# Patient Record
Sex: FEMALE | Race: WHITE | NOT HISPANIC OR LATINO
[De-identification: names, ages, dates, MRNs, and addresses within clinical notes are randomized per-mention and may not be internally consistent; named-entity substitution may affect disease eponyms.]

---

## 2017-01-06 ENCOUNTER — APPOINTMENT (OUTPATIENT)
Dept: PULMONOLOGY | Facility: CLINIC | Age: 47
End: 2017-01-06

## 2017-01-06 VITALS
HEART RATE: 72 BPM | WEIGHT: 189 LBS | BODY MASS INDEX: 37.11 KG/M2 | RESPIRATION RATE: 15 BRPM | SYSTOLIC BLOOD PRESSURE: 120 MMHG | HEIGHT: 60 IN | TEMPERATURE: 99 F | DIASTOLIC BLOOD PRESSURE: 84 MMHG

## 2017-01-12 ENCOUNTER — APPOINTMENT (OUTPATIENT)
Dept: CARDIOLOGY | Facility: CLINIC | Age: 47
End: 2017-01-12

## 2017-01-18 ENCOUNTER — APPOINTMENT (OUTPATIENT)
Dept: CARDIOLOGY | Facility: CLINIC | Age: 47
End: 2017-01-18

## 2017-01-18 ENCOUNTER — NON-APPOINTMENT (OUTPATIENT)
Age: 47
End: 2017-01-18

## 2017-01-18 VITALS
BODY MASS INDEX: 34.36 KG/M2 | SYSTOLIC BLOOD PRESSURE: 136 MMHG | TEMPERATURE: 98.1 F | WEIGHT: 175 LBS | HEIGHT: 60 IN | DIASTOLIC BLOOD PRESSURE: 82 MMHG | OXYGEN SATURATION: 98 % | HEART RATE: 72 BPM | RESPIRATION RATE: 13 BRPM

## 2017-01-24 ENCOUNTER — TRANSCRIPTION ENCOUNTER (OUTPATIENT)
Age: 47
End: 2017-01-24

## 2017-01-24 ENCOUNTER — APPOINTMENT (OUTPATIENT)
Dept: RHEUMATOLOGY | Facility: CLINIC | Age: 47
End: 2017-01-24

## 2017-01-27 ENCOUNTER — APPOINTMENT (OUTPATIENT)
Dept: INTERNAL MEDICINE | Facility: CLINIC | Age: 47
End: 2017-01-27

## 2017-01-27 VITALS
BODY MASS INDEX: 33.98 KG/M2 | HEART RATE: 74 BPM | RESPIRATION RATE: 16 BRPM | DIASTOLIC BLOOD PRESSURE: 74 MMHG | WEIGHT: 174 LBS | SYSTOLIC BLOOD PRESSURE: 128 MMHG | TEMPERATURE: 98.4 F

## 2017-01-27 RX ORDER — CYCLOBENZAPRINE HYDROCHLORIDE 5 MG/1
5 TABLET, FILM COATED ORAL
Qty: 10 | Refills: 0 | Status: COMPLETED | COMMUNITY
Start: 2017-01-23

## 2017-02-05 ENCOUNTER — RESULT REVIEW (OUTPATIENT)
Age: 47
End: 2017-02-05

## 2017-02-21 ENCOUNTER — RX RENEWAL (OUTPATIENT)
Age: 47
End: 2017-02-21

## 2017-05-24 ENCOUNTER — MEDICATION RENEWAL (OUTPATIENT)
Age: 47
End: 2017-05-24

## 2017-06-19 ENCOUNTER — APPOINTMENT (OUTPATIENT)
Dept: ULTRASOUND IMAGING | Facility: CLINIC | Age: 47
End: 2017-06-19

## 2017-06-19 ENCOUNTER — OUTPATIENT (OUTPATIENT)
Dept: OUTPATIENT SERVICES | Facility: HOSPITAL | Age: 47
LOS: 1 days | End: 2017-06-19
Payer: COMMERCIAL

## 2017-06-19 DIAGNOSIS — R10.9 UNSPECIFIED ABDOMINAL PAIN: ICD-10-CM

## 2017-06-19 PROCEDURE — 76700 US EXAM ABDOM COMPLETE: CPT

## 2017-06-29 ENCOUNTER — RESULT REVIEW (OUTPATIENT)
Age: 47
End: 2017-06-29

## 2017-07-27 ENCOUNTER — APPOINTMENT (OUTPATIENT)
Dept: INTERNAL MEDICINE | Facility: CLINIC | Age: 47
End: 2017-07-27
Payer: COMMERCIAL

## 2017-07-27 VITALS
SYSTOLIC BLOOD PRESSURE: 122 MMHG | TEMPERATURE: 97.5 F | RESPIRATION RATE: 14 BRPM | DIASTOLIC BLOOD PRESSURE: 74 MMHG | HEART RATE: 76 BPM | WEIGHT: 187 LBS | BODY MASS INDEX: 36.52 KG/M2

## 2017-07-27 DIAGNOSIS — Z87.09 PERSONAL HISTORY OF OTHER DISEASES OF THE RESPIRATORY SYSTEM: ICD-10-CM

## 2017-07-27 PROCEDURE — 99396 PREV VISIT EST AGE 40-64: CPT | Mod: 25

## 2017-07-27 PROCEDURE — G0447 BEHAVIOR COUNSEL OBESITY 15M: CPT

## 2017-07-27 PROCEDURE — 99215 OFFICE O/P EST HI 40 MIN: CPT | Mod: 25

## 2017-07-27 PROCEDURE — 36415 COLL VENOUS BLD VENIPUNCTURE: CPT

## 2017-07-27 RX ORDER — AMOXICILLIN AND CLAVULANATE POTASSIUM 875; 125 MG/1; MG/1
875-125 TABLET, COATED ORAL
Qty: 20 | Refills: 0 | Status: DISCONTINUED | COMMUNITY
Start: 2017-02-21 | End: 2017-07-27

## 2017-07-27 RX ORDER — FLUTICASONE PROPIONATE 50 UG/1
50 SPRAY, METERED NASAL TWICE DAILY
Qty: 1 | Refills: 1 | Status: DISCONTINUED | COMMUNITY
Start: 2017-01-27 | End: 2017-07-27

## 2017-07-27 RX ORDER — AZITHROMYCIN 250 MG/1
250 TABLET, FILM COATED ORAL
Qty: 6 | Refills: 0 | Status: DISCONTINUED | COMMUNITY
Start: 2017-01-27 | End: 2017-07-27

## 2017-07-27 RX ORDER — FLUCONAZOLE 150 MG/1
150 TABLET ORAL
Qty: 1 | Refills: 0 | Status: DISCONTINUED | COMMUNITY
Start: 2017-05-02

## 2017-07-27 RX ORDER — CIPROFLOXACIN HYDROCHLORIDE 500 MG/1
500 TABLET, FILM COATED ORAL
Qty: 10 | Refills: 0 | Status: DISCONTINUED | COMMUNITY
Start: 2017-05-05

## 2017-07-29 ENCOUNTER — LABORATORY RESULT (OUTPATIENT)
Age: 47
End: 2017-07-29

## 2017-07-31 ENCOUNTER — RX RENEWAL (OUTPATIENT)
Age: 47
End: 2017-07-31

## 2017-07-31 ENCOUNTER — LABORATORY RESULT (OUTPATIENT)
Age: 47
End: 2017-07-31

## 2017-07-31 LAB — BACTERIA THROAT CULT: NORMAL

## 2017-08-03 ENCOUNTER — APPOINTMENT (OUTPATIENT)
Dept: INTERNAL MEDICINE | Facility: CLINIC | Age: 47
End: 2017-08-03
Payer: COMMERCIAL

## 2017-08-03 PROCEDURE — 96372 THER/PROPH/DIAG INJ SC/IM: CPT

## 2017-08-03 RX ORDER — CYANOCOBALAMIN 1000 UG/ML
1000 INJECTION INTRAMUSCULAR; SUBCUTANEOUS
Qty: 0 | Refills: 0 | Status: COMPLETED | OUTPATIENT
Start: 2017-08-03

## 2017-08-03 RX ADMIN — CYANOCOBALAMIN 0 MCG/ML: 1000 INJECTION INTRAMUSCULAR; SUBCUTANEOUS at 00:00

## 2017-08-31 ENCOUNTER — APPOINTMENT (OUTPATIENT)
Dept: RADIOLOGY | Facility: HOSPITAL | Age: 47
End: 2017-08-31

## 2017-08-31 ENCOUNTER — OUTPATIENT (OUTPATIENT)
Dept: OUTPATIENT SERVICES | Facility: HOSPITAL | Age: 47
LOS: 1 days | End: 2017-08-31
Payer: COMMERCIAL

## 2017-08-31 DIAGNOSIS — R05 COUGH: ICD-10-CM

## 2017-08-31 DIAGNOSIS — K21.9 GASTRO-ESOPHAGEAL REFLUX DISEASE WITHOUT ESOPHAGITIS: ICD-10-CM

## 2017-08-31 PROCEDURE — 74220 X-RAY XM ESOPHAGUS 1CNTRST: CPT

## 2017-08-31 PROCEDURE — 74220 X-RAY XM ESOPHAGUS 1CNTRST: CPT | Mod: 26

## 2017-09-05 ENCOUNTER — OUTPATIENT (OUTPATIENT)
Dept: OUTPATIENT SERVICES | Facility: HOSPITAL | Age: 47
LOS: 1 days | End: 2017-09-05
Payer: COMMERCIAL

## 2017-09-05 ENCOUNTER — APPOINTMENT (OUTPATIENT)
Dept: MRI IMAGING | Facility: CLINIC | Age: 47
End: 2017-09-05
Payer: COMMERCIAL

## 2017-09-05 DIAGNOSIS — Z00.8 ENCOUNTER FOR OTHER GENERAL EXAMINATION: ICD-10-CM

## 2017-09-05 PROCEDURE — 72197 MRI PELVIS W/O & W/DYE: CPT

## 2017-09-05 PROCEDURE — A9585: CPT

## 2017-09-05 PROCEDURE — 72197 MRI PELVIS W/O & W/DYE: CPT | Mod: 26

## 2017-09-05 PROCEDURE — C8920: CPT

## 2017-12-28 ENCOUNTER — RESULT REVIEW (OUTPATIENT)
Age: 47
End: 2017-12-28

## 2017-12-30 ENCOUNTER — RX RENEWAL (OUTPATIENT)
Age: 47
End: 2017-12-30

## 2018-01-02 ENCOUNTER — TRANSCRIPTION ENCOUNTER (OUTPATIENT)
Age: 48
End: 2018-01-02

## 2018-01-19 ENCOUNTER — APPOINTMENT (OUTPATIENT)
Dept: OTOLARYNGOLOGY | Facility: CLINIC | Age: 48
End: 2018-01-19
Payer: COMMERCIAL

## 2018-01-19 VITALS
BODY MASS INDEX: 33.04 KG/M2 | HEIGHT: 61 IN | DIASTOLIC BLOOD PRESSURE: 89 MMHG | SYSTOLIC BLOOD PRESSURE: 133 MMHG | WEIGHT: 175 LBS | HEART RATE: 72 BPM

## 2018-01-19 DIAGNOSIS — Z87.19 PERSONAL HISTORY OF OTHER DISEASES OF THE DIGESTIVE SYSTEM: ICD-10-CM

## 2018-01-19 DIAGNOSIS — Z82.61 FAMILY HISTORY OF ARTHRITIS: ICD-10-CM

## 2018-01-19 PROCEDURE — 99214 OFFICE O/P EST MOD 30 MIN: CPT | Mod: 25

## 2018-01-19 PROCEDURE — 31575 DIAGNOSTIC LARYNGOSCOPY: CPT

## 2018-01-22 ENCOUNTER — TRANSCRIPTION ENCOUNTER (OUTPATIENT)
Age: 48
End: 2018-01-22

## 2018-01-31 ENCOUNTER — TRANSCRIPTION ENCOUNTER (OUTPATIENT)
Age: 48
End: 2018-01-31

## 2018-02-17 ENCOUNTER — RX RENEWAL (OUTPATIENT)
Age: 48
End: 2018-02-17

## 2018-02-26 ENCOUNTER — MEDICATION RENEWAL (OUTPATIENT)
Age: 48
End: 2018-02-26

## 2018-03-10 DIAGNOSIS — D64.9 ANEMIA, UNSPECIFIED: ICD-10-CM

## 2018-03-10 DIAGNOSIS — T50.905A TOXIC LIVER DISEASE WITH HEPATITIS, NOT ELSEWHERE CLASSIFIED: ICD-10-CM

## 2018-03-10 DIAGNOSIS — K71.6 TOXIC LIVER DISEASE WITH HEPATITIS, NOT ELSEWHERE CLASSIFIED: ICD-10-CM

## 2018-03-10 RX ORDER — METHYLPREDNISOLONE 4 MG/1
4 TABLET ORAL
Qty: 21 | Refills: 0 | Status: DISCONTINUED | COMMUNITY
Start: 2018-01-19 | End: 2018-03-10

## 2018-03-10 RX ORDER — BENZONATATE 200 MG/1
200 CAPSULE ORAL
Qty: 60 | Refills: 0 | Status: DISCONTINUED | COMMUNITY
Start: 2017-01-27 | End: 2018-03-10

## 2018-03-10 RX ORDER — DOXYCYCLINE HYCLATE 100 MG/1
100 CAPSULE ORAL
Qty: 14 | Refills: 0 | Status: DISCONTINUED | COMMUNITY
Start: 2018-01-02 | End: 2018-03-10

## 2018-03-17 LAB
25(OH)D3 SERPL-MCNC: 8.2 NG/ML
ALBUMIN SERPL ELPH-MCNC: 3.9 G/DL
ALP BLD-CCNC: 39 U/L
ALT SERPL-CCNC: 15 U/L
ANION GAP SERPL CALC-SCNC: 12 MMOL/L
AST SERPL-CCNC: 16 U/L
BASOPHILS # BLD AUTO: 0.03 K/UL
BASOPHILS NFR BLD AUTO: 0.5 %
BILIRUB SERPL-MCNC: 0.4 MG/DL
BUN SERPL-MCNC: 9 MG/DL
CALCIUM SERPL-MCNC: 8.8 MG/DL
CHLORIDE SERPL-SCNC: 101 MMOL/L
CHOLEST SERPL-MCNC: 267 MG/DL
CHOLEST/HDLC SERPL: 3.9 RATIO
CO2 SERPL-SCNC: 25 MMOL/L
CREAT SERPL-MCNC: 0.62 MG/DL
CREAT SPEC-SCNC: 45 MG/DL
EOSINOPHIL # BLD AUTO: 0.11 K/UL
EOSINOPHIL NFR BLD AUTO: 1.8 %
GLUCOSE SERPL-MCNC: 81 MG/DL
HBA1C MFR BLD HPLC: 5.3 %
HCT VFR BLD CALC: 38.5 %
HDLC SERPL-MCNC: 69 MG/DL
HGB BLD-MCNC: 12.2 G/DL
IMM GRANULOCYTES NFR BLD AUTO: 0.3 %
LDLC SERPL CALC-MCNC: 162 MG/DL
LYMPHOCYTES # BLD AUTO: 2.37 K/UL
LYMPHOCYTES NFR BLD AUTO: 38.2 %
MAN DIFF?: NORMAL
MCHC RBC-ENTMCNC: 30.6 PG
MCHC RBC-ENTMCNC: 31.7 GM/DL
MCV RBC AUTO: 96.5 FL
MICROALBUMIN 24H UR DL<=1MG/L-MCNC: 0.4 MG/DL
MICROALBUMIN/CREAT 24H UR-RTO: 9 MG/G
MONOCYTES # BLD AUTO: 0.71 K/UL
MONOCYTES NFR BLD AUTO: 11.4 %
NEUTROPHILS # BLD AUTO: 2.97 K/UL
NEUTROPHILS NFR BLD AUTO: 47.8 %
PLATELET # BLD AUTO: 348 K/UL
POTASSIUM SERPL-SCNC: 4.4 MMOL/L
PROT SERPL-MCNC: 7.1 G/DL
RBC # BLD: 3.99 M/UL
RBC # FLD: 12.7 %
SODIUM SERPL-SCNC: 138 MMOL/L
TRIGL SERPL-MCNC: 178 MG/DL
VIT B12 SERPL-MCNC: 357 PG/ML
WBC # FLD AUTO: 6.21 K/UL

## 2018-03-19 LAB
T4 FREE SERPL-MCNC: 1.2 NG/DL
TSH SERPL-ACNC: 2.14 UIU/ML

## 2018-03-20 ENCOUNTER — APPOINTMENT (OUTPATIENT)
Dept: INTERNAL MEDICINE | Facility: CLINIC | Age: 48
End: 2018-03-20

## 2018-05-11 ENCOUNTER — APPOINTMENT (OUTPATIENT)
Dept: MRI IMAGING | Facility: CLINIC | Age: 48
End: 2018-05-11
Payer: COMMERCIAL

## 2018-05-11 ENCOUNTER — OUTPATIENT (OUTPATIENT)
Dept: OUTPATIENT SERVICES | Facility: HOSPITAL | Age: 48
LOS: 1 days | End: 2018-05-11
Payer: COMMERCIAL

## 2018-05-11 DIAGNOSIS — Z00.8 ENCOUNTER FOR OTHER GENERAL EXAMINATION: ICD-10-CM

## 2018-05-11 PROCEDURE — 72141 MRI NECK SPINE W/O DYE: CPT | Mod: 26

## 2018-05-11 PROCEDURE — 72141 MRI NECK SPINE W/O DYE: CPT

## 2018-06-13 ENCOUNTER — APPOINTMENT (OUTPATIENT)
Dept: ENDOCRINOLOGY | Facility: CLINIC | Age: 48
End: 2018-06-13

## 2018-06-15 ENCOUNTER — TRANSCRIPTION ENCOUNTER (OUTPATIENT)
Age: 48
End: 2018-06-15

## 2018-07-15 ENCOUNTER — RX RENEWAL (OUTPATIENT)
Age: 48
End: 2018-07-15

## 2018-07-20 ENCOUNTER — MEDICATION RENEWAL (OUTPATIENT)
Age: 48
End: 2018-07-20

## 2018-07-20 ENCOUNTER — RX RENEWAL (OUTPATIENT)
Age: 48
End: 2018-07-20

## 2018-08-02 ENCOUNTER — APPOINTMENT (OUTPATIENT)
Dept: CARDIOLOGY | Facility: CLINIC | Age: 48
End: 2018-08-02
Payer: COMMERCIAL

## 2018-08-02 ENCOUNTER — NON-APPOINTMENT (OUTPATIENT)
Age: 48
End: 2018-08-02

## 2018-08-02 VITALS
OXYGEN SATURATION: 100 % | HEART RATE: 74 BPM | DIASTOLIC BLOOD PRESSURE: 82 MMHG | HEIGHT: 61 IN | SYSTOLIC BLOOD PRESSURE: 146 MMHG | TEMPERATURE: 98.5 F | BODY MASS INDEX: 36.44 KG/M2 | WEIGHT: 193 LBS | RESPIRATION RATE: 14 BRPM

## 2018-08-02 PROCEDURE — 99215 OFFICE O/P EST HI 40 MIN: CPT | Mod: 25

## 2018-08-02 PROCEDURE — 93000 ELECTROCARDIOGRAM COMPLETE: CPT

## 2018-08-06 ENCOUNTER — RX RENEWAL (OUTPATIENT)
Age: 48
End: 2018-08-06

## 2018-08-09 ENCOUNTER — LABORATORY RESULT (OUTPATIENT)
Age: 48
End: 2018-08-09

## 2018-08-09 ENCOUNTER — APPOINTMENT (OUTPATIENT)
Dept: ENDOCRINOLOGY | Facility: CLINIC | Age: 48
End: 2018-08-09
Payer: COMMERCIAL

## 2018-08-09 VITALS
DIASTOLIC BLOOD PRESSURE: 86 MMHG | HEIGHT: 61 IN | WEIGHT: 194 LBS | OXYGEN SATURATION: 98 % | HEART RATE: 71 BPM | BODY MASS INDEX: 36.63 KG/M2 | SYSTOLIC BLOOD PRESSURE: 136 MMHG

## 2018-08-09 PROCEDURE — 99214 OFFICE O/P EST MOD 30 MIN: CPT

## 2018-08-09 RX ORDER — PANTOPRAZOLE 40 MG/1
40 TABLET, DELAYED RELEASE ORAL
Qty: 90 | Refills: 1 | Status: DISCONTINUED | COMMUNITY
Start: 2017-01-27 | End: 2018-08-09

## 2018-08-09 RX ORDER — OMEPRAZOLE MAGNESIUM 20 MG/1
20 TABLET, DELAYED RELEASE ORAL DAILY
Qty: 1 | Refills: 3 | Status: DISCONTINUED | COMMUNITY
Start: 2018-01-19 | End: 2018-08-09

## 2018-08-13 LAB
24R-OH-CALCIDIOL SERPL-MCNC: 76.1 PG/ML
25(OH)D3 SERPL-MCNC: 18.6 NG/ML
ALBUMIN SERPL ELPH-MCNC: 4.2 G/DL
ALP BLD-CCNC: 36 U/L
ALT SERPL-CCNC: 11 U/L
ANION GAP SERPL CALC-SCNC: 12 MMOL/L
AST SERPL-CCNC: 17 U/L
BILIRUB SERPL-MCNC: <0.2 MG/DL
BUN SERPL-MCNC: 13 MG/DL
CALCIUM SERPL-MCNC: 8.7 MG/DL
CALCIUM SERPL-MCNC: 8.7 MG/DL
CHLORIDE SERPL-SCNC: 104 MMOL/L
CO2 SERPL-SCNC: 24 MMOL/L
CREAT SERPL-MCNC: 0.62 MG/DL
GLUCOSE SERPL-MCNC: 56 MG/DL
PARATHYROID HORMONE INTACT: 112 PG/ML
POTASSIUM SERPL-SCNC: 4.6 MMOL/L
PROT SERPL-MCNC: 7.3 G/DL
SODIUM SERPL-SCNC: 140 MMOL/L
T3RU NFR SERPL: 1.19 INDEX
T4 SERPL-MCNC: 7.8 UG/DL
TSH SERPL-ACNC: 3.47 UIU/ML
TTG IGA SER IA-ACNC: 6.5 UNITS
TTG IGA SER-ACNC: NEGATIVE
TTG IGG SER IA-ACNC: <5 UNITS
TTG IGG SER IA-ACNC: NEGATIVE

## 2018-10-05 ENCOUNTER — APPOINTMENT (OUTPATIENT)
Dept: OPHTHALMOLOGY | Facility: CLINIC | Age: 48
End: 2018-10-05
Payer: COMMERCIAL

## 2018-10-05 DIAGNOSIS — H18.832 RECURRENT EROSION OF CORNEA, LEFT EYE: ICD-10-CM

## 2018-10-05 PROCEDURE — 92004 COMPRE OPH EXAM NEW PT 1/>: CPT

## 2018-10-15 ENCOUNTER — RESULT CHARGE (OUTPATIENT)
Age: 48
End: 2018-10-15

## 2018-10-17 ENCOUNTER — APPOINTMENT (OUTPATIENT)
Dept: PULMONOLOGY | Facility: CLINIC | Age: 48
End: 2018-10-17
Payer: COMMERCIAL

## 2018-10-17 ENCOUNTER — RESULT CHARGE (OUTPATIENT)
Age: 48
End: 2018-10-17

## 2018-10-17 ENCOUNTER — APPOINTMENT (OUTPATIENT)
Dept: CARDIOLOGY | Facility: CLINIC | Age: 48
End: 2018-10-17
Payer: COMMERCIAL

## 2018-10-17 VITALS
SYSTOLIC BLOOD PRESSURE: 138 MMHG | OXYGEN SATURATION: 100 % | RESPIRATION RATE: 12 BRPM | BODY MASS INDEX: 36.63 KG/M2 | HEART RATE: 69 BPM | WEIGHT: 194 LBS | DIASTOLIC BLOOD PRESSURE: 85 MMHG | HEIGHT: 61 IN

## 2018-10-17 PROCEDURE — 93015 CV STRESS TEST SUPVJ I&R: CPT

## 2018-10-17 PROCEDURE — 99244 OFF/OP CNSLTJ NEW/EST MOD 40: CPT

## 2018-10-23 ENCOUNTER — APPOINTMENT (OUTPATIENT)
Dept: ORTHOPEDIC SURGERY | Facility: CLINIC | Age: 48
End: 2018-10-23
Payer: COMMERCIAL

## 2018-10-23 VITALS — WEIGHT: 194 LBS | HEIGHT: 61 IN | BODY MASS INDEX: 36.63 KG/M2

## 2018-10-23 DIAGNOSIS — M50.20 OTHER CERVICAL DISC DISPLACEMENT, UNSPECIFIED CERVICAL REGION: ICD-10-CM

## 2018-10-23 DIAGNOSIS — M54.12 RADICULOPATHY, CERVICAL REGION: ICD-10-CM

## 2018-10-23 PROCEDURE — 99203 OFFICE O/P NEW LOW 30 MIN: CPT

## 2018-10-25 ENCOUNTER — APPOINTMENT (OUTPATIENT)
Dept: CARDIOLOGY | Facility: CLINIC | Age: 48
End: 2018-10-25
Payer: COMMERCIAL

## 2018-10-25 ENCOUNTER — NON-APPOINTMENT (OUTPATIENT)
Age: 48
End: 2018-10-25

## 2018-10-25 VITALS
HEART RATE: 64 BPM | SYSTOLIC BLOOD PRESSURE: 155 MMHG | DIASTOLIC BLOOD PRESSURE: 86 MMHG | BODY MASS INDEX: 36.63 KG/M2 | OXYGEN SATURATION: 99 % | WEIGHT: 194 LBS | RESPIRATION RATE: 12 BRPM | HEIGHT: 61 IN | TEMPERATURE: 98.1 F

## 2018-10-25 VITALS — SYSTOLIC BLOOD PRESSURE: 150 MMHG | DIASTOLIC BLOOD PRESSURE: 86 MMHG

## 2018-10-25 PROCEDURE — 99214 OFFICE O/P EST MOD 30 MIN: CPT

## 2018-10-25 PROCEDURE — 93000 ELECTROCARDIOGRAM COMPLETE: CPT

## 2018-12-21 ENCOUNTER — APPOINTMENT (OUTPATIENT)
Dept: ORTHOPEDIC SURGERY | Facility: CLINIC | Age: 48
End: 2018-12-21
Payer: COMMERCIAL

## 2018-12-21 VITALS — HEIGHT: 61 IN | WEIGHT: 194 LBS | BODY MASS INDEX: 36.63 KG/M2

## 2018-12-21 DIAGNOSIS — M79.671 PAIN IN RIGHT FOOT: ICD-10-CM

## 2018-12-21 PROCEDURE — 73630 X-RAY EXAM OF FOOT: CPT | Mod: RT

## 2018-12-21 PROCEDURE — 99214 OFFICE O/P EST MOD 30 MIN: CPT

## 2019-01-17 ENCOUNTER — APPOINTMENT (OUTPATIENT)
Dept: CARDIOLOGY | Facility: CLINIC | Age: 49
End: 2019-01-17
Payer: COMMERCIAL

## 2019-01-17 ENCOUNTER — NON-APPOINTMENT (OUTPATIENT)
Age: 49
End: 2019-01-17

## 2019-01-17 VITALS
RESPIRATION RATE: 12 BRPM | TEMPERATURE: 98.2 F | HEART RATE: 70 BPM | HEIGHT: 61 IN | DIASTOLIC BLOOD PRESSURE: 82 MMHG | OXYGEN SATURATION: 98 % | BODY MASS INDEX: 36.25 KG/M2 | WEIGHT: 192 LBS | SYSTOLIC BLOOD PRESSURE: 124 MMHG

## 2019-01-17 VITALS — SYSTOLIC BLOOD PRESSURE: 122 MMHG | DIASTOLIC BLOOD PRESSURE: 78 MMHG

## 2019-01-17 PROCEDURE — 99214 OFFICE O/P EST MOD 30 MIN: CPT | Mod: 25

## 2019-01-17 PROCEDURE — 93000 ELECTROCARDIOGRAM COMPLETE: CPT

## 2019-01-17 RX ORDER — METHYLPREDNISOLONE 4 MG/1
4 TABLET ORAL
Qty: 21 | Refills: 0 | Status: DISCONTINUED | COMMUNITY
Start: 2018-10-23 | End: 2019-01-17

## 2019-01-17 NOTE — PHYSICAL EXAM
[General Appearance - Well Developed] : well developed [Normal Appearance] : normal appearance [Well Groomed] : well groomed [General Appearance - Well Nourished] : well nourished [No Deformities] : no deformities [General Appearance - In No Acute Distress] : no acute distress [Normal Conjunctiva] : the conjunctiva exhibited no abnormalities [Eyelids - No Xanthelasma] : the eyelids demonstrated no xanthelasmas [Normal Oral Mucosa] : normal oral mucosa [No Oral Pallor] : no oral pallor [No Oral Cyanosis] : no oral cyanosis [Normal Jugular Venous A Waves Present] : normal jugular venous A waves present [Normal Jugular Venous V Waves Present] : normal jugular venous V waves present [No Jugular Venous Rich A Waves] : no jugular venous rich A waves [Respiration, Rhythm And Depth] : normal respiratory rhythm and effort [Exaggerated Use Of Accessory Muscles For Inspiration] : no accessory muscle use [Auscultation Breath Sounds / Voice Sounds] : lungs were clear to auscultation bilaterally [Heart Rate And Rhythm] : heart rate and rhythm were normal [Heart Sounds] : normal S1 and S2 [Murmurs] : no murmurs present [Abdomen Soft] : soft [Abdomen Tenderness] : non-tender [Abdomen Mass (___ Cm)] : no abdominal mass palpated [Abnormal Walk] : normal gait [Gait - Sufficient For Exercise Testing] : the gait was sufficient for exercise testing [Nail Clubbing] : no clubbing of the fingernails [Cyanosis, Localized] : no localized cyanosis [Petechial Hemorrhages (___cm)] : no petechial hemorrhages [Skin Color & Pigmentation] : normal skin color and pigmentation [] : no rash [No Venous Stasis] : no venous stasis [Skin Lesions] : no skin lesions [No Skin Ulcers] : no skin ulcer [No Xanthoma] : no  xanthoma was observed [Oriented To Time, Place, And Person] : oriented to person, place, and time [Affect] : the affect was normal [Mood] : the mood was normal [No Anxiety] : not feeling anxious

## 2019-01-17 NOTE — REVIEW OF SYSTEMS
[Shortness Of Breath] : shortness of breath [Dyspnea on exertion] : dyspnea during exertion [Negative] : Heme/Lymph [Recent Weight Gain (___ Lbs)] : no recent weight gain [Chest Pain] : no chest pain [Lower Ext Edema] : no extremity edema

## 2019-01-17 NOTE — HISTORY OF PRESENT ILLNESS
[FreeTextEntry1] : [facility] Bp readings have been much higher than here. Taking amlodipine every other day. Not taking crestor regularly. Very tired and no energy.

## 2019-01-17 NOTE — DISCUSSION/SUMMARY
[___ Month(s)] : [unfilled] month(s) [FreeTextEntry1] : The patient is a 48-year-old female strong family history of premature coronary artery disease, hypothyroidism, hyperlipidemia,hypertension with fatigue.\par #1 Htn- increase amlodipine to daily, stress test next visit\par #2 Lipids- compliance crestor, labs \par #3 Hypothyroid- increased dose, f/u endo\par #4 General- We discussed adherence to a low fat low cholesterol, diet, weight loss and regular daily exercise.\par

## 2019-01-28 ENCOUNTER — APPOINTMENT (OUTPATIENT)
Dept: INTERNAL MEDICINE | Facility: CLINIC | Age: 49
End: 2019-01-28
Payer: COMMERCIAL

## 2019-01-28 VITALS — BODY MASS INDEX: 36.25 KG/M2 | WEIGHT: 192 LBS | HEIGHT: 61 IN | HEART RATE: 79 BPM | OXYGEN SATURATION: 99 %

## 2019-01-28 DIAGNOSIS — Z00.01 ENCOUNTER FOR GENERAL ADULT MEDICAL EXAMINATION WITH ABNORMAL FINDINGS: ICD-10-CM

## 2019-01-28 PROCEDURE — 99396 PREV VISIT EST AGE 40-64: CPT | Mod: 25

## 2019-01-28 PROCEDURE — 99215 OFFICE O/P EST HI 40 MIN: CPT | Mod: 25

## 2019-01-28 PROCEDURE — G0447 BEHAVIOR COUNSEL OBESITY 15M: CPT

## 2019-01-28 PROCEDURE — G0444 DEPRESSION SCREEN ANNUAL: CPT

## 2019-01-28 PROCEDURE — G0442 ANNUAL ALCOHOL SCREEN 15 MIN: CPT

## 2019-01-29 VITALS — RESPIRATION RATE: 14 BRPM | SYSTOLIC BLOOD PRESSURE: 136 MMHG | DIASTOLIC BLOOD PRESSURE: 84 MMHG

## 2019-01-29 PROBLEM — Z00.01 ANNUAL VISIT FOR GENERAL ADULT MEDICAL EXAMINATION WITH ABNORMAL FINDINGS: Status: ACTIVE | Noted: 2019-01-29

## 2019-01-29 NOTE — COUNSELING
[ - Annual Alcohol Misuse Screening] : Annual Alcohol Misuse Screening [ - Behavioral Counseling for Obesity (Face-to-Face for 15 Minutes)] : Behavioral Counseling for Obesity (Face-to-Face for 15 Minutes) [ - Annual Depression Screening] : Annual Depression Screening

## 2019-01-29 NOTE — ASSESSMENT
[FreeTextEntry1] : 1) Annual physical: Patient presents for annual physical, influenza previously administered and PPD done. Patient will be scheduling appointment for mammogram, colonoscopy and Pap smear. Patient follows with cardiology\par 2) Fatigue: May be chronic fatigue syndrome vs possibly fibromyalgia. Patient denies any arthralgias. Check TSH, CPK and Vitamin B12. Explained sleep hygiene and starting exercise program. Discussed possibility of duloxetine\par 3) Obesity: Discussed starting elliptical bike and limiting red meat\par 4) Prediabetes: Discussed starting exercise program and limiting red meat and ice cream\par 5) Low Vitamin B12 levels: Check levels as patient states she has fatigue\par 6) Hypothyroidism: Check TSH\par 7) Hypovitaminosis D: Check levels\par 8) Hyperlipidemia: Explained limiting red meat and ice cream. Advised starting exercise program on stationary bike.

## 2019-01-29 NOTE — PHYSICAL EXAM
[No Acute Distress] : no acute distress [Well Nourished] : well nourished [Well Developed] : well developed [Well-Appearing] : well-appearing [Normal Sclera/Conjunctiva] : normal sclera/conjunctiva [PERRL] : pupils equal round and reactive to light [EOMI] : extraocular movements intact [Normal Outer Ear/Nose] : the outer ears and nose were normal in appearance [Normal Oropharynx] : the oropharynx was normal [Normal TMs] : both tympanic membranes were normal [Normal Nasal Mucosa] : the nasal mucosa was normal [No JVD] : no jugular venous distention [Supple] : supple [No Lymphadenopathy] : no lymphadenopathy [Thyroid Normal, No Nodules] : the thyroid was normal and there were no nodules present [No Respiratory Distress] : no respiratory distress  [Clear to Auscultation] : lungs were clear to auscultation bilaterally [No Accessory Muscle Use] : no accessory muscle use [Normal Rate] : normal rate  [Regular Rhythm] : with a regular rhythm [Normal S1, S2] : normal S1 and S2 [No Murmur] : no murmur heard [No Carotid Bruits] : no carotid bruits [No Abdominal Bruit] : a ~M bruit was not heard ~T in the abdomen [No Varicosities] : no varicosities [Pedal Pulses Present] : the pedal pulses are present [No Edema] : there was no peripheral edema [No Extremity Clubbing/Cyanosis] : no extremity clubbing/cyanosis [No Palpable Aorta] : no palpable aorta [Soft] : abdomen soft [Non Tender] : non-tender [Non-distended] : non-distended [No Masses] : no abdominal mass palpated [No HSM] : no HSM [Normal Bowel Sounds] : normal bowel sounds [Normal Supraclavicular Nodes] : no supraclavicular lymphadenopathy [Normal Axillary Nodes] : no axillary lymphadenopathy [Normal Posterior Cervical Nodes] : no posterior cervical lymphadenopathy [Normal Anterior Cervical Nodes] : no anterior cervical lymphadenopathy [Normal Inguinal Nodes] : no inguinal lymphadenopathy [No CVA Tenderness] : no CVA  tenderness [No Spinal Tenderness] : no spinal tenderness [No Joint Swelling] : no joint swelling [Grossly Normal Strength/Tone] : grossly normal strength/tone [No Rash] : no rash [No Skin Lesions] : no skin lesions [Normal Gait] : normal gait [Coordination Grossly Intact] : coordination grossly intact [No Focal Deficits] : no focal deficits [Normal Affect] : the affect was normal [Normal Insight/Judgement] : insight and judgment were intact [de-identified] : Will be examined by gynecologist in one week

## 2019-01-29 NOTE — HISTORY OF PRESENT ILLNESS
[FreeTextEntry1] : Patient presents for annual physical [de-identified] : Patient states she has fatigue and has decreased energy. Feels soreness in legs. States she comes home from work and needs to rest before preparing dinner. Sleeps approximately 5 hours every night and is currently on CPAP machine. Feels slight difference when sleeping more hours. Denies any muscle weakness, numbness, sleepiness or muscle fatigue.

## 2019-01-31 ENCOUNTER — NON-APPOINTMENT (OUTPATIENT)
Age: 49
End: 2019-01-31

## 2019-01-31 ENCOUNTER — APPOINTMENT (OUTPATIENT)
Dept: PULMONOLOGY | Facility: CLINIC | Age: 49
End: 2019-01-31
Payer: COMMERCIAL

## 2019-01-31 VITALS
TEMPERATURE: 98.3 F | HEIGHT: 61 IN | OXYGEN SATURATION: 100 % | WEIGHT: 195 LBS | RESPIRATION RATE: 14 BRPM | DIASTOLIC BLOOD PRESSURE: 83 MMHG | BODY MASS INDEX: 36.82 KG/M2 | SYSTOLIC BLOOD PRESSURE: 139 MMHG | HEART RATE: 68 BPM

## 2019-01-31 DIAGNOSIS — R06.83 SNORING: ICD-10-CM

## 2019-01-31 PROCEDURE — 94060 EVALUATION OF WHEEZING: CPT

## 2019-01-31 PROCEDURE — 99214 OFFICE O/P EST MOD 30 MIN: CPT | Mod: 25

## 2019-02-01 ENCOUNTER — LABORATORY RESULT (OUTPATIENT)
Age: 49
End: 2019-02-01

## 2019-02-01 ENCOUNTER — RESULT REVIEW (OUTPATIENT)
Age: 49
End: 2019-02-01

## 2019-02-03 ENCOUNTER — MESSAGE (OUTPATIENT)
Age: 49
End: 2019-02-03

## 2019-02-03 LAB
25(OH)D3 SERPL-MCNC: 20.8 NG/ML
ALBUMIN SERPL ELPH-MCNC: 4.1 G/DL
ALP BLD-CCNC: 42 U/L
ALT SERPL-CCNC: 10 U/L
ANION GAP SERPL CALC-SCNC: 10 MMOL/L
AST SERPL-CCNC: 11 U/L
BASOPHILS # BLD AUTO: 0.03 K/UL
BASOPHILS NFR BLD AUTO: 0.5 %
BILIRUB SERPL-MCNC: 0.3 MG/DL
BUN SERPL-MCNC: 12 MG/DL
CALCIUM SERPL-MCNC: 9.3 MG/DL
CHLORIDE SERPL-SCNC: 103 MMOL/L
CHOLEST SERPL-MCNC: 236 MG/DL
CHOLEST/HDLC SERPL: 3.6 RATIO
CK SERPL-CCNC: 129 U/L
CO2 SERPL-SCNC: 24 MMOL/L
CREAT SERPL-MCNC: 0.73 MG/DL
EOSINOPHIL # BLD AUTO: 0.15 K/UL
EOSINOPHIL NFR BLD AUTO: 2.6 %
GLUCOSE SERPL-MCNC: 82 MG/DL
HBA1C MFR BLD HPLC: 5.4 %
HCT VFR BLD CALC: 40.1 %
HCV AB SER QL: NONREACTIVE
HCV S/CO RATIO: 0.22 S/CO
HDLC SERPL-MCNC: 66 MG/DL
HGB BLD-MCNC: 12.9 G/DL
HIV1+2 AB SPEC QL IA.RAPID: NONREACTIVE
IMM GRANULOCYTES NFR BLD AUTO: 0.2 %
LDLC SERPL CALC-MCNC: 129 MG/DL
LYMPHOCYTES # BLD AUTO: 1.95 K/UL
LYMPHOCYTES NFR BLD AUTO: 33.7 %
MAN DIFF?: NORMAL
MCHC RBC-ENTMCNC: 31.2 PG
MCHC RBC-ENTMCNC: 32.2 GM/DL
MCV RBC AUTO: 96.9 FL
MONOCYTES # BLD AUTO: 0.67 K/UL
MONOCYTES NFR BLD AUTO: 11.6 %
NEUTROPHILS # BLD AUTO: 2.98 K/UL
NEUTROPHILS NFR BLD AUTO: 51.4 %
PLATELET # BLD AUTO: 315 K/UL
POTASSIUM SERPL-SCNC: 4.3 MMOL/L
PROT SERPL-MCNC: 6.8 G/DL
RBC # BLD: 4.14 M/UL
RBC # FLD: 13.2 %
SODIUM SERPL-SCNC: 137 MMOL/L
TRIGL SERPL-MCNC: 204 MG/DL
TSH SERPL-ACNC: 2.76 UIU/ML
VIT B12 SERPL-MCNC: 349 PG/ML
WBC # FLD AUTO: 5.79 K/UL

## 2019-02-04 ENCOUNTER — APPOINTMENT (OUTPATIENT)
Dept: ENDOCRINOLOGY | Facility: CLINIC | Age: 49
End: 2019-02-04
Payer: COMMERCIAL

## 2019-02-04 VITALS
WEIGHT: 195 LBS | SYSTOLIC BLOOD PRESSURE: 130 MMHG | HEART RATE: 87 BPM | OXYGEN SATURATION: 98 % | BODY MASS INDEX: 36.82 KG/M2 | TEMPERATURE: 98.5 F | HEIGHT: 61 IN | DIASTOLIC BLOOD PRESSURE: 70 MMHG

## 2019-02-04 PROCEDURE — 99204 OFFICE O/P NEW MOD 45 MIN: CPT

## 2019-02-04 PROCEDURE — 99214 OFFICE O/P EST MOD 30 MIN: CPT

## 2019-02-04 NOTE — HISTORY OF PRESENT ILLNESS
[FreeTextEntry1] : CC: Hypothyroidism\par This is a 48-year-old female with primary hypothyroidism, here for consultation. She reports that she was diagnosed with hypothyroidism approximately 2 years ago. She is currently on levothyroxine 75 mcg daily. She takes in the morning on an empty stomach. She complains of fatigue, feeling tired, bloating.\par She was also found to have an elevated PTH level (112). She also has a history of vitamin D insufficiency and is currently on ergocalciferol 50,000 international unit weekly.

## 2019-02-04 NOTE — ASSESSMENT
[FreeTextEntry1] : This is a 48-year-old female with primary hypothyroidism, vitamin D insufficiency, elevated PTH level.\par 1.Primary hypothyroidism\par Patient provided with a sample of Synthroid 75 mcg daily to determine if she notices a difference between generic and brand name. She will call in 2 weeks if she would like Synthroid brand sent to her pharmacy.\par 2. Vitamin D insufficiency\par Continue with ergocalciferol 50,000 international unit weekly. Compliance was advised.\par 3.High PTH level\par Likely due to vitamin D insufficiency. Will check PTH level once vitamin D is replete.

## 2019-02-04 NOTE — REVIEW OF SYSTEMS
[Fatigue] : fatigue [Recent Weight Gain (___ Lbs)] : recent [unfilled] ~Ulb weight gain [Gas/Bloating] : gas/bloating [All other systems negative] : All other systems negative

## 2019-02-04 NOTE — PHYSICAL EXAM
[Alert] : alert [No Acute Distress] : no acute distress [Well Nourished] : well nourished [Well Developed] : well developed [Healthy Appearance] : healthy appearance [Normal Sclera/Conjunctiva] : normal sclera/conjunctiva [Normal Oropharynx] : the oropharynx was normal [No Neck Mass] : no neck mass was observed [Supple] : the neck was supple [No LAD] : no lymphadenopathy [Thyroid Not Enlarged] : the thyroid was not enlarged [No Thyroid Nodules] : there were no palpable thyroid nodules [No Respiratory Distress] : no respiratory distress [Normal Rate and Effort] : normal respiratory rhythm and effort [No Accessory Muscle Use] : no accessory muscle use [Clear to Auscultation] : lungs were clear to auscultation bilaterally [Normal Rate] : heart rate was normal  [Normal S1, S2] : normal S1 and S2 [Regular Rhythm] : with a regular rhythm [No Edema] : there was no peripheral edema [Not Distended] : not distended [No Stigmata of Cushings Syndrome] : no stigmata of cushings syndrome [Normal Gait] : normal gait [Normal Insight/Judgement] : insight and judgment were intact [Normal Affect] : the affect was normal [Normal Mood] : the mood was normal

## 2019-02-04 NOTE — CONSULT LETTER
[Dear  ___] : Dear  [unfilled], [Consult Letter:] : I had the pleasure of evaluating your patient, [unfilled]. [Please see my note below.] : Please see my note below. [Consult Closing:] : Thank you very much for allowing me to participate in the care of this patient.  If you have any questions, please do not hesitate to contact me. [Sincerely,] : Sincerely, [FreeTextEntry2] : Dr Jarvis Osorio  [FreeTextEntry3] : Kimi Villarreal M.D., F.A.CPeeE.\par

## 2019-02-05 LAB
THYROGLOB AB SERPL-ACNC: <20 IU/ML
THYROPEROXIDASE AB SERPL IA-ACNC: 12 IU/ML

## 2019-02-08 ENCOUNTER — LABORATORY RESULT (OUTPATIENT)
Age: 49
End: 2019-02-08

## 2019-02-14 LAB
CELIAC DISEASE INTERPRETATION: NORMAL
CELIAC GENE PAIRS PRESENT: YES
DQ ALPHA 1: NORMAL
DQ BETA 1: NORMAL
IMMUNOGLOBULIN A (IGA): 193 MG/DL

## 2019-02-27 ENCOUNTER — EMERGENCY (EMERGENCY)
Facility: HOSPITAL | Age: 49
LOS: 1 days | End: 2019-02-27
Attending: EMERGENCY MEDICINE
Payer: COMMERCIAL

## 2019-02-27 VITALS
WEIGHT: 179.9 LBS | HEIGHT: 61 IN | SYSTOLIC BLOOD PRESSURE: 161 MMHG | HEART RATE: 95 BPM | RESPIRATION RATE: 18 BRPM | TEMPERATURE: 98 F | DIASTOLIC BLOOD PRESSURE: 95 MMHG | OXYGEN SATURATION: 100 %

## 2019-02-27 LAB
ALBUMIN SERPL ELPH-MCNC: 4.5 G/DL — SIGNIFICANT CHANGE UP (ref 3.3–5)
ALP SERPL-CCNC: 39 U/L — LOW (ref 40–120)
ALT FLD-CCNC: 13 U/L — SIGNIFICANT CHANGE UP (ref 10–45)
ANION GAP SERPL CALC-SCNC: 11 MMOL/L — SIGNIFICANT CHANGE UP (ref 5–17)
ANION GAP SERPL CALC-SCNC: 12 MMOL/L — SIGNIFICANT CHANGE UP (ref 5–17)
APTT BLD: 27.8 SEC — SIGNIFICANT CHANGE UP (ref 27.5–36.3)
AST SERPL-CCNC: 41 U/L — HIGH (ref 10–40)
BILIRUB SERPL-MCNC: 0.2 MG/DL — SIGNIFICANT CHANGE UP (ref 0.2–1.2)
BLD GP AB SCN SERPL QL: NEGATIVE — SIGNIFICANT CHANGE UP
BUN SERPL-MCNC: 10 MG/DL — SIGNIFICANT CHANGE UP (ref 7–23)
BUN SERPL-MCNC: 9 MG/DL — SIGNIFICANT CHANGE UP (ref 7–23)
CALCIUM SERPL-MCNC: 9 MG/DL — SIGNIFICANT CHANGE UP (ref 8.4–10.5)
CALCIUM SERPL-MCNC: 9.3 MG/DL — SIGNIFICANT CHANGE UP (ref 8.4–10.5)
CHLORIDE SERPL-SCNC: 103 MMOL/L — SIGNIFICANT CHANGE UP (ref 96–108)
CHLORIDE SERPL-SCNC: 106 MMOL/L — SIGNIFICANT CHANGE UP (ref 96–108)
CO2 SERPL-SCNC: 24 MMOL/L — SIGNIFICANT CHANGE UP (ref 22–31)
CO2 SERPL-SCNC: 24 MMOL/L — SIGNIFICANT CHANGE UP (ref 22–31)
CREAT SERPL-MCNC: 0.57 MG/DL — SIGNIFICANT CHANGE UP (ref 0.5–1.3)
CREAT SERPL-MCNC: 0.58 MG/DL — SIGNIFICANT CHANGE UP (ref 0.5–1.3)
GLUCOSE SERPL-MCNC: 113 MG/DL — HIGH (ref 70–99)
GLUCOSE SERPL-MCNC: 79 MG/DL — SIGNIFICANT CHANGE UP (ref 70–99)
HCT VFR BLD CALC: 37.3 % — SIGNIFICANT CHANGE UP (ref 34.5–45)
HGB BLD-MCNC: 12.8 G/DL — SIGNIFICANT CHANGE UP (ref 11.5–15.5)
INR BLD: 0.92 RATIO — SIGNIFICANT CHANGE UP (ref 0.88–1.16)
MCHC RBC-ENTMCNC: 32.2 PG — SIGNIFICANT CHANGE UP (ref 27–34)
MCHC RBC-ENTMCNC: 34.3 GM/DL — SIGNIFICANT CHANGE UP (ref 32–36)
MCV RBC AUTO: 93.9 FL — SIGNIFICANT CHANGE UP (ref 80–100)
PLATELET # BLD AUTO: 298 K/UL — SIGNIFICANT CHANGE UP (ref 150–400)
POTASSIUM SERPL-MCNC: 4.1 MMOL/L — SIGNIFICANT CHANGE UP (ref 3.5–5.3)
POTASSIUM SERPL-MCNC: 5.5 MMOL/L — HIGH (ref 3.5–5.3)
POTASSIUM SERPL-SCNC: 4.1 MMOL/L — SIGNIFICANT CHANGE UP (ref 3.5–5.3)
POTASSIUM SERPL-SCNC: 5.5 MMOL/L — HIGH (ref 3.5–5.3)
PROT SERPL-MCNC: 8.3 G/DL — SIGNIFICANT CHANGE UP (ref 6–8.3)
PROTHROM AB SERPL-ACNC: 10.5 SEC — SIGNIFICANT CHANGE UP (ref 10–12.9)
RBC # BLD: 3.97 M/UL — SIGNIFICANT CHANGE UP (ref 3.8–5.2)
RBC # FLD: 12.2 % — SIGNIFICANT CHANGE UP (ref 10.3–14.5)
RH IG SCN BLD-IMP: POSITIVE — SIGNIFICANT CHANGE UP
SODIUM SERPL-SCNC: 138 MMOL/L — SIGNIFICANT CHANGE UP (ref 135–145)
SODIUM SERPL-SCNC: 142 MMOL/L — SIGNIFICANT CHANGE UP (ref 135–145)
TROPONIN T, HIGH SENSITIVITY RESULT: <6 NG/L — SIGNIFICANT CHANGE UP (ref 0–51)
TROPONIN T, HIGH SENSITIVITY RESULT: <6 NG/L — SIGNIFICANT CHANGE UP (ref 0–51)
WBC # BLD: 8.8 K/UL — SIGNIFICANT CHANGE UP (ref 3.8–10.5)
WBC # FLD AUTO: 8.8 K/UL — SIGNIFICANT CHANGE UP (ref 3.8–10.5)

## 2019-02-27 PROCEDURE — 99284 EMERGENCY DEPT VISIT MOD MDM: CPT | Mod: GC

## 2019-02-27 PROCEDURE — 99220: CPT

## 2019-02-27 PROCEDURE — 93010 ELECTROCARDIOGRAM REPORT: CPT

## 2019-02-27 PROCEDURE — 71046 X-RAY EXAM CHEST 2 VIEWS: CPT | Mod: 26

## 2019-02-27 RX ORDER — ASPIRIN/CALCIUM CARB/MAGNESIUM 324 MG
324 TABLET ORAL ONCE
Qty: 0 | Refills: 0 | Status: COMPLETED | OUTPATIENT
Start: 2019-02-27 | End: 2019-02-27

## 2019-02-27 RX ORDER — SODIUM CHLORIDE 9 MG/ML
3 INJECTION INTRAMUSCULAR; INTRAVENOUS; SUBCUTANEOUS EVERY 8 HOURS
Qty: 0 | Refills: 0 | Status: DISCONTINUED | OUTPATIENT
Start: 2019-02-27 | End: 2019-03-03

## 2019-02-27 RX ORDER — ATORVASTATIN CALCIUM 80 MG/1
40 TABLET, FILM COATED ORAL AT BEDTIME
Qty: 0 | Refills: 0 | Status: DISCONTINUED | OUTPATIENT
Start: 2019-02-27 | End: 2019-03-03

## 2019-02-27 RX ORDER — SODIUM,POTASSIUM PHOSPHATES 278-250MG
1 POWDER IN PACKET (EA) ORAL ONCE
Qty: 0 | Refills: 0 | Status: COMPLETED | OUTPATIENT
Start: 2019-02-27 | End: 2019-02-27

## 2019-02-27 RX ORDER — ACETAMINOPHEN 500 MG
975 TABLET ORAL ONCE
Qty: 0 | Refills: 0 | Status: COMPLETED | OUTPATIENT
Start: 2019-02-27 | End: 2019-02-27

## 2019-02-27 RX ORDER — AMLODIPINE BESYLATE 2.5 MG/1
2.5 TABLET ORAL DAILY
Qty: 0 | Refills: 0 | Status: DISCONTINUED | OUTPATIENT
Start: 2019-02-27 | End: 2019-03-03

## 2019-02-27 RX ORDER — LEVOTHYROXINE SODIUM 125 MCG
75 TABLET ORAL DAILY
Qty: 0 | Refills: 0 | Status: DISCONTINUED | OUTPATIENT
Start: 2019-02-27 | End: 2019-03-03

## 2019-02-27 RX ADMIN — Medication 324 MILLIGRAM(S): at 15:12

## 2019-02-27 RX ADMIN — Medication 975 MILLIGRAM(S): at 21:31

## 2019-02-27 RX ADMIN — Medication 1 PACKET(S): at 22:13

## 2019-02-27 RX ADMIN — ATORVASTATIN CALCIUM 40 MILLIGRAM(S): 80 TABLET, FILM COATED ORAL at 22:12

## 2019-02-27 RX ADMIN — SODIUM CHLORIDE 3 MILLILITER(S): 9 INJECTION INTRAMUSCULAR; INTRAVENOUS; SUBCUTANEOUS at 23:54

## 2019-02-27 RX ADMIN — Medication 975 MILLIGRAM(S): at 22:01

## 2019-02-27 RX ADMIN — AMLODIPINE BESYLATE 2.5 MILLIGRAM(S): 2.5 TABLET ORAL at 22:12

## 2019-02-27 NOTE — ED PROVIDER NOTE - OBJECTIVE STATEMENT
49 y.o. female coming in with intermittent chest pain over the past several months associated with some shortness of breath that has been progressively getting worse.  Pain is described as pressure but also sharp with radiation to the left shoulder.  Pain and shortness of breath are both worse with exertion, stopping her in her tracks earlier today.  No cough, no fevers, no chills.  No diaphoresis, abd pain, nausea or vomiting.

## 2019-02-27 NOTE — ED CDU PROVIDER INITIAL DAY NOTE - PROGRESS NOTE DETAILS
CDU PROGRESS NOTE DESHAWN WEI: Pt resting comfortably, NAD, No events on telemetry. Reports having mild headache, will order Tylenol 975mg po and continue to monitor. CDU PROGRESS NOTE PA ERIBERTO: Pt resting comfortably, without complaint. NAD, VSS. No events on telemetry. Will continue to monitor.

## 2019-02-27 NOTE — CONSULT NOTE ADULT - NSHPATTENDINGPLANDISCUSS_GEN_ALL_CORE
Plan discussed with cardiology fellow, Dr. RADHIKA Eden; patient seen and examined.       I was physically present for the key portions of the evaluation and management (E/M) service provided.    I agree with the above history, physical, and plan which I have reviewed and edited where appropriate.

## 2019-02-27 NOTE — ED PROVIDER NOTE - ATTENDING CONTRIBUTION TO CARE
Dr. Graham (Attending Physician)  I performed a history and physical exam of the patient and discussed their management with the advanced care provider. I reviewed the advanced care provider's note and agree with the documented findings and plan of care. My medical decision making and objective findings are found above.

## 2019-02-27 NOTE — ED CDU PROVIDER INITIAL DAY NOTE - ATTENDING CONTRIBUTION TO CARE
49 y.o. female coming in with intermittent chest pain over the past several months associated with some shortness of breath that has been progressively getting worse.  Pain is described as pressure but also sharp with radiation to the left shoulder.  Pain and shortness of breath are both worse with exertion, stopping her in her tracks earlier today.  No cough, no fevers, no chills.  No diaphoresis, abd pain, nausea or vomiting.  PE lungs CTA. no leg swelling. no abd TTP.   labs nonactionable. trop negative. cxr negative. ekg with TWI. cards consulted, patient follows with Dr. gaffney.   no known PE risk factors, vs stable.   patient transferred to CDU for serial trops, tele monitoring, and provocative stress testing.

## 2019-02-27 NOTE — CONSULT NOTE ADULT - SUBJECTIVE AND OBJECTIVE BOX
Patient seen and evaluated at bedside    Chief Complaint: chest pain    HPI:  Ms Stapleton is a 50 yo F with a PMH significant for HTN, HLD, hypothyroidism, strong family history of heart disease who presents after 2 episodes of chest pain. Patient notes she has been dealing with chest discomfort for months and has been following with Dr Castaneda. She had an exercise stress test in Oct 2018 which was significant for 2mm horizontal depressions in V3-V6. She recently followed with Dr Castaneda in Jan 2019 and plan was to repeat stress test at next visit. She states that over the past week she has continued to experience chest discomfort that "wraps around to the back." Today, she walked down and back up a flight of stairs and when she got to the top she experienced sharp, 9/10, substernal, nonradiating chest pain. She walked to her car without any change in pain but by the time she got to her car she was winded and felt SOB. Her symptoms subsided and she went to her office desk at which point she experienced the discomfort again with radiation up to the shoulder. She says the symptoms are similar to her prior discomfort but were more severe so she came to the ED. She endorses associated palpitations but denies nausea, vomiting.     In the ED,       PMHx:   Hypothyroid  Diverticulitis  LBP (Low Back Pain)  History of Nasal Septoplasty  h/o Heart Palpitations  Hyperlipidemia on no meds now  Irritable Bowel Syndrome  Stress Incontinence      PSHx:   h/o dental implant  History of Colonoscopy  h/o  Endoscopy  C Section - x 1 - 1994      Allergies:  iodine (Hives)  Zosyn (Urticaria)      Home Meds:    Current Medications:       FAMILY HISTORY:  Family history of colon cancer (Grandparent)      Social History:  Smoking History:  Alcohol Use:  Drug Use:    REVIEW OF SYSTEMS:  CONSTITUTIONAL: No weakness, fevers or chills  EYES/ENT: No visual changes;  No dysphagia  NECK: No pain or stiffness  RESPIRATORY: No cough, wheezing, hemoptysis; No shortness of breath  CARDIOVASCULAR: No chest pain or palpitations; No lower extremity edema  GASTROINTESTINAL: No abdominal or epigastric pain. No nausea, vomiting, or hematemesis; No diarrhea or constipation. No melena or hematochezia.  BACK: No back pain  GENITOURINARY: No dysuria, frequency or hematuria  NEUROLOGICAL: No numbness or weakness  SKIN: No itching, burning, rashes, or lesions   All other review of systems is negative unless indicated above.    Physical Exam:  T(F): 98.2 (02-27), Max: 98.2 (02-27)  HR: 98 (02-27) (95 - 99)  BP: 154/90 (02-27) (154/90 - 162/95)  RR: 18 (02-27)  SpO2: 99% (02-27)  GENERAL: No acute distress, well-developed  HEAD:  Atraumatic, Normocephalic  ENT: EOMI, PERRLA, conjunctiva and sclera clear, Neck supple, No JVD, moist mucosa  CHEST/LUNG: Clear to auscultation bilaterally; No wheeze, equal breath sounds bilaterally   BACK: No spinal tenderness  HEART: Regular rate and rhythm; No murmurs, rubs, or gallops  ABDOMEN: Soft, Nontender, Nondistended; Bowel sounds present  EXTREMITIES:  No clubbing, cyanosis, or edema  PSYCH: Nl behavior, nl affect  NEUROLOGY: AAOx3, non-focal, cranial nerves intact  SKIN: Normal color, No rashes or lesions  LINES:    Cardiovascular Diagnostic Testing:    ECG: Personally reviewed:    Echo: Personally reviewed:    Stress Testing:    Cath:    Imaging:    CXR: Personally reviewed    Labs: Personally reviewed                        12.8   8.8   )-----------( 298      ( 27 Feb 2019 14:45 )             37.3     02-27    142  |  106  |  9   ----------------------------<  79  4.1   |  24  |  0.58    Ca    9.0      27 Feb 2019 18:14  Phos  2.3     02-27  Mg     2.2     02-27    TPro  8.3  /  Alb  4.5  /  TBili  0.2  /  DBili  x   /  AST  41<H>  /  ALT  13  /  AlkPhos  39<L>  02-27    PT/INR - ( 27 Feb 2019 14:45 )   PT: 10.5 sec;   INR: 0.92 ratio         PTT - ( 27 Feb 2019 14:45 )  PTT:27.8 sec    CARDIAC MARKERS ( 27 Feb 2019 18:14 )  <6 ng/L / x     / x     / x     / x     / x      CARDIAC MARKERS ( 27 Feb 2019 14:45 )  <6 ng/L / x     / x     / x     / x     / x Patient seen and evaluated at bedside    Chief Complaint: chest pain    HPI:  Ms Stapleton is a 50 yo F with a PMH significant for HTN, HLD, hypothyroidism, strong family history of heart disease who presents after 2 episodes of chest pain. Patient notes she has been dealing with chest discomfort for months and has been following with Dr Castaneda. She had an exercise stress test in Oct 2018 which was significant for 2mm horizontal depressions in V3-V6. She recently followed with Dr Castaneda in 2019 and plan was to repeat stress test at next visit. She states that over the past week she has continued to experience chest discomfort that "wraps around to the back." Today, she walked down and back up a flight of stairs and when she got to the top she experienced sharp, 9/10, substernal, nonradiating chest pain. She walked to her car without any change in pain but by the time she got to her car she was winded and felt SOB. Her symptoms subsided and she went to her office desk at which point she experienced the discomfort again with radiation up to the shoulder. She says the symptoms are similar to her prior discomfort but were more severe so she came to the ED. She endorses associated palpitations but denies nausea, vomiting.    In the ED, T 36.7, P 95, /95, RR 18, O2 sat 100. EKG NSR 99, baseline subtle ST depressions I, II, aVF, V3-V6. HsTrop < 6 x 2. Patient asymptomatic.      PMHx:   Hypothyroid  Diverticulitis  LBP (Low Back Pain)  History of Nasal Septoplasty  h/o Heart Palpitations  Hyperlipidemia on no meds now  Irritable Bowel Syndrome  Stress Incontinence      PSHx:   h/o dental implant  History of Colonoscopy  h/o  Endoscopy  C Section - x 1994      Allergies:  iodine (Hives)  Zosyn (Urticaria)      Home Meds:  amlodipine 2.5mg daily  rosuvastatin 10mg daily  synthroid 75mcg  vit D    FAMILY HISTORY:  Father- MI at age 45,  of "arrhythmia" at 69  First cousin: MI at age 46  3 uncles with MI at age 40, 49, 51      Social History:  Smoking History: denies  Alcohol Use: rarely  Drug Use: denies    REVIEW OF SYSTEMS:  CONSTITUTIONAL: No weakness, fevers or chills  EYES/ENT: No visual changes;  No dysphagia  NECK: No pain or stiffness  RESPIRATORY: No cough, wheezing, hemoptysis; No shortness of breath  CARDIOVASCULAR: No chest pain or palpitations; No lower extremity edema  GASTROINTESTINAL: No abdominal or epigastric pain. No nausea, vomiting, or hematemesis; No diarrhea or constipation. No melena or hematochezia.  BACK: No back pain  GENITOURINARY: No dysuria, frequency or hematuria  NEUROLOGICAL: No numbness or weakness  SKIN: No itching, burning, rashes, or lesions   All other review of systems is negative unless indicated above.    Physical Exam:  T(F): 98.2 (), Max: 98.2 ()  HR: 98 () (95 - 99)  BP: 154/90 () (154/90 - 162/95)  RR: 18 ()  SpO2: 99% ()  GENERAL: No acute distress, obese  HEAD:  Atraumatic, Normocephalic  ENT: EOMI, PERRLA, conjunctiva and sclera clear, Neck supple, No JVD, moist mucosa  CHEST/LUNG: Clear to auscultation bilaterally; No wheeze, equal breath sounds bilaterally   BACK: No spinal tenderness  HEART: Regular rate and rhythm; No murmurs, rubs, or gallops  ABDOMEN: Soft, Nontender, Nondistended; Bowel sounds present  EXTREMITIES:  No clubbing, cyanosis, or edema  PSYCH: Nl behavior, nl affect  NEUROLOGY: AAOx3, non-focal, cranial nerves intact  SKIN: Normal color, No rashes or lesions    Cardiovascular Diagnostic Testing:    ECG: Personally reviewed: NSR 99, subtle ST depressions  I, II, aVF, V3-V6.    Echo: Personally reviewed:  16  mild MR  EF 55%, basal inferior wall hypokinesis  normal RV function    Stress Testin METS. No chest pain with exercise. Hypertensive to 222/100. ST depressions 2mm horizontal in leads V3-V6 5:15 min into exercise, all changes resolved by 10 minutes of recovery.    Imaging:    CXR: Personally reviewed. Normal    Labs: Personally reviewed                        12.8   8.8   )-----------( 298      ( 2019 14:45 )             37.3         142  |  106  |  9   ----------------------------<  79  4.1   |  24  |  0.58    Ca    9.0      2019 18:14  Phos  2.3       Mg     2.2         TPro  8.3  /  Alb  4.5  /  TBili  0.2  /  DBili  x   /  AST  41<H>  /  ALT  13  /  AlkPhos  39<L>      PT/INR - ( 2019 14:45 )   PT: 10.5 sec;   INR: 0.92 ratio    PTT - ( 2019 14:45 )  PTT:27.8 sec    CARDIAC MARKERS ( 2019 18:14 )  <6 ng/L / x     / x     / x     / x     / x      CARDIAC MARKERS ( 2019 14:45 )  <6 ng/L / x     / x     / x     / x     / x James Stapleton is a 48 yo F with a PMH significant for HTN, HLD, and a strong family history of heart disease.  She presents after 2 episodes of chest pain. Patient notes she has been dealing with chest discomfort for several months and has been following with Dr Castaneda. She had an exercise stress test in Oct 2018 which reported 2 mm horizontal depressions in V3-V6. She saw Dr Castaneda in 2019; plan was to repeat stress test at next visit.   Patient states that, over the past week, she has continued to experience chest discomfort that "wraps around to the back." Today, she walked down and back up a flight of stairs and when she got to the top she experienced sharp, 9/10, substernal, non-radiating chest pain. She walked to her car without any change in pain but by the time she got to her car she was winded and felt SOB. Her symptoms subsided and she went to her office desk at which point she experienced the discomfort again with radiation up to the shoulder. She says the symptoms are similar to her prior discomfort but were more severe so she came to the ED. She describes associated palpitations but denies nausea, vomiting.  Pain free at present.      PMHx:   Hypothyroid  Diverticulitis  LBP (Low Back Pain)  History of Nasal Septoplasty  h/o Heart Palpitations  Hyperlipidemia on no meds now  Irritable Bowel Syndrome  Stress Incontinence      PSHx:   h/o dental implant  History of Colonoscopy  h/o  Endoscopy  C Section - x 1994      Allergies:  iodine (Hives)  Zosyn (Urticaria)      Home Meds:  amlodipine 2.5mg daily  rosuvastatin 10mg daily  synthroid 75mcg  vit D    FAMILY HISTORY:  Father- MI at age 45,  of "arrhythmia" at 69  First cousin: MI at age 46  3 uncles with MI at age 40, 49, 51      Social History:  Smoking History: denies  Alcohol Use: rarely  Drug Use: denies    REVIEW OF SYSTEMS:  CONSTITUTIONAL: No weakness, fevers or chills  EYES/ENT: No visual changes;  No dysphagia  NECK: No pain or stiffness  RESPIRATORY: No cough, wheezing, hemoptysis; No shortness of breath  CARDIOVASCULAR: No chest pain or palpitations; No lower extremity edema  GASTROINTESTINAL: No abdominal or epigastric pain. No nausea, vomiting, or hematemesis; No diarrhea or constipation. No melena or hematochezia.  BACK: No back pain  GENITOURINARY: No dysuria, frequency or hematuria  NEUROLOGICAL: No numbness or weakness  SKIN: No itching, burning, rashes, or lesions   All other review of systems is negative unless indicated above.    Physical Exam:  T(F): 98.2 (), Max: 98.2 ()  HR: 98 () (95 - 99)  BP: 154/90 () (154/90 - 162/95)  RR: 18 ()  SpO2: 99% ()    GENERAL: No acute distress, obese  HEAD:  Atraumatic, Normocephalic  ENT: EOMI, PERRLA, conjunctiva and sclera clear, Neck supple, No JVD, moist mucosa  CHEST/LUNG: Clear to auscultation bilaterally; No wheeze, equal breath sounds bilaterally   BACK: No spinal tenderness  HEART: Regular rate and rhythm; No murmurs, rubs, or gallops  ABDOMEN: Soft, Nontender, Nondistended; Bowel sounds present  EXTREMITIES:  No clubbing, cyanosis, or edema  PSYCH: Nl behavior, nl affect  NEUROLOGY: AAOx3, non-focal, cranial nerves intact  SKIN: Normal color, No rashes or lesions      ECG:  NSR at 99 bpm.  Normal intervals and axis.  Voltage suggest LVH; NS ST/T changes vs. LVH      Echo:   16  mild MR  EF 55%, basal inferior wall hypokinesis  normal RV function      Stress Testing: 10/2018  8 METS. No chest pain with exercise. Hypertensive to 222/100. ST depressions 2mm horizontal in leads V3-V6 5:15 min into exercise, all changes resolved by 10 minutes of recovery.      CXR:  Normal      Labs:                      12.8   8.8   )-----------( 298      ( 2019 14:45 )             37.3       142  |  106  |  9   ----------------------------<  79  4.1   |  24  |  0.58    Ca    9.0      2019 18:14  Phos  2.3       Mg     2.2         TPro  8.3  /  Alb  4.5  /  TBili  0.2  /  DBili  x   /  AST  41<H>  /  ALT  13  /  AlkPhos  39<L>      PT/INR - ( 2019 14:45 )   PT: 10.5 sec;   INR: 0.92 ratio    PTT - ( 2019 14:45 )  PTT:27.8 sec    CARDIAC MARKERS ( 2019 18:14 )  <6 ng/L / x     / x     / x     / x     / x      CARDIAC MARKERS ( 2019 14:45 )  <6 ng/L / x     / x     / x     / x     / x

## 2019-02-27 NOTE — CONSULT NOTE ADULT - ASSESSMENT
Ms Stapleton is a     #Stable angina  EKG at baseline. HsTrop < 6. Currently without chest pain.  - monitor on telemetry overnight  - trend troponins to peak  - EKG PRN chest pain Ms Stapleton is a 50 yo F with a PMH significant for HTN, HLD, hypothyroidism, strong family history of heart disease who presents after 2 episodes of chest pain, EKG at baseline, HsTrop neg x 2.    #Stable angina  Chest pain is substernal, exertional and relieved with rest. She has been dealing with this pain for months. EKG at baseline. HsTrop < 6. Currently without chest pain. Patient had normal nuclear stress test in 2018 and recently had 2mm ST depressions V3-V6 on treadmill stress test in Oct 2018. She was planned for repeat stress as an outpatient.  - monitor on telemetry overnight  - trend troponins to peak  - EKG PRN chest pain  - will plan for likely nuclear stress test in am to rule out CAD. James Stapleton is a 48 yo F with hx. of HTN, HLD, and strong family history of heart disease.  Presents after 2 episodes of chest pain, EKG at baseline, HsTrop neg x 2.      REC:    #1. Chest pain - no evidence of MI.    - monitor on telemetry overnight  - trend troponins to peak  - EKG PRN chest pain  - will plan for likely nuclear stress test in am to rule out CAD.    2.  HTN - continue amlodipine    3.  HLD - continue statin      Kenny Eden M.D.  Cardiology fellow  [id number]    Gama Gentile M.D.  Cardiology Consult Service  [phone] or [phone]

## 2019-02-27 NOTE — ED PROVIDER NOTE - CLINICAL SUMMARY MEDICAL DECISION MAKING FREE TEXT BOX
Dr. Graham (Attending Physician)  exertional chest pain and shortness of breath ho abnormal stress test.  will check cardiac enzymes likely cdu for coronary cta. d/w cards.

## 2019-02-27 NOTE — ED CDU PROVIDER INITIAL DAY NOTE - OBJECTIVE STATEMENT
49 y.o. female coming in with intermittent chest pain over the past several months associated with some shortness of breath that has been progressively getting worse.  Pain is described as pressure but also sharp with radiation to the left shoulder.  Pain and shortness of breath are both worse with exertion, stopping her in her tracks earlier today.  No cough, no fevers, no chills.  No diaphoresis, abd pain, nausea or vomiting. Patient is 49 y.o female w/ reported PMH significant for HTN, HLD, hypothyroidism, strong family history of heart disease who presents after 2 episodes of chest pain. Patient notes she has been dealing with chest discomfort for months and has been following with Dr Castaneda. She had an exercise stress test in Oct 2018 which was significant for 2mm horizontal depressions in V3-V6. She recently followed with Dr Castaneda in Jan 2019 and plan was to repeat stress test at next visit. She states that over the past week she has continued to experience chest discomfort that "wraps around to the back." Today, she walked down and back up a flight of stairs and when she got to the top she experienced sharp, 9/10, substernal, nonradiated chest pain.  In ED, Patient had Troponin x 2 negative, ekg no signs of acute ischemia, chest x ray negative for acute pathology. Pt was evaluated by Cardiology and sent to CDU for telemetry monitoring, Nuclear stress testing r/o CAD.

## 2019-02-27 NOTE — ED ADULT NURSE NOTE - CHPI ED NUR SYMPTOMS NEG
no nausea/no chest pain/no fever/no congestion/no chills/no diaphoresis/no syncope/no dizziness/no vomiting/no back pain/no shortness of breath

## 2019-02-27 NOTE — ED CDU PROVIDER INITIAL DAY NOTE - DETAILS
CHEST PAIN  -[facility]  -ANISHAMI  -[facility] EVAL  -STRESS TEST  -CASE D/W ATTENDING CHEST PAIN  -[facility]  -[facility]  -[facility] EVAL  -STRESS TEST  -CASE D/W ATTENDING LIZ

## 2019-02-27 NOTE — ED ADULT NURSE NOTE - OBJECTIVE STATEMENT
49 year old female presented to ED with c/o of sharp intermittent midsternal chest pain x1 week. hx HTN, IBS, diverticulitis. pt denies current CP, SOB, nausea/vomiting, numbness/tingling, fever, cough, chills, dizziness, headache, blurred vision, neuro intact. pt a&ox3, lung sounds clear, heart rate tachycardic (~104), EKG completed handed to MD, on cardiac monitor, abdomen soft nontender nondistended to palp. skin intact. IV in right AC 18G and patent. pt currently resting in bed with MD and RN at bedside. Will continue to monitor and assess while offering support and reassurance.

## 2019-02-27 NOTE — ED PROVIDER NOTE - PMH
Diverticulitis    h/o Heart Palpitations    History of Nasal Septoplasty    Hypothyroid    Irritable Bowel Syndrome

## 2019-02-28 VITALS
DIASTOLIC BLOOD PRESSURE: 80 MMHG | HEART RATE: 67 BPM | SYSTOLIC BLOOD PRESSURE: 124 MMHG | RESPIRATION RATE: 18 BRPM | OXYGEN SATURATION: 97 % | TEMPERATURE: 99 F

## 2019-02-28 PROCEDURE — A9500: CPT

## 2019-02-28 PROCEDURE — G0378: CPT

## 2019-02-28 PROCEDURE — 84100 ASSAY OF PHOSPHORUS: CPT

## 2019-02-28 PROCEDURE — 93005 ELECTROCARDIOGRAM TRACING: CPT | Mod: 76

## 2019-02-28 PROCEDURE — 86850 RBC ANTIBODY SCREEN: CPT

## 2019-02-28 PROCEDURE — 99284 EMERGENCY DEPT VISIT MOD MDM: CPT | Mod: 25

## 2019-02-28 PROCEDURE — 80048 BASIC METABOLIC PNL TOTAL CA: CPT

## 2019-02-28 PROCEDURE — 86900 BLOOD TYPING SEROLOGIC ABO: CPT

## 2019-02-28 PROCEDURE — 93017 CV STRESS TEST TRACING ONLY: CPT

## 2019-02-28 PROCEDURE — 85610 PROTHROMBIN TIME: CPT

## 2019-02-28 PROCEDURE — 93018 CV STRESS TEST I&R ONLY: CPT

## 2019-02-28 PROCEDURE — 99217: CPT

## 2019-02-28 PROCEDURE — 84443 ASSAY THYROID STIM HORMONE: CPT

## 2019-02-28 PROCEDURE — 86901 BLOOD TYPING SEROLOGIC RH(D): CPT

## 2019-02-28 PROCEDURE — 78452 HT MUSCLE IMAGE SPECT MULT: CPT

## 2019-02-28 PROCEDURE — 83735 ASSAY OF MAGNESIUM: CPT

## 2019-02-28 PROCEDURE — 71046 X-RAY EXAM CHEST 2 VIEWS: CPT

## 2019-02-28 PROCEDURE — 78452 HT MUSCLE IMAGE SPECT MULT: CPT | Mod: 26

## 2019-02-28 PROCEDURE — 80053 COMPREHEN METABOLIC PANEL: CPT

## 2019-02-28 PROCEDURE — 85730 THROMBOPLASTIN TIME PARTIAL: CPT

## 2019-02-28 PROCEDURE — 84484 ASSAY OF TROPONIN QUANT: CPT

## 2019-02-28 PROCEDURE — 85027 COMPLETE CBC AUTOMATED: CPT

## 2019-02-28 PROCEDURE — 93016 CV STRESS TEST SUPVJ ONLY: CPT

## 2019-02-28 RX ADMIN — SODIUM CHLORIDE 3 MILLILITER(S): 9 INJECTION INTRAMUSCULAR; INTRAVENOUS; SUBCUTANEOUS at 06:42

## 2019-02-28 RX ADMIN — Medication 75 MICROGRAM(S): at 06:42

## 2019-02-28 NOTE — ED CDU PROVIDER DISPOSITION NOTE - PLAN OF CARE
1. Follow up with your PMD and cardiologist Dr. Castaneda within 48-72 hours.   2. Show copies of your reports given to you.  Take all of your other medications as previously prescribed.   3. Worsening or continued chest pain, shortness of breath, weakness, return to ED. 1.  Stay Hydrated  2. Continue taking all current home medications.  Increase Amlodipine to 5mgs  3.  Please follow up with your Primary care provider in 1-2 (Please bring all of your results with you)      Follow up with your cardiologist within one week of discharge  4.  Return to the ER for worsening Chest Pain, Shortness of breath or any other concerning symptoms.

## 2019-02-28 NOTE — ED CDU PROVIDER DISPOSITION NOTE - NSFOLLOWUPINSTRUCTIONS_ED_ALL_ED_FT
1.  Stay Hydrated  2. Continue taking all current home medications.  Increase Amlodipine to 5mgs  3.  Please follow up with your Primary care provider in 1-2 (Please bring all of your results with you)      Follow up with your cardiologist within one week of discharge  4.  Return to the ER for worsening Chest Pain, Shortness of breath or any other concerning symptoms.

## 2019-02-28 NOTE — ED CDU PROVIDER DISPOSITION NOTE - ATTENDING CONTRIBUTION TO CARE
CDU Attending Note -- Pt seen and examined at bedside.  Case discussed c CDU PA.  Pt comfortable, asymptomatic, and has good follow up.  Normal stress.  Pain free throughout CDU course and at time of d/c.  VSS and no ectopy on tele.  D/C c strict return precautions.  --BMM

## 2019-02-28 NOTE — ED CDU PROVIDER SUBSEQUENT DAY NOTE - PROGRESS NOTE DETAILS
CDU PROGRESS NOTE PA ERIBERTO: Pt resting comfortably, without complaint. NAD, VSS. No events on telemetry. Patient seen at bedside in NAD.  VSS.  Patient resting comfortably without complaints. No cardiac event noted on tele overnight.  Patient feeling well.  Denies CP/SOB.  Awaiting Stress test.  -Figueroa Cormier PA-C Patient at stress.  -Figueroa Cormier PA-C Stress test with a normal perfusion.  Discussed case with cardiology fellow who recommends discharge with close outpatient follow up and increasing amlodipine to 5mgs.  -Figueroa Cormier PA-C

## 2019-02-28 NOTE — PROGRESS NOTE ADULT - NSHPATTENDINGPLANDISCUSS_GEN_ALL_CORE
Plan discussed with cardiology fellow, Dr. ESPERANZA Stockton; patient seen and examined.       I was physically present for the key portions of the evaluation and management (E/M) service provided.    I agree with the above history, physical, and plan which I have reviewed and edited where appropriate.

## 2019-02-28 NOTE — PROGRESS NOTE ADULT - ASSESSMENT
Ms Stapleton is a 50 yo F with a PMH significant for HTN, HLD, hypothyroidism, strong family history of heart disease who presents after 2 episodes of chest pain, EKG at baseline, HsTrop neg x 2.    #Stable angina: Chest pain is substernal, exertional and relieved with rest. She has been dealing with this pain for months. EKG at baseline. HsTrop < 6. Currently without chest pain. Patient had normal nuclear stress test in 2018 and recently had 2mm ST depressions V3-V6 on treadmill stress test in Oct 2018. She was planned for repeat stress as an outpatient.  - monitor on telemetry overnight  - EKG PRN chest pain  - Pt has contrast allergy, so will plan for likely nuclear stress test today to rule out CAD    El Stockton MD  Cardiology Fellow  #[id number] Ms Stapleton is a 50 yo F with a PMH significant for HTN, HLD, hypothyroidism, strong family history of heart disease who presents after 2 episodes of chest pain, EKG at baseline, HsTrop neg x 2.    #1.  Chest pain, r/o mi neg.  - continue telemetry overnight  - EKG PRN chest pain  - Pt has contrast allergy, so will plan for likely nuclear stress test today to rule out CAD    #2.  HTN - continue amlodipine    #3.  HLD  - continue statin    El Stockton MD  Cardiology Fellow  #[id number]    Gama Gentile M.D.  Cardiology Consult Service  [phone] or [phone]

## 2019-02-28 NOTE — ED CDU PROVIDER SUBSEQUENT DAY NOTE - HISTORY
CDU PROGRESS NOTE DESHAWN WEI: No interval change from previous note. Pt resting comfortably, feeling well without complaint. NAD, VSS. No events on telemetry.

## 2019-02-28 NOTE — PROGRESS NOTE ADULT - SUBJECTIVE AND OBJECTIVE BOX
Patient seen and examined at bedside.    Overnight Events: No acute events overnight. Pt to go for NST today    Review Of Systems:  CONSTITUTIONAL: No weakness, fevers or chills  EYES/ENT: No visual changes;  No dysphagia  NECK: No pain or stiffness  RESPIRATORY: No cough, wheezing, hemoptysis; No shortness of breath  CARDIOVASCULAR: No chest pain or palpitations; No lower extremity edema  GASTROINTESTINAL: No abdominal or epigastric pain. No nausea, vomiting, or hematemesis; No diarrhea or constipation. No melena or hematochezia.  BACK: No back pain  GENITOURINARY: No dysuria, frequency or hematuria  NEUROLOGICAL: No numbness or weakness  SKIN: No itching, burning, rashes, or lesions   All other review of systems is negative unless indicated above.          Medications:  amLODIPine   Tablet 2.5 milliGRAM(s) Oral daily  atorvastatin 40 milliGRAM(s) Oral at bedtime  levothyroxine 75 MICROGram(s) Oral daily  sodium chloride 0.9% lock flush 3 milliLiter(s) IV Push every 8 hours      PAST MEDICAL & SURGICAL HISTORY:  Hypothyroid  Diverticulitis  History of Nasal Septoplasty  h/o Heart Palpitations  Irritable Bowel Syndrome  h/o dental implant  History of Colonoscopy  h/o  Endoscopy  C Section - x 1994      Vitals:  T(F): 98.4 (-), Max: 98.4 (-)  HR: 68 (-) (62 - 99)  BP: 108/70 (-) (104/67 - 162/95)  RR: 18 (-)  SpO2: 98% ()  I&O's Summary      Physical Exam:  GENERAL: No acute distress, obese  HEAD:  Atraumatic, Normocephalic  ENT: EOMI, PERRLA, conjunctiva and sclera clear, Neck supple, No JVD, moist mucosa  CHEST/LUNG: Clear to auscultation bilaterally; No wheeze, equal breath sounds bilaterally   BACK: No spinal tenderness  HEART: Regular rate and rhythm; No murmurs, rubs, or gallops  ABDOMEN: Soft, Nontender, Nondistended; Bowel sounds present  EXTREMITIES:  No clubbing, cyanosis, or edema  PSYCH: Nl behavior, nl affect  NEUROLOGY: AAOx3, non-focal, cranial nerves intact  SKIN: Normal color, No rashes or lesions                        12.8   8.8   )-----------( 298      ( 2019 14:45 )             37.3         142  |  106  |  9   ----------------------------<  79  4.1   |  24  |  0.58    Ca    9.0      2019 18:14  Phos  2.3       Mg     2.2         TPro  8.3  /  Alb  4.5  /  TBili  0.2  /  DBili  x   /  AST  41<H>  /  ALT  13  /  AlkPhos  39<L>      PT/INR - ( 2019 14:45 )   PT: 10.5 sec;   INR: 0.92 ratio         PTT - ( 2019 14:45 )  PTT:27.8 sec      ECG: Personally reviewed: NSR 99, subtle ST depressions  I, II, aVF, V3-V6.    Echo: Personally reviewed:  16  mild MR  EF 55%, basal inferior wall hypokinesis  normal RV function    Stress Testin METS. No chest pain with exercise. Hypertensive to 222/100. ST depressions 2mm horizontal in leads V3-V6 5:15 min into exercise, all changes resolved by 10 minutes of recovery.    Imaging:    CXR: Personally reviewed. Normal Patient seen and examined at bedside.    Overnight Events: No acute events overnight. Pt to go for NST today    Review Of Systems:  CONSTITUTIONAL: No weakness, fevers or chills  EYES/ENT: No visual changes;  No dysphagia  NECK: No pain or stiffness  RESPIRATORY: No cough, wheezing, hemoptysis; No shortness of breath  CARDIOVASCULAR: No chest pain or palpitations; No lower extremity edema  GASTROINTESTINAL: No abdominal or epigastric pain. No nausea, vomiting, or hematemesis; No diarrhea or constipation. No melena or hematochezia.  BACK: No back pain  GENITOURINARY: No dysuria, frequency or hematuria  NEUROLOGICAL: No numbness or weakness  SKIN: No itching, burning, rashes, or lesions   All other review of systems is negative unless indicated above.          Medications:  amLODIPine   Tablet 2.5 milliGRAM(s) Oral daily  atorvastatin 40 milliGRAM(s) Oral at bedtime  levothyroxine 75 MICROGram(s) Oral daily  sodium chloride 0.9% lock flush 3 milliLiter(s) IV Push every 8 hours      PAST MEDICAL & SURGICAL HISTORY:  Hypothyroid  Diverticulitis  History of Nasal Septoplasty  h/o Heart Palpitations  Irritable Bowel Syndrome  h/o dental implant  History of Colonoscopy  h/o  Endoscopy  C Section - x 1 - 1994      Vitals:  T(F): 98.4 (02-28), Max: 98.4 (02-28)  HR: 68 (02-28) (62 - 99)  BP: 108/70 (02-28) (104/67 - 162/95)  RR: 18 (02-28)  SpO2: 98% (02-28)  I&O's Summary      Physical Exam:  GENERAL: No acute distress, obese  HEAD:  Atraumatic, Normocephalic  ENT: EOMI, PERRLA, conjunctiva and sclera clear, Neck supple, No JVD, moist mucosa  CHEST/LUNG: Clear to auscultation bilaterally; No wheeze, equal breath sounds bilaterally   BACK: No spinal tenderness  HEART: Regular rate and rhythm; No murmurs, rubs, or gallops  ABDOMEN: Soft, Nontender, Nondistended; Bowel sounds present  EXTREMITIES:  No clubbing, cyanosis, or edema  PSYCH: Nl behavior, nl affect  NEUROLOGY: AAOx3, non-focal, cranial nerves intact  SKIN: Normal color, No rashes or lesions             LABS:             12.8   8.8   )-----------( 298      ( 27 Feb 2019 14:45 )             37.3     02-27  142  |  106  |  9   ----------------------------<  79  4.1   |  24  |  0.58    Ca    9.0      27 Feb 2019 18:14  Phos  2.3     02-27  Mg     2.2     02-27    TPro  8.3  /  Alb  4.5  /  TBili  0.2  /  DBili  x   /  AST  41<H>  /  ALT  13  /  AlkPhos  39<L>  02-27    PT/INR - ( 27 Feb 2019 14:45 )   PT: 10.5 sec;   INR: 0.92 ratio    PTT - ( 27 Feb 2019 14:45 )  PTT:27.8 sec

## 2019-02-28 NOTE — ED CDU PROVIDER DISPOSITION NOTE - CLINICAL COURSE
Patient is 49 y.o female w/ reported PMH significant for HTN, HLD, hypothyroidism, strong family history of heart disease who presents after 2 episodes of chest pain. Patient notes she has been dealing with chest discomfort for months and has been following with Dr Castaneda. She had an exercise stress test in Oct 2018 which was significant for 2mm horizontal depressions in V3-V6. She recently followed with Dr Castaneda in Jan 2019 and plan was to repeat stress test at next visit. She states that over the past week she has continued to experience chest discomfort that "wraps around to the back." Today, she walked down and back up a flight of stairs and when she got to the top she experienced sharp, 9/10, substernal, nonradiated chest pain.  In ED, Patient had Troponin x 2 negative, ekg no signs of acute ischemia, chest x ray negative for acute pathology. Pt was evaluated by Cardiology and sent to CDU for telemetry monitoring, Nuclear stress testing r/o CAD. Patient is 49 y.o female w/ reported PMH significant for HTN, HLD, hypothyroidism, strong family history of heart disease who presents after 2 episodes of chest pain. Patient notes she has been dealing with chest discomfort for months and has been following with Dr Castaneda. She had an exercise stress test in Oct 2018 which was significant for 2mm horizontal depressions in V3-V6. She recently followed with Dr Castaneda in Jan 2019 and plan was to repeat stress test at next visit. She states that over the past week she has continued to experience chest discomfort that "wraps around to the back." Today, she walked down and back up a flight of stairs and when she got to the top she experienced sharp, 9/10, substernal, nonradiated chest pain.  In ED, Patient had Troponin x 2 negative, ekg no signs of acute ischemia, chest x ray negative for acute pathology. Pt was evaluated by Cardiology and sent to CDU for telemetry monitoring, Nuclear stress testing r/o CAD.  Stress test showing normal perfusion.  Cardiology recommending discharge with increase in amlodipine to 5mgs and close outpatient cardiology follow up.

## 2019-02-28 NOTE — ED ADULT NURSE REASSESSMENT NOTE - NSIMPLEMENTINTERV_GEN_ALL_ED
Implemented All Universal Safety Interventions:  Reed to call system. Call bell, personal items and telephone within reach. Instruct patient to call for assistance. Room bathroom lighting operational. Non-slip footwear when patient is off stretcher. Physically safe environment: no spills, clutter or unnecessary equipment. Stretcher in lowest position, wheels locked, appropriate side rails in place.

## 2019-02-28 NOTE — ED ADULT NURSE REASSESSMENT NOTE - NS ED NURSE REASSESS COMMENT FT1
1845  Pt is evaluated by  Dr Sincere Pantoja  Pt feeling better pt is discharged ML out PA Figueroa Cormier explained the follow up care & gave the discharge summary pt has stable vitals has steady gait vitals  stable to go home
Pt resting quietly on stretcher on cardiac monitor showing NSR to the 80's. Awaiting results. Safety and comfort measures provided.
Pt resting quietly with family at bedside. Pt reports no pain or discomfort. Pt denies chest pain, SOB, back pain, shoulder pain. Safety and comfort measures provided.
Pt received from KATHLEEN Tidwell. Pt oriented to CDU & plan of care was discussed. Pt A&O x 4. Pt in CDU for cardiac monitoring and stress in am. Pt denies any chest pain, SOB, dizziness or palpitations as of now. Pt c/o headache 7/10 and back pain 4/10, PA Raheem aware, will medicate as per MAR. Pt on a cardiac monitor in NSR, HR in 70's. Safety & comfort measures maintained. Call bell in reach. Will continue to monitor.
07.00  am   Received the pt from KATHLEEN Gant pt is observed for Chest pain Awaitng for Nucleasr stress test   07.30 Am  Pt reassessed Pt oriented to CDU & plan of care was discussed. Pt A&O x 4.  Pt denies any chest pain, SOB, dizziness or palpitations as of now. Pt on a cardiac monitor in NSR, HR in 70's. Safety & comfort measures maintained. Call bell in reach.  IV site looks clean & dry  No signs of infiltration noted   Will continue to monitor.
23-Sep-2018 00:27

## 2019-03-03 LAB — TSH SERPL-MCNC: 3.5 UIU/ML — SIGNIFICANT CHANGE UP (ref 0.27–4.2)

## 2019-03-06 ENCOUNTER — APPOINTMENT (OUTPATIENT)
Dept: CARDIOLOGY | Facility: CLINIC | Age: 49
End: 2019-03-06
Payer: COMMERCIAL

## 2019-03-06 ENCOUNTER — NON-APPOINTMENT (OUTPATIENT)
Age: 49
End: 2019-03-06

## 2019-03-06 VITALS
BODY MASS INDEX: 36.25 KG/M2 | RESPIRATION RATE: 12 BRPM | HEART RATE: 80 BPM | HEIGHT: 61 IN | WEIGHT: 192 LBS | DIASTOLIC BLOOD PRESSURE: 85 MMHG | OXYGEN SATURATION: 99 % | SYSTOLIC BLOOD PRESSURE: 130 MMHG

## 2019-03-06 PROCEDURE — 99215 OFFICE O/P EST HI 40 MIN: CPT | Mod: 25

## 2019-03-06 NOTE — HISTORY OF PRESENT ILLNESS
[FreeTextEntry1] : James is s/p hospitalization for atypical chest pain. Nuclear normal but htn. She has been taking amlodipine 5mg every other day. She has her daughter checking her blood pressures in alternate arms but not at the same time and concerned about differences. Relative had AWMI at 49 a few weeks ago. She is very worried.

## 2019-03-06 NOTE — DISCUSSION/SUMMARY
[___ Month(s)] : [unfilled] month(s) [FreeTextEntry1] : The patient is a 49-year-old female strong family history of premature coronary artery disease, hypothyroidism, hyperlipidemia,hypertension with atypical chest pain\par #1 Htn- increase amlodipine 5mg to daily\par #2 Lipids- compliance crestor, labs \par #3 Hypothyroid- increased dose, f/u endo\par #4 CV- chest pain atypical, nuclear negative, Cardiac CT ordered, prednisone for contrast allergy\par #4 General- We discussed adherence to a low fat low cholesterol, diet, weight loss and regular daily exercise.\par

## 2019-03-19 PROBLEM — R06.83 SNORING: Status: ACTIVE | Noted: 2018-08-02

## 2019-03-19 NOTE — REVIEW OF SYSTEMS
[Snoring] : snoring [Witnessed Apneas] : demonstrated ~M apnea [Nonrestorative Sleep] : nonrestorative sleep [Awakes With Headache] : awakes with a headache [Awakes With Dry Mouth] : awakes with dry mouth [Negative] : Pulmonary Hypertension

## 2019-03-19 NOTE — ASSESSMENT
[FreeTextEntry1] : In summary the patient is a 58-year-old female with past medical history significant for hypothyroidism, obesity and mild to moderate obstructive sleep apnea who presents for followup. Patient's physical exam is significant for good air entry bilaterally. Spirometry revealed mild restrictive lung disease. The patient was instructed to continue weight loss and followup in 3 months

## 2019-03-19 NOTE — HISTORY OF PRESENT ILLNESS
[FreeTextEntry1] : Patient is a 48-year-old morbidly obese female with past medical history significant for acquired hypothyroidism , moderate MARIAN who presents for follow up. Patient denies any recent fevers, chills, chest pain, abdominal pain or hemoptysis

## 2019-03-19 NOTE — PHYSICAL EXAM
[General Appearance - Well Developed] : well developed [Normal Appearance] : normal appearance [Well Groomed] : well groomed [General Appearance - Well Nourished] : well nourished [No Deformities] : no deformities [General Appearance - In No Acute Distress] : no acute distress [Normal Conjunctiva] : the conjunctiva exhibited no abnormalities [Eyelids - No Xanthelasma] : the eyelids demonstrated no xanthelasmas [Normal Oropharynx] : normal oropharynx [III] : III [Neck Appearance] : the appearance of the neck was normal [Jugular Venous Distention Increased] : there was no jugular-venous distention [] : the neck was supple [Heart Sounds] : normal S1 and S2 [Exaggerated Use Of Accessory Muscles For Inspiration] : no accessory muscle use [Auscultation Breath Sounds / Voice Sounds] : lungs were clear to auscultation bilaterally [Bowel Sounds] : normal bowel sounds [Abdomen Soft] : soft [Abnormal Walk] : normal gait [Nail Clubbing] : no clubbing of the fingernails [Cyanosis, Localized] : no localized cyanosis [No Focal Deficits] : no focal deficits [Oriented To Time, Place, And Person] : oriented to person, place, and time [Impaired Insight] : insight and judgment were intact [Affect] : the affect was normal [Mood] : the mood was normal

## 2019-03-24 ENCOUNTER — FORM ENCOUNTER (OUTPATIENT)
Age: 49
End: 2019-03-24

## 2019-03-25 ENCOUNTER — APPOINTMENT (OUTPATIENT)
Dept: CARDIOLOGY | Facility: CLINIC | Age: 49
End: 2019-03-25

## 2019-03-25 ENCOUNTER — OUTPATIENT (OUTPATIENT)
Dept: OUTPATIENT SERVICES | Facility: HOSPITAL | Age: 49
LOS: 1 days | End: 2019-03-25
Payer: COMMERCIAL

## 2019-03-25 DIAGNOSIS — R07.9 CHEST PAIN, UNSPECIFIED: ICD-10-CM

## 2019-03-25 PROCEDURE — 75574 CT ANGIO HRT W/3D IMAGE: CPT | Mod: 26

## 2019-03-25 PROCEDURE — 75574 CT ANGIO HRT W/3D IMAGE: CPT

## 2019-04-03 ENCOUNTER — APPOINTMENT (OUTPATIENT)
Dept: PULMONOLOGY | Facility: CLINIC | Age: 49
End: 2019-04-03
Payer: COMMERCIAL

## 2019-04-03 VITALS
TEMPERATURE: 98.1 F | OXYGEN SATURATION: 97 % | WEIGHT: 192 LBS | SYSTOLIC BLOOD PRESSURE: 139 MMHG | HEART RATE: 78 BPM | DIASTOLIC BLOOD PRESSURE: 96 MMHG | BODY MASS INDEX: 36.25 KG/M2 | RESPIRATION RATE: 12 BRPM | HEIGHT: 61 IN

## 2019-04-03 DIAGNOSIS — Z82.49 FAMILY HISTORY OF ISCHEMIC HEART DISEASE AND OTHER DISEASES OF THE CIRCULATORY SYSTEM: ICD-10-CM

## 2019-04-03 DIAGNOSIS — Z86.39 PERSONAL HISTORY OF OTHER ENDOCRINE, NUTRITIONAL AND METABOLIC DISEASE: ICD-10-CM

## 2019-04-03 DIAGNOSIS — Z83.438 FAMILY HISTORY OF OTHER DISORDER OF LIPOPROTEIN METABOLISM AND OTHER LIPIDEMIA: ICD-10-CM

## 2019-04-03 PROCEDURE — 94729 DIFFUSING CAPACITY: CPT

## 2019-04-03 PROCEDURE — 94060 EVALUATION OF WHEEZING: CPT

## 2019-04-03 PROCEDURE — 99214 OFFICE O/P EST MOD 30 MIN: CPT | Mod: 25

## 2019-04-03 PROCEDURE — 94726 PLETHYSMOGRAPHY LUNG VOLUMES: CPT

## 2019-04-03 NOTE — REASON FOR VISIT
[Follow-Up] : a follow-up visit [Asthma] : asthma [Cough] : cough [Shortness of Breath] : shortness of Breath

## 2019-04-03 NOTE — PHYSICAL EXAM
[General Appearance - Well Developed] : well developed [Normal Appearance] : normal appearance [Well Groomed] : well groomed [General Appearance - Well Nourished] : well nourished [No Deformities] : no deformities [General Appearance - In No Acute Distress] : no acute distress [Normal Conjunctiva] : the conjunctiva exhibited no abnormalities [Eyelids - No Xanthelasma] : the eyelids demonstrated no xanthelasmas [Normal Oropharynx] : normal oropharynx [III] : III [Neck Appearance] : the appearance of the neck was normal [] : the neck was supple [Jugular Venous Distention Increased] : there was no jugular-venous distention [Heart Sounds] : normal S1 and S2 [Exaggerated Use Of Accessory Muscles For Inspiration] : no accessory muscle use [Auscultation Breath Sounds / Voice Sounds] : lungs were clear to auscultation bilaterally [Bowel Sounds] : normal bowel sounds [Abdomen Soft] : soft [Abnormal Walk] : normal gait [Nail Clubbing] : no clubbing of the fingernails [Cyanosis, Localized] : no localized cyanosis [No Focal Deficits] : no focal deficits [Oriented To Time, Place, And Person] : oriented to person, place, and time [Impaired Insight] : insight and judgment were intact [Affect] : the affect was normal [Mood] : the mood was normal

## 2019-04-03 NOTE — ASSESSMENT
[FreeTextEntry1] : In summary the patient is a 59-year-old female with hypertension, obstructive sleep apnea and mild obstructive airways disease who presents today for followup. Patient's physical exam is significant for good air entry bilaterally.\par \par The patient underwent a pulmonary function test which revealed normal lung function. The patient is instructed to continue her CPAP and followup in 1 month. Patient  is started on Pulmicort

## 2019-04-16 ENCOUNTER — APPOINTMENT (OUTPATIENT)
Dept: ENDOCRINOLOGY | Facility: CLINIC | Age: 49
End: 2019-04-16

## 2019-05-01 ENCOUNTER — APPOINTMENT (OUTPATIENT)
Dept: CARDIOLOGY | Facility: CLINIC | Age: 49
End: 2019-05-01

## 2019-05-02 ENCOUNTER — APPOINTMENT (OUTPATIENT)
Dept: PULMONOLOGY | Facility: CLINIC | Age: 49
End: 2019-05-02
Payer: COMMERCIAL

## 2019-05-02 VITALS
HEIGHT: 61 IN | DIASTOLIC BLOOD PRESSURE: 87 MMHG | SYSTOLIC BLOOD PRESSURE: 136 MMHG | HEART RATE: 81 BPM | RESPIRATION RATE: 12 BRPM | OXYGEN SATURATION: 99 %

## 2019-05-02 DIAGNOSIS — Z86.39 PERSONAL HISTORY OF OTHER ENDOCRINE, NUTRITIONAL AND METABOLIC DISEASE: ICD-10-CM

## 2019-05-02 DIAGNOSIS — Z82.49 FAMILY HISTORY OF ISCHEMIC HEART DISEASE AND OTHER DISEASES OF THE CIRCULATORY SYSTEM: ICD-10-CM

## 2019-05-02 PROCEDURE — 99214 OFFICE O/P EST MOD 30 MIN: CPT

## 2019-05-02 NOTE — PHYSICAL EXAM
[General Appearance - Well Developed] : well developed [Normal Appearance] : normal appearance [Well Groomed] : well groomed [General Appearance - Well Nourished] : well nourished [No Deformities] : no deformities [General Appearance - In No Acute Distress] : no acute distress [Normal Conjunctiva] : the conjunctiva exhibited no abnormalities [Eyelids - No Xanthelasma] : the eyelids demonstrated no xanthelasmas [Normal Oropharynx] : normal oropharynx [III] : III [Neck Appearance] : the appearance of the neck was normal [] : the neck was supple [Jugular Venous Distention Increased] : there was no jugular-venous distention [Heart Sounds] : normal S1 and S2 [Exaggerated Use Of Accessory Muscles For Inspiration] : no accessory muscle use [Bowel Sounds] : normal bowel sounds [Auscultation Breath Sounds / Voice Sounds] : lungs were clear to auscultation bilaterally [Abdomen Soft] : soft [Abnormal Walk] : normal gait [Nail Clubbing] : no clubbing of the fingernails [Cyanosis, Localized] : no localized cyanosis [No Focal Deficits] : no focal deficits [Impaired Insight] : insight and judgment were intact [Oriented To Time, Place, And Person] : oriented to person, place, and time [Affect] : the affect was normal [Mood] : the mood was normal

## 2019-05-02 NOTE — ASSESSMENT
[FreeTextEntry1] : In summary the patient is a 49-year-old female with hypertension, obstructive sleep apnea and mild obstructive airways disease who presents today for followup. Patient's physical exam is significant for good air entry bilaterally.\par The patient is instructed to continue current medications and followup in 3 months

## 2019-05-02 NOTE — HISTORY OF PRESENT ILLNESS
[FreeTextEntry1] : Patient is a 49-year-old morbidly obese female with past medical history significant for acquired hypothyroidism , moderate MARIAN, asthma  who presents for follow up. Patient denies any recent fevers, chills, chest pain, abdominal pain or hemoptysis. Patient states that her cough and shortness of breath have improved with her compliance with her bronchodilator therapy

## 2019-05-02 NOTE — REASON FOR VISIT
[Follow-Up] : a follow-up visit [Shortness of Breath] : shortness of Breath [Asthma] : asthma [Sleep Apnea] : sleep apnea

## 2019-05-09 ENCOUNTER — APPOINTMENT (OUTPATIENT)
Dept: CARDIOLOGY | Facility: CLINIC | Age: 49
End: 2019-05-09

## 2019-05-20 ENCOUNTER — APPOINTMENT (OUTPATIENT)
Dept: ENDOCRINOLOGY | Facility: CLINIC | Age: 49
End: 2019-05-20
Payer: COMMERCIAL

## 2019-05-20 VITALS
HEIGHT: 61 IN | HEART RATE: 89 BPM | DIASTOLIC BLOOD PRESSURE: 80 MMHG | TEMPERATURE: 98.3 F | SYSTOLIC BLOOD PRESSURE: 125 MMHG | WEIGHT: 192 LBS | BODY MASS INDEX: 36.25 KG/M2 | OXYGEN SATURATION: 99 %

## 2019-05-20 DIAGNOSIS — R79.89 OTHER SPECIFIED ABNORMAL FINDINGS OF BLOOD CHEMISTRY: ICD-10-CM

## 2019-05-20 PROCEDURE — 99214 OFFICE O/P EST MOD 30 MIN: CPT | Mod: 25

## 2019-05-20 PROCEDURE — 36415 COLL VENOUS BLD VENIPUNCTURE: CPT

## 2019-05-20 NOTE — REVIEW OF SYSTEMS
[Fatigue] : fatigue [Irregular Menses] : irregular menses [All other systems negative] : All other systems negative

## 2019-05-20 NOTE — HISTORY OF PRESENT ILLNESS
[FreeTextEntry1] : CC: Hypothyroidism\par This is a 49-year-old female with primary hypothyroidism, vitamin D insufficiency, here for follow up. She reports that she was diagnosed with hypothyroidism at age 46. She is currently on Synthroid 75 mcg daily. She takes in the morning on an empty stomach. She complains of fatigue.\par She was also found to have an elevated PTH level (112). She also has a history of vitamin D insufficiency and completed a 3 month course of ergocalciferol 50,000 international unit weekly.

## 2019-05-20 NOTE — PHYSICAL EXAM
[Alert] : alert [Well Nourished] : well nourished [No Acute Distress] : no acute distress [Well Developed] : well developed [Healthy Appearance] : healthy appearance [Normal Voice/Communication] : normal voice communication [Normal Sclera/Conjunctiva] : normal sclera/conjunctiva [No Proptosis] : no proptosis [Normal Oropharynx] : the oropharynx was normal [No Neck Mass] : no neck mass was observed [Supple] : the neck was supple [No LAD] : no lymphadenopathy [Thyroid Not Enlarged] : the thyroid was not enlarged [No Respiratory Distress] : no respiratory distress [Normal Rate and Effort] : normal respiratory rhythm and effort [No Accessory Muscle Use] : no accessory muscle use [Clear to Auscultation] : lungs were clear to auscultation bilaterally [Normal Rate] : heart rate was normal  [Normal S1, S2] : normal S1 and S2 [Regular Rhythm] : with a regular rhythm [No Edema] : there was no peripheral edema [Not Distended] : not distended [No Stigmata of Cushings Syndrome] : no stigmata of cushings syndrome [Normal Gait] : normal gait [No Involuntary Movements] : no involuntary movements were seen [Hirsutism] : no hirsutism [Acanthosis Nigricans] : no acanthosis nigricans [No Motor Deficits] : the motor exam was normal [Normal Affect] : the affect was normal [Normal Insight/Judgement] : insight and judgment were intact [Normal Mood] : the mood was normal

## 2019-05-20 NOTE — ASSESSMENT
[FreeTextEntry1] : This is a 49-year-old female with primary hypothyroidism, vitamin D insufficiency, elevated PTH level, obesity.\par 1.Primary hypothyroidism\par Check TFTs. \par Cont Synthroid 75mcg qd pending BW.\par 2. Vitamin D insufficiency\par Check 25 OHD.\par 3.High PTH level\par Likely due to vitamin D insufficiency. \par Check PTH today.\par 4. Obesity\par LSM. \par Reviewed Saxenda with pt. She will try LSM for 3 months and contact me if she is interested in starting Saxenda.  [Importance of Diet and Exercise] : importance of diet and exercise to improve glycemic control, achieve weight loss and improve cardiovascular health

## 2019-05-21 LAB
ALBUMIN SERPL ELPH-MCNC: 3.9 G/DL
ALP BLD-CCNC: 39 U/L
ALT SERPL-CCNC: 13 U/L
ANION GAP SERPL CALC-SCNC: 11 MMOL/L
AST SERPL-CCNC: 11 U/L
BASOPHILS # BLD AUTO: 0.06 K/UL
BASOPHILS NFR BLD AUTO: 1.2 %
BILIRUB SERPL-MCNC: 0.2 MG/DL
BUN SERPL-MCNC: 12 MG/DL
CALCIUM SERPL-MCNC: 9.2 MG/DL
CALCIUM SERPL-MCNC: 9.2 MG/DL
CHLORIDE SERPL-SCNC: 103 MMOL/L
CO2 SERPL-SCNC: 24 MMOL/L
CREAT SERPL-MCNC: 0.66 MG/DL
EOSINOPHIL # BLD AUTO: 0.13 K/UL
EOSINOPHIL NFR BLD AUTO: 2.5 %
GLUCOSE SERPL-MCNC: 79 MG/DL
HCT VFR BLD CALC: 40.2 %
HGB BLD-MCNC: 12.5 G/DL
IMM GRANULOCYTES NFR BLD AUTO: 0.2 %
LYMPHOCYTES # BLD AUTO: 1.93 K/UL
LYMPHOCYTES NFR BLD AUTO: 37.6 %
MAN DIFF?: NORMAL
MCHC RBC-ENTMCNC: 30.8 PG
MCHC RBC-ENTMCNC: 31.1 GM/DL
MCV RBC AUTO: 99 FL
MONOCYTES # BLD AUTO: 0.69 K/UL
MONOCYTES NFR BLD AUTO: 13.5 %
NEUTROPHILS # BLD AUTO: 2.31 K/UL
NEUTROPHILS NFR BLD AUTO: 45 %
PARATHYROID HORMONE INTACT: 78 PG/ML
PLATELET # BLD AUTO: 326 K/UL
POTASSIUM SERPL-SCNC: 4.4 MMOL/L
PROT SERPL-MCNC: 6.9 G/DL
RBC # BLD: 4.06 M/UL
RBC # FLD: 13.2 %
SODIUM SERPL-SCNC: 138 MMOL/L
T4 FREE SERPL-MCNC: 1.3 NG/DL
TSH SERPL-ACNC: 2.63 UIU/ML
WBC # FLD AUTO: 5.13 K/UL

## 2019-05-22 ENCOUNTER — RESULT REVIEW (OUTPATIENT)
Age: 49
End: 2019-05-22

## 2019-05-22 LAB
25(OH)D3 SERPL-MCNC: 13.3 NG/ML
VIT B12 SERPL-MCNC: 461 PG/ML

## 2019-08-03 ENCOUNTER — TRANSCRIPTION ENCOUNTER (OUTPATIENT)
Age: 49
End: 2019-08-03

## 2019-08-07 ENCOUNTER — APPOINTMENT (OUTPATIENT)
Dept: ORTHOPEDIC SURGERY | Facility: CLINIC | Age: 49
End: 2019-08-07
Payer: COMMERCIAL

## 2019-08-07 VITALS — WEIGHT: 180 LBS | HEIGHT: 61 IN | BODY MASS INDEX: 33.99 KG/M2

## 2019-08-07 DIAGNOSIS — M19.241 SECONDARY OSTEOARTHRITIS, RIGHT HAND: ICD-10-CM

## 2019-08-07 PROCEDURE — 99214 OFFICE O/P EST MOD 30 MIN: CPT

## 2019-08-28 ENCOUNTER — APPOINTMENT (OUTPATIENT)
Dept: ORTHOPEDIC SURGERY | Facility: CLINIC | Age: 49
End: 2019-08-28
Payer: COMMERCIAL

## 2019-08-28 VITALS — HEIGHT: 61 IN | BODY MASS INDEX: 33.99 KG/M2 | WEIGHT: 180 LBS

## 2019-08-28 DIAGNOSIS — M17.12 UNILATERAL PRIMARY OSTEOARTHRITIS, LEFT KNEE: ICD-10-CM

## 2019-08-28 PROCEDURE — 99214 OFFICE O/P EST MOD 30 MIN: CPT

## 2019-09-02 ENCOUNTER — FORM ENCOUNTER (OUTPATIENT)
Age: 49
End: 2019-09-02

## 2019-09-03 ENCOUNTER — APPOINTMENT (OUTPATIENT)
Dept: ULTRASOUND IMAGING | Facility: IMAGING CENTER | Age: 49
End: 2019-09-03
Payer: COMMERCIAL

## 2019-09-03 ENCOUNTER — OUTPATIENT (OUTPATIENT)
Dept: OUTPATIENT SERVICES | Facility: HOSPITAL | Age: 49
LOS: 1 days | End: 2019-09-03
Payer: COMMERCIAL

## 2019-09-03 DIAGNOSIS — E03.9 HYPOTHYROIDISM, UNSPECIFIED: ICD-10-CM

## 2019-09-03 PROCEDURE — 76536 US EXAM OF HEAD AND NECK: CPT

## 2019-09-03 PROCEDURE — 76536 US EXAM OF HEAD AND NECK: CPT | Mod: 26

## 2019-09-10 ENCOUNTER — APPOINTMENT (OUTPATIENT)
Dept: MRI IMAGING | Facility: CLINIC | Age: 49
End: 2019-09-10
Payer: COMMERCIAL

## 2019-09-10 ENCOUNTER — OUTPATIENT (OUTPATIENT)
Dept: OUTPATIENT SERVICES | Facility: HOSPITAL | Age: 49
LOS: 1 days | End: 2019-09-10
Payer: COMMERCIAL

## 2019-09-10 ENCOUNTER — APPOINTMENT (OUTPATIENT)
Dept: MRI IMAGING | Facility: CLINIC | Age: 49
End: 2019-09-10

## 2019-09-10 ENCOUNTER — APPOINTMENT (OUTPATIENT)
Dept: ULTRASOUND IMAGING | Facility: CLINIC | Age: 49
End: 2019-09-10

## 2019-09-10 DIAGNOSIS — Z00.8 ENCOUNTER FOR OTHER GENERAL EXAMINATION: ICD-10-CM

## 2019-09-10 PROCEDURE — 73218 MRI UPPER EXTREMITY W/O DYE: CPT | Mod: 26,LT

## 2019-09-10 PROCEDURE — 73218 MRI UPPER EXTREMITY W/O DYE: CPT

## 2019-09-12 ENCOUNTER — APPOINTMENT (OUTPATIENT)
Dept: PULMONOLOGY | Facility: CLINIC | Age: 49
End: 2019-09-12

## 2019-09-16 ENCOUNTER — EMERGENCY (EMERGENCY)
Facility: HOSPITAL | Age: 49
LOS: 1 days | Discharge: HOME | End: 2019-09-16
Attending: EMERGENCY MEDICINE
Payer: COMMERCIAL

## 2019-09-16 VITALS
WEIGHT: 179.9 LBS | RESPIRATION RATE: 16 BRPM | TEMPERATURE: 98 F | HEIGHT: 61 IN | HEART RATE: 77 BPM | DIASTOLIC BLOOD PRESSURE: 86 MMHG | SYSTOLIC BLOOD PRESSURE: 128 MMHG | OXYGEN SATURATION: 99 %

## 2019-09-16 LAB
ALBUMIN SERPL ELPH-MCNC: 4.4 G/DL — SIGNIFICANT CHANGE UP (ref 3.3–5)
ALP SERPL-CCNC: 43 U/L — SIGNIFICANT CHANGE UP (ref 40–120)
ALT FLD-CCNC: 14 U/L — SIGNIFICANT CHANGE UP (ref 10–45)
ANION GAP SERPL CALC-SCNC: 11 MMOL/L — SIGNIFICANT CHANGE UP (ref 5–17)
APPEARANCE UR: CLEAR — SIGNIFICANT CHANGE UP
AST SERPL-CCNC: 12 U/L — SIGNIFICANT CHANGE UP (ref 10–40)
BACTERIA # UR AUTO: ABNORMAL
BASOPHILS # BLD AUTO: 0.1 K/UL — SIGNIFICANT CHANGE UP (ref 0–0.2)
BASOPHILS NFR BLD AUTO: 0.8 % — SIGNIFICANT CHANGE UP (ref 0–2)
BILIRUB SERPL-MCNC: 0.4 MG/DL — SIGNIFICANT CHANGE UP (ref 0.2–1.2)
BILIRUB UR-MCNC: NEGATIVE — SIGNIFICANT CHANGE UP
BUN SERPL-MCNC: 9 MG/DL — SIGNIFICANT CHANGE UP (ref 7–23)
CALCIUM SERPL-MCNC: 9.7 MG/DL — SIGNIFICANT CHANGE UP (ref 8.4–10.5)
CHLORIDE SERPL-SCNC: 101 MMOL/L — SIGNIFICANT CHANGE UP (ref 96–108)
CO2 SERPL-SCNC: 25 MMOL/L — SIGNIFICANT CHANGE UP (ref 22–31)
COLOR SPEC: YELLOW — SIGNIFICANT CHANGE UP
CREAT SERPL-MCNC: 0.66 MG/DL — SIGNIFICANT CHANGE UP (ref 0.5–1.3)
DIFF PNL FLD: ABNORMAL
EOSINOPHIL # BLD AUTO: 0.2 K/UL — SIGNIFICANT CHANGE UP (ref 0–0.5)
EOSINOPHIL NFR BLD AUTO: 1.9 % — SIGNIFICANT CHANGE UP (ref 0–6)
EPI CELLS # UR: 6 /HPF — HIGH
GAS PNL BLDV: SIGNIFICANT CHANGE UP
GLUCOSE SERPL-MCNC: 87 MG/DL — SIGNIFICANT CHANGE UP (ref 70–99)
GLUCOSE UR QL: NEGATIVE — SIGNIFICANT CHANGE UP
HCT VFR BLD CALC: 40.1 % — SIGNIFICANT CHANGE UP (ref 34.5–45)
HGB BLD-MCNC: 13.2 G/DL — SIGNIFICANT CHANGE UP (ref 11.5–15.5)
HYALINE CASTS # UR AUTO: 4 /LPF — HIGH (ref 0–2)
KETONES UR-MCNC: ABNORMAL
LEUKOCYTE ESTERASE UR-ACNC: NEGATIVE — SIGNIFICANT CHANGE UP
LIDOCAIN IGE QN: 23 U/L — SIGNIFICANT CHANGE UP (ref 7–60)
LYMPHOCYTES # BLD AUTO: 3.6 K/UL — HIGH (ref 1–3.3)
LYMPHOCYTES # BLD AUTO: 36 % — SIGNIFICANT CHANGE UP (ref 13–44)
MCHC RBC-ENTMCNC: 31.5 PG — SIGNIFICANT CHANGE UP (ref 27–34)
MCHC RBC-ENTMCNC: 33 GM/DL — SIGNIFICANT CHANGE UP (ref 32–36)
MCV RBC AUTO: 95.6 FL — SIGNIFICANT CHANGE UP (ref 80–100)
MONOCYTES # BLD AUTO: 0.9 K/UL — SIGNIFICANT CHANGE UP (ref 0–0.9)
MONOCYTES NFR BLD AUTO: 9.3 % — SIGNIFICANT CHANGE UP (ref 2–14)
NEUTROPHILS # BLD AUTO: 5.2 K/UL — SIGNIFICANT CHANGE UP (ref 1.8–7.4)
NEUTROPHILS NFR BLD AUTO: 52 % — SIGNIFICANT CHANGE UP (ref 43–77)
NITRITE UR-MCNC: NEGATIVE — SIGNIFICANT CHANGE UP
PH UR: 6 — SIGNIFICANT CHANGE UP (ref 5–8)
PLATELET # BLD AUTO: 320 K/UL — SIGNIFICANT CHANGE UP (ref 150–400)
POTASSIUM SERPL-MCNC: 4.2 MMOL/L — SIGNIFICANT CHANGE UP (ref 3.5–5.3)
POTASSIUM SERPL-SCNC: 4.2 MMOL/L — SIGNIFICANT CHANGE UP (ref 3.5–5.3)
PROT SERPL-MCNC: 7.7 G/DL — SIGNIFICANT CHANGE UP (ref 6–8.3)
PROT UR-MCNC: ABNORMAL
RBC # BLD: 4.19 M/UL — SIGNIFICANT CHANGE UP (ref 3.8–5.2)
RBC # FLD: 11.8 % — SIGNIFICANT CHANGE UP (ref 10.3–14.5)
RBC CASTS # UR COMP ASSIST: 3 /HPF — SIGNIFICANT CHANGE UP (ref 0–4)
SODIUM SERPL-SCNC: 137 MMOL/L — SIGNIFICANT CHANGE UP (ref 135–145)
SP GR SPEC: 1.03 — HIGH (ref 1.01–1.02)
UROBILINOGEN FLD QL: NEGATIVE — SIGNIFICANT CHANGE UP
WBC # BLD: 10 K/UL — SIGNIFICANT CHANGE UP (ref 3.8–10.5)
WBC # FLD AUTO: 10 K/UL — SIGNIFICANT CHANGE UP (ref 3.8–10.5)
WBC UR QL: 5 /HPF — SIGNIFICANT CHANGE UP (ref 0–5)

## 2019-09-16 PROCEDURE — 74176 CT ABD & PELVIS W/O CONTRAST: CPT | Mod: 26

## 2019-09-16 PROCEDURE — 99284 EMERGENCY DEPT VISIT MOD MDM: CPT

## 2019-09-16 RX ORDER — ACETAMINOPHEN 500 MG
975 TABLET ORAL ONCE
Refills: 0 | Status: COMPLETED | OUTPATIENT
Start: 2019-09-16 | End: 2019-09-16

## 2019-09-16 NOTE — ED STATDOCS - NS_EDPROVIDERDISPOUSERTYPE_ED_A_ED
Attending Attestation (For Attendings USE Only)... Attending Attestation (For Attendings USE Only).../I have personally evaluated and examined the patient. The Attending was available to me as a supervising provider if needed.

## 2019-09-16 NOTE — ED PROVIDER NOTE - OBJECTIVE STATEMENT
48yo F with HTN, HLD, hypothyroidism, IBS, diverticulitis presenting with lower abdominal pain x 1 week. She endorses she has baseline abdominal pain and frequent BMs due to IBS, but this time pain unabated after BMs. She denies fevers, chills, dysuria. Some radiation to the bilateral flanks. She endorses some nausea and poor PO tolerance.     Patient with anaphylaxis to contrast.

## 2019-09-16 NOTE — ED PROVIDER NOTE - PHYSICAL EXAMINATION
PHYSICAL EXAM:    General: No acute distress.  HEENT: NCAT.  PERRL.  EOMI.    Neck: Supple.  Full ROM.  No JVD.    Heart: RRR.  Normal S1 and S2.  No murmurs, rubs, or gallops.   Lungs: CTAB. No wheezes, crackles, or rhonchi.    Abdomen: BS+, soft, ND.  No organomegaly. minimal TTP in lower quadrants  Skin: Warm and dry.  No rashes.  Extremities: No edema.  2+ peripheral pulses b/l.  Musculoskeletal: No deformities.  No spinal or paraspinal tenderness.  Neuro: A&Ox3 but mild confusion, poor recall.  CN II-XII intact.  5/5 strength in UE and LE b/l.

## 2019-09-16 NOTE — ED PROVIDER NOTE - NS ED ROS FT
REVIEW OF SYSTEMS:    CONSTITUTIONAL: No fever  EYES/ENT: No visual changes;  No vertigo or throat pain   NECK: No pain or stiffness  RESPIRATORY: No cough, wheezing, hemoptysis; No shortness of breath  CARDIOVASCULAR: No chest pain or palpitations  GASTROINTESTINAL: + abdominal pain; nausea, vomiting;  diarrhea or constipation. No hematemasis, melena or hematochezia.  GENITOURINARY: No dysuria, frequency or hematuria  NEUROLOGICAL: No numbness or weakness  SKIN: No itching, burning, rashes, or lesions   All other review of systems is negative unless indicated above.

## 2019-09-16 NOTE — ED ADULT NURSE NOTE - OBJECTIVE STATEMENT
50 yo female from home c/o lower abd pn, 3/10 non radiating tender on palpation. c/o nausea no vomiting, no issues urinating. denies diarrhea, abd rounded, soft. hx diverticulitis, st less severe pn than last flare up. denies SOB, chx pn, l/s equal clr bilat apices to bases. IV in left AC via 20G catheter, labs sent from triage. Vs stable, Ct pending, will continue to monitor.

## 2019-09-16 NOTE — ED PROVIDER NOTE - PATIENT PORTAL LINK FT
You can access the FollowMyHealth Patient Portal offered by [facility] by registering at the following website: [email or web address]. By joining [email or web address]’s FollowMyHealth portal, you will also be able to view your health information using other applications (apps) compatible with our system.

## 2019-09-16 NOTE — ED STATDOCS - OBJECTIVE STATEMENT
48yo F with HTN (not taking meds x 1 month), HLD, hypothyroidism, IBS, diverticulitis presenting with 1 week of constant dull RLQ pain radiating across lower abd. Worse with walking/bending. Better with rest.   IBS pain resolves with BM. This time no relief of pain. Pain 6/10 at rest. +associated chills and nausea. Did not eat today. No vomiting. 8-9 BMs on saturday typical for her IBS, however no BM yesterday and today BM x 1, unusual for her. Reports nonbloody stool, no melena. No urinary symptoms. Did not take anything for pain. No abdominal surgeries. Last colonoscopy 2 years ago, WNL. Called GI today and was directed to ED.    GI Dr. TREY Cordon 50yo F with HTN (not taking meds x 1 month), HLD, hypothyroidism, IBS, diverticulitis presenting with 1 week of constant dull abdominal pain radiating across lower abd, now feels like RLQ>LLQ. Pain worse with walking/bending, better with rest. IBS pain resolves with BM. This time no relief of pain. Pain 6/10 at rest. +associated chills and nausea. Did not eat today. No vomiting. Reports 8-9 BMs on saturday typical for her IBS, however no BM yesterday and today BM x 1, unusual for her. Reports nonbloody stool, no melena. No urinary symptoms. Does not want anything for pain. No h/o abdominal surgeries. Last colonoscopy 2 years ago, WNL. Called GI today and was directed to ED.    GI Dr. TREY Cordon

## 2019-09-16 NOTE — ED STATDOCS - PROGRESS NOTE DETAILS
Attending Alina Plascencia DO, cosign the chart. I was available for supervisory role while patient in the waiting room but did not directly see or examine the patient. Patient was later seen in ER by another ER attending.

## 2019-09-16 NOTE — ED PROVIDER NOTE - NSFOLLOWUPINSTRUCTIONS_ED_ALL_ED_FT
You were seen in the Emergency Department for diverticulitis.  Follow up with you gastroenterologists with in the next 2-3 days.     Return to the Emergency Department if you develop new or worsening symptoms.

## 2019-09-16 NOTE — ED PROVIDER NOTE - CLINICAL SUMMARY MEDICAL DECISION MAKING FREE TEXT BOX
Attending MD Escobar: 49F with lower abd pain, ho diverticulitis. Exam with llq and suprapubic abd ttp, ddx diverticulitis or colitis. Plan for CT a/p eval

## 2019-09-17 VITALS
DIASTOLIC BLOOD PRESSURE: 84 MMHG | RESPIRATION RATE: 16 BRPM | HEART RATE: 70 BPM | OXYGEN SATURATION: 99 % | SYSTOLIC BLOOD PRESSURE: 136 MMHG

## 2019-09-17 PROCEDURE — 80053 COMPREHEN METABOLIC PANEL: CPT

## 2019-09-17 PROCEDURE — 85014 HEMATOCRIT: CPT

## 2019-09-17 PROCEDURE — 84132 ASSAY OF SERUM POTASSIUM: CPT

## 2019-09-17 PROCEDURE — 83605 ASSAY OF LACTIC ACID: CPT

## 2019-09-17 PROCEDURE — 83690 ASSAY OF LIPASE: CPT

## 2019-09-17 PROCEDURE — 82803 BLOOD GASES ANY COMBINATION: CPT

## 2019-09-17 PROCEDURE — 87086 URINE CULTURE/COLONY COUNT: CPT

## 2019-09-17 PROCEDURE — 99284 EMERGENCY DEPT VISIT MOD MDM: CPT | Mod: 25

## 2019-09-17 PROCEDURE — 85027 COMPLETE CBC AUTOMATED: CPT

## 2019-09-17 PROCEDURE — 82330 ASSAY OF CALCIUM: CPT

## 2019-09-17 PROCEDURE — 82435 ASSAY OF BLOOD CHLORIDE: CPT

## 2019-09-17 PROCEDURE — 74176 CT ABD & PELVIS W/O CONTRAST: CPT

## 2019-09-17 PROCEDURE — 36415 COLL VENOUS BLD VENIPUNCTURE: CPT

## 2019-09-17 PROCEDURE — 87186 SC STD MICRODIL/AGAR DIL: CPT

## 2019-09-17 PROCEDURE — 81001 URINALYSIS AUTO W/SCOPE: CPT

## 2019-09-17 PROCEDURE — 82947 ASSAY GLUCOSE BLOOD QUANT: CPT

## 2019-09-17 PROCEDURE — 84295 ASSAY OF SERUM SODIUM: CPT

## 2019-09-17 RX ORDER — MOXIFLOXACIN HYDROCHLORIDE TABLETS, 400 MG 400 MG/1
1 TABLET, FILM COATED ORAL
Qty: 20 | Refills: 0
Start: 2019-09-17 | End: 2019-09-26

## 2019-09-17 RX ORDER — METRONIDAZOLE 500 MG
1 TABLET ORAL
Qty: 30 | Refills: 0
Start: 2019-09-17 | End: 2019-09-26

## 2019-09-17 RX ADMIN — Medication 975 MILLIGRAM(S): at 00:20

## 2019-09-19 NOTE — ED POST DISCHARGE NOTE - ADDITIONAL DOCUMENTATION
Per sensitivity list, bacteria sensitive to Cipro, patient adequately treated per ED, no need to contact at this time. -Julianne Cage PA-C

## 2019-09-21 ENCOUNTER — INPATIENT (INPATIENT)
Facility: HOSPITAL | Age: 49
LOS: 1 days | Discharge: HOME | DRG: 392 | End: 2019-09-23
Attending: STUDENT IN AN ORGANIZED HEALTH CARE EDUCATION/TRAINING PROGRAM | Admitting: HOSPITALIST
Payer: COMMERCIAL

## 2019-09-21 VITALS
TEMPERATURE: 98 F | HEART RATE: 83 BPM | WEIGHT: 179.9 LBS | DIASTOLIC BLOOD PRESSURE: 93 MMHG | HEIGHT: 61 IN | OXYGEN SATURATION: 98 % | RESPIRATION RATE: 17 BRPM | SYSTOLIC BLOOD PRESSURE: 140 MMHG

## 2019-09-21 DIAGNOSIS — I10 ESSENTIAL (PRIMARY) HYPERTENSION: ICD-10-CM

## 2019-09-21 DIAGNOSIS — N39.0 URINARY TRACT INFECTION, SITE NOT SPECIFIED: ICD-10-CM

## 2019-09-21 DIAGNOSIS — E78.5 HYPERLIPIDEMIA, UNSPECIFIED: ICD-10-CM

## 2019-09-21 DIAGNOSIS — R10.32 LEFT LOWER QUADRANT PAIN: ICD-10-CM

## 2019-09-21 DIAGNOSIS — E87.2 ACIDOSIS: ICD-10-CM

## 2019-09-21 DIAGNOSIS — Z29.9 ENCOUNTER FOR PROPHYLACTIC MEASURES, UNSPECIFIED: ICD-10-CM

## 2019-09-21 DIAGNOSIS — E86.0 DEHYDRATION: ICD-10-CM

## 2019-09-21 DIAGNOSIS — E03.9 HYPOTHYROIDISM, UNSPECIFIED: ICD-10-CM

## 2019-09-21 DIAGNOSIS — E66.9 OBESITY, UNSPECIFIED: ICD-10-CM

## 2019-09-21 LAB
ALBUMIN SERPL ELPH-MCNC: 4 G/DL — SIGNIFICANT CHANGE UP (ref 3.3–5)
ALP SERPL-CCNC: 38 U/L — LOW (ref 40–120)
ALT FLD-CCNC: 10 U/L — SIGNIFICANT CHANGE UP (ref 10–45)
ANION GAP SERPL CALC-SCNC: 13 MMOL/L — SIGNIFICANT CHANGE UP (ref 5–17)
APPEARANCE UR: ABNORMAL
APTT BLD: 29 SEC — SIGNIFICANT CHANGE UP (ref 27.5–36.3)
AST SERPL-CCNC: 15 U/L — SIGNIFICANT CHANGE UP (ref 10–40)
BACTERIA # UR AUTO: NEGATIVE — SIGNIFICANT CHANGE UP
BASE EXCESS BLDV CALC-SCNC: -0.3 MMOL/L — SIGNIFICANT CHANGE UP (ref -2–2)
BASOPHILS # BLD AUTO: 0 K/UL — SIGNIFICANT CHANGE UP (ref 0–0.2)
BASOPHILS NFR BLD AUTO: 0.7 % — SIGNIFICANT CHANGE UP (ref 0–2)
BILIRUB SERPL-MCNC: 0.4 MG/DL — SIGNIFICANT CHANGE UP (ref 0.2–1.2)
BILIRUB UR-MCNC: NEGATIVE — SIGNIFICANT CHANGE UP
BUN SERPL-MCNC: 9 MG/DL — SIGNIFICANT CHANGE UP (ref 7–23)
CA-I SERPL-SCNC: 1.18 MMOL/L — SIGNIFICANT CHANGE UP (ref 1.12–1.3)
CALCIUM SERPL-MCNC: 9.1 MG/DL — SIGNIFICANT CHANGE UP (ref 8.4–10.5)
CHLORIDE BLDV-SCNC: 110 MMOL/L — HIGH (ref 96–108)
CHLORIDE SERPL-SCNC: 105 MMOL/L — SIGNIFICANT CHANGE UP (ref 96–108)
CO2 BLDV-SCNC: 26 MMOL/L — SIGNIFICANT CHANGE UP (ref 22–30)
CO2 SERPL-SCNC: 21 MMOL/L — LOW (ref 22–31)
COLOR SPEC: YELLOW — SIGNIFICANT CHANGE UP
COMMENT - URINE: SIGNIFICANT CHANGE UP
CREAT SERPL-MCNC: 0.76 MG/DL — SIGNIFICANT CHANGE UP (ref 0.5–1.3)
DIFF PNL FLD: ABNORMAL
EOSINOPHIL # BLD AUTO: 0.1 K/UL — SIGNIFICANT CHANGE UP (ref 0–0.5)
EOSINOPHIL NFR BLD AUTO: 1.5 % — SIGNIFICANT CHANGE UP (ref 0–6)
EPI CELLS # UR: 19 — SIGNIFICANT CHANGE UP
GAS PNL BLDV: 136 MMOL/L — SIGNIFICANT CHANGE UP (ref 135–145)
GAS PNL BLDV: SIGNIFICANT CHANGE UP
GAS PNL BLDV: SIGNIFICANT CHANGE UP
GLUCOSE BLDV-MCNC: 89 MG/DL — SIGNIFICANT CHANGE UP (ref 70–99)
GLUCOSE SERPL-MCNC: 87 MG/DL — SIGNIFICANT CHANGE UP (ref 70–99)
GLUCOSE UR QL: NEGATIVE — SIGNIFICANT CHANGE UP
HCO3 BLDV-SCNC: 25 MMOL/L — SIGNIFICANT CHANGE UP (ref 21–29)
HCT VFR BLD CALC: 39.5 % — SIGNIFICANT CHANGE UP (ref 34.5–45)
HCT VFR BLDA CALC: 41 % — SIGNIFICANT CHANGE UP (ref 39–50)
HGB BLD CALC-MCNC: 13.4 G/DL — SIGNIFICANT CHANGE UP (ref 11.5–15.5)
HGB BLD-MCNC: 12.9 G/DL — SIGNIFICANT CHANGE UP (ref 11.5–15.5)
HOROWITZ INDEX BLDV+IHG-RTO: SIGNIFICANT CHANGE UP
HYALINE CASTS # UR AUTO: 5 /LPF — SIGNIFICANT CHANGE UP (ref 0–7)
INR BLD: 1.14 RATIO — SIGNIFICANT CHANGE UP (ref 0.88–1.16)
KETONES UR-MCNC: SIGNIFICANT CHANGE UP
LACTATE BLDV-MCNC: 1.3 MMOL/L — SIGNIFICANT CHANGE UP (ref 0.7–2)
LEUKOCYTE ESTERASE UR-ACNC: ABNORMAL
LYMPHOCYTES # BLD AUTO: 2.1 K/UL — SIGNIFICANT CHANGE UP (ref 1–3.3)
LYMPHOCYTES # BLD AUTO: 31 % — SIGNIFICANT CHANGE UP (ref 13–44)
MCHC RBC-ENTMCNC: 31.5 PG — SIGNIFICANT CHANGE UP (ref 27–34)
MCHC RBC-ENTMCNC: 32.7 GM/DL — SIGNIFICANT CHANGE UP (ref 32–36)
MCV RBC AUTO: 96.3 FL — SIGNIFICANT CHANGE UP (ref 80–100)
MONOCYTES # BLD AUTO: 0.7 K/UL — SIGNIFICANT CHANGE UP (ref 0–0.9)
MONOCYTES NFR BLD AUTO: 10.3 % — SIGNIFICANT CHANGE UP (ref 2–14)
NEUTROPHILS # BLD AUTO: 3.9 K/UL — SIGNIFICANT CHANGE UP (ref 1.8–7.4)
NEUTROPHILS NFR BLD AUTO: 56.5 % — SIGNIFICANT CHANGE UP (ref 43–77)
NITRITE UR-MCNC: NEGATIVE — SIGNIFICANT CHANGE UP
PCO2 BLDV: 44 MMHG — SIGNIFICANT CHANGE UP (ref 35–50)
PH BLDV: 7.37 — SIGNIFICANT CHANGE UP (ref 7.35–7.45)
PH UR: 5.5 — SIGNIFICANT CHANGE UP (ref 5–8)
PLATELET # BLD AUTO: 314 K/UL — SIGNIFICANT CHANGE UP (ref 150–400)
PO2 BLDV: 29 MMHG — SIGNIFICANT CHANGE UP (ref 25–45)
POTASSIUM BLDV-SCNC: 4.1 MMOL/L — SIGNIFICANT CHANGE UP (ref 3.5–5.3)
POTASSIUM SERPL-MCNC: 3.9 MMOL/L — SIGNIFICANT CHANGE UP (ref 3.5–5.3)
POTASSIUM SERPL-SCNC: 3.9 MMOL/L — SIGNIFICANT CHANGE UP (ref 3.5–5.3)
PROT SERPL-MCNC: 7.1 G/DL — SIGNIFICANT CHANGE UP (ref 6–8.3)
PROT UR-MCNC: ABNORMAL
PROTHROM AB SERPL-ACNC: 13.1 SEC — HIGH (ref 10–12.9)
RBC # BLD: 4.1 M/UL — SIGNIFICANT CHANGE UP (ref 3.8–5.2)
RBC # FLD: 11.5 % — SIGNIFICANT CHANGE UP (ref 10.3–14.5)
RBC CASTS # UR COMP ASSIST: 4 /HPF — SIGNIFICANT CHANGE UP (ref 0–4)
SAO2 % BLDV: 51 % — LOW (ref 67–88)
SODIUM SERPL-SCNC: 139 MMOL/L — SIGNIFICANT CHANGE UP (ref 135–145)
SP GR SPEC: 1.03 — HIGH (ref 1.01–1.02)
UROBILINOGEN FLD QL: NEGATIVE — SIGNIFICANT CHANGE UP
WBC # BLD: 6.9 K/UL — SIGNIFICANT CHANGE UP (ref 3.8–10.5)
WBC # FLD AUTO: 6.9 K/UL — SIGNIFICANT CHANGE UP (ref 3.8–10.5)
WBC UR QL: 68 /HPF — HIGH (ref 0–5)

## 2019-09-21 PROCEDURE — 99285 EMERGENCY DEPT VISIT HI MDM: CPT

## 2019-09-21 PROCEDURE — 99223 1ST HOSP IP/OBS HIGH 75: CPT

## 2019-09-21 RX ORDER — SODIUM CHLORIDE 9 MG/ML
1000 INJECTION, SOLUTION INTRAVENOUS
Refills: 0 | Status: DISCONTINUED | OUTPATIENT
Start: 2019-09-21 | End: 2019-09-23

## 2019-09-21 RX ORDER — METRONIDAZOLE 500 MG
500 TABLET ORAL EVERY 8 HOURS
Refills: 0 | Status: DISCONTINUED | OUTPATIENT
Start: 2019-09-22 | End: 2019-09-23

## 2019-09-21 RX ORDER — MORPHINE SULFATE 50 MG/1
4 CAPSULE, EXTENDED RELEASE ORAL ONCE
Refills: 0 | Status: DISCONTINUED | OUTPATIENT
Start: 2019-09-21 | End: 2019-09-21

## 2019-09-21 RX ORDER — SODIUM CHLORIDE 9 MG/ML
1000 INJECTION INTRAMUSCULAR; INTRAVENOUS; SUBCUTANEOUS ONCE
Refills: 0 | Status: COMPLETED | OUTPATIENT
Start: 2019-09-21 | End: 2019-09-21

## 2019-09-21 RX ORDER — CIPROFLOXACIN LACTATE 400MG/40ML
400 VIAL (ML) INTRAVENOUS EVERY 12 HOURS
Refills: 0 | Status: DISCONTINUED | OUTPATIENT
Start: 2019-09-22 | End: 2019-09-23

## 2019-09-21 RX ORDER — ACETAMINOPHEN 500 MG
650 TABLET ORAL EVERY 6 HOURS
Refills: 0 | Status: DISCONTINUED | OUTPATIENT
Start: 2019-09-21 | End: 2019-09-23

## 2019-09-21 RX ORDER — ONDANSETRON 8 MG/1
4 TABLET, FILM COATED ORAL ONCE
Refills: 0 | Status: COMPLETED | OUTPATIENT
Start: 2019-09-21 | End: 2019-09-21

## 2019-09-21 RX ORDER — CIPROFLOXACIN LACTATE 400MG/40ML
400 VIAL (ML) INTRAVENOUS ONCE
Refills: 0 | Status: COMPLETED | OUTPATIENT
Start: 2019-09-21 | End: 2019-09-21

## 2019-09-21 RX ORDER — ATORVASTATIN CALCIUM 80 MG/1
80 TABLET, FILM COATED ORAL AT BEDTIME
Refills: 0 | Status: DISCONTINUED | OUTPATIENT
Start: 2019-09-21 | End: 2019-09-23

## 2019-09-21 RX ORDER — LEVOTHYROXINE SODIUM 125 MCG
75 TABLET ORAL DAILY
Refills: 0 | Status: DISCONTINUED | OUTPATIENT
Start: 2019-09-21 | End: 2019-09-23

## 2019-09-21 RX ORDER — METRONIDAZOLE 500 MG
500 TABLET ORAL ONCE
Refills: 0 | Status: COMPLETED | OUTPATIENT
Start: 2019-09-21 | End: 2019-09-21

## 2019-09-21 RX ORDER — DIPHENHYDRAMINE HCL 50 MG
50 CAPSULE ORAL ONCE
Refills: 0 | Status: COMPLETED | OUTPATIENT
Start: 2019-09-21 | End: 2019-09-22

## 2019-09-21 RX ORDER — ENOXAPARIN SODIUM 100 MG/ML
40 INJECTION SUBCUTANEOUS DAILY
Refills: 0 | Status: DISCONTINUED | OUTPATIENT
Start: 2019-09-21 | End: 2019-09-23

## 2019-09-21 RX ORDER — MORPHINE SULFATE 50 MG/1
1 CAPSULE, EXTENDED RELEASE ORAL ONCE
Refills: 0 | Status: DISCONTINUED | OUTPATIENT
Start: 2019-09-21 | End: 2019-09-21

## 2019-09-21 RX ADMIN — Medication 200 MILLIGRAM(S): at 15:00

## 2019-09-21 RX ADMIN — Medication 50 MILLIGRAM(S): at 23:08

## 2019-09-21 RX ADMIN — ONDANSETRON 4 MILLIGRAM(S): 8 TABLET, FILM COATED ORAL at 14:37

## 2019-09-21 RX ADMIN — MORPHINE SULFATE 4 MILLIGRAM(S): 50 CAPSULE, EXTENDED RELEASE ORAL at 15:02

## 2019-09-21 RX ADMIN — Medication 100 MILLIGRAM(S): at 16:21

## 2019-09-21 RX ADMIN — SODIUM CHLORIDE 1000 MILLILITER(S): 9 INJECTION INTRAMUSCULAR; INTRAVENOUS; SUBCUTANEOUS at 14:37

## 2019-09-21 RX ADMIN — MORPHINE SULFATE 4 MILLIGRAM(S): 50 CAPSULE, EXTENDED RELEASE ORAL at 14:37

## 2019-09-21 RX ADMIN — MORPHINE SULFATE 1 MILLIGRAM(S): 50 CAPSULE, EXTENDED RELEASE ORAL at 23:08

## 2019-09-21 RX ADMIN — ATORVASTATIN CALCIUM 80 MILLIGRAM(S): 80 TABLET, FILM COATED ORAL at 23:08

## 2019-09-21 RX ADMIN — SODIUM CHLORIDE 100 MILLILITER(S): 9 INJECTION, SOLUTION INTRAVENOUS at 19:55

## 2019-09-21 NOTE — ED PROVIDER NOTE - OBJECTIVE STATEMENT
50y/o female with HTN, HLD, hypothyroidism, IBS, diverticulitis presenting with lower abdominal pain and nausea after being diagnosed with diverticulitis and UTI in [facility] <1 week and dc with Cipro/Flagyx 1 week. <24 hours states that she has had a BM and urinated without trouble. Has not been able to eat since leaving ED, decreased appetite. Has been sipping H2O. Has been taking antibiotics up until today 2/2 nausea with medication. States that pain is similar, in lower quadrants although has worsening pain on the right side, described as stabbing.   Denies vomiting, fever, chest pain, SOB.

## 2019-09-21 NOTE — ED PROVIDER NOTE - NS ED ROS FT
Constitutional: No fever or chills  Eyes: No visual changes  CV: No chest pain or lower extremity edema  Resp: No SOB no cough  GI: + abd pain.+ nausea No vomiting. No diarrhea.   : No new urinary complaints   MSK: No musculoskeletal pain  Skin: No rash  Psych: No complaints   Neuro: No headache. No numbness or tingling.+ weakness.  +unable to tolerate PO Constitutional: No fever or chills  Eyes: No visual changes  CV: No chest pain or lower extremity edema  Resp: No SOB no cough  GI: + abd pain.+ nausea No vomiting. No diarrhea.   : No new urinary complaints   MSK: No musculoskeletal pain  Skin: No rash  Psych: No complaints   Neuro: No headache. No numbness or tingling.+ weakness.  +unable to tolerate PO solids, per patient

## 2019-09-21 NOTE — ED PROVIDER NOTE - PHYSICAL EXAMINATION
GEN: Well Appearing, Nontoxic, NAD  HEENT: NC/AT, Symm Facies. EOMI  CV: +S1S2, RRR w/o m/g/r  RESP: CTAB w/o w/r/r  ABD: Soft, +mild abdomen ttp in RLQ/nd, +BS  EXT/MSK: No lower extremity edema or calf tenderness. FROMx4  SKIN: No erythema, lesions or rash  Neuro: Grossly intact, AOX3 with normal speech; Sensation intact, motor equal

## 2019-09-21 NOTE — H&P ADULT - NSHPPHYSICALEXAM_GEN_ALL_CORE
ICU Vital Signs Last 24 Hrs  T(C): 36.7 (21 Sep 2019 17:08), Max: 36.8 (21 Sep 2019 13:26)  T(F): 98.1 (21 Sep 2019 17:08), Max: 98.2 (21 Sep 2019 13:26)  HR: 65 (21 Sep 2019 17:08) (65 - 83)  BP: 129/85 (21 Sep 2019 17:08) (129/85 - 140/93)  BP(mean): --  ABP: --  ABP(mean): --  RR: 16 (21 Sep 2019 17:08) (16 - 18)  SpO2: 100% (21 Sep 2019 17:08) (98% - 100%)

## 2019-09-21 NOTE — H&P ADULT - PROBLEM SELECTOR PLAN 8
Lovenox ppx  Keep mg > 2  K>4  Clear diet- advance as tolerated  DIspo: F/U CT scan and if resolved will DC abx and send for outpt GYN appt.

## 2019-09-21 NOTE — ED ADULT NURSE NOTE - OBJECTIVE STATEMENT
48 y/o female PMH diverticulitis, IBS and sleep apnea presents to ED reporting nausea and decreased PO intake. Pt reports being at hospital on Monday, was diagnosed with diverticulosis and sent home with PO abx. Pt also reports increasing of pain and inability to take abx because of nausea. On exam, AOx3, speaking in complete sentences. Lung sounds CTA, NAD. Abdomen soft, non-tender, non-distended. Pt denies CP, SOB, v/d, fever/chills at this time. Seen and evaluted by LEYDI Coronado placed, labs sent.

## 2019-09-21 NOTE — H&P ADULT - PROBLEM SELECTOR PLAN 6
Holding Amlodipine 10mg here until dose confirmed. Normotensive now but if becomes hypertensive can start Amlodipine 5mg in meantime.

## 2019-09-21 NOTE — H&P ADULT - NSICDXPASTSURGICALHX_GEN_ALL_CORE_FT
PAST SURGICAL HISTORY:  C Section - x 1 - 1994     h/o  Endoscopy     h/o dental implant     History of Colonoscopy

## 2019-09-21 NOTE — ED PROVIDER NOTE - ATTENDING CONTRIBUTION TO CARE
has been on cipro flagyl for divertic / zofran. shows me waterbottle - "I cannot eavn keep this down and my urine is very dark"  minimal ttp diffusely wo rebound or guarding  no need to re-ct. will admit for ivf / abx

## 2019-09-21 NOTE — H&P ADULT - NSHPLABSRESULTS_GEN_ALL_CORE
139  |  105  |  9   ----------------------------<  87  3.9   |  21<L>  |  0.76    Ca    9.1      21 Sep 2019 14:26    TPro  7.1  /  Alb  4.0  /  TBili  0.4  /  DBili  x   /  AST  15  /  ALT  10  /  AlkPhos  38<L>                              12.9   6.9   )-----------( 314      ( 21 Sep 2019 14:26 )             39.5             LIVER FUNCTIONS - ( 21 Sep 2019 14:26 )  Alb: 4.0 g/dL / Pro: 7.1 g/dL / ALK PHOS: 38 U/L / ALT: 10 U/L / AST: 15 U/L / GGT: x             PT/INR - ( 21 Sep 2019 14:26 )   PT: 13.1 sec;   INR: 1.14 ratio         PTT - ( 21 Sep 2019 14:26 )  PTT:29.0 sec    Urinalysis Basic - ( 21 Sep 2019 16:31 )    Color: Yellow / Appearance: Slightly Turbid / S.029 / pH: x  Gluc: x / Ketone: Trace  / Bili: Negative / Urobili: Negative   Blood: x / Protein: 30 mg/dL / Nitrite: Negative   Leuk Esterase: Large / RBC: 4 /hpf / WBC 68 /HPF   Sq Epi: x / Non Sq Epi: 19 / Bacteria: Negative      CT abd/pelvis: ordered

## 2019-09-21 NOTE — ED ADULT NURSE NOTE - FINAL NURSING ELECTRONIC SIGNATURE
21-Sep-2019 17:02 O-L Flap Text: The defect edges were debeveled with a #15 scalpel blade.  Given the location of the defect, shape of the defect and the proximity to free margins an O-L flap was deemed most appropriate.  Using a sterile surgical marker, an appropriate advancement flap was drawn incorporating the defect and placing the expected incisions within the relaxed skin tension lines where possible.    The area thus outlined was incised deep to adipose tissue with a #15 scalpel blade.  The skin margins were undermined to an appropriate distance in all directions utilizing iris scissors.

## 2019-09-21 NOTE — H&P ADULT - PROBLEM SELECTOR PLAN 1
Left worse than right but b/l L abd pain 2/2 cystitis vs. diverticulitis vs. appendicitis vs. ovarian cyst  Repeat CT abd/pelvis w/ iv contrast to r/o appendicitis or abscess.  Continue Cipro and Flagyl for now.   If negative would send for outpt pelvic US for cysts.  Benign abd exam w/o rebound or guarding or any TTP.   UTI treatment as below

## 2019-09-21 NOTE — H&P ADULT - NSICDXFAMILYHX_GEN_ALL_CORE_FT
FAMILY HISTORY:  Family history of early CAD    Grandparent  Still living? No  Family history of colon cancer, Age at diagnosis: Age Unknown

## 2019-09-21 NOTE — H&P ADULT - PROBLEM SELECTOR PLAN 2
Last culture grew Klebsiella sensitive to Cirpo so will continue but resent urine culture. No dysuria but abd pain could be 2/2 cystitis.

## 2019-09-21 NOTE — H&P ADULT - NSICDXPASTMEDICALHX_GEN_ALL_CORE_FT
PAST MEDICAL HISTORY:  Diverticulitis     h/o Heart Palpitations     History of Nasal Septoplasty     Hypothyroid     Irritable Bowel Syndrome

## 2019-09-21 NOTE — ED ADULT NURSE NOTE - NSIMPLEMENTINTERV_GEN_ALL_ED
Implemented All Universal Safety Interventions:  [place] to call system. Call bell, personal items and telephone within reach. Instruct patient to call for assistance. Room bathroom lighting operational. Non-slip footwear when patient is off stretcher. Physically safe environment: no spills, clutter or unnecessary equipment. Stretcher in lowest position, wheels locked, appropriate side rails in place.

## 2019-09-21 NOTE — PATIENT PROFILE ADULT - LAST ORAL INTAKE
Subjective   Sari Lees  is a 20 y.o. female who presents for a 6 week postpartum checkup after .  She is feeling well.  Bowels and bladder are functioning normally.  Incisional pain has resolved.  Her baby is breast-feeding.  The baby is doing well    Chief Complaint   Patient presents with   • Postpartum Care     6 weeks out from        HPI    Past Medical History:   Diagnosis Date   • History of pancreatitis    • IBS (irritable bowel syndrome)        Past Surgical History:   Procedure Laterality Date   •  SECTION N/A 2/3/2017    Procedure:  SECTION PRIMARY;  Surgeon: David Barajas MD;  Location: [facility] LABOR DELIVERY;  Service:    • CHOLECYSTECTOMY     • COLONOSCOPY  < 2yrs ago    Had done [facility] by Dr Sergei Junior   • ERCP      Had at [facility] approx one yr ago   • UPPER GASTROINTESTINAL ENDOSCOPY      Done  at [facility] by  Dr Seregi Junior.       Social History     Social History   • Marital status:      Spouse name: N/A   • Number of children: N/A   • Years of education: N/A     Occupational History   • Not on file.     Social History Main Topics   • Smoking status: Never Smoker   • Smokeless tobacco: Never Used   • Alcohol use No      Comment: OCC. BEFORE PREGNANCY   • Drug use: No   • Sexual activity: Yes     Partners: Male     Other Topics Concern   • Not on file     Social History Narrative       The following portions of the patient's history were reviewed and updated as appropriate: allergies, current medications, past family history, past medical history, past social history, past surgical history and problem list.    Review of Systems   Constitutional: Negative.    HENT: Negative.    Respiratory: Negative.    Cardiovascular: Negative.    Gastrointestinal: Negative.    Genitourinary: Negative.    Musculoskeletal: Negative.    Skin: Negative.    Allergic/Immunologic: Negative.    Psychiatric/Behavioral: Negative.        Objective      Physical Exam   Constitutional: She is oriented to person, place, and time. She appears well-developed and well-nourished.   HENT:   Head: Normocephalic and atraumatic.   Cardiovascular: Normal rate and regular rhythm.    Pulmonary/Chest: Effort normal and breath sounds normal. She has no wheezes. She has no rales.   Abdominal: Soft. She exhibits no distension. There is no tenderness.   Genitourinary: Vagina normal and uterus normal. There is no lesion on the right labia. There is no lesion on the left labia. Cervix exhibits no motion tenderness. Right adnexum displays no mass and no tenderness. Left adnexum displays no mass and no tenderness. No vaginal discharge found.   Neurological: She is alert and oriented to person, place, and time.   Skin: Skin is warm and dry.   Nursing note and vitals reviewed.      Assessment    Sari was seen today for postpartum care.    Diagnoses and all orders for this visit:    Routine postpartum follow-up        Plan  1.  Appropriate progress 6 weeks postpartum.  Okay to resume all normal activities of daily living  2.  Pap is due in May  3.  Contraception.  The patient chooses to use condoms  4.   21-Sep-2019

## 2019-09-21 NOTE — H&P ADULT - PROBLEM SELECTOR PLAN 5
Nonadherant to Crestor at home but has very strong family history of cardiac death so will continue here and encouraged her to continue to take it and control her weight and BP.  Needs dose confirmation

## 2019-09-22 DIAGNOSIS — K57.92 DIVERTICULITIS OF INTESTINE, PART UNSPECIFIED, WITHOUT PERFORATION OR ABSCESS WITHOUT BLEEDING: ICD-10-CM

## 2019-09-22 LAB
ALBUMIN SERPL ELPH-MCNC: 3.9 G/DL — SIGNIFICANT CHANGE UP (ref 3.3–5)
ALP SERPL-CCNC: 38 U/L — LOW (ref 40–120)
ALT FLD-CCNC: 10 U/L — SIGNIFICANT CHANGE UP (ref 10–45)
ANION GAP SERPL CALC-SCNC: 12 MMOL/L — SIGNIFICANT CHANGE UP (ref 5–17)
APPEARANCE UR: CLEAR — SIGNIFICANT CHANGE UP
AST SERPL-CCNC: 12 U/L — SIGNIFICANT CHANGE UP (ref 10–40)
BACTERIA # UR AUTO: NEGATIVE — SIGNIFICANT CHANGE UP
BILIRUB SERPL-MCNC: 0.2 MG/DL — SIGNIFICANT CHANGE UP (ref 0.2–1.2)
BILIRUB UR-MCNC: NEGATIVE — SIGNIFICANT CHANGE UP
BUN SERPL-MCNC: 12 MG/DL — SIGNIFICANT CHANGE UP (ref 7–23)
CALCIUM SERPL-MCNC: 9.2 MG/DL — SIGNIFICANT CHANGE UP (ref 8.4–10.5)
CHLORIDE SERPL-SCNC: 106 MMOL/L — SIGNIFICANT CHANGE UP (ref 96–108)
CO2 SERPL-SCNC: 20 MMOL/L — LOW (ref 22–31)
COLOR SPEC: YELLOW — SIGNIFICANT CHANGE UP
CREAT SERPL-MCNC: 0.88 MG/DL — SIGNIFICANT CHANGE UP (ref 0.5–1.3)
CULTURE RESULTS: SIGNIFICANT CHANGE UP
DIFF PNL FLD: ABNORMAL
EPI CELLS # UR: 3 /HPF — SIGNIFICANT CHANGE UP
GAS PNL BLDA: SIGNIFICANT CHANGE UP
GLUCOSE SERPL-MCNC: 146 MG/DL — HIGH (ref 70–99)
GLUCOSE UR QL: NEGATIVE — SIGNIFICANT CHANGE UP
HCT VFR BLD CALC: 38.8 % — SIGNIFICANT CHANGE UP (ref 34.5–45)
HGB BLD-MCNC: 13.2 G/DL — SIGNIFICANT CHANGE UP (ref 11.5–15.5)
HYALINE CASTS # UR AUTO: 1 /LPF — SIGNIFICANT CHANGE UP (ref 0–2)
KETONES UR-MCNC: NEGATIVE — SIGNIFICANT CHANGE UP
LEUKOCYTE ESTERASE UR-ACNC: NEGATIVE — SIGNIFICANT CHANGE UP
MAGNESIUM SERPL-MCNC: 1.9 MG/DL — SIGNIFICANT CHANGE UP (ref 1.6–2.6)
MCHC RBC-ENTMCNC: 32.4 PG — SIGNIFICANT CHANGE UP (ref 27–34)
MCHC RBC-ENTMCNC: 33.9 GM/DL — SIGNIFICANT CHANGE UP (ref 32–36)
MCV RBC AUTO: 95.4 FL — SIGNIFICANT CHANGE UP (ref 80–100)
NITRITE UR-MCNC: NEGATIVE — SIGNIFICANT CHANGE UP
PH UR: 6.5 — SIGNIFICANT CHANGE UP (ref 5–8)
PHOSPHATE SERPL-MCNC: 1.7 MG/DL — LOW (ref 2.5–4.5)
PLATELET # BLD AUTO: 330 K/UL — SIGNIFICANT CHANGE UP (ref 150–400)
POTASSIUM SERPL-MCNC: 3.6 MMOL/L — SIGNIFICANT CHANGE UP (ref 3.5–5.3)
POTASSIUM SERPL-SCNC: 3.6 MMOL/L — SIGNIFICANT CHANGE UP (ref 3.5–5.3)
PROT SERPL-MCNC: 7.1 G/DL — SIGNIFICANT CHANGE UP (ref 6–8.3)
PROT UR-MCNC: ABNORMAL
RBC # BLD: 4.06 M/UL — SIGNIFICANT CHANGE UP (ref 3.8–5.2)
RBC # FLD: 11.7 % — SIGNIFICANT CHANGE UP (ref 10.3–14.5)
RBC CASTS # UR COMP ASSIST: 8 /HPF — HIGH (ref 0–4)
SODIUM SERPL-SCNC: 138 MMOL/L — SIGNIFICANT CHANGE UP (ref 135–145)
SP GR SPEC: 1.03 — HIGH (ref 1.01–1.02)
SPECIMEN SOURCE: SIGNIFICANT CHANGE UP
UROBILINOGEN FLD QL: NEGATIVE — SIGNIFICANT CHANGE UP
WBC # BLD: 10.3 K/UL — SIGNIFICANT CHANGE UP (ref 3.8–10.5)
WBC # FLD AUTO: 10.3 K/UL — SIGNIFICANT CHANGE UP (ref 3.8–10.5)
WBC UR QL: 1 /HPF — SIGNIFICANT CHANGE UP (ref 0–5)

## 2019-09-22 PROCEDURE — 74177 CT ABD & PELVIS W/CONTRAST: CPT | Mod: 26

## 2019-09-22 PROCEDURE — 99233 SBSQ HOSP IP/OBS HIGH 50: CPT

## 2019-09-22 PROCEDURE — 93010 ELECTROCARDIOGRAM REPORT: CPT

## 2019-09-22 RX ORDER — FLUCONAZOLE 150 MG/1
200 TABLET ORAL ONCE
Refills: 0 | Status: COMPLETED | OUTPATIENT
Start: 2019-09-22 | End: 2019-09-22

## 2019-09-22 RX ORDER — POTASSIUM PHOSPHATE, MONOBASIC POTASSIUM PHOSPHATE, DIBASIC 236; 224 MG/ML; MG/ML
15 INJECTION, SOLUTION INTRAVENOUS ONCE
Refills: 0 | Status: COMPLETED | OUTPATIENT
Start: 2019-09-22 | End: 2019-09-23

## 2019-09-22 RX ORDER — POTASSIUM PHOSPHATE, MONOBASIC POTASSIUM PHOSPHATE, DIBASIC 236; 224 MG/ML; MG/ML
15 INJECTION, SOLUTION INTRAVENOUS ONCE
Refills: 0 | Status: DISCONTINUED | OUTPATIENT
Start: 2019-09-22 | End: 2019-09-22

## 2019-09-22 RX ORDER — SODIUM CHLORIDE 9 MG/ML
1000 INJECTION, SOLUTION INTRAVENOUS
Refills: 0 | Status: COMPLETED | OUTPATIENT
Start: 2019-09-22 | End: 2019-09-22

## 2019-09-22 RX ORDER — POTASSIUM CHLORIDE 20 MEQ
40 PACKET (EA) ORAL ONCE
Refills: 0 | Status: COMPLETED | OUTPATIENT
Start: 2019-09-22 | End: 2019-09-22

## 2019-09-22 RX ADMIN — FLUCONAZOLE 100 MILLIGRAM(S): 150 TABLET ORAL at 23:05

## 2019-09-22 RX ADMIN — Medication 650 MILLIGRAM(S): at 16:07

## 2019-09-22 RX ADMIN — ATORVASTATIN CALCIUM 80 MILLIGRAM(S): 80 TABLET, FILM COATED ORAL at 22:03

## 2019-09-22 RX ADMIN — Medication 50 MILLIGRAM(S): at 11:03

## 2019-09-22 RX ADMIN — MORPHINE SULFATE 1 MILLIGRAM(S): 50 CAPSULE, EXTENDED RELEASE ORAL at 00:00

## 2019-09-22 RX ADMIN — Medication 75 MICROGRAM(S): at 05:42

## 2019-09-22 RX ADMIN — Medication 200 MILLIGRAM(S): at 02:42

## 2019-09-22 RX ADMIN — SODIUM CHLORIDE 999 MILLILITER(S): 9 INJECTION, SOLUTION INTRAVENOUS at 22:03

## 2019-09-22 RX ADMIN — Medication 100 MILLIGRAM(S): at 00:43

## 2019-09-22 RX ADMIN — Medication 200 MILLIGRAM(S): at 14:30

## 2019-09-22 RX ADMIN — Medication 650 MILLIGRAM(S): at 16:50

## 2019-09-22 RX ADMIN — Medication 50 MILLIGRAM(S): at 05:42

## 2019-09-22 RX ADMIN — Medication 100 MILLIGRAM(S): at 14:31

## 2019-09-22 RX ADMIN — Medication 100 MILLIGRAM(S): at 07:33

## 2019-09-22 RX ADMIN — Medication 40 MILLIEQUIVALENT(S): at 22:40

## 2019-09-22 RX ADMIN — Medication 1 APPLICATORFUL: at 22:40

## 2019-09-22 RX ADMIN — ENOXAPARIN SODIUM 40 MILLIGRAM(S): 100 INJECTION SUBCUTANEOUS at 11:03

## 2019-09-22 NOTE — CHART NOTE - NSCHARTNOTEFT_GEN_A_CORE
Notified by RN patient gets premedicated for CT scans w/ contrast since allergy to iodine. D/w CT tech and radiology resident - premedication protocol started at 11PM and CT scan scheduled for 12PM 9/22. VSS. HD stable. Does not endorse any complaints.     will continue to monitor   will endorse to day team     Elaine Garrison PA-C   [phone]

## 2019-09-22 NOTE — PROGRESS NOTE ADULT - SUBJECTIVE AND OBJECTIVE BOX
Patient is a 49y old  Female who presents with a chief complaint of Abdominal pain (21 Sep 2019 18:39)      INTERVAL HPI/OVERNIGHT EVENTS: Complains of constant pain still not much improved.       Dehydration  Family history of early CAD  Family history of colon cancer (Grandparent)  Handoff  MEWS Score  Hypothyroid  Diverticulitis  LBP (Low Back Pain)  History of Nasal Septoplasty  h/o Heart Palpitations  Hyperlipidemia on no meds now  Irritable Bowel Syndrome  Stress Incontinence  Dehydration  Need for prophylactic measure  Obesity  HTN (hypertension)  HLD (hyperlipidemia)  Hypothyroidism  Metabolic acidosis  UTI (urinary tract infection)  Left lower quadrant abdominal pain  h/o dental implant  History of Colonoscopy  h/o  Endoscopy  C Section - x  -   ABDOMINAL PAIN  4      Review of Systems: 12 point review of systems otherwise negative  ( - )fevers/chills  ( - ) dyspnea  ( - ) cough  ( - ) chest pain  ( - ) palpatations  ( - ) dizziness/lightheadedness  ( - ) nausea/vomiting  ( - ) abd pain  ( - ) diarrhea  ( - ) melena  ( - ) hematochezia  ( - ) dysuria  ( - ) hematuria  ( - ) leg swelling  ( -) calf tenderness  ( - ) motor weakness  ( - ) extremity numbness  ( - ) back pain  ( + ) tolerating POs  ( + ) BM    MEDICATIONS  (STANDING):  atorvastatin 80 milliGRAM(s) Oral at bedtime  ciprofloxacin   IVPB 400 milliGRAM(s) IV Intermittent every 12 hours  enoxaparin Injectable 40 milliGRAM(s) SubCutaneous daily  lactated ringers. 1000 milliLiter(s) (100 mL/Hr) IV Continuous <Continuous>  levothyroxine 75 MICROGram(s) Oral daily  metroNIDAZOLE  IVPB 500 milliGRAM(s) IV Intermittent every 8 hours    MEDICATIONS  (PRN):  acetaminophen   Tablet .. 650 milliGRAM(s) Oral every 6 hours PRN Moderate Pain (4 - 6)      Allergies    iodine (Hives)  Zosyn (Urticaria)    Intolerances          Vital Signs Last 24 Hrs  T(C): 36.9 (22 Sep 2019 13:56), Max: 36.9 (22 Sep 2019 13:56)  T(F): 98.4 (22 Sep 2019 13:56), Max: 98.4 (22 Sep 2019 13:56)  HR: 71 (22 Sep 2019 13:56) (65 - 71)  BP: 114/76 (22 Sep 2019 13:56) (111/74 - 129/85)  BP(mean): --  RR: 17 (22 Sep 2019 13:56) (16 - 17)  SpO2: 96% (22 Sep 2019 13:56) (96% - 100%)  CAPILLARY BLOOD GLUCOSE           @ 07:01  -   @ 07:00  --------------------------------------------------------  IN: 240 mL / OUT: 0 mL / NET: 240 mL     @ 07:01  -   @ 14:37  --------------------------------------------------------  IN: 600 mL / OUT: 0 mL / NET: 600 mL        Physical Exam:    Daily     Daily   General:  NAD, non toxic  HEENT:  Nonicteric, PERRLA  CV:  RRR, no murmur  Lungs:  CTA B/L, no wheezes, rales, rhonchi  Abdomen:  Soft, non-tender, no distended, positive BS no rebound or guarding  Extremities:  no edema  Skin:  Warm and dry, no rashes  :  No bacon  Neuro:  AAOx3, non-focal  No Restraints    LABS:                        12.9   6.9   )-----------( 314      ( 21 Sep 2019 14:26 )             39.5         139  |  105  |  9   ----------------------------<  87  3.9   |  21<L>  |  0.76    Ca    9.1      21 Sep 2019 14:26    TPro  7.1  /  Alb  4.0  /  TBili  0.4  /  DBili  x   /  AST  15  /  ALT  10  /  AlkPhos  38<L>      PT/INR - ( 21 Sep 2019 14:26 )   PT: 13.1 sec;   INR: 1.14 ratio         PTT - ( 21 Sep 2019 14:26 )  PTT:29.0 sec  Urinalysis Basic - ( 21 Sep 2019 16:31 )    Color: Yellow / Appearance: Slightly Turbid / S.029 / pH: x  Gluc: x / Ketone: Trace  / Bili: Negative / Urobili: Negative   Blood: x / Protein: 30 mg/dL / Nitrite: Negative   Leuk Esterase: Large / RBC: 4 /hpf / WBC 68 /HPF   Sq Epi: x / Non Sq Epi: 19 / Bacteria: Negative          RADIOLOGY & ADDITIONAL TESTS:    ---------------------------------------------------------------------------  I personally reviewed: [  ]EKG   [  ]CXR    [  ] CT    [  ]Other  ---------------------------------------------------------------------------  PLEASE CHECK WHEN PRESENT:     [  ]Heart Failure     [  ] Acute     [  ] Acute on Chronic     [  ] Chronic  -------------------------------------------------------------------     [  ]Diastolic [HFpEF]     [  ]Systolic [HFrEF]     [  ]Combined [HFpEF & HFrEF]     [  ]Other:  -------------------------------------------------------------------  [  ]KRISTEN     [  ]ATN     [  ]Reneal Medullary Necrosis     [  ]Renal Cortical Necrosis     [  ]Other Pathological Lesions:    [  ]CKD 1  [  ]CKD 2  [  ]CKD 3  [  ]CKD 4  [  ]CKD 5  [  ]Other  -------------------------------------------------------------------  [  ]Other/Unspecified:    --------------------------------------------------------------------    Abdominal Nutritional Status  [  ]Malnutrition: See Nutrition Note  [  ]Cachexia  [  ]Other:   [  ]Supplement Ordered:  [  ]Morbid Obesity (BMI >=40]

## 2019-09-22 NOTE — CHART NOTE - NSCHARTNOTEFT_GEN_A_CORE
Called by RN to the bedside pt is c/o palpitations.   Examined pt at the bedside. c/o palpitations, nausea , HA, poor PO intake,  " feeling I am getting yeast infection ", painless vaginal blood x1 on wipe post urination . Reports that her menstrual period was 9/2/2019 and has been irregular. Last PAP at the beginning of the year and it was normal.    Denies chest pain, dizziness, visual changes, fever, chills, SOB.   Physical Exam ; pt is AOx3 , PERRLA, +s1 & s2, CTA b/l, skin warm and dry , + 2 Dt/PT b/l.     EKG. NSR 90  Vital signs :   HR: 78 (22 Sep 2019 21:24)   BP: 122/75 (22 Sep 2019 21:24) (  RR: 17 (22 Sep 2019 21:24) (17 - 17)  SpO2: 97% (22 Sep 2019 21:24)  T 36.7  Blood work : CBC, CMP,MG, phos. VBG w lytes, CE, TSH, UA   -urine cx pending results   1L LR bolus x 1   increased LR to 125.   Diflucan 200 mg IVPB x 1   miconazole application q bedtime x 3 days   Will send UA to check on amount of blood lose thru urinary track . However, If further vaginal blood loss pt isntucted to notify provider and will call GYN service to do pelvis/ vaginal US & PAP exam. t also instructed to follow with outpatient GYN . Pt reports understanding.  Emotional support provided. Called by RN to the bedside pt is c/o palpitations.   Examined pt at the bedside. c/o palpitations, nausea , HA, poor PO intake,  " feeling I am getting yeast infection ", painless vaginal blood x1 on wipe post urination . Reports that her menstrual period was 9/2/2019 and has been irregular. Last PAP at the beginning of the year and it was normal.    Denies chest pain, dizziness, visual changes, fever, chills, SOB.   Physical Exam ; pt is AOx3 , PERRLA, +s1 & s2, CTA b/l, skin warm and dry , + 2 Dt/PT b/l.     EKG. NSR 90  Vital signs :   HR: 78 (22 Sep 2019 21:24)   BP: 122/75 (22 Sep 2019 21:24) (  RR: 17 (22 Sep 2019 21:24) (17 - 17)  SpO2: 97% (22 Sep 2019 21:24)  T 36.7  Blood work : CBC, CMP,MG, phos. VBG w lytes, CE, TSH, UA   -urine cx pending results   1L LR bolus x 1   increased LR to 125.   Diflucan 200 mg IVPB x 1   miconazole application q bedtime x 3 days   Will send UA to check on amount of blood lose thru urinary track . However, If further vaginal blood loss pt instructed to notify provider and will call GYN service to do pelvis/ vaginal US & PAP exam. t also instructed to follow with outpatient GYN . Pt reports understanding.  Emotional support provided.    Will re-eval post tx if pt still having palpitations . If present will transfer to telemetry unit for monitoring. Called by RN to the bedside pt is c/o palpitations.   50 y/o F with multiple medical problems including  IBS x 30 years , HTN,  HLD, hypothyroid, low vitamin D , diverticulitis 4 years ago  p/w abdominal pain found to have UTI and diverticulitis now on cipro & flagyl.   Examined pt at the bedside. c/o palpitations, nausea , HA, poor PO intake,  " feeling I am getting yeast infection ", painless vaginal blood x1 on wipe post urination . Reports that her menstrual period was 9/2/2019 and has been irregular. Last PAP at the beginning of the year and it was normal.    Denies chest pain, dizziness, visual changes, fever, chills, SOB, dysuria, diarrhea . last BM prior top admission to the hospital.    Physical Exam ; pt is AOx3 , PERRLA, +s1 & s2, CTA b/l, skin warm and dry , + 2 Dt/PT b/l.     EKG. NSR 90  Vital signs :   HR: 78 (22 Sep 2019 21:24)   BP: 122/75 (22 Sep 2019 21:24) (  RR: 17 (22 Sep 2019 21:24) (17 - 17)  SpO2: 97% (22 Sep 2019 21:24)  T 36.7  Blood work : CBC, CMP,MG, phos. VBG w lytes, CE, TSH, UA   -urine cx pending results   1L LR bolus x 1   increased LR to 125.   Diflucan 200 mg IVPB x 1   miconazole application q bedtime x 3 days   Will send UA to check on amount of blood lose thru urinary track . However, If further vaginal blood loss pt instructed to notify provider and will call GYN service to do pelvis/ vaginal US & PAP exam. t also instructed to follow with outpatient GYN . Pt reports understanding.  Emotional support provided.    Will re-eval post tx if pt still having palpitations . If present will transfer to telemetry unit for monitoring.

## 2019-09-22 NOTE — PROGRESS NOTE ADULT - PROBLEM SELECTOR PLAN 8
Lovenox ppx  Keep mg > 2  K>4  Clear diet- advance to regular diet today and assess response    DC when tolerates diet and pending urine cultures  DIspo: F/U CT scan and if resolved will DC abx and send for outpt GYN appt.

## 2019-09-23 ENCOUNTER — TRANSCRIPTION ENCOUNTER (OUTPATIENT)
Age: 49
End: 2019-09-23

## 2019-09-23 VITALS
SYSTOLIC BLOOD PRESSURE: 114 MMHG | DIASTOLIC BLOOD PRESSURE: 62 MMHG | RESPIRATION RATE: 18 BRPM | HEART RATE: 76 BPM | TEMPERATURE: 99 F | OXYGEN SATURATION: 98 %

## 2019-09-23 LAB
ANION GAP SERPL CALC-SCNC: 9 MMOL/L — SIGNIFICANT CHANGE UP (ref 5–17)
BASE EXCESS BLDV CALC-SCNC: 1 MMOL/L — SIGNIFICANT CHANGE UP (ref -2–2)
BUN SERPL-MCNC: 9 MG/DL — SIGNIFICANT CHANGE UP (ref 7–23)
CA-I SERPL-SCNC: 1.18 MMOL/L — SIGNIFICANT CHANGE UP (ref 1.12–1.3)
CALCIUM SERPL-MCNC: 8.8 MG/DL — SIGNIFICANT CHANGE UP (ref 8.4–10.5)
CHLORIDE BLDV-SCNC: 112 MMOL/L — HIGH (ref 96–108)
CHLORIDE SERPL-SCNC: 108 MMOL/L — SIGNIFICANT CHANGE UP (ref 96–108)
CO2 BLDV-SCNC: 26 MMOL/L — SIGNIFICANT CHANGE UP (ref 22–30)
CO2 SERPL-SCNC: 23 MMOL/L — SIGNIFICANT CHANGE UP (ref 22–31)
CREAT SERPL-MCNC: 0.69 MG/DL — SIGNIFICANT CHANGE UP (ref 0.5–1.3)
GAS PNL BLDV: 136 MMOL/L — SIGNIFICANT CHANGE UP (ref 135–145)
GAS PNL BLDV: SIGNIFICANT CHANGE UP
GLUCOSE BLDV-MCNC: 93 MG/DL — SIGNIFICANT CHANGE UP (ref 70–99)
GLUCOSE SERPL-MCNC: 94 MG/DL — SIGNIFICANT CHANGE UP (ref 70–99)
HCO3 BLDV-SCNC: 24 MMOL/L — SIGNIFICANT CHANGE UP (ref 21–29)
HCT VFR BLD CALC: 27.6 % — LOW (ref 34.5–45)
HCT VFR BLDA CALC: 36 % — LOW (ref 39–50)
HGB BLD CALC-MCNC: 11.7 G/DL — SIGNIFICANT CHANGE UP (ref 11.5–15.5)
HGB BLD-MCNC: 9.3 G/DL — LOW (ref 11.5–15.5)
LACTATE BLDV-MCNC: 1.9 MMOL/L — SIGNIFICANT CHANGE UP (ref 0.7–2)
MAGNESIUM SERPL-MCNC: 2.1 MG/DL — SIGNIFICANT CHANGE UP (ref 1.6–2.6)
MCHC RBC-ENTMCNC: 32 PG — SIGNIFICANT CHANGE UP (ref 27–34)
MCHC RBC-ENTMCNC: 33.7 GM/DL — SIGNIFICANT CHANGE UP (ref 32–36)
MCV RBC AUTO: 94.8 FL — SIGNIFICANT CHANGE UP (ref 80–100)
OTHER CELLS CSF MANUAL: 11 ML/DL — LOW (ref 18–22)
PCO2 BLDV: 37 MMHG — SIGNIFICANT CHANGE UP (ref 35–50)
PH BLDV: 7.44 — SIGNIFICANT CHANGE UP (ref 7.35–7.45)
PHOSPHATE SERPL-MCNC: 2.2 MG/DL — LOW (ref 2.5–4.5)
PLATELET # BLD AUTO: 116 K/UL — LOW (ref 150–400)
PO2 BLDV: 36 MMHG — SIGNIFICANT CHANGE UP (ref 25–45)
POTASSIUM BLDV-SCNC: 3.9 MMOL/L — SIGNIFICANT CHANGE UP (ref 3.5–5.3)
POTASSIUM SERPL-MCNC: 3.8 MMOL/L — SIGNIFICANT CHANGE UP (ref 3.5–5.3)
POTASSIUM SERPL-SCNC: 3.8 MMOL/L — SIGNIFICANT CHANGE UP (ref 3.5–5.3)
RBC # BLD: 2.91 M/UL — LOW (ref 3.8–5.2)
RBC # FLD: 12.3 % — SIGNIFICANT CHANGE UP (ref 10.3–14.5)
SAO2 % BLDV: 70 % — SIGNIFICANT CHANGE UP (ref 67–88)
SODIUM SERPL-SCNC: 140 MMOL/L — SIGNIFICANT CHANGE UP (ref 135–145)
TSH SERPL-MCNC: 1.14 UIU/ML — SIGNIFICANT CHANGE UP (ref 0.27–4.2)
WBC # BLD: 7.5 K/UL — SIGNIFICANT CHANGE UP (ref 3.8–10.5)
WBC # FLD AUTO: 7.5 K/UL — SIGNIFICANT CHANGE UP (ref 3.8–10.5)

## 2019-09-23 PROCEDURE — 99285 EMERGENCY DEPT VISIT HI MDM: CPT | Mod: 25

## 2019-09-23 PROCEDURE — 85014 HEMATOCRIT: CPT

## 2019-09-23 PROCEDURE — 96374 THER/PROPH/DIAG INJ IV PUSH: CPT

## 2019-09-23 PROCEDURE — 96375 TX/PRO/DX INJ NEW DRUG ADDON: CPT

## 2019-09-23 PROCEDURE — 84132 ASSAY OF SERUM POTASSIUM: CPT

## 2019-09-23 PROCEDURE — 81001 URINALYSIS AUTO W/SCOPE: CPT

## 2019-09-23 PROCEDURE — 83605 ASSAY OF LACTIC ACID: CPT

## 2019-09-23 PROCEDURE — 82330 ASSAY OF CALCIUM: CPT

## 2019-09-23 PROCEDURE — 83735 ASSAY OF MAGNESIUM: CPT

## 2019-09-23 PROCEDURE — 85730 THROMBOPLASTIN TIME PARTIAL: CPT

## 2019-09-23 PROCEDURE — 80048 BASIC METABOLIC PNL TOTAL CA: CPT

## 2019-09-23 PROCEDURE — 84295 ASSAY OF SERUM SODIUM: CPT

## 2019-09-23 PROCEDURE — 80053 COMPREHEN METABOLIC PANEL: CPT

## 2019-09-23 PROCEDURE — 82565 ASSAY OF CREATININE: CPT

## 2019-09-23 PROCEDURE — 87086 URINE CULTURE/COLONY COUNT: CPT

## 2019-09-23 PROCEDURE — 99221 1ST HOSP IP/OBS SF/LOW 40: CPT

## 2019-09-23 PROCEDURE — 99239 HOSP IP/OBS DSCHRG MGMT >30: CPT

## 2019-09-23 PROCEDURE — 85027 COMPLETE CBC AUTOMATED: CPT

## 2019-09-23 PROCEDURE — 84100 ASSAY OF PHOSPHORUS: CPT

## 2019-09-23 PROCEDURE — 82435 ASSAY OF BLOOD CHLORIDE: CPT

## 2019-09-23 PROCEDURE — 82947 ASSAY GLUCOSE BLOOD QUANT: CPT

## 2019-09-23 PROCEDURE — 74177 CT ABD & PELVIS W/CONTRAST: CPT

## 2019-09-23 PROCEDURE — 82803 BLOOD GASES ANY COMBINATION: CPT

## 2019-09-23 PROCEDURE — 84443 ASSAY THYROID STIM HORMONE: CPT

## 2019-09-23 PROCEDURE — 85610 PROTHROMBIN TIME: CPT

## 2019-09-23 PROCEDURE — 93005 ELECTROCARDIOGRAM TRACING: CPT

## 2019-09-23 RX ORDER — MOXIFLOXACIN HYDROCHLORIDE TABLETS, 400 MG 400 MG/1
1 TABLET, FILM COATED ORAL
Qty: 2 | Refills: 0
Start: 2019-09-23 | End: 2019-09-23

## 2019-09-23 RX ORDER — METRONIDAZOLE 500 MG
1 TABLET ORAL
Qty: 3 | Refills: 0
Start: 2019-09-23 | End: 2019-09-23

## 2019-09-23 RX ORDER — ONDANSETRON 8 MG/1
1 TABLET, FILM COATED ORAL
Qty: 15 | Refills: 0
Start: 2019-09-23 | End: 2019-09-27

## 2019-09-23 RX ORDER — AMLODIPINE BESYLATE 2.5 MG/1
1 TABLET ORAL
Qty: 0 | Refills: 0 | DISCHARGE

## 2019-09-23 RX ADMIN — POTASSIUM PHOSPHATE, MONOBASIC POTASSIUM PHOSPHATE, DIBASIC 125 MILLIMOLE(S): 236; 224 INJECTION, SOLUTION INTRAVENOUS at 01:19

## 2019-09-23 RX ADMIN — Medication 200 MILLIGRAM(S): at 12:07

## 2019-09-23 RX ADMIN — Medication 100 MILLIGRAM(S): at 07:28

## 2019-09-23 RX ADMIN — Medication 75 MICROGRAM(S): at 05:25

## 2019-09-23 RX ADMIN — SODIUM CHLORIDE 150 MILLILITER(S): 9 INJECTION, SOLUTION INTRAVENOUS at 01:18

## 2019-09-23 RX ADMIN — Medication 100 MILLIGRAM(S): at 12:07

## 2019-09-23 RX ADMIN — Medication 650 MILLIGRAM(S): at 02:15

## 2019-09-23 RX ADMIN — Medication 200 MILLIGRAM(S): at 04:08

## 2019-09-23 RX ADMIN — Medication 100 MILLIGRAM(S): at 00:13

## 2019-09-23 RX ADMIN — Medication 650 MILLIGRAM(S): at 01:18

## 2019-09-23 NOTE — DISCHARGE NOTE NURSING/CASE MANAGEMENT/SOCIAL WORK - PATIENT PORTAL LINK FT
You can access the FollowMyHealth Patient Portal offered by [facility] by registering at the following website: [email or web address]. By joining [facility]’s FollowMyHealth portal, you will also be able to view your health information using other applications (apps) compatible with our system.

## 2019-09-23 NOTE — CONSULT NOTE ADULT - SUBJECTIVE AND OBJECTIVE BOX
Patient is a 49y old  Female who presentsed with a chief complaint of Abdominal pain (22 Sep 2019 14:37)      HPI:  49F pmhx of IBS, hypothyroidism, HLD, and HTN who presents with constant, worsening lower quadrant abd pain x 5 days.  She came to ED on Monday for similar pain and was dx w/ diverticulitis and UTI and sent home with Cipro and Flagyl which she has been taking for the last week until today when she was too nauseated to take it.  She says the pain was initially dull, right and left lower quadrants and associated with nausea but now worsening to sharp, stabbing pain on both lower quadrants now.  She also notes she has hx of iBS and ovarian cysts but has not had pain like this.  She has had diverticulitis in the past but not in several years and felt that the pain was worse at that time.  She is having soft regular BMs, no fever, some chills and no vomiting.    In ED:  She was given IVF, Morphine and Cipro/flagyl. (21 Sep 2019 18:39)    CT 9/22 - stable descending colon diverticulitis  Now tolerating PO diet, feeling much better  lower abdominal pain much better, possible plans for discharge today  Has scheduled outpt follow up with Dr Macias on Thursday this week    PAST MEDICAL & SURGICAL HISTORY:  Hypothyroid  Diverticulitis  History of Nasal Septoplasty  h/o Heart Palpitations  Irritable Bowel Syndrome  h/o dental implant  History of Colonoscopy  h/o  Endoscopy  C Section - x 1 - 1994      Allergies  iodine (Hives)  Zosyn (Urticaria)      MEDICATIONS  (STANDING):  atorvastatin 80 milliGRAM(s) Oral at bedtime  ciprofloxacin   IVPB 400 milliGRAM(s) IV Intermittent every 12 hours  clotrimazole 2% Vaginal Cream 1 Applicatorful Vaginal at bedtime  enoxaparin Injectable 40 milliGRAM(s) SubCutaneous daily  lactated ringers. 1000 milliLiter(s) (150 mL/Hr) IV Continuous <Continuous>  levothyroxine 75 MICROGram(s) Oral daily  metroNIDAZOLE  IVPB 500 milliGRAM(s) IV Intermittent every 8 hours    MEDICATIONS  (PRN):  acetaminophen   Tablet .. 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 no Tobacco    Family History   IBD (  ) Yes   ( X ) No  GI Malignancy (  )  Yes    (X  ) No    Health Management     Last Colonoscopy - 2years ago - diverticulsos      Advanced Directives: (  X   ) None    (      ) DNR    (     ) DNI    (     ) Health Care Proxy:     Review of Systems:    General:  No wt loss, fevers, chills, night sweats, fatigue   CV:  No pain, palpitations, hypo/hypertension  Resp:  No dyspnea, cough, tachypnea, wheezing  GI:  see HPI  :  No pain, bleeding, incontinence, nocturia  Muscle:  No pain, weakness  Neuro:  No weakness, tingling, memory problems  Psych:  No fatigue, insomnia, mood problems, depression  Endocrine:  No polyuria, polydypsia, cold/heat intolerance  Heme:  No petechiae, ecchymosis, easy bruisability  Skin:  No rash, tattoos, scars, edema      Vital Signs Last 24 Hrs  T(C): 36.6 (23 Sep 2019 05:42), Max: 36.9 (22 Sep 2019 13:56)  T(F): 97.8 (23 Sep 2019 05:42), Max: 98.4 (22 Sep 2019 13:56)  HR: 70 (23 Sep 2019 05:42) (70 - 78)  BP: 127/79 (23 Sep 2019 05:42) (114/76 - 127/79)  BP(mean): --  RR: 17 (23 Sep 2019 05:42) (17 - 17)  SpO2: 97% (23 Sep 2019 05:42) (96% - 97%)    PHYSICAL EXAM:    Constitutional: NAD, well-developed pleasant female eating breakfast  Neck: No LAD, supple  Respiratory: Clear b/l  Cardiovascular: S1 and S2, RRR  Gastrointestinal: BS+, soft, NT/ND, neg HSM, no rebound guarding or rigidity  Extremities: No peripheral edema, neg clubbing, cyanosis  Vascular: 2+ peripheral pulses  Neurological: A/O x 3, no focal deficits  Psychiatric: Normal mood, normal affect  Skin: No rashes        LABS:                        13.2   10.3  )-----------( 330      ( 22 Sep 2019 21:48 )             38.8     09-23    140  |  108  |  9   ----------------------------<  94  3.8   |  23  |  0.69    Ca    8.8      23 Sep 2019 07:00  Phos  2.2     09-23  Mg     2.1     09-23    TPro  7.1  /  Alb  3.9  /  TBili  0.2  /  DBili  x   /  AST  12  /  ALT  10  /  AlkPhos  38<L>  09-22    PT/INR - ( 21 Sep 2019 14:26 )   PT: 13.1 sec;   INR: 1.14 ratio         PTT - ( 21 Sep 2019 14:26 )  PTT:29.0 sec  Urinalysis Basic - ( 22 Sep 2019 23:46 )    Color: x / Appearance: x / SG: x / pH: x  Gluc: x / Ketone: x  / Bili: x / Urobili: x   Blood: x / Protein: x / Nitrite: x   Leuk Esterase: x / RBC: 8 /hpf / WBC 1 /HPF   Sq Epi: x / Non Sq Epi: 3 /hpf / Bacteria: Negative        RADIOLOGY & ADDITIONAL TESTS:  < from: CT Abdomen and Pelvis w/ Oral Cont and w/ IV Cont (09.22.19 @ 12:43) >    FINDINGS:    LOWER CHEST: Within normal limits.     LIVER: Within normal limits.   BILE DUCTS: Normal caliber.  GALLBLADDER: Within normal limits.  SPLEEN: Within normal limits.   PANCREAS: Within normal limits.  ADRENALS: Within normal limits.   KIDNEYS/URETERS: Within normal limits.     BLADDER: Within normal limits.   REPRODUCTIVE ORGANS: Within normal limits.    BOWEL: No bowel obstruction. Minimal inflammation surrounding a   descending colon diverticulum is essentially unchanged. There is no free   air or abscess.  PERITONEUM: No ascites.   VESSELS:  Within normal limits.  RETROPERITONEUM/LYMPH NODES: No lymphadenopathy.     ABDOMINAL WALL: Within normal limits.  BONES: Within normal limits.     IMPRESSION: Stable descending colon diverticulitis.

## 2019-09-23 NOTE — CONSULT NOTE ADULT - ASSESSMENT
49F pmhx of IBS, hypothyroidism, HLD, and HTN who presented to ER with worsening abdominal pain on 9/21/19; initially Dx with acute descending colon diverticultiis on 9/16/19 - Rx with Cipro and Flagyl. Was not tolerating PO (meds or diet) well at home and presented with concerns of worsening abdominal pain.  Stable uncomplicated  Diverticultis on repeat  CT 9/22 - clinically much improved and tolerating PO diet    RECS:  No GI objection to discharge plans  Continue Cipro/Flagyl to complete 10 days Rx on 9/26/19  Keep scheduled follow up appt with Dr Macias for 9/26/19  Low Residue diet education reviewed with pt  Discussed with pt, all questions answered at bedside    Thank you for the courtesy of this consult.  Discussed with Medicine NP    Arnulfo Canela PA-C    [facility]  [phone]  After hours and weekend coverage [phone] 49F pmhx of IBS, hypothyroidism, HLD, and HTN who presented to ER with worsening abdominal pain on 9/21/19; initially Dx with acute descending colon diverticultiis on 9/16/19 - Rx with Cipro and Flagyl. Was not tolerating PO (meds or diet) well at home and presented with concerns of worsening abdominal pain.  Stable uncomplicated  Diverticultis on repeat  CT 9/22 - clinically much improved and tolerating PO diet    RECS:  No GI objection to discharge plans  Continue Cipro/Flagyl to complete 10 days Rx on 9/26/19  Keep scheduled follow up appt with Dr Macias for 9/26/19  Low Residue diet education reviewed with pt  Eventual outpt colonoscopy   (in 6-8 weeks)  Discussed with pt, all questions answered at bedside    Thank you for the courtesy of this consult.  Discussed with Medicine NP    Arnulfo Canela PA-C    [facility]  [phone]  After hours and weekend coverage [phone]

## 2019-09-23 NOTE — DISCHARGE NOTE PROVIDER - HOSPITAL COURSE
This is a 49F with pmhx of IBS, hypothyroidism (c/w Synthroid  75 mcg daily and TSH - 1.14 - ), HLD (nonadherent to Crestor at home but +strong family hx. of cardiac death so will continue here and encouraged her to continue to take it, control weight and BP), HTN (holding Amlodipine 10mg here, normotensive in-pt.), obesity; admitted on  with lower quadrant abd pain and s/p CT scan which showed diverticulitis. Pt. seen in ED prior for same pain (dx w/ diverticulitis and UTI and sent home with Cipro and Flagyl) and then returned with worsening lower quadrant pain and nausea.  Repeat CT abd/pelvis was done to ?appendicitis or abscess and pt. now admitted with Cipro and Flagyl course.  CT w/ + unchanged diverticulitis and will observe & continue treatment until  for total of 10 days. Diet  was advanced and tolerated.  Regarding UTI (last culture with Klebsiella sensitive to Cipro so continued with urine culture resent; no dysuria 2/2 cystitis. Will F/U urine culture (on cipro with ? UTI). Initially metabolic acidosis 2/2 infection /  PO intake and improvement with LR @100mls/hr. For obesity seen in-pt by nutrition. On  dc'd home as tolerated diet. Dispo. plan: Complete abx course through  and f/u with GI (pt. has an appt) and out-pt GYN.  Discussed discharge with GI  / Dr. Ball. This is a 49F with pmhx of IBS, hypothyroidism (c/w Synthroid  75 mcg daily and TSH - 1.14 - ), HLD (nonadherent to Crestor at home but +strong family hx. of cardiac death so will continue here and encouraged her to continue to take it, control weight and BP), HTN (holding Amlodipine 10mg here, normotensive in-pt. & will continue to hold), & obesity; admitted on  with lower quadrant abd pain and s/p CT scan which showed diverticulitis. Pt. seen in ED prior for same pain (dx w/ diverticulitis and UTI and sent home with Cipro and Flagyl) and then returned with worsening lower quadrant pain and nausea.  Repeat CT abd/pelvis was done to ?appendicitis or abscess and pt. now admitted with Cipro and Flagyl course.  CT w/ + unchanged diverticulitis and will observe & continue treatment until  for total of 10 days. Diet  was advanced and tolerated.  Regarding UTI (last culture with Klebsiella sensitive to Cipro so continued with urine culture resent; no dysuria 2/2 cystitis. Will F/U urine culture (on cipro with ? UTI). Initially metabolic acidosis 2/2 infection /  PO intake and improvement with LR @100mls/hr. For obesity seen in-pt by nutrition. On  dc'd home as tolerated diet. Dispo. plan: Complete abx course through  and f/u with GI (pt. has an appt) and out-pt GYN.  Discussed discharge with GI  / Dr. Ball.

## 2019-09-23 NOTE — DISCHARGE NOTE PROVIDER - NSDCFUADDINST_GEN_ALL_CORE_FT
Keep scheduled follow up appt with Dr Macias for 9/26/19  Low Residue diet education reviewed with pt  Eventual outpt colonoscopy   (in 6-8 weeks)  Discussed with pt, all questions answered at bedside Continue Cipro/Flagyl to complete 10 days Rx on 9/26/19  Keep scheduled follow up appt with Dr Macias for 9/26/19  Low Residue diet education reviewed with pt  Eventual outpt colonoscopy   (in 6-8 weeks)  Discussed with pt, all questions answered at bedside

## 2019-09-23 NOTE — DISCHARGE NOTE PROVIDER - CARE PROVIDERS DIRECT ADDRESSES
[email or web address]

## 2019-09-23 NOTE — DISCHARGE NOTE PROVIDER - CARE PROVIDER_API CALL
Faheem Macias)  Gastroenterology  [address], [address]  [place], [place] [id number]  Phone: [phone]  Fax: [phone]  Follow Up Time:     Verito Osorio)  Internal Medicine  [address] [place] [address], [address] [place] [address]  Phone: [phone]  Fax: [phone]  Follow Up Time:

## 2019-09-23 NOTE — DISCHARGE NOTE PROVIDER - NSDCCPCAREPLAN_GEN_ALL_CORE_FT
PRINCIPAL DISCHARGE DIAGNOSIS  Diagnosis: Left lower quadrant abdominal pain  Assessment and Plan of Treatment: Left lower quadrant abdominal pain  CT scan w/ +diverticulitis  Repeat CT abd/pelvis w/ iv contrast + diverticullitis    Continuee Cipro and Flagyl   Seen by GI    Follouwup outpt pelvic US for cysts.    UTI treatment with Cipro / f/u cx.   CT w/ unchanged diverticulitis & gi agree c/w abx. thru  - total of 10 days   Diet advance & toelrated          SECONDARY DISCHARGE DIAGNOSES  Diagnosis: Diverticulitis  Assessment and Plan of Treatment: Diverticulitis  As above    Diagnosis: UTI (urinary tract infection)  Assessment and Plan of Treatment: UTI (urinary tract infection)  Last culture grew Klebsiella sensitive to Cipro so will continue   Resent urine culture.   (No dysuria / abd pain could be 2/2 cystitis    Diagnosis: Metabolic acidosis  Assessment and Plan of Treatment: Metabolic acidosis - 2/2 infection vs  PO intake  LR @100mls/hr with improvment   Labs trended    Diagnosis: Hypothyroidism  Assessment and Plan of Treatment: Hypothyroidism  Continue with Synthroid   TSH   - 1.14    Diagnosis: HLD (hyperlipidemia)  Assessment and Plan of Treatment: HLD (hyperlipidemia)  Pt. non-adherant to Crestor at home (+ strong family history of cardiac death so educated to continue here & at home   Educated regarding weight and BP. PRINCIPAL DISCHARGE DIAGNOSIS  Diagnosis: Left lower quadrant abdominal pain  Assessment and Plan of Treatment: Left lower quadrant abdominal pain  CT scan w/ +diverticulitis  Repeat CT abd/pelvis w/ iv contrast + diverticullitis    Continuee Cipro and Flagyl   Seen by GI    Follouwup outpt pelvic US for cysts.    UTI treatment with Cipro / f/u cx.   CT w/ unchanged diverticulitis & gi agree c/w abx. thru  - total of 10 days   Diet advance & toelrated          SECONDARY DISCHARGE DIAGNOSES  Diagnosis: Diverticulitis  Assessment and Plan of Treatment: Diverticulitis  As above    Diagnosis: UTI (urinary tract infection)  Assessment and Plan of Treatment: UTI (urinary tract infection)  Last culture grew Klebsiella sensitive to Cipro so will continue   Resent urine culture.   (No dysuria / abd pain could be 2/2 cystitis    Diagnosis: Metabolic acidosis  Assessment and Plan of Treatment: Metabolic acidosis - 2/2 infection vs  PO intake  LR @100mls/hr with improvment   Labs trended    Diagnosis: Hypothyroidism  Assessment and Plan of Treatment: Hypothyroidism  Continue with Synthroid   TSH   - 1.14    Diagnosis: HLD (hyperlipidemia)  Assessment and Plan of Treatment: HLD (hyperlipidemia)  Pt. non-adherant to Crestor at home (+ strong family history of cardiac death so educated to continue here & at home   Educated regarding weight and BP.    Diagnosis: HTN (hypertension)  Assessment and Plan of Treatment: HTN (hypertension)  Holding Amlodipine 10mg in-pt   Normotensive   Followup as an out-pt

## 2019-09-23 NOTE — DISCHARGE NOTE PROVIDER - NSDCPNSUBOBJ_GEN_ALL_CORE
CT abd/pelvis shows same diverticulitis and no appendicitis.  Plan for outpt GYN follow up for pelvic US if pain persists to eval for ovarian cysts (has hx of this) and will follow up with GI for future C scope and resolving diverticulitis on Thursday 9/26.  She will be sent home to complete total of  10 days of treatment for diverticulitis and also UTI has resolved.  Got 1 dose of Diflucan for yeast infection related to antibiotic use.         Discharge time 43 minutes

## 2019-09-23 NOTE — DISCHARGE NOTE PROVIDER - NSDCFUSCHEDAPPT_GEN_ALL_CORE_FT
WARNER REYES ; 11/18/2019 ; [facility] End[facility] [address] [facility] WARNER REYES ; 11/18/2019 ; [facility] Endocrin [facility] [address] [facility] WARNER REYES ; 11/18/2019 ; [facility] Endocrin 36 [address] [facility] WARNER REYES ; 11/18/2019 ; N[facility] Endocrin 36 29 [facility] WARNER REYES ; 10/02/2019 ; [facility] OrthoSurg [address] [facility]  WARNER REYES ; 11/18/2019 ; N[facility] Endocrin 36 29 [facility] WARNER REYES ; 10/02/2019 ; [facility] OrthoSurg [address] [facility]  WARNER REYES ; 11/18/2019 ; NPP Endocrin 36 29 [facility] WARNER REYES ; 10/02/2019 ; [facility] OrthoSurg [address] [facility]  WARNER REYES ; 11/18/2019 ; NPP Endocrin 36 [address] [facility]

## 2019-09-23 NOTE — DISCHARGE NOTE NURSING/CASE MANAGEMENT/SOCIAL WORK - NSDCPEWEB_GEN_ALL_CORE
[facility] for Tobacco Control website --- [email or web address] website --- [email or web address]

## 2019-10-02 ENCOUNTER — APPOINTMENT (OUTPATIENT)
Dept: ORTHOPEDIC SURGERY | Facility: CLINIC | Age: 49
End: 2019-10-02
Payer: COMMERCIAL

## 2019-10-02 PROCEDURE — 99214 OFFICE O/P EST MOD 30 MIN: CPT

## 2019-10-25 ENCOUNTER — RX RENEWAL (OUTPATIENT)
Age: 49
End: 2019-10-25

## 2019-11-04 ENCOUNTER — FORM ENCOUNTER (OUTPATIENT)
Age: 49
End: 2019-11-04

## 2019-11-05 ENCOUNTER — APPOINTMENT (OUTPATIENT)
Dept: MRI IMAGING | Facility: CLINIC | Age: 49
End: 2019-11-05
Payer: COMMERCIAL

## 2019-11-05 ENCOUNTER — OUTPATIENT (OUTPATIENT)
Dept: OUTPATIENT SERVICES | Facility: HOSPITAL | Age: 49
LOS: 1 days | End: 2019-11-05
Payer: COMMERCIAL

## 2019-11-05 DIAGNOSIS — M19.042 PRIMARY OSTEOARTHRITIS, LEFT HAND: ICD-10-CM

## 2019-11-05 PROCEDURE — 73218 MRI UPPER EXTREMITY W/O DYE: CPT | Mod: 26,LT

## 2019-11-05 PROCEDURE — 73218 MRI UPPER EXTREMITY W/O DYE: CPT

## 2019-11-14 ENCOUNTER — APPOINTMENT (OUTPATIENT)
Dept: ORTHOPEDIC SURGERY | Facility: CLINIC | Age: 49
End: 2019-11-14
Payer: COMMERCIAL

## 2019-11-14 DIAGNOSIS — M19.042 PRIMARY OSTEOARTHRITIS, LEFT HAND: ICD-10-CM

## 2019-11-14 PROCEDURE — 20550 NJX 1 TENDON SHEATH/LIGAMENT: CPT | Mod: FA

## 2019-11-14 PROCEDURE — 99213 OFFICE O/P EST LOW 20 MIN: CPT | Mod: 25

## 2019-11-14 NOTE — DISCUSSION/SUMMARY
[FreeTextEntry1] : Left thumb tendonitis and chronic partial tear of left thumb UCL. \par \par The underlying pathophysiology was reviewed with the patient. Treatment options were discussed including; observation, NSAIDS, analgesics, injection and surgical intervention. \par \par The patient wishes to proceed with a cortisone injection at this time. The skin was prepped with alcohol and sprayed with Ethyl Chloride. An injection of 0.5 cc 1% Lidocaine without epinephrine, 0.25 cc Kenalog 40mg, and 0.25 cc Dexamethasone was administered into the flexor tendon sheath of the left thumb. The patient tolerated the procedure well. Apply ice. \par \par The patient was advised to soak the hand in warm water and Epsom salt. \par NSAIDs as tolerated. \par Follow up PRN.

## 2019-11-14 NOTE — END OF VISIT
[FreeTextEntry3] : I, Maxx Arora MD, ordering physician, have read and attest that all the information, medical decision making and discharge instructions within are true and accurate.

## 2019-11-14 NOTE — ADDENDUM
[FreeTextEntry1] : I, Ramonita Peraza wrote this note acting as a scribe for Dr. Maxx Arora on Nov 14, 2019.

## 2019-11-14 NOTE — CONSULT LETTER
[Dear  ___] : Dear  [unfilled], [Consult Letter:] : I had the pleasure of evaluating your patient, [unfilled]. [Please see my note below.] : Please see my note below. [Consult Closing:] : Thank you very much for allowing me to participate in the care of this patient.  If you have any questions, please do not hesitate to contact me. [Sincerely,] : Sincerely, [FreeTextEntry2] : LANNY BLANCAS  [FreeTextEntry3] : Maxx Arora MD

## 2019-11-14 NOTE — PHYSICAL EXAM
[de-identified] : Patient is WDWN, alert, and in no acute distress. Breathing is unlabored. She is grossly oriented to person, place, and time. \par \par Left hand: There is A1 pulley tenderness in the thumb. Tenderness with stabilization at the thumb and applied radial deviation. No palpable defect indicating avulsion fracture. Full arc of motion in the fingers. Sensation is intact to light touch, and no skin lesions or discoloration.  [de-identified] : MRI of the left hand taken on 9/10/2019 revealed:\par 1.Capsular sprain injury and joint effusion of the metacarpophalangeal joint of the thumb. \par 2. Suggestion of at least high-grade partial tearing of the ulnar collateral ligament and volar plate injury. \par 3. Recommend dedicated imaging of the thumb for further evaluation as hand MRI does not optimize obliquity planes of the thumb. \par She was prescribed a second MRI dedicated to the thumb on 11/7/19 but she could not tolerate the exam and the study was not complete.  It did reveal a chronic grade 2 sprain of the UCL, no stener lesion.

## 2019-11-14 NOTE — HISTORY OF PRESENT ILLNESS
[Right] : right hand dominant [FreeTextEntry1] : Pt is a 50 y/o female with left hand pain.  She fell while in the shower on 8/2/19 injuring her left knee and left hand. Her primary concern at the time was the left knee which resolved on its own. She now has chronic pain in the left thumb. She has been seeing Dr. Rizo for her injuries.  She was initially seen at [facility] where xrays were taken which were negative for fracture.  An MRI was recommended to evaluate for soft tissue injury which revealed chronic high-grade partial tearing of the ulnar collateral ligament and volar plate injury. She continues to have pain in the thumb.  She does not have full flexion.  She has pain when she types on a computer.  She has difficulty pinching or gripping anything.  It is painful to dress herself.  She is not taking anything for pain.  She does not attend hand therapy.

## 2019-11-18 ENCOUNTER — APPOINTMENT (OUTPATIENT)
Dept: ENDOCRINOLOGY | Facility: CLINIC | Age: 49
End: 2019-11-18
Payer: COMMERCIAL

## 2019-11-18 VITALS
HEART RATE: 88 BPM | TEMPERATURE: 98.2 F | BODY MASS INDEX: 33.99 KG/M2 | OXYGEN SATURATION: 98 % | SYSTOLIC BLOOD PRESSURE: 130 MMHG | DIASTOLIC BLOOD PRESSURE: 75 MMHG | WEIGHT: 180 LBS | HEIGHT: 61 IN

## 2019-11-18 PROCEDURE — 99214 OFFICE O/P EST MOD 30 MIN: CPT | Mod: 25

## 2019-11-18 PROCEDURE — 36415 COLL VENOUS BLD VENIPUNCTURE: CPT

## 2019-11-18 RX ORDER — ERGOCALCIFEROL 1.25 MG/1
1.25 MG CAPSULE ORAL
Qty: 12 | Refills: 0 | Status: COMPLETED | COMMUNITY
Start: 2019-05-22 | End: 2019-11-18

## 2019-11-18 NOTE — PHYSICAL EXAM
[Alert] : alert [No Acute Distress] : no acute distress [Well Developed] : well developed [Well Nourished] : well nourished [Healthy Appearance] : healthy appearance [Normal Voice/Communication] : normal voice communication [Normal Sclera/Conjunctiva] : normal sclera/conjunctiva [Normal Oropharynx] : the oropharynx was normal [No Proptosis] : no proptosis [No LAD] : no lymphadenopathy [No Neck Mass] : no neck mass was observed [Supple] : the neck was supple [Thyroid Not Enlarged] : the thyroid was not enlarged [No Thyroid Nodules] : there were no palpable thyroid nodules [No Respiratory Distress] : no respiratory distress [Normal Rate and Effort] : normal respiratory rhythm and effort [Normal Rate] : heart rate was normal  [Clear to Auscultation] : lungs were clear to auscultation bilaterally [No Accessory Muscle Use] : no accessory muscle use [Normal S1, S2] : normal S1 and S2 [Regular Rhythm] : with a regular rhythm [No Edema] : there was no peripheral edema [No Stigmata of Cushings Syndrome] : no stigmata of cushings syndrome [Not Distended] : not distended [Hirsutism] : no hirsutism [No Involuntary Movements] : no involuntary movements were seen [Normal Gait] : normal gait [Acanthosis Nigricans] : no acanthosis nigricans [No Motor Deficits] : the motor exam was normal [Normal Insight/Judgement] : insight and judgment were intact [Normal Mood] : the mood was normal [Normal Affect] : the affect was normal

## 2019-11-18 NOTE — REVIEW OF SYSTEMS
[Fatigue] : fatigue [FreeTextEntry2] : difficulty losing weight  [All other systems negative] : All other systems negative

## 2019-11-18 NOTE — ASSESSMENT
[FreeTextEntry1] : This is a 49-year-old female with primary hypothyroidism, vitamin D insufficiency, obesity.\par 1.Primary hypothyroidism\par Check TFTs. \par Cont Synthroid 75mcg qd pending BW.\par 2. Vitamin D insufficiency\par Check 25 OHD.\par 3. Obesity\par LSM. \par Appt with RD. \par She is not interested in Saxenda.

## 2019-11-18 NOTE — HISTORY OF PRESENT ILLNESS
[FreeTextEntry1] : CC: Hypothyroidism\par This is a 49-year-old female with primary hypothyroidism, vitamin D insufficiency, obesity here for follow up. She reports that she was diagnosed with hypothyroidism at age 46. She is currently on Synthroid 75 mcg daily. She takes in the morning on an empty stomach. She complains of fatigue and difficulty losing weight despite exercise.\par

## 2019-11-20 ENCOUNTER — RESULT REVIEW (OUTPATIENT)
Age: 49
End: 2019-11-20

## 2019-11-20 LAB
25(OH)D3 SERPL-MCNC: 21.7 NG/ML
ALBUMIN SERPL ELPH-MCNC: 4.2 G/DL
ALP BLD-CCNC: 42 U/L
ALT SERPL-CCNC: 12 U/L
ANION GAP SERPL CALC-SCNC: 12 MMOL/L
AST SERPL-CCNC: 11 U/L
BASOPHILS # BLD AUTO: 0.06 K/UL
BASOPHILS NFR BLD AUTO: 0.9 %
BILIRUB SERPL-MCNC: 0.2 MG/DL
BUN SERPL-MCNC: 17 MG/DL
CALCIUM SERPL-MCNC: 9.4 MG/DL
CHLORIDE SERPL-SCNC: 103 MMOL/L
CHOLEST SERPL-MCNC: 190 MG/DL
CHOLEST/HDLC SERPL: 2.6 RATIO
CO2 SERPL-SCNC: 24 MMOL/L
CREAT SERPL-MCNC: 0.75 MG/DL
EOSINOPHIL # BLD AUTO: 0.12 K/UL
EOSINOPHIL NFR BLD AUTO: 1.7 %
GLUCOSE SERPL-MCNC: 87 MG/DL
HCT VFR BLD CALC: 40.8 %
HDLC SERPL-MCNC: 72 MG/DL
HGB BLD-MCNC: 12.8 G/DL
IMM GRANULOCYTES NFR BLD AUTO: 0.1 %
LDLC SERPL CALC-MCNC: 91 MG/DL
LYMPHOCYTES # BLD AUTO: 2.5 K/UL
LYMPHOCYTES NFR BLD AUTO: 35.8 %
MAN DIFF?: NORMAL
MCHC RBC-ENTMCNC: 30.7 PG
MCHC RBC-ENTMCNC: 31.4 GM/DL
MCV RBC AUTO: 97.8 FL
MONOCYTES # BLD AUTO: 0.71 K/UL
MONOCYTES NFR BLD AUTO: 10.2 %
NEUTROPHILS # BLD AUTO: 3.59 K/UL
NEUTROPHILS NFR BLD AUTO: 51.3 %
PLATELET # BLD AUTO: 377 K/UL
POTASSIUM SERPL-SCNC: 4.7 MMOL/L
PROT SERPL-MCNC: 7.1 G/DL
RBC # BLD: 4.17 M/UL
RBC # FLD: 12.7 %
SODIUM SERPL-SCNC: 138 MMOL/L
T4 FREE SERPL-MCNC: 1.4 NG/DL
TRIGL SERPL-MCNC: 137 MG/DL
TSH SERPL-ACNC: 4.43 UIU/ML
VIT B12 SERPL-MCNC: 706 PG/ML
WBC # FLD AUTO: 6.99 K/UL

## 2019-12-02 ENCOUNTER — APPOINTMENT (OUTPATIENT)
Dept: ENDOCRINOLOGY | Facility: CLINIC | Age: 49
End: 2019-12-02

## 2020-01-16 ENCOUNTER — RESULT REVIEW (OUTPATIENT)
Age: 50
End: 2020-01-16

## 2020-01-16 ENCOUNTER — OUTPATIENT (OUTPATIENT)
Dept: OUTPATIENT SERVICES | Facility: HOSPITAL | Age: 50
LOS: 1 days | End: 2020-01-16
Payer: COMMERCIAL

## 2020-01-16 ENCOUNTER — APPOINTMENT (OUTPATIENT)
Dept: CT IMAGING | Facility: CLINIC | Age: 50
End: 2020-01-16
Payer: COMMERCIAL

## 2020-01-16 DIAGNOSIS — Z00.8 ENCOUNTER FOR OTHER GENERAL EXAMINATION: ICD-10-CM

## 2020-01-16 PROCEDURE — 74176 CT ABD & PELVIS W/O CONTRAST: CPT

## 2020-01-16 PROCEDURE — 74176 CT ABD & PELVIS W/O CONTRAST: CPT | Mod: 26

## 2020-01-29 ENCOUNTER — APPOINTMENT (OUTPATIENT)
Dept: INTERNAL MEDICINE | Facility: CLINIC | Age: 50
End: 2020-01-29
Payer: COMMERCIAL

## 2020-01-29 ENCOUNTER — APPOINTMENT (OUTPATIENT)
Dept: ENDOCRINOLOGY | Facility: CLINIC | Age: 50
End: 2020-01-29
Payer: COMMERCIAL

## 2020-01-29 VITALS
WEIGHT: 192 LBS | DIASTOLIC BLOOD PRESSURE: 96 MMHG | OXYGEN SATURATION: 99 % | HEIGHT: 60.24 IN | BODY MASS INDEX: 37.2 KG/M2 | TEMPERATURE: 97.7 F | SYSTOLIC BLOOD PRESSURE: 156 MMHG | HEART RATE: 70 BPM

## 2020-01-29 VITALS — SYSTOLIC BLOOD PRESSURE: 136 MMHG | DIASTOLIC BLOOD PRESSURE: 82 MMHG

## 2020-01-29 PROCEDURE — 36415 COLL VENOUS BLD VENIPUNCTURE: CPT

## 2020-01-29 PROCEDURE — 97802 MEDICAL NUTRITION INDIV IN: CPT

## 2020-01-29 PROCEDURE — 99396 PREV VISIT EST AGE 40-64: CPT | Mod: 25

## 2020-01-29 PROCEDURE — 99214 OFFICE O/P EST MOD 30 MIN: CPT | Mod: 25

## 2020-01-31 LAB
25(OH)D3 SERPL-MCNC: 17.4 NG/ML
ALBUMIN SERPL ELPH-MCNC: 4.4 G/DL
ALP BLD-CCNC: 42 U/L
ALT SERPL-CCNC: 13 U/L
ANION GAP SERPL CALC-SCNC: 11 MMOL/L
AST SERPL-CCNC: 13 U/L
BASOPHILS # BLD AUTO: 0.06 K/UL
BASOPHILS NFR BLD AUTO: 1.2 %
BILIRUB SERPL-MCNC: 0.2 MG/DL
BUN SERPL-MCNC: 12 MG/DL
CALCIUM SERPL-MCNC: 9.4 MG/DL
CHLORIDE SERPL-SCNC: 106 MMOL/L
CHOLEST SERPL-MCNC: 247 MG/DL
CHOLEST/HDLC SERPL: 3.6 RATIO
CK SERPL-CCNC: 127 U/L
CO2 SERPL-SCNC: 24 MMOL/L
CREAT SERPL-MCNC: 0.67 MG/DL
EOSINOPHIL # BLD AUTO: 0.11 K/UL
EOSINOPHIL NFR BLD AUTO: 2.2 %
ESTIMATED AVERAGE GLUCOSE: 108 MG/DL
GLUCOSE SERPL-MCNC: 83 MG/DL
HBA1C MFR BLD HPLC: 5.4 %
HCT VFR BLD CALC: 41.6 %
HDLC SERPL-MCNC: 68 MG/DL
HGB BLD-MCNC: 13.2 G/DL
IMM GRANULOCYTES NFR BLD AUTO: 0.2 %
LDLC SERPL CALC-MCNC: 148 MG/DL
LYMPHOCYTES # BLD AUTO: 1.76 K/UL
LYMPHOCYTES NFR BLD AUTO: 35 %
MAN DIFF?: NORMAL
MCHC RBC-ENTMCNC: 31.2 PG
MCHC RBC-ENTMCNC: 31.7 GM/DL
MCV RBC AUTO: 98.3 FL
MONOCYTES # BLD AUTO: 0.5 K/UL
MONOCYTES NFR BLD AUTO: 9.9 %
NEUTROPHILS # BLD AUTO: 2.59 K/UL
NEUTROPHILS NFR BLD AUTO: 51.5 %
PLATELET # BLD AUTO: 298 K/UL
POTASSIUM SERPL-SCNC: 4.7 MMOL/L
PROT SERPL-MCNC: 7.1 G/DL
RBC # BLD: 4.23 M/UL
RBC # FLD: 12.9 %
SODIUM SERPL-SCNC: 141 MMOL/L
T3 SERPL-MCNC: 108 NG/DL
T4 SERPL-MCNC: 10.4 UG/DL
TRIGL SERPL-MCNC: 154 MG/DL
TSH SERPL-ACNC: 1.82 UIU/ML
VIT B12 SERPL-MCNC: 528 PG/ML
WBC # FLD AUTO: 5.03 K/UL

## 2020-01-31 NOTE — HISTORY OF PRESENT ILLNESS
[FreeTextEntry1] : Patient presents for annual physical [de-identified] : The patient states that she feels fatigued, has been occurring for a few months now. Denies any cough nausea vomiting rashes muscle aches joint pains. Denies any previous viral infections. Denies any unintentional weight loss. Has seen cardiologist and had unremarkable workup. Patient denies any cough or shortness of breath or wheezing. Compliant with CPAP machine, uses CPAP machine at least 4 hours every night. Has been under more stress recently.

## 2020-01-31 NOTE — PHYSICAL EXAM
[Normal] : affect was normal and insight and judgment were intact [de-identified] : Deferred to gynecology

## 2020-01-31 NOTE — HEALTH RISK ASSESSMENT
[Very Good] : ~his/her~  mood as very good [FreeTextEntry1] : Fatigue [] : No [No] : No [0] : 2) Feeling down, depressed, or hopeless: Not at all (0) [de-identified] : none [de-identified] : Dr. Rizo, Dr. Villarreal, Dr. Castaneda, Dr. Toure [Patient reported colonoscopy was normal] : Patient reported colonoscopy was normal [ColonoscopyDate] : 1/2017

## 2020-01-31 NOTE — ASSESSMENT
[FreeTextEntry1] : 1) Annual Physical exam: Following up with nutritionist, influenza received this year. Will f/u for mammogram\par 2) Fatigue: check CBC, electrolytes, LFT's, thyroid, CK. Advised to complete mammogram and have Pap smear UTD. Check CXR to complete workup. Stress, anxiety possibly contributing\par 3) HLD: patient following up with nutritionist, check lipid panel\par 4) Low Vitamin D: check levels, continue with supplementation\par 5) Low B12: check levels\par 6) Hypothyroidism: check TSH\par 7) HTN: stable, continue current management

## 2020-02-07 ENCOUNTER — TRANSCRIPTION ENCOUNTER (OUTPATIENT)
Age: 50
End: 2020-02-07

## 2020-02-07 NOTE — ED ADULT NURSE NOTE - NS ED NURSE RECORD ANOTHER VITAL SIGN
[facility]    PATIENT'S NAME: ZAID CADE   ATTENDING PHYSICIAN: Gildardo Hart M.D. OPERATING PHYSICIAN: Gildardo Hart M.D.    PATIENT ACCOUNT#:   [de-identified]    LOCATION:  PACU [facility] PACU 9 ED  MEDICAL RECORD #:   [id number]       DATE OF BIRTH:  1 femoral attachment. Leg lengths were measured and a moni made. I anticipated lengthening about 1 cm as she was short by at least this amount.   After marking the leg length, the hip was dislocated in the routine fashion in a flexion external rotation posi negative. There was no undue tension.   It was stable in full extension, external rotation 90 degrees, stable in sleep position, 45 degrees abduction and internal rotation, 90 degrees I could flex, internally rotate 40 degrees, and there was no instability Vicryl, 0 Vicryl, and then 2-0 Vicryl, and the skin closed with staples with the assistance of the SAs. Antibiotic ointment placed over the staples. Adaptic, 4 x 4's, ABDs, a Hemovac was hooked to squeeze box suction, and bulky sterile dressing applied. Yes

## 2020-04-25 ENCOUNTER — MESSAGE (OUTPATIENT)
Age: 50
End: 2020-04-25

## 2020-05-21 ENCOUNTER — APPOINTMENT (OUTPATIENT)
Dept: ENDOCRINOLOGY | Facility: CLINIC | Age: 50
End: 2020-05-21
Payer: COMMERCIAL

## 2020-05-21 VITALS
SYSTOLIC BLOOD PRESSURE: 109 MMHG | HEIGHT: 60.63 IN | OXYGEN SATURATION: 99 % | WEIGHT: 195.41 LBS | TEMPERATURE: 98.4 F | HEART RATE: 64 BPM | DIASTOLIC BLOOD PRESSURE: 75 MMHG | BODY MASS INDEX: 37.38 KG/M2

## 2020-05-21 PROCEDURE — 99214 OFFICE O/P EST MOD 30 MIN: CPT | Mod: 25

## 2020-05-21 PROCEDURE — 36415 COLL VENOUS BLD VENIPUNCTURE: CPT

## 2020-05-21 RX ORDER — LEVOTHYROXINE SODIUM 75 UG/1
75 TABLET ORAL
Qty: 90 | Refills: 3 | Status: DISCONTINUED | COMMUNITY
Start: 2019-02-04 | End: 2020-05-21

## 2020-05-21 NOTE — REVIEW OF SYSTEMS
[Fatigue] : fatigue [All other systems negative] : All other systems negative [FreeTextEntry4] : swelling right anterior neck

## 2020-05-21 NOTE — HISTORY OF PRESENT ILLNESS
[FreeTextEntry1] : CC: Hypothyroidism\par This is a 50-year-old female with primary hypothyroidism, vitamin D insufficiency, obesity here for follow up. She reports that she was diagnosed with hypothyroidism at age 46. She is currently on Synthroid 88mcg daily. She takes in the morning on an empty stomach. She complains of fatigue and swelling on right side of neck.\par She has not yet completed 24 hr urine cortisol collection but will do it next week.  \par

## 2020-05-21 NOTE — ASSESSMENT
[FreeTextEntry1] : This is a 50-year-old female with primary hypothyroidism, vitamin D insufficiency, obesity.\par 1.Primary hypothyroidism\par Check TFTs. \par Cont Synthroid 88mcg qd pending BW.\par 2. Vitamin D insufficiency\par Check 25 OHD.\par 3. Obesity\par LSM. \par

## 2020-05-21 NOTE — PHYSICAL EXAM
[Alert] : alert [Well Nourished] : well nourished [Healthy Appearance] : healthy appearance [Obese] : obese [No Acute Distress] : no acute distress [Well Developed] : well developed [Normal Voice/Communication] : normal voice communication [Normal Sclera/Conjunctiva] : normal sclera/conjunctiva [Normal Oropharynx] : the oropharynx was normal [No Neck Mass] : no neck mass was observed [No LAD] : no lymphadenopathy [Supple] : the neck was supple [Thyroid Not Enlarged] : the thyroid was not enlarged [No Thyroid Nodules] : no palpable thyroid nodules [No Respiratory Distress] : no respiratory distress [No Accessory Muscle Use] : no accessory muscle use [Normal Rate and Effort] : normal respiratory rate and effort [Normal Rate] : heart rate was normal [Spine Straight] : spine straight [Normal Gait] : normal gait [No Clubbing, Cyanosis] : no clubbing  or cyanosis of the fingernails [No Involuntary Movements] : no involuntary movements were seen [Normal Affect] : the affect was normal [Acanthosis Nigricans] : no acanthosis nigricans [No Tremors] : no tremors [Normal Insight/Judgement] : insight and judgment were intact [Normal Mood] : the mood was normal

## 2020-05-22 LAB
25(OH)D3 SERPL-MCNC: 27.8 NG/ML
ALBUMIN SERPL ELPH-MCNC: 4.4 G/DL
ALP BLD-CCNC: 40 U/L
ALT SERPL-CCNC: 12 U/L
ANION GAP SERPL CALC-SCNC: 11 MMOL/L
AST SERPL-CCNC: 15 U/L
BASOPHILS # BLD AUTO: 0.04 K/UL
BASOPHILS NFR BLD AUTO: 0.7 %
BILIRUB SERPL-MCNC: 0.4 MG/DL
BUN SERPL-MCNC: 8 MG/DL
CALCIUM SERPL-MCNC: 9.1 MG/DL
CHLORIDE SERPL-SCNC: 104 MMOL/L
CHOLEST SERPL-MCNC: 197 MG/DL
CHOLEST/HDLC SERPL: 2.8 RATIO
CO2 SERPL-SCNC: 25 MMOL/L
CREAT SERPL-MCNC: 0.69 MG/DL
EOSINOPHIL # BLD AUTO: 0.14 K/UL
EOSINOPHIL NFR BLD AUTO: 2.6 %
GLUCOSE SERPL-MCNC: 88 MG/DL
HCT VFR BLD CALC: 39.1 %
HDLC SERPL-MCNC: 72 MG/DL
HGB BLD-MCNC: 12.5 G/DL
IMM GRANULOCYTES NFR BLD AUTO: 0.2 %
LDLC SERPL CALC-MCNC: 77 MG/DL
LYMPHOCYTES # BLD AUTO: 1.87 K/UL
LYMPHOCYTES NFR BLD AUTO: 34.2 %
MAN DIFF?: NORMAL
MCHC RBC-ENTMCNC: 31.3 PG
MCHC RBC-ENTMCNC: 32 GM/DL
MCV RBC AUTO: 98 FL
MONOCYTES # BLD AUTO: 0.61 K/UL
MONOCYTES NFR BLD AUTO: 11.2 %
NEUTROPHILS # BLD AUTO: 2.8 K/UL
NEUTROPHILS NFR BLD AUTO: 51.1 %
PLATELET # BLD AUTO: 288 K/UL
POTASSIUM SERPL-SCNC: 3.8 MMOL/L
PROT SERPL-MCNC: 6.8 G/DL
RBC # BLD: 3.99 M/UL
RBC # FLD: 13.1 %
SODIUM SERPL-SCNC: 140 MMOL/L
T4 FREE SERPL-MCNC: 1.3 NG/DL
TRIGL SERPL-MCNC: 239 MG/DL
TSH SERPL-ACNC: 4.01 UIU/ML
WBC # FLD AUTO: 5.47 K/UL

## 2020-05-26 RX ORDER — LEVOTHYROXINE SODIUM 0.09 MG/1
88 TABLET ORAL
Qty: 90 | Refills: 2 | Status: DISCONTINUED | COMMUNITY
Start: 2019-11-20 | End: 2020-05-26

## 2020-05-28 ENCOUNTER — APPOINTMENT (OUTPATIENT)
Dept: ORTHOPEDIC SURGERY | Facility: CLINIC | Age: 50
End: 2020-05-28

## 2020-05-28 ENCOUNTER — APPOINTMENT (OUTPATIENT)
Dept: ULTRASOUND IMAGING | Facility: CLINIC | Age: 50
End: 2020-05-28
Payer: COMMERCIAL

## 2020-05-28 ENCOUNTER — APPOINTMENT (OUTPATIENT)
Dept: MAMMOGRAPHY | Facility: CLINIC | Age: 50
End: 2020-05-28
Payer: COMMERCIAL

## 2020-05-28 ENCOUNTER — OUTPATIENT (OUTPATIENT)
Dept: OUTPATIENT SERVICES | Facility: HOSPITAL | Age: 50
LOS: 1 days | End: 2020-05-28
Payer: COMMERCIAL

## 2020-05-28 DIAGNOSIS — Z00.8 ENCOUNTER FOR OTHER GENERAL EXAMINATION: ICD-10-CM

## 2020-05-28 PROCEDURE — 76641 ULTRASOUND BREAST COMPLETE: CPT

## 2020-05-28 PROCEDURE — G0279: CPT | Mod: 26

## 2020-05-28 PROCEDURE — 77066 DX MAMMO INCL CAD BI: CPT | Mod: 26

## 2020-05-28 PROCEDURE — 76641 ULTRASOUND BREAST COMPLETE: CPT | Mod: 26,50

## 2020-05-28 PROCEDURE — G0279: CPT

## 2020-05-28 PROCEDURE — 77066 DX MAMMO INCL CAD BI: CPT

## 2020-06-11 ENCOUNTER — APPOINTMENT (OUTPATIENT)
Dept: ORTHOPEDIC SURGERY | Facility: CLINIC | Age: 50
End: 2020-06-11
Payer: COMMERCIAL

## 2020-06-11 DIAGNOSIS — S63.592D OTHER SPECIFIED SPRAIN OF LEFT WRIST, SUBSEQUENT ENCOUNTER: ICD-10-CM

## 2020-06-11 PROCEDURE — 99213 OFFICE O/P EST LOW 20 MIN: CPT

## 2020-06-11 NOTE — DISCUSSION/SUMMARY
[FreeTextEntry1] : \par \par The underlying pathophysiology was reviewed with the patient. Treatment options were discussed including; surgical intervention. \par \par  The etiology of her condition was discussed at length with the associated treatment options. She was advised surgical intervention as she is at higher risk for developing post-traumatic osteoarthritis in the left thumb due to instability in the ligament which has been resulting in continued subluxation. The risks and benefits were reviewed with the patient. All of her questions were answered. She is currently consenting to the procedure and would like to undergo exploration and repair/reconstruction  of the LEFT THUMB UCL.

## 2020-06-11 NOTE — PHYSICAL EXAM
[de-identified] : Patient is WDWN, alert, and in no acute distress. Breathing is unlabored. She is grossly oriented to person, place, and time. \par \par Left hand: There is A1 pulley tenderness in the thumb. Tenderness with stabilization at the thumb and applied radial deviation. No palpable defect indicating avulsion fracture. Full arc of motion in the fingers. Sensation is intact to light touch, and no skin lesions or discoloration. \par Stability: Instability of the thumb UCL with stabilization and applied stress deviation. [de-identified] : MRI of the left hand taken on 9/10/2019 revealed:\par 1.Capsular sprain injury and joint effusion of the metacarpophalangeal joint of the thumb. \par 2. Suggestion of at least high-grade partial tearing of the ulnar collateral ligament and volar plate injury. \par 3. Recommend dedicated imaging of the thumb for further evaluation as hand MRI does not optimize obliquity planes of the thumb. \par She was prescribed a second MRI dedicated to the thumb on 11/7/19 but she could not tolerate the exam and the study was not complete.  It did reveal a chronic grade 2 sprain of the UCL, no stener lesion.

## 2020-06-11 NOTE — ADDENDUM
[FreeTextEntry1] : I, Ramonita Peraza wrote this note acting as a scribe for Dr. Maxx Arora on Jun 11, 2020.

## 2020-06-11 NOTE — HISTORY OF PRESENT ILLNESS
[Right] : right hand dominant [FreeTextEntry1] : Pt is a 51 y/o female who returns with a left hand pain.  She fell while in the shower on 8/2/19 injuring her left knee and left hand. Her primary concern at the time was the left knee which resolved on its own. She then noticed pain in the left thumb which has remained chronic since. She was initially seen at [facility] where xrays of the left hand were taken which were negative for fracture.  An MRI was recommended to evaluate for soft tissue injury which revealed chronic high-grade partial tearing of the ulnar collateral ligament and volar plate injury. The patient was treated with a cortisone injection into the left thumb flexor tendon sheath in this office on 11/14/2019. She returns today stating that the injection helped in terms of relieving some her pain, however she continues to have pain in the thumb.  She does not have full flexion. She has difficulty pinching or gripping anything and is unable to hold or twist open a jar.  It is painful to dress herself.  She is not taking anything for pain.  She does not attend hand therapy.

## 2020-06-23 ENCOUNTER — APPOINTMENT (OUTPATIENT)
Dept: ULTRASOUND IMAGING | Facility: IMAGING CENTER | Age: 50
End: 2020-06-23
Payer: COMMERCIAL

## 2020-06-23 ENCOUNTER — APPOINTMENT (OUTPATIENT)
Dept: RADIOLOGY | Facility: IMAGING CENTER | Age: 50
End: 2020-06-23
Payer: COMMERCIAL

## 2020-06-23 ENCOUNTER — RESULT REVIEW (OUTPATIENT)
Age: 50
End: 2020-06-23

## 2020-06-23 ENCOUNTER — OUTPATIENT (OUTPATIENT)
Dept: OUTPATIENT SERVICES | Facility: HOSPITAL | Age: 50
LOS: 1 days | End: 2020-06-23
Payer: COMMERCIAL

## 2020-06-23 DIAGNOSIS — Z00.8 ENCOUNTER FOR OTHER GENERAL EXAMINATION: ICD-10-CM

## 2020-06-23 PROCEDURE — 76536 US EXAM OF HEAD AND NECK: CPT | Mod: 26

## 2020-06-23 PROCEDURE — 71046 X-RAY EXAM CHEST 2 VIEWS: CPT | Mod: 26

## 2020-06-23 PROCEDURE — 76536 US EXAM OF HEAD AND NECK: CPT

## 2020-06-23 PROCEDURE — 71046 X-RAY EXAM CHEST 2 VIEWS: CPT

## 2020-06-24 ENCOUNTER — RESULT REVIEW (OUTPATIENT)
Age: 50
End: 2020-06-24

## 2020-06-25 ENCOUNTER — NON-APPOINTMENT (OUTPATIENT)
Age: 50
End: 2020-06-25

## 2020-06-25 ENCOUNTER — APPOINTMENT (OUTPATIENT)
Dept: CARDIOLOGY | Facility: CLINIC | Age: 50
End: 2020-06-25
Payer: COMMERCIAL

## 2020-06-25 VITALS
OXYGEN SATURATION: 98 % | TEMPERATURE: 98.7 F | BODY MASS INDEX: 38.06 KG/M2 | DIASTOLIC BLOOD PRESSURE: 85 MMHG | RESPIRATION RATE: 12 BRPM | HEART RATE: 76 BPM | WEIGHT: 199 LBS | SYSTOLIC BLOOD PRESSURE: 139 MMHG

## 2020-06-25 PROCEDURE — 99214 OFFICE O/P EST MOD 30 MIN: CPT

## 2020-06-25 NOTE — PHYSICAL EXAM
[Well Groomed] : well groomed [General Appearance - Well Developed] : well developed [Normal Appearance] : normal appearance [General Appearance - Well Nourished] : well nourished [No Deformities] : no deformities [General Appearance - In No Acute Distress] : no acute distress [Normal Oral Mucosa] : normal oral mucosa [Eyelids - No Xanthelasma] : the eyelids demonstrated no xanthelasmas [Normal Conjunctiva] : the conjunctiva exhibited no abnormalities [Normal Jugular Venous A Waves Present] : normal jugular venous A waves present [No Oral Cyanosis] : no oral cyanosis [No Oral Pallor] : no oral pallor [Normal Jugular Venous V Waves Present] : normal jugular venous V waves present [No Jugular Venous Rich A Waves] : no jugular venous rich A waves [Respiration, Rhythm And Depth] : normal respiratory rhythm and effort [Auscultation Breath Sounds / Voice Sounds] : lungs were clear to auscultation bilaterally [Exaggerated Use Of Accessory Muscles For Inspiration] : no accessory muscle use [Murmurs] : no murmurs present [Heart Sounds] : normal S1 and S2 [Heart Rate And Rhythm] : heart rate and rhythm were normal [Abdomen Soft] : soft [Abdomen Tenderness] : non-tender [Abdomen Mass (___ Cm)] : no abdominal mass palpated [Abnormal Walk] : normal gait [Cyanosis, Localized] : no localized cyanosis [Petechial Hemorrhages (___cm)] : no petechial hemorrhages [Gait - Sufficient For Exercise Testing] : the gait was sufficient for exercise testing [Nail Clubbing] : no clubbing of the fingernails [Skin Color & Pigmentation] : normal skin color and pigmentation [] : no ischemic changes [No Skin Ulcers] : no skin ulcer [Skin Lesions] : no skin lesions [No Venous Stasis] : no venous stasis [No Xanthoma] : no  xanthoma was observed [Oriented To Time, Place, And Person] : oriented to person, place, and time [Affect] : the affect was normal [No Anxiety] : not feeling anxious [Mood] : the mood was normal

## 2020-07-06 VITALS
SYSTOLIC BLOOD PRESSURE: 135 MMHG | RESPIRATION RATE: 23 BRPM | HEART RATE: 81 BPM | WEIGHT: 202.38 LBS | DIASTOLIC BLOOD PRESSURE: 75 MMHG | HEIGHT: 61 IN | TEMPERATURE: 98 F | OXYGEN SATURATION: 100 %

## 2020-07-06 RX ORDER — LEVOTHYROXINE SODIUM 125 MCG
1 TABLET ORAL
Qty: 0 | Refills: 0 | DISCHARGE

## 2020-07-06 RX ORDER — ROSUVASTATIN CALCIUM 5 MG/1
1 TABLET ORAL
Qty: 0 | Refills: 0 | DISCHARGE

## 2020-07-06 NOTE — H&P PST ADULT - NSICDXPROBLEM_GEN_ALL_CORE_FT
PROBLEM DIAGNOSES  Problem: Trigger thumb, left thumb  Assessment and Plan: LEFT THUMB ULNAR COLLATERAL LIGAMENT RECONSTRUCTION WITH ARTHREX INTERNAL BRACE AND LEFT TRIGGER THUMB RELEASE  MEDICAL AND CARDIAC CLEARANCES  PRE OP INSTRUCTIONS  COVID TESTING 7/8

## 2020-07-06 NOTE — H&P PST ADULT - HISTORY OF PRESENT ILLNESS
As per Covid protocol, telephone interview obtained.  51 yo female reports one year history of pain in left thumb after a fall in the shower.  Pain is intermittent and patient states that she received mild relief from a cortisone injection.  Patient states that it is difficult for her to open a jar or perform normal daily activities using her left hand.  No current analgesics.

## 2020-07-06 NOTE — H&P PST ADULT - NSICDXPASTSURGICALHX_GEN_ALL_CORE_FT
PAST SURGICAL HISTORY:  C Section - x 1 - 1994     h/o  Endoscopy     h/o dental implant     H/O vaginal surgery vaginal mesh    History of Colonoscopy

## 2020-07-06 NOTE — H&P PST ADULT - NSICDXPASTMEDICALHX_GEN_ALL_CORE_FT
PAST MEDICAL HISTORY:  Diverticulitis last hospitalized 9.19    h/o Heart Palpitations     Hypertension     Hypothyroid     Irritable Bowel Syndrome     Sleep apnea nasal mask- settings unknown

## 2020-07-07 ENCOUNTER — LABORATORY RESULT (OUTPATIENT)
Age: 50
End: 2020-07-07

## 2020-07-07 ENCOUNTER — RX RENEWAL (OUTPATIENT)
Age: 50
End: 2020-07-07

## 2020-07-07 ENCOUNTER — APPOINTMENT (OUTPATIENT)
Dept: INTERNAL MEDICINE | Facility: CLINIC | Age: 50
End: 2020-07-07
Payer: COMMERCIAL

## 2020-07-07 VITALS
SYSTOLIC BLOOD PRESSURE: 125 MMHG | OXYGEN SATURATION: 100 % | HEIGHT: 61.02 IN | DIASTOLIC BLOOD PRESSURE: 83 MMHG | TEMPERATURE: 98.2 F | HEART RATE: 64 BPM | BODY MASS INDEX: 37.83 KG/M2 | WEIGHT: 200.38 LBS

## 2020-07-07 DIAGNOSIS — Z01.818 ENCOUNTER FOR OTHER PREPROCEDURAL EXAMINATION: ICD-10-CM

## 2020-07-07 PROCEDURE — 99214 OFFICE O/P EST MOD 30 MIN: CPT | Mod: 25

## 2020-07-07 PROCEDURE — 36415 COLL VENOUS BLD VENIPUNCTURE: CPT

## 2020-07-07 RX ORDER — CHLORHEXIDINE GLUCONATE 213 G/1000ML
1 SOLUTION TOPICAL DAILY
Refills: 0 | Status: DISCONTINUED | OUTPATIENT
Start: 2020-07-10 | End: 2020-07-25

## 2020-07-07 RX ORDER — APREPITANT 80 MG/1
40 CAPSULE ORAL ONCE
Refills: 0 | Status: DISCONTINUED | OUTPATIENT
Start: 2020-07-10 | End: 2020-07-25

## 2020-07-08 ENCOUNTER — APPOINTMENT (OUTPATIENT)
Dept: CARDIOLOGY | Facility: CLINIC | Age: 50
End: 2020-07-08
Payer: COMMERCIAL

## 2020-07-08 ENCOUNTER — OUTPATIENT (OUTPATIENT)
Dept: OUTPATIENT SERVICES | Facility: HOSPITAL | Age: 50
LOS: 1 days | End: 2020-07-08
Payer: COMMERCIAL

## 2020-07-08 DIAGNOSIS — Z98.890 OTHER SPECIFIED POSTPROCEDURAL STATES: Chronic | ICD-10-CM

## 2020-07-08 DIAGNOSIS — S63.649D: ICD-10-CM

## 2020-07-08 DIAGNOSIS — Z11.59 ENCOUNTER FOR SCREENING FOR OTHER VIRAL DISEASES: ICD-10-CM

## 2020-07-08 DIAGNOSIS — M65.312 TRIGGER THUMB, LEFT THUMB: ICD-10-CM

## 2020-07-08 DIAGNOSIS — Z01.818 ENCOUNTER FOR OTHER PREPROCEDURAL EXAMINATION: ICD-10-CM

## 2020-07-08 PROCEDURE — 93306 TTE W/DOPPLER COMPLETE: CPT

## 2020-07-08 PROCEDURE — G0463: CPT

## 2020-07-08 PROCEDURE — U0003: CPT

## 2020-07-09 ENCOUNTER — TRANSCRIPTION ENCOUNTER (OUTPATIENT)
Age: 50
End: 2020-07-09

## 2020-07-09 LAB
ALBUMIN SERPL ELPH-MCNC: 4.4 G/DL
ALP BLD-CCNC: 40 U/L
ALT SERPL-CCNC: 11 U/L
ANION GAP SERPL CALC-SCNC: 13 MMOL/L
APPEARANCE: CLEAR
APPEARANCE: CLEAR
APTT BLD: 27.8 SEC
AST SERPL-CCNC: 13 U/L
BACTERIA: NEGATIVE
BASOPHILS # BLD AUTO: 0.06 K/UL
BASOPHILS NFR BLD AUTO: 1.1 %
BILIRUB SERPL-MCNC: 0.2 MG/DL
BILIRUBIN URINE: NEGATIVE
BILIRUBIN URINE: NEGATIVE
BLOOD URINE: ABNORMAL
BLOOD URINE: ABNORMAL
BUN SERPL-MCNC: 15 MG/DL
CALCIUM SERPL-MCNC: 9 MG/DL
CHLORIDE SERPL-SCNC: 104 MMOL/L
CO2 SERPL-SCNC: 23 MMOL/L
COLOR: YELLOW
COLOR: YELLOW
CREAT SERPL-MCNC: 0.8 MG/DL
EOSINOPHIL # BLD AUTO: 0.16 K/UL
EOSINOPHIL NFR BLD AUTO: 3 %
GLUCOSE QUALITATIVE U: NEGATIVE
GLUCOSE QUALITATIVE U: NEGATIVE
GLUCOSE SERPL-MCNC: 90 MG/DL
HCT VFR BLD CALC: 40.4 %
HGB BLD-MCNC: 12.4 G/DL
HYALINE CASTS: 0 /LPF
IMM GRANULOCYTES NFR BLD AUTO: 0 %
INR PPP: 0.93 RATIO
KETONES URINE: NEGATIVE
KETONES URINE: NEGATIVE
LEUKOCYTE ESTERASE URINE: NEGATIVE
LEUKOCYTE ESTERASE URINE: NEGATIVE
LYMPHOCYTES # BLD AUTO: 2.12 K/UL
LYMPHOCYTES NFR BLD AUTO: 40 %
MAN DIFF?: NORMAL
MCHC RBC-ENTMCNC: 30.7 GM/DL
MCHC RBC-ENTMCNC: 30.8 PG
MCV RBC AUTO: 100.2 FL
MICROSCOPIC-UA: NORMAL
MONOCYTES # BLD AUTO: 0.57 K/UL
MONOCYTES NFR BLD AUTO: 10.8 %
NEUTROPHILS # BLD AUTO: 2.39 K/UL
NEUTROPHILS NFR BLD AUTO: 45.1 %
NITRITE URINE: NEGATIVE
NITRITE URINE: NEGATIVE
PH URINE: 6
PH URINE: 6
PLATELET # BLD AUTO: 317 K/UL
POTASSIUM SERPL-SCNC: 4.5 MMOL/L
PROT SERPL-MCNC: 7 G/DL
PROTEIN URINE: NORMAL
PROTEIN URINE: NORMAL
PT BLD: 11 SEC
RBC # BLD: 4.03 M/UL
RBC # FLD: 13.2 %
RED BLOOD CELLS URINE: 48 /HPF
SARS-COV-2 IGG SERPL IA-ACNC: 0.26 INDEX
SARS-COV-2 IGG SERPL QL IA: NEGATIVE
SARS-COV-2 RNA SPEC QL NAA+PROBE: SIGNIFICANT CHANGE UP
SODIUM SERPL-SCNC: 140 MMOL/L
SPECIFIC GRAVITY URINE: 1.03
SPECIFIC GRAVITY URINE: 1.03
SQUAMOUS EPITHELIAL CELLS: 14 /HPF
UROBILINOGEN URINE: NORMAL
UROBILINOGEN URINE: NORMAL
WBC # FLD AUTO: 5.3 K/UL
WHITE BLOOD CELLS URINE: 2 /HPF

## 2020-07-09 NOTE — ASU PATIENT PROFILE, ADULT - PMH
Diverticulitis  last hospitalized 9.19  h/o Heart Palpitations    Hypertension    Hypothyroid    Irritable Bowel Syndrome    Sleep apnea  nasal mask- settings unknown

## 2020-07-09 NOTE — ASU PATIENT PROFILE, ADULT - PSH
C Section - x 1 - 1994    h/o  Endoscopy    h/o dental implant    H/O vaginal surgery  vaginal mesh  History of Colonoscopy

## 2020-07-09 NOTE — DISCUSSION/SUMMARY
[___ Month(s)] : [unfilled] month(s) [FreeTextEntry1] : The patient is a 50-year-old female strong family history of premature coronary artery disease, hypothyroidism, hyperlipidemia,hypertension with dyspnea\par #1 Htn- better on amlodipine 5mg  daily\par #2 Lipids- compliance crestor, labs acceptable\par #3 Hypothyroid- stable f/u endo\par #4 CV- cardiac CT normal\par #4 General- We discussed adherence to a low fat low cholesterol, diet, weight loss and regular daily exercise.\par  7/9/20 Addendum; There are no cardiac contraindications to surgery.

## 2020-07-09 NOTE — ASSESSMENT
[High Risk Surgery - Intraperitoneal, Intrathoracic or Supringuinal Vascular Procedures] : High Risk Surgery - Intraperitoneal, Intrathoracic or Supringuinal Vascular Procedures - No (0) [Congestive Heart Failure] : Congestive Heart Failure - No (0) [Ischemic Heart Disease] : Ischemic Heart Disease - No (0) [Prior Cerebrovascular Accident or TIA] : Prior Cerebrovascular Accident or TIA - No (0) [Creatinine >= 2mg/dL (1 Point)] : Creatinine >= 2mg/dL - No (0) [Insulin-dependent Diabetic (1 Point)] : Insulin-dependent Diabetic - No (0) [0] : 0 , RCRI Class: I, Risk of Post-Op Cardiac Complications: 3.9%, 95% CI for Risk Estimate: 2.8% - 5.4% [Patient Optimized for Surgery] : Patient optimized for surgery [As per surgery] : as per surgery [FreeTextEntry5] : Discontinue NSAID's and multivitamine [FreeTextEntry4] : Medical labs reviewed, patient medically optimized. Of note patient has history of sleep apnea and asthma and is currently on On CPAP machine. Asthma symptoms currently well cotnrolled.

## 2020-07-09 NOTE — ASU PATIENT PROFILE, ADULT - NS PRO MODE OF ARRIVAL
Medicare Wellness  Personal Prevention Plan of Service     Date of Office Visit:  05/15/2019  Encounter Provider:  Agueda Rodriguez MD  Place of Service:  [facility]  Patient Name: Denisse Chavez  :  1947    As part of the Medicare Wellness portion of your visit today, we are providing you with this personalized preventive plan of services (PPPS). This plan is based upon recommendations of the [place] Preventive Services Task Force (USPSTF) and the Advisory Committee on Immunization Practices (ACIP).    This lists the preventive care services that should be considered, and provides dates of when you are due. Items listed as completed are up-to-date and do not require any further intervention.    Health Maintenance   Topic Date Due   • ZOSTER VACCINE (1 of 2) 1995   • DIABETIC FOOT EXAM  2018   • MEDICARE ANNUAL WELLNESS  2019   • INFLUENZA VACCINE  2019   • HEMOGLOBIN A1C  2019   • MAMMOGRAM  2019   • DIABETIC EYE EXAM  2020   • LIPID PANEL  2020   • URINE MICROALBUMIN  2020   • COLONOSCOPY  2020   • TDAP/TD VACCINES (2 - Td) 10/13/2026   • HEPATITIS C SCREENING  Completed   • PNEUMOCOCCAL VACCINES (65+ LOW/MEDIUM RISK)  Completed       No orders of the defined types were placed in this encounter.      No Follow-up on file.         ambulatory

## 2020-07-09 NOTE — HISTORY OF PRESENT ILLNESS
[FreeTextEntry1] : James has been doing well over the last year. Only complaint is shortness of breath. No chest pain, palpitations or dizziness.

## 2020-07-09 NOTE — REVIEW OF SYSTEMS
[Dyspnea on exertion] : dyspnea during exertion [Shortness Of Breath] : shortness of breath [Negative] : Endocrine [Chest Pain] : no chest pain [Recent Weight Gain (___ Lbs)] : no recent weight gain [Lower Ext Edema] : no extremity edema

## 2020-07-09 NOTE — HISTORY OF PRESENT ILLNESS
[No Pertinent Cardiac History] : no history of aortic stenosis, atrial fibrillation, coronary artery disease, recent myocardial infarction, or implantable device/pacemaker [Asthma] : asthma [No Adverse Anesthesia Reaction] : no adverse anesthesia reaction in self or family member [Sleep Apnea] : sleep apnea [Chronic Anticoagulation] : no chronic anticoagulation [Chronic Kidney Disease] : no chronic kidney disease [Diabetes] : no diabetes [(Patient denies any chest pain, claudication, dyspnea on exertion, orthopnea, palpitations or syncope)] : Patient denies any chest pain, claudication, dyspnea on exertion, orthopnea, palpitations or syncope [Moderate (4-6 METs)] : Moderate (4-6 METs) [FreeTextEntry1] : Surgery of the right wrist [FreeTextEntry2] : 7/10/2020 [FreeTextEntry3] : Dr. Arora

## 2020-07-10 ENCOUNTER — APPOINTMENT (OUTPATIENT)
Dept: ORTHOPEDIC SURGERY | Facility: HOSPITAL | Age: 50
End: 2020-07-10

## 2020-07-10 ENCOUNTER — RESULT REVIEW (OUTPATIENT)
Age: 50
End: 2020-07-10

## 2020-07-10 ENCOUNTER — OUTPATIENT (OUTPATIENT)
Dept: OUTPATIENT SERVICES | Facility: HOSPITAL | Age: 50
LOS: 1 days | Discharge: HOME | End: 2020-07-10
Payer: COMMERCIAL

## 2020-07-10 VITALS
DIASTOLIC BLOOD PRESSURE: 75 MMHG | SYSTOLIC BLOOD PRESSURE: 135 MMHG | TEMPERATURE: 98 F | RESPIRATION RATE: 23 BRPM | HEIGHT: 61 IN | OXYGEN SATURATION: 100 % | WEIGHT: 202.38 LBS | HEART RATE: 81 BPM

## 2020-07-10 VITALS
OXYGEN SATURATION: 97 % | HEART RATE: 91 BPM | SYSTOLIC BLOOD PRESSURE: 138 MMHG | DIASTOLIC BLOOD PRESSURE: 76 MMHG | RESPIRATION RATE: 15 BRPM

## 2020-07-10 DIAGNOSIS — Z98.890 OTHER SPECIFIED POSTPROCEDURAL STATES: Chronic | ICD-10-CM

## 2020-07-10 DIAGNOSIS — M65.312 TRIGGER THUMB, LEFT THUMB: ICD-10-CM

## 2020-07-10 DIAGNOSIS — S63.649D: ICD-10-CM

## 2020-07-10 LAB
ANION GAP SERPL CALC-SCNC: 7 MMOL/L — SIGNIFICANT CHANGE UP (ref 5–17)
BUN SERPL-MCNC: 14 MG/DL — SIGNIFICANT CHANGE UP (ref 7–23)
CALCIUM SERPL-MCNC: 8.9 MG/DL — SIGNIFICANT CHANGE UP (ref 8.4–10.5)
CHLORIDE SERPL-SCNC: 105 MMOL/L — SIGNIFICANT CHANGE UP (ref 96–108)
CO2 SERPL-SCNC: 27 MMOL/L — SIGNIFICANT CHANGE UP (ref 22–31)
CREAT SERPL-MCNC: 0.73 MG/DL — SIGNIFICANT CHANGE UP (ref 0.5–1.3)
GLUCOSE SERPL-MCNC: 92 MG/DL — SIGNIFICANT CHANGE UP (ref 70–99)
HCG UR QL: NEGATIVE — SIGNIFICANT CHANGE UP
HCT VFR BLD CALC: 40 % — SIGNIFICANT CHANGE UP (ref 34.5–45)
HGB BLD-MCNC: 13.1 G/DL — SIGNIFICANT CHANGE UP (ref 11.5–15.5)
MCHC RBC-ENTMCNC: 31.3 PG — SIGNIFICANT CHANGE UP (ref 27–34)
MCHC RBC-ENTMCNC: 32.8 GM/DL — SIGNIFICANT CHANGE UP (ref 32–36)
MCV RBC AUTO: 95.7 FL — SIGNIFICANT CHANGE UP (ref 80–100)
NRBC # BLD: 0 /100 WBCS — SIGNIFICANT CHANGE UP (ref 0–0)
PLATELET # BLD AUTO: 320 K/UL — SIGNIFICANT CHANGE UP (ref 150–400)
POTASSIUM SERPL-MCNC: 4.1 MMOL/L — SIGNIFICANT CHANGE UP (ref 3.5–5.3)
POTASSIUM SERPL-SCNC: 4.1 MMOL/L — SIGNIFICANT CHANGE UP (ref 3.5–5.3)
RBC # BLD: 4.18 M/UL — SIGNIFICANT CHANGE UP (ref 3.8–5.2)
RBC # FLD: 12.7 % — SIGNIFICANT CHANGE UP (ref 10.3–14.5)
SODIUM SERPL-SCNC: 139 MMOL/L — SIGNIFICANT CHANGE UP (ref 135–145)
WBC # BLD: 6.27 K/UL — SIGNIFICANT CHANGE UP (ref 3.8–10.5)
WBC # FLD AUTO: 6.27 K/UL — SIGNIFICANT CHANGE UP (ref 3.8–10.5)

## 2020-07-10 PROCEDURE — 36415 COLL VENOUS BLD VENIPUNCTURE: CPT

## 2020-07-10 PROCEDURE — 76000 FLUOROSCOPY <1 HR PHYS/QHP: CPT

## 2020-07-10 PROCEDURE — C1713: CPT

## 2020-07-10 PROCEDURE — 85027 COMPLETE CBC AUTOMATED: CPT

## 2020-07-10 PROCEDURE — 26541 REPAIR HAND JOINT WITH GRAFT: CPT | Mod: LT

## 2020-07-10 PROCEDURE — 26055 INCISE FINGER TENDON SHEATH: CPT | Mod: 59,FA

## 2020-07-10 PROCEDURE — 26055 INCISE FINGER TENDON SHEATH: CPT | Mod: FA,XS

## 2020-07-10 PROCEDURE — 81025 URINE PREGNANCY TEST: CPT

## 2020-07-10 PROCEDURE — 26541 REPAIR HAND JOINT WITH GRAFT: CPT | Mod: FA

## 2020-07-10 PROCEDURE — 80048 BASIC METABOLIC PNL TOTAL CA: CPT

## 2020-07-10 RX ORDER — ONDANSETRON 8 MG/1
4 TABLET, FILM COATED ORAL ONCE
Refills: 0 | Status: COMPLETED | OUTPATIENT
Start: 2020-07-10 | End: 2020-07-10

## 2020-07-10 RX ORDER — OXYCODONE HYDROCHLORIDE 5 MG/1
5 TABLET ORAL ONCE
Refills: 0 | Status: DISCONTINUED | OUTPATIENT
Start: 2020-07-10 | End: 2020-07-10

## 2020-07-10 RX ORDER — HYDROMORPHONE HYDROCHLORIDE 2 MG/ML
0.5 INJECTION INTRAMUSCULAR; INTRAVENOUS; SUBCUTANEOUS
Refills: 0 | Status: DISCONTINUED | OUTPATIENT
Start: 2020-07-10 | End: 2020-07-10

## 2020-07-10 RX ORDER — SODIUM CHLORIDE 9 MG/ML
1000 INJECTION, SOLUTION INTRAVENOUS
Refills: 0 | Status: DISCONTINUED | OUTPATIENT
Start: 2020-07-10 | End: 2020-07-10

## 2020-07-10 RX ORDER — IBUPROFEN 200 MG
1 TABLET ORAL
Qty: 30 | Refills: 0
Start: 2020-07-10

## 2020-07-10 RX ADMIN — HYDROMORPHONE HYDROCHLORIDE 0.5 MILLIGRAM(S): 2 INJECTION INTRAMUSCULAR; INTRAVENOUS; SUBCUTANEOUS at 11:25

## 2020-07-10 RX ADMIN — SODIUM CHLORIDE 75 MILLILITER(S): 9 INJECTION, SOLUTION INTRAVENOUS at 11:29

## 2020-07-10 RX ADMIN — ONDANSETRON 4 MILLIGRAM(S): 8 TABLET, FILM COATED ORAL at 12:10

## 2020-07-10 RX ADMIN — CHLORHEXIDINE GLUCONATE 1 APPLICATION(S): 213 SOLUTION TOPICAL at 10:00

## 2020-07-10 NOTE — ASU DISCHARGE PLAN (ADULT/PEDIATRIC) - CALL YOUR DOCTOR IF YOU HAVE ANY OF THE FOLLOWING:
Bleeding that does not stop/Wound/Surgical Site with redness, or foul smelling discharge or pus/Pain not relieved by Medications/Fever greater than (need to indicate Fahrenheit or Celsius)/Swelling that gets worse/Numbness, tingling, color or temperature change to extremity

## 2020-07-10 NOTE — BRIEF OPERATIVE NOTE - NSICDXBRIEFPREOP_GEN_ALL_CORE_FT
PRE-OP DIAGNOSIS:  Rupture of ulnar collateral ligament of thumb 10-Jul-2020 11:08:35 left w trigger finger Prisca Martinez

## 2020-07-10 NOTE — BRIEF OPERATIVE NOTE - NSICDXBRIEFPOSTOP_GEN_ALL_CORE_FT
POST-OP DIAGNOSIS:  Rupture of ulnar collateral ligament of thumb 10-Jul-2020 11:08:59 left and tigger finger Prisca Martinez

## 2020-07-10 NOTE — ASU DISCHARGE PLAN (ADULT/PEDIATRIC) - CARE PROVIDER_API CALL
Maxx Arora  ORTHOPAEDIC SURGERY  [address] [address]  Phone: [phone]  Fax: [phone]  Follow Up Time:

## 2020-07-10 NOTE — ASU DISCHARGE PLAN (ADULT/PEDIATRIC) - BATHING
I SPOKE TO PCP, DR MCMANUS, HE SAID PATIENT WAS AT LOW RISK FOR SURGERY AND THAT A1C WAS 6.4, IN WHICH IS VERY GOOD FOR PATIENT'S HISTORY. HE WILL SEND OVER SIGNED CLEARANCE.  
cover with plastic to keep dry/Shower only

## 2020-07-10 NOTE — ASU DISCHARGE PLAN (ADULT/PEDIATRIC) - ASU DC SPECIAL INSTRUCTIONSFT
Elevate Left arm in sling daily when up & walking.  Elevate the  hand/arm above heart level on pillow/blankets when lying down.  Pad the neck strap with athletic sock/collared shirt.  Apply ice packs to top of Left  hand for 30 minutes every 3 hours daily.  Keep bandage clean, dry , & intact daily.  Call the Dr.  for fever, severe pain, fall or hand injury.  Call for an appointment for office visit  in 10 days.

## 2020-07-10 NOTE — BRIEF OPERATIVE NOTE - NSICDXBRIEFPROCEDURE_GEN_ALL_CORE_FT
PROCEDURES:  Release, trigger thumb 10-Jul-2020 11:08:15 left Prisca Martinez  Repair, ligament, ulnar collateral, thumb 10-Jul-2020 11:07:53 left Prisca Martinez

## 2020-07-16 ENCOUNTER — APPOINTMENT (OUTPATIENT)
Dept: ENDOCRINOLOGY | Facility: CLINIC | Age: 50
End: 2020-07-16

## 2020-07-17 NOTE — ED PROVIDER NOTE - NS ED MD EM SELECTION
Did report some bloody show with voiding,but thinks cramping less intense since cervidil placed   [id number] Comprehensive

## 2020-07-20 ENCOUNTER — APPOINTMENT (OUTPATIENT)
Dept: ORTHOPEDIC SURGERY | Facility: CLINIC | Age: 50
End: 2020-07-20
Payer: COMMERCIAL

## 2020-07-20 DIAGNOSIS — M65.319 TRIGGER THUMB, UNSPECIFIED THUMB: ICD-10-CM

## 2020-07-20 PROCEDURE — 99024 POSTOP FOLLOW-UP VISIT: CPT

## 2020-07-20 PROCEDURE — 29075 APPL CST ELBW FNGR SHORT ARM: CPT | Mod: 58,LT

## 2020-07-20 NOTE — HISTORY OF PRESENT ILLNESS
[de-identified] : s/p reconstruction of left thumb UCL with use of palmaris longus tendon then left trigger thumb release on 07/10/2020.  [de-identified] : No imaging done today.  [de-identified] : Patient is WDWN, alert, and in no acute distress. Breathing is unlabored. She is grossly oriented to person, place, and time.  \par \par Left thumb: Xeroform dressing reformed. Incision over the thumb volar aspect at the level of the A1 pulley and the thumb UCL aspect are healing well. There are no signs of infection. Sutures were removed. Normal amount of postoperative edema and tenderness present.  [de-identified] : The patient is a 50 year old female who returns for the 1st postoperative visit after undergoing reconstruction of left thumb UCL with use of palmaris longus tendon then left trigger thumb release at [facility]. The surgery was performed on 07/10/2020. The patient is recovering at home. She reports mild postoperative pain, however she does admit to taking two of the prescribed pain medication tablets following the surgery. She is otherwise doing well.  [de-identified] : A [place]-Deshawn lined short arm cast was applied. Cast care instructions were reviewed. Gentle ROM exercises as well as pumping of the hand while in the cast were encouraged. NSAIDs as tolerated. \par Follow up in 5 weeks for cast removal.

## 2020-07-20 NOTE — CONSULT LETTER
[Dear  ___] : Dear  [unfilled], [Consult Letter:] : I had the pleasure of evaluating your patient, [unfilled]. [Please see my note below.] : Please see my note below. [Consult Closing:] : Thank you very much for allowing me to participate in the care of this patient.  If you have any questions, please do not hesitate to contact me. [Sincerely,] : Sincerely, [FreeTextEntry3] : Maxx Arora MD  [FreeTextEntry2] : LANNY BLANCAS

## 2020-07-20 NOTE — ADDENDUM
[FreeTextEntry1] : I, Ramonita Peraza wrote this note acting as a scribe for Dr. Maxx Arora on Jul 20, 2020.

## 2020-08-24 ENCOUNTER — APPOINTMENT (OUTPATIENT)
Dept: ORTHOPEDIC SURGERY | Facility: CLINIC | Age: 50
End: 2020-08-24
Payer: COMMERCIAL

## 2020-08-24 PROBLEM — G47.30 SLEEP APNEA, UNSPECIFIED: Chronic | Status: ACTIVE | Noted: 2020-07-06

## 2020-08-24 PROBLEM — I10 ESSENTIAL (PRIMARY) HYPERTENSION: Chronic | Status: ACTIVE | Noted: 2020-07-06

## 2020-08-24 PROCEDURE — 99024 POSTOP FOLLOW-UP VISIT: CPT

## 2020-08-24 NOTE — ADDENDUM
[FreeTextEntry1] : I, Maci Simmons wrote this note acting as a scribe for Dr. Maxx Arora on Aug 24, 2020.\par

## 2020-08-24 NOTE — END OF VISIT
[FreeTextEntry3] : I, Maxx Arora MD, ordering physician, have read and attest that all the information, medical decision making and discharge instructions within are true and accurate.\par

## 2020-08-24 NOTE — HISTORY OF PRESENT ILLNESS
[de-identified] : s/p reconstruction of left thumb UCL with use of palmaris longus tendon then left trigger thumb release on 07/10/2020.  [de-identified] : The patient is a 50 year old female who returns for the 2nd postoperative visit after undergoing reconstruction of left thumb UCL with use of palmaris longus tendon then left trigger thumb release at [facility]. The surgery was performed on 07/10/2020. The patient is recovering at home. She reports mild postoperative pain, however she does admit to taking two of the prescribed pain medication tablets following the surgery. She is otherwise doing well. \par \par She returns today for a cast removal.  [de-identified] : Patient is WDWN, alert, and in no acute distress. Breathing is unlabored. She is grossly oriented to person, place, and time.  \par \par Left thumb: Xeroform dressing reformed. Incision over the thumb volar aspect at the level of the A1 pulley and the thumb UCL aspect are healing well. There are no signs of infection. Sutures were removed. Normal amount of postoperative edema and tenderness present. \par \par Cast Removal: Skin intact\par No tenderness over fx site\par No swelling, no deformities\par Fingers well perfused and moving freely with capillary refill of less than 2 seconds\par Neurovascularly intact\par Sensation intact\par  [de-identified] : No imaging done today.  [de-identified] : A [place]-Deshawn lined short arm cast was applied. Cast care instructions were reviewed. Gentle ROM exercises as well as pumping of the hand while in the cast were encouraged. NSAIDs as tolerated. \par \par Massage the scar with vitamin E oil.  \par The patient was advised to soak the hand in warm water and Epsom salt.\par Gentle range of motion and strengthening exercises were encouraged, as tolerated by pain.\par Follow Up in 6 weeks.\par \par \par Follow up in 5 weeks for cast removal.

## 2020-08-30 ENCOUNTER — TRANSCRIPTION ENCOUNTER (OUTPATIENT)
Age: 50
End: 2020-08-30

## 2020-08-30 ENCOUNTER — INPATIENT (INPATIENT)
Facility: HOSPITAL | Age: 50
LOS: 0 days | Discharge: HOME | DRG: 392 | End: 2020-08-31
Attending: HOSPITALIST | Admitting: STUDENT IN AN ORGANIZED HEALTH CARE EDUCATION/TRAINING PROGRAM
Payer: COMMERCIAL

## 2020-08-30 VITALS
TEMPERATURE: 98 F | RESPIRATION RATE: 19 BRPM | HEIGHT: 61 IN | WEIGHT: 190.04 LBS | HEART RATE: 93 BPM | SYSTOLIC BLOOD PRESSURE: 143 MMHG | DIASTOLIC BLOOD PRESSURE: 80 MMHG | OXYGEN SATURATION: 98 %

## 2020-08-30 DIAGNOSIS — R07.9 CHEST PAIN, UNSPECIFIED: ICD-10-CM

## 2020-08-30 DIAGNOSIS — Z98.890 OTHER SPECIFIED POSTPROCEDURAL STATES: Chronic | ICD-10-CM

## 2020-08-30 LAB
ALBUMIN SERPL ELPH-MCNC: 4.3 G/DL — SIGNIFICANT CHANGE UP (ref 3.3–5)
ALP SERPL-CCNC: 41 U/L — SIGNIFICANT CHANGE UP (ref 40–120)
ALT FLD-CCNC: 15 U/L — SIGNIFICANT CHANGE UP (ref 10–45)
ANION GAP SERPL CALC-SCNC: 14 MMOL/L — SIGNIFICANT CHANGE UP (ref 5–17)
APPEARANCE UR: CLEAR — SIGNIFICANT CHANGE UP
AST SERPL-CCNC: 17 U/L — SIGNIFICANT CHANGE UP (ref 10–40)
BACTERIA # UR AUTO: NEGATIVE — SIGNIFICANT CHANGE UP
BASOPHILS # BLD AUTO: 0.06 K/UL — SIGNIFICANT CHANGE UP (ref 0–0.2)
BASOPHILS NFR BLD AUTO: 0.6 % — SIGNIFICANT CHANGE UP (ref 0–2)
BILIRUB SERPL-MCNC: 0.6 MG/DL — SIGNIFICANT CHANGE UP (ref 0.2–1.2)
BILIRUB UR-MCNC: NEGATIVE — SIGNIFICANT CHANGE UP
BUN SERPL-MCNC: 14 MG/DL — SIGNIFICANT CHANGE UP (ref 7–23)
CALCIUM SERPL-MCNC: 9.5 MG/DL — SIGNIFICANT CHANGE UP (ref 8.4–10.5)
CHLORIDE SERPL-SCNC: 103 MMOL/L — SIGNIFICANT CHANGE UP (ref 96–108)
CO2 SERPL-SCNC: 20 MMOL/L — LOW (ref 22–31)
COLOR SPEC: YELLOW — SIGNIFICANT CHANGE UP
CREAT SERPL-MCNC: 0.69 MG/DL — SIGNIFICANT CHANGE UP (ref 0.5–1.3)
DIFF PNL FLD: ABNORMAL
EOSINOPHIL # BLD AUTO: 0.08 K/UL — SIGNIFICANT CHANGE UP (ref 0–0.5)
EOSINOPHIL NFR BLD AUTO: 0.8 % — SIGNIFICANT CHANGE UP (ref 0–6)
EPI CELLS # UR: 8 /HPF — HIGH
GLUCOSE SERPL-MCNC: 78 MG/DL — SIGNIFICANT CHANGE UP (ref 70–99)
GLUCOSE UR QL: NEGATIVE — SIGNIFICANT CHANGE UP
HCT VFR BLD CALC: 37.1 % — SIGNIFICANT CHANGE UP (ref 34.5–45)
HGB BLD-MCNC: 12.3 G/DL — SIGNIFICANT CHANGE UP (ref 11.5–15.5)
HYALINE CASTS # UR AUTO: 6 /LPF — HIGH (ref 0–2)
IMM GRANULOCYTES NFR BLD AUTO: 0.3 % — SIGNIFICANT CHANGE UP (ref 0–1.5)
KETONES UR-MCNC: ABNORMAL
LEUKOCYTE ESTERASE UR-ACNC: ABNORMAL
LYMPHOCYTES # BLD AUTO: 2.38 K/UL — SIGNIFICANT CHANGE UP (ref 1–3.3)
LYMPHOCYTES # BLD AUTO: 23.7 % — SIGNIFICANT CHANGE UP (ref 13–44)
MCHC RBC-ENTMCNC: 31.1 PG — SIGNIFICANT CHANGE UP (ref 27–34)
MCHC RBC-ENTMCNC: 33.2 GM/DL — SIGNIFICANT CHANGE UP (ref 32–36)
MCV RBC AUTO: 93.7 FL — SIGNIFICANT CHANGE UP (ref 80–100)
MONOCYTES # BLD AUTO: 0.93 K/UL — HIGH (ref 0–0.9)
MONOCYTES NFR BLD AUTO: 9.3 % — SIGNIFICANT CHANGE UP (ref 2–14)
NEUTROPHILS # BLD AUTO: 6.55 K/UL — SIGNIFICANT CHANGE UP (ref 1.8–7.4)
NEUTROPHILS NFR BLD AUTO: 65.3 % — SIGNIFICANT CHANGE UP (ref 43–77)
NITRITE UR-MCNC: NEGATIVE — SIGNIFICANT CHANGE UP
NRBC # BLD: 0 /100 WBCS — SIGNIFICANT CHANGE UP (ref 0–0)
PH UR: 6 — SIGNIFICANT CHANGE UP (ref 5–8)
PLATELET # BLD AUTO: 302 K/UL — SIGNIFICANT CHANGE UP (ref 150–400)
POTASSIUM SERPL-MCNC: 3.6 MMOL/L — SIGNIFICANT CHANGE UP (ref 3.5–5.3)
POTASSIUM SERPL-SCNC: 3.6 MMOL/L — SIGNIFICANT CHANGE UP (ref 3.5–5.3)
PROT SERPL-MCNC: 7.1 G/DL — SIGNIFICANT CHANGE UP (ref 6–8.3)
PROT UR-MCNC: ABNORMAL
RBC # BLD: 3.96 M/UL — SIGNIFICANT CHANGE UP (ref 3.8–5.2)
RBC # FLD: 12.2 % — SIGNIFICANT CHANGE UP (ref 10.3–14.5)
RBC CASTS # UR COMP ASSIST: 4 /HPF — SIGNIFICANT CHANGE UP (ref 0–4)
SARS-COV-2 RNA SPEC QL NAA+PROBE: SIGNIFICANT CHANGE UP
SODIUM SERPL-SCNC: 137 MMOL/L — SIGNIFICANT CHANGE UP (ref 135–145)
SP GR SPEC: 1.04 — HIGH (ref 1.01–1.02)
TROPONIN T, HIGH SENSITIVITY RESULT: <6 NG/L — SIGNIFICANT CHANGE UP (ref 0–51)
UROBILINOGEN FLD QL: ABNORMAL
WBC # BLD: 10.03 K/UL — SIGNIFICANT CHANGE UP (ref 3.8–10.5)
WBC # FLD AUTO: 10.03 K/UL — SIGNIFICANT CHANGE UP (ref 3.8–10.5)
WBC UR QL: 17 /HPF — HIGH (ref 0–5)

## 2020-08-30 PROCEDURE — 93010 ELECTROCARDIOGRAM REPORT: CPT

## 2020-08-30 PROCEDURE — 99285 EMERGENCY DEPT VISIT HI MDM: CPT

## 2020-08-30 PROCEDURE — 74176 CT ABD & PELVIS W/O CONTRAST: CPT | Mod: 26

## 2020-08-30 PROCEDURE — 99233 SBSQ HOSP IP/OBS HIGH 50: CPT

## 2020-08-30 PROCEDURE — 71046 X-RAY EXAM CHEST 2 VIEWS: CPT | Mod: 26

## 2020-08-30 RX ORDER — FAMOTIDINE 10 MG/ML
20 INJECTION INTRAVENOUS ONCE
Refills: 0 | Status: COMPLETED | OUTPATIENT
Start: 2020-08-30 | End: 2020-08-30

## 2020-08-30 RX ORDER — METRONIDAZOLE 500 MG
500 TABLET ORAL ONCE
Refills: 0 | Status: DISCONTINUED | OUTPATIENT
Start: 2020-08-30 | End: 2020-08-30

## 2020-08-30 RX ORDER — CIPROFLOXACIN LACTATE 400MG/40ML
400 VIAL (ML) INTRAVENOUS ONCE
Refills: 0 | Status: DISCONTINUED | OUTPATIENT
Start: 2020-08-30 | End: 2020-08-30

## 2020-08-30 RX ORDER — SODIUM CHLORIDE 9 MG/ML
1000 INJECTION INTRAMUSCULAR; INTRAVENOUS; SUBCUTANEOUS ONCE
Refills: 0 | Status: COMPLETED | OUTPATIENT
Start: 2020-08-30 | End: 2020-08-30

## 2020-08-30 RX ORDER — ASPIRIN/CALCIUM CARB/MAGNESIUM 324 MG
324 TABLET ORAL ONCE
Refills: 0 | Status: COMPLETED | OUTPATIENT
Start: 2020-08-30 | End: 2020-08-30

## 2020-08-30 RX ADMIN — SODIUM CHLORIDE 1000 MILLILITER(S): 9 INJECTION INTRAMUSCULAR; INTRAVENOUS; SUBCUTANEOUS at 23:25

## 2020-08-30 RX ADMIN — Medication 1 TABLET(S): at 23:25

## 2020-08-30 RX ADMIN — Medication 324 MILLIGRAM(S): at 17:28

## 2020-08-30 RX ADMIN — FAMOTIDINE 20 MILLIGRAM(S): 10 INJECTION INTRAVENOUS at 17:29

## 2020-08-30 RX ADMIN — Medication 30 MILLILITER(S): at 17:29

## 2020-08-30 RX ADMIN — Medication 1 TABLET(S): at 20:02

## 2020-08-30 NOTE — ED PROVIDER NOTE - ATTENDING CONTRIBUTION TO CARE
ED attending Karma Kaminski DO  I performed a history and physical exam of the patient and discussed their management with the resident/ACP. I reviewed the resident/ACP's note and agree with the documented findings and plan of care except for the following.     Please refer to MDM

## 2020-08-30 NOTE — ED PROVIDER NOTE - CLINICAL SUMMARY MEDICAL DECISION MAKING FREE TEXT BOX
Dr. Karma Kaminski: 49 year old IBS, hypothyroidism, HLD, and HTN presented with LLQ and suprapubic pain. Also reported atypical chest pain for the past week. Abdominal pain likely secondary to UTI vs diverticulitis. r/o ACS for atypical chest pain. Plan: Labs, CT a/p, UA, Urine culture, EKG, cxr. Reassess

## 2020-08-30 NOTE — ED ADULT NURSE NOTE - OBJECTIVE STATEMENT
49 y/o F A&Ox3 PMH diverticulitis, HTN, hypothyroid, palpitations & PSH colonoscopy,  presents to the ED from  c/o CP. Pt states she went to  today after having difficulty urinating last night. While at  pt stated she was having CP, EKG was read as abnormal and pt was advised to seek treatment in the ED. Pt reports stabbing epigastric pain radiating to her back x4 weeks. Endorses intermittent periods of dizziness. Denies fevers, chills, headaches, vision changes, cough, N/V/D, jaw pain. Reports extensive familial cardiac hx. Upon assessment abdomen is soft, non-distended, suprapubic region tender to palpation. Pt reports she has not urinated since this morning. EKG completed, placed on CM. IV access obtained. Call bell within reach, comfort and safety provided.

## 2020-08-30 NOTE — ED PROVIDER NOTE - OBJECTIVE STATEMENT
50 year old female with history of IBS, hypothyroidism, HLD, and HTN presented to ED with 2 days of LLQ and suprapubic abdominal pain associated with urinary hesitancy. Pt states the pain feels like the last time she had UTI and diverticulitis. No fever, chills, N/V, back/flank pain. Also reports left sided chest pain for the past 2 weeks. Pt was seen at  and was told to come to ED for further reevaluation.

## 2020-08-30 NOTE — ED ADULT NURSE REASSESSMENT NOTE - NS ED NURSE REASSESS COMMENT FT1
Pt is resting in bed. VSS/NAD tx for uti. States she still has pressure over bladder area. Pending ct scan.

## 2020-08-30 NOTE — ED ADULT NURSE NOTE - CAS EDN DISCHARGE ASSESSMENT
Still reporting pain but his mood appears to be lifting  paranoia has generally subsided Alert and oriented to person, place and time

## 2020-08-30 NOTE — ED PROVIDER NOTE - NS ED ROS FT
ROS:  GEN: (-) fevers/chills, (-) weight loss, (-) fatigue/malaise  HEENT: (-) visual change, (-) photophobia, (-) nasal congestion/rhinorrhea, (-) difficulty swallowing  NECK: (-) stiffness, (-) swelling  RESP: (-) shortness of breath, (-) cough, (-) sputum, (-) hemoptysis, (-) ELIZALDE  CV: (+) chest pain, (-) palpitations, (-) PND/orthopnea  GI: (-) nausea, (-) vomiting, (+) pain, (-) constipation, (-) diarrhea, (-) melena, (-) BRBPR  : (-) frequency/urgency, (-) hematuria, (-) dysuria, (-) incontinence, (-) discharge  EXT: (-) edema, (-) cyanosis  ENDO: (-) heat/cold intolerance, (-) polyuria  NEURO: (-) paresthesias, (-) weakness, (-) headache, (-) dizziness, (-) syncope  Psych: (-) depression, (-) anxiety, (-) SI, (-) HI, (-) AH, (-) VH  MSK: no recent trauma, no muscle pain

## 2020-08-31 ENCOUNTER — TRANSCRIPTION ENCOUNTER (OUTPATIENT)
Age: 50
End: 2020-08-31

## 2020-08-31 ENCOUNTER — APPOINTMENT (OUTPATIENT)
Dept: CARDIOLOGY | Facility: CLINIC | Age: 50
End: 2020-08-31

## 2020-08-31 VITALS
TEMPERATURE: 98 F | RESPIRATION RATE: 18 BRPM | DIASTOLIC BLOOD PRESSURE: 76 MMHG | OXYGEN SATURATION: 98 % | SYSTOLIC BLOOD PRESSURE: 111 MMHG | HEART RATE: 79 BPM

## 2020-08-31 DIAGNOSIS — R07.9 CHEST PAIN, UNSPECIFIED: ICD-10-CM

## 2020-08-31 DIAGNOSIS — K57.92 DIVERTICULITIS OF INTESTINE, PART UNSPECIFIED, WITHOUT PERFORATION OR ABSCESS WITHOUT BLEEDING: ICD-10-CM

## 2020-08-31 DIAGNOSIS — Z29.9 ENCOUNTER FOR PROPHYLACTIC MEASURES, UNSPECIFIED: ICD-10-CM

## 2020-08-31 LAB
A1C WITH ESTIMATED AVERAGE GLUCOSE RESULT: 5.2 % — SIGNIFICANT CHANGE UP (ref 4–5.6)
ANION GAP SERPL CALC-SCNC: 13 MMOL/L — SIGNIFICANT CHANGE UP (ref 5–17)
BASOPHILS # BLD AUTO: 0.04 K/UL — SIGNIFICANT CHANGE UP (ref 0–0.2)
BASOPHILS NFR BLD AUTO: 0.6 % — SIGNIFICANT CHANGE UP (ref 0–2)
BUN SERPL-MCNC: 9 MG/DL — SIGNIFICANT CHANGE UP (ref 7–23)
CALCIUM SERPL-MCNC: 8.6 MG/DL — SIGNIFICANT CHANGE UP (ref 8.4–10.5)
CHLORIDE SERPL-SCNC: 105 MMOL/L — SIGNIFICANT CHANGE UP (ref 96–108)
CHOLEST SERPL-MCNC: 149 MG/DL — SIGNIFICANT CHANGE UP (ref 10–199)
CO2 SERPL-SCNC: 18 MMOL/L — LOW (ref 22–31)
CREAT SERPL-MCNC: 0.65 MG/DL — SIGNIFICANT CHANGE UP (ref 0.5–1.3)
CULTURE RESULTS: SIGNIFICANT CHANGE UP
EOSINOPHIL # BLD AUTO: 0.1 K/UL — SIGNIFICANT CHANGE UP (ref 0–0.5)
EOSINOPHIL NFR BLD AUTO: 1.5 % — SIGNIFICANT CHANGE UP (ref 0–6)
ESTIMATED AVERAGE GLUCOSE: 103 MG/DL — SIGNIFICANT CHANGE UP (ref 68–114)
GLUCOSE SERPL-MCNC: 79 MG/DL — SIGNIFICANT CHANGE UP (ref 70–99)
HCT VFR BLD CALC: 34.2 % — LOW (ref 34.5–45)
HCT VFR BLD CALC: 35.3 % — SIGNIFICANT CHANGE UP (ref 34.5–45)
HDLC SERPL-MCNC: 65 MG/DL — SIGNIFICANT CHANGE UP
HGB BLD-MCNC: 11.1 G/DL — LOW (ref 11.5–15.5)
HGB BLD-MCNC: 11.6 G/DL — SIGNIFICANT CHANGE UP (ref 11.5–15.5)
IMM GRANULOCYTES NFR BLD AUTO: 0.2 % — SIGNIFICANT CHANGE UP (ref 0–1.5)
LIPID PNL WITH DIRECT LDL SERPL: 66 MG/DL — SIGNIFICANT CHANGE UP
LYMPHOCYTES # BLD AUTO: 2.04 K/UL — SIGNIFICANT CHANGE UP (ref 1–3.3)
LYMPHOCYTES # BLD AUTO: 30.8 % — SIGNIFICANT CHANGE UP (ref 13–44)
MAGNESIUM SERPL-MCNC: 2.2 MG/DL — SIGNIFICANT CHANGE UP (ref 1.6–2.6)
MCHC RBC-ENTMCNC: 30.7 PG — SIGNIFICANT CHANGE UP (ref 27–34)
MCHC RBC-ENTMCNC: 30.9 PG — SIGNIFICANT CHANGE UP (ref 27–34)
MCHC RBC-ENTMCNC: 32.5 GM/DL — SIGNIFICANT CHANGE UP (ref 32–36)
MCHC RBC-ENTMCNC: 32.9 GM/DL — SIGNIFICANT CHANGE UP (ref 32–36)
MCV RBC AUTO: 94.1 FL — SIGNIFICANT CHANGE UP (ref 80–100)
MCV RBC AUTO: 94.5 FL — SIGNIFICANT CHANGE UP (ref 80–100)
MONOCYTES # BLD AUTO: 0.78 K/UL — SIGNIFICANT CHANGE UP (ref 0–0.9)
MONOCYTES NFR BLD AUTO: 11.8 % — SIGNIFICANT CHANGE UP (ref 2–14)
NEUTROPHILS # BLD AUTO: 3.66 K/UL — SIGNIFICANT CHANGE UP (ref 1.8–7.4)
NEUTROPHILS NFR BLD AUTO: 55.1 % — SIGNIFICANT CHANGE UP (ref 43–77)
NRBC # BLD: 0 /100 WBCS — SIGNIFICANT CHANGE UP (ref 0–0)
NRBC # BLD: 0 /100 WBCS — SIGNIFICANT CHANGE UP (ref 0–0)
PHOSPHATE SERPL-MCNC: 2.5 MG/DL — SIGNIFICANT CHANGE UP (ref 2.5–4.5)
PLATELET # BLD AUTO: 273 K/UL — SIGNIFICANT CHANGE UP (ref 150–400)
PLATELET # BLD AUTO: 283 K/UL — SIGNIFICANT CHANGE UP (ref 150–400)
POTASSIUM SERPL-MCNC: 3.7 MMOL/L — SIGNIFICANT CHANGE UP (ref 3.5–5.3)
POTASSIUM SERPL-SCNC: 3.7 MMOL/L — SIGNIFICANT CHANGE UP (ref 3.5–5.3)
RBC # BLD: 3.62 M/UL — LOW (ref 3.8–5.2)
RBC # BLD: 3.75 M/UL — LOW (ref 3.8–5.2)
RBC # FLD: 12.2 % — SIGNIFICANT CHANGE UP (ref 10.3–14.5)
RBC # FLD: 12.3 % — SIGNIFICANT CHANGE UP (ref 10.3–14.5)
SARS-COV-2 IGG SERPL QL IA: NEGATIVE — SIGNIFICANT CHANGE UP
SARS-COV-2 IGM SERPL IA-ACNC: 0.46 RATIO — SIGNIFICANT CHANGE UP
SODIUM SERPL-SCNC: 136 MMOL/L — SIGNIFICANT CHANGE UP (ref 135–145)
SPECIMEN SOURCE: SIGNIFICANT CHANGE UP
TOTAL CHOLESTEROL/HDL RATIO MEASUREMENT: 2.3 RATIO — LOW (ref 3.3–7.1)
TRIGL SERPL-MCNC: 87 MG/DL — SIGNIFICANT CHANGE UP (ref 10–149)
TROPONIN T, HIGH SENSITIVITY RESULT: <6 NG/L — SIGNIFICANT CHANGE UP (ref 0–51)
WBC # BLD: 6.45 K/UL — SIGNIFICANT CHANGE UP (ref 3.8–10.5)
WBC # BLD: 6.63 K/UL — SIGNIFICANT CHANGE UP (ref 3.8–10.5)
WBC # FLD AUTO: 6.45 K/UL — SIGNIFICANT CHANGE UP (ref 3.8–10.5)
WBC # FLD AUTO: 6.63 K/UL — SIGNIFICANT CHANGE UP (ref 3.8–10.5)

## 2020-08-31 PROCEDURE — 85027 COMPLETE CBC AUTOMATED: CPT

## 2020-08-31 PROCEDURE — 80061 LIPID PANEL: CPT

## 2020-08-31 PROCEDURE — 93306 TTE W/DOPPLER COMPLETE: CPT

## 2020-08-31 PROCEDURE — 99223 1ST HOSP IP/OBS HIGH 75: CPT

## 2020-08-31 PROCEDURE — 84484 ASSAY OF TROPONIN QUANT: CPT

## 2020-08-31 PROCEDURE — 99233 SBSQ HOSP IP/OBS HIGH 50: CPT

## 2020-08-31 PROCEDURE — 86769 SARS-COV-2 COVID-19 ANTIBODY: CPT

## 2020-08-31 PROCEDURE — 74176 CT ABD & PELVIS W/O CONTRAST: CPT

## 2020-08-31 PROCEDURE — 87086 URINE CULTURE/COLONY COUNT: CPT

## 2020-08-31 PROCEDURE — 84100 ASSAY OF PHOSPHORUS: CPT

## 2020-08-31 PROCEDURE — 96374 THER/PROPH/DIAG INJ IV PUSH: CPT

## 2020-08-31 PROCEDURE — 84702 CHORIONIC GONADOTROPIN TEST: CPT

## 2020-08-31 PROCEDURE — 99285 EMERGENCY DEPT VISIT HI MDM: CPT | Mod: 25

## 2020-08-31 PROCEDURE — 83735 ASSAY OF MAGNESIUM: CPT

## 2020-08-31 PROCEDURE — 81001 URINALYSIS AUTO W/SCOPE: CPT

## 2020-08-31 PROCEDURE — U0003: CPT

## 2020-08-31 PROCEDURE — 83036 HEMOGLOBIN GLYCOSYLATED A1C: CPT

## 2020-08-31 PROCEDURE — 93306 TTE W/DOPPLER COMPLETE: CPT | Mod: 26

## 2020-08-31 PROCEDURE — 93005 ELECTROCARDIOGRAM TRACING: CPT

## 2020-08-31 PROCEDURE — 71046 X-RAY EXAM CHEST 2 VIEWS: CPT

## 2020-08-31 PROCEDURE — 80048 BASIC METABOLIC PNL TOTAL CA: CPT

## 2020-08-31 PROCEDURE — 99239 HOSP IP/OBS DSCHRG MGMT >30: CPT

## 2020-08-31 PROCEDURE — 80053 COMPREHEN METABOLIC PANEL: CPT

## 2020-08-31 RX ORDER — CIPROFLOXACIN LACTATE 400MG/40ML
400 VIAL (ML) INTRAVENOUS EVERY 12 HOURS
Refills: 0 | Status: DISCONTINUED | OUTPATIENT
Start: 2020-08-31 | End: 2020-08-31

## 2020-08-31 RX ORDER — METRONIDAZOLE 500 MG
500 TABLET ORAL EVERY 8 HOURS
Refills: 0 | Status: DISCONTINUED | OUTPATIENT
Start: 2020-08-31 | End: 2020-08-31

## 2020-08-31 RX ORDER — METRONIDAZOLE 500 MG
500 TABLET ORAL ONCE
Refills: 0 | Status: COMPLETED | OUTPATIENT
Start: 2020-08-31 | End: 2020-08-31

## 2020-08-31 RX ORDER — CHOLECALCIFEROL (VITAMIN D3) 125 MCG
50000 CAPSULE ORAL
Qty: 0 | Refills: 0 | DISCHARGE

## 2020-08-31 RX ORDER — AMLODIPINE BESYLATE 2.5 MG/1
1 TABLET ORAL
Qty: 0 | Refills: 0 | DISCHARGE

## 2020-08-31 RX ORDER — ONDANSETRON 8 MG/1
4 TABLET, FILM COATED ORAL ONCE
Refills: 0 | Status: COMPLETED | OUTPATIENT
Start: 2020-08-31 | End: 2020-08-31

## 2020-08-31 RX ORDER — ACETAMINOPHEN 500 MG
650 TABLET ORAL ONCE
Refills: 0 | Status: COMPLETED | OUTPATIENT
Start: 2020-08-31 | End: 2020-08-31

## 2020-08-31 RX ORDER — SODIUM CHLORIDE 9 MG/ML
500 INJECTION, SOLUTION INTRAVENOUS ONCE
Refills: 0 | Status: COMPLETED | OUTPATIENT
Start: 2020-08-31 | End: 2020-08-31

## 2020-08-31 RX ORDER — CIPROFLOXACIN LACTATE 400MG/40ML
400 VIAL (ML) INTRAVENOUS ONCE
Refills: 0 | Status: COMPLETED | OUTPATIENT
Start: 2020-08-31 | End: 2020-08-31

## 2020-08-31 RX ORDER — CIPROFLOXACIN LACTATE 400MG/40ML
VIAL (ML) INTRAVENOUS
Refills: 0 | Status: DISCONTINUED | OUTPATIENT
Start: 2020-08-31 | End: 2020-08-31

## 2020-08-31 RX ORDER — METRONIDAZOLE 500 MG
TABLET ORAL
Refills: 0 | Status: DISCONTINUED | OUTPATIENT
Start: 2020-08-31 | End: 2020-08-31

## 2020-08-31 RX ADMIN — Medication 650 MILLIGRAM(S): at 06:48

## 2020-08-31 RX ADMIN — Medication 200 MILLIGRAM(S): at 02:42

## 2020-08-31 RX ADMIN — Medication 100 MILLIGRAM(S): at 02:43

## 2020-08-31 RX ADMIN — SODIUM CHLORIDE 100 MILLILITER(S): 9 INJECTION, SOLUTION INTRAVENOUS at 06:33

## 2020-08-31 RX ADMIN — Medication 650 MILLIGRAM(S): at 05:59

## 2020-08-31 RX ADMIN — Medication 100 MILLIGRAM(S): at 10:03

## 2020-08-31 RX ADMIN — ONDANSETRON 4 MILLIGRAM(S): 8 TABLET, FILM COATED ORAL at 06:33

## 2020-08-31 NOTE — H&P ADULT - ATTENDING COMMENTS
Patient was previously unknown to me. Patient was assigned to me by hospitalist in charge. My involvement in this case consisted only of the initial history, physical and management plan. Primary medicine day team to assume care in AM and thereafter. Case discussed in detail with overnight medicine NP/PA Adrienne [phone]

## 2020-08-31 NOTE — H&P ADULT - ASSESSMENT
51 yo woman with PMH of HTN, HLD, hypothyroidism, IBS, diverticulitis, presents from urgent care in setting of LLQ pain and suprapubic abdominal pain found with diverticulitis

## 2020-08-31 NOTE — PROGRESS NOTE ADULT - SUBJECTIVE AND OBJECTIVE BOX
Patient is a 50y old  Female who presents with a chief complaint of abdominal pain and chest pain (31 Aug 2020 10:15)      SUBJECTIVE / OVERNIGHT EVENTS: No overnight events. tolerating liquid diet. Abd pain improving. Denies f/c. Last BM yesterday was normal. no blood   No current cp but has been getting intermittent chest tightness at home, not exertional, no real pattern. denies concomitant sob, palpitations, diaphoresis.     Tele reviewed: sinus       ADDITIONAL REVIEW OF SYSTEMS: Negative except for above    MEDICATIONS  (STANDING):  ciprofloxacin   IVPB 400 milliGRAM(s) IV Intermittent every 12 hours  ciprofloxacin   IVPB      metroNIDAZOLE  IVPB 500 milliGRAM(s) IV Intermittent every 8 hours  metroNIDAZOLE  IVPB        MEDICATIONS  (PRN):      CAPILLARY BLOOD GLUCOSE        I&O's Summary      PHYSICAL EXAM:  Vital Signs Last 24 Hrs  T(C): 37 (31 Aug 2020 11:48), Max: 37.2 (30 Aug 2020 20:03)  T(F): 98.6 (31 Aug 2020 11:48), Max: 98.9 (30 Aug 2020 20:03)  HR: 65 (31 Aug 2020 11:48) (65 - 93)  BP: 123/85 (31 Aug 2020 11:48) (108/71 - 143/80)  BP(mean): --  RR: 19 (31 Aug 2020 11:48) (12 - 20)  SpO2: 99% (31 Aug 2020 11:48) (96% - 100%)    PHYSICAL EXAM:  GENERAL: NAD, well-developed  EYES:  conjunctiva and sclera clear  NECK: Supple, No JVD  CHEST/LUNG: Clear to auscultation bilaterally; No wheeze  HEART: Regular rate and rhythm; No murmurs, rubs, or gallops  ABDOMEN: Soft, Nontender, Nondistended; Bowel sounds present  EXTREMITIES:  2+ Peripheral Pulses, No clubbing, cyanosis, or edema  PSYCH: AAOx3, +anxious  NEUROLOGY: non-focal  SKIN: No rashes or lesions      LABS:                        11.6   6.45  )-----------( 283      ( 31 Aug 2020 12:11 )             35.3         136  |  105  |  9   ----------------------------<  79  3.7   |  18<L>  |  0.65    Ca    8.6      31 Aug 2020 05:57  Phos  2.5     -  Mg     2.2     -    TPro  7.1  /  Alb  4.3  /  TBili  0.6  /  DBili  x   /  AST  17  /  ALT  15  /  AlkPhos  41            Urinalysis Basic - ( 30 Aug 2020 18:54 )    Color: Yellow / Appearance: Clear / S.037 / pH: x  Gluc: x / Ketone: Large  / Bili: Negative / Urobili: 2 mg/dL   Blood: x / Protein: 30 mg/dL / Nitrite: Negative   Leuk Esterase: Small / RBC: 4 /hpf / WBC 17 /HPF   Sq Epi: x / Non Sq Epi: 8 /hpf / Bacteria: Negative          RADIOLOGY & ADDITIONAL TESTS:    Imaging Personally Reviewed:    Electrocardiogram Personally Reviewed:    COORDINATION OF CARE:  Care Discussed with Consultants/Other Providers [Y/N]:  Prior or Outpatient Records Reviewed [Y/N]:
HPI: 51 yo F with a PMH significant for IBS, hypothyroid, HTN, HLD, and a strong family history of heart disease presenting with chest discomfort. The patient was seen for similar pain last year, follows with Dr. Castaneda. Initially presented to outside provider for suprapubic discomfort which she believes is due to a UTI, described ongoing chest pain, EKG done - reportedly abnormal, discussed w/ coverage for outpatient cardiologist and referred to ED for further management. The patient describes her pain as moderate in intensity, usually lasting for 5 minutes, not associated with exertion, occurs randomly approx. once per week. Also describes intermittent palpitations, usually notices when she is resting especially at night, occasionally associated with dizziness. Describes exercise intolerance as well, difficulty ambulating up a flight of stairs over the past few months, limited by fatigue and shortness of breath which she attributes to deconditioning. Denies syncope, orthopnea, PND, LE edema, no f/c n/v.     ===========================  Interval Events  - No reported worsening CP/Palps/SOB     -   - No reported worsening CP/Palps/SOB\  ===========================      PMHx:   Hypertension  Sleep apnea  Hypothyroid  Diverticulitis  LBP (Low Back Pain)  History of Nasal Septoplasty  h/o Heart Palpitations  Hyperlipidemia on no meds now  Irritable Bowel Syndrome  Stress Incontinence    PSHx:   H/O vaginal surgery  h/o dental implant  History of Colonoscopy  h/o  Endoscopy  C Section - x 1 - 1994    Allergies:  iodine (Hives)  Zosyn (Urticaria)    Current Medications:   ciprofloxacin   IVPB 400 milliGRAM(s) IV Intermittent once  metroNIDAZOLE  IVPB 500 milliGRAM(s) IV Intermittent once    FAMILY HISTORY:  Family history of early CAD  Family history of colon cancer (Grandparent)    Physical Exam:  T(F): 98.9 (08-30), Max: 98.9 (08-30)  HR: 90 (08-30) (88 - 93)  BP: 123/77 (08-30) (123/77 - 143/80)  RR: 16 (08-30)  SpO2: 96% (08-30)  GENERAL: No acute distress, well appearing   CHEST/LUNG: Clear to auscultation bilaterally; No wheeze, equal breath sounds bilaterally   HEART: Regular rate and rhythm; No murmurs, rubs, or gallops  ABDOMEN: Soft, Nontender, Nondistended; Bowel sounds present  EXTREMITIES:  No clubbing, cyanosis. Trace edema   NEUROLOGY: AAOx3, non-focal    Cardiovascular Diagnostic Testing:    ECG: NSR, new inferolateral TWI     Cardiac CT 2019: angiographically normal coronaries, coronary ca score zero    Stress Testing: IMPRESSIONS:Normal Scan, Abnormal ECG Study  * Exercise capacity: 10 METS, Excellent for age and  gender.  * Chest Pain: No chest pain with exercise.  * Symptom: Fatigue, shortness of breath.  * HR Response: Appropriate.  * BP Response: Hypertensive response (excessive increase  in systolic/diastolic BP) 200/90.  * Heart Rhythm: Sinus Rhythm - 70 BPM.  * Baseline ECG: ST-T wave abnormalities in leads III, aVF,  and V3-V6 at baseline.  * ECG Changes: ST Depression: 1.5 mm downsloping in leads  V3-V6 and 1 mm horizontal in leads II, III, aVF started at  6:00 min of exercise at HR of 141 bpm and persisted 11:00  min into recovery. These changes had mostly returned to  baseline by 11 min in recovery.  * Arrhythmia: None.  * Review of raw data shows: Breast attenuation artifact.  * The left ventricle was normal in size. There are  medium-sized, mild to moderate defects in the distal  anterior, distal anteroseptal, apical, and lateral walls  that are mostly fixed, predominantly correct with prone  imaging, and demonstrate normal wall motion suggestive of  attenuation artifact.  * Post-stress gated wall motion analysis was performed  (LVEF = 67 %;LVEDV = 68 ml.), revealing normal LV  function.    Imaging:    CXR: unremarkable     CT abdomen pending     Labs: Personally reviewed 8-31                        12.3   10.03 )-----------( 302      ( 30 Aug 2020 17:42 )             37.1     08-30    137  |  103  |  14  ----------------------------<  78  3.6   |  20<L>  |  0.69    Ca    9.5      30 Aug 2020 17:42    TPro  7.1  /  Alb  4.3  /  TBili  0.6  /  DBili  x   /  AST  17  /  ALT  15  /  AlkPhos  41  08-30    CARDIAC MARKERS ( 30 Aug 2020 17:42 )  <6 ng/L / x     / x     / x     / x     / x          51 yo F with a PMH significant for IBS, hypothyroid, HTN, HLD, and a strong family history of heart disease presenting with atypical chest discomfort and nonspecific EKG changes, first troponin negative. Given negative stress imaging and coronary calcium score of zero within last year, probability of coronary disease is low.    - presented for uti at urgent care and not for CP; mentioned it as a side point  - gained 20 lbs recently  - recent unremarkable cta and negative stress  - reassured  - f/u outpatient

## 2020-08-31 NOTE — DISCHARGE NOTE PROVIDER - NSDCFUADDAPPT_GEN_ALL_CORE_FT
Follow up with GI in 1-2 weeks. Follow up with GI in 1-2 weeks.  Follow up with Dr. Castaneda in 1-2 weeks for possible stress test.

## 2020-08-31 NOTE — DISCHARGE NOTE NURSING/CASE MANAGEMENT/SOCIAL WORK - PATIENT PORTAL LINK FT
You can access the FollowMyHealth Patient Portal offered by [facility] by registering at the following website: [email or web address]. By joining [facility]’s FollowMyHealth portal, you will also be able to view your health information using other applications (apps) compatible with our system.

## 2020-08-31 NOTE — H&P ADULT - PROBLEM SELECTOR PLAN 1
EKG with nonspecific changes. Strong family history of heart disease  Cardiology consult reviewed:  -trend cardiac enzymes   -monitor on telemetry for arrythmia in setting of palpitations at night, can consider long term rhythm monitoring at discharge   -TTE in am to asses LV function given worsening exercise intolerance

## 2020-08-31 NOTE — CONSULT NOTE ADULT - ASSESSMENT
49 yo woman with PMH of HTN, HLD, hypothyroidism, IBS, diverticulitis, presents from urgent care in setting of LLQ pain and suprapubic abdominal pain.  Found on CT with uncomplicated sigmoid diverticulitis - clinically improving on IV Cipro and Flagyl    Last colonoscopy 1/2020 +hemorrhoids and sigmoids, descending colon diverticulosis    Clinically non toxic appearing with minimal suprapubic tenderness and no leukocytosis    RECS:  -Continue IV Cipro and Flagyl  -Trial clear liquids. If tolerated then can advance to LRD  -Will clarify allergy status (reported urticaria reaction to Zosyn) given pt very reluctant to get discharged on PO Cipro & Flagyl (severe nausea and vomiting on PO regimen during 9/2019 diverticulitis episode).  Pt requesting PO Augmentin (appropriate for coverage/diverticulitis rx), but will need to clarify allergy status.    Cardiac work-up per primary team    Discussed with pt, all questions answered  Discussed with Medicine NP    Thank you for the courtesy of this consult.    Arnulfo Canela PA-C    [facility]  [phone]  After hours and weekend coverage [phone]

## 2020-08-31 NOTE — H&P ADULT - HISTORY OF PRESENT ILLNESS
49 yo woman with PMH of HTN, HLD, hypothyroidism, IBS, diverticulitis, presents from urgent care in setting of LLQ pain and suprapubic abdominal pain. Patient states in the past 2-3 days she was feeling pressure around the suprapubic region with symptoms of urinary hesitancy. Patient noticed also that her BM was less frequent (typically patient has loose BM on a daily basis). This prompted the patient to go to UC to get checked out. Denies associated fevers, chills or sweats.  She also mentioned that in the past 2-3 weeks she's been experiencing intermittent chest pain. Describes that it is located in the middle of the chest described as a dull, achy sensation. Some radiation to the back or left shoulder. No associated shortness of breath. Sometimes she feels palpitation in the middle of the night. No orthopnea, PND, or LE edema. CP is not related to exertion. Occurs at rest and usually lasts for a few minutes. 49 yo woman with PMH of HTN, HLD, hypothyroidism, IBS, diverticulitis, presents from urgent care in setting of LLQ pain and suprapubic abdominal pain. Patient states in the past 2-3 days she was feeling pressure around the suprapubic region with symptoms of urinary hesitancy. Patient noticed also that her BM was less frequent (typically patient has loose BM on a daily basis). This prompted the patient to go to  to get checked out. Denies associated fevers, chills or sweats.  She also mentioned that in the past 2-3 weeks she's been experiencing intermittent chest pain. Describes that it is located in the middle of the chest described as a dull, achy sensation. Some radiation to the back or left shoulder. No associated shortness of breath. Sometimes she feels palpitation in the middle of the night. No orthopnea, PND, or LE edema. CP is not related to exertion. Occurs at rest and usually lasts for a few minutes. In the past year: had a nuclear stress test-reported normal and was found to have a coronary calcium score of zero.

## 2020-08-31 NOTE — H&P ADULT - NSHPPHYSICALEXAM_GEN_ALL_CORE
Vital Signs Last 24 Hrs  T(C): 36.8 (31 Aug 2020 00:53), Max: 37.2 (30 Aug 2020 20:03)  T(F): 98.2 (31 Aug 2020 00:53), Max: 98.9 (30 Aug 2020 20:03)  HR: 77 (31 Aug 2020 00:53) (77 - 93)  BP: 135/77 (31 Aug 2020 00:53) (117/66 - 143/80)  BP(mean): --  RR: 18 (31 Aug 2020 00:53) (12 - 20)  SpO2: 99% (31 Aug 2020 00:53) (96% - 100%) PHYSICAL EXAM:  GENERAL: NAD, well-groomed, well-developed  HEAD:  Atraumatic, Normocephalic  EYES: EOMI, PERRLA, conjunctiva and sclera clear  NECK: Supple, No JVD  NERVOUS SYSTEM:  Alert & Oriented X3, Good concentration; Motor Strength 5/5 B/L upper and lower extremities;  CHEST/LUNG: Clear to percussion bilaterally; No rales, rhonchi, or wheezing  HEART: Regular rate and rhythm +S1 S2; No murmurs, rubs, or gallops  ABDOMEN: Soft, Nontender, Nondistended; Bowel sounds present  EXTREMITIES:  2+ radial and PT pulse B/L, No clubbing, cyanosis, or edema  SKIN: No rashes or lesions Vital Signs Last 24 Hrs  T(C): 36.8 (31 Aug 2020 00:53), Max: 37.2 (30 Aug 2020 20:03)  T(F): 98.2 (31 Aug 2020 00:53), Max: 98.9 (30 Aug 2020 20:03)  HR: 77 (31 Aug 2020 00:53) (77 - 93)  BP: 135/77 (31 Aug 2020 00:53) (117/66 - 143/80)  BP(mean): --  RR: 18 (31 Aug 2020 00:53) (12 - 20)  SpO2: 99% (31 Aug 2020 00:53) (96% - 100%)    PHYSICAL EXAM:  GENERAL: NAD, well-groomed, well-developed  HEAD:  Atraumatic, Normocephalic  EYES: EOMI, PERRLA, conjunctiva and sclera clear  NECK: Supple, No JVD  NERVOUS SYSTEM:  Alert & Oriented X3, Good concentration; Motor Strength 5/5 B/L upper and lower extremities;  CHEST/LUNG: Clear to percussion bilaterally; No rales, rhonchi, or wheezing  HEART: Regular rate and rhythm +S1 S2; No murmurs, rubs, or gallops  ABDOMEN: Soft, Nontender, Nondistended; Bowel sounds present  EXTREMITIES:  2+ radial and PT pulse B/L, No clubbing, cyanosis, or edema  SKIN: No rashes or lesions

## 2020-08-31 NOTE — DISCHARGE NOTE PROVIDER - CARE PROVIDERS DIRECT ADDRESSES
[email or web address]

## 2020-08-31 NOTE — H&P ADULT - NSICDXFAMILYHX_GEN_ALL_CORE_FT
FAMILY HISTORY:  Family history of early CAD    Grandparent  Still living? No  Family history of colon cancer, Age at diagnosis: Age Unknown FAMILY HISTORY:  Family history of early CAD, Father-45, Paternal Aunt Maternal Uncle    Grandparent  Still living? No  Family history of colon cancer, Age at diagnosis: Age Unknown

## 2020-08-31 NOTE — DISCHARGE NOTE PROVIDER - PROVIDER TOKENS
PROVIDER:[TOKEN:[[id number]]] PROVIDER:[TOKEN:[[id number]]],PROVIDER:[TOKEN:[[id number]],FOLLOWUP:[1 week],ESTABLISHEDPATIENT:[T]],PROVIDER:[TOKEN:[[id number]],FOLLOWUP:[1 week],ESTABLISHEDPATIENT:[T]]

## 2020-08-31 NOTE — DISCHARGE NOTE PROVIDER - CARE PROVIDER_API CALL
Brunner, Robert J  MEDICINE - Z AND A GASTRO  [address], [address] [address]  Phone: [phone]  Fax: [phone]  Follow Up Time: Brunner, Jaime J  MEDICINE - Z AND A GASTRO  [address], [address] [address]  Phone: [phone]  Fax: [phone]  Follow Up Time:     Faheem Macias  GASTROENTEROLOGY  [address], [address]  [place], [place] [id number]  Phone: [phone]  Fax: [phone]  Established Patient  Follow Up Time: 1 week    Jenn Castaneda  CARDIOVASCULAR DISEASE  [address] [place] [address]  Phone: [phone]  Fax: [phone]  Established Patient  Follow Up Time: 1 week Brunner, Jaime J  MEDICINE - Z AND A GASTRO  [address], [address] [address]  Phone: [phone]  Fax: [phone]  Follow Up Time:     Faheem Macias  GASTROENTEROLOGY  [address], [address]  [place], [place] [address]  Phone: [phone]  Fax: [phone]  Established Patient  Follow Up Time: 1 week    Jenn Castaneda  CARDIOVASCULAR DISEASE  [address] [place] [address]  Phone: [phone]  Fax: [phone]  Established Patient  Follow Up Time: 1 week

## 2020-08-31 NOTE — DISCHARGE NOTE NURSING/CASE MANAGEMENT/SOCIAL WORK - NSDCFUADDAPPT_GEN_ALL_CORE_FT
Follow up with GI in 1-2 weeks.  Follow up with Dr. Castaneda in 1-2 weeks for possible stress test.

## 2020-08-31 NOTE — H&P ADULT - NSHPLABSRESULTS_GEN_ALL_CORE
Personally reviewed labs and noted in detail below:     Personally reviewed EKG:    Personally reviewed CXR: no appreciable focal consolidations    < from: CT Abdomen and Pelvis No Cont (08.30.20 @ 20:54) >    FINDINGS:  LOWER CHEST: Within normal limits.    LIVER: Within normal limits.  BILE DUCTS: Normal caliber.  GALLBLADDER: Layering sludge. No wall thickening or pericholecystic fluid.  SPLEEN: Within normal limits.  PANCREAS: Within normal limits.  ADRENALS: Within normal limits.  KIDNEYS/URETERS: Within normal limits.    BLADDER: Within normal limits.  REPRODUCTIVE ORGANS: 2.1 cm right adnexal simple cyst. Uterus and left adnexa are within normal limits.    BOWEL: No bowel obstruction. There is focal wall thickening in the proximal sigmoid colon, in region of multiple diverticula, with adjacent surrounding inflammatory change. Few scattered small mesenteric lymph nodes are seen in the pericolonic fat adjacent to the sigmoid colon of indeterminate etiology. No additional bowel wall thickening. Normal appendix is seen.  PERITONEUM: Noascites. No free air or abscess.  VESSELS: Within normal limits.  RETROPERITONEUM/LYMPH NODES: No lymphadenopathy.  ABDOMINAL WALL: Within normal limits.  BONES: Grade 1 retrolisthesis of L5 on S1.    IMPRESSION:  Acute uncomplicated sigmoid diverticulitis. Follow-up colonoscopy if the appropriate clinical treatment is recommended.    < end of copied text > Personally reviewed labs and noted in detail below: HsTrop <6    Personally reviewed EKG:    Personally reviewed CXR: no appreciable focal consolidations    < from: CT Abdomen and Pelvis No Cont (08.30.20 @ 20:54) >    FINDINGS:  LOWER CHEST: Within normal limits.    LIVER: Within normal limits.  BILE DUCTS: Normal caliber.  GALLBLADDER: Layering sludge. No wall thickening or pericholecystic fluid.  SPLEEN: Within normal limits.  PANCREAS: Within normal limits.  ADRENALS: Within normal limits.  KIDNEYS/URETERS: Within normal limits.    BLADDER: Within normal limits.  REPRODUCTIVE ORGANS: 2.1 cm right adnexal simple cyst. Uterus and left adnexa are within normal limits.    BOWEL: No bowel obstruction. There is focal wall thickening in the proximal sigmoid colon, in region of multiple diverticula, with adjacent surrounding inflammatory change. Few scattered small mesenteric lymph nodes are seen in the pericolonic fat adjacent to the sigmoid colon of indeterminate etiology. No additional bowel wall thickening. Normal appendix is seen.  PERITONEUM: Noascites. No free air or abscess.  VESSELS: Within normal limits.  RETROPERITONEUM/LYMPH NODES: No lymphadenopathy.  ABDOMINAL WALL: Within normal limits.  BONES: Grade 1 retrolisthesis of L5 on S1.    IMPRESSION:  Acute uncomplicated sigmoid diverticulitis. Follow-up colonoscopy if the appropriate clinical treatment is recommended.    < end of copied text > Personally reviewed labs and noted in detail below: HsTrop <6    Personally reviewed EKG: Sinus 93 QTc 467 Twave Inv inferolateral leads    Personally reviewed CXR: no appreciable focal consolidations    < from: CT Abdomen and Pelvis No Cont (08.30.20 @ 20:54) >    FINDINGS:  LOWER CHEST: Within normal limits.    LIVER: Within normal limits.  BILE DUCTS: Normal caliber.  GALLBLADDER: Layering sludge. No wall thickening or pericholecystic fluid.  SPLEEN: Within normal limits.  PANCREAS: Within normal limits.  ADRENALS: Within normal limits.  KIDNEYS/URETERS: Within normal limits.    BLADDER: Within normal limits.  REPRODUCTIVE ORGANS: 2.1 cm right adnexal simple cyst. Uterus and left adnexa are within normal limits.    BOWEL: No bowel obstruction. There is focal wall thickening in the proximal sigmoid colon, in region of multiple diverticula, with adjacent surrounding inflammatory change. Few scattered small mesenteric lymph nodes are seen in the pericolonic fat adjacent to the sigmoid colon of indeterminate etiology. No additional bowel wall thickening. Normal appendix is seen.  PERITONEUM: Noascites. No free air or abscess.  VESSELS: Within normal limits.  RETROPERITONEUM/LYMPH NODES: No lymphadenopathy.  ABDOMINAL WALL: Within normal limits.  BONES: Grade 1 retrolisthesis of L5 on S1.    IMPRESSION:  Acute uncomplicated sigmoid diverticulitis. Follow-up colonoscopy if the appropriate clinical treatment is recommended.    < end of copied text >

## 2020-08-31 NOTE — CONSULT NOTE ADULT - SUBJECTIVE AND OBJECTIVE BOX
Patient is a 50y old  Female who presents with a chief complaint of abdominal pain and chest pain (31 Aug 2020 00:58)      HPI:  51 yo woman with PMH of HTN, HLD, hypothyroidism, IBS, diverticulitis, presents from urgent care in setting of LLQ pain and suprapubic abdominal pain. Patient states in the past 2-3 days she was feeling pressure around the suprapubic region with symptoms of urinary hesitancy. Patient noticed also that her BM was less frequent (typically patient has loose BM on a daily basis). This prompted the patient to go to  to get checked out. Denies associated fevers, chills or sweats.  She also mentioned that in the past 2-3 weeks she's been experiencing intermittent chest pain. Describes that it is located in the middle of the chest described as a dull, achy sensation. Some radiation to the back or left shoulder. No associated shortness of breath. Sometimes she feels palpitation in the middle of the night. No orthopnea, PND, or LE edema. CP is not related to exertion. Occurs at rest and usually lasts for a few minutes. In the past year: had a nuclear stress test-reported normal and was found to have a coronary calcium score of zero. (31 Aug 2020 00:58)    Last Colonoscopy - 2020 : Internal hemorrhoids, Sigmoid and Descending colon diverticulosis  Pt reports abdominal pain primarily LLQ/suprapubic region- no rectal bleeding.  +BMs but decreased from baseline.  Today feeling better after receiving IV Abx. Similar presentation 2019 - also with uncompliacted sigmoid diverticulitis.  Did well with IV Cipro and Flagyl but not able to tolerate PO Cipro/Flagyl combination and required readmission in 2019.  Had follow up colonoscopy (results below).     Currently feeling better and would like to attempt PO, hoping for d/c today if possible as per pt    PAST MEDICAL & SURGICAL HISTORY:  Hypertension  Sleep apnea: nasal mask- settings unknown  Hypothyroid  Diverticulitis: last hospitalized 9.19  h/o Heart Palpitations  Irritable Bowel Syndrome  H/O vaginal surgery: vaginal mesh  h/o dental implant  History of Colonoscopy  h/o  Endoscopy  C Section - x  -       Allergies  iodine (Hives)  Zosyn (Urticaria)        MEDICATIONS  (STANDING):  ciprofloxacin   IVPB 400 milliGRAM(s) IV Intermittent every 12 hours  ciprofloxacin   IVPB      metroNIDAZOLE  IVPB 500 milliGRAM(s) IV Intermittent every 8 hours  metroNIDAZOLE  IVPB        MEDICATIONS  (PRN):      Social History:    Marital Status:  ( X  )    (   ) Single    (   )    (  )   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  Lives with: (  ) alone  ( X ) children   ( X ) spouse   (  ) parents  (  ) other    no ETOH or tobacco    Family History   IBD (  ) Yes   ( X ) No  GI Malignancy (  )  Yes    ( X ) No    Health Management  Last Colonoscopy - 2020 : Internal hemorrhoids, Sigmoid and Descending colon diverticulosis      Advanced Directives: ( X    ) None    (      ) DNR    (     ) DNI    (     ) Health Care Proxy:     Review of Systems:    General:  No wt loss, fevers, chills, night sweats, fatigue  CV:  see HPI - currently without CP  Resp:  No dyspnea, cough, tachypnea, wheezing  GI: see HPI  :  No pain, bleeding, incontinence, nocturia  Muscle:  No pain, weakness  Neuro:  No weakness, tingling, memory problems  Psych:  No fatigue, insomnia, mood problems, depression  Endocrine:  No polyuria, polydypsia, cold/heat intolerance  Heme:  No petechiae, ecchymosis, easy bruisability  Skin:  No rash, tattoos, scars, edema      Vital Signs Last 24 Hrs  T(C): 36.9 (31 Aug 2020 06:18), Max: 37.2 (30 Aug 2020 20:03)  T(F): 98.5 (31 Aug 2020 06:18), Max: 98.9 (30 Aug 2020 20:03)  HR: 71 (31 Aug 2020 06:18) (71 - 93)  BP: 108/71 (31 Aug 2020 06:18) (108/71 - 143/80)  BP(mean): --  RR: 18 (31 Aug 2020 06:18) (12 - 20)  SpO2: 97% (31 Aug 2020 06:18) (96% - 100%)    PHYSICAL EXAM:    Constitutional: NAD, well-developed pleasant non toxic appearing female  Neck: No LAD, supple  Respiratory: Clear anteriorly  Cardiovascular: S1 and S2, RRR NT to palpation  Gastrointestinal: BS+, soft, minimal tenderness to deep palpation in suprapubic region, no rebound, guarding or rigidity  Extremities: No peripheral edema, neg clubbing, cyanosis  Vascular: 2+ peripheral pulses  Neurological: A/O x 3, no focal deficits  Psychiatric: Normal mood, normal affect  Skin: No rashes        LABS:                        11.1   6.63  )-----------( 273      ( 31 Aug 2020 05:57 )             34.2         136  |  105  |  9   ----------------------------<  79  3.7   |  18<L>  |  0.65    Ca    8.6      31 Aug 2020 05:57  Phos  2.5       Mg     2.2         TPro  7.1  /  Alb  4.3  /  TBili  0.6  /  DBili  x   /  AST  17  /  ALT  15  /  AlkPhos  41        Urinalysis Basic - ( 30 Aug 2020 18:54 )    Color: Yellow / Appearance: Clear / S.037 / pH: x  Gluc: x / Ketone: Large  / Bili: Negative / Urobili: 2 mg/dL   Blood: x / Protein: 30 mg/dL / Nitrite: Negative   Leuk Esterase: Small / RBC: 4 /hpf / WBC 17 /HPF   Sq Epi: x / Non Sq Epi: 8 /hpf / Bacteria: Negative      COVID-19 PCR . (20 @ 17:42)    COVID-19 PCR: NotDetec      RADIOLOGY & ADDITIONAL TESTS:  < from: CT Abdomen and Pelvis No Cont (20 @ 20:54) >  PROCEDURE:  CT of the Abdomen and Pelvis was performed without intravenous contrast.  Intravenous contrast: None.  Oral contrast: None.  Sagittal and coronal reformats were performed.    FINDINGS:  LOWER CHEST: Within normal limits.    LIVER: Within normal limits.  BILE DUCTS: Normal caliber.  GALLBLADDER: Layering sludge. No wall thickening or pericholecystic fluid.  SPLEEN: Within normal limits.  PANCREAS: Within normal limits.  ADRENALS: Within normal limits.  KIDNEYS/URETERS: Within normal limits.    BLADDER: Within normal limits.  REPRODUCTIVE ORGANS: 2.1 cm right adnexal simple cyst. Uterus and left adnexa are within normal limits.    BOWEL: No bowel obstruction. There is focal wall thickening in the proximal sigmoid colon, in region of multiple diverticula, with adjacent surrounding inflammatory change. Few scattered small mesenteric lymph nodes are seen in the pericolonic fat adjacent to the sigmoid colon of indeterminate etiology. No additional bowel wall thickening. Normal appendix is seen.  PERITONEUM: Noascites. No free air or abscess.  VESSELS: Within normal limits.  RETROPERITONEUM/LYMPH NODES: No lymphadenopathy.  ABDOMINAL WALL: Within normal limits.  BONES: Grade 1 retrolisthesis of L5 on S1.    IMPRESSION:  Acute uncomplicated sigmoid diverticulitis. Follow-up colonoscopy if the appropriate clinical treatment is recommended.
Patient seen and evaluated at bedside    Chief Complaint: Chest Pain     HPI: 49 yo F with a PMH significant for IBS, hypothyroid, HTN, HLD, and a strong family history of heart disease presenting with chest discomfort. The patient was seen for similar pain last year, follows with Dr. Castaneda. Initially presented to outside provider for suprapubic discomfort which she believes is due to a UTI, described ongoing chest pain, EKG done - reportedly abnormal, discussed w/ coverage for outpatient cardiologist and referred to ED for further management. The patient describes her pain as moderate in intensity, usually lasting for 5 minutes, not associated with exertion, occurs randomly approx. once per week. Also describes intermittent palpitations, usually notices when she is resting especially at night, occasionally associated with dizziness. Describes exercise intolerance as well, difficulty ambulating up a flight of stairs over the past few months, limited by fatigue and shortness of breath which she attributes to deconditioning. Denies syncope, orthopnea, PND, LE edema, no f/c n/v.     PMHx:   Hypertension  Sleep apnea  Hypothyroid  Diverticulitis  LBP (Low Back Pain)  History of Nasal Septoplasty  h/o Heart Palpitations  Hyperlipidemia on no meds now  Irritable Bowel Syndrome  Stress Incontinence    PSHx:   H/O vaginal surgery  h/o dental implant  History of Colonoscopy  h/o  Endoscopy  C Section - x 1 - 1994    Allergies:  iodine (Hives)  Zosyn (Urticaria)    Current Medications:   ciprofloxacin   IVPB 400 milliGRAM(s) IV Intermittent once  metroNIDAZOLE  IVPB 500 milliGRAM(s) IV Intermittent once    FAMILY HISTORY:  Family history of early CAD  Family history of colon cancer (Grandparent)    Physical Exam:  T(F): 98.9 (08-30), Max: 98.9 (08-30)  HR: 90 (08-30) (88 - 93)  BP: 123/77 (08-30) (123/77 - 143/80)  RR: 16 (08-30)  SpO2: 96% (08-30)  GENERAL: No acute distress, well appearing   CHEST/LUNG: Clear to auscultation bilaterally; No wheeze, equal breath sounds bilaterally   HEART: Regular rate and rhythm; No murmurs, rubs, or gallops  ABDOMEN: Soft, Nontender, Nondistended; Bowel sounds present  EXTREMITIES:  No clubbing, cyanosis. Trace edema   NEUROLOGY: AAOx3, non-focal    Cardiovascular Diagnostic Testing:    ECG: NSR, new inferolateral TWI     Cardiac CT 2019: angiographically normal coronaries, coronary ca score zero    Stress Testing: IMPRESSIONS:Normal Scan, Abnormal ECG Study  * Exercise capacity: 10 METS, Excellent for age and  gender.  * Chest Pain: No chest pain with exercise.  * Symptom: Fatigue, shortness of breath.  * HR Response: Appropriate.  * BP Response: Hypertensive response (excessive increase  in systolic/diastolic BP) 200/90.  * Heart Rhythm: Sinus Rhythm - 70 BPM.  * Baseline ECG: ST-T wave abnormalities in leads III, aVF,  and V3-V6 at baseline.  * ECG Changes: ST Depression: 1.5 mm downsloping in leads  V3-V6 and 1 mm horizontal in leads II, III, aVF started at  6:00 min of exercise at HR of 141 bpm and persisted 11:00  min into recovery. These changes had mostly returned to  baseline by 11 min in recovery.  * Arrhythmia: None.  * Review of raw data shows: Breast attenuation artifact.  * The left ventricle was normal in size. There are  medium-sized, mild to moderate defects in the distal  anterior, distal anteroseptal, apical, and lateral walls  that are mostly fixed, predominantly correct with prone  imaging, and demonstrate normal wall motion suggestive of  attenuation artifact.  * Post-stress gated wall motion analysis was performed  (LVEF = 67 %;LVEDV = 68 ml.), revealing normal LV  function.    Imaging:    CXR: unremarkable     CT abdomen pending     Labs: Personally reviewed                        12.3   10.03 )-----------( 302      ( 30 Aug 2020 17:42 )             37.1     08-30    137  |  103  |  14  ----------------------------<  78  3.6   |  20<L>  |  0.69    Ca    9.5      30 Aug 2020 17:42    TPro  7.1  /  Alb  4.3  /  TBili  0.6  /  DBili  x   /  AST  17  /  ALT  15  /  AlkPhos  41  08-30    CARDIAC MARKERS ( 30 Aug 2020 17:42 )  <6 ng/L / x     / x     / x     / x     / x          49 yo F with a PMH significant for IBS, hypothyroid, HTN, HLD, and a strong family history of heart disease presenting with atypical chest discomfort and nonspecific EKG changes, first troponin negative. Given negative stress imaging and coronary calcium score of zero within last year, probability of coronary disease is low however will monitor overnight and trend cardiac enzymes. Also reporting intermittent palpitations with dizziness as well as fatigue and SOB with exertion. Currently appears relatively euvolemic.     - trend cardiac enzymes   - monitor on telemetry for e/o arrhytmia, can consider long term rhythm monitoring at discharge   - does not currently appear to be fluid overloaded, can consider TTE in am to asses LV function given worsening exercise intolerance   - can hold off on antiplatelets for now   - lipids and A1c for risk stratification
no shortness of breath

## 2020-08-31 NOTE — DISCHARGE NOTE PROVIDER - HOSPITAL COURSE
51 yo woman with PMH of HTN, HLD, hypothyroidism, IBS, diverticulitis, presents from urgent care in setting of LLQ pain and suprapubic abdominal pain.  Found on CT with uncomplicated sigmoid diverticulitis - clinically improving on IV Cipro and Flagyl        Last colonoscopy 1/2020 +hemorrhoids and sigmoids, descending colon diverticulosis        Clinically non toxic appearing with minimal suprapubic tenderness and no leukocytosis        -Continue IV Cipro and Flagyl    -Trial clear liquids. If tolerated then can advance to LRD    -Will clarify allergy status (reported urticaria reaction to Zosyn) given pt very reluctant to get discharged on PO Cipro & Flagyl (severe nausea and vomiting on PO regimen during 9/2019 diverticulitis episode).  Pt requesting PO Augmentin (appropriate for coverage/diverticulitis rx), but will need to clarify allergy status.        trend cardiac enzymes     - monitor on telemetry for e/o arrhytmia, can consider long term rhythm monitoring at discharge     - does not currently appear to be fluid overloaded, can consider TTE in am to asses LV function given worsening exercise intolerance     - can hold off on antiplatelets for now     - lipids and A1c for risk stratification 51 yo woman with PMH of HTN, HLD, hypothyroidism, IBS, diverticulitis, presents from urgent care in setting of LLQ pain and suprapubic abdominal pain.  Found on CT with uncomplicated sigmoid diverticulitis - clinically improving on IV Cipro and Flagyl.  Last colonoscopy 1/2020 +hemorrhoids and sigmoids, descending colon diverticulosis    Clinically non toxic appearing with minimal suprapubic tenderness and no leukocytosis    -Continue IV Cipro and Flagyl, Trial clear liquids. If tolerated then can advance to LRD    reported urticaria reaction to Zosyn given pt very reluctant to get discharged on PO Cipro & Flagyl (severe nausea and vomiting on PO regimen during 9/2019 diverticulitis episode).  Pt requesting PO Augmentin (appropriate for coverage/diverticulitis rx), pt reports she's taken Augmentin in the past without an allergic reaction.      trend cardiac enzymes, monitor on telemetry for e/o arrythmia, can consider long term rhythm monitoring at discharge     Echo... 51 yo woman with PMH of HTN, HLD, hypothyroidism, IBS, diverticulitis, presents from urgent care in setting of LLQ pain and suprapubic abdominal pain found with diverticulitis also with chest pain.         Problem/Plan - 1:    ·  Problem: Chest pain.  Plan: Strong family history of heart disease. CP not specific. Cardiac enzymes negative ACS ruled out. Seems anxiety related    -nuclear stress 2/2019 with fixed unchanged defects    -CT coronaries 3/2019 also without significant findings    -TTE pending    -f/u further cardio recs.          Problem/Plan - 2:    ·  Problem: Diverticulitis.  Plan: Acute uncomplicated sigmoid diverticulitis on CT    -tolerating CLD, advanced to LRD per GI    -c/w IV Cipro and Flagyl     -per GI can discharge on augmetin 875mg bid x10 days as had severe n/v with po cipro/flagyl. She took augmentin earlier this year and tolerated well, does not report allergy to this.          Problem/Plan - 3:    ·  Problem: R/O UTI (urinary tract infection).  Plan: UA weakly suggestive of UTI however no symptoms, likely contaminated    F/U Urine culture.          Problem/Plan - 4:    ·  Problem: Prophylactic measure.  Plan: DVT ppx: IMPROVE Score= 0    Dispo: home later today if cleared by cards and tolerating LRD. Will need outpatient cards, GI, PCP followup lyly 1 week    spent 49 min on discharge process time. 49 yo woman with PMH of HTN, HLD, hypothyroidism, IBS, diverticulitis, presents from urgent care in setting of LLQ pain and suprapubic abdominal pain found with diverticulitis also with chest pain.         Problem/Plan - 1:    ·  Problem: Chest pain.  Plan: Strong family history of heart disease. CP not specific. Cardiac enzymes negative ACS ruled out. Seems anxiety related    -nuclear stress 2/2019 with fixed unchanged defects    -CT coronaries 3/2019 also without significant findings    -TTE pending    -f/u further cardio recs.          Problem/Plan - 2:    ·  Problem: Diverticulitis.  Plan: Acute uncomplicated sigmoid diverticulitis on CT    -tolerating CLD, advanced to LRD per GI    -c/w IV Cipro and Flagyl     -per GI can discharge on augmetin 875mg bid x10 days as had severe n/v with po cipro/flagyl. She took augmentin earlier this year and tolerated well, does not report allergy to this.          Problem/Plan - 3:    ·  Problem: R/O UTI (urinary tract infection).  Plan: UA weakly suggestive of UTI however no symptoms, likely contaminated    F/U Urine culture.          Problem/Plan - 4:    ·  Problem: Prophylactic measure.  Plan: DVT ppx: IMPROVE Score= 0    Dispo: home later today if cleared by cards and tolerating LRD. Will need outpatient cards, GI, PCP followup lyly 1 week    spent 49 min on discharge process time.     Echo normal.  Cleared by cards for discharge with out pt cards follow up with Dr. Castaneda for possible ST. 51 yo woman with PMH of HTN, HLD, hypothyroidism, IBS, diverticulitis, presents from urgent care in setting of LLQ pain and suprapubic abdominal pain found with diverticulitis also with chest pain.         Problem/Plan - 1:    ·  Problem: Chest pain.  Plan: Strong family history of heart disease. CP not specific. Cardiac enzymes negative ACS ruled out. Seems anxiety related    -nuclear stress 2/2019 with fixed unchanged defects    -CT coronaries 3/2019 also without significant findings    -TTE wnl    -per cards stable for d/c home          Problem/Plan - 2:    ·  Problem: Diverticulitis.  Plan: Acute uncomplicated sigmoid diverticulitis on CT    -tolerating CLD, advanced to LRD per GI    -c/w IV Cipro and Flagyl     -per GI can discharge on augmetin 875mg bid x10 days as had severe n/v with po cipro/flagyl. She took augmentin earlier this year and tolerated well, does not report allergy to this.          Problem/Plan - 3:    ·  Problem: R/O UTI (urinary tract infection).  Plan: UA weakly suggestive of UTI however no symptoms, likely contaminated    F/U Urine culture.          Problem/Plan - 4:    ·  Problem: Prophylactic measure.  Plan: DVT ppx: IMPROVE Score= 0    Dispo: home later today if cleared by cards and tolerating LRD. Will need outpatient cards, GI, PCP followup lyly 1 week    spent 49 min on discharge process time.     Echo normal.  Cleared by cards for discharge with out pt cards follow up with Dr. Castaneda for possible repeat ST.

## 2020-08-31 NOTE — H&P ADULT - NSHPREVIEWOFSYSTEMS_GEN_ALL_CORE
REVIEW OF SYSTEMS:  CONSTITUTIONAL: No fever, chills or sweats.  EYES: No eye pain or discharge  ENMT: No sinus or throat pain  RESPIRATORY: No cough, wheezing, or shortness of breath  CARDIOVASCULAR: No chest pain, palpitations, dizziness, or leg swelling  GASTROINTESTINAL: No abdominal pain. No nausea, vomiting, diarrhea or constipation. No melena or hematochezia.  GENITOURINARY: No dysuria or change of frequency  NEUROLOGICAL: No headaches, loss of strength, numbness, or tremors  SKIN: No rashes or lesions   MUSCULOSKELETAL: No joint pain or swelling; No muscle, back, or extremity pain  PSYCHIATRIC: No depression or anxiety  HEME/LYMPH: No easy bruising, or bleeding gums  ALLERGY AND IMMUNOLOGIC: No reactions to medications.

## 2020-08-31 NOTE — PROGRESS NOTE ADULT - PROBLEM SELECTOR PLAN 4
DVT ppx: IMPROVE Score= 0  Dispo: home later today if cleared by cards and tolerating LRD. Will need outpatient cards, GI, PCP followup lyly 1 week  spent 49 min on discharge process time

## 2020-08-31 NOTE — PROGRESS NOTE ADULT - PROBLEM SELECTOR PLAN 1
Strong family history of heart disease. CP not specific. Cardiac enzymes negative ACS ruled out. Seems anxiety related  -nuclear stress 2/2019 with fixed unchanged defects  -CT coronaries 3/2019 also without significant findings  -TTE pending  -f/u further cardio recs

## 2020-08-31 NOTE — DISCHARGE NOTE PROVIDER - NSDCCPCAREPLAN_GEN_ALL_CORE_FT
PRINCIPAL DISCHARGE DIAGNOSIS  Diagnosis: Chest pain  Assessment and Plan of Treatment:   HOME CARE INSTRUCTIONS  For the next few days, avoid physical activities that bring on chest pain. Continue physical activities as directed.  Do not Informed pt of the various negative side effects of smoking including risk of COPD, Lung Ca etc  Strongly recommended that pt stops smoking and pt given various options of smoking cessasion tools such as NRT's and other pharmacotherapies.  Avoid drinking alcohol.   Only take over-the-counter or prescription medicine for pain, discomfort, or fever as directed by your caregiver.  Follow your caregiver's suggestions for further testing if your chest pain does not go away.  Keep any follow-up appointments you made. If you do not go to an appointment, you could develop lasting (chronic) problems with pain. If there is any problem keeping an appointment, you must call to reschedule.   SEEK MEDICAL CARE IF:  You think you are having problems from the medicine you are taking. Read your medicine instructions carefully.  Your chest pain does not go away, even after treatment.  You develop a rash with blisters on your chest.  SEEK IMMEDIATE MEDICAL CARE IF:  You have increased chest pain or pain that spreads to your arm, neck, jaw, back, or abdomen.   You develop shortness of breath, an increasing cough, or you are coughing up blood.  You have severe back or abdominal pain, feel nauseous, or vomit.  You develop severe weakness, fainting, or chills.  You have a fever.  THIS IS AN EMERGENCY. Do not wait to see if the pain will go away. Get medical help at once. Call your local emergency services (_____________________). Do not drive yourself to the hospital.      SECONDARY DISCHARGE DIAGNOSES  Diagnosis: Acute diverticulitis  Assessment and Plan of Treatment: Continue antibiotics as prescribed  Continue low residue diet. PRINCIPAL DISCHARGE DIAGNOSIS  Diagnosis: Chest pain  Assessment and Plan of Treatment:   HOME CARE INSTRUCTIONS  For the next few days, avoid physical activities that bring on chest pain. Continue physical activities as directed.  Do not Informed pt of the various negative side effects of smoking including risk of COPD, Lung Ca etc  Strongly recommended that pt stops smoking and pt given various options of smoking cessasion tools such as NRT's and other pharmacotherapies.  Avoid drinking alcohol.   Only take over-the-counter or prescription medicine for pain, discomfort, or fever as directed by your caregiver.  Follow your caregiver's suggestions for further testing if your chest pain does not go away.  Keep any follow-up appointments you made. If you do not go to an appointment, you could develop lasting (chronic) problems with pain. If there is any problem keeping an appointment, you must call to reschedule.   SEEK MEDICAL CARE IF:  You think you are having problems from the medicine you are taking. Read your medicine instructions carefully.  Your chest pain does not go away, even after treatment.  You develop a rash with blisters on your chest.  SEEK IMMEDIATE MEDICAL CARE IF:  You have increased chest pain or pain that spreads to your arm, neck, jaw, back, or abdomen.   You develop shortness of breath, an increasing cough, or you are coughing up blood.  You have severe back or abdominal pain, feel nauseous, or vomit.  You develop severe weakness, fainting, or chills.  You have a fever.  THIS IS AN EMERGENCY. Do not wait to see if the pain will go away. Get medical help at once. Call your local emergency services (_____________________). Do not drive yourself to the hospital.      SECONDARY DISCHARGE DIAGNOSES  Diagnosis: Acute diverticulitis  Assessment and Plan of Treatment: Continue antibiotics as prescribed  Continue low residue diet.  followup with GI doctor in 1 week PRINCIPAL DISCHARGE DIAGNOSIS  Diagnosis: Acute diverticulitis  Assessment and Plan of Treatment: Continue antibiotics as prescribed  Continue low residue diet.  followup with GI doctor in 1 week      SECONDARY DISCHARGE DIAGNOSES  Diagnosis: Chest pain  Assessment and Plan of Treatment: HOME CARE INSTRUCTIONS  For the next few days, avoid physical activities that bring on chest pain. Continue physical activities as directed.  Do not Informed pt of the various negative side effects of smoking including risk of COPD, Lung Ca etc  Strongly recommended that pt stops smoking and pt given various options of smoking cessasion tools such as NRT's and other pharmacotherapies.  Avoid drinking alcohol.   Only take over-the-counter or prescription medicine for pain, discomfort, or fever as directed by your caregiver.  Follow your caregiver's suggestions for further testing if your chest pain does not go away.  Keep any follow-up appointments you made. If you do not go to an appointment, you could develop lasting (chronic) problems with pain. If there is any problem keeping an appointment, you must call to reschedule.   SEEK MEDICAL CARE IF:  You think you are having problems from the medicine you are taking. Read your medicine instructions carefully.  Your chest pain does not go away, even after treatment.  You develop a rash with blisters on your chest.  SEEK IMMEDIATE MEDICAL CARE IF:  You have increased chest pain or pain that spreads to your arm, neck, jaw, back, or abdomen.   You develop shortness of breath, an increasing cough, or you are coughing up blood.  You have severe back or abdominal pain, feel nauseous, or vomit.  You develop severe weakness, fainting, or chills.  You have a fever.  THIS IS AN EMERGENCY. Do not wait to see if the pain will go away. Get medical help at once. Call your local emergency services (_____________________). Do not drive yourself to the hospital.    Diagnosis: Acute diverticulitis  Assessment and Plan of Treatment: Continue antibiotics as prescribed  Continue low residue diet.  followup with GI doctor in 1 week

## 2020-08-31 NOTE — DISCHARGE NOTE PROVIDER - NSDCFUSCHEDAPPT_GEN_ALL_CORE_FT
CATEGWARNER HAGEN ; 09/24/2020 ; [facility] [address] [facility]  CATEGWARNER HAGEN ; 10/05/2020 ; [facility] [address] [facility]  CATEGWARNER HAGEN ; 10/29/2020 ; NPP Cardio [address] CATEGWARNER HAGEN ; 09/24/2020 ; [facility] [address] [facility]  CATEGWARNER HAGEN ; 10/05/2020 ; [facility] [address] [facility]  CATEGWARNER HAGEN ; 10/29/2020 ; NPP Cardio [address] CATEGWARNER HAGEN ; 09/24/2020 ; [facility]PP [facility] [address] [facility]  CATEGWARNER HAGEN ; 10/05/2020 ; [facility] [address] [facility]  CATEGWARNER HAGEN ; 10/29/2020 ; NPP Cardio [address] CATEGWARNER HAGEN ; 09/24/2020 ; [facility] Neuro [address] [facility]  CATEGWARNER HAGEN ; 10/05/2020 ; N[facility] [address] [facility]  CATEGWARNER HAGEN ; 10/29/2020 ; NPP Cardio [address] CATEGE, WARNER ; 09/02/2020 ; NPP Cardio [address]  CATEGE, WARNER ; 09/24/2020 ; NPP Neuro [address]  CATEGE, WARNER ; 10/05/2020 ; NPP Orth[facility]urg [address]  CATEGE, WARNER ; 10/29/2020 ; NPP Cardio [address]

## 2020-08-31 NOTE — PROGRESS NOTE ADULT - PROBLEM SELECTOR PLAN 2
Acute uncomplicated sigmoid diverticulitis on CT  -tolerating CLD, advanced to LRD per GI  -c/w IV Cipro and Flagyl   -per GI can discharge on augmetin 875mg bid x10 days as had severe n/v with po cipro/flagyl. She took augmentin earlier this year and tolerated well, does not report allergy to this

## 2020-08-31 NOTE — DISCHARGE NOTE PROVIDER - NSDCMRMEDTOKEN_GEN_ALL_CORE_FT
amLODIPine 5 mg oral tablet: 1 tab(s) orally once a day  Crestor 10 mg oral tablet: 1 tab(s) orally once a day  levothyroxine 100 mcg (0.1 mg) oral tablet: 1 tab(s) orally once a day amLODIPine 5 mg oral tablet: 1 tab(s) orally once a day  Augmentin 875 mg-125 mg oral tablet: 1 tab(s) orally every 12 hours   Crestor 10 mg oral tablet: 1 tab(s) orally once a day  levothyroxine 100 mcg (0.1 mg) oral tablet: 1 tab(s) orally once a day Augmentin 875 mg-125 mg oral tablet: 1 tab(s) orally every 12 hours   Crestor 10 mg oral tablet: 1 tab(s) orally once a day  levothyroxine 100 mcg (0.1 mg) oral tablet: 1 tab(s) orally once a day

## 2020-08-31 NOTE — PROGRESS NOTE ADULT - ASSESSMENT
51 yo woman with PMH of HTN, HLD, hypothyroidism, IBS, diverticulitis, presents from urgent care in setting of LLQ pain and suprapubic abdominal pain found with diverticulitis also with chest pain.

## 2020-09-02 ENCOUNTER — APPOINTMENT (OUTPATIENT)
Dept: CARDIOLOGY | Facility: CLINIC | Age: 50
End: 2020-09-02
Payer: COMMERCIAL

## 2020-09-02 PROCEDURE — 78452 HT MUSCLE IMAGE SPECT MULT: CPT

## 2020-09-02 PROCEDURE — A9500: CPT

## 2020-09-02 PROCEDURE — 93015 CV STRESS TEST SUPVJ I&R: CPT

## 2020-09-04 ENCOUNTER — INPATIENT (INPATIENT)
Facility: HOSPITAL | Age: 50
LOS: 6 days | Discharge: HOME | DRG: 392 | End: 2020-09-11
Attending: HOSPITALIST | Admitting: HOSPITALIST
Payer: COMMERCIAL

## 2020-09-04 VITALS
SYSTOLIC BLOOD PRESSURE: 128 MMHG | OXYGEN SATURATION: 100 % | HEIGHT: 61 IN | TEMPERATURE: 99 F | WEIGHT: 190.04 LBS | DIASTOLIC BLOOD PRESSURE: 76 MMHG | HEART RATE: 93 BPM | RESPIRATION RATE: 20 BRPM

## 2020-09-04 DIAGNOSIS — Z98.890 OTHER SPECIFIED POSTPROCEDURAL STATES: Chronic | ICD-10-CM

## 2020-09-04 LAB
BASE EXCESS BLDV CALC-SCNC: 1.5 MMOL/L — SIGNIFICANT CHANGE UP (ref -2–2)
BASOPHILS # BLD AUTO: 0.03 K/UL — SIGNIFICANT CHANGE UP (ref 0–0.2)
BASOPHILS NFR BLD AUTO: 0.2 % — SIGNIFICANT CHANGE UP (ref 0–2)
CA-I SERPL-SCNC: 1.22 MMOL/L — SIGNIFICANT CHANGE UP (ref 1.12–1.3)
CHLORIDE BLDV-SCNC: 103 MMOL/L — SIGNIFICANT CHANGE UP (ref 96–108)
CO2 BLDV-SCNC: 28 MMOL/L — SIGNIFICANT CHANGE UP (ref 22–30)
EOSINOPHIL # BLD AUTO: 0.07 K/UL — SIGNIFICANT CHANGE UP (ref 0–0.5)
EOSINOPHIL NFR BLD AUTO: 0.6 % — SIGNIFICANT CHANGE UP (ref 0–6)
GAS PNL BLDV: 138 MMOL/L — SIGNIFICANT CHANGE UP (ref 135–145)
GAS PNL BLDV: SIGNIFICANT CHANGE UP
GAS PNL BLDV: SIGNIFICANT CHANGE UP
GLUCOSE BLDV-MCNC: 92 MG/DL — SIGNIFICANT CHANGE UP (ref 70–99)
HCO3 BLDV-SCNC: 27 MMOL/L — SIGNIFICANT CHANGE UP (ref 21–29)
HCT VFR BLD CALC: 35.4 % — SIGNIFICANT CHANGE UP (ref 34.5–45)
HCT VFR BLDA CALC: 37 % — LOW (ref 39–50)
HGB BLD CALC-MCNC: 12.1 G/DL — SIGNIFICANT CHANGE UP (ref 11.5–15.5)
HGB BLD-MCNC: 11.4 G/DL — LOW (ref 11.5–15.5)
IMM GRANULOCYTES NFR BLD AUTO: 1.1 % — SIGNIFICANT CHANGE UP (ref 0–1.5)
LACTATE BLDV-MCNC: 1.2 MMOL/L — SIGNIFICANT CHANGE UP (ref 0.7–2)
LYMPHOCYTES # BLD AUTO: 17.4 % — SIGNIFICANT CHANGE UP (ref 13–44)
LYMPHOCYTES # BLD AUTO: 2.13 K/UL — SIGNIFICANT CHANGE UP (ref 1–3.3)
MCHC RBC-ENTMCNC: 30.8 PG — SIGNIFICANT CHANGE UP (ref 27–34)
MCHC RBC-ENTMCNC: 32.2 GM/DL — SIGNIFICANT CHANGE UP (ref 32–36)
MCV RBC AUTO: 95.7 FL — SIGNIFICANT CHANGE UP (ref 80–100)
MONOCYTES # BLD AUTO: 1.32 K/UL — HIGH (ref 0–0.9)
MONOCYTES NFR BLD AUTO: 10.8 % — SIGNIFICANT CHANGE UP (ref 2–14)
NEUTROPHILS # BLD AUTO: 8.52 K/UL — HIGH (ref 1.8–7.4)
NEUTROPHILS NFR BLD AUTO: 69.9 % — SIGNIFICANT CHANGE UP (ref 43–77)
NRBC # BLD: 0 /100 WBCS — SIGNIFICANT CHANGE UP (ref 0–0)
PCO2 BLDV: 47 MMHG — SIGNIFICANT CHANGE UP (ref 35–50)
PH BLDV: 7.37 — SIGNIFICANT CHANGE UP (ref 7.35–7.45)
PLATELET # BLD AUTO: 297 K/UL — SIGNIFICANT CHANGE UP (ref 150–400)
PO2 BLDV: 27 MMHG — SIGNIFICANT CHANGE UP (ref 25–45)
POTASSIUM BLDV-SCNC: 3.5 MMOL/L — SIGNIFICANT CHANGE UP (ref 3.5–5.3)
RBC # BLD: 3.7 M/UL — LOW (ref 3.8–5.2)
RBC # FLD: 12.5 % — SIGNIFICANT CHANGE UP (ref 10.3–14.5)
SAO2 % BLDV: 44 % — LOW (ref 67–88)
WBC # BLD: 12.21 K/UL — HIGH (ref 3.8–10.5)
WBC # FLD AUTO: 12.21 K/UL — HIGH (ref 3.8–10.5)

## 2020-09-04 PROCEDURE — 99285 EMERGENCY DEPT VISIT HI MDM: CPT

## 2020-09-04 RX ORDER — ONDANSETRON 8 MG/1
4 TABLET, FILM COATED ORAL ONCE
Refills: 0 | Status: COMPLETED | OUTPATIENT
Start: 2020-09-04 | End: 2020-09-04

## 2020-09-04 RX ORDER — SODIUM CHLORIDE 9 MG/ML
1000 INJECTION INTRAMUSCULAR; INTRAVENOUS; SUBCUTANEOUS ONCE
Refills: 0 | Status: COMPLETED | OUTPATIENT
Start: 2020-09-04 | End: 2020-09-04

## 2020-09-04 RX ORDER — MORPHINE SULFATE 50 MG/1
4 CAPSULE, EXTENDED RELEASE ORAL ONCE
Refills: 0 | Status: DISCONTINUED | OUTPATIENT
Start: 2020-09-04 | End: 2020-09-04

## 2020-09-04 RX ADMIN — SODIUM CHLORIDE 1000 MILLILITER(S): 9 INJECTION INTRAMUSCULAR; INTRAVENOUS; SUBCUTANEOUS at 23:41

## 2020-09-04 RX ADMIN — ONDANSETRON 4 MILLIGRAM(S): 8 TABLET, FILM COATED ORAL at 23:42

## 2020-09-04 RX ADMIN — MORPHINE SULFATE 4 MILLIGRAM(S): 50 CAPSULE, EXTENDED RELEASE ORAL at 23:41

## 2020-09-04 NOTE — ED PROVIDER NOTE - NS ED ROS FT
CONSTITUTIONAL - No fever, No diaphoresis, No weight change  SKIN - No rash  HEMATOLOGIC - No abnormal bleeding or bruising  EYES - No eye pain, No blurred vision  ENT - No change in hearing, No sore throat, No neck pain, No rhinorrhea, No ear pain  RESPIRATORY - No shortness of breath, No cough  CARDIAC -No chest pain, No palpitations  GI +abdominal pain, +nausea, No vomiting, No diarrhea, No constipation  - No dysuria, frequency, hematuria.   MUSCULOSKELETAL - No joint paint, No swelling, No back pain  NEUROLOGIC - No numbness, No focal weakness, No headache, No dizziness

## 2020-09-04 NOTE — ED PROVIDER NOTE - PROGRESS NOTE DETAILS
O"Yuliana: ordered CTAP non-con to r/o perforation. CT tech called and said they needed upreg, put unable to provide urine (doesn't need to urinate at moment) sent serum HCG instead since r/o perforation on CT. ED Sign Out, eval for worsening abd pain, pending imaging and likely admission for pain control, advance diet, antibiotics -- El Chowdary MD

## 2020-09-04 NOTE — ED PROVIDER NOTE - ATTENDING CONTRIBUTION TO CARE
I saw and evaluated patient with resident. I discussed H+P and MDM with resident. I agree with the statements made by the resident unless otherwise noted.    The care of this patient was in support of the [facility] countermeasures to Covid-19.

## 2020-09-04 NOTE — ED PROVIDER NOTE - PHYSICAL EXAMINATION
CONSTITUTIONAL: Well-developed; well-nourished; in no acute distress but looks in pain   SKIN: warm, dry  HEAD: Normocephalic; atraumatic.  EYES: no conjunctival injection.   ENT: No nasal discharge; airway clear.  NECK: Supple; non tender.  CARD: S1, S2 normal; no murmurs, gallops, or rubs. Regular rate and rhythm.   RESP: No wheezes, rales or rhonchi.  ABD: tenderness to LLQ LUQ. No guarding, rebound or rigidity.   EXT:   No clubbing, cyanosis or edema.   NEURO: Alert, oriented, grossly unremarkable  PSYCH: Cooperative, appropriate.

## 2020-09-04 NOTE — ED ADULT NURSE REASSESSMENT NOTE - NS ED NURSE REASSESS COMMENT FT1
Report received from Inez WANG in [facility]. Patient seen laying in bed. Awaiting to be seen by MDs.

## 2020-09-04 NOTE — ED ADULT NURSE NOTE - OBJECTIVE STATEMENT
50 year old female presenting to ED c/o abdominal pain. PMH includes HTN, hypothyroid, IBS. Pt A+Ox3, moves all four extremities, admitted and visited [facility] ED this week for diverticulitis and UTI. Pt presents today c/o worsening diffuse abdominal pain, chills, frontal headache, midsternal chest pain, and inability to tolerate PO intake since this morning. Pt reports taking Tylenol this morning for abdominal pain, however with no relief, and denies alleviating or exacerbating factors. Pt also reports urgency with voiding and dysuria, no relief yet from recently prescribed antibiotics for UTI. Upon assessment, diffuse abdominal tenderness to palpation. Pt denies dizziness, SOB, vomiting, hematuria, bowel symptoms, or fevers. Pt attached to cardiac monitor.

## 2020-09-04 NOTE — ED PROVIDER NOTE - OBJECTIVE STATEMENT
49 y/o F with pmhx of IBS, hypothyroidism, HLD, HTN, MARIAN and was recently here Sunday into Monday and was dx with diverticulitis and UTI - pt today presents c/o worsening abd pain. Pt was discharged on Augmentin PO. Pt states that the pain got much worse today, today 9/10 pain localized to LUQ, left lateral to umbilicus and suprapubic areas. Pt is unable to eat/drink due to nausea. Admits to pressure sensation when urinating. Denies fevers, chills, chest pain, sob, diarrhea, hematuria. Pt denies tobacco, alcohol or recreational drug use 51 y/o F with pmhx of IBS, hypothyroidism, HLD, HTN, MARIAN and was recently here Sunday into Monday and was dx with diverticulitis and UTI - pt today presents c/o worsening abd pain. Pt was discharged on Augmentin PO. Pt states that the pain got much worse today, today 9/10 pain localized to LUQ, left lateral to umbilicus and suprapubic areas. Pt is unable to eat/drink due to nausea. Admits to pressure sensation when urinating. Denies fevers, chills, chest pain, sob, diarrhea, hematuria. Pt denies tobacco, alcohol or recreational drug use.

## 2020-09-04 NOTE — ED PROVIDER NOTE - CLINICAL SUMMARY MEDICAL DECISION MAKING FREE TEXT BOX
SILVANO Hackett MD: Pt is a 49 y/o female with pmhx of IBS, hypothyroidism, HLD, HTN, MARIAN and was recently here Sunday into Monday and was dx with diverticulitis and UTI - pt today presents c/o worsening abd pain. Pt was discharged on Augmentin PO. Pt states that the pain got much worse today, today 9/10 pain localized to LUQ, left lateral to umbilicus and suprapubic areas. Pt is unable to eat/drink due to nausea. Admits to pressure sensation when urinating. Denies fevers, chills, chest pain, sob, diarrhea, hematuria. Pt denies tobacco, alcohol or recreational drug use. States she spoke with her GI doctor who told her to go to ED for a CT scan to r/o perforation. Ddx includes, however, is not limited to: diverticulitis, abscess, fistula, perforation, uti, other acute d/o. Plan: basic and sepsis labs, CTAP, IVF, pain and nausea control, likely IV abx and admission for further eval and mgt

## 2020-09-05 DIAGNOSIS — Z29.9 ENCOUNTER FOR PROPHYLACTIC MEASURES, UNSPECIFIED: ICD-10-CM

## 2020-09-05 DIAGNOSIS — E78.5 HYPERLIPIDEMIA, UNSPECIFIED: ICD-10-CM

## 2020-09-05 DIAGNOSIS — K57.32 DIVERTICULITIS OF LARGE INTESTINE WITHOUT PERFORATION OR ABSCESS WITHOUT BLEEDING: ICD-10-CM

## 2020-09-05 DIAGNOSIS — E03.9 HYPOTHYROIDISM, UNSPECIFIED: ICD-10-CM

## 2020-09-05 LAB
ALBUMIN SERPL ELPH-MCNC: 3.8 G/DL — SIGNIFICANT CHANGE UP (ref 3.3–5)
ALP SERPL-CCNC: 41 U/L — SIGNIFICANT CHANGE UP (ref 40–120)
ALT FLD-CCNC: 13 U/L — SIGNIFICANT CHANGE UP (ref 10–45)
ANION GAP SERPL CALC-SCNC: 14 MMOL/L — SIGNIFICANT CHANGE UP (ref 5–17)
APPEARANCE UR: ABNORMAL
AST SERPL-CCNC: 15 U/L — SIGNIFICANT CHANGE UP (ref 10–40)
BACTERIA # UR AUTO: NEGATIVE — SIGNIFICANT CHANGE UP
BILIRUB SERPL-MCNC: 0.5 MG/DL — SIGNIFICANT CHANGE UP (ref 0.2–1.2)
BILIRUB UR-MCNC: NEGATIVE — SIGNIFICANT CHANGE UP
BUN SERPL-MCNC: 7 MG/DL — SIGNIFICANT CHANGE UP (ref 7–23)
CALCIUM SERPL-MCNC: 9.4 MG/DL — SIGNIFICANT CHANGE UP (ref 8.4–10.5)
CHLORIDE SERPL-SCNC: 104 MMOL/L — SIGNIFICANT CHANGE UP (ref 96–108)
CO2 SERPL-SCNC: 23 MMOL/L — SIGNIFICANT CHANGE UP (ref 22–31)
COLOR SPEC: YELLOW — SIGNIFICANT CHANGE UP
CREAT SERPL-MCNC: 0.68 MG/DL — SIGNIFICANT CHANGE UP (ref 0.5–1.3)
DIFF PNL FLD: ABNORMAL
EPI CELLS # UR: ABNORMAL
GLUCOSE SERPL-MCNC: 92 MG/DL — SIGNIFICANT CHANGE UP (ref 70–99)
GLUCOSE UR QL: NEGATIVE — SIGNIFICANT CHANGE UP
HCG SERPL-ACNC: <2 MIU/ML — SIGNIFICANT CHANGE UP
HYALINE CASTS # UR AUTO: ABNORMAL /LPF
KETONES UR-MCNC: ABNORMAL
LEUKOCYTE ESTERASE UR-ACNC: ABNORMAL
NITRITE UR-MCNC: NEGATIVE — SIGNIFICANT CHANGE UP
PH UR: 6 — SIGNIFICANT CHANGE UP (ref 5–8)
POTASSIUM SERPL-MCNC: 4.2 MMOL/L — SIGNIFICANT CHANGE UP (ref 3.5–5.3)
POTASSIUM SERPL-SCNC: 4.2 MMOL/L — SIGNIFICANT CHANGE UP (ref 3.5–5.3)
PROT SERPL-MCNC: 7.1 G/DL — SIGNIFICANT CHANGE UP (ref 6–8.3)
PROT UR-MCNC: ABNORMAL
RBC CASTS # UR COMP ASSIST: SIGNIFICANT CHANGE UP /HPF (ref 0–4)
SARS-COV-2 RNA SPEC QL NAA+PROBE: SIGNIFICANT CHANGE UP
SODIUM SERPL-SCNC: 141 MMOL/L — SIGNIFICANT CHANGE UP (ref 135–145)
SP GR SPEC: 1.02 — SIGNIFICANT CHANGE UP (ref 1.01–1.02)
UROBILINOGEN FLD QL: NEGATIVE — SIGNIFICANT CHANGE UP
WBC UR QL: ABNORMAL

## 2020-09-05 PROCEDURE — 99223 1ST HOSP IP/OBS HIGH 75: CPT

## 2020-09-05 PROCEDURE — 93010 ELECTROCARDIOGRAM REPORT: CPT

## 2020-09-05 PROCEDURE — 74176 CT ABD & PELVIS W/O CONTRAST: CPT | Mod: 26

## 2020-09-05 RX ORDER — SODIUM CHLORIDE 9 MG/ML
1000 INJECTION, SOLUTION INTRAVENOUS
Refills: 0 | Status: DISCONTINUED | OUTPATIENT
Start: 2020-09-05 | End: 2020-09-09

## 2020-09-05 RX ORDER — ENOXAPARIN SODIUM 100 MG/ML
40 INJECTION SUBCUTANEOUS DAILY
Refills: 0 | Status: DISCONTINUED | OUTPATIENT
Start: 2020-09-05 | End: 2020-09-11

## 2020-09-05 RX ORDER — METRONIDAZOLE 500 MG
500 TABLET ORAL ONCE
Refills: 0 | Status: COMPLETED | OUTPATIENT
Start: 2020-09-05 | End: 2020-09-05

## 2020-09-05 RX ORDER — LEVOTHYROXINE SODIUM 125 MCG
100 TABLET ORAL DAILY
Refills: 0 | Status: DISCONTINUED | OUTPATIENT
Start: 2020-09-05 | End: 2020-09-11

## 2020-09-05 RX ORDER — CEFEPIME 1 G/1
2000 INJECTION, POWDER, FOR SOLUTION INTRAMUSCULAR; INTRAVENOUS ONCE
Refills: 0 | Status: COMPLETED | OUTPATIENT
Start: 2020-09-05 | End: 2020-09-05

## 2020-09-05 RX ORDER — ENOXAPARIN SODIUM 100 MG/ML
40 INJECTION SUBCUTANEOUS EVERY 12 HOURS
Refills: 0 | Status: DISCONTINUED | OUTPATIENT
Start: 2020-09-05 | End: 2020-09-05

## 2020-09-05 RX ORDER — ACETAMINOPHEN 500 MG
650 TABLET ORAL ONCE
Refills: 0 | Status: COMPLETED | OUTPATIENT
Start: 2020-09-05 | End: 2020-09-05

## 2020-09-05 RX ORDER — METRONIDAZOLE 500 MG
500 TABLET ORAL EVERY 8 HOURS
Refills: 0 | Status: DISCONTINUED | OUTPATIENT
Start: 2020-09-05 | End: 2020-09-11

## 2020-09-05 RX ORDER — CIPROFLOXACIN LACTATE 400MG/40ML
400 VIAL (ML) INTRAVENOUS EVERY 12 HOURS
Refills: 0 | Status: DISCONTINUED | OUTPATIENT
Start: 2020-09-05 | End: 2020-09-11

## 2020-09-05 RX ORDER — MORPHINE SULFATE 50 MG/1
2 CAPSULE, EXTENDED RELEASE ORAL ONCE
Refills: 0 | Status: DISCONTINUED | OUTPATIENT
Start: 2020-09-05 | End: 2020-09-05

## 2020-09-05 RX ORDER — ONDANSETRON 8 MG/1
4 TABLET, FILM COATED ORAL ONCE
Refills: 0 | Status: COMPLETED | OUTPATIENT
Start: 2020-09-05 | End: 2020-09-05

## 2020-09-05 RX ORDER — ATORVASTATIN CALCIUM 80 MG/1
40 TABLET, FILM COATED ORAL AT BEDTIME
Refills: 0 | Status: DISCONTINUED | OUTPATIENT
Start: 2020-09-05 | End: 2020-09-11

## 2020-09-05 RX ORDER — MORPHINE SULFATE 50 MG/1
4 CAPSULE, EXTENDED RELEASE ORAL EVERY 4 HOURS
Refills: 0 | Status: DISCONTINUED | OUTPATIENT
Start: 2020-09-05 | End: 2020-09-06

## 2020-09-05 RX ORDER — MORPHINE SULFATE 50 MG/1
4 CAPSULE, EXTENDED RELEASE ORAL ONCE
Refills: 0 | Status: DISCONTINUED | OUTPATIENT
Start: 2020-09-05 | End: 2020-09-05

## 2020-09-05 RX ORDER — ONDANSETRON 8 MG/1
4 TABLET, FILM COATED ORAL EVERY 8 HOURS
Refills: 0 | Status: DISCONTINUED | OUTPATIENT
Start: 2020-09-05 | End: 2020-09-11

## 2020-09-05 RX ADMIN — MORPHINE SULFATE 4 MILLIGRAM(S): 50 CAPSULE, EXTENDED RELEASE ORAL at 18:00

## 2020-09-05 RX ADMIN — CEFEPIME 100 MILLIGRAM(S): 1 INJECTION, POWDER, FOR SOLUTION INTRAMUSCULAR; INTRAVENOUS at 06:09

## 2020-09-05 RX ADMIN — MORPHINE SULFATE 2 MILLIGRAM(S): 50 CAPSULE, EXTENDED RELEASE ORAL at 16:20

## 2020-09-05 RX ADMIN — Medication 650 MILLIGRAM(S): at 09:23

## 2020-09-05 RX ADMIN — MORPHINE SULFATE 2 MILLIGRAM(S): 50 CAPSULE, EXTENDED RELEASE ORAL at 15:54

## 2020-09-05 RX ADMIN — MORPHINE SULFATE 4 MILLIGRAM(S): 50 CAPSULE, EXTENDED RELEASE ORAL at 00:15

## 2020-09-05 RX ADMIN — ONDANSETRON 4 MILLIGRAM(S): 8 TABLET, FILM COATED ORAL at 02:54

## 2020-09-05 RX ADMIN — MORPHINE SULFATE 4 MILLIGRAM(S): 50 CAPSULE, EXTENDED RELEASE ORAL at 03:15

## 2020-09-05 RX ADMIN — Medication 100 MILLIGRAM(S): at 06:37

## 2020-09-05 RX ADMIN — MORPHINE SULFATE 2 MILLIGRAM(S): 50 CAPSULE, EXTENDED RELEASE ORAL at 11:50

## 2020-09-05 RX ADMIN — Medication 100 MILLIGRAM(S): at 22:25

## 2020-09-05 RX ADMIN — MORPHINE SULFATE 4 MILLIGRAM(S): 50 CAPSULE, EXTENDED RELEASE ORAL at 02:54

## 2020-09-05 RX ADMIN — ONDANSETRON 4 MILLIGRAM(S): 8 TABLET, FILM COATED ORAL at 11:25

## 2020-09-05 RX ADMIN — MORPHINE SULFATE 4 MILLIGRAM(S): 50 CAPSULE, EXTENDED RELEASE ORAL at 22:23

## 2020-09-05 RX ADMIN — SODIUM CHLORIDE 75 MILLILITER(S): 9 INJECTION, SOLUTION INTRAVENOUS at 16:03

## 2020-09-05 RX ADMIN — MORPHINE SULFATE 4 MILLIGRAM(S): 50 CAPSULE, EXTENDED RELEASE ORAL at 17:37

## 2020-09-05 RX ADMIN — MORPHINE SULFATE 2 MILLIGRAM(S): 50 CAPSULE, EXTENDED RELEASE ORAL at 11:22

## 2020-09-05 RX ADMIN — MORPHINE SULFATE 4 MILLIGRAM(S): 50 CAPSULE, EXTENDED RELEASE ORAL at 22:38

## 2020-09-05 RX ADMIN — Medication 200 MILLIGRAM(S): at 17:47

## 2020-09-05 RX ADMIN — ATORVASTATIN CALCIUM 40 MILLIGRAM(S): 80 TABLET, FILM COATED ORAL at 22:25

## 2020-09-05 RX ADMIN — Medication 650 MILLIGRAM(S): at 10:20

## 2020-09-05 NOTE — ED ADULT NURSE REASSESSMENT NOTE - NS ED NURSE REASSESS COMMENT FT1
Attempted to call report, was placed on hold and told to wait due to change of shift. Will attempt to call again.

## 2020-09-05 NOTE — H&P ADULT - ATTENDING COMMENTS
Steph Becerra MD  Division of Hospital Medicine  [facility]   Pager: [phone]

## 2020-09-05 NOTE — H&P ADULT - NSHPLABSRESULTS_GEN_ALL_CORE
Labs reviewed:                         11.4   12.21 )-----------( 297      ( 04 Sep 2020 23:39 )             35.4     09-04    141  |  104  |  7   ----------------------------<  92  4.2   |  23  |  0.68    Ca    9.4      04 Sep 2020 23:39    TPro  7.1  /  Alb  3.8  /  TBili  0.5  /  DBili  x   /  AST  15  /  ALT  13  /  AlkPhos  41  09-04          Urinalysis Basic - ( 05 Sep 2020 01:40 )    Color: Yellow / Appearance: Slightly Turbid / S.020 / pH: x  Gluc: x / Ketone: Small  / Bili: Negative / Urobili: Negative   Blood: x / Protein: Trace / Nitrite: Negative   Leuk Esterase: Large / RBC: 3-5 /hpf / WBC 6-10   Sq Epi: x / Non Sq Epi: Many / Bacteria: Negative          Imaging personally reviewed:  CT abd/pelv:  FINDINGS:    LOWER CHEST: No consolidation or pleural effusion. Trace bibasilar dependent atelectasis.    LIVER: Within normal limits.  BILE DUCTS: Normal caliber.  GALLBLADDER: Within normal limits.  SPLEEN: Within normal limits.  PANCREAS: Within normal limits.  ADRENALS: Within normal limits.  KIDNEYS/URETERS: Within normal limits.    BLADDER: Collapsed limiting detailed evaluation.  REPRODUCTIVE ORGANS: Unchanged 2.1 cm right adnexal simple cyst. Uterus and left adnexa are within normal limits.    BOWEL: No bowel obstruction. Again seen is wall thickening with minimal surrounding fat stranding of the proximal sigmoid colon with multiple diverticuli, improved to nearly resolved compared to prior study. There is new short segmental wall thickening and surrounding fatty stranding at the level of mid to distal sigmoid colonic diverticuli just distal to the original area of previous diverticulitis, concerning for a new segment of acute diverticulitis. Appendix is normal.  PERITONEUM: No ascites. No pneumoperitoneum.  VESSELS: Within normal limits.  RETROPERITONEUM/LYMPH NODES: No lymphadenopathy.  ABDOMINAL WALL: Within normal limits.  BONES: Advanced degenerative disc disease at L5-S1 with posterior osteophytic ridging and facet hypertrophy, resulting in bilateral neural foramina narrowing at this level. Grade 1 retrolisthesis of L5 on S1.    IMPRESSION:    Previously identified short segment proximal sigmoid diverticulitis appears improved to nearly resolved.    New short segment of acute mid to distal sigmoid diverticulitis as detailed above. No bowel obstruction, perforation or abscess. Recommend nonemergent follow-up colonoscopy once symptoms have resolved to exclude underlying mass.    Additional findings as mentioned above.    ECG reviewed and interpreted:

## 2020-09-05 NOTE — H&P ADULT - PROBLEM SELECTOR PLAN 1
patient recently admitted and discharged for diverticulitis discharge on PO augmentin initially improving but with new acute worsning LLQ and suprapubic pain.   - s/p cefepime and flagyl in the ED  - CT abd/pelv showing previous areas of sigmoid diverticulitis improved/resolved but also with new short segment of acute mid to distal sigmoid diverticulitis  - start cipro 400mg  IVPB q12h and flagyl 500mg  IVPB q8h  - clear liquid diet, advance as tolerated  - gentle hydration with IVF in setting of poor PO intake  - pain control, zofran PRN for nausea  - f/u GI consult

## 2020-09-05 NOTE — H&P ADULT - NSICDXFAMILYHX_GEN_ALL_CORE_FT
FAMILY HISTORY:  Family history of early CAD, Father-45, Paternal Aunt Maternal Uncle    Grandparent  Still living? No  Family history of colon cancer, Age at diagnosis: Age Unknown

## 2020-09-05 NOTE — H&P ADULT - HISTORY OF PRESENT ILLNESS
50 year old female with PMH of HTN, HLD, hypothyroidism, IBS, recurrent episodes of diverticulitis recently discharged on PO augmentin for diverticulitis on 8/31/20 who presents to the ED with acute worsening LLQ and suprapubic pain. Patient states initially she was improving on PO antibiotics but she developed sharp 9-10 out 10 pains starting Thursday evening associated with nausea and decreased PO intake. Denies any fevers, chills, chest pain SOB, vomiting, diarrhea, constipation, nor dysuria. Last BM yesterday.     In the ED, vitals stable. Labs notable for leukocytosis to 12.21 from 6K on discharge last week. CT abd/pelv showing previous areas of sigmoid diverticulitis improved/resolved but also with new short segment of acute mid to distal sigmoid diverticulitis.

## 2020-09-05 NOTE — H&P ADULT - NSHPREVIEWOFSYSTEMS_GEN_ALL_CORE
CONSTITUTIONAL: No fever, weight loss, or fatigue  EYES: No eye pain, visual disturbances, or discharge  ENMT:  No difficulty hearing, tinnitus, vertigo; No sinus or throat pain  NECK: No pain or stiffness  RESPIRATORY: No cough, wheezing, chills or hemoptysis; No shortness of breath  CARDIOVASCULAR: No chest pain, palpitations, dizziness, or leg swelling  GASTROINTESTINAL: +nausea +LLQ and suprapubic non-radiating 9-10/10 pain, No vomiting, or hematemesis; No diarrhea or constipation. No melena or hematochezia.  GENITOURINARY: No dysuria, frequency, hematuria, or incontinence  NEUROLOGICAL: No headaches, memory loss, loss of strength, numbness, or tremors  SKIN: No itching, burning, rashes, or lesions   LYMPH NODES: No enlarged glands  ENDOCRINE: No heat or cold intolerance; No hair loss  MUSCULOSKELETAL: No joint pain or swelling; No muscle, back, or extremity pain  PSYCHIATRIC: No depression, anxiety, mood swings, or difficulty sleeping  HEME/LYMPH: No easy bruising, or bleeding gums

## 2020-09-05 NOTE — H&P ADULT - ASSESSMENT
50 year old female with PMH of HTN, HLD, hypothyroidism, IBS, recurrent episodes of diverticulitis recently discharged on PO augmentin for diverticulitis on 8/31/20 who presents to the ED with acute worsening LLQ and suprapubic pain with CT abd/pelv showing previous areas of sigmoid diverticulitis improved/resolved but also with new short segment of acute mid to distal sigmoid diverticulitis despite being on PO augmentin.

## 2020-09-05 NOTE — H&P ADULT - NSHPPHYSICALEXAM_GEN_ALL_CORE
CONSTITUTIONAL: NAD, well-developed, non-toxic appearing  EYES: PERRLA; conjunctiva and sclera clear  ENMT: +dry oral mucosa, normal dentition  NECK: Supple, no palpable masses; no thyromegaly  RESPIRATORY: Normal respiratory effort; lungs are clear to auscultation bilaterally  CARDIOVASCULAR: Regular rate and rhythm, normal S1 and S2, no murmur/rub/gallop; No lower extremity edema; Peripheral pulses are 2+ bilaterally  ABDOMEN: Soft, Non-distended,  normoactive bowel sounds +tender to palpation in LLQ>RLQ and suprapubic region  MUSCULOSKELETAL:  No clubbing or cyanosis of digits; no joint swelling or tenderness to palpation  PSYCH: A+O to person, place, and time; affect appropriate  NEUROLOGY: CN 2-12 are intact and symmetric; no gross sensory deficits   SKIN: No rashes; no palpable lesions

## 2020-09-06 LAB
ANION GAP SERPL CALC-SCNC: 9 MMOL/L — SIGNIFICANT CHANGE UP (ref 5–17)
BASOPHILS # BLD AUTO: 0.04 K/UL — SIGNIFICANT CHANGE UP (ref 0–0.2)
BASOPHILS NFR BLD AUTO: 0.6 % — SIGNIFICANT CHANGE UP (ref 0–2)
BUN SERPL-MCNC: 5 MG/DL — LOW (ref 7–23)
CALCIUM SERPL-MCNC: 8.8 MG/DL — SIGNIFICANT CHANGE UP (ref 8.4–10.5)
CHLORIDE SERPL-SCNC: 100 MMOL/L — SIGNIFICANT CHANGE UP (ref 96–108)
CO2 SERPL-SCNC: 24 MMOL/L — SIGNIFICANT CHANGE UP (ref 22–31)
CREAT SERPL-MCNC: 0.7 MG/DL — SIGNIFICANT CHANGE UP (ref 0.5–1.3)
CULTURE RESULTS: SIGNIFICANT CHANGE UP
EOSINOPHIL # BLD AUTO: 0.21 K/UL — SIGNIFICANT CHANGE UP (ref 0–0.5)
EOSINOPHIL NFR BLD AUTO: 3 % — SIGNIFICANT CHANGE UP (ref 0–6)
GLUCOSE SERPL-MCNC: 102 MG/DL — HIGH (ref 70–99)
HCT VFR BLD CALC: 33.3 % — LOW (ref 34.5–45)
HGB BLD-MCNC: 10.9 G/DL — LOW (ref 11.5–15.5)
IMM GRANULOCYTES NFR BLD AUTO: 0.3 % — SIGNIFICANT CHANGE UP (ref 0–1.5)
LYMPHOCYTES # BLD AUTO: 2.02 K/UL — SIGNIFICANT CHANGE UP (ref 1–3.3)
LYMPHOCYTES # BLD AUTO: 28.7 % — SIGNIFICANT CHANGE UP (ref 13–44)
MAGNESIUM SERPL-MCNC: 2 MG/DL — SIGNIFICANT CHANGE UP (ref 1.6–2.6)
MCHC RBC-ENTMCNC: 31.1 PG — SIGNIFICANT CHANGE UP (ref 27–34)
MCHC RBC-ENTMCNC: 32.7 GM/DL — SIGNIFICANT CHANGE UP (ref 32–36)
MCV RBC AUTO: 94.9 FL — SIGNIFICANT CHANGE UP (ref 80–100)
MONOCYTES # BLD AUTO: 0.73 K/UL — SIGNIFICANT CHANGE UP (ref 0–0.9)
MONOCYTES NFR BLD AUTO: 10.4 % — SIGNIFICANT CHANGE UP (ref 2–14)
NEUTROPHILS # BLD AUTO: 4.01 K/UL — SIGNIFICANT CHANGE UP (ref 1.8–7.4)
NEUTROPHILS NFR BLD AUTO: 57 % — SIGNIFICANT CHANGE UP (ref 43–77)
NRBC # BLD: 0 /100 WBCS — SIGNIFICANT CHANGE UP (ref 0–0)
PHOSPHATE SERPL-MCNC: 2.3 MG/DL — LOW (ref 2.5–4.5)
PLATELET # BLD AUTO: 304 K/UL — SIGNIFICANT CHANGE UP (ref 150–400)
POTASSIUM SERPL-MCNC: 3.3 MMOL/L — LOW (ref 3.5–5.3)
POTASSIUM SERPL-SCNC: 3.3 MMOL/L — LOW (ref 3.5–5.3)
RBC # BLD: 3.51 M/UL — LOW (ref 3.8–5.2)
RBC # FLD: 12.6 % — SIGNIFICANT CHANGE UP (ref 10.3–14.5)
SARS-COV-2 IGG SERPL QL IA: NEGATIVE — SIGNIFICANT CHANGE UP
SARS-COV-2 IGM SERPL IA-ACNC: 0.09 INDEX — SIGNIFICANT CHANGE UP
SODIUM SERPL-SCNC: 133 MMOL/L — LOW (ref 135–145)
SPECIMEN SOURCE: SIGNIFICANT CHANGE UP
WBC # BLD: 7.03 K/UL — SIGNIFICANT CHANGE UP (ref 3.8–10.5)
WBC # FLD AUTO: 7.03 K/UL — SIGNIFICANT CHANGE UP (ref 3.8–10.5)

## 2020-09-06 PROCEDURE — 99233 SBSQ HOSP IP/OBS HIGH 50: CPT

## 2020-09-06 RX ORDER — POTASSIUM CHLORIDE 20 MEQ
20 PACKET (EA) ORAL ONCE
Refills: 0 | Status: COMPLETED | OUTPATIENT
Start: 2020-09-06 | End: 2020-09-06

## 2020-09-06 RX ORDER — HYDROMORPHONE HYDROCHLORIDE 2 MG/ML
0.5 INJECTION INTRAMUSCULAR; INTRAVENOUS; SUBCUTANEOUS EVERY 4 HOURS
Refills: 0 | Status: DISCONTINUED | OUTPATIENT
Start: 2020-09-06 | End: 2020-09-11

## 2020-09-06 RX ORDER — CHLORHEXIDINE GLUCONATE 213 G/1000ML
1 SOLUTION TOPICAL DAILY
Refills: 0 | Status: DISCONTINUED | OUTPATIENT
Start: 2020-09-06 | End: 2020-09-11

## 2020-09-06 RX ORDER — FLUCONAZOLE 150 MG/1
150 TABLET ORAL ONCE
Refills: 0 | Status: COMPLETED | OUTPATIENT
Start: 2020-09-06 | End: 2020-09-06

## 2020-09-06 RX ORDER — ACETAMINOPHEN 500 MG
650 TABLET ORAL ONCE
Refills: 0 | Status: COMPLETED | OUTPATIENT
Start: 2020-09-06 | End: 2020-09-06

## 2020-09-06 RX ADMIN — ONDANSETRON 4 MILLIGRAM(S): 8 TABLET, FILM COATED ORAL at 10:59

## 2020-09-06 RX ADMIN — HYDROMORPHONE HYDROCHLORIDE 0.5 MILLIGRAM(S): 2 INJECTION INTRAMUSCULAR; INTRAVENOUS; SUBCUTANEOUS at 22:29

## 2020-09-06 RX ADMIN — SODIUM CHLORIDE 75 MILLILITER(S): 9 INJECTION, SOLUTION INTRAVENOUS at 08:02

## 2020-09-06 RX ADMIN — Medication 650 MILLIGRAM(S): at 12:20

## 2020-09-06 RX ADMIN — HYDROMORPHONE HYDROCHLORIDE 0.5 MILLIGRAM(S): 2 INJECTION INTRAMUSCULAR; INTRAVENOUS; SUBCUTANEOUS at 22:14

## 2020-09-06 RX ADMIN — ATORVASTATIN CALCIUM 40 MILLIGRAM(S): 80 TABLET, FILM COATED ORAL at 21:17

## 2020-09-06 RX ADMIN — ENOXAPARIN SODIUM 40 MILLIGRAM(S): 100 INJECTION SUBCUTANEOUS at 11:38

## 2020-09-06 RX ADMIN — MORPHINE SULFATE 4 MILLIGRAM(S): 50 CAPSULE, EXTENDED RELEASE ORAL at 05:10

## 2020-09-06 RX ADMIN — Medication 200 MILLIGRAM(S): at 05:11

## 2020-09-06 RX ADMIN — Medication 100 MILLIGRAM(S): at 21:17

## 2020-09-06 RX ADMIN — Medication 1 APPLICATORFUL: at 21:14

## 2020-09-06 RX ADMIN — Medication 100 MICROGRAM(S): at 05:11

## 2020-09-06 RX ADMIN — Medication 20 MILLIEQUIVALENT(S): at 08:04

## 2020-09-06 RX ADMIN — MORPHINE SULFATE 4 MILLIGRAM(S): 50 CAPSULE, EXTENDED RELEASE ORAL at 05:25

## 2020-09-06 RX ADMIN — Medication 200 MILLIGRAM(S): at 17:45

## 2020-09-06 RX ADMIN — Medication 100 MILLIGRAM(S): at 06:46

## 2020-09-06 RX ADMIN — Medication 650 MILLIGRAM(S): at 11:37

## 2020-09-06 RX ADMIN — ONDANSETRON 4 MILLIGRAM(S): 8 TABLET, FILM COATED ORAL at 18:59

## 2020-09-06 RX ADMIN — Medication 100 MILLIGRAM(S): at 13:52

## 2020-09-06 RX ADMIN — FLUCONAZOLE 150 MILLIGRAM(S): 150 TABLET ORAL at 21:17

## 2020-09-06 NOTE — PROGRESS NOTE ADULT - SUBJECTIVE AND OBJECTIVE BOX
Patient is a 50y old  Female who presents with a chief complaint of     SUBJECTIVE / OVERNIGHT EVENTS:  Reports continued abdominal pain.  Still with intermittent nausea.   Afebrile. Leukocytosis improved.    MEDICATIONS  (STANDING):  atorvastatin 40 milliGRAM(s) Oral at bedtime  ciprofloxacin   IVPB 400 milliGRAM(s) IV Intermittent every 12 hours  dextrose 5% + sodium chloride 0.45%. 1000 milliLiter(s) (75 mL/Hr) IV Continuous <Continuous>  enoxaparin Injectable 40 milliGRAM(s) SubCutaneous daily  levothyroxine 100 MICROGram(s) Oral daily  metroNIDAZOLE  IVPB 500 milliGRAM(s) IV Intermittent every 8 hours    MEDICATIONS  (PRN):  morphine  - Injectable 4 milliGRAM(s) IV Push every 4 hours PRN Severe Pain (7 - 10)  ondansetron Injectable 4 milliGRAM(s) IV Push every 8 hours PRN Nausea and/or Vomiting      CAPILLARY BLOOD GLUCOSE        I&O's Summary      PHYSICAL EXAM:  Vital Signs Last 24 Hrs  T(C): 36.9 (06 Sep 2020 07:40), Max: 36.9 (06 Sep 2020 07:40)  T(F): 98.4 (06 Sep 2020 07:40), Max: 98.4 (06 Sep 2020 07:40)  HR: 76 (06 Sep 2020 07:40) (65 - 76)  BP: 110/74 (06 Sep 2020 07:40) (98/61 - 132/86)  BP(mean): --  RR: 18 (06 Sep 2020 07:40) (18 - 18)  SpO2: 96% (06 Sep 2020 07:40) (96% - 100%)  GENERAL: NAD, well-developed  HEAD:  Atraumatic, Normocephalic  EYES: EOMI, PERRLA, conjunctiva and sclera clear  NECK: Supple, No JVD  CHEST/LUNG: Clear to auscultation bilaterally; No wheeze  HEART: Regular rate and rhythm; No murmurs, rubs, or gallops  ABDOMEN: Soft, RLQ pain. Tender to palpation  EXTREMITIES:  2+ Peripheral Pulses, No clubbing, cyanosis, or edema  PSYCH: AAOx3  NEUROLOGY: non-focal  SKIN: No rashes or lesions    LABS:                        10.9   7.03  )-----------( 304      ( 06 Sep 2020 07:00 )             33.3     09-06    133<L>  |  100  |  5<L>  ----------------------------<  102<H>  3.3<L>   |  24  |  0.70    Ca    8.8      06 Sep 2020 07:00  Phos  2.3       Mg     2.0         TPro  7.1  /  Alb  3.8  /  TBili  0.5  /  DBili  x   /  AST  15  /  ALT  13  /  AlkPhos  41  -04          Urinalysis Basic - ( 05 Sep 2020 01:40 )    Color: Yellow / Appearance: Slightly Turbid / S.020 / pH: x  Gluc: x / Ketone: Small  / Bili: Negative / Urobili: Negative   Blood: x / Protein: Trace / Nitrite: Negative   Leuk Esterase: Large / RBC: 3-5 /hpf / WBC 6-10   Sq Epi: x / Non Sq Epi: Many / Bacteria: Negative        RADIOLOGY & ADDITIONAL TESTS:    Imaging Personally Reviewed:    Consultant(s) Notes Reviewed:      Care Discussed with Consultants/Other Providers:

## 2020-09-07 LAB
ANION GAP SERPL CALC-SCNC: 11 MMOL/L — SIGNIFICANT CHANGE UP (ref 5–17)
BUN SERPL-MCNC: <4 MG/DL — LOW (ref 7–23)
CALCIUM SERPL-MCNC: 8.8 MG/DL — SIGNIFICANT CHANGE UP (ref 8.4–10.5)
CHLORIDE SERPL-SCNC: 102 MMOL/L — SIGNIFICANT CHANGE UP (ref 96–108)
CO2 SERPL-SCNC: 23 MMOL/L — SIGNIFICANT CHANGE UP (ref 22–31)
CREAT SERPL-MCNC: 0.66 MG/DL — SIGNIFICANT CHANGE UP (ref 0.5–1.3)
GLUCOSE SERPL-MCNC: 117 MG/DL — HIGH (ref 70–99)
HCT VFR BLD CALC: 35.9 % — SIGNIFICANT CHANGE UP (ref 34.5–45)
HGB BLD-MCNC: 11.4 G/DL — LOW (ref 11.5–15.5)
MAGNESIUM SERPL-MCNC: 2 MG/DL — SIGNIFICANT CHANGE UP (ref 1.6–2.6)
MCHC RBC-ENTMCNC: 30.7 PG — SIGNIFICANT CHANGE UP (ref 27–34)
MCHC RBC-ENTMCNC: 31.8 GM/DL — LOW (ref 32–36)
MCV RBC AUTO: 96.8 FL — SIGNIFICANT CHANGE UP (ref 80–100)
NRBC # BLD: 0 /100 WBCS — SIGNIFICANT CHANGE UP (ref 0–0)
PHOSPHATE SERPL-MCNC: 2.4 MG/DL — LOW (ref 2.5–4.5)
PLATELET # BLD AUTO: 290 K/UL — SIGNIFICANT CHANGE UP (ref 150–400)
POTASSIUM SERPL-MCNC: 3.9 MMOL/L — SIGNIFICANT CHANGE UP (ref 3.5–5.3)
POTASSIUM SERPL-SCNC: 3.9 MMOL/L — SIGNIFICANT CHANGE UP (ref 3.5–5.3)
RBC # BLD: 3.71 M/UL — LOW (ref 3.8–5.2)
RBC # FLD: 12.2 % — SIGNIFICANT CHANGE UP (ref 10.3–14.5)
SODIUM SERPL-SCNC: 136 MMOL/L — SIGNIFICANT CHANGE UP (ref 135–145)
WBC # BLD: 5.49 K/UL — SIGNIFICANT CHANGE UP (ref 3.8–10.5)
WBC # FLD AUTO: 5.49 K/UL — SIGNIFICANT CHANGE UP (ref 3.8–10.5)

## 2020-09-07 PROCEDURE — 99233 SBSQ HOSP IP/OBS HIGH 50: CPT

## 2020-09-07 RX ORDER — ACETAMINOPHEN 500 MG
650 TABLET ORAL ONCE
Refills: 0 | Status: COMPLETED | OUTPATIENT
Start: 2020-09-07 | End: 2020-09-07

## 2020-09-07 RX ADMIN — HYDROMORPHONE HYDROCHLORIDE 0.5 MILLIGRAM(S): 2 INJECTION INTRAMUSCULAR; INTRAVENOUS; SUBCUTANEOUS at 12:39

## 2020-09-07 RX ADMIN — Medication 100 MILLIGRAM(S): at 22:08

## 2020-09-07 RX ADMIN — HYDROMORPHONE HYDROCHLORIDE 0.5 MILLIGRAM(S): 2 INJECTION INTRAMUSCULAR; INTRAVENOUS; SUBCUTANEOUS at 22:53

## 2020-09-07 RX ADMIN — ONDANSETRON 4 MILLIGRAM(S): 8 TABLET, FILM COATED ORAL at 12:31

## 2020-09-07 RX ADMIN — Medication 100 MICROGRAM(S): at 05:07

## 2020-09-07 RX ADMIN — ONDANSETRON 4 MILLIGRAM(S): 8 TABLET, FILM COATED ORAL at 22:07

## 2020-09-07 RX ADMIN — HYDROMORPHONE HYDROCHLORIDE 0.5 MILLIGRAM(S): 2 INJECTION INTRAMUSCULAR; INTRAVENOUS; SUBCUTANEOUS at 03:38

## 2020-09-07 RX ADMIN — Medication 1 APPLICATORFUL: at 22:17

## 2020-09-07 RX ADMIN — Medication 650 MILLIGRAM(S): at 17:45

## 2020-09-07 RX ADMIN — HYDROMORPHONE HYDROCHLORIDE 0.5 MILLIGRAM(S): 2 INJECTION INTRAMUSCULAR; INTRAVENOUS; SUBCUTANEOUS at 13:09

## 2020-09-07 RX ADMIN — ENOXAPARIN SODIUM 40 MILLIGRAM(S): 100 INJECTION SUBCUTANEOUS at 12:27

## 2020-09-07 RX ADMIN — Medication 100 MILLIGRAM(S): at 13:57

## 2020-09-07 RX ADMIN — CHLORHEXIDINE GLUCONATE 1 APPLICATION(S): 213 SOLUTION TOPICAL at 12:27

## 2020-09-07 RX ADMIN — HYDROMORPHONE HYDROCHLORIDE 0.5 MILLIGRAM(S): 2 INJECTION INTRAMUSCULAR; INTRAVENOUS; SUBCUTANEOUS at 22:37

## 2020-09-07 RX ADMIN — HYDROMORPHONE HYDROCHLORIDE 0.5 MILLIGRAM(S): 2 INJECTION INTRAMUSCULAR; INTRAVENOUS; SUBCUTANEOUS at 03:53

## 2020-09-07 RX ADMIN — Medication 650 MILLIGRAM(S): at 17:15

## 2020-09-07 RX ADMIN — Medication 200 MILLIGRAM(S): at 17:47

## 2020-09-07 RX ADMIN — Medication 200 MILLIGRAM(S): at 05:07

## 2020-09-07 RX ADMIN — ATORVASTATIN CALCIUM 40 MILLIGRAM(S): 80 TABLET, FILM COATED ORAL at 22:08

## 2020-09-07 RX ADMIN — Medication 100 MILLIGRAM(S): at 06:37

## 2020-09-07 NOTE — PROGRESS NOTE ADULT - SUBJECTIVE AND OBJECTIVE BOX
Patient is a 50y old  Female who presents with a chief complaint of Abdominal pain / Diverticulitis (06 Sep 2020 09:39)      SUBJECTIVE / OVERNIGHT EVENTS: Patient seen and examined at bedside. She states that she feels intermittently nauseous, still has intermittent abd pain, she had mild abd with clears, does not think she is ready to be advanced to a regular diet yet. Her pain currently is generalized and 4/10, sharp and intermittent, no acute events overnight.      ROS:  All other review of systems negative    Allergies    iodine (Hives)  Zosyn (Urticaria)    Intolerances        MEDICATIONS  (STANDING):  atorvastatin 40 milliGRAM(s) Oral at bedtime  chlorhexidine 2% Cloths 1 Application(s) Topical daily  ciprofloxacin   IVPB 400 milliGRAM(s) IV Intermittent every 12 hours  clotrimazole 2% Vaginal Cream 1 Applicatorful Vaginal at bedtime  dextrose 5% + sodium chloride 0.45%. 1000 milliLiter(s) (75 mL/Hr) IV Continuous <Continuous>  enoxaparin Injectable 40 milliGRAM(s) SubCutaneous daily  levothyroxine 100 MICROGram(s) Oral daily  metroNIDAZOLE  IVPB 500 milliGRAM(s) IV Intermittent every 8 hours    MEDICATIONS  (PRN):  HYDROmorphone  Injectable 0.5 milliGRAM(s) IV Push every 4 hours PRN moderate and severe pain  ondansetron Injectable 4 milliGRAM(s) IV Push every 8 hours PRN Nausea and/or Vomiting      Vital Signs Last 24 Hrs  T(C): 36.6 (07 Sep 2020 08:14), Max: 36.7 (06 Sep 2020 16:25)  T(F): 97.9 (07 Sep 2020 08:14), Max: 98 (06 Sep 2020 16:25)  HR: 70 (07 Sep 2020 08:14) (58 - 70)  BP: 109/59 (07 Sep 2020 08:14) (109/59 - 125/75)  BP(mean): --  RR: 18 (07 Sep 2020 08:14) (18 - 18)  SpO2: 99% (07 Sep 2020 08:14) (99% - 100%)  CAPILLARY BLOOD GLUCOSE        I&O's Summary    06 Sep 2020 07:01  -  07 Sep 2020 07:00  --------------------------------------------------------  IN: 1200 mL / OUT: 0 mL / NET: 1200 mL        PHYSICAL EXAM:  GENERAL: NAD, well-developed  HEAD:  Atraumatic, Normocephalic  EYES: EOMI, PERRLA, conjunctiva and sclera clear  NECK: Supple, No JVD  CHEST/LUNG: Clear to auscultation bilaterally; No wheeze  HEART: Regular rate and rhythm; No murmurs, rubs, or gallops  ABDOMEN: Soft, diffuse TTP , mildly distended; Bowel sounds present  EXTREMITIES:  2+ Peripheral Pulses, No clubbing, cyanosis, or edema  NEUROLOGY: AAOx3, non-focal  PSYCH: calm  SKIN: No rashes or lesions    LABS:                        11.4   5.49  )-----------( 290      ( 07 Sep 2020 07:02 )             35.9     09-07    136  |  102  |  <4<L>  ----------------------------<  117<H>  3.9   |  23  |  0.66    Ca    8.8      07 Sep 2020 07:02  Phos  2.4     09-07  Mg     2.0     09-07                RADIOLOGY & ADDITIONAL TESTS:    Consultant(s) Notes Reviewed:  GI rec noted     Care Discussed with Consultants/Other Providers: medicine NP

## 2020-09-07 NOTE — PROVIDER CONTACT NOTE (OTHER) - ACTION/TREATMENT ORDERED:
MARIAMA Baird said no intervention from GI at this time. GI is not advancing diet at this time. Pt is to remain on a clear liquid diet. NO GI intervention at this time. There is a note from MOLLY Beverly on chart.

## 2020-09-07 NOTE — CONSULT NOTE ADULT - ATTENDING COMMENTS
Note initiated by GI Fellow yesterday.  I saw the patient today.  She states that her pain is somewhat improved since admission.  She is tender in the LLQ and suprapubic region.  Leukocytosis improved since admission.  Will therefore continue IV Cipro/Flagyl.  Continue clears for now.  Would not advance the diet until there is no tenderness, as she will not be able to tolerate PO Cipro/Flagyl until she is taking solids.  Will also ask CRS to see the patient given the recurrent nature of her diverticulitis attacks.    Thank you for the courtesy of this consultation.     Makenna Laura MD, FACP, FACG, AGAF  [facility]  [phone]     After hours and weekend coverage GI service : [phone]

## 2020-09-07 NOTE — CONSULT NOTE ADULT - SUBJECTIVE AND OBJECTIVE BOX
Chief Complaint:  Patient is a 50y old  Female who presents with a chief complaint of Abdominal pain / Diverticulitis (06 Sep 2020 09:39)      HPI: Pt is a 50 year old female with PMH of HTN, HLD, hypothyroidism, IBS, recent episode of diverticulitis recently discharged on PO augmentin for diverticulitis on 8/31/20 who presents to the ED with worsening LLQ and suprapubic pain. Despite recent antibiotics use symptoms recurrent. She initially improved but then developed worsening sharpy LLQ pain. She had associated issues with po intake and nausea without vomiting. No melena or hematochezia. No fevers.      Allergies:  iodine (Hives)  Zosyn (Urticaria)      Home Medications:    Hospital Medications:  atorvastatin 40 milliGRAM(s) Oral at bedtime  chlorhexidine 2% Cloths 1 Application(s) Topical daily  ciprofloxacin   IVPB 400 milliGRAM(s) IV Intermittent every 12 hours  clotrimazole 2% Vaginal Cream 1 Applicatorful Vaginal at bedtime  dextrose 5% + sodium chloride 0.45%. 1000 milliLiter(s) IV Continuous <Continuous>  enoxaparin Injectable 40 milliGRAM(s) SubCutaneous daily  HYDROmorphone  Injectable 0.5 milliGRAM(s) IV Push every 4 hours PRN  levothyroxine 100 MICROGram(s) Oral daily  metroNIDAZOLE  IVPB 500 milliGRAM(s) IV Intermittent every 8 hours  ondansetron Injectable 4 milliGRAM(s) IV Push every 8 hours PRN      PMHX/PSHX:  Hypertension  Sleep apnea  Hypothyroid  Diverticulitis  LBP (Low Back Pain)  History of Nasal Septoplasty  h/o Heart Palpitations  Hyperlipidemia on no meds now  Irritable Bowel Syndrome  Stress Incontinence  H/O vaginal surgery  h/o dental implant  History of Colonoscopy  h/o  Endoscopy  C Section - x 1 - 1994      Family history:  Family history of early CAD  Family history of colon cancer (Grandparent)      Social History:     ROS: As per HPI, 14-point ROS negative otherwise.    General:  No wt loss, fevers, chills, night sweats, fatigue,   Eyes:  Good vision, no reported pain  ENT:  No sore throat, pain, runny nose, dysphagia  CV:  No pain, palpitations, hypo/hypertension  Resp:  No dyspnea, cough, tachypnea, wheezing  GI:  See HPI  :  No pain, bleeding, incontinence, nocturia  Muscle:  No pain, weakness  Neuro:  No weakness, tingling, memory problems  Psych:  No fatigue, insomnia, mood problems, depression  Endocrine:  No polyuria, polydipsia, cold/heat intolerance  Heme:  No petechiae, ecchymosis, easy bruisability  Skin:  No rash, edema      PHYSICAL EXAM:     Vital Signs:  Vital Signs Last 24 Hrs  T(C): 36.6 (07 Sep 2020 08:14), Max: 36.7 (06 Sep 2020 16:25)  T(F): 97.9 (07 Sep 2020 08:14), Max: 98 (06 Sep 2020 16:25)  HR: 70 (07 Sep 2020 08:14) (58 - 70)  BP: 109/59 (07 Sep 2020 08:14) (109/59 - 125/75)  BP(mean): --  RR: 18 (07 Sep 2020 08:14) (18 - 18)  SpO2: 99% (07 Sep 2020 08:14) (99% - 100%)  Daily     Daily     GENERAL:  Appears stated age, well-groomed, well-nourished, no distress  HEENT:  NC/AT,  conjunctivae clear and pink  CHEST:  Full & symmetric excursion, no increased effort  HEART:  Regular rhythm, no JVD  ABDOMEN:  Soft, tender, non-distended, normoactive bowel sounds,  no masses , no hepatosplenomegaly  EXTREMITIES:  no cyanosis, clubbing or edema  SKIN:  No rash/erythema/ecchymoses/petechiae/wounds/abscess/warm/dry  NEURO:  Alert, oriented, nonfocal    LABS:                        11.4   5.49  )-----------( 290      ( 07 Sep 2020 07:02 )             35.9     09-07    136  |  102  |  <4<L>  ----------------------------<  117<H>  3.9   |  23  |  0.66    Ca    8.8      07 Sep 2020 07:02  Phos  2.4     09-07  Mg     2.0     09-07                Imaging:    < from: CT Abdomen and Pelvis No Cont (09.05.20 @ 01:41) >  IMPRESSION:    Previously identified short segment proximal sigmoid diverticulitis appears improved to nearly resolved.    New short segment of acute mid to distal sigmoid diverticulitis as detailed above. No bowel obstruction, perforation or abscess. Recommend nonemergent follow-up colonoscopy once symptoms have resolved to exclude underlying mass.    Additional findings as mentioned above.    < end of copied text > Chief Complaint:  Patient is a 50y old  Female who presents with a chief complaint of Abdominal pain / Diverticulitis (06 Sep 2020 09:39)    HPI: Pt is a 50 year old female with PMH of HTN, HLD, hypothyroidism, IBS, recent episode of diverticulitis recently discharged on PO augmentin for diverticulitis on 8/31/20 who presents to the ED with worsening LLQ and suprapubic pain. Despite recent antibiotics use symptoms recurrent. She initially improved but then developed worsening sharpy LLQ pain. She had associated issues with po intake and nausea without vomiting. No melena or hematochezia. No fevers.      Allergies:  iodine (Hives)  Zosyn (Urticaria)    Home Medications:    Hospital Medications:  atorvastatin 40 milliGRAM(s) Oral at bedtime  chlorhexidine 2% Cloths 1 Application(s) Topical daily  ciprofloxacin   IVPB 400 milliGRAM(s) IV Intermittent every 12 hours  clotrimazole 2% Vaginal Cream 1 Applicatorful Vaginal at bedtime  dextrose 5% + sodium chloride 0.45%. 1000 milliLiter(s) IV Continuous <Continuous>  enoxaparin Injectable 40 milliGRAM(s) SubCutaneous daily  HYDROmorphone  Injectable 0.5 milliGRAM(s) IV Push every 4 hours PRN  levothyroxine 100 MICROGram(s) Oral daily  metroNIDAZOLE  IVPB 500 milliGRAM(s) IV Intermittent every 8 hours  ondansetron Injectable 4 milliGRAM(s) IV Push every 8 hours PRN    PMHX/PSHX:  Hypertension  Sleep apnea  Hypothyroid  Diverticulitis  LBP (Low Back Pain)  History of Nasal Septoplasty  h/o Heart Palpitations  Hyperlipidemia on no meds now  Irritable Bowel Syndrome  Stress Incontinence  H/O vaginal surgery  h/o dental implant  History of Colonoscopy  h/o  Endoscopy  C Section - x 1 - 1994    Family history:  Family history of early CAD  Family history of colon cancer (Grandparent)    Social History:     ROS: As per HPI, 14-point ROS negative otherwise.    General:  No wt loss, fevers, chills, night sweats, fatigue,   Eyes:  Good vision, no reported pain  ENT:  No sore throat, pain, runny nose, dysphagia  CV:  No pain, palpitations, hypo/hypertension  Resp:  No dyspnea, cough, tachypnea, wheezing  GI:  See HPI  :  No pain, bleeding, incontinence, nocturia  Muscle:  No pain, weakness  Neuro:  No weakness, tingling, memory problems  Psych:  No fatigue, insomnia, mood problems, depression  Endocrine:  No polyuria, polydipsia, cold/heat intolerance  Heme:  No petechiae, ecchymosis, easy bruisability  Skin:  No rash, edema    PHYSICAL EXAM:   Vital Signs:  Vital Signs Last 24 Hrs  T(C): 36.6 (07 Sep 2020 08:14), Max: 36.7 (06 Sep 2020 16:25)  T(F): 97.9 (07 Sep 2020 08:14), Max: 98 (06 Sep 2020 16:25)  HR: 70 (07 Sep 2020 08:14) (58 - 70)  BP: 109/59 (07 Sep 2020 08:14) (109/59 - 125/75)  BP(mean): --  RR: 18 (07 Sep 2020 08:14) (18 - 18)  SpO2: 99% (07 Sep 2020 08:14) (99% - 100%)  Daily     Daily     GENERAL:  Appears stated age, well-groomed, well-nourished, no distress  HEENT:  NC/AT,  conjunctivae clear and pink  CHEST:  Full & symmetric excursion, no increased effort  HEART:  Regular rhythm, no JVD  ABDOMEN:  Soft, tender LLQ and suprapubic, non-distended, normoactive bowel sounds,  no masses , no hepatosplenomegaly  EXTREMITIES:  no cyanosis, clubbing or edema  SKIN:  No rash/erythema/ecchymoses/petechiae/wounds/abscess/warm/dry  NEURO:  Alert, oriented, nonfocal    LABS:                      11.4   5.49  )-----------( 290      ( 07 Sep 2020 07:02 )             35.9     09-07    136  |  102  |  <4<L>  ----------------------------<  117<H>  3.9   |  23  |  0.66    Ca    8.8      07 Sep 2020 07:02  Phos  2.4     09-07  Mg     2.0     09-07    Imaging:    < from: CT Abdomen and Pelvis No Cont (09.05.20 @ 01:41) >  IMPRESSION:    Previously identified short segment proximal sigmoid diverticulitis appears improved to nearly resolved.    New short segment of acute mid to distal sigmoid diverticulitis as detailed above. No bowel obstruction, perforation or abscess. Recommend nonemergent follow-up colonoscopy once symptoms have resolved to exclude underlying mass.    Additional findings as mentioned above.    < end of copied text >

## 2020-09-07 NOTE — CONSULT NOTE ADULT - ASSESSMENT
49 yo woman with PMH of HTN, HLD, hypothyroidism, IBS, diverticulitis, presents for recurrent LLQ pain found on CT with new segment of sigmoid diverticulitis.    Impression:   # Recurrent diverticulitis: Improved leukocytosis and no fevers noted at this time. Last colonoscopy 1/2020 +hemorrhoids and sigmoids, descending colon diverticulosis    RECS:  -Continue IV Cipro and Flagyl for now given this is now recurrent  -advance diet as tolerated  -no further plans for endoscopic eval at this time

## 2020-09-08 LAB
ANION GAP SERPL CALC-SCNC: 11 MMOL/L — SIGNIFICANT CHANGE UP (ref 5–17)
BUN SERPL-MCNC: 4 MG/DL — LOW (ref 7–23)
CALCIUM SERPL-MCNC: 8.9 MG/DL — SIGNIFICANT CHANGE UP (ref 8.4–10.5)
CHLORIDE SERPL-SCNC: 102 MMOL/L — SIGNIFICANT CHANGE UP (ref 96–108)
CO2 SERPL-SCNC: 23 MMOL/L — SIGNIFICANT CHANGE UP (ref 22–31)
CREAT SERPL-MCNC: 0.63 MG/DL — SIGNIFICANT CHANGE UP (ref 0.5–1.3)
GLUCOSE SERPL-MCNC: 85 MG/DL — SIGNIFICANT CHANGE UP (ref 70–99)
HCT VFR BLD CALC: 35.2 % — SIGNIFICANT CHANGE UP (ref 34.5–45)
HGB BLD-MCNC: 11.4 G/DL — LOW (ref 11.5–15.5)
MCHC RBC-ENTMCNC: 30.4 PG — SIGNIFICANT CHANGE UP (ref 27–34)
MCHC RBC-ENTMCNC: 32.4 GM/DL — SIGNIFICANT CHANGE UP (ref 32–36)
MCV RBC AUTO: 93.9 FL — SIGNIFICANT CHANGE UP (ref 80–100)
NRBC # BLD: 0 /100 WBCS — SIGNIFICANT CHANGE UP (ref 0–0)
PLATELET # BLD AUTO: 342 K/UL — SIGNIFICANT CHANGE UP (ref 150–400)
POTASSIUM SERPL-MCNC: 3.6 MMOL/L — SIGNIFICANT CHANGE UP (ref 3.5–5.3)
POTASSIUM SERPL-SCNC: 3.6 MMOL/L — SIGNIFICANT CHANGE UP (ref 3.5–5.3)
RBC # BLD: 3.75 M/UL — LOW (ref 3.8–5.2)
RBC # FLD: 12.2 % — SIGNIFICANT CHANGE UP (ref 10.3–14.5)
SODIUM SERPL-SCNC: 136 MMOL/L — SIGNIFICANT CHANGE UP (ref 135–145)
WBC # BLD: 4.81 K/UL — SIGNIFICANT CHANGE UP (ref 3.8–10.5)
WBC # FLD AUTO: 4.81 K/UL — SIGNIFICANT CHANGE UP (ref 3.8–10.5)

## 2020-09-08 PROCEDURE — 99233 SBSQ HOSP IP/OBS HIGH 50: CPT

## 2020-09-08 RX ADMIN — HYDROMORPHONE HYDROCHLORIDE 0.5 MILLIGRAM(S): 2 INJECTION INTRAMUSCULAR; INTRAVENOUS; SUBCUTANEOUS at 23:50

## 2020-09-08 RX ADMIN — SODIUM CHLORIDE 75 MILLILITER(S): 9 INJECTION, SOLUTION INTRAVENOUS at 19:37

## 2020-09-08 RX ADMIN — ONDANSETRON 4 MILLIGRAM(S): 8 TABLET, FILM COATED ORAL at 18:51

## 2020-09-08 RX ADMIN — CHLORHEXIDINE GLUCONATE 1 APPLICATION(S): 213 SOLUTION TOPICAL at 11:55

## 2020-09-08 RX ADMIN — ATORVASTATIN CALCIUM 40 MILLIGRAM(S): 80 TABLET, FILM COATED ORAL at 22:07

## 2020-09-08 RX ADMIN — Medication 100 MILLIGRAM(S): at 22:52

## 2020-09-08 RX ADMIN — SODIUM CHLORIDE 75 MILLILITER(S): 9 INJECTION, SOLUTION INTRAVENOUS at 00:23

## 2020-09-08 RX ADMIN — ENOXAPARIN SODIUM 40 MILLIGRAM(S): 100 INJECTION SUBCUTANEOUS at 11:54

## 2020-09-08 RX ADMIN — Medication 200 MILLIGRAM(S): at 17:55

## 2020-09-08 RX ADMIN — HYDROMORPHONE HYDROCHLORIDE 0.5 MILLIGRAM(S): 2 INJECTION INTRAMUSCULAR; INTRAVENOUS; SUBCUTANEOUS at 22:35

## 2020-09-08 RX ADMIN — SODIUM CHLORIDE 75 MILLILITER(S): 9 INJECTION, SOLUTION INTRAVENOUS at 11:55

## 2020-09-08 RX ADMIN — HYDROMORPHONE HYDROCHLORIDE 0.5 MILLIGRAM(S): 2 INJECTION INTRAMUSCULAR; INTRAVENOUS; SUBCUTANEOUS at 05:57

## 2020-09-08 RX ADMIN — Medication 100 MICROGRAM(S): at 05:24

## 2020-09-08 RX ADMIN — Medication 100 MILLIGRAM(S): at 14:03

## 2020-09-08 RX ADMIN — Medication 1 APPLICATORFUL: at 22:08

## 2020-09-08 RX ADMIN — Medication 100 MILLIGRAM(S): at 05:21

## 2020-09-08 RX ADMIN — HYDROMORPHONE HYDROCHLORIDE 0.5 MILLIGRAM(S): 2 INJECTION INTRAMUSCULAR; INTRAVENOUS; SUBCUTANEOUS at 06:12

## 2020-09-08 RX ADMIN — Medication 200 MILLIGRAM(S): at 06:48

## 2020-09-08 NOTE — CONSULT NOTE PEDS - SUBJECTIVE AND OBJECTIVE BOX
HPI:  50 year old female with PMH of HTN, HLD, hypothyroidism, IBS, recurrent episodes of diverticulitis recently discharged on PO augmentin for diverticulitis on 8/31/20 who presents to the ED with acute worsening LLQ and suprapubic pain. Patient states initially she was improving on PO antibiotics but she developed sharp 9-10 out 10 pains starting Thursday evening associated with nausea and decreased PO intake. Denies any fevers, chills, chest pain SOB, vomiting, diarrhea, constipation, nor dysuria. Last BM yesterday.     In the ED, vitals stable. Labs notable for leukocytosis to 12.21 from 6K on discharge last week. CT abd/pelv showing previous areas of sigmoid diverticulitis improved/resolved but also with new short segment of acute mid to distal sigmoid diverticulitis. (05 Sep 2020 17:09)    Interval Events/hospital course:   Patient with improving pain, now tolerable and controlled by pain medication. Patient was nauseated through yesterday evening, now feeling she can tolerate clear liquids without nausea or pain. GI fxn +/+. Has been hemodynamically stable through hospital stay. Remains afebrile. WBC count downtrending (12.2-->7-->5.5-->4.8). General surgery consulted for eventual surgical management of recurrent diverticulitis.       PAST MEDICAL & SURGICAL HISTORY:  Hypertension  Sleep apnea: nasal mask- settings unknown  Hypothyroid  Diverticulitis: last hospitalized 9.19  h/o Heart Palpitations  Irritable Bowel Syndrome  H/O vaginal surgery: vaginal mesh  h/o dental implant  History of Colonoscopy  h/o  Endoscopy  C Section - x 1 - 1994      MEDICATIONS  (STANDING):  atorvastatin 40 milliGRAM(s) Oral at bedtime  chlorhexidine 2% Cloths 1 Application(s) Topical daily  ciprofloxacin   IVPB 400 milliGRAM(s) IV Intermittent every 12 hours  clotrimazole 2% Vaginal Cream 1 Applicatorful Vaginal at bedtime  dextrose 5% + sodium chloride 0.45%. 1000 milliLiter(s) (75 mL/Hr) IV Continuous <Continuous>  enoxaparin Injectable 40 milliGRAM(s) SubCutaneous daily  levothyroxine 100 MICROGram(s) Oral daily  metroNIDAZOLE  IVPB 500 milliGRAM(s) IV Intermittent every 8 hours    MEDICATIONS  (PRN):  HYDROmorphone  Injectable 0.5 milliGRAM(s) IV Push every 4 hours PRN moderate and severe pain  ondansetron Injectable 4 milliGRAM(s) IV Push every 8 hours PRN Nausea and/or Vomiting      Allergies  iodine (Hives)  Zosyn (Urticaria)    Intolerances  No known intolerances    SOCIAL HISTORY:  Non smoker  Social drinker    FAMILY HISTORY:  Family history of early CAD: Father-45, Paternal Aunt Maternal Uncle  Family history of colon cancer (Grandparent)      Physical Exam:  General: NAD, resting comfortably  HEENT: NC/AT, EOMI, normal hearing, neck supple, CN II-XII in tact  Pulmonary: normal resp effort on room air  Cardiovascular: NSR, no murmurs  Abdominal: soft, non-distended, tender to palpation in lower quadrants (L>>R), no organomegaly  Extremities: WWP, normal strength, no clubbing/cyanosis/edema    Vital Signs Last 24 Hrs  T(C): 36.8 (08 Sep 2020 07:30), Max: 36.9 (07 Sep 2020 15:45)  T(F): 98.2 (08 Sep 2020 07:30), Max: 98.4 (07 Sep 2020 15:45)  HR: 77 (08 Sep 2020 07:30) (62 - 77)  BP: 106/70 (08 Sep 2020 07:30) (106/70 - 121/83)  BP(mean): --  RR: 18 (08 Sep 2020 07:30) (18 - 18)  SpO2: 98% (08 Sep 2020 07:30) (97% - 99%)    I&O's Summary    07 Sep 2020 07:01  -  08 Sep 2020 07:00  --------------------------------------------------------  IN: 1170 mL / OUT: 0 mL / NET: 1170 mL          LABS:                        11.4   4.81  )-----------( 342      ( 08 Sep 2020 07:13 )             35.2     09-08    136  |  102  |  4<L>  ----------------------------<  85  3.6   |  23  |  0.63    Ca    8.9      08 Sep 2020 07:11  Phos  2.4     09-07  Mg     2.0     09-07        RADIOLOGY & ADDITIONAL STUDIES:  < from: CT Abdomen and Pelvis No Cont (09.05.20 @ 01:41) >  FINDINGS:    LOWER CHEST: No consolidation or pleural effusion. Trace bibasilar dependent atelectasis.    LIVER: Within normal limits.  BILE DUCTS: Normal caliber.  GALLBLADDER: Within normal limits.  SPLEEN: Within normal limits.  PANCREAS: Within normal limits.  ADRENALS: Within normal limits.  KIDNEYS/URETERS: Withinnormal limits.  BLADDER: Collapsed limiting detailed evaluation.  REPRODUCTIVE ORGANS: Unchanged 2.1 cm right adnexal simple cyst. Uterus and left adnexa are within normal limits.  BOWEL: No bowel obstruction. Again seen is wall thickening with minimal surrounding fat stranding of the proximal sigmoid colon with multiple diverticuli, improved to nearly resolved compared to prior study. There is new short segmental wall thickening and surrounding fatty stranding at the level of mid to distal sigmoid colonic diverticuli just distal to the original area of previous diverticulitis, concerning for a new segment of acute diverticulitis. Appendix is normal.  PERITONEUM: No ascites. No pneumoperitoneum.  VESSELS: Within normal limits.  RETROPERITONEUM/LYMPH NODES: No lymphadenopathy.  ABDOMINAL WALL: Within normal limits.  BONES: Advanced degenerative disc disease at L5-S1 with posterior osteophytic ridging and facet hypertrophy, resulting in bilateral neural foramina narrowing at this level.Grade 1 retrolisthesis of L5 on S1.    IMPRESSION:    Previously identified short segment proximal sigmoid diverticulitis appears improved to nearly resolved.    New short segment of acute mid to distal sigmoid diverticulitis as detailed above. No bowel obstruction, perforation or abscess. Recommend nonemergent follow-up colonoscopy once symptoms have resolved to exclude underlying mass.    < end of copied text >

## 2020-09-08 NOTE — CONSULT NOTE ADULT - ASSESSMENT
49 yo woman with PMH of HTN, HLD, hypothyroidism, IBS, multiple episodes of diverticulitis, recently discharged on PO Augmentin presenting w/ new segment of sigmoid diverticulitis distal to the original area.     Plan:   - Agree with continuation of IV abx (Cipro and Flagyl) as patient had recurrent episode while on PO outpatient abx  - Can advance diet tomorrow if patient tolerates CLD w/o nausea and vomiting throughout the day  - No acute surgical intervention required  - Patient can follow up outpatient to discuss LAR for definitive treatment of recurrent sigmoid diverticulitis  - Plan was discussed with patient at bedside    Julianne Zepeda, PGY2  [facility]  [phone]

## 2020-09-08 NOTE — PROGRESS NOTE ADULT - SUBJECTIVE AND OBJECTIVE BOX
Patient is a 50y old  Female who presents with a chief complaint of Abdominal pain / Diverticulitis (06 Sep 2020 09:39)      SUBJECTIVE / OVERNIGHT EVENTS: patient reports nausea and lower abdominal discomfort, awaiting GI eval today    MEDICATIONS  (STANDING):  atorvastatin 40 milliGRAM(s) Oral at bedtime  chlorhexidine 2% Cloths 1 Application(s) Topical daily  ciprofloxacin   IVPB 400 milliGRAM(s) IV Intermittent every 12 hours  clotrimazole 2% Vaginal Cream 1 Applicatorful Vaginal at bedtime  dextrose 5% + sodium chloride 0.45%. 1000 milliLiter(s) (75 mL/Hr) IV Continuous <Continuous>  enoxaparin Injectable 40 milliGRAM(s) SubCutaneous daily  levothyroxine 100 MICROGram(s) Oral daily  metroNIDAZOLE  IVPB 500 milliGRAM(s) IV Intermittent every 8 hours    MEDICATIONS  (PRN):  HYDROmorphone  Injectable 0.5 milliGRAM(s) IV Push every 4 hours PRN moderate and severe pain  ondansetron Injectable 4 milliGRAM(s) IV Push every 8 hours PRN Nausea and/or Vomiting      Vital Signs Last 24 Hrs  T(C): 36.8 (08 Sep 2020 07:30), Max: 36.9 (07 Sep 2020 15:45)  T(F): 98.2 (08 Sep 2020 07:30), Max: 98.4 (07 Sep 2020 15:45)  HR: 77 (08 Sep 2020 07:30) (62 - 77)  BP: 106/70 (08 Sep 2020 07:30) (106/70 - 121/83)  BP(mean): --  RR: 18 (08 Sep 2020 07:30) (18 - 18)  SpO2: 98% (08 Sep 2020 07:30) (97% - 99%)  CAPILLARY BLOOD GLUCOSE        I&O's Summary    07 Sep 2020 07:01  -  08 Sep 2020 07:00  --------------------------------------------------------  IN: 1170 mL / OUT: 0 mL / NET: 1170 mL        PHYSICAL EXAM:  GENERAL: NAD  HEAD:  Atraumatic, Normocephalic  EYES: conjunctiva and sclera clear  CHEST/LUNG: Clear to auscultation bilaterally; No wheeze  HEART: +S1/S2, reg   ABDOMEN: Soft, Nontender, mild discomfort on plapation in the lower abdomen   EXTREMITIES:  no LE edema   PSYCH: AAOx3      LABS:                        11.4   4.81  )-----------( 342      ( 08 Sep 2020 07:13 )             35.2     09-08    136  |  102  |  4<L>  ----------------------------<  85  3.6   |  23  |  0.63    Ca    8.9      08 Sep 2020 07:11  Phos  2.4     09-07  Mg     2.0     09-07

## 2020-09-08 NOTE — CONSULT NOTE PEDS - ASSESSMENT
51 yo woman with PMH of HTN, HLD, hypothyroidism, IBS, multiple episodes of diverticulitis, recently discharged on PO Augmentin presenting w/ new segment of sigmoid diverticulitis distal to the original area.     Plan:   - Agree with continuation of IV abx (Cipro and Flagyl) as patient had recurrent episode while on PO outpatient abx  - Can advance diet tomorrow if patient tolerates CLD w/o nausea and vomiting throughout the day  - No acute surgical intervention required  - Patient can follow up outpatient to discuss LAR for definitive treatment of recurrent sigmoid diverticulitis  - Plan was discussed with patient at bedside    Julianne Zepeda, PGY2  [facility]  [phone]

## 2020-09-09 LAB
ANION GAP SERPL CALC-SCNC: 9 MMOL/L — SIGNIFICANT CHANGE UP (ref 5–17)
BUN SERPL-MCNC: <4 MG/DL — LOW (ref 7–23)
CALCIUM SERPL-MCNC: 9.2 MG/DL — SIGNIFICANT CHANGE UP (ref 8.4–10.5)
CHLORIDE SERPL-SCNC: 103 MMOL/L — SIGNIFICANT CHANGE UP (ref 96–108)
CO2 SERPL-SCNC: 26 MMOL/L — SIGNIFICANT CHANGE UP (ref 22–31)
CREAT SERPL-MCNC: 0.7 MG/DL — SIGNIFICANT CHANGE UP (ref 0.5–1.3)
GLUCOSE SERPL-MCNC: 88 MG/DL — SIGNIFICANT CHANGE UP (ref 70–99)
HCT VFR BLD CALC: 35.4 % — SIGNIFICANT CHANGE UP (ref 34.5–45)
HGB BLD-MCNC: 11.4 G/DL — LOW (ref 11.5–15.5)
MCHC RBC-ENTMCNC: 30.4 PG — SIGNIFICANT CHANGE UP (ref 27–34)
MCHC RBC-ENTMCNC: 32.2 GM/DL — SIGNIFICANT CHANGE UP (ref 32–36)
MCV RBC AUTO: 94.4 FL — SIGNIFICANT CHANGE UP (ref 80–100)
NRBC # BLD: 0 /100 WBCS — SIGNIFICANT CHANGE UP (ref 0–0)
PLATELET # BLD AUTO: 342 K/UL — SIGNIFICANT CHANGE UP (ref 150–400)
POTASSIUM SERPL-MCNC: 3.9 MMOL/L — SIGNIFICANT CHANGE UP (ref 3.5–5.3)
POTASSIUM SERPL-SCNC: 3.9 MMOL/L — SIGNIFICANT CHANGE UP (ref 3.5–5.3)
RBC # BLD: 3.75 M/UL — LOW (ref 3.8–5.2)
RBC # FLD: 12.2 % — SIGNIFICANT CHANGE UP (ref 10.3–14.5)
SODIUM SERPL-SCNC: 138 MMOL/L — SIGNIFICANT CHANGE UP (ref 135–145)
WBC # BLD: 4.75 K/UL — SIGNIFICANT CHANGE UP (ref 3.8–10.5)
WBC # FLD AUTO: 4.75 K/UL — SIGNIFICANT CHANGE UP (ref 3.8–10.5)

## 2020-09-09 PROCEDURE — 99233 SBSQ HOSP IP/OBS HIGH 50: CPT

## 2020-09-09 RX ORDER — PANTOPRAZOLE SODIUM 20 MG/1
40 TABLET, DELAYED RELEASE ORAL DAILY
Refills: 0 | Status: DISCONTINUED | OUTPATIENT
Start: 2020-09-10 | End: 2020-09-11

## 2020-09-09 RX ORDER — PANTOPRAZOLE SODIUM 20 MG/1
TABLET, DELAYED RELEASE ORAL
Refills: 0 | Status: DISCONTINUED | OUTPATIENT
Start: 2020-09-09 | End: 2020-09-11

## 2020-09-09 RX ORDER — PANTOPRAZOLE SODIUM 20 MG/1
40 TABLET, DELAYED RELEASE ORAL ONCE
Refills: 0 | Status: COMPLETED | OUTPATIENT
Start: 2020-09-09 | End: 2020-09-09

## 2020-09-09 RX ADMIN — PANTOPRAZOLE SODIUM 40 MILLIGRAM(S): 20 TABLET, DELAYED RELEASE ORAL at 12:07

## 2020-09-09 RX ADMIN — Medication 100 MICROGRAM(S): at 05:14

## 2020-09-09 RX ADMIN — Medication 200 MILLIGRAM(S): at 18:35

## 2020-09-09 RX ADMIN — Medication 200 MILLIGRAM(S): at 06:44

## 2020-09-09 RX ADMIN — CHLORHEXIDINE GLUCONATE 1 APPLICATION(S): 213 SOLUTION TOPICAL at 12:08

## 2020-09-09 RX ADMIN — ENOXAPARIN SODIUM 40 MILLIGRAM(S): 100 INJECTION SUBCUTANEOUS at 12:07

## 2020-09-09 RX ADMIN — Medication 1 APPLICATORFUL: at 22:03

## 2020-09-09 RX ADMIN — ONDANSETRON 4 MILLIGRAM(S): 8 TABLET, FILM COATED ORAL at 18:33

## 2020-09-09 RX ADMIN — HYDROMORPHONE HYDROCHLORIDE 0.5 MILLIGRAM(S): 2 INJECTION INTRAMUSCULAR; INTRAVENOUS; SUBCUTANEOUS at 23:07

## 2020-09-09 RX ADMIN — Medication 100 MILLIGRAM(S): at 15:51

## 2020-09-09 RX ADMIN — ATORVASTATIN CALCIUM 40 MILLIGRAM(S): 80 TABLET, FILM COATED ORAL at 21:59

## 2020-09-09 RX ADMIN — Medication 100 MILLIGRAM(S): at 05:10

## 2020-09-09 NOTE — PROGRESS NOTE ADULT - SUBJECTIVE AND OBJECTIVE BOX
SURGERY DAILY PROGRESS NOTE:    S:   Patient seen and examined. No acute events overnight. Continues to tolerate clear liquid diet w/o N/V. Pain is minimal. GI fxn +/+.     O:   Exam:  Gen: NAD. A&Ox3.  Well developed, alert and cooperative.   Resp: No additional work of breathing.   Card: RR. No peripheral edema or pallor.   Abd: Soft, ND, tender in left lower quadrant w/o N/V.  Ext: WWP. Able to move all extremities equally.    Vital Signs Last 24 Hrs  T(C): 36.4 (09 Sep 2020 00:09), Max: 36.8 (08 Sep 2020 07:30)  T(F): 97.6 (09 Sep 2020 00:09), Max: 98.2 (08 Sep 2020 07:30)  HR: 58 (09 Sep 2020 00:09) (58 - 77)  BP: 97/61 (09 Sep 2020 00:09) (97/61 - 129/72)  BP(mean): --  RR: 18 (09 Sep 2020 00:09) (18 - 18)  SpO2: 99% (09 Sep 2020 00:09) (98% - 99%)      09-07-20 @ 07:01  -  09-08-20 @ 07:00  --------------------------------------------------------  IN: 1170 mL / OUT: 0 mL / NET: 1170 mL    09-08-20 @ 07:01  -  09-09-20 @ 06:20  --------------------------------------------------------  IN: 100 mL / OUT: 0 mL / NET: 100 mL        LABS:                        11.4   4.81  )-----------( 342      ( 08 Sep 2020 07:13 )             35.2     09-08    136  |  102  |  4<L>  ----------------------------<  85  3.6   |  23  |  0.63    Ca    8.9      08 Sep 2020 07:11  Phos  2.4     09-07  Mg     2.0     09-07 SURGERY DAILY PROGRESS NOTE:    S:   Patient seen and examined. No acute events overnight. Continues to tolerate clear liquid diet w/o emesis. Complaining of acid reflux causing nausea, states this has happened to her before unrelated to diverticulitis and she would normally eat crackers/bland bread. Pain is minimal. GI fxn +/+.     O:   Exam:  Gen: NAD. A&Ox3.  Well developed, alert and cooperative.   Resp: No additional work of breathing.   Card: RR. No peripheral edema or pallor.   Abd: Soft, ND, non tender in all quadrants.  Ext: WWP. Able to move all extremities equally.    Vital Signs Last 24 Hrs  T(C): 36.4 (09 Sep 2020 00:09), Max: 36.8 (08 Sep 2020 07:30)  T(F): 97.6 (09 Sep 2020 00:09), Max: 98.2 (08 Sep 2020 07:30)  HR: 58 (09 Sep 2020 00:09) (58 - 77)  BP: 97/61 (09 Sep 2020 00:09) (97/61 - 129/72)  BP(mean): --  RR: 18 (09 Sep 2020 00:09) (18 - 18)  SpO2: 99% (09 Sep 2020 00:09) (98% - 99%)      09-07-20 @ 07:01  -  09-08-20 @ 07:00  --------------------------------------------------------  IN: 1170 mL / OUT: 0 mL / NET: 1170 mL    09-08-20 @ 07:01  -  09-09-20 @ 06:20  --------------------------------------------------------  IN: 100 mL / OUT: 0 mL / NET: 100 mL        LABS:                        11.4   4.81  )-----------( 342      ( 08 Sep 2020 07:13 )             35.2     09-08    136  |  102  |  4<L>  ----------------------------<  85  3.6   |  23  |  0.63    Ca    8.9      08 Sep 2020 07:11  Phos  2.4     09-07  Mg     2.0     09-07

## 2020-09-09 NOTE — PROGRESS NOTE ADULT - SUBJECTIVE AND OBJECTIVE BOX
Patient is a 50y old  Female who presents with a chief complaint of Abdominal pain / Diverticulitis (06 Sep 2020 09:39)      SUBJECTIVE / OVERNIGHT EVENTS: no acute events overnight, abd pain is improved but still has on and off nausea. Tolerated clears.     MEDICATIONS  (STANDING):  atorvastatin 40 milliGRAM(s) Oral at bedtime  chlorhexidine 2% Cloths 1 Application(s) Topical daily  ciprofloxacin   IVPB 400 milliGRAM(s) IV Intermittent every 12 hours  clotrimazole 2% Vaginal Cream 1 Applicatorful Vaginal at bedtime  enoxaparin Injectable 40 milliGRAM(s) SubCutaneous daily  levothyroxine 100 MICROGram(s) Oral daily  metroNIDAZOLE  IVPB 500 milliGRAM(s) IV Intermittent every 8 hours  pantoprazole  Injectable        MEDICATIONS  (PRN):  HYDROmorphone  Injectable 0.5 milliGRAM(s) IV Push every 4 hours PRN moderate and severe pain  ondansetron Injectable 4 milliGRAM(s) IV Push every 8 hours PRN Nausea and/or Vomiting      Vital Signs Last 24 Hrs  T(C): 36.8 (09 Sep 2020 08:35), Max: 36.8 (09 Sep 2020 08:35)  T(F): 98.3 (09 Sep 2020 08:35), Max: 98.3 (09 Sep 2020 08:35)  HR: 59 (09 Sep 2020 08:35) (58 - 65)  BP: 113/65 (09 Sep 2020 08:35) (97/61 - 129/72)  BP(mean): --  RR: 18 (09 Sep 2020 08:35) (18 - 18)  SpO2: 98% (09 Sep 2020 08:35) (98% - 99%)  CAPILLARY BLOOD GLUCOSE        I&O's Summary    08 Sep 2020 07:01  -  09 Sep 2020 07:00  --------------------------------------------------------  IN: 1300 mL / OUT: 0 mL / NET: 1300 mL    09 Sep 2020 07:01  -  09 Sep 2020 13:53  --------------------------------------------------------  IN: 220 mL / OUT: 0 mL / NET: 220 mL      PHYSICAL EXAM:  GENERAL: NAD  EYES: conjunctiva and sclera clear  CHEST/LUNG: Clear to auscultation bilaterally; No wheeze  HEART: +S1/S2, reg   ABDOMEN: Soft, Nontender  EXTREMITIES: no LE edema   PSYCH: AAOx3      LABS:                        11.4   4.75  )-----------( 342      ( 09 Sep 2020 07:09 )             35.4     09-09    138  |  103  |  <4<L>  ----------------------------<  88  3.9   |  26  |  0.70    Ca    9.2      09 Sep 2020 07:06

## 2020-09-09 NOTE — PROGRESS NOTE ADULT - SUBJECTIVE AND OBJECTIVE BOX
Patient is a 50y old  Female who presented with a chief complaint of Abdominal pain / Diverticulitis (06 Sep 2020 09:39)      INTERVAL HPI/OVERNIGHT EVENTS:  no lower abdominal pain  +dyspepsia with associated nausea  no emesis  no fever or chills    MEDICATIONS  (STANDING):  atorvastatin 40 milliGRAM(s) Oral at bedtime  chlorhexidine 2% Cloths 1 Application(s) Topical daily  ciprofloxacin   IVPB 400 milliGRAM(s) IV Intermittent every 12 hours  clotrimazole 2% Vaginal Cream 1 Applicatorful Vaginal at bedtime  enoxaparin Injectable 40 milliGRAM(s) SubCutaneous daily  levothyroxine 100 MICROGram(s) Oral daily  metroNIDAZOLE  IVPB 500 milliGRAM(s) IV Intermittent every 8 hours      MEDICATIONS  (PRN):  HYDROmorphone  Injectable 0.5 milliGRAM(s) IV Push every 4 hours PRN moderate and severe pain  ondansetron Injectable 4 milliGRAM(s) IV Push every 8 hours PRN Nausea and/or Vomiting    Allergies  iodine (Hives)  Zosyn (Urticaria)      Review of Systems:  General:  No wt loss, fevers, chills, night sweats, fatigue,   Eyes:  Good vision, no reported pain  ENT:  No sore throat, pain, runny nose, dysphagia  CV:  No pain, palpitations, hypo/hypertension  Resp:  No dyspnea, cough, tachypnea, wheezing  GI:  See HPI  :  No pain, bleeding, incontinence, nocturia  Muscle:  No pain, weakness  Neuro:  No weakness, tingling, memory problems  Psych:  No fatigue, insomnia, mood problems, depression  Endocrine:  No polyuria, polydipsia, cold/heat intolerance  Heme:  No petechiae, ecchymosis, easy bruisability  Skin:  No rash, edema    Vital Signs Last 24 Hrs  T(C): 36.8 (09 Sep 2020 08:35), Max: 36.8 (09 Sep 2020 08:35)  T(F): 98.3 (09 Sep 2020 08:35), Max: 98.3 (09 Sep 2020 08:35)  HR: 59 (09 Sep 2020 08:35) (58 - 65)  BP: 113/65 (09 Sep 2020 08:35) (97/61 - 129/72)  BP(mean): --  RR: 18 (09 Sep 2020 08:35) (18 - 18)  SpO2: 98% (09 Sep 2020 08:35) (98% - 99%)    PHYSICAL EXAM:  GENERAL:  Appears stated age, well-groomed, well-nourished, no distress sitting up in bed  HEENT:  NC/AT,  conjunctivae clear and pink  CHEST:  Full & symmetric excursion, no increased effort  HEART:  Regular rhythm, no JVD  ABDOMEN:  Soft, NT non-distended, normoactive bowel sounds,  no rebound, guarding or rigidity  EXTREMITIES:  no cyanosis, clubbing or edema  SKIN:  No rash/erythema/ecchymoses/petechiae/wounds/abscess/warm/dry  NEURO:  Alert, oriented, nonfocal      LABS:                        11.4   4.75  )-----------( 342      ( 09 Sep 2020 07:09 )             35.4     09-09    138  |  103  |  <4<L>  ----------------------------<  88  3.9   |  26  |  0.70    Ca    9.2      09 Sep 2020 07:06      RADIOLOGY & ADDITIONAL TESTS:

## 2020-09-10 LAB
CULTURE RESULTS: SIGNIFICANT CHANGE UP
CULTURE RESULTS: SIGNIFICANT CHANGE UP
SPECIMEN SOURCE: SIGNIFICANT CHANGE UP
SPECIMEN SOURCE: SIGNIFICANT CHANGE UP

## 2020-09-10 PROCEDURE — 99232 SBSQ HOSP IP/OBS MODERATE 35: CPT | Mod: GC

## 2020-09-10 PROCEDURE — 99233 SBSQ HOSP IP/OBS HIGH 50: CPT

## 2020-09-10 PROCEDURE — 99232 SBSQ HOSP IP/OBS MODERATE 35: CPT

## 2020-09-10 RX ORDER — ONDANSETRON 8 MG/1
4 TABLET, FILM COATED ORAL ONCE
Refills: 0 | Status: COMPLETED | OUTPATIENT
Start: 2020-09-10 | End: 2020-09-10

## 2020-09-10 RX ADMIN — Medication 200 MILLIGRAM(S): at 08:45

## 2020-09-10 RX ADMIN — ONDANSETRON 4 MILLIGRAM(S): 8 TABLET, FILM COATED ORAL at 13:32

## 2020-09-10 RX ADMIN — ATORVASTATIN CALCIUM 40 MILLIGRAM(S): 80 TABLET, FILM COATED ORAL at 21:17

## 2020-09-10 RX ADMIN — ENOXAPARIN SODIUM 40 MILLIGRAM(S): 100 INJECTION SUBCUTANEOUS at 08:55

## 2020-09-10 RX ADMIN — Medication 100 MILLIGRAM(S): at 13:09

## 2020-09-10 RX ADMIN — HYDROMORPHONE HYDROCHLORIDE 0.5 MILLIGRAM(S): 2 INJECTION INTRAMUSCULAR; INTRAVENOUS; SUBCUTANEOUS at 00:00

## 2020-09-10 RX ADMIN — HYDROMORPHONE HYDROCHLORIDE 0.5 MILLIGRAM(S): 2 INJECTION INTRAMUSCULAR; INTRAVENOUS; SUBCUTANEOUS at 23:05

## 2020-09-10 RX ADMIN — HYDROMORPHONE HYDROCHLORIDE 0.5 MILLIGRAM(S): 2 INJECTION INTRAMUSCULAR; INTRAVENOUS; SUBCUTANEOUS at 04:15

## 2020-09-10 RX ADMIN — Medication 100 MILLIGRAM(S): at 21:16

## 2020-09-10 RX ADMIN — ONDANSETRON 4 MILLIGRAM(S): 8 TABLET, FILM COATED ORAL at 18:32

## 2020-09-10 RX ADMIN — Medication 100 MICROGRAM(S): at 05:52

## 2020-09-10 RX ADMIN — Medication 200 MILLIGRAM(S): at 21:16

## 2020-09-10 RX ADMIN — PANTOPRAZOLE SODIUM 40 MILLIGRAM(S): 20 TABLET, DELAYED RELEASE ORAL at 09:00

## 2020-09-10 RX ADMIN — Medication 100 MILLIGRAM(S): at 05:52

## 2020-09-10 RX ADMIN — HYDROMORPHONE HYDROCHLORIDE 0.5 MILLIGRAM(S): 2 INJECTION INTRAMUSCULAR; INTRAVENOUS; SUBCUTANEOUS at 22:50

## 2020-09-10 RX ADMIN — HYDROMORPHONE HYDROCHLORIDE 0.5 MILLIGRAM(S): 2 INJECTION INTRAMUSCULAR; INTRAVENOUS; SUBCUTANEOUS at 03:45

## 2020-09-10 NOTE — PROGRESS NOTE ADULT - SUBJECTIVE AND OBJECTIVE BOX
Patient is a 50y old  Female who presents with a chief complaint of Abdominal pain / Diverticulitis (06 Sep 2020 09:39)      SUBJECTIVE / OVERNIGHT EVENTS: patient tolerated dinner last night, but required IV pain meds and also has ongoing nausea. No vomiting. Abd pain is improved.     MEDICATIONS  (STANDING):  atorvastatin 40 milliGRAM(s) Oral at bedtime  chlorhexidine 2% Cloths 1 Application(s) Topical daily  ciprofloxacin   IVPB 400 milliGRAM(s) IV Intermittent every 12 hours  clotrimazole 2% Vaginal Cream 1 Applicatorful Vaginal at bedtime  enoxaparin Injectable 40 milliGRAM(s) SubCutaneous daily  levothyroxine 100 MICROGram(s) Oral daily  metroNIDAZOLE  IVPB 500 milliGRAM(s) IV Intermittent every 8 hours  pantoprazole  Injectable      pantoprazole  Injectable 40 milliGRAM(s) IV Push daily    MEDICATIONS  (PRN):  HYDROmorphone  Injectable 0.5 milliGRAM(s) IV Push every 4 hours PRN moderate and severe pain  ondansetron Injectable 4 milliGRAM(s) IV Push every 8 hours PRN Nausea and/or Vomiting      Vital Signs Last 24 Hrs  T(C): 36.7 (10 Sep 2020 08:15), Max: 36.8 (09 Sep 2020 16:09)  T(F): 98 (10 Sep 2020 08:15), Max: 98.2 (09 Sep 2020 16:09)  HR: 70 (10 Sep 2020 08:15) (64 - 70)  BP: 122/84 (10 Sep 2020 08:15) (121/82 - 127/81)  BP(mean): --  RR: 18 (10 Sep 2020 08:15) (18 - 18)  SpO2: 100% (10 Sep 2020 08:15) (98% - 100%)  CAPILLARY BLOOD GLUCOSE        I&O's Summary    09 Sep 2020 07:01  -  10 Sep 2020 07:00  --------------------------------------------------------  IN: 500 mL / OUT: 0 mL / NET: 500 mL    10 Sep 2020 07:01  -  10 Sep 2020 13:01  --------------------------------------------------------  IN: 220 mL / OUT: 0 mL / NET: 220 mL        PHYSICAL EXAM:  GENERAL: NAD  EYES: conjunctiva and sclera clear  CHEST/LUNG: Clear to auscultation bilaterally; No wheeze  HEART: +S1/S2, reg   ABDOMEN: Soft, Nontender, Nondistended  EXTREMITIES: no LE edema   PSYCH: AAOx3      LABS:                        11.4   4.75  )-----------( 342      ( 09 Sep 2020 07:09 )             35.4     09-09    138  |  103  |  <4<L>  ----------------------------<  88  3.9   |  26  |  0.70    Ca    9.2      09 Sep 2020 07:06

## 2020-09-10 NOTE — PROGRESS NOTE ADULT - SUBJECTIVE AND OBJECTIVE BOX
SURGERY DAILY PROGRESS NOTE:    S:   Patient seen and examined. No acute events overnight. Advanced to a regular; tolerating w/o nausea/ emesis. Pain is minimal. GI fxn +/+.     O:   Exam:  Gen: NAD. A&Ox3.  Well developed, alert and cooperative.   Resp: No additional work of breathing.   Card: RR. No peripheral edema or pallor.   Abd: Soft, ND, non tender in all quadrants.  Ext: WWP. Able to move all extremities equally.      Vitals   Vital Signs Last 24 Hrs  T(C): 36.4 (10 Sep 2020 00:14), Max: 36.8 (09 Sep 2020 08:35)  T(F): 97.6 (10 Sep 2020 00:14), Max: 98.3 (09 Sep 2020 08:35)  HR: 64 (10 Sep 2020 00:14) (59 - 65)  BP: 127/81 (10 Sep 2020 00:14) (113/65 - 127/81)  BP(mean): --  RR: 18 (10 Sep 2020 00:14) (18 - 18)  SpO2: 98% (10 Sep 2020 00:14) (98% - 100%)    LABS:                        11.4   4.75  )-----------( 342      ( 09 Sep 2020 07:09 )             35.4     09-09    138  |  103  |  <4<L>  ----------------------------<  88  3.9   |  26  |  0.70    Ca    9.2      09 Sep 2020 07:06            I&O's Detail    08 Sep 2020 07:01  -  09 Sep 2020 07:00  --------------------------------------------------------  IN:    dextrose 5% + sodium chloride 0.45%.: 900 mL    IV PiggyBack: 400 mL  Total IN: 1300 mL    OUT:  Total OUT: 0 mL    Total NET: 1300 mL      09 Sep 2020 07:01  -  10 Sep 2020 06:02  --------------------------------------------------------  IN:    Oral Fluid: 500 mL  Total IN: 500 mL    OUT:  Total OUT: 0 mL    Total NET: 500 mL SURGERY DAILY PROGRESS NOTE:    S:   Patient seen and examined. No acute events overnight. Advanced to a regular; tolerating w/ nausea but w/o emesis. Patient states that she often has nausea with antibiotics. Pain is minimal. GI fxn +/+.     O:   Exam:  Gen: NAD. A&Ox3.  Well developed, alert and cooperative.   Resp: No additional work of breathing.   Card: RR. No peripheral edema or pallor.   Abd: Soft, ND, non tender in all quadrants.  Ext: WWP. Able to move all extremities equally.      Vitals   Vital Signs Last 24 Hrs  T(C): 36.4 (10 Sep 2020 00:14), Max: 36.8 (09 Sep 2020 08:35)  T(F): 97.6 (10 Sep 2020 00:14), Max: 98.3 (09 Sep 2020 08:35)  HR: 64 (10 Sep 2020 00:14) (59 - 65)  BP: 127/81 (10 Sep 2020 00:14) (113/65 - 127/81)  BP(mean): --  RR: 18 (10 Sep 2020 00:14) (18 - 18)  SpO2: 98% (10 Sep 2020 00:14) (98% - 100%)    LABS:                        11.4   4.75  )-----------( 342      ( 09 Sep 2020 07:09 )             35.4     09-09    138  |  103  |  <4<L>  ----------------------------<  88  3.9   |  26  |  0.70    Ca    9.2      09 Sep 2020 07:06            I&O's Detail    08 Sep 2020 07:01  -  09 Sep 2020 07:00  --------------------------------------------------------  IN:    dextrose 5% + sodium chloride 0.45%.: 900 mL    IV PiggyBack: 400 mL  Total IN: 1300 mL    OUT:  Total OUT: 0 mL    Total NET: 1300 mL      09 Sep 2020 07:01  -  10 Sep 2020 06:02  --------------------------------------------------------  IN:    Oral Fluid: 500 mL  Total IN: 500 mL    OUT:  Total OUT: 0 mL    Total NET: 500 mL

## 2020-09-10 NOTE — PROGRESS NOTE ADULT - SUBJECTIVE AND OBJECTIVE BOX
Patient is a 50y old  Female who presented with a chief complaint of Abdominal pain / Diverticulitis (06 Sep 2020 09:39)      INTERVAL HPI/OVERNIGHT EVENTS:  dyspepsia improved with addition of PPI  tolerating PO but some nausea  required 1 dose of dilaudid overnight  +flatus, no BM    no fever or chills  no abdominal pain at present  overall continues to feel better   very nervous/anxious about possibility of surgery    MEDICATIONS  (STANDING):  atorvastatin 40 milliGRAM(s) Oral at bedtime  chlorhexidine 2% Cloths 1 Application(s) Topical daily  ciprofloxacin   IVPB 400 milliGRAM(s) IV Intermittent every 12 hours  clotrimazole 2% Vaginal Cream 1 Applicatorful Vaginal at bedtime  enoxaparin Injectable 40 milliGRAM(s) SubCutaneous daily  levothyroxine 100 MICROGram(s) Oral daily  metroNIDAZOLE  IVPB 500 milliGRAM(s) IV Intermittent every 8 hours  pantoprazole  Injectable      pantoprazole  Injectable 40 milliGRAM(s) IV Push daily    MEDICATIONS  (PRN):  HYDROmorphone  Injectable 0.5 milliGRAM(s) IV Push every 4 hours PRN moderate and severe pain  ondansetron Injectable 4 milliGRAM(s) IV Push every 8 hours PRN Nausea and/or Vomiting      Allergies  iodine (Hives)  Zosyn (Urticaria)      Review of Systems:  General:  No wt loss, fevers, chills, night sweats, fatigue,   Eyes:  Good vision, no reported pain  ENT:  No sore throat, pain, runny nose, dysphagia  CV:  No pain, palpitations, hypo/hypertension  Resp:  No dyspnea, cough, tachypnea, wheezing  GI:  See HPI  :  No pain, bleeding, incontinence, nocturia  Muscle:  No pain, weakness  Neuro:  No weakness, tingling, memory problems  Psych:  No fatigue, insomnia, +anxiety +IBS  Endocrine:  No polyuria, polydipsia, cold/heat intolerance  Heme:  No petechiae, ecchymosis, easy bruisability  Skin:  No rash, edema    Vital Signs Last 24 Hrs  T(C): 36.7 (10 Sep 2020 08:15), Max: 36.8 (09 Sep 2020 16:09)  T(F): 98 (10 Sep 2020 08:15), Max: 98.2 (09 Sep 2020 16:09)  HR: 70 (10 Sep 2020 08:15) (64 - 70)  BP: 122/84 (10 Sep 2020 08:15) (121/82 - 127/81)  BP(mean): --  RR: 18 (10 Sep 2020 08:15) (18 - 18)  SpO2: 100% (10 Sep 2020 08:15) (98% - 100%)    PHYSICAL EXAM:  GENERAL:  Appears stated age, well-groomed, well-nourished, no distress sitting up in bed. non toxic but anxious appearing  HEENT:  NC/AT,  conjunctivae clear and pink  CHEST:  Full & symmetric excursion, no increased effort  HEART:  Regular rhythm, no JVD  ABDOMEN:  Soft, NT non-distended, normoactive bowel sounds,  no rebound, guarding or rigidity  EXTREMITIES:  no cyanosis, clubbing or edema  SKIN:  No rash/erythema/ecchymoses/petechiae/wounds/abscess/warm/dry  NEURO:  Alert, oriented, nonfocal    LABS:                        11.4   4.75  )-----------( 342      ( 09 Sep 2020 07:09 )             35.4     09-09    138  |  103  |  <4<L>  ----------------------------<  88  3.9   |  26  |  0.70    Ca    9.2      09 Sep 2020 07:06      RADIOLOGY & ADDITIONAL TESTS:

## 2020-09-11 ENCOUNTER — TRANSCRIPTION ENCOUNTER (OUTPATIENT)
Age: 50
End: 2020-09-11

## 2020-09-11 VITALS
OXYGEN SATURATION: 99 % | DIASTOLIC BLOOD PRESSURE: 72 MMHG | RESPIRATION RATE: 18 BRPM | SYSTOLIC BLOOD PRESSURE: 130 MMHG | TEMPERATURE: 98 F | HEART RATE: 70 BPM

## 2020-09-11 PROCEDURE — 85018 HEMOGLOBIN: CPT

## 2020-09-11 PROCEDURE — 81001 URINALYSIS AUTO W/SCOPE: CPT

## 2020-09-11 PROCEDURE — 80053 COMPREHEN METABOLIC PANEL: CPT

## 2020-09-11 PROCEDURE — 99285 EMERGENCY DEPT VISIT HI MDM: CPT | Mod: 25

## 2020-09-11 PROCEDURE — 96376 TX/PRO/DX INJ SAME DRUG ADON: CPT

## 2020-09-11 PROCEDURE — 82803 BLOOD GASES ANY COMBINATION: CPT

## 2020-09-11 PROCEDURE — 83605 ASSAY OF LACTIC ACID: CPT

## 2020-09-11 PROCEDURE — 93005 ELECTROCARDIOGRAM TRACING: CPT

## 2020-09-11 PROCEDURE — 82947 ASSAY GLUCOSE BLOOD QUANT: CPT

## 2020-09-11 PROCEDURE — 85014 HEMATOCRIT: CPT

## 2020-09-11 PROCEDURE — 87040 BLOOD CULTURE FOR BACTERIA: CPT

## 2020-09-11 PROCEDURE — 74176 CT ABD & PELVIS W/O CONTRAST: CPT

## 2020-09-11 PROCEDURE — 96375 TX/PRO/DX INJ NEW DRUG ADDON: CPT

## 2020-09-11 PROCEDURE — 99233 SBSQ HOSP IP/OBS HIGH 50: CPT

## 2020-09-11 PROCEDURE — 82435 ASSAY OF BLOOD CHLORIDE: CPT

## 2020-09-11 PROCEDURE — 99239 HOSP IP/OBS DSCHRG MGMT >30: CPT

## 2020-09-11 PROCEDURE — 84100 ASSAY OF PHOSPHORUS: CPT

## 2020-09-11 PROCEDURE — 96374 THER/PROPH/DIAG INJ IV PUSH: CPT

## 2020-09-11 PROCEDURE — 84295 ASSAY OF SERUM SODIUM: CPT

## 2020-09-11 PROCEDURE — 85027 COMPLETE CBC AUTOMATED: CPT

## 2020-09-11 PROCEDURE — 86769 SARS-COV-2 COVID-19 ANTIBODY: CPT

## 2020-09-11 PROCEDURE — U0003: CPT

## 2020-09-11 PROCEDURE — 82330 ASSAY OF CALCIUM: CPT

## 2020-09-11 PROCEDURE — 83735 ASSAY OF MAGNESIUM: CPT

## 2020-09-11 PROCEDURE — 84132 ASSAY OF SERUM POTASSIUM: CPT

## 2020-09-11 PROCEDURE — 84702 CHORIONIC GONADOTROPIN TEST: CPT

## 2020-09-11 PROCEDURE — 87086 URINE CULTURE/COLONY COUNT: CPT

## 2020-09-11 PROCEDURE — 80048 BASIC METABOLIC PNL TOTAL CA: CPT

## 2020-09-11 RX ORDER — CIPROFLOXACIN LACTATE 400MG/40ML
500 VIAL (ML) INTRAVENOUS EVERY 12 HOURS
Refills: 0 | Status: DISCONTINUED | OUTPATIENT
Start: 2020-09-11 | End: 2020-09-11

## 2020-09-11 RX ORDER — METRONIDAZOLE 500 MG
1 TABLET ORAL
Qty: 24 | Refills: 0
Start: 2020-09-11 | End: 2020-09-18

## 2020-09-11 RX ORDER — METRONIDAZOLE 500 MG
250 TABLET ORAL ONCE
Refills: 0 | Status: COMPLETED | OUTPATIENT
Start: 2020-09-11 | End: 2020-09-11

## 2020-09-11 RX ORDER — ONDANSETRON 8 MG/1
1 TABLET, FILM COATED ORAL
Qty: 21 | Refills: 0
Start: 2020-09-11 | End: 2020-09-17

## 2020-09-11 RX ORDER — PANTOPRAZOLE SODIUM 20 MG/1
1 TABLET, DELAYED RELEASE ORAL
Qty: 14 | Refills: 0
Start: 2020-09-11 | End: 2020-09-24

## 2020-09-11 RX ORDER — OXYCODONE AND ACETAMINOPHEN 5; 325 MG/1; MG/1
1 TABLET ORAL EVERY 4 HOURS
Refills: 0 | Status: DISCONTINUED | OUTPATIENT
Start: 2020-09-11 | End: 2020-09-11

## 2020-09-11 RX ORDER — MOXIFLOXACIN HYDROCHLORIDE TABLETS, 400 MG 400 MG/1
1 TABLET, FILM COATED ORAL
Qty: 16 | Refills: 0
Start: 2020-09-11 | End: 2020-09-18

## 2020-09-11 RX ADMIN — HYDROMORPHONE HYDROCHLORIDE 0.5 MILLIGRAM(S): 2 INJECTION INTRAMUSCULAR; INTRAVENOUS; SUBCUTANEOUS at 04:50

## 2020-09-11 RX ADMIN — Medication 200 MILLIGRAM(S): at 09:00

## 2020-09-11 RX ADMIN — ENOXAPARIN SODIUM 40 MILLIGRAM(S): 100 INJECTION SUBCUTANEOUS at 12:14

## 2020-09-11 RX ADMIN — Medication 250 MILLIGRAM(S): at 14:08

## 2020-09-11 RX ADMIN — HYDROMORPHONE HYDROCHLORIDE 0.5 MILLIGRAM(S): 2 INJECTION INTRAMUSCULAR; INTRAVENOUS; SUBCUTANEOUS at 04:34

## 2020-09-11 RX ADMIN — Medication 100 MICROGRAM(S): at 05:32

## 2020-09-11 RX ADMIN — PANTOPRAZOLE SODIUM 40 MILLIGRAM(S): 20 TABLET, DELAYED RELEASE ORAL at 12:15

## 2020-09-11 RX ADMIN — Medication 100 MILLIGRAM(S): at 05:32

## 2020-09-11 RX ADMIN — Medication 500 MILLIGRAM(S): at 17:48

## 2020-09-11 NOTE — PROGRESS NOTE ADULT - ASSESSMENT
50 year old female with PMH of HTN, HLD, hypothyroidism, IBS, recurrent episodes of diverticulitis recently discharged on PO augmentin for diverticulitis on 8/31/20 who presents to the ED with acute worsening LLQ and suprapubic pain with CT abd/pelv showing previous areas of sigmoid diverticulitis improved/resolved but also with new short segment of acute mid to distal sigmoid diverticulitis despite being on PO augmentin.
49 yo woman with PMH of HTN, HLD, hypothyroidism, IBS, diverticulitis, presents for recurrent LLQ pain found on CT with new segment of sigmoid diverticulitis.    Impression:   # Recurrent diverticulitis: Improved leukocytosis and no fevers noted at this time. Last colonoscopy 1/2020 +hemorrhoids and sigmoid/ descending colon diverticulosis    RECS:  -Continue IV Cipro and Flagyl for now given this is now recurrent  -advance diet as tolerated (LRD)  -start PPI (IV for now, can change to PO once tolerating solids)  -Surgery following    Discussed with pt, all questions answered  Medicine attending/NP updated    Arnulfo Canela PA-C    [facility]  [phone]  After hours and weekend coverage [phone]
50 year old female with PMH of HTN, HLD, hypothyroidism, IBS, recurrent episodes of diverticulitis recently discharged on PO augmentin for diverticulitis on 8/31/20 who presents to the ED with acute worsening LLQ and suprapubic pain with CT abd/pelv showing previous areas of sigmoid diverticulitis improved/resolved but also with new short segment of acute mid to distal sigmoid diverticulitis despite being on PO augmentin.
51 yo woman with PMH of HTN, HLD, hypothyroidism, IBS, diverticulitis, presents for recurrent LLQ pain found on CT with new segment of sigmoid diverticulitis.    Impression:   # Recurrent diverticulitis: Improved leukocytosis and no fevers noted at this time. Last colonoscopy 1/2020 +hemorrhoids and sigmoid/ descending colon diverticulosis    RECS:  -Can see if pt able to tolerate PO Flagyl (250mg) dose with lunch. If tolerated, then can d/c on  PO Cipro 500 mg BID and Flagyl 250 mg PO TID today; if not able to tolerate (has previously had issues with nausea/vomiting on Flagyl 500mg) then can plan for d/c on PO Augmentin 875 BID (for total 2 week course from date of this admission). Plan d/w Dr Medrano  -Continue PO diet (LRD)  -Continue PPI (IV for now, can change to PO at discharge)    Discussed with pt, all questions answered  No GI objection to d/c plans for today; detailed as outline above.  Outpt follow -up with Dr Macias [phone]) telehealth visit next week    Arnulfo Canela PA-C    [facility]  [phone]  After hours and weekend coverage [phone], please specify "Non-Teaching service coverage"
51 yo woman with PMH of HTN, HLD, hypothyroidism, IBS, diverticulitis, presents for recurrent LLQ pain found on CT with new segment of sigmoid diverticulitis.    Impression:   # Recurrent diverticulitis: Improved leukocytosis and no fevers noted at this time. Last colonoscopy 1/2020 +hemorrhoids and sigmoid/ descending colon diverticulosis    RECS:  -Continue IV Cipro and Flagyl for now given this is now recurrent. If doing better tomorrow AM can try to see if pt able to tolerate PO Cipro 500 mg BID and Flagyl 250 mg PO TID tomorrow; if not able to tolerate (has previously had issues with nausea/vomiting on Flagyl 500mg) then can plan for d/c on PO Augmentin 875 BID (for 2 week course). Plan d/w Dr Medrano  -Continue PO diet (LRD)  -Continue PPI (IV for now, can change to PO prior to discharge)  -Surgery following    Discussed with pt, all questions answered    Arnulfo Canela PA-C    [facility]  [phone]  After hours and weekend coverage [phone]
51 yo woman with PMH of HTN, HLD, hypothyroidism, IBS, multiple episodes of diverticulitis, recently discharged on PO Augmentin presenting w/ new segment of sigmoid diverticulitis distal to the original area.     Plan:   - Tolerating regular diet  - Agree with continuation of abx (Cipro and Flagyl) upon d/c for completion of course  - No acute surgical intervention required    Patient will follow-up outpatient to discuss LAR for definitive treatment of recurrent sigmoid diverticulitis  Please call with further questions     Julianne Zepeda, PGY2  [facility]  [phone]
51 yo woman with PMH of HTN, HLD, hypothyroidism, IBS, multiple episodes of diverticulitis, recently discharged on PO Augmentin presenting w/ new segment of sigmoid diverticulitis distal to the original area.     Plan:   - Advance to regular diet  - Agree with continuation of IV abx (Cipro and Flagyl) as patient had recurrent episode while on PO outpatient abx  - No acute surgical intervention required  - Patient can follow up outpatient to discuss LAR for definitive treatment of recurrent sigmoid diverticulitis  - Plan was discussed with patient at bedside    Julianne Zepeda, PGY2  [facility]  [phone]

## 2020-09-11 NOTE — DISCHARGE NOTE PROVIDER - NSDCMRMEDTOKEN_GEN_ALL_CORE_FT
Augmentin 875 mg-125 mg oral tablet: 1 tab(s) orally every 12 hours   Crestor 10 mg oral tablet: 1 tab(s) orally once a day  levothyroxine 100 mcg (0.1 mg) oral tablet: 1 tab(s) orally once a day Cipro 500 mg oral tablet: 1 tab(s) orally every 12 hours  Crestor 10 mg oral tablet: 1 tab(s) orally once a day  levothyroxine 100 mcg (0.1 mg) oral tablet: 1 tab(s) orally once a day  metroNIDAZOLE 250 mg oral tablet: 1 tab(s) orally 3 times a day Cipro 500 mg oral tablet: 1 tab(s) orally every 12 hours  Crestor 10 mg oral tablet: 1 tab(s) orally once a day  levothyroxine 100 mcg (0.1 mg) oral tablet: 1 tab(s) orally once a day  metroNIDAZOLE 250 mg oral tablet: 1 tab(s) orally 3 times a day   ondansetron 4 mg oral tablet, disintegratin tab(s) orally every 8 hours   pantoprazole 40 mg oral delayed release tablet: 1 tab(s) orally once a day before breakfast

## 2020-09-11 NOTE — DISCHARGE NOTE PROVIDER - NSDCFUADDAPPT_GEN_ALL_CORE_FT
Please follow up with your primary care doctor within 1 week.    Please follow up with GI within 1-2 weeks of discharge.     Please follow up outpatient with general surgery.

## 2020-09-11 NOTE — PROGRESS NOTE ADULT - PROBLEM SELECTOR PROBLEM 1
Diverticulitis of colon

## 2020-09-11 NOTE — DISCHARGE NOTE PROVIDER - NSDCCPCAREPLAN_GEN_ALL_CORE_FT
PRINCIPAL DISCHARGE DIAGNOSIS  Diagnosis: Diverticulitis of colon  Assessment and Plan of Treatment: s/p IV cipro and flagyl  to complete total of 14 days  F/u outpatient with GI   F/u outpatient with general surgery for possible LAR as definitive treatment      SECONDARY DISCHARGE DIAGNOSES  Diagnosis: Hyperlipidemia  Assessment and Plan of Treatment: continue with home dose of statin  low fat/high fiber diet  exercise as tolerated    Diagnosis: Hypothyroid  Assessment and Plan of Treatment: continue with your home medications

## 2020-09-11 NOTE — PROGRESS NOTE ADULT - SUBJECTIVE AND OBJECTIVE BOX
Patient is a 50y old  Female who presented with a chief complaint of diverticulitis (10 Sep 2020 13:16)      INTERVAL HPI/OVERNIGHT EVENTS:  still some nausea, occasional dyspepsia  tolerating PO but appetite fair  +flatus but no BM    MEDICATIONS  (STANDING):  atorvastatin 40 milliGRAM(s) Oral at bedtime  chlorhexidine 2% Cloths 1 Application(s) Topical daily  ciprofloxacin     Tablet 500 milliGRAM(s) Oral every 12 hours  clotrimazole 2% Vaginal Cream 1 Applicatorful Vaginal at bedtime  enoxaparin Injectable 40 milliGRAM(s) SubCutaneous daily  levothyroxine 100 MICROGram(s) Oral daily  metroNIDAZOLE    Tablet 250 milliGRAM(s) Oral once  pantoprazole  Injectable      pantoprazole  Injectable 40 milliGRAM(s) IV Push daily    MEDICATIONS  (PRN):  ondansetron Injectable 4 milliGRAM(s) IV Push every 8 hours PRN Nausea and/or Vomiting  oxycodone    5 mG/acetaminophen 325 mG 1 Tablet(s) Oral every 4 hours PRN Moderate Pain (4 - 6)      Allergies  iodine (Hives)  Zosyn (Urticaria)      Review of Systems:  General:  No wt loss, fevers, chills, night sweats, fatigue,   Eyes:  Good vision, no reported pain  ENT:  No sore throat, pain, runny nose, dysphagia  CV:  No pain, palpitations, hypo/hypertension  Resp:  No dyspnea, cough, tachypnea, wheezing  GI:  See HPI  :  No pain, bleeding, incontinence, nocturia  Muscle:  No pain, weakness  Neuro:  No weakness, tingling, memory problems  Psych:  No fatigue, insomnia, +anxiety +IBS  Endocrine:  No polyuria, polydipsia, cold/heat intolerance  Heme:  No petechiae, ecchymosis, easy bruisability  Skin:  No rash, edema    Vital Signs Last 24 Hrs  T(C): 36.7 (11 Sep 2020 07:25), Max: 36.9 (10 Sep 2020 16:01)  T(F): 98.1 (11 Sep 2020 07:25), Max: 98.4 (10 Sep 2020 16:01)  HR: 74 (11 Sep 2020 07:25) (62 - 74)  BP: 138/84 (11 Sep 2020 07:25) (129/84 - 138/84)  BP(mean): --  RR: 18 (11 Sep 2020 07:25) (16 - 18)  SpO2: 100% (11 Sep 2020 07:25) (99% - 100%)    PHYSICAL EXAM:  GENERAL:  Appears stated age, well-groomed, well-nourished, no distress. non toxic but anxious appearing  HEENT:  NC/AT,  conjunctivae clear and pink  CHEST:  Full & symmetric excursion, no increased effort  HEART:  Regular rhythm, no JVD  ABDOMEN:  Soft, NT non-distended, normoactive bowel sounds,  no rebound, guarding or rigidity  EXTREMITIES:  no cyanosis, clubbing or edema  SKIN:  No rash/erythema/ecchymoses/petechiae/wounds/abscess/warm/dry  NEURO:  Alert, oriented, nonfocal      LABS:            RADIOLOGY & ADDITIONAL TESTS: Patient is a 50y old  Female who presented with a chief complaint of diverticulitis (10 Sep 2020 13:16)    INTERVAL HPI/OVERNIGHT EVENTS:  still some nausea, occasional dyspepsia  tolerating PO but appetite fair  +flatus but no BM    MEDICATIONS  (STANDING):  atorvastatin 40 milliGRAM(s) Oral at bedtime  chlorhexidine 2% Cloths 1 Application(s) Topical daily  ciprofloxacin     Tablet 500 milliGRAM(s) Oral every 12 hours  clotrimazole 2% Vaginal Cream 1 Applicatorful Vaginal at bedtime  enoxaparin Injectable 40 milliGRAM(s) SubCutaneous daily  levothyroxine 100 MICROGram(s) Oral daily  metroNIDAZOLE    Tablet 250 milliGRAM(s) Oral once  pantoprazole  Injectable      pantoprazole  Injectable 40 milliGRAM(s) IV Push daily    MEDICATIONS  (PRN):  ondansetron Injectable 4 milliGRAM(s) IV Push every 8 hours PRN Nausea and/or Vomiting  oxycodone    5 mG/acetaminophen 325 mG 1 Tablet(s) Oral every 4 hours PRN Moderate Pain (4 - 6)      Allergies  iodine (Hives)  Zosyn (Urticaria)    Review of Systems:  General:  No wt loss, fevers, chills, night sweats, fatigue,   Eyes:  Good vision, no reported pain  ENT:  No sore throat, pain, runny nose, dysphagia  CV:  No pain, palpitations, hypo/hypertension  Resp:  No dyspnea, cough, tachypnea, wheezing  GI:  See HPI  :  No pain, bleeding, incontinence, nocturia  Muscle:  No pain, weakness  Neuro:  No weakness, tingling, memory problems  Psych:  No fatigue, insomnia, +anxiety +IBS  Endocrine:  No polyuria, polydipsia, cold/heat intolerance  Heme:  No petechiae, ecchymosis, easy bruisability  Skin:  No rash, edema    Vital Signs Last 24 Hrs  T(C): 36.7 (11 Sep 2020 07:25), Max: 36.9 (10 Sep 2020 16:01)  T(F): 98.1 (11 Sep 2020 07:25), Max: 98.4 (10 Sep 2020 16:01)  HR: 74 (11 Sep 2020 07:25) (62 - 74)  BP: 138/84 (11 Sep 2020 07:25) (129/84 - 138/84)  BP(mean): --  RR: 18 (11 Sep 2020 07:25) (16 - 18)  SpO2: 100% (11 Sep 2020 07:25) (99% - 100%)    PHYSICAL EXAM:  GENERAL:  Appears stated age, well-groomed, well-nourished, no distress. non toxic but anxious appearing  HEENT:  NC/AT,  conjunctivae clear and pink  CHEST:  Full & symmetric excursion, no increased effort  HEART:  Regular rhythm, no JVD  ABDOMEN:  Soft, NT non-distended, normoactive bowel sounds,  no rebound, guarding or rigidity  EXTREMITIES:  no cyanosis, clubbing or edema  SKIN:  No rash/erythema/ecchymoses/petechiae/wounds/abscess/warm/dry  NEURO:  Alert, oriented, nonfocal      LABS:            RADIOLOGY & ADDITIONAL TESTS:

## 2020-09-11 NOTE — PROGRESS NOTE ADULT - PROBLEM SELECTOR PLAN 1
- Patient recently admitted and discharged for diverticulitis discharge on PO   Augmentin initially improving but with new acute worsening LLQ and suprapubic   pain.   - CT abd/pelv showing previous areas of sigmoid diverticulitis improved/resolved   but also with new short segment of acute mid to distal sigmoid diverticulitis  - c/w IV Cipro 400mg  IVPB q12h and flagyl 500mg  IVPB q8h  - Gentle hydration with IVF in setting of poor PO intake  - Pain control, zofran PRN for nausea. c/w Dilaudid for severe pain  -GI and CRS evals appreciated  - advance to low residue diet today
- Patient recently admitted and discharged for diverticulitis discharge on PO   Augmentin initially improving but with new acute worsening LLQ and suprapubic   pain.   - CT abd/pelv showing previous areas of sigmoid diverticulitis improved/resolved   but also with new short segment of acute mid to distal sigmoid diverticulitis  - GI and CRS evals appreciated  - tolerating reg diet  - c/w PRN meds for nausea  -d/c on augmentin 2 week course as per GI, oral percocet for 3 days on discharge, prn zofran PO
- Patient recently admitted and discharged for diverticulitis discharge on PO   Augmentin initially improving but with new acute worsening LLQ and suprapubic   pain.   - CT abd/pelv showing previous areas of sigmoid diverticulitis improved/resolved   but also with new short segment of acute mid to distal sigmoid diverticulitis  - c/w IV Cipro 400mg  IVPB q12h and flagyl 500mg  IVPB q8h  - Gentle hydration with IVF in setting of poor PO intake  - Pain control, zofran PRN for nausea. c/w Dilaudid for severe pain  - GI and CRS evals appreciated  - tolerating reg diet  - c/w PRN meds for nausea
- Patient recently admitted and discharged for diverticulitis discharge on PO   Augmentin initially improving but with new acute worsning LLQ and suprapubic   pain.   - CT abd/pelv showing previous areas of sigmoid diverticulitis improved/resolved   but also with new short segment of acute mid to distal sigmoid diverticulitis  - c/w IV Cipro 400mg  IVPB q12h and flagyl 500mg  IVPB q8h  - c/w Clear liquid diet, advance as tolerated  - Gentle hydration with IVF in setting of poor PO intake  - Pain control, zofran PRN for nausea. c/w Dilaudid for severe pain, received 1mg Dilaudid in 24 hrs   - GI consult rec noted no interventions at this time.
- Patient recently admitted and discharged for diverticulitis discharge on PO   Augmentin initially improving but with new acute worsning LLQ and suprapubic   pain.   - CT abd/pelv showing previous areas of sigmoid diverticulitis improved/resolved   but also with new short segment of acute mid to distal sigmoid diverticulitis  - c/w IV Cipro 400mg  IVPB q12h and flagyl 500mg  IVPB q8h  - c/w Clear liquid diet, advance as tolerated  - Gentle hydration with IVF in setting of poor PO intake  - Pain control, zofran PRN for nausea. c/w Dilaudid for severe pain, received 1mg Dilaudid in 24 hrs   -GI and CRS evals appreciated
- Patient recently admitted and discharged for diverticulitis discharge on PO   Augmentin initially improving but with new acute worsning LLQ and suprapubic   pain.   - S/p cefepime and flagyl in the ED  - CT abd/pelv showing previous areas of sigmoid diverticulitis improved/resolved   but also with new short segment of acute mid to distal sigmoid diverticulitis  - Cipro 400mg  IVPB q12h and flagyl 500mg  IVPB q8h  - Clear liquid diet, advance as tolerated  - Gentle hydration with IVF in setting of poor PO intake  - Pain control, zofran PRN for nausea. Morphine to switch to Dilaudid  - GI consult pending

## 2020-09-11 NOTE — DISCHARGE NOTE PROVIDER - NSDCFUSCHEDAPPT_GEN_ALL_CORE_FT
CATEGWARNER HAGEN ; 09/24/2020 ; [facility] [address]  CATEGWARNER HAGEN ; 10/05/2020 ; [facility] [address]  CATEGWARNER HAGEN ; 10/29/2020 ; NPP Cardio [address] CATEGWARNER HAGEN ; 09/24/2020 ; [facility] Neuro [address]  CATEGWARNER HAGEN ; 10/05/2020 ; [facility] [address] [facility] [address]  CATEGWARNER HAGEN ; 10/29/2020 ; NPP Cardio [address] CATEGWARNER HAGEN ; 09/24/2020 ; [facility] Neuro [address]  CATEGWARNER HAGEN ; 10/05/2020 ; [facility] [address] [facility] [address] [facility]  CATEGWARNER HAGEN ; 10/29/2020 ; NPP Cardio [address]

## 2020-09-11 NOTE — PROGRESS NOTE ADULT - SUBJECTIVE AND OBJECTIVE BOX
Patient is a 50y old  Female who presents with a chief complaint of diverticulitis (10 Sep 2020 13:16)      SUBJECTIVE / OVERNIGHT EVENTS: no acute events overnight. Tolerating PO intake. Still has nausea on and off, but otherwise feeling much better     MEDICATIONS  (STANDING):  atorvastatin 40 milliGRAM(s) Oral at bedtime  chlorhexidine 2% Cloths 1 Application(s) Topical daily  ciprofloxacin     Tablet 500 milliGRAM(s) Oral every 12 hours  clotrimazole 2% Vaginal Cream 1 Applicatorful Vaginal at bedtime  enoxaparin Injectable 40 milliGRAM(s) SubCutaneous daily  levothyroxine 100 MICROGram(s) Oral daily  metroNIDAZOLE    Tablet 250 milliGRAM(s) Oral once  pantoprazole  Injectable      pantoprazole  Injectable 40 milliGRAM(s) IV Push daily    MEDICATIONS  (PRN):  ondansetron Injectable 4 milliGRAM(s) IV Push every 8 hours PRN Nausea and/or Vomiting  oxycodone    5 mG/acetaminophen 325 mG 1 Tablet(s) Oral every 4 hours PRN Moderate Pain (4 - 6)      Vital Signs Last 24 Hrs  T(C): 36.7 (11 Sep 2020 07:25), Max: 36.9 (10 Sep 2020 16:01)  T(F): 98.1 (11 Sep 2020 07:25), Max: 98.4 (10 Sep 2020 16:01)  HR: 74 (11 Sep 2020 07:25) (62 - 74)  BP: 138/84 (11 Sep 2020 07:25) (129/84 - 138/84)  BP(mean): --  RR: 18 (11 Sep 2020 07:25) (16 - 18)  SpO2: 100% (11 Sep 2020 07:25) (99% - 100%)  CAPILLARY BLOOD GLUCOSE        I&O's Summary    10 Sep 2020 07:01  -  11 Sep 2020 07:00  --------------------------------------------------------  IN: 1000 mL / OUT: 0 mL / NET: 1000 mL    11 Sep 2020 07:01  -  11 Sep 2020 13:06  --------------------------------------------------------  IN: 220 mL / OUT: 0 mL / NET: 220 mL        PHYSICAL EXAM:  GENERAL: NAD  EYES: EOMI, PERRLA, conjunctiva and sclera clear  CHEST/LUNG: Clear to auscultation bilaterally; No wheeze  HEART: +S1/S2, reg   ABDOMEN: Soft, Nontender, Nondistended; Bowel sounds present  EXTREMITIES: no LE edema   PSYCH: AAOx3      LABS:

## 2020-09-11 NOTE — PROGRESS NOTE ADULT - PROBLEM SELECTOR PLAN 2
- Stable.  - Continue home levothyroxine

## 2020-09-11 NOTE — DISCHARGE NOTE PROVIDER - HOSPITAL COURSE
50 year old female with PMH of HTN, HLD, hypothyroidism, IBS, recurrent episodes of diverticulitis recently discharged on PO augmentin for diverticulitis on 8/31/20 who presents to the ED with acute worsening LLQ and suprapubic pain. Patient states initially she was improving on PO antibiotics but she developed sharp 9-10 out 10 pains starting Thursday evening associated with nausea and decreased PO intake. Denies any fevers, chills, chest pain SOB, vomiting, diarrhea, constipation, nor dysuria. Last BM yesterday.         In the ED, vitals stable. Labs notable for leukocytosis to 12.21 from 6K on discharge last week. CT abd/pelv showing previous areas of sigmoid diverticulitis improved/resolved but also with new short segment of acute mid to distal sigmoid diverticulitis.         Patient was admitted for further management.  GI was consulted.  Patient was placed on IV cipro and flagyl.  Patient NPO and transitioned to regular diet and tolerated.  Surgery was also consulted for recurrent diverticulitis. Recommended outpatient follow up for possible LAR in future.         Discharge/Dispo/Med rec discussed with attending.     Patient cleared for discharge with outpatient follow up with GI/General Surgery/PCP.    Patient to complete 14 day course of antibiotics.

## 2020-09-11 NOTE — PROGRESS NOTE ADULT - PROBLEM SELECTOR PLAN 4
DVT ppx: lovenox  DIspo: pending improvement in abd pain and tolerating regular diet.
DVT ppx: lovenox
DVT ppx: lovenox  DIspo: pending improvement in abd pain and tolerating regular diet.

## 2020-09-11 NOTE — PROGRESS NOTE ADULT - PROVIDER SPECIALTY LIST ADULT
Gastroenterology
Hospitalist
Surgery
Surgery
US ABDOMEN LIMITED



INDICATION: Abdominal pain/tenderness. Midline abdominal tenderness right above the umbilicus for 5 y
ears, off and on.



COMPARISON: None.



FINDINGS: Limited midline abdominal sonography above the umbilicus in the area of pain demonstrates n
o significant abnormality as a focal suspicious mass or fluid collection. Nonaneurysmal imaged abdomi
nal aorta.



IMPRESSION: No significant sonographic abnormality in the region scanned, as described. Please also c
orrelate clinically and may be evaluated further as with cross-sectional imaging, if warranted or sym
ptoms persist.



Thank you for the opportunity to participate in this patient's care.



Signer Name: Davis Thornton 

Signed: 10/24/2019 11:15 AM

 Workstation Name: [id number]
Hospitalist

## 2020-09-11 NOTE — DISCHARGE NOTE PROVIDER - CARE PROVIDER_API CALL
Jaime Medrano  COLON/RECTAL SURGERY  [address], [address]  [place] [address]  Phone: [phone]  Fax: [phone]  Follow Up Time:     Schuyler Malloy  GASTROENTEROLOGY  [address] [place] [address], [address]  [place] [address]  Phone: [phone]  Fax: [phone]  Follow Up Time:

## 2020-09-11 NOTE — PROGRESS NOTE ADULT - PROBLEM SELECTOR PLAN 3
- On rosuvastatin at home.  - Continue atorvastatin while inpatient

## 2020-09-11 NOTE — DISCHARGE NOTE NURSING/CASE MANAGEMENT/SOCIAL WORK - PATIENT PORTAL LINK FT
You can access the FollowMyHealth Patient Portal offered by [facility] by registering at the following website: [email or web address]. By joining [facility]’s FollowMyHealth portal, you will also be able to view your health information using other applications (apps) compatible with our system.

## 2020-09-11 NOTE — PROGRESS NOTE ADULT - ATTENDING COMMENTS
I agree with the above surgery resident's note.
I have seen and examined the patient. I agree with the above surgery resident's note.
recurrent diverticulitis on cipro/flagyl,      pLan; advance diet as tolerated           ppi daily            surgical f/u
D/C home today  F/u with GI  and PCP as an outpt  Total d/c time: 45 mins
Will f/u GI, if ok today, possible d/c tomorrow.

## 2020-09-16 ENCOUNTER — NON-APPOINTMENT (OUTPATIENT)
Age: 50
End: 2020-09-16

## 2020-09-16 ENCOUNTER — APPOINTMENT (OUTPATIENT)
Dept: CARDIOLOGY | Facility: CLINIC | Age: 50
End: 2020-09-16
Payer: COMMERCIAL

## 2020-09-16 VITALS
SYSTOLIC BLOOD PRESSURE: 137 MMHG | OXYGEN SATURATION: 100 % | HEART RATE: 66 BPM | RESPIRATION RATE: 12 BRPM | DIASTOLIC BLOOD PRESSURE: 85 MMHG | BODY MASS INDEX: 35.87 KG/M2 | TEMPERATURE: 97.1 F | WEIGHT: 190 LBS | HEIGHT: 61 IN

## 2020-09-16 DIAGNOSIS — R94.39 ABNORMAL RESULT OF OTHER CARDIOVASCULAR FUNCTION STUDY: ICD-10-CM

## 2020-09-16 PROCEDURE — 93000 ELECTROCARDIOGRAM COMPLETE: CPT

## 2020-09-16 PROCEDURE — 99215 OFFICE O/P EST HI 40 MIN: CPT

## 2020-09-16 NOTE — PHYSICAL EXAM
[General Appearance - Well Developed] : well developed [Normal Appearance] : normal appearance [Well Groomed] : well groomed [No Deformities] : no deformities [General Appearance - Well Nourished] : well nourished [General Appearance - In No Acute Distress] : no acute distress [Normal Conjunctiva] : the conjunctiva exhibited no abnormalities [Normal Oral Mucosa] : normal oral mucosa [Eyelids - No Xanthelasma] : the eyelids demonstrated no xanthelasmas [No Oral Pallor] : no oral pallor [No Oral Cyanosis] : no oral cyanosis [Normal Jugular Venous A Waves Present] : normal jugular venous A waves present [No Jugular Venous Rich A Waves] : no jugular venous rich A waves [Normal Jugular Venous V Waves Present] : normal jugular venous V waves present [Respiration, Rhythm And Depth] : normal respiratory rhythm and effort [Exaggerated Use Of Accessory Muscles For Inspiration] : no accessory muscle use [Auscultation Breath Sounds / Voice Sounds] : lungs were clear to auscultation bilaterally [Heart Rate And Rhythm] : heart rate and rhythm were normal [Heart Sounds] : normal S1 and S2 [Murmurs] : no murmurs present [Abdomen Soft] : soft [Abdomen Tenderness] : non-tender [Abdomen Mass (___ Cm)] : no abdominal mass palpated [Abnormal Walk] : normal gait [Gait - Sufficient For Exercise Testing] : the gait was sufficient for exercise testing [Nail Clubbing] : no clubbing of the fingernails [Cyanosis, Localized] : no localized cyanosis [Petechial Hemorrhages (___cm)] : no petechial hemorrhages [Skin Color & Pigmentation] : normal skin color and pigmentation [] : no rash [No Venous Stasis] : no venous stasis [Skin Lesions] : no skin lesions [No Skin Ulcers] : no skin ulcer [No Xanthoma] : no  xanthoma was observed [Oriented To Time, Place, And Person] : oriented to person, place, and time [Affect] : the affect was normal [Mood] : the mood was normal [No Anxiety] : not feeling anxious

## 2020-09-16 NOTE — DISCUSSION/SUMMARY
[___ Week(s)] : [unfilled] week(s) [FreeTextEntry1] : The patient is a 50-year-old female strong family history of premature coronary artery disease, hypothyroidism, hyperlipidemia,hypertension with recurrent diverticulitis and persistent chest pain.\par #1 Htn-  amlodipine 2.5mg for now\par #2 Lipids- compliance crestor, labs acceptable\par #3 Hypothyroid- stable f/u endo\par #4 CV- cardiac CT normal last year, recurrent chest pain even on exercise stress with normal perfusion, consider repeat CT if symptoms persist\par #4 GI- recurrent diverticulitis, refer to Dr. Moncada/[facility].

## 2020-09-16 NOTE — HISTORY OF PRESENT ILLNESS
[FreeTextEntry1] : James has been hospitalized several times over the summer last one two weeks ago for diverticulitis. She was hypotensive in house and amlodipine stopped. She also had chest pain and nuclear stress done as outpatient with similar symptoms but neg perfusion. Now will need surgery.

## 2020-09-16 NOTE — REVIEW OF SYSTEMS
[Recent Weight Gain (___ Lbs)] : no recent weight gain [Dyspnea on exertion] : dyspnea during exertion [Shortness Of Breath] : shortness of breath [Lower Ext Edema] : no extremity edema [Chest Pain] : chest pain [Negative] : Endocrine

## 2020-09-23 ENCOUNTER — MED ADMIN CHARGE (OUTPATIENT)
Age: 50
End: 2020-09-23

## 2020-09-24 ENCOUNTER — APPOINTMENT (OUTPATIENT)
Dept: INTERNAL MEDICINE | Facility: CLINIC | Age: 50
End: 2020-09-24
Payer: COMMERCIAL

## 2020-09-24 ENCOUNTER — APPOINTMENT (OUTPATIENT)
Dept: NEUROLOGY | Facility: CLINIC | Age: 50
End: 2020-09-24

## 2020-09-24 VITALS
TEMPERATURE: 97.5 F | HEIGHT: 61.02 IN | SYSTOLIC BLOOD PRESSURE: 146 MMHG | DIASTOLIC BLOOD PRESSURE: 83 MMHG | WEIGHT: 194.76 LBS | BODY MASS INDEX: 36.77 KG/M2 | HEART RATE: 70 BPM | OXYGEN SATURATION: 99 %

## 2020-09-24 DIAGNOSIS — Z23 ENCOUNTER FOR IMMUNIZATION: ICD-10-CM

## 2020-09-24 DIAGNOSIS — Z09 ENCOUNTER FOR FOLLOW-UP EXAMINATION AFTER COMPLETED TREATMENT FOR CONDITIONS OTHER THAN MALIGNANT NEOPLASM: ICD-10-CM

## 2020-09-24 DIAGNOSIS — Z11.59 ENCOUNTER FOR SCREENING FOR OTHER VIRAL DISEASES: ICD-10-CM

## 2020-09-24 PROCEDURE — 99495 TRANSJ CARE MGMT MOD F2F 14D: CPT | Mod: 25

## 2020-09-24 PROCEDURE — 90686 IIV4 VACC NO PRSV 0.5 ML IM: CPT

## 2020-09-24 PROCEDURE — G0008: CPT

## 2020-09-25 PROBLEM — Z23 ENCOUNTER FOR IMMUNIZATION: Status: ACTIVE | Noted: 2020-09-25

## 2020-09-25 NOTE — PHYSICAL EXAM
[Normal] : affect was normal and insight and judgment were intact [37340 - Moderate Complexity requires multiple possible diagnoses and/or the management options, moderate complexity of the medical data (tests, etc.) to be reviewed, and moderate risk of significant complications, morbidity, and/or mortality as well as co] : Moderate Complexity

## 2020-09-25 NOTE — ASSESSMENT
[FreeTextEntry1] : Patient presents for hospital discharge for diverticulitis, has had 4 episodes in the past year will be following up with colorectal surgery and also CAT scan of the arteries given episodic chest pain. Check lipid panel and hemoglobin A1c advised high fiber diet. Influenza administered today.

## 2020-09-25 NOTE — HISTORY OF PRESENT ILLNESS
[Post-hospitalization from ___ Hospital] : Post-hospitalization from [unfilled] Hospital [Admitted on: ___] : The patient was admitted on [unfilled] [Discharged on ___] : discharged on [unfilled] [Discharge Summary] : discharge summary [Pertinent Labs] : pertinent labs [Radiology Findings] : radiology findings [Discharge Med List] : discharge medication list [Med Reconciliation] : medication reconciliation has been completed [Patient Contacted By: ____] : and contacted by [unfilled] [FreeTextEntry2] : Patient presents for hospital discharge followup for diverticulitis, has had 4 episodes in the past year will be following with colorectal surgery. Tending to eat a high-fiber diet. Currently denies any abdominal pain blood in the stool. Will Also be having CAT scan of the arteries with cardiology to

## 2020-09-29 LAB
25(OH)D3 SERPL-MCNC: 21.6 NG/ML
ALBUMIN SERPL ELPH-MCNC: 4.3 G/DL
ALP BLD-CCNC: 45 U/L
ALT SERPL-CCNC: 14 U/L
ANION GAP SERPL CALC-SCNC: 13 MMOL/L
APPEARANCE: ABNORMAL
APPEARANCE: ABNORMAL
AST SERPL-CCNC: 14 U/L
BACTERIA: NEGATIVE
BASOPHILS # BLD AUTO: 0.07 K/UL
BASOPHILS NFR BLD AUTO: 1.3 %
BILIRUB SERPL-MCNC: <0.2 MG/DL
BILIRUBIN URINE: NEGATIVE
BILIRUBIN URINE: NEGATIVE
BLOOD URINE: NEGATIVE
BLOOD URINE: NEGATIVE
BUN SERPL-MCNC: 12 MG/DL
CALCIUM SERPL-MCNC: 9.1 MG/DL
CHLORIDE SERPL-SCNC: 104 MMOL/L
CO2 SERPL-SCNC: 22 MMOL/L
COLOR: YELLOW
COLOR: YELLOW
CREAT SERPL-MCNC: 0.64 MG/DL
EOSINOPHIL # BLD AUTO: 0.15 K/UL
EOSINOPHIL NFR BLD AUTO: 2.8 %
GLUCOSE QUALITATIVE U: NEGATIVE
GLUCOSE QUALITATIVE U: NEGATIVE
GLUCOSE SERPL-MCNC: 86 MG/DL
HCT VFR BLD CALC: 39.3 %
HGB BLD-MCNC: 12.2 G/DL
HYALINE CASTS: 1 /LPF
IMM GRANULOCYTES NFR BLD AUTO: 0.2 %
KETONES URINE: NEGATIVE
KETONES URINE: NEGATIVE
LEUKOCYTE ESTERASE URINE: NEGATIVE
LEUKOCYTE ESTERASE URINE: NEGATIVE
LYMPHOCYTES # BLD AUTO: 1.96 K/UL
LYMPHOCYTES NFR BLD AUTO: 36.6 %
MAN DIFF?: NORMAL
MCHC RBC-ENTMCNC: 30.7 PG
MCHC RBC-ENTMCNC: 31 GM/DL
MCV RBC AUTO: 98.7 FL
MICROSCOPIC-UA: NORMAL
MONOCYTES # BLD AUTO: 0.7 K/UL
MONOCYTES NFR BLD AUTO: 13.1 %
NEUTROPHILS # BLD AUTO: 2.46 K/UL
NEUTROPHILS NFR BLD AUTO: 46 %
NITRITE URINE: NEGATIVE
NITRITE URINE: NEGATIVE
PH URINE: 6
PH URINE: 6
PLATELET # BLD AUTO: 335 K/UL
POTASSIUM SERPL-SCNC: 5.1 MMOL/L
PROT SERPL-MCNC: 6.8 G/DL
PROTEIN URINE: NORMAL
PROTEIN URINE: NORMAL
RBC # BLD: 3.98 M/UL
RBC # FLD: 13.3 %
RED BLOOD CELLS URINE: 3 /HPF
SARS-COV-2 N GENE NPH QL NAA+PROBE: NOT DETECTED
SODIUM SERPL-SCNC: 140 MMOL/L
SPECIFIC GRAVITY URINE: 1.03
SPECIFIC GRAVITY URINE: 1.03
SQUAMOUS EPITHELIAL CELLS: 11 /HPF
T3FREE SERPL-MCNC: 2.76 PG/ML
T4 FREE SERPL-MCNC: 1.3 NG/DL
TSH SERPL-ACNC: 2.09 UIU/ML
UROBILINOGEN URINE: NORMAL
UROBILINOGEN URINE: NORMAL
WBC # FLD AUTO: 5.35 K/UL
WHITE BLOOD CELLS URINE: 2 /HPF

## 2020-10-02 ENCOUNTER — OUTPATIENT (OUTPATIENT)
Dept: OUTPATIENT SERVICES | Facility: HOSPITAL | Age: 50
LOS: 1 days | End: 2020-10-02
Payer: COMMERCIAL

## 2020-10-02 ENCOUNTER — RESULT REVIEW (OUTPATIENT)
Age: 50
End: 2020-10-02

## 2020-10-02 ENCOUNTER — APPOINTMENT (OUTPATIENT)
Dept: CARDIOLOGY | Facility: CLINIC | Age: 50
End: 2020-10-02

## 2020-10-02 DIAGNOSIS — R94.39 ABNORMAL RESULT OF OTHER CARDIOVASCULAR FUNCTION STUDY: ICD-10-CM

## 2020-10-02 DIAGNOSIS — Z98.890 OTHER SPECIFIED POSTPROCEDURAL STATES: Chronic | ICD-10-CM

## 2020-10-02 DIAGNOSIS — R07.9 CHEST PAIN, UNSPECIFIED: ICD-10-CM

## 2020-10-02 PROCEDURE — 75574 CT ANGIO HRT W/3D IMAGE: CPT

## 2020-10-02 PROCEDURE — 75574 CT ANGIO HRT W/3D IMAGE: CPT | Mod: 26

## 2020-10-12 ENCOUNTER — APPOINTMENT (OUTPATIENT)
Dept: ORTHOPEDIC SURGERY | Facility: CLINIC | Age: 50
End: 2020-10-12
Payer: COMMERCIAL

## 2020-10-12 DIAGNOSIS — M65.312 TRIGGER THUMB, LEFT THUMB: ICD-10-CM

## 2020-10-12 PROCEDURE — 99213 OFFICE O/P EST LOW 20 MIN: CPT

## 2020-10-12 NOTE — ADDENDUM
[FreeTextEntry1] : I, Maci Simmons wrote this note acting as a scribe for Dr. Maxx Arora on Oct 12, 2020.

## 2020-10-12 NOTE — HISTORY OF PRESENT ILLNESS
[de-identified] : s/p reconstruction of left thumb UCL with use of palmaris longus tendon then left trigger thumb release on 07/10/2020.  [de-identified] : The patient is a 50 year old female who returns for the 3rd postoperative visit after undergoing reconstruction of left thumb UCL with use of palmaris longus tendon then left trigger thumb release at [facility]. The surgery was performed on 07/10/2020. The patient is recovering at home. She reports mild postoperative pain, however she does admit to taking two of the prescribed pain medication tablets following the surgery. She is otherwise doing well. She states that she is doing better than before surgery. She states that she does have days where she feels discomfort. She states that she takes Tylenol when needed.  [de-identified] : Patient is WDWN, alert, and in no acute distress. Breathing is unlabored. She is grossly oriented to person, place, and time.  \par \par Left thumb scar is well healed.No sign of infection.\par No instability on rdaial deviation of thumb MCP joint\par FROM. Sensation is intact\par \par Left thumb: Incision over the thumb volar aspect at the level of the A1 pulley and the thumb UCL aspect are healed. There are no signs of infection. Sutures were removed. Normal amount of postoperative edema and tenderness present.  [de-identified] : No imaging done today.  [de-identified] : Range of motion and strengthening exercises were reviewed. \par NSAIDs as tolerated. \par Massage the scar with vitamin E oil.  \par The patient was advised to soak the hand in warm water and Epsom salt.\par Follow Up in 4 weeks.

## 2020-10-27 ENCOUNTER — APPOINTMENT (OUTPATIENT)
Dept: SURGERY | Facility: CLINIC | Age: 50
End: 2020-10-27
Payer: COMMERCIAL

## 2020-10-27 VITALS
RESPIRATION RATE: 15 BRPM | DIASTOLIC BLOOD PRESSURE: 89 MMHG | HEART RATE: 73 BPM | TEMPERATURE: 97.2 F | OXYGEN SATURATION: 100 % | SYSTOLIC BLOOD PRESSURE: 121 MMHG

## 2020-10-27 DIAGNOSIS — K57.32 DIVERTICULITIS OF LARGE INTESTINE W/OUT PERFORATION OR ABSCESS W/OUT BLEEDING: ICD-10-CM

## 2020-10-27 PROCEDURE — 99072 ADDL SUPL MATRL&STAF TM PHE: CPT

## 2020-10-27 PROCEDURE — 99214 OFFICE O/P EST MOD 30 MIN: CPT

## 2020-10-27 RX ORDER — PREDNISONE 50 MG/1
50 TABLET ORAL
Qty: 3 | Refills: 0 | Status: DISCONTINUED | COMMUNITY
Start: 2019-03-06 | End: 2020-10-27

## 2020-10-27 RX ORDER — DIPHENHYDRAMINE HCL 50 MG/1
50 CAPSULE ORAL
Qty: 1 | Refills: 0 | Status: DISCONTINUED | COMMUNITY
Start: 2019-03-06 | End: 2020-10-27

## 2020-10-27 RX ORDER — ALPRAZOLAM 0.25 MG/1
0.25 TABLET ORAL
Qty: 15 | Refills: 0 | Status: DISCONTINUED | COMMUNITY
Start: 2020-04-07 | End: 2020-10-27

## 2020-10-27 NOTE — HISTORY OF PRESENT ILLNESS
[FreeTextEntry1] : James is a 49 y/o female here for follow up visit. PMH of recurrent diverticulitis. CT from 20 demonstrated acute uncomplicated sigmoid diverticulitis. Follow-up colonoscopy if the appropriate clinical treatment is recommended. She was discharged from the hospital on 20 with PO Augmentin. \par \par She presented to [facility] ED on 20 with worsening acute LLQ and suprapubic pain. CT abd/pelv from 20 demonstrated previously identified short segment proximal sigmoid diverticulitis appears improved to nearly resolved. New short segment of acute. She was placed on IV Cipro and Flagyl. She was discharged home on 20 with PO Cipro and Flagyl. \par \par Today, patient reports history of IBS. Has erratic BMs. Reports abdominal pain related to IBS today.  Her only abdominal surgery was a  section.  She states that she had a colonoscopy in January of this year which was normal except for diverticulosis.  She now feels well.  She denies fevers chills nausea vomiting

## 2020-10-27 NOTE — ASSESSMENT
[FreeTextEntry1] : In summary the patient has had multiple episodes of recurrent sigmoid diverticulitis.  She has had a least 4 attacks in the past 3 years.  Her most recent episode was in September of this year.  In the office today she feels well.  I explained that the 2 options would be continued conservative treatment versus a laparoscopic sigmoid colectomy.  I explained the risks benefits and alternatives of each approach including the risks of bleeding infection anastomotic leakage the possible need for an open procedure and the possible need for temporary stoma with surgery versus the risk of recurrent diverticulitis and potential complications related to it with continued conservative treatment.  She will be giving this some consideration will be contacting my office if she decides to proceed with surgery.

## 2020-10-27 NOTE — CONSULT LETTER
[Dear  ___] : Dear  [unfilled], [Consult Letter:] : I had the pleasure of evaluating your patient, [unfilled]. [Please see my note below.] : Please see my note below. [Consult Closing:] : Thank you very much for allowing me to participate in the care of this patient.  If you have any questions, please do not hesitate to contact me. [Sincerely,] : Sincerely, [FreeTextEntry3] : Jaime Medrano M.D., F.A.C.S, F.A.S.C.R.S [DrPee  ___] : Dr. BRISENO

## 2020-10-27 NOTE — PHYSICAL EXAM
[No HSM] : no hepatosplenomegaly [Exam Deferred] : exam was deferred [Wheezing] : no wheezing was heard [Normal Rate and Rhythm] : normal rate and rhythm [No Rash or Lesion] : No rash or lesion [Alert] : alert [Calm] : calm [de-identified] : Obese, soft, nontender, no palpable masses or hernias. [de-identified] : nl [de-identified] : nl

## 2020-10-28 ENCOUNTER — APPOINTMENT (OUTPATIENT)
Dept: CARDIOLOGY | Facility: CLINIC | Age: 50
End: 2020-10-28
Payer: COMMERCIAL

## 2020-10-28 ENCOUNTER — NON-APPOINTMENT (OUTPATIENT)
Age: 50
End: 2020-10-28

## 2020-10-28 VITALS
TEMPERATURE: 97.5 F | DIASTOLIC BLOOD PRESSURE: 86 MMHG | HEIGHT: 61 IN | SYSTOLIC BLOOD PRESSURE: 130 MMHG | OXYGEN SATURATION: 98 % | BODY MASS INDEX: 36.82 KG/M2 | WEIGHT: 195 LBS | RESPIRATION RATE: 12 BRPM | HEART RATE: 71 BPM

## 2020-10-28 PROCEDURE — 99214 OFFICE O/P EST MOD 30 MIN: CPT

## 2020-10-28 PROCEDURE — 99072 ADDL SUPL MATRL&STAF TM PHE: CPT

## 2020-10-28 PROCEDURE — 93000 ELECTROCARDIOGRAM COMPLETE: CPT

## 2020-10-28 RX ORDER — CLOTRIMAZOLE AND BETAMETHASONE DIPROPIONATE 10; .5 MG/G; MG/G
1-0.05 CREAM TOPICAL
Qty: 15 | Refills: 0 | Status: DISCONTINUED | COMMUNITY
Start: 2020-06-23

## 2020-10-28 RX ORDER — FLUTICASONE PROPIONATE 0.5 MG/G
0.05 CREAM TOPICAL
Qty: 30 | Refills: 0 | Status: DISCONTINUED | COMMUNITY
Start: 2020-06-29

## 2020-10-28 RX ORDER — IBUPROFEN 600 MG/1
600 TABLET, FILM COATED ORAL
Qty: 30 | Refills: 0 | Status: DISCONTINUED | COMMUNITY
Start: 2020-07-10

## 2020-10-28 RX ORDER — METRONIDAZOLE 250 MG/1
250 TABLET ORAL
Qty: 24 | Refills: 0 | Status: DISCONTINUED | COMMUNITY
Start: 2020-09-11

## 2020-10-28 RX ORDER — ONDANSETRON 4 MG/1
4 TABLET, ORALLY DISINTEGRATING ORAL
Qty: 21 | Refills: 0 | Status: DISCONTINUED | COMMUNITY
Start: 2020-09-11

## 2020-10-28 RX ORDER — OXYCODONE AND ACETAMINOPHEN 5; 325 MG/1; MG/1
5-325 TABLET ORAL
Qty: 5 | Refills: 0 | Status: DISCONTINUED | COMMUNITY
Start: 2020-07-10

## 2020-10-28 RX ORDER — CICLOPIROX OLAMINE 7.7 MG/G
0.77 CREAM TOPICAL
Qty: 30 | Refills: 0 | Status: DISCONTINUED | COMMUNITY
Start: 2020-06-29

## 2020-10-28 NOTE — REVIEW OF SYSTEMS
[Shortness Of Breath] : shortness of breath [Dyspnea on exertion] : dyspnea during exertion [Chest Pain] : chest pain [Negative] : Heme/Lymph [Recent Weight Gain (___ Lbs)] : no recent weight gain [Lower Ext Edema] : no extremity edema

## 2020-10-28 NOTE — DISCUSSION/SUMMARY
[___ Week(s)] : [unfilled] week(s) [FreeTextEntry1] : The patient is a 50-year-old female strong family history of premature coronary artery disease, hypothyroidism, hyperlipidemia,hypertension with recurrent diverticulitis, palpitations and persistent chest pain.\par #1 Htn-  amlodipine 2.5mg for now\par #2 Lipids- compliance crestor, labs acceptable\par #3 Hypothyroid- stable f/u endo\par #4 CV- cardiac CT normal, event monitor for one month, discussed nitrates but not sure her bp will tolerate addition\par #4 GI- recurrent diverticulitis, f/u Dr. Medrano

## 2020-10-28 NOTE — HISTORY OF PRESENT ILLNESS
[FreeTextEntry1] : James continues to have palpitations that can happen at any time. She is concerned that she has afib that has not been found. Also continues to have chest pain for no reason and very concerned no etiology has been found.

## 2020-11-17 ENCOUNTER — APPOINTMENT (OUTPATIENT)
Dept: INTERNAL MEDICINE | Facility: CLINIC | Age: 50
End: 2020-11-17
Payer: COMMERCIAL

## 2020-11-17 VITALS
WEIGHT: 192.89 LBS | OXYGEN SATURATION: 98 % | TEMPERATURE: 96.2 F | DIASTOLIC BLOOD PRESSURE: 83 MMHG | SYSTOLIC BLOOD PRESSURE: 140 MMHG | BODY MASS INDEX: 36.89 KG/M2 | HEART RATE: 77 BPM | HEIGHT: 60.63 IN

## 2020-11-17 DIAGNOSIS — Z20.828 CONTACT WITH AND (SUSPECTED) EXPOSURE TO OTHER VIRAL COMMUNICABLE DISEASES: ICD-10-CM

## 2020-11-17 PROCEDURE — 99211 OFF/OP EST MAY X REQ PHY/QHP: CPT

## 2020-11-19 ENCOUNTER — APPOINTMENT (OUTPATIENT)
Dept: ORTHOPEDIC SURGERY | Facility: CLINIC | Age: 50
End: 2020-11-19
Payer: COMMERCIAL

## 2020-11-19 DIAGNOSIS — S63.649D: ICD-10-CM

## 2020-11-19 LAB — SARS-COV-2 N GENE NPH QL NAA+PROBE: NOT DETECTED

## 2020-11-19 PROCEDURE — 99212 OFFICE O/P EST SF 10 MIN: CPT

## 2020-11-20 PROBLEM — S63.649D: Status: ACTIVE | Noted: 2020-06-11

## 2020-11-20 NOTE — PHYSICAL EXAM
[de-identified] : Patient is WDWN, alert, and in no acute distress. Breathing is unlabored. She is grossly oriented to person, place, and time. \par \par Left hand: There is A1 pulley tenderness in the thumb. Tenderness with stabilization at the thumb and applied radial deviation. No palpable defect indicating avulsion fracture. Full arc of motion in the fingers. Sensation is intact to light touch, and no skin lesions or discoloration. \par Stability: Instability of the thumb UCL with stabilization and applied stress deviation.\par Pain with touch  [de-identified] : MRI of left hand obtained on 09/10/2019 were reviewed and interpreted in office by Dr. Arora and revealed capsular sprain injury and joint effusion of the metacarpophalangeal joint of the thumb. Suggestion of at least high-grade partial tearing of the ulnar collateral ligament and volar plate injury. Recommend dedicated imaging of the thumb for further evaluation as hand MRI does not optimize obliquity planes of the thumb. \par \par She was prescribed a second MRI dedicated to the thumb on 11/7/19 but she could not tolerate the exam and the study was not complete. It did reveal a chronic grade 2 sprain of the UCL, no stener lesion.

## 2020-11-20 NOTE — ADDENDUM
[FreeTextEntry1] : I, Maci Simmons wrote this note acting as a scribe for Dr. Maxx Arora on Nov 19, 2020.

## 2020-11-20 NOTE — HISTORY OF PRESENT ILLNESS
[Right] : right hand dominant [de-identified] : s/p reconstruction of left thumb UCL with use of palmaris longus tendon then left trigger thumb release on 07/10/2020.  [de-identified] : The patient is a 50 year old female who returns for the 3rd postoperative visit after undergoing reconstruction of left thumb UCL with use of palmaris longus tendon then left trigger thumb release at [facility]. The surgery was performed on 07/10/2020. The patient is recovering at home. She reports mild postoperative pain, however she does admit to taking two of the prescribed pain medication tablets following the surgery. She is otherwise doing well. She states that she is doing better than before surgery. She states that she does have days where she feels discomfort. She states that she takes Tylenol when needed.  [de-identified] : Patient is WDWN, alert, and in no acute distress. Breathing is unlabored. She is grossly oriented to person, place, and time.  \par \par Left thumb scar is well healed.No sign of infection.\par No instability on rdaial deviation of thumb MCP joint\par FROM. Sensation is intact\par \par Left thumb: Incision over the thumb volar aspect at the level of the A1 pulley and the thumb UCL aspect are healed. There are no signs of infection. Sutures were removed. Normal amount of postoperative edema and tenderness present.  [de-identified] : No imaging done today.  [de-identified] : Range of motion and strengthening exercises were reviewed. \par NSAIDs as tolerated. \par Massage the scar with vitamin E oil.  \par The patient was advised to soak the hand in warm water and Epsom salt.\par Follow Up in 4 weeks. [FreeTextEntry1] : Pt is a 51 y/o female who returns with a left hand pain.  She fell while in the shower on 8/2/19 injuring her left knee and left hand. Her primary concern at the time was the left knee which resolved on its own. She then noticed pain in the left thumb which has remained chronic since. She was initially seen at [facility] where xrays of the left hand were taken which were negative for fracture.  An MRI was recommended to evaluate for soft tissue injury which revealed chronic high-grade partial tearing of the ulnar collateral ligament and volar plate injury. The patient was treated with a cortisone injection into the left thumb flexor tendon sheath in this office on 11/14/2019. She returned stating that the injection helped in terms of relieving some her pain, however she continues to have pain in the thumb.  She does not have full flexion. She had difficulty pinching or gripping anything and is unable to hold or twist open a jar.  It was painful to dress herself.  She is not taking anything for pain.  She did not attend hand therapy.  \par She returns after undergoing reconstruction of left thumb UCL with use of palmaris longus tendon then left trigger thumb release at [facility]. The surgery was performed on 07/10/2020. She states that she is doing better than before surgery. She states that she does have days where she feels discomfort. She reports taking Tylenol when needed. She states that she has noticed that she was having pain in her left hand near her thumb.

## 2020-11-20 NOTE — ADDENDUM
[FreeTextEntry1] : I, Maci Simmons wrote this note acting as a scribe for Dr. Mxax Arora on Nov 19, 2020.

## 2020-11-20 NOTE — HISTORY OF PRESENT ILLNESS
[Right] : right hand dominant [de-identified] : s/p reconstruction of left thumb UCL with use of palmaris longus tendon then left trigger thumb release on 07/10/2020.  [de-identified] : The patient is a 50 year old female who returns for the 3rd postoperative visit after undergoing reconstruction of left thumb UCL with use of palmaris longus tendon then left trigger thumb release at [facility]. The surgery was performed on 07/10/2020. The patient is recovering at home. She reports mild postoperative pain, however she does admit to taking two of the prescribed pain medication tablets following the surgery. She is otherwise doing well. She states that she is doing better than before surgery. She states that she does have days where she feels discomfort. She states that she takes Tylenol when needed.  [de-identified] : Patient is WDWN, alert, and in no acute distress. Breathing is unlabored. She is grossly oriented to person, place, and time.  \par \par Left thumb scar is well healed.No sign of infection.\par No instability on rdaial deviation of thumb MCP joint\par FROM. Sensation is intact\par \par Left thumb: Incision over the thumb volar aspect at the level of the A1 pulley and the thumb UCL aspect are healed. There are no signs of infection. Sutures were removed. Normal amount of postoperative edema and tenderness present.  [de-identified] : No imaging done today.  [de-identified] : Range of motion and strengthening exercises were reviewed. \par NSAIDs as tolerated. \par Massage the scar with vitamin E oil.  \par The patient was advised to soak the hand in warm water and Epsom salt.\par Follow Up in 4 weeks. [FreeTextEntry1] : Pt is a 49 y/o female who returns with a left hand pain.  She fell while in the shower on 8/2/19 injuring her left knee and left hand. Her primary concern at the time was the left knee which resolved on its own. She then noticed pain in the left thumb which has remained chronic since. She was initially seen at [facility] where xrays of the left hand were taken which were negative for fracture.  An MRI was recommended to evaluate for soft tissue injury which revealed chronic high-grade partial tearing of the ulnar collateral ligament and volar plate injury. The patient was treated with a cortisone injection into the left thumb flexor tendon sheath in this office on 11/14/2019. She returned stating that the injection helped in terms of relieving some her pain, however she continues to have pain in the thumb.  She does not have full flexion. She had difficulty pinching or gripping anything and is unable to hold or twist open a jar.  It was painful to dress herself.  She is not taking anything for pain.  She did not attend hand therapy.  \par She returns after undergoing reconstruction of left thumb UCL with use of palmaris longus tendon then left trigger thumb release at [facility]. The surgery was performed on 07/10/2020. She states that she is doing better than before surgery. She states that she does have days where she feels discomfort. She reports taking Tylenol when needed. She states that she has noticed that she was having pain in her left hand near her thumb.

## 2020-11-20 NOTE — PHYSICAL EXAM
[de-identified] : Patient is WDWN, alert, and in no acute distress. Breathing is unlabored. She is grossly oriented to person, place, and time. \par \par Left hand: There is A1 pulley tenderness in the thumb. Tenderness with stabilization at the thumb and applied radial deviation. No palpable defect indicating avulsion fracture. Full arc of motion in the fingers. Sensation is intact to light touch, and no skin lesions or discoloration. \par Stability: Instability of the thumb UCL with stabilization and applied stress deviation.\par Pain with touch  [de-identified] : MRI of left hand obtained on 09/10/2019 were reviewed and interpreted in office by Dr. Arora and revealed capsular sprain injury and joint effusion of the metacarpophalangeal joint of the thumb. Suggestion of at least high-grade partial tearing of the ulnar collateral ligament and volar plate injury. Recommend dedicated imaging of the thumb for further evaluation as hand MRI does not optimize obliquity planes of the thumb. \par \par She was prescribed a second MRI dedicated to the thumb on 11/7/19 but she could not tolerate the exam and the study was not complete. It did reveal a chronic grade 2 sprain of the UCL, no stener lesion.

## 2020-11-20 NOTE — DISCUSSION/SUMMARY
[FreeTextEntry1] : Patient was informed that she could receive a cortisone injection for her pain, however she has decided not to get the injection. \par The patient was advised to soak the hand in warm water and Epsom salt. \par Range of motion and strengthening exercises were reviewed. \par NSAIDs as tolerated. \par Follow Up in 6 weeks if needed.

## 2020-11-30 ENCOUNTER — INPATIENT (INPATIENT)
Facility: HOSPITAL | Age: 50
LOS: 7 days | Discharge: HOME | DRG: 392 | End: 2020-12-08
Attending: COLON & RECTAL SURGERY | Admitting: COLON & RECTAL SURGERY
Payer: COMMERCIAL

## 2020-11-30 VITALS
RESPIRATION RATE: 18 BRPM | DIASTOLIC BLOOD PRESSURE: 83 MMHG | HEIGHT: 61 IN | TEMPERATURE: 98 F | WEIGHT: 190.04 LBS | OXYGEN SATURATION: 99 % | SYSTOLIC BLOOD PRESSURE: 145 MMHG | HEART RATE: 77 BPM

## 2020-11-30 DIAGNOSIS — Z98.890 OTHER SPECIFIED POSTPROCEDURAL STATES: Chronic | ICD-10-CM

## 2020-11-30 DIAGNOSIS — K57.32 DIVERTICULITIS OF LARGE INTESTINE WITHOUT PERFORATION OR ABSCESS WITHOUT BLEEDING: ICD-10-CM

## 2020-11-30 LAB
ALBUMIN SERPL ELPH-MCNC: 4.1 G/DL — SIGNIFICANT CHANGE UP (ref 3.3–5)
ALP SERPL-CCNC: 47 U/L — SIGNIFICANT CHANGE UP (ref 40–120)
ALT FLD-CCNC: 15 U/L — SIGNIFICANT CHANGE UP (ref 10–45)
ANION GAP SERPL CALC-SCNC: 10 MMOL/L — SIGNIFICANT CHANGE UP (ref 5–17)
APPEARANCE UR: CLEAR — SIGNIFICANT CHANGE UP
AST SERPL-CCNC: 15 U/L — SIGNIFICANT CHANGE UP (ref 10–40)
BACTERIA # UR AUTO: NEGATIVE — SIGNIFICANT CHANGE UP
BASE EXCESS BLDV CALC-SCNC: 1.6 MMOL/L — SIGNIFICANT CHANGE UP (ref -2–2)
BASOPHILS # BLD AUTO: 0.04 K/UL — SIGNIFICANT CHANGE UP (ref 0–0.2)
BASOPHILS NFR BLD AUTO: 0.7 % — SIGNIFICANT CHANGE UP (ref 0–2)
BILIRUB SERPL-MCNC: 0.4 MG/DL — SIGNIFICANT CHANGE UP (ref 0.2–1.2)
BILIRUB UR-MCNC: NEGATIVE — SIGNIFICANT CHANGE UP
BUN SERPL-MCNC: 12 MG/DL — SIGNIFICANT CHANGE UP (ref 7–23)
CA-I SERPL-SCNC: 1.17 MMOL/L — SIGNIFICANT CHANGE UP (ref 1.12–1.3)
CALCIUM SERPL-MCNC: 9.6 MG/DL — SIGNIFICANT CHANGE UP (ref 8.4–10.5)
CHLORIDE BLDV-SCNC: 109 MMOL/L — HIGH (ref 96–108)
CHLORIDE SERPL-SCNC: 105 MMOL/L — SIGNIFICANT CHANGE UP (ref 96–108)
CO2 BLDV-SCNC: 28 MMOL/L — SIGNIFICANT CHANGE UP (ref 22–30)
CO2 SERPL-SCNC: 24 MMOL/L — SIGNIFICANT CHANGE UP (ref 22–31)
COLOR SPEC: SIGNIFICANT CHANGE UP
CREAT SERPL-MCNC: 0.66 MG/DL — SIGNIFICANT CHANGE UP (ref 0.5–1.3)
DIFF PNL FLD: NEGATIVE — SIGNIFICANT CHANGE UP
EOSINOPHIL # BLD AUTO: 0.11 K/UL — SIGNIFICANT CHANGE UP (ref 0–0.5)
EOSINOPHIL NFR BLD AUTO: 2 % — SIGNIFICANT CHANGE UP (ref 0–6)
EPI CELLS # UR: 0 /HPF — SIGNIFICANT CHANGE UP
GAS PNL BLDV: 136 MMOL/L — SIGNIFICANT CHANGE UP (ref 135–145)
GAS PNL BLDV: SIGNIFICANT CHANGE UP
GAS PNL BLDV: SIGNIFICANT CHANGE UP
GLUCOSE BLDV-MCNC: 91 MG/DL — SIGNIFICANT CHANGE UP (ref 70–99)
GLUCOSE SERPL-MCNC: 97 MG/DL — SIGNIFICANT CHANGE UP (ref 70–99)
GLUCOSE UR QL: NEGATIVE — SIGNIFICANT CHANGE UP
HCG UR QL: NEGATIVE — SIGNIFICANT CHANGE UP
HCO3 BLDV-SCNC: 27 MMOL/L — SIGNIFICANT CHANGE UP (ref 21–29)
HCT VFR BLD CALC: 38.5 % — SIGNIFICANT CHANGE UP (ref 34.5–45)
HCT VFR BLDA CALC: 38 % — LOW (ref 39–50)
HGB BLD CALC-MCNC: 12.3 G/DL — SIGNIFICANT CHANGE UP (ref 11.5–15.5)
HGB BLD-MCNC: 12.3 G/DL — SIGNIFICANT CHANGE UP (ref 11.5–15.5)
HYALINE CASTS # UR AUTO: 0 /LPF — SIGNIFICANT CHANGE UP (ref 0–2)
IMM GRANULOCYTES NFR BLD AUTO: 0.2 % — SIGNIFICANT CHANGE UP (ref 0–1.5)
KETONES UR-MCNC: NEGATIVE — SIGNIFICANT CHANGE UP
LACTATE BLDV-MCNC: 1.4 MMOL/L — SIGNIFICANT CHANGE UP (ref 0.7–2)
LEUKOCYTE ESTERASE UR-ACNC: NEGATIVE — SIGNIFICANT CHANGE UP
LIDOCAIN IGE QN: 25 U/L — SIGNIFICANT CHANGE UP (ref 7–60)
LYMPHOCYTES # BLD AUTO: 2.11 K/UL — SIGNIFICANT CHANGE UP (ref 1–3.3)
LYMPHOCYTES # BLD AUTO: 39.1 % — SIGNIFICANT CHANGE UP (ref 13–44)
MCHC RBC-ENTMCNC: 30.3 PG — SIGNIFICANT CHANGE UP (ref 27–34)
MCHC RBC-ENTMCNC: 31.9 GM/DL — LOW (ref 32–36)
MCV RBC AUTO: 94.8 FL — SIGNIFICANT CHANGE UP (ref 80–100)
MONOCYTES # BLD AUTO: 0.62 K/UL — SIGNIFICANT CHANGE UP (ref 0–0.9)
MONOCYTES NFR BLD AUTO: 11.5 % — SIGNIFICANT CHANGE UP (ref 2–14)
NEUTROPHILS # BLD AUTO: 2.51 K/UL — SIGNIFICANT CHANGE UP (ref 1.8–7.4)
NEUTROPHILS NFR BLD AUTO: 46.5 % — SIGNIFICANT CHANGE UP (ref 43–77)
NITRITE UR-MCNC: NEGATIVE — SIGNIFICANT CHANGE UP
NRBC # BLD: 0 /100 WBCS — SIGNIFICANT CHANGE UP (ref 0–0)
PCO2 BLDV: 46 MMHG — SIGNIFICANT CHANGE UP (ref 35–50)
PH BLDV: 7.38 — SIGNIFICANT CHANGE UP (ref 7.35–7.45)
PH UR: 7 — SIGNIFICANT CHANGE UP (ref 5–8)
PLATELET # BLD AUTO: 281 K/UL — SIGNIFICANT CHANGE UP (ref 150–400)
PO2 BLDV: 44 MMHG — SIGNIFICANT CHANGE UP (ref 25–45)
POTASSIUM BLDV-SCNC: 3.9 MMOL/L — SIGNIFICANT CHANGE UP (ref 3.5–5.3)
POTASSIUM SERPL-MCNC: 4.1 MMOL/L — SIGNIFICANT CHANGE UP (ref 3.5–5.3)
POTASSIUM SERPL-SCNC: 4.1 MMOL/L — SIGNIFICANT CHANGE UP (ref 3.5–5.3)
PROT SERPL-MCNC: 7.4 G/DL — SIGNIFICANT CHANGE UP (ref 6–8.3)
PROT UR-MCNC: NEGATIVE — SIGNIFICANT CHANGE UP
RBC # BLD: 4.06 M/UL — SIGNIFICANT CHANGE UP (ref 3.8–5.2)
RBC # FLD: 12.8 % — SIGNIFICANT CHANGE UP (ref 10.3–14.5)
RBC CASTS # UR COMP ASSIST: 1 /HPF — SIGNIFICANT CHANGE UP (ref 0–4)
SAO2 % BLDV: 74 % — SIGNIFICANT CHANGE UP (ref 67–88)
SARS-COV-2 RNA SPEC QL NAA+PROBE: SIGNIFICANT CHANGE UP
SODIUM SERPL-SCNC: 139 MMOL/L — SIGNIFICANT CHANGE UP (ref 135–145)
SP GR SPEC: 1.01 — SIGNIFICANT CHANGE UP (ref 1.01–1.02)
UROBILINOGEN FLD QL: NEGATIVE — SIGNIFICANT CHANGE UP
WBC # BLD: 5.4 K/UL — SIGNIFICANT CHANGE UP (ref 3.8–10.5)
WBC # FLD AUTO: 5.4 K/UL — SIGNIFICANT CHANGE UP (ref 3.8–10.5)
WBC UR QL: 0 /HPF — SIGNIFICANT CHANGE UP (ref 0–5)

## 2020-11-30 PROCEDURE — 74176 CT ABD & PELVIS W/O CONTRAST: CPT | Mod: 26

## 2020-11-30 PROCEDURE — 99285 EMERGENCY DEPT VISIT HI MDM: CPT

## 2020-11-30 RX ORDER — ENOXAPARIN SODIUM 100 MG/ML
40 INJECTION SUBCUTANEOUS DAILY
Refills: 0 | Status: DISCONTINUED | OUTPATIENT
Start: 2020-11-30 | End: 2020-12-08

## 2020-11-30 RX ORDER — METRONIDAZOLE 500 MG
500 TABLET ORAL EVERY 8 HOURS
Refills: 0 | Status: DISCONTINUED | OUTPATIENT
Start: 2020-11-30 | End: 2020-12-04

## 2020-11-30 RX ORDER — METRONIDAZOLE 500 MG
500 TABLET ORAL ONCE
Refills: 0 | Status: COMPLETED | OUTPATIENT
Start: 2020-11-30 | End: 2020-11-30

## 2020-11-30 RX ORDER — CIPROFLOXACIN LACTATE 400MG/40ML
400 VIAL (ML) INTRAVENOUS ONCE
Refills: 0 | Status: COMPLETED | OUTPATIENT
Start: 2020-11-30 | End: 2020-11-30

## 2020-11-30 RX ORDER — ONDANSETRON 8 MG/1
4 TABLET, FILM COATED ORAL ONCE
Refills: 0 | Status: COMPLETED | OUTPATIENT
Start: 2020-11-30 | End: 2020-11-30

## 2020-11-30 RX ORDER — ATORVASTATIN CALCIUM 80 MG/1
40 TABLET, FILM COATED ORAL AT BEDTIME
Refills: 0 | Status: DISCONTINUED | OUTPATIENT
Start: 2020-11-30 | End: 2020-12-08

## 2020-11-30 RX ORDER — ACETAMINOPHEN 500 MG
1000 TABLET ORAL EVERY 6 HOURS
Refills: 0 | Status: DISCONTINUED | OUTPATIENT
Start: 2020-11-30 | End: 2020-12-08

## 2020-11-30 RX ORDER — MORPHINE SULFATE 50 MG/1
4 CAPSULE, EXTENDED RELEASE ORAL ONCE
Refills: 0 | Status: DISCONTINUED | OUTPATIENT
Start: 2020-11-30 | End: 2020-11-30

## 2020-11-30 RX ORDER — CIPROFLOXACIN LACTATE 400MG/40ML
400 VIAL (ML) INTRAVENOUS EVERY 12 HOURS
Refills: 0 | Status: DISCONTINUED | OUTPATIENT
Start: 2020-11-30 | End: 2020-12-04

## 2020-11-30 RX ORDER — SODIUM CHLORIDE 9 MG/ML
1000 INJECTION INTRAMUSCULAR; INTRAVENOUS; SUBCUTANEOUS ONCE
Refills: 0 | Status: COMPLETED | OUTPATIENT
Start: 2020-11-30 | End: 2020-11-30

## 2020-11-30 RX ORDER — SODIUM CHLORIDE 9 MG/ML
1000 INJECTION, SOLUTION INTRAVENOUS
Refills: 0 | Status: DISCONTINUED | OUTPATIENT
Start: 2020-11-30 | End: 2020-12-01

## 2020-11-30 RX ORDER — LEVOTHYROXINE SODIUM 125 MCG
100 TABLET ORAL DAILY
Refills: 0 | Status: DISCONTINUED | OUTPATIENT
Start: 2020-11-30 | End: 2020-12-08

## 2020-11-30 RX ORDER — HYDROMORPHONE HYDROCHLORIDE 2 MG/ML
0.5 INJECTION INTRAMUSCULAR; INTRAVENOUS; SUBCUTANEOUS EVERY 6 HOURS
Refills: 0 | Status: DISCONTINUED | OUTPATIENT
Start: 2020-11-30 | End: 2020-12-07

## 2020-11-30 RX ORDER — AMLODIPINE BESYLATE 2.5 MG/1
2.5 TABLET ORAL DAILY
Refills: 0 | Status: DISCONTINUED | OUTPATIENT
Start: 2020-11-30 | End: 2020-12-08

## 2020-11-30 RX ORDER — ONDANSETRON 8 MG/1
4 TABLET, FILM COATED ORAL ONCE
Refills: 0 | Status: DISCONTINUED | OUTPATIENT
Start: 2020-11-30 | End: 2020-11-30

## 2020-11-30 RX ADMIN — ATORVASTATIN CALCIUM 40 MILLIGRAM(S): 80 TABLET, FILM COATED ORAL at 21:34

## 2020-11-30 RX ADMIN — SODIUM CHLORIDE 1000 MILLILITER(S): 9 INJECTION INTRAMUSCULAR; INTRAVENOUS; SUBCUTANEOUS at 15:20

## 2020-11-30 RX ADMIN — MORPHINE SULFATE 4 MILLIGRAM(S): 50 CAPSULE, EXTENDED RELEASE ORAL at 17:32

## 2020-11-30 RX ADMIN — MORPHINE SULFATE 4 MILLIGRAM(S): 50 CAPSULE, EXTENDED RELEASE ORAL at 17:02

## 2020-11-30 RX ADMIN — Medication 100 MILLIGRAM(S): at 22:41

## 2020-11-30 RX ADMIN — Medication 200 MILLIGRAM(S): at 18:49

## 2020-11-30 RX ADMIN — SODIUM CHLORIDE 1000 MILLILITER(S): 9 INJECTION INTRAMUSCULAR; INTRAVENOUS; SUBCUTANEOUS at 14:00

## 2020-11-30 RX ADMIN — ONDANSETRON 4 MILLIGRAM(S): 8 TABLET, FILM COATED ORAL at 17:02

## 2020-11-30 NOTE — H&P ADULT - NSHPLABSRESULTS_GEN_ALL_CORE
12.3   5.40  )-----------( 281      ( 30 Nov 2020 13:48 )             38.5     11-30    139  |  105  |  12  ----------------------------<  97  4.1   |  24  |  0.66    Ca    9.6      30 Nov 2020 13:48    TPro  7.4  /  Alb  4.1  /  TBili  0.4  /  DBili  x   /  AST  15  /  ALT  15  /  AlkPhos  47  11-30      < from: CT Abdomen and Pelvis w/ Oral Cont (11.30.20 @ 16:38) >      EXAM:  CT ABDOMEN AND PELVIS OC                            PROCEDURE DATE:  11/30/2020            INTERPRETATION:  CLINICAL INFORMATION: Recurrent diverticulitis. Abdominal pain.    COMPARISON: 9/5/2020    PROCEDURE:  CT of the Abdomen and Pelvis was performed without intravenous contrast.  Intravenous contrast: None.  Oral contrast: positive contrast was administered.  Sagittal and coronal reformats were performed.    FINDINGS:  LOWER CHEST: Small hiatal hernia.    LIVER: Within normal limits.  BILE DUCTS: Normal caliber.  GALLBLADDER: Within normal limits.  SPLEEN: Within normal limits.  PANCREAS: Within normal limits.  ADRENALS: Within normal limits.  KIDNEYS/URETERS: Within normal limits.    BLADDER: Within normal limits.  REPRODUCTIVE ORGANS: Uterus and bilateral adnexa are within normal limits.    BOWEL: No bowel obstruction. Appendix is normal. Diverticulosis with acute nonperforated diverticulitis at the level of the proximal sigmoid colon.  PERITONEUM: No ascites. No diverticular abscess. No pneumoperitoneum.  VESSELS: Within normal limits.  RETROPERITONEUM/LYMPH NODES: No lymphadenopathy.  ABDOMINAL WALL: Within normal limits.  BONES: Degenerative changes.    IMPRESSION:  Acute nonperforated diverticulitis of the proximal sigmoid colon.      SO GUZMAN MD; Attending Radiologist  This document has been electronically signed. Nov 30 2020  4:45PM    < end of copied text >

## 2020-11-30 NOTE — ED PROVIDER NOTE - OBJECTIVE STATEMENT
50F hx of IBS, diverticulitis, hypothyroidism presenting to the ED for 5 days of sharp suprapubic abdominal pain w/o radiation, migration, worse w/ standing, no changed with food, similar to prior episodes of diverticulitis. Saw a surgeon outpatient who said patient would likely need surgery but she put it off due to the holidays. Endorsed chills yesterday, denies fever. Endorsing some nausea w/o emesis or diarrhea. No urinary complaints. Has a holter monitor on that she follows outpatient for 2/2 palpitation. No current chest pain or shortness of breath.    GI: Dr. Toscano  Surgeon: Dr. Medrano

## 2020-11-30 NOTE — CONSULT NOTE ADULT - ASSESSMENT
50F hx of IBS, diverticulitis, hypothyroidism presenting to the ED for 5 days of sharp suprapubic abdominal pain w/o radiation, migration, worse w/ standing, no changed with food, similar to prior episodes of diverticulitis    Clinically non toxic appearing, without leukocytosis  Suspect recurrent diverticulitis  low clinical suspicion for abscess/perforation    RECS:  -await CT  -Surgical input (Dr Medrano's team notified)    Further plan pending CT findings; pt admits very anxious and reluctant to get discharged on oral antibiotics if CT reveals uncomplicated diverticulitis unless surgical team is in agreement with the plan      Discussed with Er attending and resident    Discussed with pt, all questions answered    Thank you for the courtesy of this consult.    Arnulfo Canela PA-C    [facility]  [phone]  After hours and weekend coverage [phone]

## 2020-11-30 NOTE — CONSULT NOTE ADULT - SUBJECTIVE AND OBJECTIVE BOX
Patient is a 50y old  Female who presents with a chief complaint of worsening lower abdominal pain    HPI:    50F hx of IBS, diverticulitis, hypothyroidism presenting to the ED for 5 days of sharp suprapubic abdominal pain w/o radiation, migration, worse w/ standing, no changed with food, similar to prior episodes of diverticulitis. Saw Dr Medrano as outpatient who said patient would likely need surgery but she put it off due to the holidays. Endorsed chills yesterday, denies fever. Endorsing some nausea w/o emesis or diarrhea. No urinary complaints. Has a holter monitor on that she follows outpatient for 2/2 palpitation. No current chest pain or shortness of breath.    Pt not currently on Abx, reports onset of sharp suprapubic pain several days ago, similar to prior presentation on diverticulitis.  Pain has progressed and overnight pt reports she was unable to sleep 2/2 pain.    last BM yesterday "normal"  no significant change in dietary intake  no sick contacts or recent travel    PAST MEDICAL & SURGICAL HISTORY:  Hypertension    Sleep apnea  nasal mask- settings unknown    Hypothyroid    Diverticulitis  last hospitalized 9.19    h/o Heart Palpitations    Irritable Bowel Syndrome    H/O vaginal surgery  vaginal mesh    h/o dental implant    History of Colonoscopy    h/o  Endoscopy    C Section - x 1994      Allergies  iodine (Hives)  Zosyn (Urticaria)        MEDICATIONS  (STANDING):  morphine  - Injectable 4 milliGRAM(s) IV Push Once  ondansetron Injectable 4 milliGRAM(s) IV Push once    MEDICATIONS  (PRN):      Social History:  , lives with spouse and children  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    Family History   Family history:  Family history of early CAD  Family history of colon cancer (Grandparent)    IBD (  ) Yes   (X  ) No  GI Malignancy ( X )  Yes - see above   (  ) No    Health Management  Last Colonoscopy - 2020 : Internal hemorrhoids, Sigmoid and Descending colon diverticulosis      Advanced Directives: (   X  ) None    (      ) DNR    (     ) DNI    (     ) Health Care Proxy:     Review of Systems:    General:  No wt loss, fevers, chills, night sweats, fatigue  CV:  see HPI - currently without CP  Resp:  No dyspnea, cough, tachypnea, wheezing  GI: see HPI  :  No pain, bleeding, incontinence, nocturia  Muscle:  No pain, weakness  Neuro:  No weakness, tingling, memory problems  Psych:  No fatigue, insomnia, mood problems, depression  Endocrine:  No polyuria, polydypsia, cold/heat intolerance  Heme:  No petechiae, ecchymosis, easy bruisability  Skin:  No rash, tattoos, scars, edema        Vital Signs Last 24 Hrs  T(C): 36.6 (2020 13:05), Max: 36.6 (2020 11:46)  T(F): 97.9 (2020 13:05), Max: 97.9 (2020 11:46)  HR: 74 (2020 16:27) (73 - 77)  BP: 146/84 (2020 16:27) (145/78 - 146/84)  BP(mean): --  RR: 16 (2020 16:27) (16 - 18)  SpO2: 100% (2020 16:27) (99% - 100%)    PHYSICAL EXAM:    Constitutional: NAD, well-developed pleasant non toxic appearing female  Neck: No LAD, supple  Respiratory: Clear anteriorly  Cardiovascular: S1 and S2, RRR NT to palpation  Gastrointestinal: BS+, soft, +tenderness to deep palpation in suprapubic region, no rebound, guarding or rigidity  Extremities: No peripheral edema, neg clubbing, cyanosis  Vascular: 2+ peripheral pulses  Neurological: A/O x 3, no focal deficits  Psychiatric: Normal mood, normal affect  Skin: No rashes        LABS:                        12.3   5.40  )-----------( 281      ( 2020 13:48 )             38.5     11-30    139  |  105  |  12  ----------------------------<  97  4.1   |  24  |  0.66    Ca    9.6      2020 13:48    TPro  7.4  /  Alb  4.1  /  TBili  0.4  /  DBili  x   /  AST  15  /  ALT  15  /  AlkPhos  47  1130    Lipase, Serum (11.30.20 @ 13:48)   Lipase, Serum: 25 U/L     Blood Gas Venous - Lactate (11.30.20 @ 13:48)   Blood Gas Venous - Lactate: 1.4 mmoL/L       Pregnancy Profile, Urine (11.30.20 @ 14:43)   Pregnancy Profile, Urine: Negative  Urinalysis Basic - ( 2020 14:43 )    Color: Light Yellow / Appearance: Clear / S.011 / pH: x  Gluc: x / Ketone: Negative  / Bili: Negative / Urobili: Negative   Blood: x / Protein: Negative / Nitrite: Negative   Leuk Esterase: Negative / RBC: 1 /hpf / WBC 0 /HPF   Sq Epi: x / Non Sq Epi: 0 /hpf / Bacteria: Negative      LIVER FUNCTIONS - ( 2020 13:48 )  Alb: 4.1 g/dL / Pro: 7.4 g/dL / ALK PHOS: 47 U/L / ALT: 15 U/L / AST: 15 U/L / GGT: x             RADIOLOGY & ADDITIONAL TESTS:

## 2020-11-30 NOTE — ED PROVIDER NOTE - ATTENDING CONTRIBUTION TO CARE
Attending MD Castellon:  I personally have seen and examined this patient.  Resident note reviewed and agree on plan of care and except where noted.

## 2020-11-30 NOTE — ED PROVIDER NOTE - NS ED ROS FT
CONSTITUTIONAL: No fevers, chills, fatigue, unexpected weight change, decreased appetite  CV: No chest pain  PULM: No cough  GI: ABDOMINAL PAIN, NAUSEA. No vomiting, diarrhea, constipation, bloody stools  : No dysuria, polyuria, hematuria  SKIN: No rashes, swelling  MSK: No back pain, flank pain

## 2020-11-30 NOTE — ED PROVIDER NOTE - PHYSICAL EXAMINATION
GENERAL: middle aged female, lying in bed, NAD. Vital signs are within normal limits  HEENT: NC/AT, conjunctiva noninjected, sclera anicteric, moist mucous membranes,  NECK: Supple, trachea midline  LUNG: Nonlabored respirations, no wheezes  CV: RRR, Pulses- Radial: 2+ b/l  ABDOMEN: soft, Nondistended, suprapubic tenderness to palpation  MSK: No visible deformities, no CVA tenderness  NEURO: AAOx4 (to person, place, time, event), no tremor, steady gait  PSYCH: Normal mood and affect

## 2020-11-30 NOTE — ED ADULT NURSE NOTE - ED STAT RN HANDOFF DETAILS
hand off given to oncoming RN Jory Armstrong. Oral contrast in progress for CT. Continues to c/o suprapubic pain. Declines pain medication at present

## 2020-11-30 NOTE — H&P ADULT - ASSESSMENT
50F hx IBS, diverticulitis, hypothyroidism, HLD presenting to the ED with 5 days of abdominal pain found to have uncomplicated sigmoid diverticulitis    - Admit to Surgery under Dr. Medrano  - NPO / IV hydration / IV Ciprofloxacin and Flagyl  - Pain control as needed  - Home meds  - VTE ppx: Lovenox, ambulation    d/w Dr. Mitchell Johns, PGY-3  General Surgery ([place])  [id number] with questions 50F hx IBS, diverticulitis, hypothyroidism, HLD presenting to the ED with 5 days of abdominal pain found to have uncomplicated sigmoid diverticulitis    - Admit to Surgery under Dr. Medrano  - NPO / IV hydration / IV Ciprofloxacin and Flagyl  - Cardiology consult in AM for OR optimization  - Pain control as needed  - Home meds  - VTE ppx: Lovenox, ambulation    d/w Dr. Mitchell Johns, PGY-3  General Surgery ([facility])  [id number] with questions

## 2020-11-30 NOTE — H&P ADULT - HISTORY OF PRESENT ILLNESS
50F hx IBS, diverticulitis, hypothyroidism, HLD presenting to the ED with 5 days of abdominal pain. Patient has had multiple prior episodes of uncomplicated diverticulitis in September 2019, September 2020 which required inpatient admission due to failure of po antibiotics. Prior episodes have been responsive to IV Cipro/Flagyl. She underwent colonoscopy in January 2020 which was reportedly normal. She also saw Dr. Medrano in the office and discussed the possibility of a sigmoid resection, but wanted to hold off until after the holidays. The day before Thanksgiving the patient started having lower abdominal pain similar to prior episodes of diverticulitis. However she wanted to wait and see if the pain improved-- it did not, and continued to worsen. On Sunday the pain was unbearable, sharp, and awoke her from sleep. She now presents to the ED for further evaluation. Denies fevers, endorses chills, mild nausea yesterday, no vomiting. Last BM yesterday.    Of note, patient is currently wearing a Holter monitor being worked up for arrhythmia.

## 2020-11-30 NOTE — ED PROVIDER NOTE - CLINICAL SUMMARY MEDICAL DECISION MAKING FREE TEXT BOX
50F hx of reccurent diverticulitis here for sxs patient states is similar to her prior diverticulitis. Vitals wnl. Afebrile. Suprapubic tenderness. DDx includes sigmoid diverticulitis vs colitis vs urine infection. Eval with CT a/p with oral contrast (patient states she has had it before), labs, urinal, hydrate. Denies wanting analgesia or antiemetics are present time despite pain "7/10". 50F hx of reccurent diverticulitis here for sxs patient states is similar to her prior diverticulitis. Vitals wnl. Afebrile. Suprapubic tenderness. DDx includes sigmoid diverticulitis vs colitis vs urine infection. Eval with CT a/p with oral contrast (patient states she has had it before), labs, urinal, hydrate. Denies wanting analgesia or antiemetics are present time despite pain "7/10".    Attending MD Castellon: 49 yo female with diverticulitis, IBS and hypothyroid now with suprapubic pain similar to prior episodes fo diverticulitis, no urinary symptoms, no fevers, + mild nausea.  On exam the is suprpubic tenderness to palpation.  No LLQ.  Plan:  IV, IVF, labs, type and screen, pain control, abd/pelvis CT, surgical consult as needed,  likely admission.

## 2020-11-30 NOTE — ED ADULT NURSE REASSESSMENT NOTE - NS ED NURSE REASSESS COMMENT FT1
Pt received from RN laine, resting comfortably on stretcher, VSS. A&Ox4, MCARTHUR, distal pulses intact, abdomen soft nontender, skin intact. Side rails up for safety, call bell and personal items within reach, instructed to call for assistance, verbalizes understanding. Will continue to monitor.

## 2020-11-30 NOTE — H&P ADULT - NSHPPHYSICALEXAM_GEN_ALL_CORE
NAD, resting in stretcher  Alert, responding appropriately  Non labored respirations  Soft, suprapubic and LLQ tenderness, ND, no rebound/guarding  WWP

## 2020-11-30 NOTE — ED ADULT NURSE NOTE - OBJECTIVE STATEMENT
1245 50 yr old WF c/o suprapubic pain and decreased appetite x 2 days associated with chills this morning. Wearing a holter monitor for the last 2 wks for evaluation of persistent palpitations, chest pain and dizziness. States recent echo showed abnormality of heart valve. No dizzinesschest pain or palp at present. A&Ox4. ambulatory. Color pink. skin W&D. Lungs clear. abd soft. TTP suprapubic region. pmh IBS and diverticulitis. Denies cough, congestion or SOB. Was tested 3 wks ago for COVID19 when she was exposed to colleague with COVID and had cold symptoms at that time. None now. Was COVID neg 3 wks ago

## 2020-12-01 LAB
ANION GAP SERPL CALC-SCNC: 10 MMOL/L — SIGNIFICANT CHANGE UP (ref 5–17)
BUN SERPL-MCNC: 8 MG/DL — SIGNIFICANT CHANGE UP (ref 7–23)
CALCIUM SERPL-MCNC: 8.7 MG/DL — SIGNIFICANT CHANGE UP (ref 8.4–10.5)
CHLORIDE SERPL-SCNC: 102 MMOL/L — SIGNIFICANT CHANGE UP (ref 96–108)
CO2 SERPL-SCNC: 23 MMOL/L — SIGNIFICANT CHANGE UP (ref 22–31)
CREAT SERPL-MCNC: 0.71 MG/DL — SIGNIFICANT CHANGE UP (ref 0.5–1.3)
CULTURE RESULTS: SIGNIFICANT CHANGE UP
GLUCOSE SERPL-MCNC: 74 MG/DL — SIGNIFICANT CHANGE UP (ref 70–99)
HCT VFR BLD CALC: 35.6 % — SIGNIFICANT CHANGE UP (ref 34.5–45)
HGB BLD-MCNC: 11.6 G/DL — SIGNIFICANT CHANGE UP (ref 11.5–15.5)
MAGNESIUM SERPL-MCNC: 2 MG/DL — SIGNIFICANT CHANGE UP (ref 1.6–2.6)
MCHC RBC-ENTMCNC: 30.4 PG — SIGNIFICANT CHANGE UP (ref 27–34)
MCHC RBC-ENTMCNC: 32.6 GM/DL — SIGNIFICANT CHANGE UP (ref 32–36)
MCV RBC AUTO: 93.4 FL — SIGNIFICANT CHANGE UP (ref 80–100)
NRBC # BLD: 0 /100 WBCS — SIGNIFICANT CHANGE UP (ref 0–0)
PHOSPHATE SERPL-MCNC: 3.2 MG/DL — SIGNIFICANT CHANGE UP (ref 2.5–4.5)
PLATELET # BLD AUTO: 255 K/UL — SIGNIFICANT CHANGE UP (ref 150–400)
POTASSIUM SERPL-MCNC: 3.7 MMOL/L — SIGNIFICANT CHANGE UP (ref 3.5–5.3)
POTASSIUM SERPL-SCNC: 3.7 MMOL/L — SIGNIFICANT CHANGE UP (ref 3.5–5.3)
RBC # BLD: 3.81 M/UL — SIGNIFICANT CHANGE UP (ref 3.8–5.2)
RBC # FLD: 12.9 % — SIGNIFICANT CHANGE UP (ref 10.3–14.5)
SARS-COV-2 IGG SERPL QL IA: NEGATIVE — SIGNIFICANT CHANGE UP
SARS-COV-2 IGM SERPL IA-ACNC: 0.08 INDEX — SIGNIFICANT CHANGE UP
SODIUM SERPL-SCNC: 135 MMOL/L — SIGNIFICANT CHANGE UP (ref 135–145)
SPECIMEN SOURCE: SIGNIFICANT CHANGE UP
WBC # BLD: 5.07 K/UL — SIGNIFICANT CHANGE UP (ref 3.8–10.5)
WBC # FLD AUTO: 5.07 K/UL — SIGNIFICANT CHANGE UP (ref 3.8–10.5)

## 2020-12-01 PROCEDURE — 99223 1ST HOSP IP/OBS HIGH 75: CPT

## 2020-12-01 RX ORDER — DEXTROSE MONOHYDRATE, SODIUM CHLORIDE, AND POTASSIUM CHLORIDE 50; .745; 4.5 G/1000ML; G/1000ML; G/1000ML
1000 INJECTION, SOLUTION INTRAVENOUS
Refills: 0 | Status: DISCONTINUED | OUTPATIENT
Start: 2020-12-01 | End: 2020-12-03

## 2020-12-01 RX ORDER — BACITRACIN ZINC 500 UNIT/G
1 OINTMENT IN PACKET (EA) TOPICAL DAILY
Refills: 0 | Status: DISCONTINUED | OUTPATIENT
Start: 2020-12-01 | End: 2020-12-08

## 2020-12-01 RX ORDER — ONDANSETRON 8 MG/1
4 TABLET, FILM COATED ORAL ONCE
Refills: 0 | Status: COMPLETED | OUTPATIENT
Start: 2020-12-01 | End: 2020-12-02

## 2020-12-01 RX ADMIN — ONDANSETRON 4 MILLIGRAM(S): 8 TABLET, FILM COATED ORAL at 00:21

## 2020-12-01 RX ADMIN — Medication 1000 MILLIGRAM(S): at 18:59

## 2020-12-01 RX ADMIN — Medication 1 APPLICATION(S): at 22:16

## 2020-12-01 RX ADMIN — Medication 100 MICROGRAM(S): at 06:00

## 2020-12-01 RX ADMIN — AMLODIPINE BESYLATE 2.5 MILLIGRAM(S): 2.5 TABLET ORAL at 06:01

## 2020-12-01 RX ADMIN — Medication 100 MILLIGRAM(S): at 06:00

## 2020-12-01 RX ADMIN — ENOXAPARIN SODIUM 40 MILLIGRAM(S): 100 INJECTION SUBCUTANEOUS at 11:43

## 2020-12-01 RX ADMIN — Medication 1000 MILLIGRAM(S): at 10:42

## 2020-12-01 RX ADMIN — Medication 100 MILLIGRAM(S): at 14:04

## 2020-12-01 RX ADMIN — HYDROMORPHONE HYDROCHLORIDE 0.5 MILLIGRAM(S): 2 INJECTION INTRAMUSCULAR; INTRAVENOUS; SUBCUTANEOUS at 06:46

## 2020-12-01 RX ADMIN — HYDROMORPHONE HYDROCHLORIDE 0.5 MILLIGRAM(S): 2 INJECTION INTRAMUSCULAR; INTRAVENOUS; SUBCUTANEOUS at 00:55

## 2020-12-01 RX ADMIN — HYDROMORPHONE HYDROCHLORIDE 0.5 MILLIGRAM(S): 2 INJECTION INTRAMUSCULAR; INTRAVENOUS; SUBCUTANEOUS at 07:41

## 2020-12-01 RX ADMIN — DEXTROSE MONOHYDRATE, SODIUM CHLORIDE, AND POTASSIUM CHLORIDE 100 MILLILITER(S): 50; .745; 4.5 INJECTION, SOLUTION INTRAVENOUS at 17:44

## 2020-12-01 RX ADMIN — SODIUM CHLORIDE 125 MILLILITER(S): 9 INJECTION, SOLUTION INTRAVENOUS at 10:13

## 2020-12-01 RX ADMIN — ATORVASTATIN CALCIUM 40 MILLIGRAM(S): 80 TABLET, FILM COATED ORAL at 22:16

## 2020-12-01 RX ADMIN — Medication 200 MILLIGRAM(S): at 06:06

## 2020-12-01 RX ADMIN — Medication 1000 MILLIGRAM(S): at 10:12

## 2020-12-01 RX ADMIN — HYDROMORPHONE HYDROCHLORIDE 0.5 MILLIGRAM(S): 2 INJECTION INTRAMUSCULAR; INTRAVENOUS; SUBCUTANEOUS at 00:21

## 2020-12-01 RX ADMIN — Medication 1000 MILLIGRAM(S): at 19:29

## 2020-12-01 NOTE — PROGRESS NOTE ADULT - SUBJECTIVE AND OBJECTIVE BOX
Patient is a 50y old  Female who presented with a chief complaint of Uncomplicated diverticulitis (01 Dec 2020 03:45)      INTERVAL HPI/OVERNIGHT EVENTS:  still with lower abdominal/ suprapubic discomfort  +migraine HA  +nausea, no emesis    currently NPO with IV Abx  no CP or SOB  no fever or chills    MEDICATIONS  (STANDING):  amLODIPine   Tablet 2.5 milliGRAM(s) Oral daily  atorvastatin 40 milliGRAM(s) Oral at bedtime  ciprofloxacin   IVPB 400 milliGRAM(s) IV Intermittent every 12 hours  dextrose 5% + sodium chloride 0.45% with potassium chloride 20 mEq/L 1000 milliLiter(s) (100 mL/Hr) IV Continuous <Continuous>  enoxaparin Injectable 40 milliGRAM(s) SubCutaneous daily  levothyroxine 100 MICROGram(s) Oral daily  metroNIDAZOLE  IVPB 500 milliGRAM(s) IV Intermittent every 8 hours  ondansetron Injectable 4 milliGRAM(s) IV Push once    MEDICATIONS  (PRN):  acetaminophen   Tablet .. 1000 milliGRAM(s) Oral every 6 hours PRN Mild Pain (1 - 3), Moderate Pain (4 - 6)  BACItracin   Ointment 1 Application(s) Topical daily PRN for skin irritation  HYDROmorphone  Injectable 0.5 milliGRAM(s) IV Push every 6 hours PRN Severe Pain (7 - 10)      Allergies  iodine (Hives)  Zosyn (Urticaria)      Review of Systems:  General:  No wt loss, fevers, chills, night sweats, fatigue  CV:  see HPI - currently without CP  Resp:  No dyspnea, cough, tachypnea, wheezing  GI: see HPI  :  No pain, bleeding, incontinence, nocturia  Muscle:  No pain, weakness  Neuro:  No weakness, tingling, memory problems  Psych:  No fatigue, insomnia, mood problems, depression  Endocrine:  No polyuria, polydypsia, cold/heat intolerance  Heme:  No petechiae, ecchymosis, easy bruisability  Skin:  No rash, tattoos, scars, edema    Vital Signs Last 24 Hrs  T(C): 36.6 (01 Dec 2020 12:03), Max: 36.7 (2020 20:46)  T(F): 97.9 (01 Dec 2020 12:03), Max: 98.1 (2020 20:46)  HR: 77 (01 Dec 2020 12:03) (60 - 77)  BP: 123/76 (01 Dec 2020 12:03) (108/72 - 146/84)  BP(mean): --  RR: 18 (01 Dec 2020 12:03) (16 - 18)  SpO2: 99% (01 Dec 2020 12:03) (96% - 100%)    PHYSICAL EXAM:  Constitutional: NAD, well-developed pleasant non toxic appearing female  Neck: No LAD, supple  Respiratory: Clear anteriorly  Cardiovascular: S1 and S2, RRR NT to palpation  Gastrointestinal: BS+, soft, +tenderness to deep palpation in suprapubic region, no rebound, guarding or rigidity  Extremities: No peripheral edema, neg clubbing, cyanosis  Vascular: 2+ peripheral pulses  Neurological: A/O x 3, no focal deficits  Psychiatric: Normal mood, normal affect  Skin: No rashes    LABS:                        11.6   5.07  )-----------( 255      ( 01 Dec 2020 06:45 )             35.6     12-    135  |  102  |  8   ----------------------------<  74  3.7   |  23  |  0.71    Ca    8.7      01 Dec 2020 06:44  Phos  3.2     12-  Mg     2.0     12    TPro  7.4  /  Alb  4.1  /  TBili  0.4  /  DBili  x   /  AST  15  /  ALT  15  /  AlkPhos  47        Urinalysis Basic - ( 2020 14:43 )    Color: Light Yellow / Appearance: Clear / S.011 / pH: x  Gluc: x / Ketone: Negative  / Bili: Negative / Urobili: Negative   Blood: x / Protein: Negative / Nitrite: Negative   Leuk Esterase: Negative / RBC: 1 /hpf / WBC 0 /HPF   Sq Epi: x / Non Sq Epi: 0 /hpf / Bacteria: Negative        RADIOLOGY & ADDITIONAL TESTS:  EXAM:  CT ABDOMEN AND PELVIS OC                        PROCEDURE DATE:  2020        INTERPRETATION:  CLINICAL INFORMATION: Recurrent diverticulitis. Abdominal pain.    COMPARISON: 2020    PROCEDURE:  CT of the Abdomen and Pelvis was performed without intravenous contrast.  Intravenous contrast: None.  Oral contrast: positive contrast was administered.  Sagittal and coronal reformats were performed.    FINDINGS:  LOWER CHEST: Small hiatal hernia.    LIVER: Within normal limits.  BILE DUCTS: Normal caliber.  GALLBLADDER: Within normal limits.  SPLEEN: Within normal limits.  PANCREAS: Within normal limits.  ADRENALS: Within normal limits.  KIDNEYS/URETERS: Within normal limits.    BLADDER: Within normal limits.  REPRODUCTIVE ORGANS: Uterus and bilateral adnexa are within normal limits.    BOWEL: No bowel obstruction. Appendix is normal. Diverticulosis with acute nonperforated diverticulitis at the level of the proximal sigmoid colon.  PERITONEUM: No ascites. No diverticular abscess. No pneumoperitoneum.  VESSELS: Within normal limits.  RETROPERITONEUM/LYMPH NODES: No lymphadenopathy.  ABDOMINAL WALL: Within normal limits.  BONES: Degenerative changes.    IMPRESSION:  Acute nonperforated diverticulitis of the proximal sigmoid colon.

## 2020-12-01 NOTE — PROGRESS NOTE ADULT - ASSESSMENT
50F hx IBS, diverticulitis, hypothyroidism, HLD presenting to the ED with 5 days of abdominal pain found to have uncomplicated sigmoid diverticulitis.    Plan:   - NPO / IV hydration / IV Ciprofloxacin and Flagyl  - Cardiology consult in AM for OR optimization  - Pain control as needed  - Home meds  - F/u GI consult   - VTE ppx: Lovenox, ambulation    Green surgery   px [phone]

## 2020-12-01 NOTE — PROGRESS NOTE ADULT - SUBJECTIVE AND OBJECTIVE BOX
Interval events: Patient nauseous overnight, improved with IV Zofran, no emesis.    Patient seen and examined at bedside.   Pain seems to be well controlled.  NPO, nauseous, but not emesis.    GI Function -/-      PHYSICAL EXAM:  Constitutional: NAD, non toxic appearing female  Respiratory: Clear anteriorly  Gastrointestinal: BS+, soft, +tenderness to deep palpation in suprapubic region, no rebound, guarding or rigidity.  Extremities: No peripheral edema, neg clubbing, cyanosis    Vital Signs Last 24 Hrs  T(C): 36.6 (2020 23:31), Max: 36.7 (2020 20:46)  T(F): 97.8 (2020 23:31), Max: 98.1 (2020 20:46)  HR: 72 (:31) (68 - 77)  BP: 134/86 (2020 23:31) (113/77 - 146/84)  BP(mean): --  RR: 18 (2020 23:31) (16 - 18)  SpO2: 98% (2020 23:31) (97% - 100%)    I&O's Detail    2020 07:01  -  01 Dec 2020 03:46  --------------------------------------------------------  IN:    IV PiggyBack: 300 mL    Oral Fluid: 120 mL  Total IN: 420 mL    OUT:  Total OUT: 0 mL    Total NET: 420 mL              139  |  105  |  12  ----------------------------<  97  4.1   |  24  |  0.66    Ca    9.6      2020 13:48    TPro  7.4  /  Alb  4.1  /  TBili  0.4  /  DBili  x   /  AST  15  /  ALT  15  /  AlkPhos  47                              12.3   5.40  )-----------( 281      ( 2020 13:48 )             38.5           LABS:                         12.3   5.40  )-----------( 281      ( 2020 13:48 )             38.5     11-30    139  |  105  |  12  ----------------------------<  97  4.1   |  24  |  0.66    Ca    9.6      2020 13:48    TPro  7.4  /  Alb  4.1  /  TBili  0.4  /  DBili  x   /  AST  15  /  ALT  15  /  AlkPhos  47        Urinalysis Basic - ( 2020 14:43 )    Color: Light Yellow / Appearance: Clear / S.011 / pH: x  Gluc: x / Ketone: Negative  / Bili: Negative / Urobili: Negative   Blood: x / Protein: Negative / Nitrite: Negative   Leuk Esterase: Negative / RBC: 1 /hpf / WBC 0 /HPF   Sq Epi: x / Non Sq Epi: 0 /hpf / Bacteria: Negative    < from: CT Abdomen and Pelvis w/ Oral Cont (20 @ 16:38) >    EXAM:  CT ABDOMEN AND PELVIS OC                            PROCEDURE DATE:  2020        INTERPRETATION:  CLINICAL INFORMATION: Recurrent diverticulitis. Abdominal pain.    COMPARISON: 2020    PROCEDURE:  CT of the Abdomen and Pelvis was performed without intravenous contrast.  Intravenous contrast: None.  Oral contrast: positive contrast was administered.  Sagittal and coronal reformats were performed.    FINDINGS:  LOWER CHEST: Small hiatal hernia.    LIVER: Within normal limits.  BILE DUCTS: Normal caliber.  GALLBLADDER: Within normal limits.  SPLEEN: Within normal limits.  PANCREAS: Within normal limits.  ADRENALS: Within normal limits.  KIDNEYS/URETERS: Within normal limits.    BLADDER: Within normal limits.  REPRODUCTIVE ORGANS: Uterus and bilateral adnexa are within normal limits.    BOWEL: No bowel obstruction. Appendix is normal. Diverticulosis with acute nonperforated diverticulitis at the level of the proximal sigmoid colon.  PERITONEUM: No ascites. No diverticular abscess. No pneumoperitoneum.  VESSELS: Within normal limits.  RETROPERITONEUM/LYMPH NODES: No lymphadenopathy.  ABDOMINAL WALL: Within normal limits.  BONES: Degenerative changes.    IMPRESSION:  Acute nonperforated diverticulitis of the proximal sigmoid colon.            < end of copied text >      MEDICATIONS  (STANDING):  amLODIPine   Tablet 2.5 milliGRAM(s) Oral daily  atorvastatin 40 milliGRAM(s) Oral at bedtime  ciprofloxacin   IVPB 400 milliGRAM(s) IV Intermittent every 12 hours  enoxaparin Injectable 40 milliGRAM(s) SubCutaneous daily  lactated ringers. 1000 milliLiter(s) (125 mL/Hr) IV Continuous <Continuous>  levothyroxine 100 MICROGram(s) Oral daily  metroNIDAZOLE  IVPB 500 milliGRAM(s) IV Intermittent every 8 hours    MEDICATIONS  (PRN):  acetaminophen   Tablet .. 1000 milliGRAM(s) Oral every 6 hours PRN Mild Pain (1 - 3), Moderate Pain (4 - 6)  HYDROmorphone  Injectable 0.5 milliGRAM(s) IV Push every 6 hours PRN Severe Pain (7 - 10)        -Plan to be discussed with the attending after rounds.

## 2020-12-01 NOTE — PROGRESS NOTE ADULT - ASSESSMENT
50F hx of IBS, diverticulitis, hypothyroidism presenting to the ED for 5 days of sharp suprapubic abdominal pain similar to prior episodes of diverticulitis    Clinically non toxic appearing, without leukocytosis  CT - acute non perforated diverticulitis    Acute recurrent (proximal) sigmoid diverticulitis      RECS:  -IV ABx  -NPO; possible trial of clears tomorrow  -monitor clinically  -Zofran prn    Discussed with surgical attending and team  Discussed with pt, all questions answered      Arnulfo Canela PA-C    [facility]  [phone]  After hours and weekend coverage [phone]

## 2020-12-01 NOTE — CONSULT NOTE ADULT - SUBJECTIVE AND OBJECTIVE BOX
N-[id number]    CHIEF COMPLAINT:  Patient is a 50y old  Female who presents with a chief complaint of Uncomplicated diverticulitis (01 Dec 2020 13:00)      HISTORY OF PRESENT ILLNESS:  WARNER REYES is a 50y Female patient with past medical history of *** presenting with ***.     Allergies    iodine (Hives)  Zosyn (Urticaria)    Intolerances    	    PAST MEDICAL & SURGICAL HISTORY:  Hypertension    Sleep apnea  nasal mask- settings unknown    Hypothyroid    Diverticulitis  last hospitalized 9.19    h/o Heart Palpitations    Irritable Bowel Syndrome    H/O vaginal surgery  vaginal mesh    h/o dental implant    History of Colonoscopy    h/o  Endoscopy    C Section - x 1 - 1994        FAMILY HISTORY:  Family history of early CAD  Father-45, Paternal Aunt Maternal Uncle    Family history of colon cancer (Grandparent)        SOCIAL HISTORY:    [ ] Non-smoker  [ ] Smoker  [ ] Alcohol    REVIEW OF SYSTEMS:  CONSTITUTIONAL: No fever, weight loss, or fatigue  EYES: No eye pain, visual disturbances, or discharge  ENMT:  No difficulty hearing, tinnitus, vertigo; No sinus or throat pain  NECK: No pain or stiffness  RESPIRATORY: No cough, wheezing, chills or hemoptysis; No Shortness of Breath  CARDIOVASCULAR: No chest pain, palpitations, passing out, dizziness, or leg swelling  GASTROINTESTINAL: No abdominal or epigastric pain. No nausea, vomiting, or hematemesis; No diarrhea or constipation. No melena or hematochezia.  GENITOURINARY: No dysuria, frequency, hematuria, or incontinence  NEUROLOGICAL: No headaches, memory loss, loss of strength, numbness, or tremors  SKIN: No itching, burning, rashes, or lesions   LYMPH Nodes: No enlarged glands  ENDOCRINE: No heat or cold intolerance; No hair loss  MUSCULOSKELETAL: No joint pain or swelling; No muscle, back, or extremity pain  PSYCHIATRIC: No depression, anxiety, mood swings, or difficulty sleeping  HEME/LYMPH: No easy bruising, or bleeding gums  ALLERY AND IMMUNOLOGIC: No hives or eczema	    [ ] All others negative	  [ ] Unable to obtain    I&O's Summary    30 Nov 2020 07:01  -  01 Dec 2020 07:00  --------------------------------------------------------  IN: 1120 mL / OUT: 0 mL / NET: 1120 mL        PHYSICAL EXAM:  Vital Signs Last 24 Hrs  T(C): 36.8 (01 Dec 2020 16:13), Max: 36.8 (01 Dec 2020 16:13)  T(F): 98.3 (01 Dec 2020 16:13), Max: 98.3 (01 Dec 2020 16:13)  HR: 72 (01 Dec 2020 16:13) (60 - 77)  BP: 130/80 (01 Dec 2020 16:13) (108/72 - 134/86)  BP(mean): --  RR: 18 (01 Dec 2020 16:13) (18 - 18)  SpO2: 97% (01 Dec 2020 16:13) (96% - 99%)  Appearance: Normal	  HEENT:   Normal oral mucosa, PERRL, EOMI	  Lymphatic: No lymphadenopathy  Cardiovascular: Normal S1 S2, No JVD, No murmurs, No edema  Respiratory: Lungs clear to auscultation	  Psychiatry: A & O x 3, Mood & affect appropriate  Gastrointestinal:  Soft, Non-tender, + BS	  Skin: No rashes, No ecchymoses, No cyanosis	  Neurologic: Non-focal  Extremities: Normal range of motion, No clubbing, cyanosis or edema  Vascular: Peripheral pulses palpable 2+ bilaterally    MEDICATIONS:  MEDICATIONS  (STANDING):  amLODIPine   Tablet 2.5 milliGRAM(s) Oral daily  atorvastatin 40 milliGRAM(s) Oral at bedtime  ciprofloxacin   IVPB 400 milliGRAM(s) IV Intermittent every 12 hours  dextrose 5% + sodium chloride 0.45% with potassium chloride 20 mEq/L 1000 milliLiter(s) (100 mL/Hr) IV Continuous <Continuous>  enoxaparin Injectable 40 milliGRAM(s) SubCutaneous daily  levothyroxine 100 MICROGram(s) Oral daily  metroNIDAZOLE  IVPB 500 milliGRAM(s) IV Intermittent every 8 hours  ondansetron Injectable 4 milliGRAM(s) IV Push once    MEDICATIONS  (PRN):  acetaminophen   Tablet .. 1000 milliGRAM(s) Oral every 6 hours PRN Mild Pain (1 - 3), Moderate Pain (4 - 6)  BACItracin   Ointment 1 Application(s) Topical daily PRN for skin irritation  HYDROmorphone  Injectable 0.5 milliGRAM(s) IV Push every 6 hours PRN Severe Pain (7 - 10)      Home Medications:  amLODIPine 2.5 mg oral tablet: 1 tab(s) orally once a day (30 Nov 2020 17:56)  Crestor 10 mg oral tablet: 1 tab(s) orally once a day (30 Nov 2020 17:56)  levothyroxine 100 mcg (0.1 mg) oral tablet: 1 tab(s) orally once a day (30 Nov 2020 17:56)      LABS:	 	  CBC Full  -  ( 01 Dec 2020 06:45 )  WBC Count : 5.07 K/uL  Hemoglobin : 11.6 g/dL  Hematocrit : 35.6 %  Platelet Count - Automated : 255 K/uL  Mean Cell Volume : 93.4 fl  Mean Cell Hemoglobin : 30.4 pg  Mean Cell Hemoglobin Concentration : 32.6 gm/dL  Auto Neutrophil # : x  Auto Lymphocyte # : x  Auto Monocyte # : x  Auto Eosinophil # : x  Auto Basophil # : x  Auto Neutrophil % : x  Auto Lymphocyte % : x  Auto Monocyte % : x  Auto Eosinophil % : x  Auto Basophil % : x    12-01    135  |  102  |  8   ----------------------------<  74  3.7   |  23  |  0.71  11-30    139  |  105  |  12  ----------------------------<  97  4.1   |  24  |  0.66    Ca    8.7      01 Dec 2020 06:44  Ca    9.6      30 Nov 2020 13:48  Phos  3.2     12-01  Mg     2.0     12-01    TPro  7.4  /  Alb  4.1  /  TBili  0.4  /  DBili  x   /  AST  15  /  ALT  15  /  AlkPhos  47  11-30            proBNP:   Lipid Profile:   HgA1c:   TSH:     TELEMETRY: 	    ECG:  	  RADIOLOGY:  OTHER: 	    CARDIAC TESTING/STUDIES:    [ ] Echocardiogram:  [ ]  Catheterization:  [ ] Stress Test:  	  	  ASSESSMENT/PLAN: 	      Pierre Jay MD, MPH, CHRISTINA  Cardiovascular Specialist Attending  [facility]  C: [phone] (text preferred and/or call)  E: [email or web address]   MRN-[id number]    CHIEF COMPLAINT:  Abdominal Pain     HISTORY OF PRESENT ILLNESS:  WARNER REYES is a 50y Female patient with past medical history of IBS, diverticulitis, hypothyroidism, HTN, HLD presenting with recurrent uncomplicated diverticulitis often responsive to antibiotics but becoming more painful, prolonged, and frequent exacerbations. Discussed the possibility of a sigmoid resection in the outpatient setting, but trying to defer/delay as long as possible. Primary team consulted cardiology for possible pre-operative optimization.     Cardiac history incudes history of chest discomfort and underwent a nuclear stress test with negative perfusion but positive ECG in the setting of hypertensive response to exercise. She subsequently had a CT coronary with a calcium score of 0 and no evidence of CAD although the study was sub-optimal. ECG with borderline LVH, NSR. She notes that she has the sensation of palpitations but nothing has been found on ECG/telemetry to date.     Allergies  iodine (Hives)  Zosyn (Urticaria)    PAST MEDICAL & SURGICAL HISTORY:  Hypertension  Sleep apnea  nasal mask- settings unknown  Hypothyroid  Diverticulitis  last hospitalized 9.1  h/o Heart Palpitations  Irritable Bowel Syndrome    H/O vaginal surgery  vaginal mesh    h/o dental implant  History of Colonoscopy  h/o  Endoscop  C Section - x 1994    FAMILY HISTORY:  Family history of early CAD  Father-45, Paternal Aunt Maternal Uncle    Family history of colon cancer (Grandparent)    SOCIAL HISTORY:    Non-smoker    REVIEW OF SYSTEMS:  CONSTITUTIONAL: No fever, weight loss, or fatigue  EYES: No eye pain, visual disturbances, or discharge  ENMT:  No difficulty hearing, tinnitus, vertigo; No sinus or throat pain  NECK: No pain or stiffness  RESPIRATORY: No cough, wheezing, chills or hemoptysis; No Shortness of Breath  CARDIOVASCULAR: No passing out, dizziness, or leg swelling. POSITIVE chest pain, palpitations.  GASTROINTESTINAL: POSITIVE abdominal pain, nausea.   GENITOURINARY: No dysuria, frequency, hematuria, or incontinence  NEUROLOGICAL: No headaches, memory loss, loss of strength, numbness, or tremors  SKIN: No itching, burning, rashes, or lesions   LYMPH Nodes: No enlarged glands  ENDOCRINE: No heat or cold intolerance  MUSCULOSKELETAL: No joint pain or swelling; POSISTIVE extremity pain  PSYCHIATRIC: No depression, mood swings, or difficulty sleeping. POSITIVE anxiety.  HEME/LYMPH: No easy bruising, or bleeding gums  ALLERY AND IMMUNOLOGIC: No hives or eczema	    I&O's Summary    2020 07:01  -  01 Dec 2020 07:00  --------------------------------------------------------  IN: 1120 mL / OUT: 0 mL / NET: 1120 mL    PHYSICAL EXAM:  Vital Signs Last 24 Hrs  T(C): 36.8 (01 Dec 2020 16:13), Max: 36.8 (01 Dec 2020 16:13)  T(F): 98.3 (01 Dec 2020 16:13), Max: 98.3 (01 Dec 2020 16:13)  HR: 72 (01 Dec 2020 16:13) (60 - 77)  BP: 130/80 (01 Dec 2020 16:13) (108/72 - 134/86)  RR: 18 (01 Dec 2020 16:13) (18 - 18)  SpO2: 97% (01 Dec 2020 16:13) (96% - 99%)    Appearance: Normal	  HEENT:   Normal oral mucosa  Lymphatic: No lymphadenopathy  Cardiovascular: Normal S1 S2, No JVD, No edema  Respiratory: Lungs clear to auscultation	  Psychiatry: A & O x 3  Gastrointestinal:  Soft, Tender to light palpation  Skin: No rashes, No ecchymoses, No cyanosis	  Neurologic: Non-focal  Extremities: Normal range of motion  Vascular: Peripheral pulses palpable 2+ bilaterally    MEDICATIONS:  MEDICATIONS  (STANDING):  amLODIPine   Tablet 2.5 milliGRAM(s) Oral daily  atorvastatin 40 milliGRAM(s) Oral at bedtime  ciprofloxacin   IVPB 400 milliGRAM(s) IV Intermittent every 12 hours  dextrose 5% + sodium chloride 0.45% with potassium chloride 20 mEq/L 1000 milliLiter(s) (100 mL/Hr) IV Continuous <Continuous>  enoxaparin Injectable 40 milliGRAM(s) SubCutaneous daily  levothyroxine 100 MICROGram(s) Oral daily  metroNIDAZOLE  IVPB 500 milliGRAM(s) IV Intermittent every 8 hours  ondansetron Injectable 4 milliGRAM(s) IV Push once    MEDICATIONS  (PRN):  acetaminophen   Tablet .. 1000 milliGRAM(s) Oral every 6 hours PRN Mild Pain (1 - 3), Moderate Pain (4 - 6)  BACItracin   Ointment 1 Application(s) Topical daily PRN for skin irritation  HYDROmorphone  Injectable 0.5 milliGRAM(s) IV Push every 6 hours PRN Severe Pain (7 - 10)     Home Medications:  amLODIPine 2.5 mg oral tablet: 1 tab(s) orally once a day (2020 17:56)  Crestor 10 mg oral tablet: 1 tab(s) orally once a day (2020 17:56)  levothyroxine 100 mcg (0.1 mg) oral tablet: 1 tab(s) orally once a day (2020 17:56)    LABS:	 	  CBC Full  -  ( 01 Dec 2020 06:45 )  WBC Count : 5.07 K/uL  Hemoglobin : 11.6 g/dL  Hematocrit : 35.6 %  Platelet Count - Automated : 255 K/uL  Mean Cell Volume : 93.4 fl  Mean Cell Hemoglobin : 30.4 pg  Mean Cell Hemoglobin Concentration : 32.6 gm/dL        135  |  102  |  8   ----------------------------<  74  3.7   |  23  |  0.71      139  |  105  |  12  ----------------------------<  97  4.1   |  24  |  0.66    Ca    8.7      01 Dec 2020 06:44  Ca    9.6      2020 13:48  Phos  3.2       Mg     2.0         TPro  7.4  /  Alb  4.1  /  TBili  0.4  /  DBili  x   /  AST  15  /  ALT  15  /  AlkPhos  47      EC2020  NSR, borderline LVH, non-specific ST abnormalities.     CARDIAC TESTING/STUDIES:    ECHO 2020  Conclusions:  1. Normal left ventricular internal dimensions and wall  thicknesses.  2. Normal left ventricular systolic function. No segmental  wall motion abnormalities.  3. Normal right ventricular size and function.  *** No previous Echo exam.    Nuclear stress test: 2019  IMPRESSIONS: Normal Scan, Abnormal ECG Study  * Exercise capacity: 10 METS, Excellent for age and ngender.  * Chest Pain: No chest pain with exercise.  * Symptom: Fatigue, shortness of breath.  * HR Response: Appropriate.  * BP Response: Hypertensive response (excessive increase  in systolic/diastolic BP) 200/90.  * Heart Rhythm: Sinus Rhythm - 70 BPM.  * Baseline ECG: ST-T wave abnormalities in leads III, aVF,  and V3-V6 at baseline.  * ECG Changes: ST Depression: 1.5 mm downsloping in leads  V3-V6 and 1 mm horizontal in leads II, III, aVF started at  6:00 min of exercise at HR of 141 bpm and persisted 11:00  min into recovery. These changes had mostly returned to  baseline by 11 min in recovery.  * Arrhythmia: None.  * Review of raw data shows: Breast attenuation artifact.  * The left ventricle was normal in size. There are  medium-sized, mild to moderate defects in the distal  anterior, distal anteroseptal, apical, and lateral walls  that are mostly fixed, predominantly correct with prone  imaging, and demonstrate normal wall motion suggestive of  attenuation artifact.  * Post-stress gated wall motion analysis was performed  (LVEF = 67 %;LVEDV = 68 ml.), revealing normal LV  function.  *** Compared with the Nuclear/Stress perfusion images of  2016, the results of the current study appear  similar    CT Heart  3/25/2019  IMPRESSION:    Suboptimal image quality due to motion artifact.  1.  Coronary arteries:  LM:  appears angiographically normal  LAD:  appears angiographically normal; diagonal branches not well   visualized  LCX:  not well visualized  RCA:  dominant; appears angiographically normal  2.  No coronary artery calcium (Agatston score 0).    ASSESSMENT/PLAN: 	  50y Female patient with past medical history of IBS, diverticulitis, hypothyroidism, HTN, HLD presenting with recurrent uncomplicated diverticulitis with possibility of a sigmoid resection.     1) Pre-operative/procodural Optimization  RCRI 0; 4% 30-day risk of MI, cardiac arrest, and/or death  Functional status METs >4  Intermediate risk surgery/procedure  No absolute contraindications including active chest pain, decompensated heart failure, and/or life threatening arrythmia.  Chest discomfort is not likely from myocardial ischemia based on testing and imaging - but possibly from acute episodes of elevated blood pressure whoch can cause angina.     ·	No further testing indicated prior to any surgeries. She is optimized to proceed to GI operation if indicated.   ·	No cardiac indication for aspirin 81 mg daily.     2) HTN   Well controlled     ·	Continue medical management with amlodipine 2.5 mg daily.    3) HLD   Borderline indicated; discuss with outpatient PCP whether this is needed.     ·	Continue home dose rosuvastatin 10 mg nightly (or equivalent).   ·	No cardiac indication for aspirin 81 mg daily.     Pierre Jay MD, MPH, CHRISTINA  Cardiovascular Specialist Attending  [facility]  C: [phone] (text preferred and/or call)  E: [email or web address]

## 2020-12-02 LAB
ANION GAP SERPL CALC-SCNC: 13 MMOL/L — SIGNIFICANT CHANGE UP (ref 5–17)
BUN SERPL-MCNC: 8 MG/DL — SIGNIFICANT CHANGE UP (ref 7–23)
CALCIUM SERPL-MCNC: 9.3 MG/DL — SIGNIFICANT CHANGE UP (ref 8.4–10.5)
CHLORIDE SERPL-SCNC: 101 MMOL/L — SIGNIFICANT CHANGE UP (ref 96–108)
CO2 SERPL-SCNC: 22 MMOL/L — SIGNIFICANT CHANGE UP (ref 22–31)
CREAT SERPL-MCNC: 0.74 MG/DL — SIGNIFICANT CHANGE UP (ref 0.5–1.3)
GLUCOSE SERPL-MCNC: 89 MG/DL — SIGNIFICANT CHANGE UP (ref 70–99)
HCT VFR BLD CALC: 36 % — SIGNIFICANT CHANGE UP (ref 34.5–45)
HGB BLD-MCNC: 11.7 G/DL — SIGNIFICANT CHANGE UP (ref 11.5–15.5)
MAGNESIUM SERPL-MCNC: 2.1 MG/DL — SIGNIFICANT CHANGE UP (ref 1.6–2.6)
MCHC RBC-ENTMCNC: 30.2 PG — SIGNIFICANT CHANGE UP (ref 27–34)
MCHC RBC-ENTMCNC: 32.5 GM/DL — SIGNIFICANT CHANGE UP (ref 32–36)
MCV RBC AUTO: 93 FL — SIGNIFICANT CHANGE UP (ref 80–100)
NRBC # BLD: 0 /100 WBCS — SIGNIFICANT CHANGE UP (ref 0–0)
PHOSPHATE SERPL-MCNC: 2.8 MG/DL — SIGNIFICANT CHANGE UP (ref 2.5–4.5)
PLATELET # BLD AUTO: 264 K/UL — SIGNIFICANT CHANGE UP (ref 150–400)
POTASSIUM SERPL-MCNC: 3.7 MMOL/L — SIGNIFICANT CHANGE UP (ref 3.5–5.3)
POTASSIUM SERPL-SCNC: 3.7 MMOL/L — SIGNIFICANT CHANGE UP (ref 3.5–5.3)
RBC # BLD: 3.87 M/UL — SIGNIFICANT CHANGE UP (ref 3.8–5.2)
RBC # FLD: 12.7 % — SIGNIFICANT CHANGE UP (ref 10.3–14.5)
SODIUM SERPL-SCNC: 136 MMOL/L — SIGNIFICANT CHANGE UP (ref 135–145)
WBC # BLD: 4.28 K/UL — SIGNIFICANT CHANGE UP (ref 3.8–10.5)
WBC # FLD AUTO: 4.28 K/UL — SIGNIFICANT CHANGE UP (ref 3.8–10.5)

## 2020-12-02 PROCEDURE — 99233 SBSQ HOSP IP/OBS HIGH 50: CPT

## 2020-12-02 RX ORDER — POTASSIUM PHOSPHATE, MONOBASIC POTASSIUM PHOSPHATE, DIBASIC 236; 224 MG/ML; MG/ML
15 INJECTION, SOLUTION INTRAVENOUS ONCE
Refills: 0 | Status: COMPLETED | OUTPATIENT
Start: 2020-12-02 | End: 2020-12-04

## 2020-12-02 RX ADMIN — AMLODIPINE BESYLATE 2.5 MILLIGRAM(S): 2.5 TABLET ORAL at 06:58

## 2020-12-02 RX ADMIN — Medication 1000 MILLIGRAM(S): at 11:43

## 2020-12-02 RX ADMIN — ENOXAPARIN SODIUM 40 MILLIGRAM(S): 100 INJECTION SUBCUTANEOUS at 11:38

## 2020-12-02 RX ADMIN — Medication 100 MILLIGRAM(S): at 21:54

## 2020-12-02 RX ADMIN — Medication 100 MILLIGRAM(S): at 01:21

## 2020-12-02 RX ADMIN — ONDANSETRON 4 MILLIGRAM(S): 8 TABLET, FILM COATED ORAL at 21:59

## 2020-12-02 RX ADMIN — Medication 100 MILLIGRAM(S): at 09:01

## 2020-12-02 RX ADMIN — Medication 200 MILLIGRAM(S): at 15:43

## 2020-12-02 RX ADMIN — Medication 1000 MILLIGRAM(S): at 12:30

## 2020-12-02 RX ADMIN — ATORVASTATIN CALCIUM 40 MILLIGRAM(S): 80 TABLET, FILM COATED ORAL at 21:53

## 2020-12-02 RX ADMIN — Medication 100 MICROGRAM(S): at 05:30

## 2020-12-02 RX ADMIN — Medication 100 MILLIGRAM(S): at 18:34

## 2020-12-02 RX ADMIN — Medication 200 MILLIGRAM(S): at 03:29

## 2020-12-02 NOTE — PROGRESS NOTE ADULT - ASSESSMENT
50F hx of IBS, diverticulitis, hypothyroidism presenting to the ED for 5 days of sharp suprapubic abdominal pain similar to prior episodes of diverticulitis    Clinically non toxic appearing, without leukocytosis  CT - acute non perforated diverticulitis    Acute recurrent (proximal) sigmoid diverticulitis      RECS:  -IV ABx  -monitor PO tolerance  -monitor clinically  -Zofran prn  -plans for eventual surgical resection; timing per CRS  -Cardiology following for pre-op optimization    Discussed with surgical attending and team  Discussed with pt, all questions answered      Arnulfo Canela PA-C    [facility]  [phone]  After hours and weekend coverage [phone]

## 2020-12-02 NOTE — PROGRESS NOTE ADULT - SUBJECTIVE AND OBJECTIVE BOX
Interval events: no acute events    SUBJECTIVE:  Reports pain controlled NPO. Denies N/V. Bowel Function +/+  Ambulating independently       OBJECTIVE:  Vital Signs Last 24 Hrs  T(C): 36.3 (02 Dec 2020 00:00), Max: 36.8 (01 Dec 2020 16:13)  T(F): 97.4 (02 Dec 2020 00:00), Max: 98.3 (01 Dec 2020 16:13)  HR: 74 (02 Dec 2020 00:00) (60 - 77)  BP: 125/79 (02 Dec 2020 00:00) (108/72 - 138/86)  BP(mean): --  RR: 18 (02 Dec 2020 00:00) (18 - 18)  SpO2: 97% (02 Dec 2020 00:00) (96% - 99%)    Physical Examination:  GEN: NAD, resting quietly  PULM: symmetric chest rise bilaterally, no increased WOB  ABD: soft, less tender LLQ, nondistended  EXTR: no LE erythema, moving all extremities      LABS:                        11.6   5.07  )-----------( 255      ( 01 Dec 2020 06:45 )             35.6       12-01    135  |  102  |  8   ----------------------------<  74  3.7   |  23  |  0.71    Ca    8.7      01 Dec 2020 06:44  Phos  3.2     12-01  Mg     2.0     12-01    TPro  7.4  /  Alb  4.1  /  TBili  0.4  /  DBili  x   /  AST  15  /  ALT  15  /  AlkPhos  47  11-30

## 2020-12-02 NOTE — PROGRESS NOTE ADULT - ASSESSMENT
50F hx IBS, diverticulitis, hypothyroidism, HLD presenting to the ED with 5 days of abdominal pain found to have uncomplicated sigmoid diverticulitis.    Plan:   - CLD, advance as tolerated  - IV Ciprofloxacin and Flagyl  - Cardiology consult in AM for OR optimization  - Pain control as needed: PO meds  - Home meds  - F/u GI consult   - VTE ppx: Lovenox, ambulation    Green surgery   px [phone]

## 2020-12-02 NOTE — PROGRESS NOTE ADULT - SUBJECTIVE AND OBJECTIVE BOX
Patient is a 50y old  Female who presented with a chief complaint of Uncomplicated diverticulitis (02 Dec 2020 04:15)      INTERVAL HPI/OVERNIGHT EVENTS:  still some nausea, no emesis  did not require narcotic analgesics overnight - abdominal /suprapubic pain improving  started PO clears this AM    migraine HA resolved    MEDICATIONS  (STANDING):  amLODIPine   Tablet 2.5 milliGRAM(s) Oral daily  atorvastatin 40 milliGRAM(s) Oral at bedtime  ciprofloxacin   IVPB 400 milliGRAM(s) IV Intermittent every 12 hours  dextrose 5% + sodium chloride 0.45% with potassium chloride 20 mEq/L 1000 milliLiter(s) (50 mL/Hr) IV Continuous <Continuous>  enoxaparin Injectable 40 milliGRAM(s) SubCutaneous daily  levothyroxine 100 MICROGram(s) Oral daily  metroNIDAZOLE  IVPB 500 milliGRAM(s) IV Intermittent every 8 hours  ondansetron Injectable 4 milliGRAM(s) IV Push once  potassium phosphate IVPB 15 milliMole(s) IV Intermittent once    MEDICATIONS  (PRN):  acetaminophen   Tablet .. 1000 milliGRAM(s) Oral every 6 hours PRN Mild Pain (1 - 3), Moderate Pain (4 - 6)  BACItracin   Ointment 1 Application(s) Topical daily PRN for skin irritation  HYDROmorphone  Injectable 0.5 milliGRAM(s) IV Push every 6 hours PRN Severe Pain (7 - 10)      Allergies  iodine (Hives)  Zosyn (Urticaria)      Review of Systems:  General:  No wt loss, fevers, chills, night sweats, fatigue  CV:  see HPI - currently without CP  Resp:  No dyspnea, cough, tachypnea, wheezing  GI: see HPI  :  No pain, bleeding, incontinence, nocturia  Muscle:  No pain, weakness  Neuro:  No weakness, tingling, memory problems  Psych:  No fatigue, insomnia, mood problems, depression  Endocrine:  No polyuria, polydypsia, cold/heat intolerance  Heme:  No petechiae, ecchymosis, easy bruisability  Skin:  No rash, tattoos, scars, edema      Vital Signs Last 24 Hrs  T(C): 36.7 (02 Dec 2020 08:35), Max: 36.8 (01 Dec 2020 16:13)  T(F): 98.1 (02 Dec 2020 08:35), Max: 98.3 (01 Dec 2020 16:13)  HR: 60 (02 Dec 2020 08:35) (60 - 77)  BP: 122/61 (02 Dec 2020 08:35) (122/61 - 138/86)  BP(mean): --  RR: 18 (02 Dec 2020 08:35) (18 - 18)  SpO2: 99% (02 Dec 2020 08:35) (96% - 99%)    PHYSICAL EXAM:  Constitutional: NAD, well-developed pleasant non toxic appearing female, ambulating in room  Neck: No LAD, supple  Respiratory: Clear anteriorly  Cardiovascular: S1 and S2, RRR NT to palpation  Gastrointestinal: BS+, soft, +mild tenderness to deep palpation in suprapubic region, no rebound, guarding or rigidity  Extremities: No peripheral edema, neg clubbing, cyanosis  Vascular: 2+ peripheral pulses  Neurological: A/O x 3, no focal deficits  Psychiatric: Normal mood, normal affect  Skin: No rashes      LABS:                        11.7   4.28  )-----------( 264      ( 02 Dec 2020 07:13 )             36.0     12-02    136  |  101  |  8   ----------------------------<  89  3.7   |  22  |  0.74    Ca    9.3      02 Dec 2020 07:13  Phos  2.8     12-02  Mg     2.1     12-02    TPro  7.4  /  Alb  4.1  /  TBili  0.4  /  DBili  x   /  AST  15  /  ALT  15  /  AlkPhos  47  11-30        RADIOLOGY & ADDITIONAL TESTS:

## 2020-12-03 ENCOUNTER — TRANSCRIPTION ENCOUNTER (OUTPATIENT)
Age: 50
End: 2020-12-03

## 2020-12-03 PROCEDURE — 99232 SBSQ HOSP IP/OBS MODERATE 35: CPT

## 2020-12-03 RX ORDER — SODIUM,POTASSIUM PHOSPHATES 278-250MG
1 POWDER IN PACKET (EA) ORAL ONCE
Refills: 0 | Status: COMPLETED | OUTPATIENT
Start: 2020-12-03 | End: 2020-12-03

## 2020-12-03 RX ORDER — DEXTROSE MONOHYDRATE, SODIUM CHLORIDE, AND POTASSIUM CHLORIDE 50; .745; 4.5 G/1000ML; G/1000ML; G/1000ML
1000 INJECTION, SOLUTION INTRAVENOUS
Refills: 0 | Status: DISCONTINUED | OUTPATIENT
Start: 2020-12-03 | End: 2020-12-07

## 2020-12-03 RX ADMIN — Medication 1 PACKET(S): at 11:58

## 2020-12-03 RX ADMIN — Medication 200 MILLIGRAM(S): at 14:00

## 2020-12-03 RX ADMIN — Medication 100 MILLIGRAM(S): at 14:00

## 2020-12-03 RX ADMIN — Medication 100 MILLIGRAM(S): at 05:47

## 2020-12-03 RX ADMIN — ENOXAPARIN SODIUM 40 MILLIGRAM(S): 100 INJECTION SUBCUTANEOUS at 11:56

## 2020-12-03 RX ADMIN — Medication 100 MICROGRAM(S): at 05:48

## 2020-12-03 RX ADMIN — Medication 100 MILLIGRAM(S): at 21:13

## 2020-12-03 RX ADMIN — Medication 200 MILLIGRAM(S): at 05:48

## 2020-12-03 NOTE — PROGRESS NOTE ADULT - ASSESSMENT
50F hx IBS, diverticulitis, hypothyroidism, HLD presenting to the ED with 5 days of abdominal pain found to have uncomplicated sigmoid diverticulitis.    Plan:   - Advance diet as tolerated   - IV Ciprofloxacin and Flagyl  - Cardiology consult in AM for OR optimization  - Pain control as needed: PO meds  - Home meds  - F/u GI consult   - VTE ppx: Lovenox, ambulation    Green surgery   px [phone]

## 2020-12-03 NOTE — PROGRESS NOTE ADULT - SUBJECTIVE AND OBJECTIVE BOX
Interval events: no acute events    SUBJECTIVE:  Reports pain well controlled. Tolerating clear liquid diet. Denies N/V. Bowel Function +/-   Ambulating independently       OBJECTIVE:  Vital Signs Last 24 Hrs  T(C): 36.5 (02 Dec 2020 21:03), Max: 36.8 (02 Dec 2020 16:09)  T(F): 97.7 (02 Dec 2020 21:03), Max: 98.3 (02 Dec 2020 16:09)  HR: 66 (02 Dec 2020 21:03) (60 - 73)  BP: 122/76 (02 Dec 2020 21:03) (122/61 - 134/86)  BP(mean): --  RR: 18 (02 Dec 2020 21:03) (18 - 18)  SpO2: 98% (02 Dec 2020 21:03) (96% - 100%)    Physical Examination:  GEN: NAD, resting quietly  PULM: symmetric chest rise bilaterally, no increased WOB  ABD: soft, nontender, nondistended  EXTR: no LE erythema, moving all extremities      LABS:                        11.7   4.28  )-----------( 264      ( 02 Dec 2020 07:13 )             36.0       12-02    136  |  101  |  8   ----------------------------<  89  3.7   |  22  |  0.74    Ca    9.3      02 Dec 2020 07:13  Phos  2.8     12-02  Mg     2.1     12-02

## 2020-12-03 NOTE — DISCHARGE NOTE PROVIDER - HOSPITAL COURSE
50F hx IBS, diverticulitis, hypothyroidism, HLD presenting to the ED with 5 days of abdominal pain. Patient has had multiple prior episodes of uncomplicated diverticulitis in September 2019, September 2020 which required inpatient admission due to failure of po antibiotics. Prior episodes have been responsive to IV Cipro/Flagyl. She underwent colonoscopy in January 2020 which was reportedly normal. She also saw Dr. Medrano in the office and discussed the possibility of a sigmoid resection, but wanted to hold off until after the holidays. The day before Thanksgiving the patient started having lower abdominal pain similar to prior episodes of diverticulitis. However she wanted to wait and see if the pain improved-- it did not, and continued to worsen. On Sunday the pain was unbearable, sharp, and awoke her from sleep. She now presents to the ED for further evaluation. Denies fevers, endorses chills, mild nausea yesterday, no vomiting.   Of note, patient is currently wearing a Holter monitor being worked up for arrhythmia.  CT scan 11/30 showed:  Acute nonperforated diverticulitis of the proximal sigmoid colon.  She was admitted to the surgical service and was started on IV antibiotics.  GI and cardiology were consulted.  When tenderness improved, she was started on clear liquids which she tolerated without increased pain.  She was then advanced to low fiber diet.  She is ambulating, voiding, is tolerating a diet, and is stable for discharge home.  She will complete a course of oral antibiotics and will follow-up with Dr. Medrano within 1-2 weeks to discuss scheduling surgery.

## 2020-12-03 NOTE — PROGRESS NOTE ADULT - SUBJECTIVE AND OBJECTIVE BOX
Patient is a 50y old  Female who presented with a chief complaint of Uncomplicated diverticulitis (03 Dec 2020 10:12)      INTERVAL HPI/OVERNIGHT EVENTS:  started PO low fiber diet today  tolerating well  some upper abdominal bloating  +flatus  no nausea or vomiting  lowe abdominal/ suprapubic pain -overall improved    MEDICATIONS  (STANDING):  amLODIPine   Tablet 2.5 milliGRAM(s) Oral daily  atorvastatin 40 milliGRAM(s) Oral at bedtime  ciprofloxacin   IVPB 400 milliGRAM(s) IV Intermittent every 12 hours  enoxaparin Injectable 40 milliGRAM(s) SubCutaneous daily  levothyroxine 100 MICROGram(s) Oral daily  metroNIDAZOLE  IVPB 500 milliGRAM(s) IV Intermittent every 8 hours  potassium phosphate IVPB 15 milliMole(s) IV Intermittent once    MEDICATIONS  (PRN):  acetaminophen   Tablet .. 1000 milliGRAM(s) Oral every 6 hours PRN Mild Pain (1 - 3), Moderate Pain (4 - 6)  BACItracin   Ointment 1 Application(s) Topical daily PRN for skin irritation  HYDROmorphone  Injectable 0.5 milliGRAM(s) IV Push every 6 hours PRN Severe Pain (7 - 10)      Allergies  iodine (Hives)  Zosyn (Urticaria)      Review of Systems:  General:  No wt loss, fevers, chills, night sweats, fatigue  CV:  see HPI - currently without CP  Resp:  No dyspnea, cough, tachypnea, wheezing  GI: see HPI  :  No pain, bleeding, incontinence, nocturia  Muscle:  No pain, weakness  Neuro:  No weakness, tingling, memory problems  Psych:  No fatigue, insomnia, mood problems, depression  Endocrine:  No polyuria, polydypsia, cold/heat intolerance  Heme:  No petechiae, ecchymosis, easy bruisability  Skin:  No rash, tattoos, scars, edema    Vital Signs Last 24 Hrs  T(C): 36.8 (03 Dec 2020 12:19), Max: 36.8 (02 Dec 2020 16:09)  T(F): 98.3 (03 Dec 2020 12:19), Max: 98.3 (02 Dec 2020 16:09)  HR: 74 (03 Dec 2020 12:19) (62 - 74)  BP: 133/83 (03 Dec 2020 12:19) (108/71 - 134/86)  BP(mean): --  RR: 18 (03 Dec 2020 12:19) (18 - 18)  SpO2: 98% (03 Dec 2020 12:19) (96% - 100%)    PHYSICAL EXAM:  Constitutional: NAD, well-developed pleasant non toxic appearing female, ambulating in room  Neck: No LAD, supple  Respiratory: Clear anteriorly  Cardiovascular: S1 and S2, RRR NT to palpation  Gastrointestinal: BS+, soft, +mild tenderness to deep palpation in suprapubic region, no rebound, guarding or rigidity  Extremities: No peripheral edema, neg clubbing, cyanosis  Vascular: 2+ peripheral pulses  Neurological: A/O x 3, no focal deficits  Psychiatric: Normal mood, normal affect  Skin: No rashes    LABS:                        11.7   4.28  )-----------( 264      ( 02 Dec 2020 07:13 )             36.0     12-02    136  |  101  |  8   ----------------------------<  89  3.7   |  22  |  0.74    Ca    9.3      02 Dec 2020 07:13  Phos  2.8     12-02  Mg     2.1     12-02        RADIOLOGY & ADDITIONAL TESTS:

## 2020-12-03 NOTE — DISCHARGE NOTE PROVIDER - CARE PROVIDER_API CALL
Jaime Medrano  COLON/RECTAL SURGERY  [address], [address]  [place] [address]  Phone: [phone]  Fax: [phone]  Follow Up Time: 1 week    Makenna Laura  GASTROENTEROLOGY  [address], [address]  [place], [place] [id number]  Phone: [phone]  Fax: [phone]  Follow Up Time: 2 weeks    Pierre Jay (MD; CHRISTINA; MPH)  Cardiovascular Disease; Internal Medicine  [address] [facility], [address] [place] [address]  Phone: [phone]  Fax: [phone]  Follow Up Time: 1 week

## 2020-12-03 NOTE — PROGRESS NOTE ADULT - ASSESSMENT
50F hx of IBS, diverticulitis, hypothyroidism presenting to the ED for 5 days of sharp suprapubic abdominal pain similar to prior episodes of diverticulitis    Clinically non toxic appearing, without leukocytosis  CT - acute non perforated diverticulitis    Acute recurrent (proximal) sigmoid diverticulitis      RECS:  -IV ABx  -monitor PO tolerance  -pt does not want simethicone at this time  -encouraged ambulation  -monitor clinically  -Zofran prn  -plans for eventual surgical resection; timing per CRS  -Cardiology following for pre-op optimization    Discussed with surgical attending and team  Discussed with pt, all questions answered      Arnulfo Canela PA-C    [facility]  [phone]  After hours and weekend coverage [phone]

## 2020-12-03 NOTE — DISCHARGE NOTE PROVIDER - NSDCMRMEDTOKEN_GEN_ALL_CORE_FT
amLODIPine 2.5 mg oral tablet: 1 tab(s) orally once a day  Crestor 10 mg oral tablet: 1 tab(s) orally once a day  levothyroxine 100 mcg (0.1 mg) oral tablet: 1 tab(s) orally once a day

## 2020-12-03 NOTE — DISCHARGE NOTE PROVIDER - CARE PROVIDERS DIRECT ADDRESSES
[email or web address]

## 2020-12-03 NOTE — DISCHARGE NOTE PROVIDER - NSDCCPCAREPLAN_GEN_ALL_CORE_FT
PRINCIPAL DISCHARGE DIAGNOSIS  Diagnosis: Sigmoid diverticulitis  Assessment and Plan of Treatment: Please continue IV antibiotics ertapenem and follow up with both Dr. Medrano( surgeon) in 1-2 weeks and Dr. Mensah (Infectious diseae) in 1-2 weeks for further instructions on length of antibiotics and further surgical management.

## 2020-12-03 NOTE — DISCHARGE NOTE PROVIDER - PROVIDER TOKENS
PROVIDER:[TOKEN:[[id number]],FOLLOWUP:[1 week]],PROVIDER:[TOKEN:[[id number]],FOLLOWUP:[2 weeks]],PROVIDER:[TOKEN:[[id number]],FOLLOWUP:[1 week]]

## 2020-12-04 LAB
ANION GAP SERPL CALC-SCNC: 10 MMOL/L — SIGNIFICANT CHANGE UP (ref 5–17)
BUN SERPL-MCNC: 10 MG/DL — SIGNIFICANT CHANGE UP (ref 7–23)
CALCIUM SERPL-MCNC: 9.1 MG/DL — SIGNIFICANT CHANGE UP (ref 8.4–10.5)
CHLORIDE SERPL-SCNC: 108 MMOL/L — SIGNIFICANT CHANGE UP (ref 96–108)
CO2 SERPL-SCNC: 22 MMOL/L — SIGNIFICANT CHANGE UP (ref 22–31)
CREAT SERPL-MCNC: 0.81 MG/DL — SIGNIFICANT CHANGE UP (ref 0.5–1.3)
GLUCOSE SERPL-MCNC: 89 MG/DL — SIGNIFICANT CHANGE UP (ref 70–99)
HCT VFR BLD CALC: 36.1 % — SIGNIFICANT CHANGE UP (ref 34.5–45)
HGB BLD-MCNC: 11.7 G/DL — SIGNIFICANT CHANGE UP (ref 11.5–15.5)
MAGNESIUM SERPL-MCNC: 2 MG/DL — SIGNIFICANT CHANGE UP (ref 1.6–2.6)
MCHC RBC-ENTMCNC: 30.4 PG — SIGNIFICANT CHANGE UP (ref 27–34)
MCHC RBC-ENTMCNC: 32.4 GM/DL — SIGNIFICANT CHANGE UP (ref 32–36)
MCV RBC AUTO: 93.8 FL — SIGNIFICANT CHANGE UP (ref 80–100)
NRBC # BLD: 0 /100 WBCS — SIGNIFICANT CHANGE UP (ref 0–0)
PHOSPHATE SERPL-MCNC: 4.5 MG/DL — SIGNIFICANT CHANGE UP (ref 2.5–4.5)
PLATELET # BLD AUTO: 253 K/UL — SIGNIFICANT CHANGE UP (ref 150–400)
POTASSIUM SERPL-MCNC: 4.1 MMOL/L — SIGNIFICANT CHANGE UP (ref 3.5–5.3)
POTASSIUM SERPL-SCNC: 4.1 MMOL/L — SIGNIFICANT CHANGE UP (ref 3.5–5.3)
RBC # BLD: 3.85 M/UL — SIGNIFICANT CHANGE UP (ref 3.8–5.2)
RBC # FLD: 12.9 % — SIGNIFICANT CHANGE UP (ref 10.3–14.5)
SODIUM SERPL-SCNC: 140 MMOL/L — SIGNIFICANT CHANGE UP (ref 135–145)
WBC # BLD: 5.83 K/UL — SIGNIFICANT CHANGE UP (ref 3.8–10.5)
WBC # FLD AUTO: 5.83 K/UL — SIGNIFICANT CHANGE UP (ref 3.8–10.5)

## 2020-12-04 PROCEDURE — 99223 1ST HOSP IP/OBS HIGH 75: CPT

## 2020-12-04 PROCEDURE — 99231 SBSQ HOSP IP/OBS SF/LOW 25: CPT | Mod: GC

## 2020-12-04 PROCEDURE — 99232 SBSQ HOSP IP/OBS MODERATE 35: CPT

## 2020-12-04 PROCEDURE — 74176 CT ABD & PELVIS W/O CONTRAST: CPT | Mod: 26

## 2020-12-04 RX ORDER — ERTAPENEM SODIUM 1 G/1
1000 INJECTION, POWDER, LYOPHILIZED, FOR SOLUTION INTRAMUSCULAR; INTRAVENOUS ONCE
Refills: 0 | Status: COMPLETED | OUTPATIENT
Start: 2020-12-04 | End: 2020-12-04

## 2020-12-04 RX ORDER — ERTAPENEM SODIUM 1 G/1
1000 INJECTION, POWDER, LYOPHILIZED, FOR SOLUTION INTRAMUSCULAR; INTRAVENOUS EVERY 24 HOURS
Refills: 0 | Status: DISCONTINUED | OUTPATIENT
Start: 2020-12-05 | End: 2020-12-08

## 2020-12-04 RX ORDER — ONDANSETRON 8 MG/1
4 TABLET, FILM COATED ORAL EVERY 4 HOURS
Refills: 0 | Status: DISCONTINUED | OUTPATIENT
Start: 2020-12-04 | End: 2020-12-08

## 2020-12-04 RX ORDER — CIPROFLOXACIN LACTATE 400MG/40ML
400 VIAL (ML) INTRAVENOUS EVERY 12 HOURS
Refills: 0 | Status: DISCONTINUED | OUTPATIENT
Start: 2020-12-04 | End: 2020-12-04

## 2020-12-04 RX ORDER — CIPROFLOXACIN LACTATE 400MG/40ML
400 VIAL (ML) INTRAVENOUS ONCE
Refills: 0 | Status: COMPLETED | OUTPATIENT
Start: 2020-12-04 | End: 2020-12-04

## 2020-12-04 RX ORDER — METRONIDAZOLE 500 MG
500 TABLET ORAL EVERY 8 HOURS
Refills: 0 | Status: DISCONTINUED | OUTPATIENT
Start: 2020-12-04 | End: 2020-12-04

## 2020-12-04 RX ORDER — ERTAPENEM SODIUM 1 G/1
INJECTION, POWDER, LYOPHILIZED, FOR SOLUTION INTRAMUSCULAR; INTRAVENOUS
Refills: 0 | Status: DISCONTINUED | OUTPATIENT
Start: 2020-12-04 | End: 2020-12-08

## 2020-12-04 RX ADMIN — ERTAPENEM SODIUM 1000 MILLIGRAM(S): 1 INJECTION, POWDER, LYOPHILIZED, FOR SOLUTION INTRAMUSCULAR; INTRAVENOUS at 16:08

## 2020-12-04 RX ADMIN — HYDROMORPHONE HYDROCHLORIDE 0.5 MILLIGRAM(S): 2 INJECTION INTRAMUSCULAR; INTRAVENOUS; SUBCUTANEOUS at 11:36

## 2020-12-04 RX ADMIN — ONDANSETRON 4 MILLIGRAM(S): 8 TABLET, FILM COATED ORAL at 16:53

## 2020-12-04 RX ADMIN — HYDROMORPHONE HYDROCHLORIDE 0.5 MILLIGRAM(S): 2 INJECTION INTRAMUSCULAR; INTRAVENOUS; SUBCUTANEOUS at 17:08

## 2020-12-04 RX ADMIN — HYDROMORPHONE HYDROCHLORIDE 0.5 MILLIGRAM(S): 2 INJECTION INTRAMUSCULAR; INTRAVENOUS; SUBCUTANEOUS at 06:45

## 2020-12-04 RX ADMIN — Medication 200 MILLIGRAM(S): at 05:29

## 2020-12-04 RX ADMIN — AMLODIPINE BESYLATE 2.5 MILLIGRAM(S): 2.5 TABLET ORAL at 05:29

## 2020-12-04 RX ADMIN — ATORVASTATIN CALCIUM 40 MILLIGRAM(S): 80 TABLET, FILM COATED ORAL at 22:20

## 2020-12-04 RX ADMIN — Medication 100 MILLIGRAM(S): at 13:14

## 2020-12-04 RX ADMIN — DEXTROSE MONOHYDRATE, SODIUM CHLORIDE, AND POTASSIUM CHLORIDE 100 MILLILITER(S): 50; .745; 4.5 INJECTION, SOLUTION INTRAVENOUS at 16:10

## 2020-12-04 RX ADMIN — HYDROMORPHONE HYDROCHLORIDE 0.5 MILLIGRAM(S): 2 INJECTION INTRAMUSCULAR; INTRAVENOUS; SUBCUTANEOUS at 01:47

## 2020-12-04 RX ADMIN — ENOXAPARIN SODIUM 40 MILLIGRAM(S): 100 INJECTION SUBCUTANEOUS at 13:14

## 2020-12-04 RX ADMIN — Medication 100 MILLIGRAM(S): at 05:29

## 2020-12-04 RX ADMIN — HYDROMORPHONE HYDROCHLORIDE 0.5 MILLIGRAM(S): 2 INJECTION INTRAMUSCULAR; INTRAVENOUS; SUBCUTANEOUS at 17:38

## 2020-12-04 RX ADMIN — DEXTROSE MONOHYDRATE, SODIUM CHLORIDE, AND POTASSIUM CHLORIDE 75 MILLILITER(S): 50; .745; 4.5 INJECTION, SOLUTION INTRAVENOUS at 08:05

## 2020-12-04 RX ADMIN — HYDROMORPHONE HYDROCHLORIDE 0.5 MILLIGRAM(S): 2 INJECTION INTRAMUSCULAR; INTRAVENOUS; SUBCUTANEOUS at 11:06

## 2020-12-04 RX ADMIN — POTASSIUM PHOSPHATE, MONOBASIC POTASSIUM PHOSPHATE, DIBASIC 62.5 MILLIMOLE(S): 236; 224 INJECTION, SOLUTION INTRAVENOUS at 00:04

## 2020-12-04 RX ADMIN — Medication 100 MICROGRAM(S): at 05:29

## 2020-12-04 NOTE — PROGRESS NOTE ADULT - SUBJECTIVE AND OBJECTIVE BOX
Patient is a 50y old  Female who presented with a chief complaint of Uncomplicated diverticulitis (04 Dec 2020 03:48)      INTERVAL HPI/OVERNIGHT EVENTS:  increased suprapubic discomfort - diet downgraded and awaiting CT scan today  no BM since prior to admission  no fever or chills  no nausea or vomiting    MEDICATIONS  (STANDING):  amLODIPine   Tablet 2.5 milliGRAM(s) Oral daily  atorvastatin 40 milliGRAM(s) Oral at bedtime  dextrose 5% + sodium chloride 0.45% with potassium chloride 20 mEq/L 1000 milliLiter(s) (75 mL/Hr) IV Continuous <Continuous>  enoxaparin Injectable 40 milliGRAM(s) SubCutaneous daily  levothyroxine 100 MICROGram(s) Oral daily  metroNIDAZOLE  IVPB 500 milliGRAM(s) IV Intermittent every 8 hours    MEDICATIONS  (PRN):  acetaminophen   Tablet .. 1000 milliGRAM(s) Oral every 6 hours PRN Mild Pain (1 - 3), Moderate Pain (4 - 6)  BACItracin   Ointment 1 Application(s) Topical daily PRN for skin irritation  HYDROmorphone  Injectable 0.5 milliGRAM(s) IV Push every 6 hours PRN Severe Pain (7 - 10)      Allergies  iodine (Hives)  Zosyn (Urticaria)      Review of Systems:  General:  No wt loss, fevers, chills, night sweats, fatigue  CV:  see HPI - currently without CP  Resp:  No dyspnea, cough, tachypnea, wheezing  GI: see HPI  :  No pain, bleeding, incontinence, nocturia  Muscle:  No pain, weakness  Neuro:  No weakness, tingling, memory problems  Psych:  No fatigue, insomnia, mood problems, depression  Endocrine:  No polyuria, polydypsia, cold/heat intolerance  Heme:  No petechiae, ecchymosis, easy bruisability  Skin:  No rash, tattoos, scars, edema      Vital Signs Last 24 Hrs  T(C): 36.5 (04 Dec 2020 09:39), Max: 36.9 (03 Dec 2020 20:58)  T(F): 97.7 (04 Dec 2020 09:39), Max: 98.4 (03 Dec 2020 20:58)  HR: 70 (04 Dec 2020 09:39) (68 - 74)  BP: 132/78 (04 Dec 2020 09:39) (111/73 - 133/83)  BP(mean): --  RR: 18 (04 Dec 2020 09:39) (18 - 18)  SpO2: 99% (04 Dec 2020 09:39) (96% - 99%)    PHYSICAL EXAM:  Constitutional: NAD, well-developed pleasant non toxic but anxious appearing female,   Neck: No LAD, supple  Respiratory: Clear anteriorly  Cardiovascular: S1 and S2, RRR NT to palpation  Gastrointestinal: BS+, soft, +mild tenderness to deep palpation in suprapubic region, no rebound, guarding or rigidity  Extremities: No peripheral edema, neg clubbing, cyanosis  Vascular: 2+ peripheral pulses  Neurological: A/O x 3, no focal deficits  Psychiatric: Normal mood, normal affect  Skin: No rashes    LABS:                        11.7   5.83  )-----------( 253      ( 04 Dec 2020 06:27 )             36.1     12-04    140  |  108  |  10  ----------------------------<  89  4.1   |  22  |  0.81    Ca    9.1      04 Dec 2020 06:27  Phos  4.5     12-04  Mg     2.0     12-04        RADIOLOGY & ADDITIONAL TESTS:

## 2020-12-04 NOTE — CONSULT NOTE ADULT - SUBJECTIVE AND OBJECTIVE BOX
Patient is a 50y old  Female who presents with a chief complaint of Uncomplicated diverticulitis (04 Dec 2020 11:23)    HPI:  49 y/o F PMHx of IBS, hypothyroidism, and recurrent diverticulitis presented for acute onset abdominal pain. Pt had her first episode of diverticulitis in 2014 located in the sigmoid colon. Since then she has had recurrent episodes in September 2019, March 2020, and September 2020. All episodes have been uncomplicated never requiring surgical procedures. Patient had been following with her outpatient surgeon regarding need for possible colon resection, however pt wanted to wait until after the holidays. 1-2 days prior to thanksgiving (11/26) pt noted acute onset lower adominal/suprapubic pain. The pain was associated with some nausea and some diaphoresis at night, mild chills without gross rigors. Pt did not experience fever, diarrhea or other change in BMs, dysuria, flank pain, cough, SOB.    In the hospital pt has been afebrile without leukocytosis, UA/Ucx unremarkable, COVID PCR and ab negative. CT A/P with PO contrast showed acute non-perforated diverticulitis of proximal sigmoid colon adjacent to area of healing old diverticulitis.    Pt has listed Zosyn allergy, so was treated with Cipro/flagyl with improvement in pain. Yesterday they began advancing her diet and she felt acute worsening of pain, swelling of abdomen. Went for repeat CT A/P with PO contrast, found to have worsening inflammatory changes and small pelvic ascites.    Of note, previous Zosyn allergy was rash in 2015 without airway compromise. Since then patient has taken Augmentin multiple times as well as a dose of cefepime without issue.             prior hospital charts reviewed [x  ]  primary team notes reviewed [ x ]  other consultant notes reviewed [ x ]  PAST MEDICAL & SURGICAL HISTORY:  Hypertension    Sleep apnea  nasal mask- settings unknown    Hypothyroid    Diverticulitis  last hospitalized 9.19    h/o Heart Palpitations    Irritable Bowel Syndrome    H/O vaginal surgery  vaginal mesh    h/o dental implant    History of Colonoscopy    h/o  Endoscopy    C Section - x 1 - 1994      Allergies  iodine (Hives)  Zosyn (Urticaria) - no airway compromise (2015), has tolerated Augmentin multiple times since    ANTIMICROBIALS (past 90 days)  MEDICATIONS  (STANDING):    ciprofloxacin   IVPB   200 mL/Hr IV Intermittent (11-30-20 @ 18:49)    ciprofloxacin   IVPB   200 mL/Hr IV Intermittent (12-04-20 @ 05:29)   200 mL/Hr IV Intermittent (12-03-20 @ 14:00)   200 mL/Hr IV Intermittent (12-03-20 @ 05:48)   200 mL/Hr IV Intermittent (12-02-20 @ 15:43)   200 mL/Hr IV Intermittent (12-02-20 @ 03:29)   200 mL/Hr IV Intermittent (12-01-20 @ 06:06)    metroNIDAZOLE  IVPB   100 mL/Hr IV Intermittent (12-04-20 @ 05:29)   100 mL/Hr IV Intermittent (12-03-20 @ 21:13)   100 mL/Hr IV Intermittent (12-03-20 @ 14:00)   100 mL/Hr IV Intermittent (12-03-20 @ 05:47)   100 mL/Hr IV Intermittent (12-02-20 @ 21:54)   100 mL/Hr IV Intermittent (12-02-20 @ 18:34)   100 mL/Hr IV Intermittent (12-02-20 @ 09:01)   100 mL/Hr IV Intermittent (12-02-20 @ 01:21)   100 mL/Hr IV Intermittent (12-01-20 @ 14:04)   100 mL/Hr IV Intermittent (12-01-20 @ 06:00)    metroNIDAZOLE  IVPB   100 mL/Hr IV Intermittent (11-30-20 @ 22:41)      ANTIMICROBIALS:    ciprofloxacin   IVPB 400 every 12 hours  metroNIDAZOLE  IVPB 500 every 8 hours    OTHER MEDS: MEDICATIONS  (STANDING):  acetaminophen   Tablet .. 1000 every 6 hours PRN  amLODIPine   Tablet 2.5 daily  atorvastatin 40 at bedtime  enoxaparin Injectable 40 daily  HYDROmorphone  Injectable 0.5 every 6 hours PRN  levothyroxine 100 daily    SOCIAL HISTORY:   denies smoking, alcohol, recreational drug use    FAMILY HISTORY:  Family history of early CAD  Father-45, Paternal Aunt Maternal Uncle    Family history of colon cancer (Grandparent)      REVIEW OF SYSTEMS  [  ] ROS unobtainable because:    [  x] All other systems negative except as noted below:	    Constitutional:  [ ] fever [ ] chills  [ ] weight loss  [ ] weakness  Skin:  [ ] rash [ ] phlebitis	  Eyes: [ ] icterus [ ] pain  [ ] discharge	  ENMT: [ ] sore throat  [ ] thrush [ ] ulcers [ ] exudates  Respiratory: [ ] dyspnea [ ] hemoptysis [ ] cough [ ] sputum	  Cardiovascular:  [ ] chest pain [ ] palpitations [ ] edema	  Gastrointestinal:  [ ] nausea [ ] vomiting [ ] diarrhea [ ] constipation [x ] pain	  Genitourinary:  [ ] dysuria [ ] frequency [ ] hematuria [ ] discharge [ ] flank pain  [ ] incontinence  Musculoskeletal:  [ ] myalgias [ ] arthralgias [ ] arthritis  [ ] back pain  Neurological:  [ ] headache [ ] seizures  [ ] confusion/altered mental status  Psychiatric:  [ ] anxiety [ ] depression	  Hematology/Lymphatics:  [ ] lymphadenopathy  Endocrine:  [ ] adrenal [ ] thyroid  Allergic/Immunologic:	 [ ] transplant [ ] seasonal    Vital Signs Last 24 Hrs  T(F): 97.7 (12-04-20 @ 09:39), Max: 98.4 (12-03-20 @ 20:58)  Vital Signs Last 24 Hrs  HR: 70 (12-04-20 @ 09:39) (68 - 74)  BP: 132/78 (12-04-20 @ 09:39) (111/73 - 133/83)  RR: 18 (12-04-20 @ 09:39)  SpO2: 99% (12-04-20 @ 09:39) (96% - 99%)  Wt(kg): --    PHYSICAL EXAM:  Constitutional: non-toxic, anxious, uncomfortable  HEAD/EYES: anicteric, no conjunctival injection  ENT:  supple, no thrush  Cardiovascular:   normal S1, S2, no murmur, no edema  Respiratory:  clear BS bilaterally, no wheezes, no rales  GI:  soft, significant TTP suprapubic region  :  no bacon, no CVA tenderness  Musculoskeletal:  no synovitis, normal ROM  Neurologic: awake and alert, normal strength, no focal findings  Skin:  no rash, no erythema, no phlebitis  Heme/Onc: no lymphadenopathy   Psychiatric:  anxious                            11.7   5.83  )-----------( 253      ( 04 Dec 2020 06:27 )             36.1     12-04    140  |  108  |  10  ----------------------------<  89  4.1   |  22  |  0.81    Ca    9.1      04 Dec 2020 06:27  Phos  4.5     12-04  Mg     2.0     12-04      MICROBIOLOGY:  Culture - Urine (collected 30 Nov 2020 17:22)  Source: .Urine Clean Catch (Midstream)  Final Report (01 Dec 2020 14:03):    <10,000 CFU/mL Normal Urogenital Mary                  RADIOLOGY:  < from: CT Abdomen and Pelvis w/ Oral Cont (12.04.20 @ 10:52) >  IMPRESSION:  Sigmoid diverticulitis with increased inflammatory changes in the adjacent fat since 11/30/2020 and new small volume pelvic ascites..    < from: CT Abdomen and Pelvis w/ Oral Cont (11.30.20 @ 16:38) >    IMPRESSION:  Acute nonperforated diverticulitis of the proximal sigmoid colon.    < from: CT Abdomen and Pelvis No Cont (09.05.20 @ 01:41) >  IMPRESSION:    Previously identified short segment proximal sigmoid diverticulitis appears improved to nearly resolved.    New short segment of acute mid to distal sigmoid diverticulitis as detailed above. No bowel obstruction, perforation or abscess. Recommend nonemergent follow-up colonoscopy once symptoms have resolved to exclude underlying mass.    Additional findings as mentioned above.    < from: CT Abdomen and Pelvis No Cont (08.30.20 @ 20:54) >  IMPRESSION:  Acute uncomplicated sigmoid diverticulitis. Follow-up colonoscopy if the appropriate clinical treatment is recommended.    < end of copied text >

## 2020-12-04 NOTE — PROGRESS NOTE ADULT - SUBJECTIVE AND OBJECTIVE BOX
Interval events: no acute events    SUBJECTIVE:  Increased pain and nausea with LRD.  Bowel Function +/-  Ambulating independently       OBJECTIVE:  Vital Signs Last 24 Hrs  T(C): 36.6 (04 Dec 2020 00:17), Max: 36.9 (03 Dec 2020 20:58)  T(F): 97.9 (04 Dec 2020 00:17), Max: 98.4 (03 Dec 2020 20:58)  HR: 68 (04 Dec 2020 00:17) (62 - 74)  BP: 119/80 (04 Dec 2020 00:17) (108/71 - 133/83)  BP(mean): --  RR: 18 (04 Dec 2020 00:17) (18 - 18)  SpO2: 96% (04 Dec 2020 00:17) (96% - 98%)    Physical Examination:  GEN: NAD, resting quietly  PULM: symmetric chest rise bilaterally, no increased WOB  ABD: soft, nontender, nondistended  EXTR: no LE erythema, moving all extremities      LABS:                        11.7   4.28  )-----------( 264      ( 02 Dec 2020 07:13 )             36.0       12-02    136  |  101  |  8   ----------------------------<  89  3.7   |  22  |  0.74    Ca    9.3      02 Dec 2020 07:13  Phos  2.8     12-02  Mg     2.1     12-02             Interval events: Increased abdominal pain. Patient reports pain as bad as admission. Diet backed down to CLD.     SUBJECTIVE:  Increased pain and nausea with LRD.  Bowel Function +/-  Ambulating independently       OBJECTIVE:  Vital Signs Last 24 Hrs  T(C): 36.6 (04 Dec 2020 00:17), Max: 36.9 (03 Dec 2020 20:58)  T(F): 97.9 (04 Dec 2020 00:17), Max: 98.4 (03 Dec 2020 20:58)  HR: 68 (04 Dec 2020 00:17) (62 - 74)  BP: 119/80 (04 Dec 2020 00:17) (108/71 - 133/83)  BP(mean): --  RR: 18 (04 Dec 2020 00:17) (18 - 18)  SpO2: 96% (04 Dec 2020 00:17) (96% - 98%)    Physical Examination:  GEN: NAD, resting quietly  PULM: symmetric chest rise bilaterally, no increased WOB  ABD: soft, nontender, nondistended  EXTR: no LE erythema, moving all extremities      LABS:                        11.7   4.28  )-----------( 264      ( 02 Dec 2020 07:13 )             36.0       12-02    136  |  101  |  8   ----------------------------<  89  3.7   |  22  |  0.74    Ca    9.3      02 Dec 2020 07:13  Phos  2.8     12-02  Mg     2.1     12-02

## 2020-12-04 NOTE — CONSULT NOTE ADULT - ASSESSMENT
49 y/o F PMHx of IBS, hypothyroidism, and recurrent diverticulitis presented for acute onset abdominal pain, found to have recurrent diverticulitis, worsening after initial response to antibiotics.    Recurrent Sigmoid Diverticulitis  -not grossly septic, afebrile without leukocytosis  -initial CT A/P with PO contrast showed recurrent of diverticulitis, initially improving on IV Cipro/flagyl but with acute worsening yesterday after restarting diet. Repeat CT A/P showed worsening inflammatory changes  -uncomplicated, no evidence of abscess, perforation, fistula, stricture  -listed history of Zosyn allergy however pt tolerated Augmentin  -eventual surgical plan per surgery team  -further recommendations pending   51 y/o F PMHx of IBS, hypothyroidism, and recurrent diverticulitis presented for acute onset abdominal pain, found to have recurrent diverticulitis, worsening after initial response to antibiotics.    Recurrent Sigmoid Diverticulitis  -not grossly septic, afebrile without leukocytosis  -initial CT A/P with PO contrast showed recurrent of diverticulitis, initially improving on IV Cipro/flagyl but with acute worsening yesterday after restarting diet. Repeat CT A/P showed worsening inflammatory changes  -uncomplicated, no evidence of abscess, perforation, fistula, stricture  -listed history of Zosyn allergy however pt tolerated Augmentin  -eventual surgical plan per surgery team      Pt notes chills and abdominal discomfort  would obtain blood cultures and start IV ertapenem  Repeat CT reviewed  Team is holding diet and will advance as tolerated  If does well with this regimen will likely go home on IV abs with a PICC line prior to considering surgery  Plan will depend on clinical course

## 2020-12-04 NOTE — PROGRESS NOTE ADULT - ASSESSMENT
50F hx of IBS, diverticulitis, hypothyroidism presenting to the ED for 5 days of sharp suprapubic abdominal pain similar to prior episodes of diverticulitis    Last Colonoscopy - 1/2020 : Internal hemorrhoids, Sigmoid and Descending colon diverticulosis    Clinically non toxic appearing, without leukocytosis  CT 11/30- acute non perforated diverticulitis    Acute recurrent (proximal) sigmoid diverticulitis    RECS:  -IV ABx  -await CT A/P as ordered  -monitor clinically  -Zofran prn  -plans for eventual surgical resection; timing per CRS  -Cardiology following for pre-op optimization    Discussed with pt, all questions answered    Please call over weekend prn with GI concerns   GI service : [phone]      Arnulfo Canela PA-C    [facility]  [phone]  After hours and weekend coverage [phone]

## 2020-12-04 NOTE — CONSULT NOTE ADULT - ATTENDING COMMENTS
Cora Rich M.D. ,   Pager [phone]     after 5PM/ weekends [phone]      ID service will be covering over the weekend. Please call for acute issues or questions. [phone]

## 2020-12-04 NOTE — PROGRESS NOTE ADULT - ASSESSMENT
50F hx IBS, diverticulitis, hypothyroidism, HLD presenting to the ED with 5 days of abdominal pain found to have uncomplicated sigmoid diverticulitis.    Plan:   - Diet as tolerated   - May need a f/up CT   - IV Ciprofloxacin and Flagyl  - Cardiology consult for OR optimization  - Pain control as needed: PO meds  - Home meds  - F/u GI consult   - VTE ppx: Lovenox, ambulation    Green surgery   px [phone]

## 2020-12-05 LAB
ANION GAP SERPL CALC-SCNC: 10 MMOL/L — SIGNIFICANT CHANGE UP (ref 5–17)
BUN SERPL-MCNC: 5 MG/DL — LOW (ref 7–23)
CALCIUM SERPL-MCNC: 9.2 MG/DL — SIGNIFICANT CHANGE UP (ref 8.4–10.5)
CHLORIDE SERPL-SCNC: 106 MMOL/L — SIGNIFICANT CHANGE UP (ref 96–108)
CO2 SERPL-SCNC: 24 MMOL/L — SIGNIFICANT CHANGE UP (ref 22–31)
CREAT SERPL-MCNC: 0.68 MG/DL — SIGNIFICANT CHANGE UP (ref 0.5–1.3)
GLUCOSE SERPL-MCNC: 90 MG/DL — SIGNIFICANT CHANGE UP (ref 70–99)
HCT VFR BLD CALC: 36.7 % — SIGNIFICANT CHANGE UP (ref 34.5–45)
HGB BLD-MCNC: 11.8 G/DL — SIGNIFICANT CHANGE UP (ref 11.5–15.5)
MAGNESIUM SERPL-MCNC: 2.1 MG/DL — SIGNIFICANT CHANGE UP (ref 1.6–2.6)
MCHC RBC-ENTMCNC: 30.2 PG — SIGNIFICANT CHANGE UP (ref 27–34)
MCHC RBC-ENTMCNC: 32.2 GM/DL — SIGNIFICANT CHANGE UP (ref 32–36)
MCV RBC AUTO: 93.9 FL — SIGNIFICANT CHANGE UP (ref 80–100)
NRBC # BLD: 0 /100 WBCS — SIGNIFICANT CHANGE UP (ref 0–0)
PHOSPHATE SERPL-MCNC: 2.6 MG/DL — SIGNIFICANT CHANGE UP (ref 2.5–4.5)
PLATELET # BLD AUTO: 254 K/UL — SIGNIFICANT CHANGE UP (ref 150–400)
POTASSIUM SERPL-MCNC: 4 MMOL/L — SIGNIFICANT CHANGE UP (ref 3.5–5.3)
POTASSIUM SERPL-SCNC: 4 MMOL/L — SIGNIFICANT CHANGE UP (ref 3.5–5.3)
RBC # BLD: 3.91 M/UL — SIGNIFICANT CHANGE UP (ref 3.8–5.2)
RBC # FLD: 13 % — SIGNIFICANT CHANGE UP (ref 10.3–14.5)
SODIUM SERPL-SCNC: 140 MMOL/L — SIGNIFICANT CHANGE UP (ref 135–145)
WBC # BLD: 4.14 K/UL — SIGNIFICANT CHANGE UP (ref 3.8–10.5)
WBC # FLD AUTO: 4.14 K/UL — SIGNIFICANT CHANGE UP (ref 3.8–10.5)

## 2020-12-05 RX ADMIN — HYDROMORPHONE HYDROCHLORIDE 0.5 MILLIGRAM(S): 2 INJECTION INTRAMUSCULAR; INTRAVENOUS; SUBCUTANEOUS at 01:20

## 2020-12-05 RX ADMIN — Medication 85 MILLIMOLE(S): at 15:52

## 2020-12-05 RX ADMIN — DEXTROSE MONOHYDRATE, SODIUM CHLORIDE, AND POTASSIUM CHLORIDE 100 MILLILITER(S): 50; .745; 4.5 INJECTION, SOLUTION INTRAVENOUS at 05:03

## 2020-12-05 RX ADMIN — ENOXAPARIN SODIUM 40 MILLIGRAM(S): 100 INJECTION SUBCUTANEOUS at 14:22

## 2020-12-05 RX ADMIN — HYDROMORPHONE HYDROCHLORIDE 0.5 MILLIGRAM(S): 2 INJECTION INTRAMUSCULAR; INTRAVENOUS; SUBCUTANEOUS at 07:54

## 2020-12-05 RX ADMIN — HYDROMORPHONE HYDROCHLORIDE 0.5 MILLIGRAM(S): 2 INJECTION INTRAMUSCULAR; INTRAVENOUS; SUBCUTANEOUS at 23:00

## 2020-12-05 RX ADMIN — Medication 1000 MILLIGRAM(S): at 08:24

## 2020-12-05 RX ADMIN — Medication 1000 MILLIGRAM(S): at 07:54

## 2020-12-05 RX ADMIN — HYDROMORPHONE HYDROCHLORIDE 0.5 MILLIGRAM(S): 2 INJECTION INTRAMUSCULAR; INTRAVENOUS; SUBCUTANEOUS at 08:24

## 2020-12-05 RX ADMIN — ONDANSETRON 4 MILLIGRAM(S): 8 TABLET, FILM COATED ORAL at 21:40

## 2020-12-05 RX ADMIN — HYDROMORPHONE HYDROCHLORIDE 0.5 MILLIGRAM(S): 2 INJECTION INTRAMUSCULAR; INTRAVENOUS; SUBCUTANEOUS at 00:48

## 2020-12-05 RX ADMIN — ATORVASTATIN CALCIUM 40 MILLIGRAM(S): 80 TABLET, FILM COATED ORAL at 21:37

## 2020-12-05 RX ADMIN — AMLODIPINE BESYLATE 2.5 MILLIGRAM(S): 2.5 TABLET ORAL at 05:03

## 2020-12-05 RX ADMIN — HYDROMORPHONE HYDROCHLORIDE 0.5 MILLIGRAM(S): 2 INJECTION INTRAMUSCULAR; INTRAVENOUS; SUBCUTANEOUS at 22:24

## 2020-12-05 RX ADMIN — ERTAPENEM SODIUM 120 MILLIGRAM(S): 1 INJECTION, POWDER, LYOPHILIZED, FOR SOLUTION INTRAMUSCULAR; INTRAVENOUS at 14:19

## 2020-12-05 RX ADMIN — DEXTROSE MONOHYDRATE, SODIUM CHLORIDE, AND POTASSIUM CHLORIDE 100 MILLILITER(S): 50; .745; 4.5 INJECTION, SOLUTION INTRAVENOUS at 07:54

## 2020-12-05 RX ADMIN — ONDANSETRON 4 MILLIGRAM(S): 8 TABLET, FILM COATED ORAL at 14:22

## 2020-12-05 RX ADMIN — DEXTROSE MONOHYDRATE, SODIUM CHLORIDE, AND POTASSIUM CHLORIDE 100 MILLILITER(S): 50; .745; 4.5 INJECTION, SOLUTION INTRAVENOUS at 22:23

## 2020-12-05 RX ADMIN — Medication 100 MICROGRAM(S): at 05:03

## 2020-12-05 NOTE — PROGRESS NOTE ADULT - SUBJECTIVE AND OBJECTIVE BOX
Interval events:  - Repeat CT revealed progressed inflammation, no perf  - Diet stepped back  - Pain control  - ID consulted    Patient seen and examined at bedside. Report pain has been stable.   NPO/ IVFm. Nauseous but no vomiting. GI -/-  Afebrile overnight.      Physical Examination:  GEN: NAD, resting quietly  PULM: Patent airways/   ABD: soft, nondistended, suprapubic tenderness, no rebound.   EXTR: no LE erythema, moving all extremities      Vital Signs Last 24 Hrs  T(C): 36.4 (05 Dec 2020 04:27), Max: 36.8 (04 Dec 2020 21:17)  T(F): 97.6 (05 Dec 2020 04:27), Max: 98.3 (04 Dec 2020 21:17)  HR: 63 (05 Dec 2020 04:27) (63 - 74)  BP: 116/73 (05 Dec 2020 04:27) (111/73 - 132/78)  BP(mean): --  RR: 18 (05 Dec 2020 04:27) (18 - 18)  SpO2: 97% (05 Dec 2020 04:27) (96% - 99%)    I&O's Detail    03 Dec 2020 07:01  -  04 Dec 2020 07:00  --------------------------------------------------------  IN:    Oral Fluid: 480 mL  Total IN: 480 mL    OUT:  Total OUT: 0 mL    Total NET: 480 mL          12-04    140  |  108  |  10  ----------------------------<  89  4.1   |  22  |  0.81    Ca    9.1      04 Dec 2020 06:27  Phos  4.5     12-04  Mg     2.0     12-04                              11.7   5.83  )-----------( 253      ( 04 Dec 2020 06:27 )             36.1           LABS:                         11.7   5.83  )-----------( 253      ( 04 Dec 2020 06:27 )             36.1     12-04    140  |  108  |  10  ----------------------------<  89  4.1   |  22  |  0.81    Ca    9.1      04 Dec 2020 06:27  Phos  4.5     12-04  Mg     2.0     12-04      < from: CT Abdomen and Pelvis w/ Oral Cont (12.04.20 @ 10:52) >  EXAM:  CT ABDOMEN AND PELVIS OC                            PROCEDURE DATE:  12/04/2020        INTERPRETATION:  CLINICAL INFORMATION: Diverticulitis with increased suprapubic abdominal pain.    COMPARISON: CT abdomen pelvis 11/30/2020.    PROCEDURE:  CT of the Abdomen and Pelvis was performed without intravenous contrast.  Intravenous contrast: None.  Oral contrast: positive contrast was administered.  Sagittal and coronal reformats were performed.    Evaluation of the solid visceral organs is limited without intravenous contrast.    FINDINGS:  LOWER CHEST: Bibasilar linear atelectasis.    LIVER: Within normal limits.  BILE DUCTS: Normal caliber.  GALLBLADDER: Mildly distended. Dependent high attenuation may be secondary to sludge and/orsmall stones.  SPLEEN: Within normal limits.  PANCREAS: Within normal limits.  ADRENALS: Within normal limits.  KIDNEYS/URETERS: Within normal limits.    BLADDER: Within normal limits.  REPRODUCTIVE ORGANS: Uterus and adnexa are within normal limits.    BOWEL: Diverticulosis with wall thickening of the sigmoid colon in the region of an inflamed diverticulum. Increased pericolonic inflammatory changes compared with 11/30/2020.. No bowel obstruction. Appendix is normal.  PERITONEUM: Small volume pelvic ascites.  VESSELS: Within normal limits.  RETROPERITONEUM/LYMPH NODES: No lymphadenopathy.  ABDOMINAL WALL: Small fat-containing umbilical hernia.  BONES: Degenerative changes.    IMPRESSION:  Sigmoid diverticulitis with increased inflammatory changes in the adjacent fat since 11/30/2020 and new small volume pelvic ascites..      < end of copied text >    MEDICATIONS  (STANDING):  amLODIPine   Tablet 2.5 milliGRAM(s) Oral daily  atorvastatin 40 milliGRAM(s) Oral at bedtime  dextrose 5% + sodium chloride 0.45% with potassium chloride 20 mEq/L 1000 milliLiter(s) (100 mL/Hr) IV Continuous <Continuous>  enoxaparin Injectable 40 milliGRAM(s) SubCutaneous daily  ertapenem  IVPB      ertapenem  IVPB 1000 milliGRAM(s) IV Intermittent every 24 hours  levothyroxine 100 MICROGram(s) Oral daily    MEDICATIONS  (PRN):  acetaminophen   Tablet .. 1000 milliGRAM(s) Oral every 6 hours PRN Mild Pain (1 - 3), Moderate Pain (4 - 6)  BACItracin   Ointment 1 Application(s) Topical daily PRN for skin irritation  HYDROmorphone  Injectable 0.5 milliGRAM(s) IV Push every 6 hours PRN Severe Pain (7 - 10)  ondansetron Injectable 4 milliGRAM(s) IV Push every 4 hours PRN Nausea and/or Vomiting        -Plan to be discussed with the attending after rounds.

## 2020-12-05 NOTE — PROGRESS NOTE ADULT - ASSESSMENT
50F hx IBS, diverticulitis, hypothyroidism, HLD presenting to the ED with 5 days of abdominal pain found to have uncomplicated sigmoid diverticulitis. Pt with increased pain, unable to tolerate diet, backed down, repeat ct revealed progressed locoregional inflammation. No abscess or sigmoid perforation.    Plan:   - NPO/ IVFm  - Pain control  - IV ABx: previously with IV Ciprofloxacin and Flagyl, Now with Ertapenem.   - ID consulted, on board.   - Appreciate ID and GI recs.  - Cardiology consult for OR optimization  - VTE ppx: Lovenox, ambulation  - transfer to [address] [place] [address]  - Final Plan to be discussed with attending.    [facility]   px [phone] 50F hx IBS, diverticulitis, hypothyroidism, HLD presenting to the ED with 5 days of abdominal pain found to have uncomplicated sigmoid diverticulitis. Pt with increased pain, unable to tolerate diet, backed down, repeat ct revealed progressed locoregional inflammation. No abscess or sigmoid perforation.    Plan:   - NPO/ IVFm  - Pain control  - IV ABx: previously with IV Ciprofloxacin and Flagyl, Now with Ertapenem. Will place medline  - ID consulted, on board.   - Appreciate ID and GI recs.  - Cardiology consult for OR optimization  - VTE ppx: Lovenox, ambulation  - transfer to [address] [place] [address]  - Final Plan to be discussed with attending.    [facility]   px [phone]

## 2020-12-06 RX ADMIN — ONDANSETRON 4 MILLIGRAM(S): 8 TABLET, FILM COATED ORAL at 14:18

## 2020-12-06 RX ADMIN — Medication 100 MICROGRAM(S): at 04:41

## 2020-12-06 RX ADMIN — ATORVASTATIN CALCIUM 40 MILLIGRAM(S): 80 TABLET, FILM COATED ORAL at 22:24

## 2020-12-06 RX ADMIN — HYDROMORPHONE HYDROCHLORIDE 0.5 MILLIGRAM(S): 2 INJECTION INTRAMUSCULAR; INTRAVENOUS; SUBCUTANEOUS at 04:41

## 2020-12-06 RX ADMIN — HYDROMORPHONE HYDROCHLORIDE 0.5 MILLIGRAM(S): 2 INJECTION INTRAMUSCULAR; INTRAVENOUS; SUBCUTANEOUS at 22:29

## 2020-12-06 RX ADMIN — HYDROMORPHONE HYDROCHLORIDE 0.5 MILLIGRAM(S): 2 INJECTION INTRAMUSCULAR; INTRAVENOUS; SUBCUTANEOUS at 22:59

## 2020-12-06 RX ADMIN — ENOXAPARIN SODIUM 40 MILLIGRAM(S): 100 INJECTION SUBCUTANEOUS at 14:18

## 2020-12-06 RX ADMIN — ERTAPENEM SODIUM 120 MILLIGRAM(S): 1 INJECTION, POWDER, LYOPHILIZED, FOR SOLUTION INTRAMUSCULAR; INTRAVENOUS at 14:18

## 2020-12-06 NOTE — PROGRESS NOTE ADULT - ASSESSMENT
50F hx IBS, diverticulitis, hypothyroidism, HLD presenting to the ED with 5 days of abdominal pain found to have uncomplicated sigmoid diverticulitis. Pt with increased pain, unable to tolerate diet, backed down, repeat ct revealed progressed locoregional inflammation. No abscess or sigmoid perforation.    Plan:   - NPO/ IVFm  - Pain control  - IV ABx: previously with IV Ciprofloxacin and Flagyl, Now with Ertapenem. Will place medline  - ID consulted, on board.   - Appreciate ID and GI recs.  - Cardiology consult for OR optimization  - VTE ppx: Lovenox, ambulation  - transfer to [address] [place] [address]  - Final Plan to be discussed with attending.    [facility] surgery   px [phone]

## 2020-12-06 NOTE — PROGRESS NOTE ADULT - SUBJECTIVE AND OBJECTIVE BOX
No acute events reported over the past 24hrs    Patient seen and examined at bedside. Report pain has been stable. NPO/ IVFm. Nauseous but no vomiting. GI -/-  Afebrile overnight.      Physical Examination:  GEN: NAD, resting quietly  PULM: Patent airways   ABD: soft, nondistended, suprapubic tenderness, no rebound.   EXTR: no LE erythema, moving all extremities        Vital Signs Last 24 Hrs  T(C): 36.6 (06 Dec 2020 04:27), Max: 36.8 (05 Dec 2020 17:52)  T(F): 97.8 (06 Dec 2020 04:27), Max: 98.3 (05 Dec 2020 17:52)  HR: 64 (06 Dec 2020 04:27) (64 - 82)  BP: 106/71 (06 Dec 2020 04:27) (106/71 - 133/84)  BP(mean): --  RR: 18 (06 Dec 2020 04:27) (18 - 18)  SpO2: 97% (06 Dec 2020 04:27) (96% - 99%)    I&O's Detail    04 Dec 2020 07:01  -  05 Dec 2020 07:00  --------------------------------------------------------  IN:    dextrose 5% + sodium chloride 0.45% w/ Additives: 1200 mL  Total IN: 1200 mL    OUT:  Total OUT: 0 mL    Total NET: 1200 mL      05 Dec 2020 07:01  -  06 Dec 2020 05:43  --------------------------------------------------------  IN:    dextrose 5% + sodium chloride 0.45% w/ Additives: 900 mL    IV PiggyBack: 550 mL  Total IN: 1450 mL    OUT:    Voided (mL): 400 mL  Total OUT: 400 mL    Total NET: 1050 mL          12-05    140  |  106  |  5<L>  ----------------------------<  90  4.0   |  24  |  0.68    Ca    9.2      05 Dec 2020 07:11  Phos  2.6     12-05  Mg     2.1     12-05                              11.8   4.14  )-----------( 254      ( 05 Dec 2020 07:11 )             36.7           LABS:                         11.8   4.14  )-----------( 254      ( 05 Dec 2020 07:11 )             36.7     12-05    140  |  106  |  5<L>  ----------------------------<  90  4.0   |  24  |  0.68    Ca    9.2      05 Dec 2020 07:11  Phos  2.6     12-05  Mg     2.1     12-05    RADIOLOGY, EKG & ADDITIONAL TESTS: Reviewed.     MEDICATIONS  (STANDING):  amLODIPine   Tablet 2.5 milliGRAM(s) Oral daily  atorvastatin 40 milliGRAM(s) Oral at bedtime  dextrose 5% + sodium chloride 0.45% with potassium chloride 20 mEq/L 1000 milliLiter(s) (100 mL/Hr) IV Continuous <Continuous>  enoxaparin Injectable 40 milliGRAM(s) SubCutaneous daily  ertapenem  IVPB      ertapenem  IVPB 1000 milliGRAM(s) IV Intermittent every 24 hours  levothyroxine 100 MICROGram(s) Oral daily    MEDICATIONS  (PRN):  acetaminophen   Tablet .. 1000 milliGRAM(s) Oral every 6 hours PRN Mild Pain (1 - 3), Moderate Pain (4 - 6)  BACItracin   Ointment 1 Application(s) Topical daily PRN for skin irritation  HYDROmorphone  Injectable 0.5 milliGRAM(s) IV Push every 6 hours PRN Severe Pain (7 - 10)  ondansetron Injectable 4 milliGRAM(s) IV Push every 4 hours PRN Nausea and/or Vomiting        -Plan to be discussed with the attending after rounds.

## 2020-12-07 ENCOUNTER — TRANSCRIPTION ENCOUNTER (OUTPATIENT)
Age: 50
End: 2020-12-07

## 2020-12-07 LAB
ANION GAP SERPL CALC-SCNC: 13 MMOL/L — SIGNIFICANT CHANGE UP (ref 5–17)
BUN SERPL-MCNC: 5 MG/DL — LOW (ref 7–23)
CALCIUM SERPL-MCNC: 9.3 MG/DL — SIGNIFICANT CHANGE UP (ref 8.4–10.5)
CHLORIDE SERPL-SCNC: 106 MMOL/L — SIGNIFICANT CHANGE UP (ref 96–108)
CO2 SERPL-SCNC: 22 MMOL/L — SIGNIFICANT CHANGE UP (ref 22–31)
CREAT SERPL-MCNC: 0.77 MG/DL — SIGNIFICANT CHANGE UP (ref 0.5–1.3)
GLUCOSE SERPL-MCNC: 98 MG/DL — SIGNIFICANT CHANGE UP (ref 70–99)
HCT VFR BLD CALC: 38.2 % — SIGNIFICANT CHANGE UP (ref 34.5–45)
HGB BLD-MCNC: 12.3 G/DL — SIGNIFICANT CHANGE UP (ref 11.5–15.5)
MAGNESIUM SERPL-MCNC: 2.1 MG/DL — SIGNIFICANT CHANGE UP (ref 1.6–2.6)
MCHC RBC-ENTMCNC: 30.2 PG — SIGNIFICANT CHANGE UP (ref 27–34)
MCHC RBC-ENTMCNC: 32.2 GM/DL — SIGNIFICANT CHANGE UP (ref 32–36)
MCV RBC AUTO: 93.9 FL — SIGNIFICANT CHANGE UP (ref 80–100)
NRBC # BLD: 0 /100 WBCS — SIGNIFICANT CHANGE UP (ref 0–0)
PHOSPHATE SERPL-MCNC: 2.7 MG/DL — SIGNIFICANT CHANGE UP (ref 2.5–4.5)
PLATELET # BLD AUTO: 265 K/UL — SIGNIFICANT CHANGE UP (ref 150–400)
POTASSIUM SERPL-MCNC: 3.9 MMOL/L — SIGNIFICANT CHANGE UP (ref 3.5–5.3)
POTASSIUM SERPL-SCNC: 3.9 MMOL/L — SIGNIFICANT CHANGE UP (ref 3.5–5.3)
RBC # BLD: 4.07 M/UL — SIGNIFICANT CHANGE UP (ref 3.8–5.2)
RBC # FLD: 13 % — SIGNIFICANT CHANGE UP (ref 10.3–14.5)
SODIUM SERPL-SCNC: 141 MMOL/L — SIGNIFICANT CHANGE UP (ref 135–145)
WBC # BLD: 4.71 K/UL — SIGNIFICANT CHANGE UP (ref 3.8–10.5)
WBC # FLD AUTO: 4.71 K/UL — SIGNIFICANT CHANGE UP (ref 3.8–10.5)

## 2020-12-07 PROCEDURE — 71045 X-RAY EXAM CHEST 1 VIEW: CPT | Mod: 26

## 2020-12-07 PROCEDURE — 99232 SBSQ HOSP IP/OBS MODERATE 35: CPT

## 2020-12-07 PROCEDURE — 99233 SBSQ HOSP IP/OBS HIGH 50: CPT | Mod: GC

## 2020-12-07 PROCEDURE — 99231 SBSQ HOSP IP/OBS SF/LOW 25: CPT | Mod: GC

## 2020-12-07 RX ORDER — SODIUM CHLORIDE 9 MG/ML
500 INJECTION, SOLUTION INTRAVENOUS ONCE
Refills: 0 | Status: COMPLETED | OUTPATIENT
Start: 2020-12-07 | End: 2020-12-07

## 2020-12-07 RX ORDER — LANOLIN ALCOHOL/MO/W.PET/CERES
3 CREAM (GRAM) TOPICAL AT BEDTIME
Refills: 0 | Status: DISCONTINUED | OUTPATIENT
Start: 2020-12-07 | End: 2020-12-08

## 2020-12-07 RX ADMIN — ERTAPENEM SODIUM 120 MILLIGRAM(S): 1 INJECTION, POWDER, LYOPHILIZED, FOR SOLUTION INTRAMUSCULAR; INTRAVENOUS at 14:17

## 2020-12-07 RX ADMIN — SODIUM CHLORIDE 1000 MILLILITER(S): 9 INJECTION, SOLUTION INTRAVENOUS at 23:09

## 2020-12-07 RX ADMIN — Medication 100 MICROGRAM(S): at 05:54

## 2020-12-07 RX ADMIN — ONDANSETRON 4 MILLIGRAM(S): 8 TABLET, FILM COATED ORAL at 21:34

## 2020-12-07 RX ADMIN — AMLODIPINE BESYLATE 2.5 MILLIGRAM(S): 2.5 TABLET ORAL at 05:54

## 2020-12-07 RX ADMIN — ATORVASTATIN CALCIUM 40 MILLIGRAM(S): 80 TABLET, FILM COATED ORAL at 21:34

## 2020-12-07 RX ADMIN — ENOXAPARIN SODIUM 40 MILLIGRAM(S): 100 INJECTION SUBCUTANEOUS at 14:17

## 2020-12-07 RX ADMIN — Medication 3 MILLIGRAM(S): at 23:09

## 2020-12-07 NOTE — PROGRESS NOTE ADULT - ASSESSMENT
49 y/o F PMHx of IBS, hypothyroidism, and recurrent diverticulitis presented for acute onset abdominal pain, found to have recurrent diverticulitis, worsening after initial response to antibiotics.    Recurrent Sigmoid Diverticulitis  -not grossly septic, afebrile without leukocytosis  -initial CT A/P with PO contrast showed recurrent of diverticulitis, initially improving on IV Cipro/flagyl but with acute worsening yesterday after restarting diet. Repeat CT A/P showed worsening inflammatory changes  -uncomplicated, no evidence of abscess, perforation, fistula, stricture  -listed history of Zosyn allergy however pt tolerated Augmentin  -eventual surgical plan per surgery team      Pt noted chills and abdominal discomfort  pt changed to ertapenem  now improved  BC negative  for a PICC and home IV abs and then eventual surgery

## 2020-12-07 NOTE — PROGRESS NOTE ADULT - ASSESSMENT
50F hx of IBS, diverticulitis, hypothyroidism presenting to the ED for 5 days of sharp suprapubic abdominal pain similar to prior episodes of diverticulitis    Last Colonoscopy - 1/2020 : Internal hemorrhoids, Sigmoid and Descending colon diverticulosis    Clinically non toxic appearing, without leukocytosis  CT 11/30- acute non perforated diverticulitis    Acute recurrent (proximal) sigmoid diverticulitis; increased inflammation on 12/4 follow up CT - Abx upgraded to IV Invanz; ID following  CT 12/4 - Sigmoid diverticulitis with increased inflammatory changes in the adjacent fat since 11/30/2020 and new small volume pelvic ascites.    RECS:  -IV ABx per ID  -monitor clinically  -Zofran prn  -plans for eventual surgical resection; timing per CRS  -diet per CRS  -Cardiology following     Discussed with pt, all questions answered      Arnulfo Canela PA-C    [facility]  [phone]  After hours and weekend coverage [phone]

## 2020-12-07 NOTE — PROGRESS NOTE ADULT - ASSESSMENT
50F hx IBS, diverticulitis, hypothyroidism, HLD presenting to the ED with 5 days of abdominal pain found to have uncomplicated sigmoid diverticulitis. Pt with increased pain, unable to tolerate diet, backed down, repeat ct revealed progressed locoregional inflammation. No abscess or sigmoid perforation.    Plan:     - CLD  - Pain control  - Now with Ertapenem. Will place medline  - ID consulted, on board.   - Appreciate ID and GI recs.  - Cardiology consult for OR optimization  - VTE ppx: Lovenox, ambulation  - Transfer to [address] [place] [address]  - Final Plan to be discussed with attending.    [place] [facility]   px [phone]

## 2020-12-07 NOTE — DIETITIAN INITIAL EVALUATION ADULT. - ADD RECOMMEND
Encouraged PO intake as tolerated. Diet education provided. Patient made aware RD remains available. Monitor PO intake, weight, labs, skin, GI status, diet.

## 2020-12-07 NOTE — PROGRESS NOTE ADULT - SUBJECTIVE AND OBJECTIVE BOX
Interval events:   - Advanced to CLD, tolerating  - Pain better controlled  - OOB to chair    Patient seen and examined at bedside. Feeling Better, Pain is well controlled.  Marcela CLD. Admits to some nausea d/2 IV abx. No emesis.   GI +/-      Physical Exam  General Appearance: Resting comfortably, no acute distress  Chest: non-labored breathing, no respiratory distress  CV: Pulse regular presently  Abdomen: Soft, min tender to deep palpation, non-distended, no peritonitis       Vital Signs Last 24 Hrs  T(C): 36.3 (07 Dec 2020 00:57), Max: 36.9 (06 Dec 2020 20:49)  T(F): 97.3 (07 Dec 2020 00:57), Max: 98.4 (06 Dec 2020 20:49)  HR: 62 (07 Dec 2020 00:57) (62 - 68)  BP: 116/77 (07 Dec 2020 00:57) (106/71 - 136/74)  BP(mean): --  RR: 18 (07 Dec 2020 00:57) (18 - 18)  SpO2: 97% (07 Dec 2020 00:57) (97% - 98%)    I&O's Detail    05 Dec 2020 07:01  -  06 Dec 2020 07:00  --------------------------------------------------------  IN:    dextrose 5% + sodium chloride 0.45% w/ Additives: 900 mL    IV PiggyBack: 550 mL  Total IN: 1450 mL    OUT:    Voided (mL): 400 mL  Total OUT: 400 mL    Total NET: 1050 mL      06 Dec 2020 07:01  -  07 Dec 2020 02:46  --------------------------------------------------------  IN:    Oral Fluid: 120 mL  Total IN: 120 mL    OUT:    Voided (mL): 550 mL  Total OUT: 550 mL    Total NET: -430 mL          12-05    140  |  106  |  5<L>  ----------------------------<  90  4.0   |  24  |  0.68    Ca    9.2      05 Dec 2020 07:11  Phos  2.6     12-05  Mg     2.1     12-05                              11.8   4.14  )-----------( 254      ( 05 Dec 2020 07:11 )             36.7           LABS:                         11.8   4.14  )-----------( 254      ( 05 Dec 2020 07:11 )             36.7     12-05    140  |  106  |  5<L>  ----------------------------<  90  4.0   |  24  |  0.68    Ca    9.2      05 Dec 2020 07:11  Phos  2.6     12-05  Mg     2.1     12-05                RADIOLOGY, EKG & ADDITIONAL TESTS: Reviewed.     MEDICATIONS  (STANDING):  amLODIPine   Tablet 2.5 milliGRAM(s) Oral daily  atorvastatin 40 milliGRAM(s) Oral at bedtime  dextrose 5% + sodium chloride 0.45% with potassium chloride 20 mEq/L 1000 milliLiter(s) (100 mL/Hr) IV Continuous <Continuous>  enoxaparin Injectable 40 milliGRAM(s) SubCutaneous daily  ertapenem  IVPB      ertapenem  IVPB 1000 milliGRAM(s) IV Intermittent every 24 hours  levothyroxine 100 MICROGram(s) Oral daily    MEDICATIONS  (PRN):  acetaminophen   Tablet .. 1000 milliGRAM(s) Oral every 6 hours PRN Mild Pain (1 - 3), Moderate Pain (4 - 6)  BACItracin   Ointment 1 Application(s) Topical daily PRN for skin irritation  HYDROmorphone  Injectable 0.5 milliGRAM(s) IV Push every 6 hours PRN Severe Pain (7 - 10)  ondansetron Injectable 4 milliGRAM(s) IV Push every 4 hours PRN Nausea and/or Vomiting        -Plan to be discussed with the attending after rounds.

## 2020-12-07 NOTE — PROGRESS NOTE ADULT - SUBJECTIVE AND OBJECTIVE BOX
Patient is a 50y old  Female who presented with a chief complaint of Uncomplicated diverticulitis (07 Dec 2020 11:03)      INTERVAL HPI/OVERNIGHT EVENTS:  +BM yesterday  tolerating PO clears, on IV Invanz since 12/4 PM  decreased suprapubic abdominal pain    MEDICATIONS  (STANDING):  amLODIPine   Tablet 2.5 milliGRAM(s) Oral daily  atorvastatin 40 milliGRAM(s) Oral at bedtime  enoxaparin Injectable 40 milliGRAM(s) SubCutaneous daily  ertapenem  IVPB      ertapenem  IVPB 1000 milliGRAM(s) IV Intermittent every 24 hours  levothyroxine 100 MICROGram(s) Oral daily    MEDICATIONS  (PRN):  acetaminophen   Tablet .. 1000 milliGRAM(s) Oral every 6 hours PRN Mild Pain (1 - 3), Moderate Pain (4 - 6)  BACItracin   Ointment 1 Application(s) Topical daily PRN for skin irritation  ondansetron Injectable 4 milliGRAM(s) IV Push every 4 hours PRN Nausea and/or Vomiting      Allergies  iodine (Hives)  Zosyn (Urticaria)      Review of Systems:  General:  No wt loss, fevers, chills, night sweats, fatigue  CV:  no CP or SOB +loop recorder- currently without CP  Resp:  No dyspnea, cough, tachypnea, wheezing  GI: see HPI  :  No pain, bleeding, incontinence, nocturia  Muscle:  No pain, weakness  Neuro:  No weakness, tingling, memory problems  Psych:  No fatigue, insomnia, mood problems, depression  Endocrine:  No polyuria, polydypsia, cold/heat intolerance  Heme:  No petechiae, ecchymosis, easy bruisability  Skin:  No rash, tattoos, scars, edema      Vital Signs Last 24 Hrs  T(C): 36.6 (07 Dec 2020 08:12), Max: 36.9 (06 Dec 2020 20:49)  T(F): 97.9 (07 Dec 2020 08:12), Max: 98.4 (06 Dec 2020 20:49)  HR: 69 (07 Dec 2020 08:12) (60 - 69)  BP: 124/79 (07 Dec 2020 08:12) (115/75 - 136/74)  BP(mean): --  RR: 18 (07 Dec 2020 08:12) (18 - 18)  SpO2: 99% (07 Dec 2020 08:12) (97% - 99%)    PHYSICAL EXAM:  Constitutional: NAD, well-developed pleasant non toxic but anxious appearing female, OOB to chair  Neck: No LAD, supple  Respiratory: Clear anteriorly  Cardiovascular: S1 and S2, RRR NT to palpation  Gastrointestinal: BS+, soft, +minimal tenderness to deep palpation in suprapubic region, no rebound, guarding or rigidity  Extremities: No peripheral edema, neg clubbing, cyanosis  Vascular: 2+ peripheral pulses  Neurological: A/O x 3, no focal deficits  Psychiatric: Normal mood, normal affect  Skin: No rashes      LABS:                        12.3   4.71  )-----------( 265      ( 07 Dec 2020 07:15 )             38.2     12-07    141  |  106  |  5<L>  ----------------------------<  98  3.9   |  22  |  0.77    Ca    9.3      07 Dec 2020 07:12  Phos  2.7     12-07  Mg     2.1     12-07        RADIOLOGY & ADDITIONAL TESTS:  EXAM:  CT ABDOMEN AND PELVIS OC                        PROCEDURE DATE:  12/04/2020      INTERPRETATION:  CLINICAL INFORMATION: Diverticulitis with increased suprapubic abdominal pain.    COMPARISON: CT abdomen pelvis 11/30/2020.    PROCEDURE:  CT of the Abdomen and Pelvis was performed without intravenous contrast.  Intravenous contrast: None.  Oral contrast: positive contrast was administered.  Sagittal and coronal reformats were performed.    Evaluation of the solid visceral organs is limited without intravenous contrast.    FINDINGS:  LOWER CHEST: Bibasilar linear atelectasis.    LIVER: Within normal limits.  BILE DUCTS: Normal caliber.  GALLBLADDER: Mildly distended. Dependent high attenuation may be secondary to sludge and/or small stones.  SPLEEN: Within normal limits.  PANCREAS: Within normal limits.  ADRENALS: Within normal limits.  KIDNEYS/URETERS: Within normal limits.    BLADDER: Within normal limits.  REPRODUCTIVE ORGANS: Uterus and adnexa are within normal limits.    BOWEL: Diverticulosis with wall thickening of the sigmoid colon in the region of an inflamed diverticulum. Increased pericolonic inflammatory changes compared with 11/30/2020.. No bowel obstruction. Appendix is normal.  PERITONEUM: Small volume pelvic ascites.  VESSELS: Within normal limits.  RETROPERITONEUM/LYMPH NODES: No lymphadenopathy.  ABDOMINAL WALL: Small fat-containing umbilical hernia.  BONES: Degenerative changes.    IMPRESSION:  Sigmoid diverticulitis with increased inflammatory changes in the adjacent fat since 11/30/2020 and new small volume pelvic ascites.

## 2020-12-07 NOTE — DIETITIAN INITIAL EVALUATION ADULT. - SIGNS/SYMPTOMS
as evidenced by pt report of poor PO intake, diet just advanced as evidenced by pt with limited prior knowledge of low fiber diet, with multiple questions

## 2020-12-07 NOTE — DIETITIAN INITIAL EVALUATION ADULT. - ORAL INTAKE PTA/DIET HISTORY
Pt reports good appetite and PO intake PTA with no recent changes. Denies vitamin/mineral supplementation at home.

## 2020-12-07 NOTE — DIETITIAN INITIAL EVALUATION ADULT. - ETIOLOGY
related to GI distress, decreased appetite in setting of diverticulitis related to nutrition therapy for diverticulitis/low fiber diet education

## 2020-12-07 NOTE — DIETITIAN INITIAL EVALUATION ADULT. - REASON INDICATOR FOR ASSESSMENT
Pt seen for length of stay assessment and consult received for education. Source: EMR, pt. Pt is a 49 yo female with PMH of diverticulitis, hypothyroidism, HLD, who presented with 5 days of abdominal pain, admitted 11/30.

## 2020-12-07 NOTE — DIETITIAN INITIAL EVALUATION ADULT. - OTHER INFO
Pt's diet just advanced to low fiber this afternoon (previously NPO --> clear liquids). Pt reports tolerating "some" chicken and rice for lunch (50% of meal per flowsheets) with no nausea/vomiting "yet." Denies diarrhea, reports last BM last Sunday (11/29). Denies difficulties chewing or swallowing. Confirmed NKFA.    Pt reports UBW ~190 pounds, denies recent changes. Dosing weight 189.6 pounds suggests wt stability. Pt in chair at time of visit, unable to obtain new weight. Will continue to monitor and trend weights.     Encouraged PO intake as tolerated. Provided diet education on low fiber diet. Discussed consuming sugar, lactose, and caffeine as tolerated. Explained foods recommended (refined grain products, lean protein, well-cooked vegetables, certain fruits) and foods not recommended (whole grains, seeds, fruit and vegetable peels and skins, whole nuts, raw vegetables, and most raw fruits). Educated pt on nutrition label reading and looking for grain foods with less than 2 grams of fiber per serving. Encouraged consumption of tender, lean protein including fish, chicken, and well-cooked eggs. Pt amenable to recommendations. All nutrition-related questions answered. Pt made aware RD remains available.

## 2020-12-07 NOTE — PROGRESS NOTE ADULT - SUBJECTIVE AND OBJECTIVE BOX
Patient is a 50y old  Female who presents with a chief complaint of Uncomplicated diverticulitis (07 Dec 2020 02:46)    Being followed by ID for        Interval history:  No other acute events      ROS:  No cough,SOB,CP  No N/V/D  No abd pain  No urinary complaints  No HA  No joint or limb pain  No other complaints    PAST MEDICAL & SURGICAL HISTORY:  Hypertension    Sleep apnea  nasal mask- settings unknown    Hypothyroid    Diverticulitis  last hospitalized 9.19    h/o Heart Palpitations    Irritable Bowel Syndrome    H/O vaginal surgery  vaginal mesh    h/o dental implant    History of Colonoscopy    h/o  Endoscopy    C Section - x 1 - 1994      Allergies    iodine (Hives)  Zosyn (Urticaria)    Intolerances      Antimicrobials:    ertapenem  IVPB      ertapenem  IVPB 1000 milliGRAM(s) IV Intermittent every 24 hours    MEDICATIONS  (STANDING):  amLODIPine   Tablet 2.5 milliGRAM(s) Oral daily  atorvastatin 40 milliGRAM(s) Oral at bedtime  dextrose 5% + sodium chloride 0.45% with potassium chloride 20 mEq/L 1000 milliLiter(s) (100 mL/Hr) IV Continuous <Continuous>  enoxaparin Injectable 40 milliGRAM(s) SubCutaneous daily  ertapenem  IVPB      ertapenem  IVPB 1000 milliGRAM(s) IV Intermittent every 24 hours  levothyroxine 100 MICROGram(s) Oral daily      Vital Signs Last 24 Hrs  T(C): 36.6 (12-07-20 @ 08:12), Max: 36.9 (12-06-20 @ 20:49)  T(F): 97.9 (12-07-20 @ 08:12), Max: 98.4 (12-06-20 @ 20:49)  HR: 69 (12-07-20 @ 08:12) (60 - 69)  BP: 124/79 (12-07-20 @ 08:12) (115/75 - 136/74)  BP(mean): --  RR: 18 (12-07-20 @ 08:12) (18 - 18)  SpO2: 99% (12-07-20 @ 08:12) (97% - 99%)    Physical Exam:    Constitutional well preserved,comfortable,pleasant    HEENT PERRLA EOMI,No pallor or icterus    No oral exudate or erythema    Neck supple no JVD or LN    Chest Good AE,CTA    CVS RRR S1 S2 WNl No murmur or rub or gallop    Abd soft BS normal No tenderness no masses    Ext No cyanosis clubbing or edema    IV site no erythema tenderness or discharge    Joints no swelling or LOM    CNS AAO X 3 no focal    Lab Data:                          12.3   4.71  )-----------( 265      ( 07 Dec 2020 07:15 )             38.2       12-07    141  |  106  |  5<L>  ----------------------------<  98  3.9   |  22  |  0.77    Ca    9.3      07 Dec 2020 07:12  Phos  2.7     12-07  Mg     2.1     12-07            .Blood Blood  12-04-20   No growth to date.  --  --      .Urine Clean Catch (Midstream)  11-30-20   <10,000 CFU/mL Normal Urogenital Mary  --  --                    WBC Count: 4.71 (12-07-20 @ 07:15)  WBC Count: 4.14 (12-05-20 @ 07:11)  WBC Count: 5.83 (12-04-20 @ 06:27)  WBC Count: 4.28 (12-02-20 @ 07:13)  WBC Count: 5.07 (12-01-20 @ 06:45)  WBC Count: 5.40 (11-30-20 @ 13:48)             Patient is a 50y old  Female who presents with a chief complaint of Uncomplicated diverticulitis (07 Dec 2020 02:46)    Being followed by ID for        Interval history:  pt feeling improved  no further chills  abdomen more comfortable  still with nausea  no diarrhea  awaiting PICC/mid line  No other acute events        PAST MEDICAL & SURGICAL HISTORY:  Hypertension    Sleep apnea  nasal mask- settings unknown    Hypothyroid    Diverticulitis  last hospitalized 9.19    h/o Heart Palpitations    Irritable Bowel Syndrome    H/O vaginal surgery  vaginal mesh    h/o dental implant    History of Colonoscopy    h/o  Endoscopy    C Section - x 1 - 1994      Allergies    iodine (Hives)  Zosyn (Urticaria)    Intolerances      Antimicrobials:    ertapenem  IVPB      ertapenem  IVPB 1000 milliGRAM(s) IV Intermittent every 24 hours    MEDICATIONS  (STANDING):  amLODIPine   Tablet 2.5 milliGRAM(s) Oral daily  atorvastatin 40 milliGRAM(s) Oral at bedtime  dextrose 5% + sodium chloride 0.45% with potassium chloride 20 mEq/L 1000 milliLiter(s) (100 mL/Hr) IV Continuous <Continuous>  enoxaparin Injectable 40 milliGRAM(s) SubCutaneous daily  ertapenem  IVPB      ertapenem  IVPB 1000 milliGRAM(s) IV Intermittent every 24 hours  levothyroxine 100 MICROGram(s) Oral daily      Vital Signs Last 24 Hrs  T(C): 36.6 (12-07-20 @ 08:12), Max: 36.9 (12-06-20 @ 20:49)  T(F): 97.9 (12-07-20 @ 08:12), Max: 98.4 (12-06-20 @ 20:49)  HR: 69 (12-07-20 @ 08:12) (60 - 69)  BP: 124/79 (12-07-20 @ 08:12) (115/75 - 136/74)  BP(mean): --  RR: 18 (12-07-20 @ 08:12) (18 - 18)  SpO2: 99% (12-07-20 @ 08:12) (97% - 99%)    Physical Exam:    Constitutional well preserved,comfortable,pleasant    HEENT PERRLA EOMI,No pallor or icterus    No oral exudate or erythema    Neck supple no JVD or LN    Chest Good AE,CTA    CVS RRR S1 S2     Abd soft BS normal No tenderness    Ext No cyanosis clubbing or edema    nurse to remove IV . for midline today   no erythema or tenderness     Joints no swelling or LOM    CNS AAO X 3 no focal    Lab Data:                          12.3   4.71  )-----------( 265      ( 07 Dec 2020 07:15 )             38.2       12-07    141  |  106  |  5<L>  ----------------------------<  98  3.9   |  22  |  0.77    Ca    9.3      07 Dec 2020 07:12  Phos  2.7     12-07  Mg     2.1     12-07        .Blood Blood  12-04-20   No growth to date.  --  --      .Urine Clean Catch (Midstream)  11-30-20   <10,000 CFU/mL Normal Urogenital Mary  --  --                    WBC Count: 4.71 (12-07-20 @ 07:15)  WBC Count: 4.14 (12-05-20 @ 07:11)  WBC Count: 5.83 (12-04-20 @ 06:27)  WBC Count: 4.28 (12-02-20 @ 07:13)  WBC Count: 5.07 (12-01-20 @ 06:45)  WBC Count: 5.40 (11-30-20 @ 13:48)

## 2020-12-08 VITALS
SYSTOLIC BLOOD PRESSURE: 136 MMHG | OXYGEN SATURATION: 100 % | TEMPERATURE: 98 F | HEART RATE: 76 BPM | DIASTOLIC BLOOD PRESSURE: 86 MMHG | RESPIRATION RATE: 18 BRPM

## 2020-12-08 PROCEDURE — 82803 BLOOD GASES ANY COMBINATION: CPT

## 2020-12-08 PROCEDURE — 96374 THER/PROPH/DIAG INJ IV PUSH: CPT | Mod: XU

## 2020-12-08 PROCEDURE — 82947 ASSAY GLUCOSE BLOOD QUANT: CPT

## 2020-12-08 PROCEDURE — 71045 X-RAY EXAM CHEST 1 VIEW: CPT

## 2020-12-08 PROCEDURE — 36569 INSJ PICC 5 YR+ W/O IMAGING: CPT

## 2020-12-08 PROCEDURE — 81001 URINALYSIS AUTO W/SCOPE: CPT

## 2020-12-08 PROCEDURE — 84100 ASSAY OF PHOSPHORUS: CPT

## 2020-12-08 PROCEDURE — 85027 COMPLETE CBC AUTOMATED: CPT

## 2020-12-08 PROCEDURE — 74176 CT ABD & PELVIS W/O CONTRAST: CPT

## 2020-12-08 PROCEDURE — 82435 ASSAY OF BLOOD CHLORIDE: CPT

## 2020-12-08 PROCEDURE — 87086 URINE CULTURE/COLONY COUNT: CPT

## 2020-12-08 PROCEDURE — C1751: CPT

## 2020-12-08 PROCEDURE — 82330 ASSAY OF CALCIUM: CPT

## 2020-12-08 PROCEDURE — 80048 BASIC METABOLIC PNL TOTAL CA: CPT

## 2020-12-08 PROCEDURE — 81025 URINE PREGNANCY TEST: CPT

## 2020-12-08 PROCEDURE — 84295 ASSAY OF SERUM SODIUM: CPT

## 2020-12-08 PROCEDURE — 85014 HEMATOCRIT: CPT

## 2020-12-08 PROCEDURE — 96361 HYDRATE IV INFUSION ADD-ON: CPT | Mod: XU

## 2020-12-08 PROCEDURE — U0003: CPT

## 2020-12-08 PROCEDURE — 86769 SARS-COV-2 COVID-19 ANTIBODY: CPT

## 2020-12-08 PROCEDURE — 83605 ASSAY OF LACTIC ACID: CPT

## 2020-12-08 PROCEDURE — 87040 BLOOD CULTURE FOR BACTERIA: CPT

## 2020-12-08 PROCEDURE — 84132 ASSAY OF SERUM POTASSIUM: CPT

## 2020-12-08 PROCEDURE — 85018 HEMOGLOBIN: CPT

## 2020-12-08 PROCEDURE — 83690 ASSAY OF LIPASE: CPT

## 2020-12-08 PROCEDURE — 85025 COMPLETE CBC W/AUTO DIFF WBC: CPT

## 2020-12-08 PROCEDURE — 96375 TX/PRO/DX INJ NEW DRUG ADDON: CPT | Mod: XU

## 2020-12-08 PROCEDURE — 83735 ASSAY OF MAGNESIUM: CPT

## 2020-12-08 PROCEDURE — 80053 COMPREHEN METABOLIC PANEL: CPT

## 2020-12-08 PROCEDURE — 99285 EMERGENCY DEPT VISIT HI MDM: CPT | Mod: 25

## 2020-12-08 RX ORDER — ERTAPENEM SODIUM 1 G/1
1 INJECTION, POWDER, LYOPHILIZED, FOR SOLUTION INTRAMUSCULAR; INTRAVENOUS
Qty: 21 | Refills: 0
Start: 2020-12-08 | End: 2020-12-28

## 2020-12-08 RX ADMIN — ENOXAPARIN SODIUM 40 MILLIGRAM(S): 100 INJECTION SUBCUTANEOUS at 13:29

## 2020-12-08 RX ADMIN — Medication 100 MICROGRAM(S): at 05:22

## 2020-12-08 RX ADMIN — ERTAPENEM SODIUM 120 MILLIGRAM(S): 1 INJECTION, POWDER, LYOPHILIZED, FOR SOLUTION INTRAMUSCULAR; INTRAVENOUS at 13:28

## 2020-12-08 RX ADMIN — AMLODIPINE BESYLATE 2.5 MILLIGRAM(S): 2.5 TABLET ORAL at 05:22

## 2020-12-08 NOTE — PROGRESS NOTE ADULT - REASON FOR ADMISSION
Uncomplicated diverticulitis

## 2020-12-08 NOTE — PROGRESS NOTE ADULT - SUBJECTIVE AND OBJECTIVE BOX
[facility] Team Surgery Daily Progress Note  =====================================================    SUBJECTIVE: Patient seen and examined at bedside on AM rounds. Patient reports that they're feeling well. Tolerating diet, denies nausea, vomiting.    +Flatus/   - BM. OOB/Ambulating as tolerated. Denies fever, chills.       MEDICATIONS  (STANDING):  amLODIPine   Tablet 2.5 milliGRAM(s) Oral daily  atorvastatin 40 milliGRAM(s) Oral at bedtime  enoxaparin Injectable 40 milliGRAM(s) SubCutaneous daily  ertapenem  IVPB      ertapenem  IVPB 1000 milliGRAM(s) IV Intermittent every 24 hours  levothyroxine 100 MICROGram(s) Oral daily  melatonin 3 milliGRAM(s) Oral at bedtime    MEDICATIONS  (PRN):  acetaminophen   Tablet .. 1000 milliGRAM(s) Oral every 6 hours PRN Mild Pain (1 - 3), Moderate Pain (4 - 6)  BACItracin   Ointment 1 Application(s) Topical daily PRN for skin irritation  ondansetron Injectable 4 milliGRAM(s) IV Push every 4 hours PRN Nausea and/or Vomiting      OBJECTIVE:    Vital Signs Last 24 Hrs  T(C): 36.8 (08 Dec 2020 00:08), Max: 36.8 (08 Dec 2020 00:08)  T(F): 98.2 (08 Dec 2020 00:08), Max: 98.2 (08 Dec 2020 00:08)  HR: 67 (08 Dec 2020 00:08) (60 - 79)  BP: 115/73 (08 Dec 2020 00:08) (115/73 - 124/79)  BP(mean): --  RR: 18 (08 Dec 2020 00:08) (18 - 18)  SpO2: 98% (08 Dec 2020 00:08) (97% - 99%)    Physical Exam  General Appearance: Resting comfortably, no acute distress  Chest: non-labored breathing, no respiratory distress  CV: Pulse regular presently  Abdomen: Soft, min tender to deep palpation, non-distended, no peritonitis           I&O's Detail    06 Dec 2020 07:01  -  07 Dec 2020 07:00  --------------------------------------------------------  IN:    Oral Fluid: 120 mL  Total IN: 120 mL    OUT:    Voided (mL): 550 mL  Total OUT: 550 mL    Total NET: -430 mL      07 Dec 2020 07:01  -  08 Dec 2020 04:05  --------------------------------------------------------  IN:    Oral Fluid: 240 mL  Total IN: 240 mL    OUT:    Voided (mL): 115 mL  Total OUT: 115 mL    Total NET: 125 mL          Daily     Daily     LABS:                        12.3   4.71  )-----------( 265      ( 07 Dec 2020 07:15 )             38.2     12-07    141  |  106  |  5<L>  ----------------------------<  98  3.9   |  22  |  0.77    Ca    9.3      07 Dec 2020 07:12  Phos  2.7     12-07  Mg     2.1     12-07

## 2020-12-08 NOTE — PROGRESS NOTE ADULT - ASSESSMENT
50F hx of IBS, diverticulitis, hypothyroidism presenting to the ED for 5 days of sharp suprapubic abdominal pain similar to prior episodes of diverticulitis    Last Colonoscopy - 1/2020 : Internal hemorrhoids, Sigmoid and Descending colon diverticulosis    Clinically non toxic appearing, without leukocytosis  CT 11/30- acute non perforated diverticulitis    Acute recurrent (proximal) sigmoid diverticulitis; increased inflammation on 12/4 follow up CT - Abx upgraded to IV Invanz; ID following  CT 12/4 - Sigmoid diverticulitis with increased inflammatory changes in the adjacent fat since 11/30/2020 and new small volume pelvic ascites.    RECS:  -IV ABx per ID  -monitor clinically  -Zofran prn  -plans for eventual surgical resection; timing per CRS  -diet per CRS  -Cardiology following     Discussed with pt, all questions answered  d/c planning per Surgery      Arnulfo Canela PA-C    [facility]  [phone]  After hours and weekend coverage [phone]

## 2020-12-08 NOTE — PROGRESS NOTE ADULT - ASSESSMENT
50F hx IBS, diverticulitis, hypothyroidism, HLD presenting to the ED with 5 days of abdominal pain found to have uncomplicated sigmoid diverticulitis. Pt with increased pain, unable to tolerate diet, backed down, repeat ct revealed progressed locoregional inflammation. No abscess or sigmoid perforation.    Plan:     - LRD  - Pain control  - continue IV ertapenem w/ plan for PICC line per ID  - Appreciate ID and GI recs.  - Cardiology consult for OR optimization  - VTE ppx: Lovenox, ambulation  - Final Plan to be discussed with attending.    [facility] surgery   px [phone]

## 2020-12-08 NOTE — PROGRESS NOTE ADULT - ATTENDING COMMENTS
I have seen and examined the patient. I agree with the above surgery resident's note.
I have seen and examined the patient. I agree with the above surgery resident's note.
Cora Rich M.D. ,   Pager [phone]     after 5PM/ weekends [phone]
I have seen and examined the patient. I agree with the above surgery resident's note.  plan - home on iv abx- elective lap sigmoid next 2-3 weeks (remain on iv abx til OR)
I have seen and examined the patient. I agree with the above surgery resident's note.  recurrrent sigmoid diverticulitis- npo, iv abx  will need eventual elective lap sigmoid colecomy
I have seen and examined the patient. I agree with the above surgery resident's note.  still with pain- will repeat ct to eval for abscess
Makenna Laura MD, FACP, FACG, AGAF  [facility]  [phone]     After hours and weekend coverage GI service : [phone]
Makenna Laura MD, FACP, FACG, AGAF  [facility]  [phone]     After hours and weekend coverage GI service : [phone]
Pt seen and examined  Agree with note which was reviewed and edited where appropriate.  D/W patient, RN, residents and Fellow
recurrent diverticulitis    plan; continue management per surgery         advance diet as tolerated
recurrent sigmoid diverticulitis,  now on IVANZ,     plan continue management per surgery         advance diet as tolerated
Pt seen and examined  Agree with note which was reviewed and edited where appropriate.  D/W patient, RN, residents and Fellow

## 2020-12-08 NOTE — PROVIDER CONTACT NOTE (OTHER) - ASSESSMENT
Pt AOx 4, Abdomen soft, nontender. Pt c/o nausea, feeling of abdominal bloating, and urge to urinate but unable to void.

## 2020-12-08 NOTE — PROGRESS NOTE ADULT - SUBJECTIVE AND OBJECTIVE BOX
Patient is a 50y old  Female who presented with a chief complaint of Uncomplicated diverticulitis (08 Dec 2020 04:04)      INTERVAL HPI/OVERNIGHT EVENTS:  tolerating PO (LRD)  +flatus  no nausea or vomiting  s/p midline placement yesterday  abdominal discomfort continues to improve    MEDICATIONS  (STANDING):  amLODIPine   Tablet 2.5 milliGRAM(s) Oral daily  atorvastatin 40 milliGRAM(s) Oral at bedtime  enoxaparin Injectable 40 milliGRAM(s) SubCutaneous daily  ertapenem  IVPB      ertapenem  IVPB 1000 milliGRAM(s) IV Intermittent every 24 hours  levothyroxine 100 MICROGram(s) Oral daily  melatonin 3 milliGRAM(s) Oral at bedtime    MEDICATIONS  (PRN):  acetaminophen   Tablet .. 1000 milliGRAM(s) Oral every 6 hours PRN Mild Pain (1 - 3), Moderate Pain (4 - 6)  BACItracin   Ointment 1 Application(s) Topical daily PRN for skin irritation  ondansetron Injectable 4 milliGRAM(s) IV Push every 4 hours PRN Nausea and/or Vomiting      Allergies  iodine (Hives)  Zosyn (Urticaria)      Review of Systems:  General:  No wt loss, fevers, chills, night sweats, fatigue  CV:  no CP or SOB +loop recorder- currently without CP  Resp:  No dyspnea, cough, tachypnea, wheezing  GI: see HPI  :  No pain, bleeding, incontinence, nocturia  Muscle:  No pain, weakness  Neuro:  No weakness, tingling, memory problems  Psych:  No fatigue, insomnia, mood problems, depression  Endocrine:  No polyuria, polydypsia, cold/heat intolerance  Heme:  No petechiae, ecchymosis, easy bruisability  Skin:  No rash, tattoos, scars, edema      Vital Signs Last 24 Hrs  T(C): 36.7 (08 Dec 2020 09:00), Max: 36.8 (08 Dec 2020 00:08)  T(F): 98.1 (08 Dec 2020 09:00), Max: 98.2 (08 Dec 2020 00:08)  HR: 70 (08 Dec 2020 09:00) (67 - 79)  BP: 122/83 (08 Dec 2020 09:00) (115/73 - 122/83)  BP(mean): --  RR: 18 (08 Dec 2020 09:00) (18 - 18)  SpO2: 98% (08 Dec 2020 09:00) (98% - 98%)    PHYSICAL EXAM:  Constitutional: NAD, well-developed pleasant non toxic but sl. anxious appearing female, sitting at edge of bed  Neck: No LAD, supple  Respiratory: Clear anteriorly  Cardiovascular: S1 and S2, RRR NT to palpation  Gastrointestinal: BS+, soft, +minimal tenderness to deep palpation in suprapubic region, no rebound, guarding or rigidity  Extremities: No peripheral edema, neg clubbing, cyanosis  Vascular: 2+ peripheral pulses  Neurological: A/O x 3, no focal deficits  Psychiatric: Normal mood, normal affect  Skin: No rashes    LABS:                        12.3   4.71  )-----------( 265      ( 07 Dec 2020 07:15 )             38.2     12-07    141  |  106  |  5<L>  ----------------------------<  98  3.9   |  22  |  0.77    Ca    9.3      07 Dec 2020 07:12  Phos  2.7     12-07  Mg     2.1     12-07      RADIOLOGY & ADDITIONAL TESTS:

## 2020-12-09 ENCOUNTER — NON-APPOINTMENT (OUTPATIENT)
Age: 50
End: 2020-12-09

## 2020-12-15 ENCOUNTER — NON-APPOINTMENT (OUTPATIENT)
Age: 50
End: 2020-12-15

## 2020-12-15 ENCOUNTER — INPATIENT (INPATIENT)
Facility: HOSPITAL | Age: 50
LOS: 2 days | Discharge: HOME | DRG: 392 | End: 2020-12-18
Attending: COLON & RECTAL SURGERY | Admitting: COLON & RECTAL SURGERY
Payer: COMMERCIAL

## 2020-12-15 VITALS
OXYGEN SATURATION: 98 % | RESPIRATION RATE: 17 BRPM | HEIGHT: 61 IN | SYSTOLIC BLOOD PRESSURE: 119 MMHG | HEART RATE: 89 BPM | TEMPERATURE: 98 F | WEIGHT: 190.04 LBS | DIASTOLIC BLOOD PRESSURE: 82 MMHG

## 2020-12-15 DIAGNOSIS — Z98.890 OTHER SPECIFIED POSTPROCEDURAL STATES: Chronic | ICD-10-CM

## 2020-12-15 PROBLEM — Z87.09 HISTORY OF PHARYNGITIS: Status: RESOLVED | Noted: 2017-07-27 | Resolved: 2020-12-15

## 2020-12-15 PROCEDURE — 99285 EMERGENCY DEPT VISIT HI MDM: CPT

## 2020-12-15 NOTE — ED ADULT TRIAGE NOTE - CHIEF COMPLAINT QUOTE
Lower abdominal pain and chills since 7 am this am. Patient has a history of diverticulitis and scheduled to have abdominal surgery December 23rd. Patient has a left arm PICC line for antibiotics, inserted early December.

## 2020-12-16 DIAGNOSIS — R10.84 GENERALIZED ABDOMINAL PAIN: ICD-10-CM

## 2020-12-16 LAB
ALBUMIN SERPL ELPH-MCNC: 4.4 G/DL — SIGNIFICANT CHANGE UP (ref 3.3–5)
ALP SERPL-CCNC: 58 U/L — SIGNIFICANT CHANGE UP (ref 40–120)
ALT FLD-CCNC: 20 U/L — SIGNIFICANT CHANGE UP (ref 10–45)
ANION GAP SERPL CALC-SCNC: 10 MMOL/L — SIGNIFICANT CHANGE UP (ref 5–17)
APPEARANCE UR: CLEAR — SIGNIFICANT CHANGE UP
AST SERPL-CCNC: 15 U/L — SIGNIFICANT CHANGE UP (ref 10–40)
BACTERIA # UR AUTO: NEGATIVE — SIGNIFICANT CHANGE UP
BASOPHILS # BLD AUTO: 0.05 K/UL — SIGNIFICANT CHANGE UP (ref 0–0.2)
BASOPHILS NFR BLD AUTO: 0.5 % — SIGNIFICANT CHANGE UP (ref 0–2)
BILIRUB SERPL-MCNC: 0.3 MG/DL — SIGNIFICANT CHANGE UP (ref 0.2–1.2)
BILIRUB UR-MCNC: NEGATIVE — SIGNIFICANT CHANGE UP
BUN SERPL-MCNC: 12 MG/DL — SIGNIFICANT CHANGE UP (ref 7–23)
CALCIUM SERPL-MCNC: 9.6 MG/DL — SIGNIFICANT CHANGE UP (ref 8.4–10.5)
CHLORIDE SERPL-SCNC: 102 MMOL/L — SIGNIFICANT CHANGE UP (ref 96–108)
CO2 SERPL-SCNC: 25 MMOL/L — SIGNIFICANT CHANGE UP (ref 22–31)
COLOR SPEC: COLORLESS — SIGNIFICANT CHANGE UP
CREAT SERPL-MCNC: 0.84 MG/DL — SIGNIFICANT CHANGE UP (ref 0.5–1.3)
DIFF PNL FLD: NEGATIVE — SIGNIFICANT CHANGE UP
EOSINOPHIL # BLD AUTO: 0.12 K/UL — SIGNIFICANT CHANGE UP (ref 0–0.5)
EOSINOPHIL NFR BLD AUTO: 1.1 % — SIGNIFICANT CHANGE UP (ref 0–6)
EPI CELLS # UR: 1 /HPF — SIGNIFICANT CHANGE UP
GAS PNL BLDV: SIGNIFICANT CHANGE UP
GLUCOSE SERPL-MCNC: 98 MG/DL — SIGNIFICANT CHANGE UP (ref 70–99)
GLUCOSE UR QL: NEGATIVE — SIGNIFICANT CHANGE UP
HCT VFR BLD CALC: 39.8 % — SIGNIFICANT CHANGE UP (ref 34.5–45)
HGB BLD-MCNC: 13 G/DL — SIGNIFICANT CHANGE UP (ref 11.5–15.5)
IMM GRANULOCYTES NFR BLD AUTO: 0.3 % — SIGNIFICANT CHANGE UP (ref 0–1.5)
KETONES UR-MCNC: NEGATIVE — SIGNIFICANT CHANGE UP
LEUKOCYTE ESTERASE UR-ACNC: NEGATIVE — SIGNIFICANT CHANGE UP
LYMPHOCYTES # BLD AUTO: 2.46 K/UL — SIGNIFICANT CHANGE UP (ref 1–3.3)
LYMPHOCYTES # BLD AUTO: 22.4 % — SIGNIFICANT CHANGE UP (ref 13–44)
MAGNESIUM SERPL-MCNC: 2.3 MG/DL — SIGNIFICANT CHANGE UP (ref 1.6–2.6)
MCHC RBC-ENTMCNC: 30.2 PG — SIGNIFICANT CHANGE UP (ref 27–34)
MCHC RBC-ENTMCNC: 32.7 GM/DL — SIGNIFICANT CHANGE UP (ref 32–36)
MCV RBC AUTO: 92.6 FL — SIGNIFICANT CHANGE UP (ref 80–100)
MONOCYTES # BLD AUTO: 1.08 K/UL — HIGH (ref 0–0.9)
MONOCYTES NFR BLD AUTO: 9.8 % — SIGNIFICANT CHANGE UP (ref 2–14)
NEUTROPHILS # BLD AUTO: 7.26 K/UL — SIGNIFICANT CHANGE UP (ref 1.8–7.4)
NEUTROPHILS NFR BLD AUTO: 65.9 % — SIGNIFICANT CHANGE UP (ref 43–77)
NITRITE UR-MCNC: NEGATIVE — SIGNIFICANT CHANGE UP
NRBC # BLD: 0 /100 WBCS — SIGNIFICANT CHANGE UP (ref 0–0)
PH UR: 6.5 — SIGNIFICANT CHANGE UP (ref 5–8)
PHOSPHATE SERPL-MCNC: 2.6 MG/DL — SIGNIFICANT CHANGE UP (ref 2.5–4.5)
PLATELET # BLD AUTO: 330 K/UL — SIGNIFICANT CHANGE UP (ref 150–400)
POTASSIUM SERPL-MCNC: 3.7 MMOL/L — SIGNIFICANT CHANGE UP (ref 3.5–5.3)
POTASSIUM SERPL-SCNC: 3.7 MMOL/L — SIGNIFICANT CHANGE UP (ref 3.5–5.3)
PROT SERPL-MCNC: 7.8 G/DL — SIGNIFICANT CHANGE UP (ref 6–8.3)
PROT UR-MCNC: NEGATIVE — SIGNIFICANT CHANGE UP
RBC # BLD: 4.3 M/UL — SIGNIFICANT CHANGE UP (ref 3.8–5.2)
RBC # FLD: 12.7 % — SIGNIFICANT CHANGE UP (ref 10.3–14.5)
RBC CASTS # UR COMP ASSIST: 0 /HPF — SIGNIFICANT CHANGE UP (ref 0–4)
SARS-COV-2 RNA SPEC QL NAA+PROBE: SIGNIFICANT CHANGE UP
SODIUM SERPL-SCNC: 137 MMOL/L — SIGNIFICANT CHANGE UP (ref 135–145)
SP GR SPEC: 1 — LOW (ref 1.01–1.02)
UROBILINOGEN FLD QL: NEGATIVE — SIGNIFICANT CHANGE UP
WBC # BLD: 11 K/UL — HIGH (ref 3.8–10.5)
WBC # FLD AUTO: 11 K/UL — HIGH (ref 3.8–10.5)
WBC UR QL: 0 /HPF — SIGNIFICANT CHANGE UP (ref 0–5)

## 2020-12-16 PROCEDURE — 99222 1ST HOSP IP/OBS MODERATE 55: CPT

## 2020-12-16 PROCEDURE — 74177 CT ABD & PELVIS W/CONTRAST: CPT | Mod: 26

## 2020-12-16 PROCEDURE — 71045 X-RAY EXAM CHEST 1 VIEW: CPT | Mod: 26

## 2020-12-16 RX ORDER — IBUPROFEN 200 MG
400 TABLET ORAL EVERY 6 HOURS
Refills: 0 | Status: DISCONTINUED | OUTPATIENT
Start: 2020-12-16 | End: 2020-12-18

## 2020-12-16 RX ORDER — OXYCODONE HYDROCHLORIDE 5 MG/1
2.5 TABLET ORAL EVERY 4 HOURS
Refills: 0 | Status: DISCONTINUED | OUTPATIENT
Start: 2020-12-16 | End: 2020-12-18

## 2020-12-16 RX ORDER — SODIUM CHLORIDE 9 MG/ML
1000 INJECTION, SOLUTION INTRAVENOUS ONCE
Refills: 0 | Status: COMPLETED | OUTPATIENT
Start: 2020-12-16 | End: 2020-12-16

## 2020-12-16 RX ORDER — LEVOTHYROXINE SODIUM 125 MCG
100 TABLET ORAL DAILY
Refills: 0 | Status: DISCONTINUED | OUTPATIENT
Start: 2020-12-16 | End: 2020-12-18

## 2020-12-16 RX ORDER — ACETAMINOPHEN 500 MG
650 TABLET ORAL EVERY 6 HOURS
Refills: 0 | Status: DISCONTINUED | OUTPATIENT
Start: 2020-12-16 | End: 2020-12-18

## 2020-12-16 RX ORDER — ENOXAPARIN SODIUM 100 MG/ML
40 INJECTION SUBCUTANEOUS DAILY
Refills: 0 | Status: DISCONTINUED | OUTPATIENT
Start: 2020-12-16 | End: 2020-12-18

## 2020-12-16 RX ORDER — AMLODIPINE BESYLATE 2.5 MG/1
2.5 TABLET ORAL DAILY
Refills: 0 | Status: DISCONTINUED | OUTPATIENT
Start: 2020-12-16 | End: 2020-12-18

## 2020-12-16 RX ORDER — MORPHINE SULFATE 50 MG/1
4 CAPSULE, EXTENDED RELEASE ORAL ONCE
Refills: 0 | Status: DISCONTINUED | OUTPATIENT
Start: 2020-12-16 | End: 2020-12-16

## 2020-12-16 RX ORDER — MEROPENEM 1 G/30ML
1000 INJECTION INTRAVENOUS EVERY 8 HOURS
Refills: 0 | Status: DISCONTINUED | OUTPATIENT
Start: 2020-12-16 | End: 2020-12-18

## 2020-12-16 RX ORDER — HYDROMORPHONE HYDROCHLORIDE 2 MG/ML
0.5 INJECTION INTRAMUSCULAR; INTRAVENOUS; SUBCUTANEOUS EVERY 4 HOURS
Refills: 0 | Status: DISCONTINUED | OUTPATIENT
Start: 2020-12-16 | End: 2020-12-18

## 2020-12-16 RX ORDER — ONDANSETRON 8 MG/1
4 TABLET, FILM COATED ORAL ONCE
Refills: 0 | Status: COMPLETED | OUTPATIENT
Start: 2020-12-16 | End: 2020-12-16

## 2020-12-16 RX ORDER — ATORVASTATIN CALCIUM 80 MG/1
40 TABLET, FILM COATED ORAL AT BEDTIME
Refills: 0 | Status: DISCONTINUED | OUTPATIENT
Start: 2020-12-16 | End: 2020-12-18

## 2020-12-16 RX ORDER — DIPHENHYDRAMINE HCL 50 MG
50 CAPSULE ORAL ONCE
Refills: 0 | Status: COMPLETED | OUTPATIENT
Start: 2020-12-16 | End: 2020-12-16

## 2020-12-16 RX ORDER — OXYCODONE HYDROCHLORIDE 5 MG/1
5 TABLET ORAL EVERY 4 HOURS
Refills: 0 | Status: DISCONTINUED | OUTPATIENT
Start: 2020-12-16 | End: 2020-12-18

## 2020-12-16 RX ORDER — ERTAPENEM SODIUM 1 G/1
1000 INJECTION, POWDER, LYOPHILIZED, FOR SOLUTION INTRAMUSCULAR; INTRAVENOUS EVERY 24 HOURS
Refills: 0 | Status: DISCONTINUED | OUTPATIENT
Start: 2020-12-16 | End: 2020-12-16

## 2020-12-16 RX ORDER — SENNA PLUS 8.6 MG/1
2 TABLET ORAL AT BEDTIME
Refills: 0 | Status: DISCONTINUED | OUTPATIENT
Start: 2020-12-16 | End: 2020-12-18

## 2020-12-16 RX ORDER — MEROPENEM 1 G/30ML
1000 INJECTION INTRAVENOUS ONCE
Refills: 0 | Status: COMPLETED | OUTPATIENT
Start: 2020-12-16 | End: 2020-12-16

## 2020-12-16 RX ORDER — ONDANSETRON 8 MG/1
4 TABLET, FILM COATED ORAL EVERY 6 HOURS
Refills: 0 | Status: DISCONTINUED | OUTPATIENT
Start: 2020-12-16 | End: 2020-12-18

## 2020-12-16 RX ORDER — MEROPENEM 1 G/30ML
INJECTION INTRAVENOUS
Refills: 0 | Status: DISCONTINUED | OUTPATIENT
Start: 2020-12-16 | End: 2020-12-18

## 2020-12-16 RX ORDER — SODIUM CHLORIDE 9 MG/ML
1000 INJECTION, SOLUTION INTRAVENOUS
Refills: 0 | Status: DISCONTINUED | OUTPATIENT
Start: 2020-12-16 | End: 2020-12-17

## 2020-12-16 RX ADMIN — AMLODIPINE BESYLATE 2.5 MILLIGRAM(S): 2.5 TABLET ORAL at 11:39

## 2020-12-16 RX ADMIN — MEROPENEM 100 MILLIGRAM(S): 1 INJECTION INTRAVENOUS at 09:40

## 2020-12-16 RX ADMIN — SODIUM CHLORIDE 1000 MILLILITER(S): 9 INJECTION, SOLUTION INTRAVENOUS at 01:15

## 2020-12-16 RX ADMIN — HYDROMORPHONE HYDROCHLORIDE 0.5 MILLIGRAM(S): 2 INJECTION INTRAMUSCULAR; INTRAVENOUS; SUBCUTANEOUS at 20:44

## 2020-12-16 RX ADMIN — MEROPENEM 100 MILLIGRAM(S): 1 INJECTION INTRAVENOUS at 16:29

## 2020-12-16 RX ADMIN — MORPHINE SULFATE 4 MILLIGRAM(S): 50 CAPSULE, EXTENDED RELEASE ORAL at 01:30

## 2020-12-16 RX ADMIN — OXYCODONE HYDROCHLORIDE 5 MILLIGRAM(S): 5 TABLET ORAL at 18:46

## 2020-12-16 RX ADMIN — ENOXAPARIN SODIUM 40 MILLIGRAM(S): 100 INJECTION SUBCUTANEOUS at 11:42

## 2020-12-16 RX ADMIN — MORPHINE SULFATE 4 MILLIGRAM(S): 50 CAPSULE, EXTENDED RELEASE ORAL at 01:15

## 2020-12-16 RX ADMIN — Medication 650 MILLIGRAM(S): at 11:39

## 2020-12-16 RX ADMIN — SODIUM CHLORIDE 125 MILLILITER(S): 9 INJECTION, SOLUTION INTRAVENOUS at 09:03

## 2020-12-16 RX ADMIN — Medication 50 MILLIGRAM(S): at 04:16

## 2020-12-16 RX ADMIN — Medication 650 MILLIGRAM(S): at 12:32

## 2020-12-16 RX ADMIN — HYDROMORPHONE HYDROCHLORIDE 0.5 MILLIGRAM(S): 2 INJECTION INTRAMUSCULAR; INTRAVENOUS; SUBCUTANEOUS at 21:14

## 2020-12-16 RX ADMIN — Medication 400 MILLIGRAM(S): at 16:33

## 2020-12-16 RX ADMIN — Medication 40 MILLIGRAM(S): at 01:15

## 2020-12-16 RX ADMIN — ATORVASTATIN CALCIUM 40 MILLIGRAM(S): 80 TABLET, FILM COATED ORAL at 20:44

## 2020-12-16 RX ADMIN — MORPHINE SULFATE 4 MILLIGRAM(S): 50 CAPSULE, EXTENDED RELEASE ORAL at 03:38

## 2020-12-16 RX ADMIN — Medication 400 MILLIGRAM(S): at 16:45

## 2020-12-16 RX ADMIN — OXYCODONE HYDROCHLORIDE 5 MILLIGRAM(S): 5 TABLET ORAL at 18:16

## 2020-12-16 RX ADMIN — ONDANSETRON 4 MILLIGRAM(S): 8 TABLET, FILM COATED ORAL at 02:18

## 2020-12-16 RX ADMIN — MORPHINE SULFATE 4 MILLIGRAM(S): 50 CAPSULE, EXTENDED RELEASE ORAL at 07:00

## 2020-12-16 NOTE — ED ADULT NURSE REASSESSMENT NOTE - NS ED NURSE REASSESS COMMENT FT1
Pt AAOx4, NAD, resp nonlabored, resting  in bed  Pt denies headache, dizziness, chest pain, palpitations, SOB,, v/d, urinary symptoms, fevers, chills, weakness at this time. Pt awaiting CT. Safety maintained.   Pt states she is still experiencing abdominal pain. MD made aware

## 2020-12-16 NOTE — ED PROVIDER NOTE - PHYSICAL EXAMINATION
Gen: NAD, AOx3, able to make needs known, non-toxic  Head: NCAT  HEENT: EOMI, oral mucosa moist, normal conjunctiva  Lung: CTAB, no respiratory distress, no wheezes/rhonchi/rales B/L, speaking in full sentences  CV: RRR, no murmurs  Abd: non distended, soft, tender across lower abdomen, no guarding, no CVA tenderness  MSK: no visible deformities  Neuro: Appears non focal  Skin: Warm, well perfused  Psych: normal affect

## 2020-12-16 NOTE — H&P ADULT - HISTORY OF PRESENT ILLNESS
Patient is a 50y old  Female who presents with a chief complaint of abdominal pain    HPI:   James is a very pleasant 50 year old lady with history of IBS, approximately 5 prior episodes of diverticulitis, hypothyroidism, HLD presenting to the ED with abdominal pain. Patient recently admitted earlier this month with diverticulitis and discharged with IV Ertapenem via PICC line, which she is still receiving daily. Pt states she was doing well until late last night when she began having sharp b/l LQ pain, worse on the left. Pain worsened overnight prompting her presentation to the ED. Pt denies any nausea or vomiting. Endorses flatus and had a normal BM yesterday afternoon. Last colonoscopy 1/2020 reportedly normal. Pt was scheduled for elective sigmoid resection 12/23. Denies any other complaints including sick contacts, fevers/chills, chest pain/shortness of breath, diarrhea/constipation.     ROS: 10-system review is otherwise negative except HPI above.      PAST MEDICAL & SURGICAL HISTORY:  Hypertension    Sleep apnea  nasal mask- settings unknown    Hypothyroid    Diverticulitis  last hospitalized 9.19    h/o Heart Palpitations    Irritable Bowel Syndrome    H/O vaginal surgery  vaginal mesh    h/o dental implant    History of Colonoscopy    h/o  Endoscopy    C Section - x 1 - 1994      FAMILY HISTORY:  Family history of early CAD  Father-45, Paternal Aunt Maternal Uncle    Family history of colon cancer (Grandparent)      [] Family history not pertinent as reviewed with the patient and family    SOCIAL HISTORY:    Nonsmoker  Social EtOH  Denies Illicit drug use     ALLERGIES: iodine (Hives)  Zosyn (Urticaria)      HOME MEDICATIONS:   amLODIPine 2.5 mg oral tablet: 1 tab(s) orally once a day (30 Nov 2020 17:56)  Crestor 10 mg oral tablet: 1 tab(s) orally once a day (30 Nov 2020 17:56)  levothyroxine 100 mcg (0.1 mg) oral tablet: 1 tab(s) orally once a day (30 Nov 2020 17:56)      --------------------------------------------------------------------------------------------

## 2020-12-16 NOTE — ED ADULT NURSE REASSESSMENT NOTE - NS ED NURSE REASSESS COMMENT FT1
Handoff report received from KATHLEEN Lizarraga. Pt resting comfortably in Gold 14. Surgery at bedside. VSS. Pt updated on plan of care. Pending admit.

## 2020-12-16 NOTE — ED PROVIDER NOTE - ATTENDING CONTRIBUTION TO CARE
Pt with diverticulitis getting treated via PICC w Ertapenem as outpt here with sudden onset worsening LLQ pain. denies fever, n/v. on exam, pt with diffuse lower abd tenderness, not peritonitic, afebrile. workup shows new leukocytosis but otherwise unremarkable labs. CT shows acute diverticulitis without any other emergent process. awaiting surgery eval - suspect will need re-admisison given worsening symptoms despite being on Iv abx.

## 2020-12-16 NOTE — ED ADULT NURSE NOTE - OBJECTIVE STATEMENT
50yr old female w/ pmhx of HTN, sleep apnea, Hypothyroid, and diverticulitis presents to ED c/o abd pain. Pt states she was recently DC'd from hosp, and Dx with diverticulitis, and had a PICC placed and put on ABx. Pt is scheduled to have a partial colon resection on 12/23. Pt states this AM she started experiencing abdominal pain again in the RLQ and LLQ. Pt states the pain feels more like a stabbing and its worse in the LLQ. Pt denies NVD, CP, SOB, GI,  Symptoms, syncope, any recent falls or trauma. Pt assessed by MD, bed lowered and locked, call bell within reach. will reassess

## 2020-12-16 NOTE — ED ADULT NURSE NOTE - NSIMPLEMENTINTERV_GEN_ALL_ED
Implemented All Fall Risk Interventions:  [place] to call system. Call bell, personal items and telephone within reach. Instruct patient to call for assistance. Room bathroom lighting operational. Non-slip footwear when patient is off stretcher. Physically safe environment: no spills, clutter or unnecessary equipment. Stretcher in lowest position, wheels locked, appropriate side rails in place. Provide visual cue, wrist band, yellow gown, etc. Monitor gait and stability. Monitor for mental status changes and reorient to person, place, and time. Review medications for side effects contributing to fall risk. Reinforce activity limits and safety measures with patient and family.

## 2020-12-16 NOTE — H&P ADULT - ATTENDING COMMENTS
I have seen and examined the patient. I agree with the above surgery resident's note.  iv abx  OR next week

## 2020-12-16 NOTE — ED PROVIDER NOTE - OBJECTIVE STATEMENT
49 y/o F w/ PMH of diverticulitis currently on ertapenem via PICC line, HTN, hypothyroid, IBS presenting w/ abdominal pain. Purple 14. Pt reports this morning she woke up and had LLQ pain. Has been present throughout the day. Described as sharp and will intermittently increase in pain. Non radiating. Did not take any medications. Was recently in this facility w/ similar pain and found to have divtericulitis. Was sent home w/ PICC line in place and is planned for surgery on 12/23. Was supposed to have pre-op appointments and CT scan this week. Surgeon is Dr. Medrano. Denies fevers, chills, headache, dizziness, blurred vision, chest pain, cough, shortness of breath, n/v/d/c, urinary symptoms, MSK pain, rash.

## 2020-12-16 NOTE — ED PROVIDER NOTE - PROGRESS NOTE DETAILS
Dr. Sanchez Smith, PGY-3: consulted surgery, resident agreed w/ CT a/p w/ oral and IV contrast. Will discuss when CT is performed Dr. Sanchez Smith, PGY-3: spoke w/ surg resident. Final recs to follow final CT read Dr. Sanchez Smith, PGY-3: spoke w/ surg resident and informed him of final read of CT. Still awaiting final surg recs. Surg resident has yet to evaluate pt in person.

## 2020-12-16 NOTE — H&P ADULT - NSHPPHYSICALEXAM_GEN_ALL_CORE
General: Well-developed, in no acute distress   Neurologic: Awake, alert, GCS 15, No focal Deficits   Respiratory: Normal respiratory effort  CVS: RRR, perfusing adequately  Abdomen: Abdomen obese, soft, ND, moderate LLQ TTP, no rebound or guarding   Ext: Grossly symmetric, Moving all extremities  Skin: Warm, dry, intact, no erythema

## 2020-12-16 NOTE — H&P ADULT - NSHPLABSRESULTS_GEN_ALL_CORE
< from: CT Abdomen and Pelvis w/ Oral Cont and w/ IV Cont (12.16.20 @ 05:23) >      EXAM:  CT ABDOMEN AND PELVIS OC IC                            PROCEDURE DATE:  12/16/2020            INTERPRETATION:  CLINICAL INFORMATION: Left lower quadrant pain    COMPARISON: CT abdomen pelvis 12/4/2020    PROCEDURE:  CT of the Abdomen and Pelvis was performed with intravenous contrast.  Intravenous contrast: 90 ml Omnipaque 350. 10 ml discarded.  Oral contrast: positive contrast was administered.  Sagittal and coronal reformats were performed.    FINDINGS:  LOWER CHEST: Within normal limits.    LIVER: Within normal limits.  BILE DUCTS: Normal caliber.  GALLBLADDER: Within normal limits.  SPLEEN: Within normal limits.  PANCREAS: Within normal limits.  ADRENALS: Within normal limits.  KIDNEYS/URETERS: Within normal limits.    BLADDER: Within normal limits.  REPRODUCTIVE ORGANS: Uterus and adnexa within normal limits.    BOWEL: Diverticulosis coli. There is thickening and focal inflammation of a proximal sigmoid diverticulum. No bowel obstruction. Appendix normal.  PERITONEUM: No free air, fluid collection, or ascites.  VESSELS: Within normal limits.  RETROPERITONEUM/LYMPH NODES: No lymphadenopathy.  ABDOMINAL WALL: Within normal limits.  BONES: Degenerative change at L5-S1.    IMPRESSION:  Acute, uncomplicated sigmoid diverticulitis.                  SUKHI NANCE MD; Attending Radiologist  This document has been electronically signed. Dec 16 2020  5:45AM    < end of copied text >

## 2020-12-16 NOTE — ED PROVIDER NOTE - FAMILY HISTORY
Subjective   Yue Bravo is a 38 y.o. female is here today as a self referral for annual.    History of Present Illness-here today for annual exam and checkup.  Patient also having difficulty with prolonged menstrual bleeding with clots and cramping.  She has frequently 10 days of bleeding also with heavy clots and bleeding during the month.  She has used birth control pills to try and control this even though her  has had a vasectomy and this was not successful.  Orestes is almost always painful no matter what position is used.  It also seems to trigger bleeding as well.  A recent ultrasound documented enlarged vessels with pelvic congestion syndrome.    The following portions of the patient's history were reviewed and updated as appropriate: allergies, current medications, past family history, past medical history, past social history, past surgical history and problem list.   Allergies   Allergen Reactions   • Bee Venom Shortness Of Breath   • Other Hives and Shortness Of Breath     Mushrooms and coffee   • Oxycodone Other (See Comments)     Sharp head pain.      No current facility-administered medications on file prior to visit.      No current outpatient prescriptions on file prior to visit.     Past Medical History:   Diagnosis Date   • Abnormal menstrual periods    • Ganglion cyst of finger of left hand     3 RD FINGER   • Herpes simplex     COLD SORES @ TIMES   • Pelvic congestion syndrome      Past Surgical History:   Procedure Laterality Date   • ABDOMINOPLASTY     • COLONOSCOPY     • REDUCTION MAMMAPLASTY     • TRIGGER FINGER RELEASE Left 2/1/2018    Procedure: EXCISION GANGLION CYST LT 3RD FINGER;  Surgeon: Jonh Norwood MD;  [place] [facility] [place]  Service:      Family History   Problem Relation Age of Onset   • Malig Hyperthermia Neg Hx          Review of Systems   Constitutional: Negative.    HENT: Negative.    Eyes: Negative.    Respiratory: Negative.    Cardiovascular:  Negative.    Gastrointestinal: Negative.    Endocrine: Negative.    Genitourinary: Positive for menstrual problem, pelvic pain and vaginal bleeding.   Musculoskeletal: Negative.    Skin: Negative.    Allergic/Immunologic: Negative.    Neurological: Negative.    Hematological: Negative.    Psychiatric/Behavioral: Negative.        Objective   Physical Exam   Constitutional: She is oriented to person, place, and time. She appears well-developed and well-nourished.   HENT:   Head: Normocephalic and atraumatic.   Nose: Nose normal.   Eyes: Conjunctivae and EOM are normal. Pupils are equal, round, and reactive to light.   Neck: Normal range of motion. Neck supple. No thyromegaly present.   Cardiovascular: Normal rate, regular rhythm, normal heart sounds and intact distal pulses.  Exam reveals no gallop.    No murmur heard.  Pulmonary/Chest: Effort normal and breath sounds normal. No respiratory distress. She has no wheezes. She exhibits no mass, no tenderness, no swelling and no retraction. Right breast exhibits no inverted nipple, no mass, no nipple discharge, no skin change and no tenderness. Left breast exhibits no inverted nipple, no mass, no nipple discharge, no skin change and no tenderness.   Abdominal: Soft. Bowel sounds are normal. She exhibits no distension and no mass. There is no tenderness.   Genitourinary: Rectum normal, vagina normal and uterus normal. There is no rash, tenderness, lesion or injury on the right labia. There is no rash, tenderness, lesion or injury on the left labia. Uterus is not enlarged and not tender. Cervix exhibits no motion tenderness and no discharge. Right adnexum displays no mass, no tenderness and no fullness. Left adnexum displays no mass, no tenderness and no fullness.   Musculoskeletal: Normal range of motion. She exhibits no edema, tenderness or deformity.   Neurological: She is alert and oriented to person, place, and time.   Skin: Skin is warm and dry.   Psychiatric: She has  a normal mood and affect. Her behavior is normal. Judgment and thought content normal.   Nursing note and vitals reviewed.        Assessment/Plan   Problems Addressed this Visit     None      Visit Diagnoses     Pelvic pain in female    -  Primary    Relevant Orders    Case Request    Abnormal uterine bleeding (AUB)        Relevant Orders    Case Request    Well woman exam with routine gynecological exam        Relevant Orders    IGP,rfx Aptima HPV All Pth - ThinPrep Vial, Cervix        Patient's bleeding and discomfort is incapacitating at times.  She would like to consider a definitive procedure.  We discussed the options of vaginal hysterectomy as well as laparoscopic supracervical hysterectomy she would like to proceed with a laparoscopic supracervical hysterectomy.  If there is the presence of endometriosis or other pelvic pathology we will consider possible removal of tubes and ovaries.  She understands the expectations, risks, technique and recovery.  She would like to get back to work as soon as possible after the procedure.            Family history of early CAD, Father-45, Paternal Aunt Maternal Uncle     Grandparent  Still living? No  Family history of colon cancer, Age at diagnosis: Age Unknown

## 2020-12-16 NOTE — ED PROVIDER NOTE - CLINICAL SUMMARY MEDICAL DECISION MAKING FREE TEXT BOX
49 y/o F w/ PMH of diverticulitis currently on ertapenem via PICC line, HTN, hypothyroid, IBS presenting w/ abdominal pain. Pt overall well appearing on exam. Lower abdominal tenderness on exam. Possible worsening divertic given hx of similar. Unlikely  source of pain. Will evaluate for intraabdominal process w/ CT a/p. Plan for labs, meds, imaging, IVF. Pt will require pretreatment for reported iodine contrast. Will likely require admission to surgical service. Will reassess the need for additional interventions as clinically warranted.

## 2020-12-17 ENCOUNTER — APPOINTMENT (OUTPATIENT)
Dept: CT IMAGING | Facility: CLINIC | Age: 50
End: 2020-12-17

## 2020-12-17 ENCOUNTER — TRANSCRIPTION ENCOUNTER (OUTPATIENT)
Age: 50
End: 2020-12-17

## 2020-12-17 LAB
ANION GAP SERPL CALC-SCNC: 10 MMOL/L — SIGNIFICANT CHANGE UP (ref 5–17)
BUN SERPL-MCNC: 10 MG/DL — SIGNIFICANT CHANGE UP (ref 7–23)
CALCIUM SERPL-MCNC: 9.3 MG/DL — SIGNIFICANT CHANGE UP (ref 8.4–10.5)
CHLORIDE SERPL-SCNC: 105 MMOL/L — SIGNIFICANT CHANGE UP (ref 96–108)
CO2 SERPL-SCNC: 26 MMOL/L — SIGNIFICANT CHANGE UP (ref 22–31)
CREAT SERPL-MCNC: 0.68 MG/DL — SIGNIFICANT CHANGE UP (ref 0.5–1.3)
GLUCOSE SERPL-MCNC: 70 MG/DL — SIGNIFICANT CHANGE UP (ref 70–99)
HCT VFR BLD CALC: 35.6 % — SIGNIFICANT CHANGE UP (ref 34.5–45)
HGB BLD-MCNC: 11.4 G/DL — LOW (ref 11.5–15.5)
MAGNESIUM SERPL-MCNC: 2.2 MG/DL — SIGNIFICANT CHANGE UP (ref 1.6–2.6)
MCHC RBC-ENTMCNC: 29.9 PG — SIGNIFICANT CHANGE UP (ref 27–34)
MCHC RBC-ENTMCNC: 32 GM/DL — SIGNIFICANT CHANGE UP (ref 32–36)
MCV RBC AUTO: 93.4 FL — SIGNIFICANT CHANGE UP (ref 80–100)
NRBC # BLD: 0 /100 WBCS — SIGNIFICANT CHANGE UP (ref 0–0)
PHOSPHATE SERPL-MCNC: 2.5 MG/DL — SIGNIFICANT CHANGE UP (ref 2.5–4.5)
PLATELET # BLD AUTO: 284 K/UL — SIGNIFICANT CHANGE UP (ref 150–400)
POTASSIUM SERPL-MCNC: 3.7 MMOL/L — SIGNIFICANT CHANGE UP (ref 3.5–5.3)
POTASSIUM SERPL-SCNC: 3.7 MMOL/L — SIGNIFICANT CHANGE UP (ref 3.5–5.3)
RBC # BLD: 3.81 M/UL — SIGNIFICANT CHANGE UP (ref 3.8–5.2)
RBC # FLD: 12.7 % — SIGNIFICANT CHANGE UP (ref 10.3–14.5)
SARS-COV-2 IGG SERPL QL IA: NEGATIVE — SIGNIFICANT CHANGE UP
SARS-COV-2 IGM SERPL IA-ACNC: <0.1 INDEX — SIGNIFICANT CHANGE UP
SODIUM SERPL-SCNC: 141 MMOL/L — SIGNIFICANT CHANGE UP (ref 135–145)
WBC # BLD: 6.81 K/UL — SIGNIFICANT CHANGE UP (ref 3.8–10.5)
WBC # FLD AUTO: 6.81 K/UL — SIGNIFICANT CHANGE UP (ref 3.8–10.5)

## 2020-12-17 PROCEDURE — 99223 1ST HOSP IP/OBS HIGH 75: CPT | Mod: GC

## 2020-12-17 PROCEDURE — 99232 SBSQ HOSP IP/OBS MODERATE 35: CPT

## 2020-12-17 RX ORDER — SODIUM,POTASSIUM PHOSPHATES 278-250MG
1 POWDER IN PACKET (EA) ORAL
Refills: 0 | Status: COMPLETED | OUTPATIENT
Start: 2020-12-17 | End: 2020-12-17

## 2020-12-17 RX ORDER — CHLORHEXIDINE GLUCONATE 213 G/1000ML
1 SOLUTION TOPICAL DAILY
Refills: 0 | Status: DISCONTINUED | OUTPATIENT
Start: 2020-12-17 | End: 2020-12-17

## 2020-12-17 RX ORDER — POTASSIUM PHOSPHATE, MONOBASIC POTASSIUM PHOSPHATE, DIBASIC 236; 224 MG/ML; MG/ML
30 INJECTION, SOLUTION INTRAVENOUS ONCE
Refills: 0 | Status: DISCONTINUED | OUTPATIENT
Start: 2020-12-17 | End: 2020-12-17

## 2020-12-17 RX ORDER — CHLORHEXIDINE GLUCONATE 213 G/1000ML
1 SOLUTION TOPICAL DAILY
Refills: 0 | Status: DISCONTINUED | OUTPATIENT
Start: 2020-12-17 | End: 2020-12-18

## 2020-12-17 RX ORDER — MEROPENEM 1 G/30ML
1000 INJECTION INTRAVENOUS
Qty: 18000 | Refills: 0
Start: 2020-12-17 | End: 2020-12-22

## 2020-12-17 RX ORDER — ACETAMINOPHEN 500 MG
1000 TABLET ORAL ONCE
Refills: 0 | Status: COMPLETED | OUTPATIENT
Start: 2020-12-17 | End: 2020-12-17

## 2020-12-17 RX ORDER — KETOROLAC TROMETHAMINE 30 MG/ML
30 SYRINGE (ML) INJECTION ONCE
Refills: 0 | Status: DISCONTINUED | OUTPATIENT
Start: 2020-12-17 | End: 2020-12-18

## 2020-12-17 RX ADMIN — ENOXAPARIN SODIUM 40 MILLIGRAM(S): 100 INJECTION SUBCUTANEOUS at 11:25

## 2020-12-17 RX ADMIN — HYDROMORPHONE HYDROCHLORIDE 0.5 MILLIGRAM(S): 2 INJECTION INTRAMUSCULAR; INTRAVENOUS; SUBCUTANEOUS at 10:14

## 2020-12-17 RX ADMIN — Medication 100 MICROGRAM(S): at 05:35

## 2020-12-17 RX ADMIN — HYDROMORPHONE HYDROCHLORIDE 0.5 MILLIGRAM(S): 2 INJECTION INTRAMUSCULAR; INTRAVENOUS; SUBCUTANEOUS at 22:14

## 2020-12-17 RX ADMIN — Medication 1 PACKET(S): at 12:12

## 2020-12-17 RX ADMIN — Medication 1000 MILLIGRAM(S): at 11:24

## 2020-12-17 RX ADMIN — MEROPENEM 100 MILLIGRAM(S): 1 INJECTION INTRAVENOUS at 07:33

## 2020-12-17 RX ADMIN — MEROPENEM 100 MILLIGRAM(S): 1 INJECTION INTRAVENOUS at 15:20

## 2020-12-17 RX ADMIN — Medication 1 PACKET(S): at 10:15

## 2020-12-17 RX ADMIN — MEROPENEM 100 MILLIGRAM(S): 1 INJECTION INTRAVENOUS at 01:00

## 2020-12-17 RX ADMIN — Medication 400 MILLIGRAM(S): at 08:09

## 2020-12-17 RX ADMIN — ATORVASTATIN CALCIUM 40 MILLIGRAM(S): 80 TABLET, FILM COATED ORAL at 22:15

## 2020-12-17 RX ADMIN — AMLODIPINE BESYLATE 2.5 MILLIGRAM(S): 2.5 TABLET ORAL at 05:35

## 2020-12-17 RX ADMIN — CHLORHEXIDINE GLUCONATE 1 APPLICATION(S): 213 SOLUTION TOPICAL at 14:02

## 2020-12-17 NOTE — CONSULT NOTE ADULT - SUBJECTIVE AND OBJECTIVE BOX
Patient is a 50y old  Female who presents with a chief complaint of   HPI:  Patient is a 50y old  Female who presents with a chief complaint of abdominal pain    HPI:   James is a very pleasant 50 year old lady with history of IBS, approximately 5 prior episodes of diverticulitis, hypothyroidism, HLD presenting to the ED with abdominal pain. Patient recently admitted earlier this month with diverticulitis and discharged with IV Ertapenem via PICC line, which she is still receiving daily. Pt states she was doing well until late last night when she began having sharp b/l LQ pain, worse on the left. Pain worsened overnight prompting her presentation to the ED. Pt denies any nausea or vomiting. Endorses flatus and had a normal BM yesterday afternoon. Last colonoscopy 1/2020 reportedly normal. Pt was scheduled for elective sigmoid resection 12/23. Denies any other complaints including sick contacts, fevers/chills, chest pain/shortness of breath, diarrhea/constipation.     ROS: 10-system review is otherwise negative except HPI above.      PAST MEDICAL & SURGICAL HISTORY:  Hypertension    Sleep apnea  nasal mask- settings unknown    Hypothyroid    Diverticulitis  last hospitalized 9.19    h/o Heart Palpitations    Irritable Bowel Syndrome    H/O vaginal surgery  vaginal mesh    h/o dental implant    History of Colonoscopy    h/o  Endoscopy    C Section - x 1 - 1994      FAMILY HISTORY:  Family history of early CAD  Father-45, Paternal Aunt Maternal Uncle    Family history of colon cancer (Grandparent)      [] Family history not pertinent as reviewed with the patient and family    SOCIAL HISTORY:    Nonsmoker  Social EtOH  Denies Illicit drug use     ALLERGIES: iodine (Hives)  Zosyn (Urticaria)      HOME MEDICATIONS:   amLODIPine 2.5 mg oral tablet: 1 tab(s) orally once a day (30 Nov 2020 17:56)  Crestor 10 mg oral tablet: 1 tab(s) orally once a day (30 Nov 2020 17:56)  levothyroxine 100 mcg (0.1 mg) oral tablet: 1 tab(s) orally once a day (30 Nov 2020 17:56)      --------------------------------------------------------------------------------------------   (16 Dec 2020 08:02)      PAST MEDICAL & SURGICAL HISTORY:  Hypertension    Sleep apnea  nasal mask- settings unknown    Hypothyroid    Diverticulitis  last hospitalized 9.19    h/o Heart Palpitations    Irritable Bowel Syndrome    H/O vaginal surgery  vaginal mesh    h/o dental implant    History of Colonoscopy    h/o  Endoscopy    C Section - x 1 - 1994        Social history:    FAMILY HISTORY:  Family history of early CAD  Father-45, Paternal Aunt Maternal Uncle    Family history of colon cancer (Grandparent)      REVIEW OF SYSTEMS  General:	Denies any malaise fatigue or chills. Fevers absent    Skin:No rash  	  Ophthalmologic:Denies any visual complaints,discharge redness or photophobia  	  ENMT:No nasal discharge,headache,sinus congestion or throat pain.No dental complaints    Respiratory and Thorax:No cough,sputum or chest pain.Denies shortness of breath  	  Cardiovascular:	No chest pain,palpitaions or dizziness    Gastrointestinal:	  nausea,abdominal pain or diarrhea.         Hematology/Lymphatics:	No LN swelling.No gum bleeding     Endocrine:	No recent weight gain or loss.No abnormal heat/cold intolerance    Allergic/Immunologic:	No hives or rash   Allergies    iodine (Hives)  Zosyn (Urticaria)    Intolerances        Antimicrobials:    ertapenem  IVPB 1000 milliGRAM(s) IV Intermittent every 24 hours        Vital Signs Last 24 Hrs  T(C): 36.6 (16 Dec 2020 07:05), Max: 37.1 (16 Dec 2020 06:39)  T(F): 97.9 (16 Dec 2020 07:05), Max: 98.7 (16 Dec 2020 06:39)  HR: 84 (16 Dec 2020 07:05) (76 - 89)  BP: 123/73 (16 Dec 2020 07:05) (101/66 - 135/72)  BP(mean): --  RR: 15 (16 Dec 2020 07:05) (15 - 18)  SpO2: 99% (16 Dec 2020 07:05) (98% - 100%)     .         No cachexia     Eyes:PERRL EOMI.NO discharge or conjunctival injection    ENMT:No sinus tenderness.No thrush.No pharyngeal exudate or erythema.Fair dental hygiene    Neck:Supple,No LN,no JVD      Respiratory:Good air entry bilaterally,CTA    Cardiovascular:S1 S2 wnl, No murmurs,rub or gallops    Gastrointestinal:Soft BS(+) no tenderness no masses ,No rebound or guarding    Genitourinary:No CVA tendereness     Rectal:    Extremities:No cyanosis,clubbing or edema.    Vascular:peripheral pulses felt    Neurological:AAO X 3,No grossly focal deficits    Skin:No rash     Lymph Nodes:No palpable LNs    Musculoskeletal:No joint swelling or LOM    Psychiatric:Affect normal.                                13.0   11.00 )-----------( 330      ( 16 Dec 2020 01:11 )             39.8         12-16    137  |  102  |  12  ----------------------------<  98  3.7   |  25  |  0.84    Ca    9.6      16 Dec 2020 01:11  Phos  2.6     12-16  Mg     2.3     12-16    TPro  7.8  /  Alb  4.4  /  TBili  0.3  /  DBili  x   /  AST  15  /  ALT  20  /  AlkPhos  58  12-16      RECENT CULTURES:      MICROBIOLOGY:          Radiology:      Assessment:        Recommendations and Plan:    Pager [phone]  After 5 pm/weekends or if no response :[phone]      
    Patient is a 50y old  Female who presents with a chief complaint of fever (17 Dec 2020 10:23)      HPI:   James is a very pleasant 50 year old lady with history of IBS, approximately 5 prior episodes of diverticulitis, hypothyroidism, HLD presenting to the ED with abdominal pain. Patient recently admitted earlier this month with diverticulitis and discharged with IV Ertapenem, which she is still receiving daily. Pt states she was doing well until late last night when she began having sharp b/l LQ pain, worse on the left. Pain worsened overnight prompting her presentation to the ED. Pt denies any nausea or vomiting. Endorses flatus and had a normal BM yesterday afternoon. Last colonoscopy 2020 reportedly normal. Pt was scheduled for elective sigmoid resection . Denies any other complaints including sick contacts, fevers/chills, chest pain/shortness of breath, diarrhea/constipation.     ROS: 10-system review is otherwise negative except HPI above.      reported daily BMs prior to onset of recurrent LLQ/suprapubic pain on 12/15  today feeling better, tolerated PO clears  ID evaluated and changed Abx to Meropenem (has PICC access)    PAST MEDICAL & SURGICAL HISTORY:  Hypertension    Sleep apnea  nasal mask- settings unknown    Hypothyroid    Diverticulitis    h/o Heart Palpitations    Irritable Bowel Syndrome    H/O vaginal surgery  vaginal mesh    h/o dental implant    History of Colonoscopy 2020    h/o  Endoscopy    C Section - x 1994      FAMILY HISTORY:  Family history of early CAD  Father-45, Paternal Aunt Maternal Uncle    Family history of colon cancer (Grandparent)      SOCIAL HISTORY:    , lives with spouse and children  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  Nonsmoker  Social EtOH  Denies Illicit drug use       IBD (  ) Yes   (X  ) No  GI Malignancy ( X )  Yes - see above   (  ) No    Health Management  Last Colonoscopy - 2020 : Internal hemorrhoids, Sigmoid and Descending colon diverticulosis      Advanced Directives: (   X  ) None    (      ) DNR    (     ) DNI    (     ) Health Care Proxy:       ALLERGIES: iodine (Hives)  Zosyn (Urticaria)        MEDICATIONS  (STANDING):  amLODIPine   Tablet 2.5 milliGRAM(s) Oral daily  atorvastatin 40 milliGRAM(s) Oral at bedtime  chlorhexidine 2% Cloths 1 Application(s) Topical daily  enoxaparin Injectable 40 milliGRAM(s) SubCutaneous daily  ketorolac   Injectable 30 milliGRAM(s) IV Push once  levothyroxine 100 MICROGram(s) Oral daily  meropenem  IVPB      meropenem  IVPB 1000 milliGRAM(s) IV Intermittent every 8 hours  senna 2 Tablet(s) Oral at bedtime    MEDICATIONS  (PRN):  acetaminophen   Tablet .. 650 milliGRAM(s) Oral every 6 hours PRN Mild Pain (1 - 3)  HYDROmorphone  Injectable 0.5 milliGRAM(s) IV Push every 4 hours PRN breakthrough severe pain  ibuprofen  Tablet. 400 milliGRAM(s) Oral every 6 hours PRN Moderate Pain (4 - 6)  ondansetron Injectable 4 milliGRAM(s) IV Push every 6 hours PRN Nausea and/or Vomiting  oxyCODONE    IR 5 milliGRAM(s) Oral every 4 hours PRN Severe Pain (7 - 10)  oxyCODONE    IR 2.5 milliGRAM(s) Oral every 4 hours PRN Moderate Pain (4 - 6)    Review of Systems:    General:  No wt loss, fevers, chills, night sweats,fatigue,   CV:  No pain, palpitations, hypo/hypertension +loop recorder  Resp:  No dyspnea, cough, tachypnea, wheezing  GI:  see HPI  :  No pain, bleeding, incontinence, nocturia  Muscle:  No pain, weakness  Neuro:  No weakness, tingling, memory problems  Psych:  No fatigue, insomnia, mood problems, depression  Endocrine:  No polyuria, polydypsia, cold/heat intolerance  Heme:  No petechiae, ecchymosis, easy bruisability  Skin:  No rash, tattoos, scars, edema      Vital Signs Last 24 Hrs  T(C): 36.7 (17 Dec 2020 09:47), Max: 37 (16 Dec 2020 16:12)  T(F): 98 (17 Dec 2020 09:47), Max: 98.6 (16 Dec 2020 16:12)  HR: 81 (17 Dec 2020 09:47) (67 - 90)  BP: 129/78 (17 Dec 2020 09:47) (122/83 - 134/83)  BP(mean): --  RR: 18 (17 Dec 2020 09:47) (16 - 18)  SpO2: 98% (17 Dec 2020 09:47) (97% - 98%)    PHYSICAL EXAM:    Constitutional: NAD, well-developed non toxic appearing female OOB to chair  Neck: No LAD, supple no JVD  Respiratory: CTA and P  Cardiovascular: S1 and S2, RRR  Gastrointestinal: BS+, soft, NT/ND, neg HSM no rebound or rigidity  Extremities: No peripheral edema, neg clubbing, cyanosis  +LUE PICC site clean/dry  Vascular: 2+ peripheral pulses  Neurological: A/O x 3, no focal deficits  Psychiatric: Normal mood, normal affect  Skin: No rashes        LABS:                        11.4   6.81  )-----------( 284      ( 17 Dec 2020 07:39 )             35.6     -    141  |  105  |  10  ----------------------------<  70  3.7   |  26  |  0.68    Ca    9.3      17 Dec 2020 07:39  Phos  2.5       Mg     2.2         TPro  7.8  /  Alb  4.4  /  TBili  0.3  /  DBili  x   /  AST  15  /  ALT  20  /  AlkPhos  58        Urinalysis Basic - ( 16 Dec 2020 04:00 )    Color: Colorless / Appearance: Clear / S.004 / pH: x  Gluc: x / Ketone: Negative  / Bili: Negative / Urobili: Negative   Blood: x / Protein: Negative / Nitrite: Negative   Leuk Esterase: Negative / RBC: 0 /hpf / WBC 0 /HPF   Sq Epi: x / Non Sq Epi: 1 /hpf / Bacteria: Negative               RADIOLOGY & ADDITIONAL TESTS:  EXAM:  CT ABDOMEN AND PELVIS OC IC                        PROCEDURE DATE:  2020        INTERPRETATION:  CLINICAL INFORMATION: Left lower quadrant pain    COMPARISON: CT abdomen pelvis 2020    PROCEDURE:  CT of the Abdomen and Pelvis was performed with intravenous contrast.  Intravenous contrast: 90 ml Omnipaque 350. 10 ml discarded.  Oral contrast: positive contrast was administered.  Sagittal and coronal reformats were performed.    FINDINGS:  LOWER CHEST: Within normal limits.    LIVER: Within normal limits.  BILE DUCTS: Normal caliber.  GALLBLADDER: Within normal limits.  SPLEEN: Within normal limits.  PANCREAS: Within normal limits.  ADRENALS: Within normal limits.  KIDNEYS/URETERS: Within normal limits.    BLADDER: Within normal limits.  REPRODUCTIVE ORGANS: Uterus and adnexa within normal limits.    BOWEL: Diverticulosis coli. There is thickening and focal inflammation of a proximal sigmoid diverticulum. No bowel obstruction. Appendix normal.  PERITONEUM: No free air, fluid collection, or ascites.  VESSELS: Within normal limits.  RETROPERITONEUM/LYMPH NODES: No lymphadenopathy.  ABDOMINAL WALL: Within normal limits.  BONES: Degenerative change at L5-S1.    IMPRESSION:  Acute, uncomplicated sigmoid diverticulitis.    EXAM:  XR CHEST PORTABLE URGENT 1V                        PROCEDURE DATE:  2020        INTERPRETATION:  A single chest x-ray was obtained on .    Indication: PICC line placement.    Impression:    The heart is normal in size. Lungs are clear. A PICC line was placed on the left and the tip is in the entrance to the left subclavian. No pleural effusion. No pneumothorax.

## 2020-12-17 NOTE — DISCHARGE NOTE PROVIDER - NSDCMRMEDTOKEN_GEN_ALL_CORE_FT
amLODIPine 2.5 mg oral tablet: 1 tab(s) orally once a day  Crestor 10 mg oral tablet: 1 tab(s) orally once a day  INVanz 1 g injection: 1 gram(s) injectable once a day  levothyroxine 100 mcg (0.1 mg) oral tablet: 1 tab(s) orally once a day  meropenem 1000 mg/ 50 mL-NaCl 0.9% intravenous solution: 1000 milligram(s) intravenously every 8 hours    acetaminophen 325 mg oral tablet: 2 tab(s) orally every 6 hours, As needed, Mild Pain (1 - 3)  amLODIPine 2.5 mg oral tablet: 1 tab(s) orally once a day  Crestor 10 mg oral tablet: 1 tab(s) orally once a day  ibuprofen 400 mg oral tablet: 1 tab(s) orally every 6 hours, As needed, Moderate Pain (4 - 6)  levothyroxine 100 mcg (0.1 mg) oral tablet: 1 tab(s) orally once a day  meropenem 1000 mg/ 50 mL-NaCl 0.9% intravenous solution: 1000 milligram(s) intravenously every 8 hours   oxyCODONE 5 mg oral tablet: 1 tab(s) orally every 4 hours, As needed, Severe Pain (7 - 10) MDD:3  senna oral tablet: 2 tab(s) orally once a day (at bedtime)

## 2020-12-17 NOTE — PROGRESS NOTE ADULT - ASSESSMENT
ASSESSMENT:  James Stapleton is a 50 year old lady with multiple prior episodes of diverticulitis, presenting with acute uncomplicated diverticulitis.     PLAN:  - NPO, IVF  - IV Abx: meropenem   - ID consult: rec meropenem   - Pain control  - OR planning   Final plans to be discussed with attendings on rounds  Surgery [id number] ASSESSMENT:  James Stapleton is a 50 year old lady with multiple prior episodes of diverticulitis, presenting with acute uncomplicated diverticulitis.     PLAN:  - CLD, advance as tolerated, IVF  - IV Abx: meropenem   - ID consult: rec meropenem   - Pain control  - OR planning   Final plans to be discussed with attendings on rounds  Surgery [id number]

## 2020-12-17 NOTE — PROGRESS NOTE ADULT - ASSESSMENT
50 year old with recurrent diverticulitis scheduled for surgery next week  recently sent home on IV Invanz who presents with pain and flair  no fever no chills .   exam demonstrates mild [id number] and ct  without change       Invanz  changed to meropenem for better PA coverage.  muc better   on meropenem  for surgery next week but can go home on meropenem if ok with surgery until surgery

## 2020-12-17 NOTE — PROGRESS NOTE ADULT - SUBJECTIVE AND OBJECTIVE BOX
infectious diseases progress note:    Patient is a 50y old  Female who presents with a chief complaint of pain (16 Dec 2020 09:14)        Generalized abdominal pain               Allergies    iodine (Hives)  Zosyn (Urticaria)    Intolerances        ANTIBIOTICS/RELEVANT:  antimicrobials  meropenem  IVPB      meropenem  IVPB 1000 milliGRAM(s) IV Intermittent every 8 hours    immunologic:    OTHER:  acetaminophen   Tablet .. 650 milliGRAM(s) Oral every 6 hours PRN  amLODIPine   Tablet 2.5 milliGRAM(s) Oral daily  atorvastatin 40 milliGRAM(s) Oral at bedtime  chlorhexidine 2% Cloths 1 Application(s) Topical daily  enoxaparin Injectable 40 milliGRAM(s) SubCutaneous daily  HYDROmorphone  Injectable 0.5 milliGRAM(s) IV Push every 4 hours PRN  ibuprofen  Tablet. 400 milliGRAM(s) Oral every 6 hours PRN  ketorolac   Injectable 30 milliGRAM(s) IV Push once  levothyroxine 100 MICROGram(s) Oral daily  ondansetron Injectable 4 milliGRAM(s) IV Push every 6 hours PRN  oxyCODONE    IR 5 milliGRAM(s) Oral every 4 hours PRN  oxyCODONE    IR 2.5 milliGRAM(s) Oral every 4 hours PRN  potassium phosphate / sodium phosphate Powder (PHOS-NaK) 1 Packet(s) Oral every 2 hours  senna 2 Tablet(s) Oral at bedtime      Objective:  Vital Signs Last 24 Hrs  T(C): 36.5 (17 Dec 2020 05:15), Max: 37 (16 Dec 2020 16:12)  T(F): 97.7 (17 Dec 2020 05:15), Max: 98.6 (16 Dec 2020 16:12)  HR: 72 (17 Dec 2020 05:15) (67 - 90)  BP: 123/81 (17 Dec 2020 05:15) (122/83 - 134/83)  BP(mean): --  RR: 18 (17 Dec 2020 05:15) (16 - 18)  SpO2: 97% (17 Dec 2020 05:15) (97% - 98%)     Eyes:EBENEZER, EOMI  Ear/Nose/Throat: no oral lesion, no sinus tenderness on percussion	  Neck:no JVD, no lymphadenopathy, supple  Respiratory: CTA laurie  Cardiovascular: S1S2 RRR, no murmurs  Gastrointestinal:soft, (+) BS, no HSM  Extremities:no e/e/c        LABS:                        11.4   6.81  )-----------( 284      ( 17 Dec 2020 07:39 )             35.6     12-    141  |  105  |  10  ----------------------------<  70  3.7   |  26  |  0.68    Ca    9.3      17 Dec 2020 07:39  Phos  2.5     12-  Mg     2.2     -    TPro  7.8  /  Alb  4.4  /  TBili  0.3  /  DBili  x   /  AST  15  /  ALT  20  /  AlkPhos  58  12-16      Urinalysis Basic - ( 16 Dec 2020 04:00 )    Color: Colorless / Appearance: Clear / S.004 / pH: x  Gluc: x / Ketone: Negative  / Bili: Negative / Urobili: Negative   Blood: x / Protein: Negative / Nitrite: Negative   Leuk Esterase: Negative / RBC: 0 /hpf / WBC 0 /HPF   Sq Epi: x / Non Sq Epi: 1 /hpf / Bacteria: Negative          MICROBIOLOGY:    RECENT CULTURES:        RESPIRATORY CULTURES:              RADIOLOGY & ADDITIONAL STUDIES:        Pager [phone]  After 5 pm/weekends or if no response :[phone]

## 2020-12-17 NOTE — DISCHARGE NOTE PROVIDER - NSDCFUSCHEDAPPT_GEN_ALL_CORE_FT
WARNER REYES ; 12/23/2020 ; [facility] Surg [facility] [address] WARNER Reis ; 12/23/2020 ; [facility] PreAdmits  WARNER REYES ; 01/06/2021 ; [facility] Ophthal [address] [facility] CATEGWARNER HAGEN ; [facility]18/2020 [facility]  CATEGWARNER HAGEN ; 12/23/2020 ; [facility] Surg Gen [address] WARNER Reis ; 12/23/2020 ; [facility] PreAdmits  CATEGWARNER HAGEN ; 01/06/2021 ; [facility] Ophthal [address] [facility] WARNER REYES ; 12/23/2020 ; [facility] Surg Gen [address] WARNER Reis ; 12/23/2020 ; [facility] PreAdmits  WARNER REYES ; 01/06/2021 ; [facility] Ophthal [facility]

## 2020-12-17 NOTE — DISCHARGE NOTE PROVIDER - CARE PROVIDER_API CALL
Jaime Medrano  COLON/RECTAL SURGERY  [address] [facility], [facility] [address]  [place] [address]  Phone: [phone]  Fax: [phone]  Follow Up Time: 1 week

## 2020-12-17 NOTE — PROGRESS NOTE ADULT - SUBJECTIVE AND OBJECTIVE BOX
SUBJECTIVE:  Reports pain controlled with medication. Tolerating clear liquid diet. Denies N/V. Bowel Function -/-  Ambulating independently       OBJECTIVE:  Vital Signs Last 24 Hrs  T(C): 36.8 (16 Dec 2020 20:33), Max: 37.1 (16 Dec 2020 06:39)  T(F): 98.2 (16 Dec 2020 20:33), Max: 98.7 (16 Dec 2020 06:39)  HR: 74 (16 Dec 2020 20:33) (67 - 90)  BP: 133/83 (16 Dec 2020 20:33) (101/66 - 135/72)  BP(mean): --  RR: 17 (16 Dec 2020 20:33) (15 - 18)  SpO2: 98% (16 Dec 2020 20:33) (97% - 100%)    Physical Examination:  GEN: NAD, resting quietly  PULM: symmetric chest rise bilaterally, no increased WOB  ABD: soft, mildly tender, nondistended  EXTR: no LE erythema, moving all extremities      LABS:                        13.0   11.00 )-----------( 330      ( 16 Dec 2020 01:11 )             39.8       12-16    137  |  102  |  12  ----------------------------<  98  3.7   |  25  |  0.84    Ca    9.6      16 Dec 2020 01:11  Phos  2.6     12-16  Mg     2.3     12-16    TPro  7.8  /  Alb  4.4  /  TBili  0.3  /  DBili  x   /  AST  15  /  ALT  20  /  AlkPhos  58  12-16           SUBJECTIVE:  Reports pain controlled with medication. Tolerating clear liquid diet. Denies N/V. Bowel Function +/-  Ambulating independently       OBJECTIVE:  Vital Signs Last 24 Hrs  T(C): 36.8 (16 Dec 2020 20:33), Max: 37.1 (16 Dec 2020 06:39)  T(F): 98.2 (16 Dec 2020 20:33), Max: 98.7 (16 Dec 2020 06:39)  HR: 74 (16 Dec 2020 20:33) (67 - 90)  BP: 133/83 (16 Dec 2020 20:33) (101/66 - 135/72)  BP(mean): --  RR: 17 (16 Dec 2020 20:33) (15 - 18)  SpO2: 98% (16 Dec 2020 20:33) (97% - 100%)    Physical Examination:  GEN: NAD, resting quietly  PULM: symmetric chest rise bilaterally, no increased WOB  ABD: soft, mildly tender, nondistended  EXTR: no LE erythema, moving all extremities      LABS:                        13.0   11.00 )-----------( 330      ( 16 Dec 2020 01:11 )             39.8       12-16    137  |  102  |  12  ----------------------------<  98  3.7   |  25  |  0.84    Ca    9.6      16 Dec 2020 01:11  Phos  2.6     12-16  Mg     2.3     12-16    TPro  7.8  /  Alb  4.4  /  TBili  0.3  /  DBili  x   /  AST  15  /  ALT  20  /  AlkPhos  58  12-16           SUBJECTIVE:  Pt experiencing headache. Reports pain controlled with medication. Tolerating clear liquid diet. Denies N/V. Bowel Function +/-  Ambulating independently       OBJECTIVE:  Vital Signs Last 24 Hrs  T(C): 36.8 (16 Dec 2020 20:33), Max: 37.1 (16 Dec 2020 06:39)  T(F): 98.2 (16 Dec 2020 20:33), Max: 98.7 (16 Dec 2020 06:39)  HR: 74 (16 Dec 2020 20:33) (67 - 90)  BP: 133/83 (16 Dec 2020 20:33) (101/66 - 135/72)  BP(mean): --  RR: 17 (16 Dec 2020 20:33) (15 - 18)  SpO2: 98% (16 Dec 2020 20:33) (97% - 100%)    Physical Examination:  GEN: NAD, resting quietly  PULM: symmetric chest rise bilaterally, no increased WOB  ABD: soft, mildly tender, nondistended  EXTR: no LE erythema, moving all extremities      LABS:                        13.0   11.00 )-----------( 330      ( 16 Dec 2020 01:11 )             39.8       12-16    137  |  102  |  12  ----------------------------<  98  3.7   |  25  |  0.84    Ca    9.6      16 Dec 2020 01:11  Phos  2.6     12-16  Mg     2.3     12-16    TPro  7.8  /  Alb  4.4  /  TBili  0.3  /  DBili  x   /  AST  15  /  ALT  20  /  AlkPhos  58  12-16

## 2020-12-17 NOTE — DISCHARGE NOTE PROVIDER - NSDCCPCAREPLAN_GEN_ALL_CORE_FT
PRINCIPAL DISCHARGE DIAGNOSIS  Diagnosis: Diverticulitis, colon  Assessment and Plan of Treatment: 1.  Low fiber diet  2.  Activity as tolerated  3.  IV antibiotics as prescribed.  4.  Follow-up for surgery 12/23.  Please call Dr. Medrano if any questions or concerns prior to surgery.

## 2020-12-17 NOTE — CONSULT NOTE ADULT - ASSESSMENT
50 year old lady with history of IBS, approximately 5 prior episodes of diverticulitis, hypothyroidism, HLD presenting to the ED with abdominal pain. Patient recently admitted earlier this month with diverticulitis and discharged with IV Ertapenem.   CT +acute uncomplicated sigmoid diverticulitis  ID following, Abx changed to Meropenem    Colonoscopy 1/2020 : Internal hemorrhoids, Sigmoid and Descending colon diverticulosis    RECS:  OR timing as per Surgery  Abx as per ID  Diet as per Surgery  Monitor clinically      Discussed with pt, all questions answered at bedside  Dr Macias (primary GI) updated    Thank you for the courtesy of this consult.    Arnulfo Canela PA-C    [facility]  [phone]  After hours and weekend coverage [phone]  
50 year old with recurrent diverticulitis scheduled for surgery next week  recently sent home on IV Invanz who presents with pain and flair  no fever no chills .   exam demonstrates mild [id number] and ct  without change     will change Invanz to meropenem for better PA coverage.  Hopefully she can have her surgery soon or moved up ?
nothing

## 2020-12-17 NOTE — DISCHARGE NOTE PROVIDER - HOSPITAL COURSE
James is a very pleasant 50 year old lady with history of IBS, approximately 5 prior episodes of diverticulitis, hypothyroidism, HLD presenting to the ED with abdominal pain. Patient recently admitted earlier this month with diverticulitis and discharged with IV Ertapenem via midline, which she is still receiving daily. Pt states she was doing well until late last night when she began having sharp b/l LQ pain, worse on the left. Pain worsened overnight prompting her presentation to the ED. Pt denies any nausea or vomiting. Endorses flatus and had a normal BM yesterday afternoon. Last colonoscopy 1/2020 reportedly normal. Pt was scheduled for elective sigmoid resection 12/23. Denies any other complaints including sick contacts, fevers/chills, chest pain/shortness of breath, diarrhea/constipation. .  She was admitted to the surgical service.  ID was consulted and recommended meropenem instead of Invanz.  Arrangements were made for home IV antibiotics and she was discharged home with meropenmem via midline through 12/22.  She will return for elective surgery on 12/23. James is a very pleasant 50 year old lady with history of IBS, approximately 5 prior episodes of diverticulitis, hypothyroidism, HLD presenting to the ED with abdominal pain. Patient recently admitted earlier this month with diverticulitis and discharged with IV Ertapenem via midline, which she is still receiving daily. Pt states she was doing well until late last night when she began having sharp b/l LQ pain, worse on the left. Pain worsened overnight prompting her presentation to the ED. Pt denies any nausea or vomiting. Endorses flatus and had a normal BM yesterday afternoon. Last colonoscopy 1/2020 reportedly normal. Pt was scheduled for elective sigmoid resection 12/23. Denies any other complaints including sick contacts, fevers/chills, chest pain/shortness of breath, diarrhea/constipation. .  She was admitted to the surgical service.  ID was consulted and recommended meropenem instead of Invanz.  Arrangements were made for home IV antibiotics and she was discharged home with meropenem via midline through 12/22.  She will return for elective surgery on 12/23.

## 2020-12-18 ENCOUNTER — TRANSCRIPTION ENCOUNTER (OUTPATIENT)
Age: 50
End: 2020-12-18

## 2020-12-18 ENCOUNTER — APPOINTMENT (OUTPATIENT)
Dept: SURGERY | Facility: CLINIC | Age: 50
End: 2020-12-18

## 2020-12-18 ENCOUNTER — OUTPATIENT (OUTPATIENT)
Dept: OUTPATIENT SERVICES | Facility: HOSPITAL | Age: 50
LOS: 1 days | End: 2020-12-18
Payer: COMMERCIAL

## 2020-12-18 VITALS
WEIGHT: 186.95 LBS | HEART RATE: 80 BPM | OXYGEN SATURATION: 97 % | DIASTOLIC BLOOD PRESSURE: 72 MMHG | HEIGHT: 61 IN | RESPIRATION RATE: 16 BRPM | SYSTOLIC BLOOD PRESSURE: 113 MMHG | TEMPERATURE: 98 F

## 2020-12-18 VITALS
DIASTOLIC BLOOD PRESSURE: 78 MMHG | RESPIRATION RATE: 18 BRPM | OXYGEN SATURATION: 98 % | HEART RATE: 84 BPM | SYSTOLIC BLOOD PRESSURE: 125 MMHG | TEMPERATURE: 98 F

## 2020-12-18 DIAGNOSIS — Z87.19 PERSONAL HISTORY OF OTHER DISEASES OF THE DIGESTIVE SYSTEM: ICD-10-CM

## 2020-12-18 DIAGNOSIS — K57.90 DIVERTICULOSIS OF INTESTINE, PART UNSPECIFIED, WITHOUT PERFORATION OR ABSCESS WITHOUT BLEEDING: ICD-10-CM

## 2020-12-18 DIAGNOSIS — Z29.9 ENCOUNTER FOR PROPHYLACTIC MEASURES, UNSPECIFIED: ICD-10-CM

## 2020-12-18 DIAGNOSIS — Z98.890 OTHER SPECIFIED POSTPROCEDURAL STATES: Chronic | ICD-10-CM

## 2020-12-18 DIAGNOSIS — T14.8XXA OTHER INJURY OF UNSPECIFIED BODY REGION, INITIAL ENCOUNTER: Chronic | ICD-10-CM

## 2020-12-18 DIAGNOSIS — G47.33 OBSTRUCTIVE SLEEP APNEA (ADULT) (PEDIATRIC): ICD-10-CM

## 2020-12-18 DIAGNOSIS — I10 ESSENTIAL (PRIMARY) HYPERTENSION: ICD-10-CM

## 2020-12-18 DIAGNOSIS — Z01.818 ENCOUNTER FOR OTHER PREPROCEDURAL EXAMINATION: ICD-10-CM

## 2020-12-18 LAB
A1C WITH ESTIMATED AVERAGE GLUCOSE RESULT: 5.4 % — SIGNIFICANT CHANGE UP (ref 4–5.6)
BLD GP AB SCN SERPL QL: NEGATIVE — SIGNIFICANT CHANGE UP
ESTIMATED AVERAGE GLUCOSE: 108 MG/DL — SIGNIFICANT CHANGE UP (ref 68–114)
RH IG SCN BLD-IMP: POSITIVE — SIGNIFICANT CHANGE UP

## 2020-12-18 PROCEDURE — 80048 BASIC METABOLIC PNL TOTAL CA: CPT

## 2020-12-18 PROCEDURE — 85014 HEMATOCRIT: CPT

## 2020-12-18 PROCEDURE — 83605 ASSAY OF LACTIC ACID: CPT

## 2020-12-18 PROCEDURE — 99238 HOSP IP/OBS DSCHRG MGMT 30/<: CPT

## 2020-12-18 PROCEDURE — 82435 ASSAY OF BLOOD CHLORIDE: CPT

## 2020-12-18 PROCEDURE — 99232 SBSQ HOSP IP/OBS MODERATE 35: CPT

## 2020-12-18 PROCEDURE — 80053 COMPREHEN METABOLIC PANEL: CPT

## 2020-12-18 PROCEDURE — 99231 SBSQ HOSP IP/OBS SF/LOW 25: CPT | Mod: GC

## 2020-12-18 PROCEDURE — 82330 ASSAY OF CALCIUM: CPT

## 2020-12-18 PROCEDURE — 86900 BLOOD TYPING SEROLOGIC ABO: CPT

## 2020-12-18 PROCEDURE — 96374 THER/PROPH/DIAG INJ IV PUSH: CPT | Mod: XU

## 2020-12-18 PROCEDURE — 85018 HEMOGLOBIN: CPT

## 2020-12-18 PROCEDURE — 83036 HEMOGLOBIN GLYCOSYLATED A1C: CPT

## 2020-12-18 PROCEDURE — 85027 COMPLETE CBC AUTOMATED: CPT

## 2020-12-18 PROCEDURE — U0003: CPT

## 2020-12-18 PROCEDURE — 82947 ASSAY GLUCOSE BLOOD QUANT: CPT

## 2020-12-18 PROCEDURE — G0463: CPT

## 2020-12-18 PROCEDURE — 84295 ASSAY OF SERUM SODIUM: CPT

## 2020-12-18 PROCEDURE — 84100 ASSAY OF PHOSPHORUS: CPT

## 2020-12-18 PROCEDURE — 99285 EMERGENCY DEPT VISIT HI MDM: CPT | Mod: 25

## 2020-12-18 PROCEDURE — 85025 COMPLETE CBC W/AUTO DIFF WBC: CPT

## 2020-12-18 PROCEDURE — 74177 CT ABD & PELVIS W/CONTRAST: CPT

## 2020-12-18 PROCEDURE — 96376 TX/PRO/DX INJ SAME DRUG ADON: CPT

## 2020-12-18 PROCEDURE — 86850 RBC ANTIBODY SCREEN: CPT

## 2020-12-18 PROCEDURE — 83735 ASSAY OF MAGNESIUM: CPT

## 2020-12-18 PROCEDURE — 84132 ASSAY OF SERUM POTASSIUM: CPT

## 2020-12-18 PROCEDURE — 81001 URINALYSIS AUTO W/SCOPE: CPT

## 2020-12-18 PROCEDURE — 96375 TX/PRO/DX INJ NEW DRUG ADDON: CPT

## 2020-12-18 PROCEDURE — 71045 X-RAY EXAM CHEST 1 VIEW: CPT

## 2020-12-18 PROCEDURE — 86901 BLOOD TYPING SEROLOGIC RH(D): CPT

## 2020-12-18 PROCEDURE — 82803 BLOOD GASES ANY COMBINATION: CPT

## 2020-12-18 PROCEDURE — 86769 SARS-COV-2 COVID-19 ANTIBODY: CPT

## 2020-12-18 RX ORDER — SENNA PLUS 8.6 MG/1
2 TABLET ORAL
Qty: 0 | Refills: 0 | DISCHARGE
Start: 2020-12-18

## 2020-12-18 RX ORDER — IBUPROFEN 200 MG
1 TABLET ORAL
Qty: 0 | Refills: 0 | DISCHARGE
Start: 2020-12-18

## 2020-12-18 RX ORDER — ERTAPENEM SODIUM 1 G/1
1 INJECTION, POWDER, LYOPHILIZED, FOR SOLUTION INTRAMUSCULAR; INTRAVENOUS
Qty: 0 | Refills: 0 | DISCHARGE

## 2020-12-18 RX ORDER — ACETAMINOPHEN 500 MG
2 TABLET ORAL
Qty: 0 | Refills: 0 | DISCHARGE
Start: 2020-12-18

## 2020-12-18 RX ORDER — OXYCODONE HYDROCHLORIDE 5 MG/1
1 TABLET ORAL
Qty: 6 | Refills: 0
Start: 2020-12-18

## 2020-12-18 RX ADMIN — Medication 650 MILLIGRAM(S): at 05:39

## 2020-12-18 RX ADMIN — Medication 650 MILLIGRAM(S): at 06:09

## 2020-12-18 RX ADMIN — MEROPENEM 100 MILLIGRAM(S): 1 INJECTION INTRAVENOUS at 08:21

## 2020-12-18 RX ADMIN — MEROPENEM 100 MILLIGRAM(S): 1 INJECTION INTRAVENOUS at 01:51

## 2020-12-18 RX ADMIN — AMLODIPINE BESYLATE 2.5 MILLIGRAM(S): 2.5 TABLET ORAL at 05:38

## 2020-12-18 RX ADMIN — Medication 100 MICROGRAM(S): at 05:38

## 2020-12-18 NOTE — DISCHARGE NOTE NURSING/CASE MANAGEMENT/SOCIAL WORK - NSSCNAMETXT_GEN_ALL_CORE
[facility] Hospital Medicine  Progress Note    Patient Name: Arron Atkinson Jr.  MRN: [id number]  Patient Class: IP- Inpatient   Admission Date: 1/11/2018  Length of Stay: 1 days  Attending Physician: Derrick Neal MD  Primary Care Provider: Azikiwe K Lombard, MD        Subjective:     Principal Problem:NSTEMI (non-ST elevated myocardial infarction)    HPI:  Mr. Arron Atkinson Jr. is a 56 y.o. male with essential hypertension who presented initially to [facility] ED with complaints of chest pain for one day.  The pain was midsternal without radiation, was pressure-like in quality, and was 8/10 in severity.  It was associated with some shortness of breath and nausea without vomiting.  He denies any palpitations, diaphoresis, fevers, chills, hemoptysis, nor any lower extremity pain or swelling.  He also complains of dyspnea on exertion for the past two days, which he has never had before.  He works for [employer] and when he couldn't climb up the vessel due to his dyspnea, he decided to seek ED evaluation.  He denies any exacerbating factors but does say that deep breathing helps with the pain.  He denies any orthopnea nor any PND, and doesn't have any personal or family history of heart disease.    Hospital Course:  55 y/o male admitted to ICU with NSTEMI.  S/P C on 1/12 with PCI to LAD.  On ASA, Statin, B blocker, ARB and Plavix.    Interval History: Feeling well.  No chest pain.    Review of Systems   HENT: Negative for ear discharge and ear pain.    Eyes: Negative for pain and itching.   Endocrine: Negative for polyphagia and polyuria.   Neurological: Negative for seizures and syncope.     Objective:     Vital Signs (Most Recent):  Temp: 98.5 °F (36.9 °C) (01/12/18 1030)  Pulse: 75 (01/12/18 1115)  Resp: (!) 40 (01/12/18 1115)  BP: (!) 96/58 (01/12/18 1102)  SpO2: 100 % (01/12/18 1115) Vital Signs (24h Range):  Temp:  [98.2 °F (36.8 °C)-99.1 °F (37.3 °C)] 98.5 °F (36.9 °C)  Pulse:   [58-98] 75  Resp:  [13-40] 40  SpO2:  [97 %-100 %] 100 %  BP: ()/(57-89) 96/58     Weight: 80.1 kg (176 lb 9.4 oz)  Body mass index is 26.08 kg/m².    Intake/Output Summary (Last 24 hours) at 01/12/18 1404  Last data filed at 01/12/18 0900   Gross per 24 hour   Intake            86.77 ml   Output              350 ml   Net          -263.23 ml      Physical Exam   Constitutional: He is oriented to person, place, and time. He appears well-developed and well-nourished. No distress.   HENT:   Head: Normocephalic and atraumatic.   Right Ear: External ear normal.   Left Ear: External ear normal.   Nose: Nose normal.   Eyes: Right eye exhibits no discharge. Left eye exhibits no discharge.   Neck: Normal range of motion.   Cardiovascular: Normal rate, regular rhythm, normal heart sounds and intact distal pulses.  Exam reveals no gallop and no friction rub.    No murmur heard.  Pulmonary/Chest: Effort normal and breath sounds normal. No respiratory distress. He has no wheezes. He has no rales. He exhibits no tenderness.   Abdominal: Soft. Bowel sounds are normal. He exhibits no distension. There is no tenderness. There is no rebound and no guarding.   Musculoskeletal: Normal range of motion. He exhibits no edema.   Neurological: He is alert and oriented to person, place, and time.   Skin: Skin is warm and dry. He is not diaphoretic. No erythema.   Psychiatric: He has a normal mood and affect. His behavior is normal. Judgment and thought content normal.   Nursing note and vitals reviewed.      Significant Labs:   BMP:   Recent Labs  Lab 01/12/18 0213         K 3.2*      CO2 28   BUN 16   CREATININE 1.1   CALCIUM 9.0   MG 2.0     CBC:   Recent Labs  Lab 01/11/18  2312 01/12/18  0213   WBC 8.67 8.58  8.58   HGB 13.9* 13.9*  13.9*   HCT 41.8 41.7  41.7     246 265  265       Significant Imaging: I have reviewed all pertinent imaging results/findings within the past 24  hours.    Assessment/Plan:      * NSTEMI (non-ST elevated myocardial infarction)    Patient has a significantly elevated troponin of 6.75.   Peaked at 23.5  Admitted to ICU with NSTEMI.  Cardiology consulted.  Underwent LHC with PCI to LAD.  Currently chest pain free.  Continue with ASA, Statin, B blocker, ARB and Plavix.        Hypokalemia    Replace as necessary.        Essential hypertension    Patient's blood pressure is well-controlled; will continue home regimen of amlodipine and clorthalidone, and provide as-needed clonidine.          VTE Risk Mitigation         Ordered     Medium Risk of VTE  Once      01/11/18 6024          Critical care time spent on the evaluation and treatment of severe organ dysfunction, review of pertinent labs and imaging studies, discussions with consulting providers and discussions with patient/family: 30 minutes.    Derrick Nael MD  Department of Hospital Medicine   [facility]   [facility]

## 2020-12-18 NOTE — H&P PST ADULT - REASON FOR ADMISSION
Alert and oriented to person, place and time, memory intact, behavior appropriate to situation, PERRL.
" I have diverticulitis"

## 2020-12-18 NOTE — PROGRESS NOTE ADULT - SUBJECTIVE AND OBJECTIVE BOX
SUBJECTIVE:   Pt seen and examined at bedside. No acute events overnight. Pt laying in bed comfortably. Pt tolerating diet. No nausea, vomiting. +flatus/-BM        Vital Signs Last 24 Hrs  T(C): 36.5 (17 Dec 2020 22:42), Max: 36.9 (17 Dec 2020 18:39)  T(F): 97.7 (17 Dec 2020 22:42), Max: 98.5 (17 Dec 2020 18:39)  HR: 68 (17 Dec 2020 22:42) (68 - 81)  BP: 143/81 (17 Dec 2020 22:42) (120/76 - 143/81)  BP(mean): --  RR: 18 (17 Dec 2020 22:42) (16 - 18)  SpO2: 97% (17 Dec 2020 22:42) (97% - 98%)      PHYSICAL EXAM:  Constitutional: NAD, laying in bed  Neuro: AAOx3  Respiratory: breathing comfortably  Gastrointestinal: Abdomen soft, non distended, nontender      I&O's Summary    16 Dec 2020 07:  -  17 Dec 2020 07:00  --------------------------------------------------------  IN: 2530 mL / OUT: 750 mL / NET: 1780 mL    17 Dec 2020 07:  -  18 Dec 2020 00:54  --------------------------------------------------------  IN: 930 mL / OUT: 1150 mL / NET: -220 mL      I&O's Detail    16 Dec 2020 07:  -  17 Dec 2020 07:00  --------------------------------------------------------  IN:    IV PiggyBack: 50 mL    Lactated Ringers: 2000 mL    Oral Fluid: 480 mL  Total IN: 2530 mL    OUT:    Estimated Blood Loss (mL): 0 mL    Voided (mL): 750 mL  Total OUT: 750 mL    Total NET: 1780 mL      17 Dec 2020 07:01  -  18 Dec 2020 00:54  --------------------------------------------------------  IN:    IV PiggyBack: 150 mL    Oral Fluid: 780 mL  Total IN: 930 mL    OUT:    Voided (mL): 1150 mL  Total OUT: 1150 mL    Total NET: -220 mL          MEDICATIONS  (STANDING):  amLODIPine   Tablet 2.5 milliGRAM(s) Oral daily  atorvastatin 40 milliGRAM(s) Oral at bedtime  chlorhexidine 2% Cloths 1 Application(s) Topical daily  enoxaparin Injectable 40 milliGRAM(s) SubCutaneous daily  ketorolac   Injectable 30 milliGRAM(s) IV Push once  levothyroxine 100 MICROGram(s) Oral daily  meropenem  IVPB      meropenem  IVPB 1000 milliGRAM(s) IV Intermittent every 8 hours  senna 2 Tablet(s) Oral at bedtime    MEDICATIONS  (PRN):  acetaminophen   Tablet .. 650 milliGRAM(s) Oral every 6 hours PRN Mild Pain (1 - 3)  HYDROmorphone  Injectable 0.5 milliGRAM(s) IV Push every 4 hours PRN breakthrough severe pain  ibuprofen  Tablet. 400 milliGRAM(s) Oral every 6 hours PRN Moderate Pain (4 - 6)  ondansetron Injectable 4 milliGRAM(s) IV Push every 6 hours PRN Nausea and/or Vomiting  oxyCODONE    IR 5 milliGRAM(s) Oral every 4 hours PRN Severe Pain (7 - 10)  oxyCODONE    IR 2.5 milliGRAM(s) Oral every 4 hours PRN Moderate Pain (4 - 6)      LABS:                        11.4   6.81  )-----------( 284      ( 17 Dec 2020 07:39 )             35.6     12-17    141  |  105  |  10  ----------------------------<  70  3.7   |  26  |  0.68    Ca    9.3      17 Dec 2020 07:39  Phos  2.5     12-17  Mg     2.2     12-17    TPro  7.8  /  Alb  4.4  /  TBili  0.3  /  DBili  x   /  AST  15  /  ALT  20  /  AlkPhos  58  12-16      Urinalysis Basic - ( 16 Dec 2020 04:00 )    Color: Colorless / Appearance: Clear / S.004 / pH: x  Gluc: x / Ketone: Negative  / Bili: Negative / Urobili: Negative   Blood: x / Protein: Negative / Nitrite: Negative   Leuk Esterase: Negative / RBC: 0 /hpf / WBC 0 /HPF   Sq Epi: x / Non Sq Epi: 1 /hpf / Bacteria: Negative        RADIOLOGY & ADDITIONAL STUDIES:

## 2020-12-18 NOTE — PROGRESS NOTE ADULT - ASSESSMENT
Pt is a 49 yo F with multiple prior episodes of diverticulitis, presenting with acute uncomplicated diverticulitis.     PLAN:  - LRD  - IV Abx: meropenem   - ID consult: rec meropenem   - pain control  - OR planning for upcoming wednesday  - dc home today, pt w/ PST scheduled for today  Final plans to be discussed with attendings on rounds

## 2020-12-18 NOTE — PROGRESS NOTE ADULT - ASSESSMENT
50 year old with recurrent diverticulitis scheduled for surgery next week  recently sent home on IV Invanz who presents with pain and flair  no fever no chills .   exam demonstrates mild [id number] and ct  without change       Invanz  changed to meropenem for better PA coverage.  muc better   on meropenem  for surgery next week but can go home on meropenem if ok with surgery until surgery

## 2020-12-18 NOTE — PROGRESS NOTE ADULT - ATTENDING COMMENTS
I have seen and examined the patient. I agree with the above surgery resident's note.  pain improved, home on iv abx, OR next week, r/b/c explained

## 2020-12-18 NOTE — H&P PST ADULT - NSICDXPROBLEM_GEN_ALL_CORE_FT
PROBLEM DIAGNOSES  Problem: History of diverticulosis  Assessment and Plan: Scheduled for ERP, laparoscopic sigmoid clectomy on 12/23 with Dr Medrano  Preop instructions and Chlorohexidine soap provided  Labs on chart  Covid test on 12/20    Problem: HTN (hypertension)  Assessment and Plan: Pt instructed to take BP med with sip of water on DOS       PROBLEM DIAGNOSES  Problem: Need for prophylactic measure  Assessment and Plan: The Caprini score indicates this patient is at risk for a VTE event (score 3-5).  Most surgical patients in this group would benefit from pharmacologic prophylaxis.  The surgical team will determine the balance between VTE risk and bleeding risk      Problem: History of diverticulosis  Assessment and Plan: Scheduled for ERP, laparoscopic sigmoid clectomy on 12/23 with Dr Medrano  Preop instructions and Chlorohexidine soap provided  Labs on chart  Covid test on 12/20    Problem: HTN (hypertension)  Assessment and Plan: Pt instructed to take BP med with sip of water on DOS       PROBLEM DIAGNOSES  Problem: MARIAN (obstructive sleep apnea)  Assessment and Plan: Wears CPAP at bedtime. Will notify OR booking    Problem: Need for prophylactic measure  Assessment and Plan: The Caprini score indicates this patient is at risk for a VTE event (score 3-5).  Most surgical patients in this group would benefit from pharmacologic prophylaxis.  The surgical team will determine the balance between VTE risk and bleeding risk      Problem: History of diverticulosis  Assessment and Plan: Scheduled for ERP, laparoscopic sigmoid clectomy on 12/23 with Dr Medrano  Preop instructions and Chlorohexidine soap provided  Labs on chart  Covid test on 12/20    Problem: HTN (hypertension)  Assessment and Plan: Pt instructed to take BP med with sip of water on DOS

## 2020-12-18 NOTE — H&P PST ADULT - HISTORY OF PRESENT ILLNESS
This is 50 year old female with past medical history of, HTN, HLD, hypothyroidism, MARIAN on CPAP at home, IBS  Discharged home today from [facility]- admitted for admonlal pain secondary to  diverticulitis, sent home with left arm Mid- line placed to receive  IV Ertapenem at home.     Pt is now presenting to [facility] for scheduled  ERP, laparoscopic sigmoid colectomy on 12/23 with Dr. Medrano.    Denies any other complaints including sick contacts, fevers/chills, chest pain/shortness of breath, diarrhea/constipation.  This is 50 year old female with past medical history of, HTN, HLD, hypothyroidism, MARIAN on CPAP at home, IBS  Discharged home today from [facility]- admitted for abdominal pain secondary to  diverticulitis, sent home with left arm Mid- line placed to receive  IV Ertapenem at home.  Pt is now presenting to [facility] for scheduled  ERP, laparoscopic sigmoid colectomy on 12/23 with Dr. Medrano.      Denies any other complaints including sick contacts, fevers/chills, chest pain/shortness of breath, diarrhea/constipation.  Of note, pt reports having occasional chest discomfort and palpitations Recently had cardiac workup performed- (stress test, ECHO and holter montior- negative).   This is 50 year old female with past medical history of, HTN, HLD, hypothyroidism, MARIAN on CPAP at home, IBS  Discharged home today from [facility]- admitted for abdominal pain secondary to  diverticulitis, sent home with left arm Mid- line placed to receive  IV Ertapenem at home.  Pt is now presenting to [facility] for scheduled  ERP, laparoscopic sigmoid colectomy on 12/23 with Dr. Medrano.      Denies any other complaints including sick contacts, fevers/chills, chest pain/shortness of breath, diarrhea/constipation.  Of note, pt reports having occasional chest pain/ discomfort and palpitations for over one year. Recently had cardiac workup performed- (stress test, ECHO and holter montior- negative).    ** Covid test on 12/20 @ [facility]   This is 50 year old female with past medical history of, HTN, HLD, hypothyroidism, MARIAN on CPAP at home, IBS, diverticulitis  Discharged home today from [facility]- admitted for abdominal pain secondary to diverticulitis, sent home with left arm Mid- line to receive IV Ertapenem at home.  Pt is now presenting to [facility] for scheduled  ERP, laparoscopic sigmoid colectomy on 12/23 with Dr. Medrano.    Of note, pt reports having occasional chest pain/ discomfort and palpitations for over one year. Recently had cardiac workup performed- (stress test, ECHO and holter montior- negative). Was seen by Cardiology Dr. Jay  when admitted- cleared for surgery.     ** Covid test on 12/20 @ [facility][1]   This is 50 year old female with past medical history of, HTN, HLD, hypothyroidism, MARIAN on CPAP at home, IBS, diverticulitis. Pt was discharged home today from [facility]- admitted for abdominal pain secondary to diverticulitis, sent home with left arm Mid- line to receive IV Ertapenem at home.  Pt is now presenting to [facility] for scheduled  ERP, laparoscopic sigmoid colectomy on 12/23 with Dr. Medrano.    Of note, pt reports having occasional chest pain/ discomfort and palpitations for over one year. Recently had cardiac workup performed- (stress test, ECHO and holter montior- negative). Was seen by Cardiology Dr. Jay  when admitted- cleared for surgery.     ** Covid test on 12/20 @ [facility]

## 2020-12-18 NOTE — PROGRESS NOTE ADULT - SUBJECTIVE AND OBJECTIVE BOX
infectious diseases progress note:    Patient is a 50y old  Female who presents with a chief complaint of fever (17 Dec 2020 10:23)        Generalized abdominal pain             Allergies    iodine (Hives)  Zosyn (Urticaria)    Intolerances        ANTIBIOTICS/RELEVANT:  antimicrobials  meropenem  IVPB      meropenem  IVPB 1000 milliGRAM(s) IV Intermittent every 8 hours    immunologic:    OTHER:  acetaminophen   Tablet .. 650 milliGRAM(s) Oral every 6 hours PRN  amLODIPine   Tablet 2.5 milliGRAM(s) Oral daily  atorvastatin 40 milliGRAM(s) Oral at bedtime  chlorhexidine 2% Cloths 1 Application(s) Topical daily  enoxaparin Injectable 40 milliGRAM(s) SubCutaneous daily  ibuprofen  Tablet. 400 milliGRAM(s) Oral every 6 hours PRN  levothyroxine 100 MICROGram(s) Oral daily  ondansetron Injectable 4 milliGRAM(s) IV Push every 6 hours PRN  oxyCODONE    IR 5 milliGRAM(s) Oral every 4 hours PRN  oxyCODONE    IR 2.5 milliGRAM(s) Oral every 4 hours PRN  senna 2 Tablet(s) Oral at bedtime      Objective:  Vital Signs Last 24 Hrs  T(C): 36.5 (18 Dec 2020 05:06), Max: 36.9 (17 Dec 2020 18:39)  T(F): 97.7 (18 Dec 2020 05:06), Max: 98.5 (17 Dec 2020 18:39)  HR: 69 (18 Dec 2020 05:06) (61 - 81)  BP: 128/76 (18 Dec 2020 05:06) (104/67 - 143/81)  BP(mean): --  RR: 18 (18 Dec 2020 05:06) (16 - 18)  SpO2: 98% (18 Dec 2020 05:06) (97% - 98%)       Eyes:EBENEZER, EOMI  Ear/Nose/Throat: no oral lesion, no sinus tenderness on percussion	  Neck:no JVD, no lymphadenopathy, supple  Respiratory: CTA laurie  Cardiovascular: S1S2 RRR, no murmurs  Gastrointestinal:soft, (+) BS, no HSM  Extremities:no e/e/c        LABS:                        11.4   6.81  )-----------( 284      ( 17 Dec 2020 07:39 )             35.6     12-17    141  |  105  |  10  ----------------------------<  70  3.7   |  26  |  0.68    Ca    9.3      17 Dec 2020 07:39  Phos  2.5     12-17  Mg     2.2     12-17              MICROBIOLOGY:    RECENT CULTURES:        RESPIRATORY CULTURES:              RADIOLOGY & ADDITIONAL STUDIES:        Pager [phone]  After 5 pm/weekends or if no response :[phone]

## 2020-12-18 NOTE — H&P PST ADULT - NSICDXPASTSURGICALHX_GEN_ALL_CORE_FT
PAST SURGICAL HISTORY:  C Section - x 1 - 1994     h/o  Endoscopy     h/o dental implant     H/O vaginal surgery vaginal mesh    History of Colonoscopy      PAST SURGICAL HISTORY:  C Section - x 1 - 1994     h/o  Endoscopy     h/o dental implant     H/O vaginal surgery vaginal mesh    History of Colonoscopy     Torn ligament left thumb July 2020

## 2020-12-18 NOTE — DISCHARGE NOTE NURSING/CASE MANAGEMENT/SOCIAL WORK - PATIENT PORTAL LINK FT
You can access the FollowMyHealth Patient Portal offered by [facility] by registering at the following website: [email or web address]. By joining [employer]’s FollowMyHealth portal, you will also be able to view your health information using other applications (apps) compatible with our system.

## 2020-12-18 NOTE — H&P PST ADULT - ASSESSMENT

## 2020-12-20 ENCOUNTER — OUTPATIENT (OUTPATIENT)
Dept: OUTPATIENT SERVICES | Facility: HOSPITAL | Age: 50
LOS: 1 days | End: 2020-12-20
Payer: COMMERCIAL

## 2020-12-20 DIAGNOSIS — Z98.890 OTHER SPECIFIED POSTPROCEDURAL STATES: Chronic | ICD-10-CM

## 2020-12-20 DIAGNOSIS — T14.8XXA OTHER INJURY OF UNSPECIFIED BODY REGION, INITIAL ENCOUNTER: Chronic | ICD-10-CM

## 2020-12-20 DIAGNOSIS — Z20.828 CONTACT WITH AND (SUSPECTED) EXPOSURE TO OTHER VIRAL COMMUNICABLE DISEASES: ICD-10-CM

## 2020-12-20 LAB — SARS-COV-2 RNA SPEC QL NAA+PROBE: SIGNIFICANT CHANGE UP

## 2020-12-20 PROCEDURE — C9803: CPT

## 2020-12-20 PROCEDURE — U0003: CPT

## 2020-12-20 PROCEDURE — 99232 SBSQ HOSP IP/OBS MODERATE 35: CPT

## 2020-12-22 ENCOUNTER — TRANSCRIPTION ENCOUNTER (OUTPATIENT)
Age: 50
End: 2020-12-22

## 2020-12-23 ENCOUNTER — RESULT REVIEW (OUTPATIENT)
Age: 50
End: 2020-12-23

## 2020-12-23 ENCOUNTER — INPATIENT (INPATIENT)
Facility: HOSPITAL | Age: 50
LOS: 2 days | Discharge: HOME | DRG: 331 | End: 2020-12-26
Attending: COLON & RECTAL SURGERY | Admitting: COLON & RECTAL SURGERY
Payer: COMMERCIAL

## 2020-12-23 ENCOUNTER — APPOINTMENT (OUTPATIENT)
Dept: SURGERY | Facility: HOSPITAL | Age: 50
End: 2020-12-23
Payer: COMMERCIAL

## 2020-12-23 VITALS
HEART RATE: 86 BPM | WEIGHT: 186.95 LBS | OXYGEN SATURATION: 98 % | SYSTOLIC BLOOD PRESSURE: 135 MMHG | TEMPERATURE: 98 F | DIASTOLIC BLOOD PRESSURE: 81 MMHG | HEIGHT: 61 IN | RESPIRATION RATE: 17 BRPM

## 2020-12-23 DIAGNOSIS — K57.90 DIVERTICULOSIS OF INTESTINE, PART UNSPECIFIED, WITHOUT PERFORATION OR ABSCESS WITHOUT BLEEDING: ICD-10-CM

## 2020-12-23 DIAGNOSIS — T14.8XXA OTHER INJURY OF UNSPECIFIED BODY REGION, INITIAL ENCOUNTER: Chronic | ICD-10-CM

## 2020-12-23 DIAGNOSIS — Z98.890 OTHER SPECIFIED POSTPROCEDURAL STATES: Chronic | ICD-10-CM

## 2020-12-23 LAB
GLUCOSE BLDC GLUCOMTR-MCNC: 100 MG/DL — HIGH (ref 70–99)
HCG SERPL-ACNC: <2 MIU/ML — SIGNIFICANT CHANGE UP
HCG UR QL: NEGATIVE — SIGNIFICANT CHANGE UP

## 2020-12-23 PROCEDURE — 44204 LAPARO PARTIAL COLECTOMY: CPT

## 2020-12-23 PROCEDURE — 88304 TISSUE EXAM BY PATHOLOGIST: CPT | Mod: 26

## 2020-12-23 PROCEDURE — 44213 LAP MOBIL SPLENIC FL ADD-ON: CPT

## 2020-12-23 PROCEDURE — 88307 TISSUE EXAM BY PATHOLOGIST: CPT | Mod: 26

## 2020-12-23 RX ORDER — KETOROLAC TROMETHAMINE 30 MG/ML
30 SYRINGE (ML) INJECTION EVERY 6 HOURS
Refills: 0 | Status: DISCONTINUED | OUTPATIENT
Start: 2020-12-23 | End: 2020-12-24

## 2020-12-23 RX ORDER — APREPITANT 80 MG/1
40 CAPSULE ORAL ONCE
Refills: 0 | Status: COMPLETED | OUTPATIENT
Start: 2020-12-23 | End: 2020-12-23

## 2020-12-23 RX ORDER — CELECOXIB 200 MG/1
400 CAPSULE ORAL ONCE
Refills: 0 | Status: COMPLETED | OUTPATIENT
Start: 2020-12-23 | End: 2020-12-23

## 2020-12-23 RX ORDER — OXYCODONE HYDROCHLORIDE 5 MG/1
5 TABLET ORAL
Refills: 0 | Status: DISCONTINUED | OUTPATIENT
Start: 2020-12-23 | End: 2020-12-23

## 2020-12-23 RX ORDER — SODIUM CHLORIDE 9 MG/ML
3 INJECTION INTRAMUSCULAR; INTRAVENOUS; SUBCUTANEOUS EVERY 8 HOURS
Refills: 0 | Status: DISCONTINUED | OUTPATIENT
Start: 2020-12-23 | End: 2020-12-23

## 2020-12-23 RX ORDER — MORPHINE SULFATE 50 MG/1
0.2 CAPSULE, EXTENDED RELEASE ORAL ONCE
Refills: 0 | Status: DISCONTINUED | OUTPATIENT
Start: 2020-12-23 | End: 2020-12-24

## 2020-12-23 RX ORDER — ACETAMINOPHEN 500 MG
1000 TABLET ORAL EVERY 6 HOURS
Refills: 0 | Status: COMPLETED | OUTPATIENT
Start: 2020-12-23 | End: 2020-12-24

## 2020-12-23 RX ORDER — SODIUM CHLORIDE 9 MG/ML
1000 INJECTION, SOLUTION INTRAVENOUS
Refills: 0 | Status: DISCONTINUED | OUTPATIENT
Start: 2020-12-23 | End: 2020-12-25

## 2020-12-23 RX ORDER — CHLORHEXIDINE GLUCONATE 213 G/1000ML
1 SOLUTION TOPICAL DAILY
Refills: 0 | Status: DISCONTINUED | OUTPATIENT
Start: 2020-12-23 | End: 2020-12-23

## 2020-12-23 RX ORDER — NALOXONE HYDROCHLORIDE 4 MG/.1ML
0.1 SPRAY NASAL
Refills: 0 | Status: DISCONTINUED | OUTPATIENT
Start: 2020-12-23 | End: 2020-12-24

## 2020-12-23 RX ORDER — HYDROMORPHONE HYDROCHLORIDE 2 MG/ML
1 INJECTION INTRAMUSCULAR; INTRAVENOUS; SUBCUTANEOUS
Refills: 0 | Status: DISCONTINUED | OUTPATIENT
Start: 2020-12-23 | End: 2020-12-24

## 2020-12-23 RX ORDER — GABAPENTIN 400 MG/1
600 CAPSULE ORAL ONCE
Refills: 0 | Status: COMPLETED | OUTPATIENT
Start: 2020-12-23 | End: 2020-12-23

## 2020-12-23 RX ORDER — HEPARIN SODIUM 5000 [USP'U]/ML
5000 INJECTION INTRAVENOUS; SUBCUTANEOUS EVERY 8 HOURS
Refills: 0 | Status: DISCONTINUED | OUTPATIENT
Start: 2020-12-23 | End: 2020-12-26

## 2020-12-23 RX ORDER — CIPROFLOXACIN LACTATE 400MG/40ML
400 VIAL (ML) INTRAVENOUS ONCE
Refills: 0 | Status: DISCONTINUED | OUTPATIENT
Start: 2020-12-23 | End: 2020-12-23

## 2020-12-23 RX ORDER — ONDANSETRON 8 MG/1
4 TABLET, FILM COATED ORAL EVERY 6 HOURS
Refills: 0 | Status: DISCONTINUED | OUTPATIENT
Start: 2020-12-23 | End: 2020-12-24

## 2020-12-23 RX ORDER — HYDROMORPHONE HYDROCHLORIDE 2 MG/ML
0.5 INJECTION INTRAMUSCULAR; INTRAVENOUS; SUBCUTANEOUS
Refills: 0 | Status: DISCONTINUED | OUTPATIENT
Start: 2020-12-23 | End: 2020-12-23

## 2020-12-23 RX ORDER — OXYCODONE HYDROCHLORIDE 5 MG/1
5 TABLET ORAL EVERY 4 HOURS
Refills: 0 | Status: DISCONTINUED | OUTPATIENT
Start: 2020-12-23 | End: 2020-12-26

## 2020-12-23 RX ORDER — ONDANSETRON 8 MG/1
4 TABLET, FILM COATED ORAL ONCE
Refills: 0 | Status: DISCONTINUED | OUTPATIENT
Start: 2020-12-23 | End: 2020-12-23

## 2020-12-23 RX ORDER — OXYCODONE HYDROCHLORIDE 5 MG/1
10 TABLET ORAL
Refills: 0 | Status: DISCONTINUED | OUTPATIENT
Start: 2020-12-23 | End: 2020-12-23

## 2020-12-23 RX ORDER — MORPHINE SULFATE 50 MG/1
0.2 CAPSULE, EXTENDED RELEASE ORAL ONCE
Refills: 0 | Status: DISCONTINUED | OUTPATIENT
Start: 2020-12-23 | End: 2020-12-23

## 2020-12-23 RX ORDER — LIDOCAINE HCL 20 MG/ML
0.2 VIAL (ML) INJECTION ONCE
Refills: 0 | Status: DISCONTINUED | OUTPATIENT
Start: 2020-12-23 | End: 2020-12-23

## 2020-12-23 RX ADMIN — APREPITANT 40 MILLIGRAM(S): 80 CAPSULE ORAL at 07:54

## 2020-12-23 RX ADMIN — SODIUM CHLORIDE 40 MILLILITER(S): 9 INJECTION, SOLUTION INTRAVENOUS at 14:00

## 2020-12-23 RX ADMIN — Medication 30 MILLIGRAM(S): at 20:28

## 2020-12-23 RX ADMIN — HYDROMORPHONE HYDROCHLORIDE 0.5 MILLIGRAM(S): 2 INJECTION INTRAMUSCULAR; INTRAVENOUS; SUBCUTANEOUS at 17:25

## 2020-12-23 RX ADMIN — HYDROMORPHONE HYDROCHLORIDE 0.5 MILLIGRAM(S): 2 INJECTION INTRAMUSCULAR; INTRAVENOUS; SUBCUTANEOUS at 15:35

## 2020-12-23 RX ADMIN — HYDROMORPHONE HYDROCHLORIDE 0.5 MILLIGRAM(S): 2 INJECTION INTRAMUSCULAR; INTRAVENOUS; SUBCUTANEOUS at 13:50

## 2020-12-23 RX ADMIN — HEPARIN SODIUM 5000 UNIT(S): 5000 INJECTION INTRAVENOUS; SUBCUTANEOUS at 20:29

## 2020-12-23 RX ADMIN — HYDROMORPHONE HYDROCHLORIDE 0.5 MILLIGRAM(S): 2 INJECTION INTRAMUSCULAR; INTRAVENOUS; SUBCUTANEOUS at 14:00

## 2020-12-23 RX ADMIN — HYDROMORPHONE HYDROCHLORIDE 1 MILLIGRAM(S): 2 INJECTION INTRAMUSCULAR; INTRAVENOUS; SUBCUTANEOUS at 22:40

## 2020-12-23 RX ADMIN — HEPARIN SODIUM 5000 UNIT(S): 5000 INJECTION INTRAVENOUS; SUBCUTANEOUS at 18:06

## 2020-12-23 RX ADMIN — SODIUM CHLORIDE 40 MILLILITER(S): 9 INJECTION, SOLUTION INTRAVENOUS at 18:21

## 2020-12-23 RX ADMIN — HYDROMORPHONE HYDROCHLORIDE 0.5 MILLIGRAM(S): 2 INJECTION INTRAMUSCULAR; INTRAVENOUS; SUBCUTANEOUS at 17:15

## 2020-12-23 RX ADMIN — OXYCODONE HYDROCHLORIDE 5 MILLIGRAM(S): 5 TABLET ORAL at 18:00

## 2020-12-23 RX ADMIN — HYDROMORPHONE HYDROCHLORIDE 0.5 MILLIGRAM(S): 2 INJECTION INTRAMUSCULAR; INTRAVENOUS; SUBCUTANEOUS at 13:40

## 2020-12-23 RX ADMIN — GABAPENTIN 600 MILLIGRAM(S): 400 CAPSULE ORAL at 06:27

## 2020-12-23 RX ADMIN — HYDROMORPHONE HYDROCHLORIDE 0.5 MILLIGRAM(S): 2 INJECTION INTRAMUSCULAR; INTRAVENOUS; SUBCUTANEOUS at 15:42

## 2020-12-23 RX ADMIN — HYDROMORPHONE HYDROCHLORIDE 1 MILLIGRAM(S): 2 INJECTION INTRAMUSCULAR; INTRAVENOUS; SUBCUTANEOUS at 23:10

## 2020-12-23 RX ADMIN — CELECOXIB 400 MILLIGRAM(S): 200 CAPSULE ORAL at 06:27

## 2020-12-23 RX ADMIN — Medication 1000 MILLIGRAM(S): at 18:21

## 2020-12-23 RX ADMIN — OXYCODONE HYDROCHLORIDE 5 MILLIGRAM(S): 5 TABLET ORAL at 17:20

## 2020-12-23 RX ADMIN — Medication 400 MILLIGRAM(S): at 18:02

## 2020-12-23 RX ADMIN — Medication 30 MILLIGRAM(S): at 20:58

## 2020-12-23 NOTE — PRE-ANESTHESIA EVALUATION ADULT - NSRADCARDRESULTSFT_GEN_ALL_CORE
PROCEDURE: Transthoracic echocardiogram with 2-D, M-Mode  and complete spectral and color flow Doppler.  INDICATION: Chest pain, unspecified (R07.9)  ------------------------------------------------------------------------  Dimensions:    Normal Values:  LA:     3.1    2.0 - 4.0 cm  Ao:     2.7    2.0 - 3.8 cm  SEPTUM: 1.0    0.6 - 1.2 cm  PWT:    0.8    0.6- 1.1 cm  LVIDd:  4.1    3.0 - 5.6 cm  LVIDs:  2.8    1.8 - 4.0 cm  Derived variables:  LVMI: 62 g/m2  RWT: 0.39  Fractional short: 32 %  Doppler Peak Velocity (m/sec): AoV=1.3  ------------------------------------------------------------------------  Observations:  Mitral Valve: Normal mitral valve.  Aortic Valve/Aorta: Normal trileaflet aortic valve. Peak  transaortic valve gradient equals 7 mm Hg. Peak left  ventricular outflow tract gradient equals 3 mm Hg, LVOT  velocity time integral [id number] cm.  Aortic Root: 2.7 cm.  Left Atrium: Normal left atrium.  LA volume index = 23  cc/m2.  Left Ventricle: Normal left ventricular systolic function.  No segmental wall motion abnormalities. Normal left  ventricular internal dimensions and wall thicknesses.  Right Heart: Normal right atrium. Normal right ventricular  size and function. Normal tricuspid valve. Minimal  tricuspid regurgitation. Pulmonic valve not well  visualized. Minimal pulmonic regurgitation.  Pericardium/Pleura: Normal pericardium with no pericardial  effusion.  Hemodynamic: Estimated right atrial pressure is 8 mm Hg.  ------------------------------------------------------------------------  Conclusions:  1. Normal left ventricular internal dimensions and wall  thicknesses.  2. Normal left ventricular systolic function. No segmental  wall motion abnormalities.  3. Normal right ventricular size and function.  *** No previous Echo exam.

## 2020-12-23 NOTE — PRE-ANESTHESIA EVALUATION ADULT - NSANTHPMHFT_GEN_ALL_CORE
49 y/o female with past medical history of, HTN, HLD, hypothyroidism, MARIAN on CPAP at home, IBS, diverticulitis. Hx of heart palpitiations; cardiac work up and Holter monitors wnl & no malignant arrhythmias.  COVID (-) 12/20.

## 2020-12-23 NOTE — CHART NOTE - NSCHARTNOTEFT_GEN_A_CORE
POST-OPERATIVE NOTE    Patient is s/p laparoscopic-assisted low anterior resection.    Subjective:  Patient reports pain at the incisional site, controlled with medication  Denies chest pain, shortness of breath, nausea, vomiting  Is not yet passing gas or having bowel movements  Not yet urinating independently or ambulating independently    Vital Signs Last 24 Hrs  T(C): 36.1 (23 Dec 2020 13:12), Max: 36.5 (23 Dec 2020 05:47)  T(F): 97 (23 Dec 2020 13:12), Max: 97.7 (23 Dec 2020 05:47)  HR: 78 (23 Dec 2020 16:30) (74 - 89)  BP: 127/71 (23 Dec 2020 16:30) (114/69 - 142/77)  BP(mean): 93 (23 Dec 2020 16:30) (87 - 104)  RR: 16 (23 Dec 2020 16:30) (15 - 17)  SpO2: 100% (23 Dec 2020 16:30) (98% - 100%)  I&O's Detail    23 Dec 2020 07:01  -  23 Dec 2020 17:26  --------------------------------------------------------  IN:    Lactated Ringers: 80 mL  Total IN: 80 mL    OUT:    Indwelling Catheter - Urethral (mL): 100 mL  Total OUT: 100 mL    Total NET: -20 mL        heparin   Injectable 5000    PAST MEDICAL & SURGICAL HISTORY:  Hypertension    Sleep apnea  nasal mask- settings unknown    Hypothyroid    Diverticulitis  last hospitalized 9.19    h/o Heart Palpitations    Irritable Bowel Syndrome    Torn ligament  left thumb July 2020    H/O vaginal surgery  vaginal mesh    h/o dental implant    History of Colonoscopy    h/o  Endoscopy    C Section - x 1 - 1994          Physical Exam:  General: NAD, resting comfortably in bed  Pulmonary: Nonlabored breathing, no respiratory distress, CTAB  Cardiovascular: NSR, no murmurs or rubs  Abdominal: soft, appropriately tender, nondistended. Incisions CDI.  Extremities: WWP      LABS:            CAPILLARY BLOOD GLUCOSE      POCT Blood Glucose.: 100 mg/dL (23 Dec 2020 06:08)      Radiology and Additional Studies:    Assessment:  The patient is a 50y Female who is now 4 hours post-op from a lap-assisted LAR.    Plan:  - Pain control as needed, continue Tylenol, Toradol, oxy, dilaudid  - Spinal morphine in place  - Tolerating CLD  - DC Pak in AM  - DVT ppx  - OOB and ambulating as tolerated  - F/u AM labs    [facility]  [id number] POST-OPERATIVE NOTE    Patient is s/p laparoscopic-assisted low anterior resection.    Subjective:  Patient reports pain at the incisional site, controlled with medication  Denies chest pain, shortness of breath, nausea, vomiting  Is not yet passing gas or having bowel movements  Not yet urinating independently (Pak in place) or ambulating independently    Vital Signs Last 24 Hrs  T(C): 36.1 (23 Dec 2020 13:12), Max: 36.5 (23 Dec 2020 05:47)  T(F): 97 (23 Dec 2020 13:12), Max: 97.7 (23 Dec 2020 05:47)  HR: 78 (23 Dec 2020 16:30) (74 - 89)  BP: 127/71 (23 Dec 2020 16:30) (114/69 - 142/77)  BP(mean): 93 (23 Dec 2020 16:30) (87 - 104)  RR: 16 (23 Dec 2020 16:30) (15 - 17)  SpO2: 100% (23 Dec 2020 16:30) (98% - 100%)  I&O's Detail    23 Dec 2020 07:01  -  23 Dec 2020 17:26  --------------------------------------------------------  IN:    Lactated Ringers: 80 mL  Total IN: 80 mL    OUT:    Indwelling Catheter - Urethral (mL): 100 mL  Total OUT: 100 mL    Total NET: -20 mL        heparin   Injectable 5000    PAST MEDICAL & SURGICAL HISTORY:  Hypertension    Sleep apnea  nasal mask- settings unknown    Hypothyroid    Diverticulitis  last hospitalized 9.19    h/o Heart Palpitations    Irritable Bowel Syndrome    Torn ligament  left thumb July 2020    H/O vaginal surgery  vaginal mesh    h/o dental implant    History of Colonoscopy    h/o  Endoscopy    C Section - x 1 - 1994          Physical Exam:  General: NAD, resting comfortably in bed  Pulmonary: Nonlabored breathing, no respiratory distress, CTAB  Cardiovascular: NSR, no murmurs or rubs  Abdominal: soft, appropriately tender on the right, mildly distended. Lap incisions CDI and covered in steri strips. Midline incision covered with gauze and occlusive dressing  Extremities: WWP      LABS:            CAPILLARY BLOOD GLUCOSE      POCT Blood Glucose.: 100 mg/dL (23 Dec 2020 06:08)      Radiology and Additional Studies:    Assessment:  The patient is a 50y Female who is now 4 hours post-op from a lap-assisted LAR.    Plan:  - Pain control as needed, continue Tylenol, Toradol, oxy, dilaudid  - Spinal morphine in place  - Tolerating CLD  - DC Pak in AM  - DVT ppx  - OOB and ambulating as tolerated  - F/u AM labs    [facility]  [id number]

## 2020-12-24 ENCOUNTER — TRANSCRIPTION ENCOUNTER (OUTPATIENT)
Age: 50
End: 2020-12-24

## 2020-12-24 LAB
ANION GAP SERPL CALC-SCNC: 10 MMOL/L — SIGNIFICANT CHANGE UP (ref 5–17)
BUN SERPL-MCNC: 4 MG/DL — LOW (ref 7–23)
CALCIUM SERPL-MCNC: 8.5 MG/DL — SIGNIFICANT CHANGE UP (ref 8.4–10.5)
CHLORIDE SERPL-SCNC: 102 MMOL/L — SIGNIFICANT CHANGE UP (ref 96–108)
CO2 SERPL-SCNC: 24 MMOL/L — SIGNIFICANT CHANGE UP (ref 22–31)
CREAT SERPL-MCNC: 0.53 MG/DL — SIGNIFICANT CHANGE UP (ref 0.5–1.3)
GLUCOSE SERPL-MCNC: 98 MG/DL — SIGNIFICANT CHANGE UP (ref 70–99)
HCT VFR BLD CALC: 36.6 % — SIGNIFICANT CHANGE UP (ref 34.5–45)
HGB BLD-MCNC: 11.5 G/DL — SIGNIFICANT CHANGE UP (ref 11.5–15.5)
MAGNESIUM SERPL-MCNC: 2.4 MG/DL — SIGNIFICANT CHANGE UP (ref 1.6–2.6)
MCHC RBC-ENTMCNC: 29.8 PG — SIGNIFICANT CHANGE UP (ref 27–34)
MCHC RBC-ENTMCNC: 31.4 GM/DL — LOW (ref 32–36)
MCV RBC AUTO: 94.8 FL — SIGNIFICANT CHANGE UP (ref 80–100)
NRBC # BLD: 0 /100 WBCS — SIGNIFICANT CHANGE UP (ref 0–0)
PHOSPHATE SERPL-MCNC: 2.3 MG/DL — LOW (ref 2.5–4.5)
PLATELET # BLD AUTO: 296 K/UL — SIGNIFICANT CHANGE UP (ref 150–400)
POTASSIUM SERPL-MCNC: 4.3 MMOL/L — SIGNIFICANT CHANGE UP (ref 3.5–5.3)
POTASSIUM SERPL-SCNC: 4.3 MMOL/L — SIGNIFICANT CHANGE UP (ref 3.5–5.3)
RBC # BLD: 3.86 M/UL — SIGNIFICANT CHANGE UP (ref 3.8–5.2)
RBC # FLD: 12.9 % — SIGNIFICANT CHANGE UP (ref 10.3–14.5)
SODIUM SERPL-SCNC: 136 MMOL/L — SIGNIFICANT CHANGE UP (ref 135–145)
WBC # BLD: 9.22 K/UL — SIGNIFICANT CHANGE UP (ref 3.8–10.5)
WBC # FLD AUTO: 9.22 K/UL — SIGNIFICANT CHANGE UP (ref 3.8–10.5)

## 2020-12-24 RX ORDER — LEVOTHYROXINE SODIUM 125 MCG
100 TABLET ORAL DAILY
Refills: 0 | Status: DISCONTINUED | OUTPATIENT
Start: 2020-12-24 | End: 2020-12-26

## 2020-12-24 RX ORDER — MAGNESIUM OXIDE 400 MG ORAL TABLET 241.3 MG
1000 TABLET ORAL
Refills: 0 | Status: DISCONTINUED | OUTPATIENT
Start: 2020-12-24 | End: 2020-12-26

## 2020-12-24 RX ORDER — AMLODIPINE BESYLATE 2.5 MG/1
2.5 TABLET ORAL DAILY
Refills: 0 | Status: DISCONTINUED | OUTPATIENT
Start: 2020-12-24 | End: 2020-12-26

## 2020-12-24 RX ORDER — ATORVASTATIN CALCIUM 80 MG/1
40 TABLET, FILM COATED ORAL AT BEDTIME
Refills: 0 | Status: DISCONTINUED | OUTPATIENT
Start: 2020-12-24 | End: 2020-12-26

## 2020-12-24 RX ORDER — IBUPROFEN 200 MG
600 TABLET ORAL EVERY 6 HOURS
Refills: 0 | Status: DISCONTINUED | OUTPATIENT
Start: 2020-12-24 | End: 2020-12-26

## 2020-12-24 RX ORDER — ACETAMINOPHEN 500 MG
1000 TABLET ORAL EVERY 6 HOURS
Refills: 0 | Status: DISCONTINUED | OUTPATIENT
Start: 2020-12-24 | End: 2020-12-26

## 2020-12-24 RX ADMIN — Medication 1000 MILLIGRAM(S): at 17:36

## 2020-12-24 RX ADMIN — Medication 30 MILLIGRAM(S): at 14:06

## 2020-12-24 RX ADMIN — Medication 400 MILLIGRAM(S): at 04:53

## 2020-12-24 RX ADMIN — OXYCODONE HYDROCHLORIDE 5 MILLIGRAM(S): 5 TABLET ORAL at 21:37

## 2020-12-24 RX ADMIN — Medication 400 MILLIGRAM(S): at 00:19

## 2020-12-24 RX ADMIN — Medication 600 MILLIGRAM(S): at 21:07

## 2020-12-24 RX ADMIN — Medication 1000 MILLIGRAM(S): at 06:31

## 2020-12-24 RX ADMIN — Medication 400 MILLIGRAM(S): at 11:30

## 2020-12-24 RX ADMIN — OXYCODONE HYDROCHLORIDE 5 MILLIGRAM(S): 5 TABLET ORAL at 16:41

## 2020-12-24 RX ADMIN — HEPARIN SODIUM 5000 UNIT(S): 5000 INJECTION INTRAVENOUS; SUBCUTANEOUS at 14:06

## 2020-12-24 RX ADMIN — Medication 30 MILLIGRAM(S): at 02:51

## 2020-12-24 RX ADMIN — HEPARIN SODIUM 5000 UNIT(S): 5000 INJECTION INTRAVENOUS; SUBCUTANEOUS at 04:54

## 2020-12-24 RX ADMIN — OXYCODONE HYDROCHLORIDE 5 MILLIGRAM(S): 5 TABLET ORAL at 21:07

## 2020-12-24 RX ADMIN — Medication 30 MILLIGRAM(S): at 08:06

## 2020-12-24 RX ADMIN — MAGNESIUM OXIDE 400 MG ORAL TABLET 1000 MILLIGRAM(S): 241.3 TABLET ORAL at 09:24

## 2020-12-24 RX ADMIN — Medication 30 MILLIGRAM(S): at 08:36

## 2020-12-24 RX ADMIN — Medication 62.5 MILLIMOLE(S): at 08:06

## 2020-12-24 RX ADMIN — Medication 1000 MILLIGRAM(S): at 12:00

## 2020-12-24 RX ADMIN — OXYCODONE HYDROCHLORIDE 5 MILLIGRAM(S): 5 TABLET ORAL at 16:11

## 2020-12-24 RX ADMIN — ATORVASTATIN CALCIUM 40 MILLIGRAM(S): 80 TABLET, FILM COATED ORAL at 21:06

## 2020-12-24 RX ADMIN — Medication 1000 MILLIGRAM(S): at 18:06

## 2020-12-24 RX ADMIN — SODIUM CHLORIDE 40 MILLILITER(S): 9 INJECTION, SOLUTION INTRAVENOUS at 17:37

## 2020-12-24 RX ADMIN — Medication 30 MILLIGRAM(S): at 03:21

## 2020-12-24 RX ADMIN — Medication 30 MILLIGRAM(S): at 14:36

## 2020-12-24 RX ADMIN — Medication 600 MILLIGRAM(S): at 21:37

## 2020-12-24 RX ADMIN — Medication 1000 MILLIGRAM(S): at 00:49

## 2020-12-24 RX ADMIN — HEPARIN SODIUM 5000 UNIT(S): 5000 INJECTION INTRAVENOUS; SUBCUTANEOUS at 21:07

## 2020-12-24 NOTE — PROGRESS NOTE ADULT - SUBJECTIVE AND OBJECTIVE BOX
Day 1 of Anesthesia Pain Management Service    SUBJECTIVE: Im having pain  Pain Scale Score:          [X] Refer to charted pain scores    THERAPY:    s/p     200 mcg PF morphine on 12\23\2020      MEDICATIONS  (STANDING):  acetaminophen   Tablet .. 1000 milliGRAM(s) Oral every 6 hours  acetaminophen  IVPB .. 1000 milliGRAM(s) IV Intermittent every 6 hours  heparin   Injectable 5000 Unit(s) SubCutaneous every 8 hours  ibuprofen  Tablet. 600 milliGRAM(s) Oral every 6 hours  ketorolac   Injectable 30 milliGRAM(s) IV Push every 6 hours  lactated ringers. 1000 milliLiter(s) (40 mL/Hr) IV Continuous <Continuous>  magnesium oxide 1000 milliGRAM(s) Oral two times a day with meals  morphine PF Spinal 0.2 milliGRAM(s) IntraThecal. once    MEDICATIONS  (PRN):  HYDROmorphone  Injectable 1 milliGRAM(s) IV Push every 3 hours PRN Severe Pain (7 - 10)  naloxone Injectable 0.1 milliGRAM(s) IV Push every 3 minutes PRN For ANY of the following changes in patient status:  A. RR LESS THAN 10 breaths per minute, B. Oxygen saturation LESS THAN 90%, C. Sedation score of 6  ondansetron Injectable 4 milliGRAM(s) IV Push every 6 hours PRN Nausea  oxyCODONE    IR 5 milliGRAM(s) Oral every 4 hours PRN Moderate Pain (4 - 6)      OBJECTIVE:    Sedation:        	[X] Alert	 [ ] Drowsy	[ ] Arousable      [ ] Asleep       [ ] Unresponsive    Side Effects:	[X] None 	[ ] Nausea	[ ] Vomiting         [ ] Pruritus  		[ ] Weakness            [ ] Numbness	          [ ] Other:    Vital Signs Last 24 Hrs  T(C): 36.9 (24 Dec 2020 09:08), Max: 36.9 (23 Dec 2020 23:00)  T(F): 98.4 (24 Dec 2020 09:08), Max: 98.4 (23 Dec 2020 23:00)  HR: 69 (24 Dec 2020 05:41) (69 - 102)  BP: 131/81 (24 Dec 2020 05:41) (114/69 - 147/83)  BP(mean): 94 (23 Dec 2020 17:30) (87 - 104)  RR: 17 (24 Dec 2020 09:08) (15 - 18)  SpO2: 100% (24 Dec 2020 09:08) (97% - 100%)    ASSESSMENT/ PLAN  [X] Patient transitioned to prn analgesics. Endorsing abdominal pain. Educated to prn analgesics   [X] Pain management per primary service, pain service to sign off   [X]Documentation and Verification of current medications

## 2020-12-24 NOTE — DIETITIAN INITIAL EVALUATION ADULT. - CHIEF COMPLAINT
"Pt is 50 year old female with past medical history of, HTN, HLD, hypothyroidism, MARIAN on CPAP at home, IBS, diverticulitis." Presents for laparoscopic hand assisted low anterior resection. Pt is POD#1.

## 2020-12-24 NOTE — PROGRESS NOTE ADULT - SUBJECTIVE AND OBJECTIVE BOX
Day 1 of Anesthesia Pain Management Service    SUBJECTIVE:  Pain Scale Score:          [X] Refer to charted pain scores    THERAPY: Received PF neuraxial morphine as per pain service nurse's note    OBJECTIVE:    Sedation:        	[X] Alert	[ ] Drowsy	[ ] Arousable      [ ] Asleep       [ ] Unresponsive    Side Effects:	[X] None	[ ] Nausea	[ ] Vomiting         [ ] Pruritus  		[ ] Weakness            [ ] Numbness	          [ ] Other:    ASSESSMENT/ PLAN  [X] Patient transitioned to prn analgesics  [X] Pain management per primary service, pain service to sign off   [X]Documentation and Verification of current medications

## 2020-12-24 NOTE — PROGRESS NOTE ADULT - SUBJECTIVE AND OBJECTIVE BOX
SUBJECTIVE:   Pt seen and examined at bedside. No acute events overnight. Pt laying in bed comfortably. Pt tolerating CLD. No nausea, vomiting. -flatus/-BM        Vital Signs Last 24 Hrs  T(C): 36.9 (23 Dec 2020 23:00), Max: 36.9 (23 Dec 2020 23:00)  T(F): 98.4 (23 Dec 2020 23:00), Max: 98.4 (23 Dec 2020 23:00)  HR: 80 (23 Dec 2020 23:00) (74 - 102)  BP: 115/74 (23 Dec 2020 23:00) (114/69 - 147/83)  BP(mean): 94 (23 Dec 2020 17:30) (87 - 104)  RR: 18 (23 Dec 2020 23:00) (15 - 18)  SpO2: 99% (23 Dec 2020 23:00) (98% - 100%)    PHYSICAL EXAM:  General: NAD, resting comfortably in bed  Pulmonary: Nonlabored breathing, no respiratory distress, CTAB  Cardiovascular: NSR, no murmurs or rubs  Abdominal: soft, appropriately tender on the right, mildly distended. Lap incisions CDI and covered in steri strips. Midline incision covered with gauze and occlusive dressing  Extremities: WWP      I&O's Detail    23 Dec 2020 07:01  -  24 Dec 2020 00:24  --------------------------------------------------------  IN:    Lactated Ringers: 200 mL  Total IN: 200 mL    OUT:    Indwelling Catheter - Urethral (mL): 775 mL  Total OUT: 775 mL    Total NET: -575 mL        MEDICATIONS  (STANDING):  amLODIPine   Tablet 2.5 milliGRAM(s) Oral daily  atorvastatin 40 milliGRAM(s) Oral at bedtime  chlorhexidine 2% Cloths 1 Application(s) Topical daily  enoxaparin Injectable 40 milliGRAM(s) SubCutaneous daily  ketorolac   Injectable 30 milliGRAM(s) IV Push once  levothyroxine 100 MICROGram(s) Oral daily  meropenem  IVPB      meropenem  IVPB 1000 milliGRAM(s) IV Intermittent every 8 hours  senna 2 Tablet(s) Oral at bedtime    MEDICATIONS  (PRN):  acetaminophen   Tablet .. 650 milliGRAM(s) Oral every 6 hours PRN Mild Pain (1 - 3)  HYDROmorphone  Injectable 0.5 milliGRAM(s) IV Push every 4 hours PRN breakthrough severe pain  ibuprofen  Tablet. 400 milliGRAM(s) Oral every 6 hours PRN Moderate Pain (4 - 6)  ondansetron Injectable 4 milliGRAM(s) IV Push every 6 hours PRN Nausea and/or Vomiting  oxyCODONE    IR 5 milliGRAM(s) Oral every 4 hours PRN Severe Pain (7 - 10)  oxyCODONE    IR 2.5 milliGRAM(s) Oral every 4 hours PRN Moderate Pain (4 - 6)

## 2020-12-24 NOTE — DIETITIAN INITIAL EVALUATION ADULT. - REASON
Nutrition Focused Physical exam deferred at this time per pt request as she was eating breakfast; no overt signs of muscle/fat wasting noted at this time

## 2020-12-24 NOTE — DISCHARGE NOTE PROVIDER - NSDCCPCAREPLAN_GEN_ALL_CORE_FT
PRINCIPAL DISCHARGE DIAGNOSIS  Diagnosis: History of diverticulosis  Assessment and Plan of Treatment: s/p colectomy  WOUND CARE: Staples will be removed at follow up office visit.    BATHING: Please do not submerge wound underwater. You may shower and/or sponge bathe.  ACTIVITY: No heavy lifting anything more than 10-15lbs or straining. Otherwise, you may return to your usual level of physical activity. If you are taking narcotic pain medication (such as Percocet), do NOT drive a car, operate machinery or make important decisions.  DIET: Low fiber diet  NOTIFY YOUR SURGEON IF: You have any bleeding that does not stop, any pus draining from your wound, any fever (over 100.4 F) or chills, persistent nausea/vomiting with inability to tolerate food or liquids, persistent diarrhea, or if your pain is not controlled on your discharge pain medications.  FOLLOW-UP:  1. Please call to make a follow-up appointment within one week of discharge   2. Please follow up with your primary care physician in one week regarding your hospitalization.      SECONDARY DISCHARGE DIAGNOSES  Diagnosis: HTN (hypertension)  Assessment and Plan of Treatment:     Diagnosis: Need for prophylactic measure  Assessment and Plan of Treatment:     Diagnosis: MARIAN (obstructive sleep apnea)  Assessment and Plan of Treatment:

## 2020-12-24 NOTE — DIETITIAN INITIAL EVALUATION ADULT. - SIGNS/SYMPTOMS
pt now on low fiber diet with request for additional diet education at this time pt not meeting 75% estimated nutritional needs at this time and 1.57% weight loss x 2 weeks

## 2020-12-24 NOTE — DISCHARGE NOTE PROVIDER - NSDCMRMEDTOKEN_GEN_ALL_CORE_FT
acetaminophen 325 mg oral tablet: 2 tab(s) orally every 6 hours, As needed, Mild Pain (1 - 3)  amLODIPine 2.5 mg oral tablet: 1 tab(s) orally once a day  Crestor 10 mg oral tablet: 1 tab(s) orally once a day  ibuprofen 400 mg oral tablet: 1 tab(s) orally every 6 hours, As needed, Moderate Pain (4 - 6)  levothyroxine 100 mcg (0.1 mg) oral tablet: 1 tab(s) orally once a day  meropenem 1000 mg/ 50 mL-NaCl 0.9% intravenous solution: 1000 milligram(s) intravenously every 8 hours   oxyCODONE 5 mg oral tablet: 1 tab(s) orally every 4 hours, As needed, Severe Pain (7 - 10) MDD:3   acetaminophen 325 mg oral tablet: 2 tab(s) orally every 6 hours, As needed, Mild Pain (1 - 3)  amLODIPine 2.5 mg oral tablet: 1 tab(s) orally once a day  Crestor 10 mg oral tablet: 1 tab(s) orally once a day  ibuprofen 400 mg oral tablet: 1 tab(s) orally every 6 hours, As needed, Moderate Pain (4 - 6)  levothyroxine 100 mcg (0.1 mg) oral tablet: 1 tab(s) orally once a day  oxyCODONE 5 mg oral tablet: 1 tab(s) orally every 4 hours, As needed, Severe Pain (7 - 10) MDD:3

## 2020-12-24 NOTE — DISCHARGE NOTE PROVIDER - HOSPITAL COURSE
PST HPI:    This is 50 year old female with past medical history of, HTN, HLD, hypothyroidism, MARIAN on CPAP at home, IBS, diverticulitis. Pt was discharged home today from [facility]- admitted for abdominal pain secondary to diverticulitis, sent home with left arm Mid- line to receive IV Ertapenem at home.  Pt is now presenting to [facility] for scheduled  ERP, laparoscopic sigmoid colectomy on 12/23 with Dr. Medrano.    Of note, pt reports having occasional chest pain/ discomfort and palpitations for over one year. Recently had cardiac workup performed- (stress test, ECHO and holter montior- negative). Was seen by Cardiology Dr. Jay  when admitted- cleared for surgery.     ----------------------------------------------    Pt underwent a lap-assisted low anterior resection with Dr. Medrano on 12/23. Pt post-operative course was uncomplicated on ERAS protocol. Pt was able to tolerate a clear liquids diet on POD#0 and her Pak catheter was removed on POD#1. Pt was tolerating oral pain medication and was advanced to a low fiber diet on POD#1 and had a bowel movement that evening. She was considered stable for discharge on POD#2. She is being discharged to home with pain medication and instructions to follow up with Dr. Medrano in the office within one week.

## 2020-12-24 NOTE — PROGRESS NOTE ADULT - ASSESSMENT
50y Female s/p lap-assisted LAR on 12/23 2/2 diverticular disease.    Plan:  - Pain control as needed, continue Tylenol, Toradol, oxy, dilaudid  - Spinal morphine in place  - Tolerating CLD  - DC Pak in AM  - DVT ppx  - OOB and ambulating as tolerated  - F/u AM labs  - Final plans to be discussed with attendings on rounds    [facility]  [id number].

## 2020-12-24 NOTE — PHYSICAL THERAPY INITIAL EVALUATION ADULT - ADDITIONAL COMMENTS
lives w/  and children in private home 3 steps to enter w HR,  flight of stais to bedroom,  reading glasses, hearing good, R handed

## 2020-12-24 NOTE — DISCHARGE NOTE PROVIDER - NSDCACTIVITY_GEN_ALL_CORE
Do not drive or operate machinery/Showering allowed/Do not make important decisions/Walking - Indoors allowed/No heavy lifting/straining/Walking - Outdoors allowed

## 2020-12-24 NOTE — DIETITIAN INITIAL EVALUATION ADULT. - PERTINENT MEDS FT
MEDICATIONS  (STANDING):  acetaminophen   Tablet .. 1000 milliGRAM(s) Oral every 6 hours  acetaminophen  IVPB .. 1000 milliGRAM(s) IV Intermittent every 6 hours  heparin   Injectable 5000 Unit(s) SubCutaneous every 8 hours  ibuprofen  Tablet. 600 milliGRAM(s) Oral every 6 hours  ketorolac   Injectable 30 milliGRAM(s) IV Push every 6 hours  lactated ringers. 1000 milliLiter(s) (40 mL/Hr) IV Continuous <Continuous>  magnesium oxide 1000 milliGRAM(s) Oral two times a day with meals    MEDICATIONS  (PRN):  oxyCODONE    IR 5 milliGRAM(s) Oral every 4 hours PRN Moderate Pain (4 - 6)

## 2020-12-24 NOTE — DISCHARGE NOTE PROVIDER - NSDCFUSCHEDAPPT_GEN_ALL_CORE_FT
WARNER REYES ; 01/05/2021 ; NPP Gen Surg [address] [facility]  WARNER REYES ; 01/06/2021 ; NPP Ophthal [address] [facility]

## 2020-12-24 NOTE — DISCHARGE NOTE PROVIDER - CARE PROVIDER_API CALL
Jaime Medrano  COLON/RECTAL SURGERY  [address] [facility], [facility] [address]  [place] [address]  Phone: [phone]  Fax: [phone]  Follow Up Time: 2 weeks

## 2020-12-24 NOTE — DIETITIAN INITIAL EVALUATION ADULT. - ETIOLOGY
decreased PO intake secondary to altered GI function previous limited exposure to Low Fiber Medical Nutrition Therapy

## 2020-12-24 NOTE — DIETITIAN INITIAL EVALUATION ADULT. - ADD RECOMMEND
3. Monitor diet, PO intake, GI status, weight, labs, skin integrity 4. Honor pt food preferences to promote adequate oral intake while in-house 5. Reinforce diet education as needed

## 2020-12-24 NOTE — DISCHARGE NOTE PROVIDER - NSDCCPTREATMENT_GEN_ALL_CORE_FT
PRINCIPAL PROCEDURE  Procedure: Laparoscopic assisted low anterior resection  Findings and Treatment:

## 2020-12-24 NOTE — PHYSICAL THERAPY INITIAL EVALUATION ADULT - PERTINENT HX OF CURRENT PROBLEM, REHAB EVAL
[facility]- admitted for abdominal pain secondary to diverticulitis, sent home with left arm Mid- line to receive IV Ertapenem at home.  Pt is now presenting to [facility] for scheduled  ERP, laparoscopic sigmoid colectomy on 12/23 with Dr. Medrano.

## 2020-12-24 NOTE — PHYSICAL THERAPY INITIAL EVALUATION ADULT - PLANNED THERAPY INTERVENTIONS, PT EVAL
Goal: To negotiate one flight of steps w/ one handrail independent 2 weeks/bed mobility training/gait training/strengthening/transfer training

## 2020-12-24 NOTE — DIETITIAN INITIAL EVALUATION ADULT. - OTHER INFO
Upon RD visit, pt reports poor appetite this morning as she is experiencing some abdominal pain. Pt consumed a few bites of plain cheerio's. Reports does not like eggs at breakfast, RD offered additional entree but patient declines at this time; RD took pt's lunch order for today.  Pt previously tolerating clear liquids diet well and diet was advanced to low fiber this morning. Pt denies nausea, vomiting, constipation or diarrhea at this time. Pt has not had a bowel movement yet and reports not passing gas as of now.    Pt reports UBW is ~190 pounds; Per 12/7 RD note, pt dosing weight of 189.6 pounds upon last admission.  Current dosing weight of 187 pounds. Noted a 1.57% weight change in 2 weeks; not clinically significant at this time; Will continue to monitor trends weight trends at this time.    R provided low-fiber nutrition therapy including importance of avoiding  fiber rich foods, fresh fruits/vegetables, whole grains, and added fiber in processed foods. Discussed chewing foods well and adequate hydration and protein intake. Discussed gradual reintroduction of fiber back into diet once cleared by MD. Pt verbalized understanding and accepted written handout. Patient with no nutrition-related questions at this time. Made aware RD remains available as needed.   As pt appetite has not been great for past 2 weeks or so, pt agreeable to trying ensure enlive supplement 1x/day to augment current PO intake.

## 2020-12-25 LAB
ALBUMIN SERPL ELPH-MCNC: 3.9 G/DL — SIGNIFICANT CHANGE UP (ref 3.3–5)
ALP SERPL-CCNC: 46 U/L — SIGNIFICANT CHANGE UP (ref 40–120)
ALT FLD-CCNC: 19 U/L — SIGNIFICANT CHANGE UP (ref 10–45)
ANION GAP SERPL CALC-SCNC: 10 MMOL/L — SIGNIFICANT CHANGE UP (ref 5–17)
ANION GAP SERPL CALC-SCNC: 11 MMOL/L — SIGNIFICANT CHANGE UP (ref 5–17)
AST SERPL-CCNC: 15 U/L — SIGNIFICANT CHANGE UP (ref 10–40)
BILIRUB SERPL-MCNC: 0.4 MG/DL — SIGNIFICANT CHANGE UP (ref 0.2–1.2)
BUN SERPL-MCNC: 5 MG/DL — LOW (ref 7–23)
BUN SERPL-MCNC: 7 MG/DL — SIGNIFICANT CHANGE UP (ref 7–23)
CALCIUM SERPL-MCNC: 8.4 MG/DL — SIGNIFICANT CHANGE UP (ref 8.4–10.5)
CALCIUM SERPL-MCNC: 8.8 MG/DL — SIGNIFICANT CHANGE UP (ref 8.4–10.5)
CHLORIDE SERPL-SCNC: 104 MMOL/L — SIGNIFICANT CHANGE UP (ref 96–108)
CHLORIDE SERPL-SCNC: 104 MMOL/L — SIGNIFICANT CHANGE UP (ref 96–108)
CO2 SERPL-SCNC: 25 MMOL/L — SIGNIFICANT CHANGE UP (ref 22–31)
CO2 SERPL-SCNC: 25 MMOL/L — SIGNIFICANT CHANGE UP (ref 22–31)
CREAT SERPL-MCNC: 0.56 MG/DL — SIGNIFICANT CHANGE UP (ref 0.5–1.3)
CREAT SERPL-MCNC: 0.57 MG/DL — SIGNIFICANT CHANGE UP (ref 0.5–1.3)
GLUCOSE SERPL-MCNC: 69 MG/DL — LOW (ref 70–99)
GLUCOSE SERPL-MCNC: 85 MG/DL — SIGNIFICANT CHANGE UP (ref 70–99)
HCT VFR BLD CALC: 32.8 % — LOW (ref 34.5–45)
HCT VFR BLD CALC: 34.7 % — SIGNIFICANT CHANGE UP (ref 34.5–45)
HGB BLD-MCNC: 10.5 G/DL — LOW (ref 11.5–15.5)
HGB BLD-MCNC: 11.4 G/DL — LOW (ref 11.5–15.5)
MAGNESIUM SERPL-MCNC: 2.3 MG/DL — SIGNIFICANT CHANGE UP (ref 1.6–2.6)
MAGNESIUM SERPL-MCNC: 2.3 MG/DL — SIGNIFICANT CHANGE UP (ref 1.6–2.6)
MCHC RBC-ENTMCNC: 30 PG — SIGNIFICANT CHANGE UP (ref 27–34)
MCHC RBC-ENTMCNC: 30.4 PG — SIGNIFICANT CHANGE UP (ref 27–34)
MCHC RBC-ENTMCNC: 32 GM/DL — SIGNIFICANT CHANGE UP (ref 32–36)
MCHC RBC-ENTMCNC: 32.9 GM/DL — SIGNIFICANT CHANGE UP (ref 32–36)
MCV RBC AUTO: 92.5 FL — SIGNIFICANT CHANGE UP (ref 80–100)
MCV RBC AUTO: 93.7 FL — SIGNIFICANT CHANGE UP (ref 80–100)
NRBC # BLD: 0 /100 WBCS — SIGNIFICANT CHANGE UP (ref 0–0)
NRBC # BLD: 0 /100 WBCS — SIGNIFICANT CHANGE UP (ref 0–0)
PHOSPHATE SERPL-MCNC: 1.4 MG/DL — LOW (ref 2.5–4.5)
PHOSPHATE SERPL-MCNC: 3.2 MG/DL — SIGNIFICANT CHANGE UP (ref 2.5–4.5)
PLATELET # BLD AUTO: 259 K/UL — SIGNIFICANT CHANGE UP (ref 150–400)
PLATELET # BLD AUTO: 266 K/UL — SIGNIFICANT CHANGE UP (ref 150–400)
POTASSIUM SERPL-MCNC: 3.6 MMOL/L — SIGNIFICANT CHANGE UP (ref 3.5–5.3)
POTASSIUM SERPL-MCNC: 3.7 MMOL/L — SIGNIFICANT CHANGE UP (ref 3.5–5.3)
POTASSIUM SERPL-SCNC: 3.6 MMOL/L — SIGNIFICANT CHANGE UP (ref 3.5–5.3)
POTASSIUM SERPL-SCNC: 3.7 MMOL/L — SIGNIFICANT CHANGE UP (ref 3.5–5.3)
PROT SERPL-MCNC: 6.6 G/DL — SIGNIFICANT CHANGE UP (ref 6–8.3)
RBC # BLD: 3.5 M/UL — LOW (ref 3.8–5.2)
RBC # BLD: 3.75 M/UL — LOW (ref 3.8–5.2)
RBC # FLD: 12.7 % — SIGNIFICANT CHANGE UP (ref 10.3–14.5)
RBC # FLD: 13 % — SIGNIFICANT CHANGE UP (ref 10.3–14.5)
SODIUM SERPL-SCNC: 139 MMOL/L — SIGNIFICANT CHANGE UP (ref 135–145)
SODIUM SERPL-SCNC: 140 MMOL/L — SIGNIFICANT CHANGE UP (ref 135–145)
TROPONIN T, HIGH SENSITIVITY RESULT: <6 NG/L — SIGNIFICANT CHANGE UP (ref 0–51)
WBC # BLD: 6.93 K/UL — SIGNIFICANT CHANGE UP (ref 3.8–10.5)
WBC # BLD: 7.15 K/UL — SIGNIFICANT CHANGE UP (ref 3.8–10.5)
WBC # FLD AUTO: 6.93 K/UL — SIGNIFICANT CHANGE UP (ref 3.8–10.5)
WBC # FLD AUTO: 7.15 K/UL — SIGNIFICANT CHANGE UP (ref 3.8–10.5)

## 2020-12-25 PROCEDURE — 93010 ELECTROCARDIOGRAM REPORT: CPT

## 2020-12-25 PROCEDURE — 71045 X-RAY EXAM CHEST 1 VIEW: CPT | Mod: 26

## 2020-12-25 RX ORDER — POTASSIUM PHOSPHATE, MONOBASIC POTASSIUM PHOSPHATE, DIBASIC 236; 224 MG/ML; MG/ML
15 INJECTION, SOLUTION INTRAVENOUS ONCE
Refills: 0 | Status: COMPLETED | OUTPATIENT
Start: 2020-12-25 | End: 2020-12-25

## 2020-12-25 RX ORDER — POTASSIUM CHLORIDE 20 MEQ
20 PACKET (EA) ORAL
Refills: 0 | Status: COMPLETED | OUTPATIENT
Start: 2020-12-25 | End: 2020-12-25

## 2020-12-25 RX ADMIN — Medication 600 MILLIGRAM(S): at 15:32

## 2020-12-25 RX ADMIN — MAGNESIUM OXIDE 400 MG ORAL TABLET 1000 MILLIGRAM(S): 241.3 TABLET ORAL at 18:33

## 2020-12-25 RX ADMIN — OXYCODONE HYDROCHLORIDE 5 MILLIGRAM(S): 5 TABLET ORAL at 01:25

## 2020-12-25 RX ADMIN — OXYCODONE HYDROCHLORIDE 5 MILLIGRAM(S): 5 TABLET ORAL at 10:42

## 2020-12-25 RX ADMIN — Medication 1000 MILLIGRAM(S): at 01:55

## 2020-12-25 RX ADMIN — Medication 1000 MILLIGRAM(S): at 12:06

## 2020-12-25 RX ADMIN — Medication 600 MILLIGRAM(S): at 15:02

## 2020-12-25 RX ADMIN — HEPARIN SODIUM 5000 UNIT(S): 5000 INJECTION INTRAVENOUS; SUBCUTANEOUS at 21:13

## 2020-12-25 RX ADMIN — OXYCODONE HYDROCHLORIDE 5 MILLIGRAM(S): 5 TABLET ORAL at 20:26

## 2020-12-25 RX ADMIN — Medication 600 MILLIGRAM(S): at 21:14

## 2020-12-25 RX ADMIN — OXYCODONE HYDROCHLORIDE 5 MILLIGRAM(S): 5 TABLET ORAL at 06:36

## 2020-12-25 RX ADMIN — POTASSIUM PHOSPHATE, MONOBASIC POTASSIUM PHOSPHATE, DIBASIC 62.5 MILLIMOLE(S): 236; 224 INJECTION, SOLUTION INTRAVENOUS at 09:33

## 2020-12-25 RX ADMIN — ATORVASTATIN CALCIUM 40 MILLIGRAM(S): 80 TABLET, FILM COATED ORAL at 21:14

## 2020-12-25 RX ADMIN — HEPARIN SODIUM 5000 UNIT(S): 5000 INJECTION INTRAVENOUS; SUBCUTANEOUS at 15:02

## 2020-12-25 RX ADMIN — OXYCODONE HYDROCHLORIDE 5 MILLIGRAM(S): 5 TABLET ORAL at 15:01

## 2020-12-25 RX ADMIN — AMLODIPINE BESYLATE 2.5 MILLIGRAM(S): 2.5 TABLET ORAL at 06:07

## 2020-12-25 RX ADMIN — Medication 1000 MILLIGRAM(S): at 06:36

## 2020-12-25 RX ADMIN — Medication 20 MILLIEQUIVALENT(S): at 09:33

## 2020-12-25 RX ADMIN — Medication 600 MILLIGRAM(S): at 10:03

## 2020-12-25 RX ADMIN — Medication 1000 MILLIGRAM(S): at 01:25

## 2020-12-25 RX ADMIN — Medication 600 MILLIGRAM(S): at 05:06

## 2020-12-25 RX ADMIN — Medication 1000 MILLIGRAM(S): at 18:32

## 2020-12-25 RX ADMIN — Medication 83.33 MILLIMOLE(S): at 19:57

## 2020-12-25 RX ADMIN — OXYCODONE HYDROCHLORIDE 5 MILLIGRAM(S): 5 TABLET ORAL at 19:56

## 2020-12-25 RX ADMIN — Medication 20 MILLIEQUIVALENT(S): at 12:06

## 2020-12-25 RX ADMIN — Medication 1000 MILLIGRAM(S): at 19:02

## 2020-12-25 RX ADMIN — Medication 1000 MILLIGRAM(S): at 06:07

## 2020-12-25 RX ADMIN — OXYCODONE HYDROCHLORIDE 5 MILLIGRAM(S): 5 TABLET ORAL at 06:07

## 2020-12-25 RX ADMIN — Medication 100 MICROGRAM(S): at 06:07

## 2020-12-25 RX ADMIN — Medication 600 MILLIGRAM(S): at 21:44

## 2020-12-25 RX ADMIN — OXYCODONE HYDROCHLORIDE 5 MILLIGRAM(S): 5 TABLET ORAL at 15:32

## 2020-12-25 RX ADMIN — Medication 1000 MILLIGRAM(S): at 12:21

## 2020-12-25 RX ADMIN — Medication 600 MILLIGRAM(S): at 04:36

## 2020-12-25 RX ADMIN — Medication 600 MILLIGRAM(S): at 09:33

## 2020-12-25 RX ADMIN — HEPARIN SODIUM 5000 UNIT(S): 5000 INJECTION INTRAVENOUS; SUBCUTANEOUS at 06:07

## 2020-12-25 RX ADMIN — OXYCODONE HYDROCHLORIDE 5 MILLIGRAM(S): 5 TABLET ORAL at 10:12

## 2020-12-25 NOTE — PROVIDER CONTACT NOTE (OTHER) - ACTION/TREATMENT ORDERED:
MD aware and at bedside. EKG performed. Stat labs collected. Will continue to monitor.
DESHAWN Hoover at bedside, possibly starting pt back on home medications.

## 2020-12-25 NOTE — PROGRESS NOTE ADULT - SUBJECTIVE AND OBJECTIVE BOX
SUBJECTIVE:   Pt seen and examined at bedside. No acute events overnight. Pt laying in bed comfortably. Pt tolerating CLD. No nausea, vomiting. -flatus/-BM      Vital Signs Last 24 Hrs  T(C): 36.8 (25 Dec 2020 05:48), Max: 37 (24 Dec 2020 10:52)  T(F): 98.2 (25 Dec 2020 05:48), Max: 98.6 (24 Dec 2020 10:52)  HR: 70 (25 Dec 2020 05:48) (70 - 79)  BP: 130/76 (25 Dec 2020 05:48) (116/74 - 145/84)  BP(mean): --  RR: 18 (25 Dec 2020 05:48) (17 - 18)  SpO2: 98% (25 Dec 2020 05:48) (96% - 100%)    PHYSICAL EXAM:  General: NAD, resting comfortably in bed  Pulmonary: Nonlabored breathing, no respiratory distress, CTAB  Cardiovascular: NSR, no murmurs or rubs  Abdominal: soft, appropriately tender on the right, mildly distended. Lap incisions CDI and covered in steri strips. Midline incision covered with gauze and occlusive dressing  Extremities: [facility]    LABS:                        11.5   9.22  )-----------( 296      ( 24 Dec 2020 07:02 )             36.6     12-24    136  |  102  |  4<L>  ----------------------------<  98  4.3   |  24  |  0.53    Ca    8.5      24 Dec 2020 07:02  Phos  2.3     12-24  Mg     2.4     12-24            I&O's Detail    23 Dec 2020 07:01  -  24 Dec 2020 00:24  --------------------------------------------------------  IN:    Lactated Ringers: 200 mL  Total IN: 200 mL    OUT:    Indwelling Catheter - Urethral (mL): 775 mL  Total OUT: 775 mL    Total NET: -575 mL        MEDICATIONS  (STANDING):  amLODIPine   Tablet 2.5 milliGRAM(s) Oral daily  atorvastatin 40 milliGRAM(s) Oral at bedtime  chlorhexidine 2% Cloths 1 Application(s) Topical daily  enoxaparin Injectable 40 milliGRAM(s) SubCutaneous daily  ketorolac   Injectable 30 milliGRAM(s) IV Push once  levothyroxine 100 MICROGram(s) Oral daily  meropenem  IVPB      meropenem  IVPB 1000 milliGRAM(s) IV Intermittent every 8 hours  senna 2 Tablet(s) Oral at bedtime    MEDICATIONS  (PRN):  acetaminophen   Tablet .. 650 milliGRAM(s) Oral every 6 hours PRN Mild Pain (1 - 3)  HYDROmorphone  Injectable 0.5 milliGRAM(s) IV Push every 4 hours PRN breakthrough severe pain  ibuprofen  Tablet. 400 milliGRAM(s) Oral every 6 hours PRN Moderate Pain (4 - 6)  ondansetron Injectable 4 milliGRAM(s) IV Push every 6 hours PRN Nausea and/or Vomiting  oxyCODONE    IR 5 milliGRAM(s) Oral every 4 hours PRN Severe Pain (7 - 10)  oxyCODONE    IR 2.5 milliGRAM(s) Oral every 4 hours PRN Moderate Pain (4 - 6)   SUBJECTIVE:   Pt seen and examined at bedside. No acute events overnight. Pt laying in bed comfortably. Pt tolerating CLD. Low appetite and oral intake. 1/2 maintenance IVF. No nausea, vomiting. -flatus/+BM      Vital Signs Last 24 Hrs  T(C): 36.8 (25 Dec 2020 05:48), Max: 37 (24 Dec 2020 10:52)  T(F): 98.2 (25 Dec 2020 05:48), Max: 98.6 (24 Dec 2020 10:52)  HR: 70 (25 Dec 2020 05:48) (70 - 79)  BP: 130/76 (25 Dec 2020 05:48) (116/74 - 145/84)  BP(mean): --  RR: 18 (25 Dec 2020 05:48) (17 - 18)  SpO2: 98% (25 Dec 2020 05:48) (96% - 100%)    PHYSICAL EXAM:  General: NAD, resting comfortably in bed  Pulmonary: Nonlabored breathing, no respiratory distress, CTAB  Cardiovascular: NSR, no murmurs or rubs  Abdominal: soft, appropriately tender on the right, mildly distended. Lap incisions CDI and covered in steri strips. Midline incision covered with gauze and occlusive dressing  Extremities: P    LABS:                        11.5   9.22  )-----------( 296      ( 24 Dec 2020 07:02 )             36.6     12-24    136  |  102  |  4<L>  ----------------------------<  98  4.3   |  24  |  0.53    Ca    8.5      24 Dec 2020 07:02  Phos  2.3     12-24  Mg     2.4     12-24            I&O's Detail    23 Dec 2020 07:01  -  24 Dec 2020 00:24  --------------------------------------------------------  IN:    Lactated Ringers: 200 mL  Total IN: 200 mL    OUT:    Indwelling Catheter - Urethral (mL): 775 mL  Total OUT: 775 mL    Total NET: -575 mL        MEDICATIONS  (STANDING):  amLODIPine   Tablet 2.5 milliGRAM(s) Oral daily  atorvastatin 40 milliGRAM(s) Oral at bedtime  chlorhexidine 2% Cloths 1 Application(s) Topical daily  enoxaparin Injectable 40 milliGRAM(s) SubCutaneous daily  ketorolac   Injectable 30 milliGRAM(s) IV Push once  levothyroxine 100 MICROGram(s) Oral daily  meropenem  IVPB      meropenem  IVPB 1000 milliGRAM(s) IV Intermittent every 8 hours  senna 2 Tablet(s) Oral at bedtime    MEDICATIONS  (PRN):  acetaminophen   Tablet .. 650 milliGRAM(s) Oral every 6 hours PRN Mild Pain (1 - 3)  HYDROmorphone  Injectable 0.5 milliGRAM(s) IV Push every 4 hours PRN breakthrough severe pain  ibuprofen  Tablet. 400 milliGRAM(s) Oral every 6 hours PRN Moderate Pain (4 - 6)  ondansetron Injectable 4 milliGRAM(s) IV Push every 6 hours PRN Nausea and/or Vomiting  oxyCODONE    IR 5 milliGRAM(s) Oral every 4 hours PRN Severe Pain (7 - 10)  oxyCODONE    IR 2.5 milliGRAM(s) Oral every 4 hours PRN Moderate Pain (4 - 6)

## 2020-12-25 NOTE — PROGRESS NOTE ADULT - ASSESSMENT
50y Female s/p lap-assisted LAR on 12/23 2/2 diverticular disease.    Plan:  - Pain control as needed, continue Tylenol, Toradol, oxy, dilaudid  - Spinal morphine  - Tolerating CLD  - DVT ppx  - OOB and ambulating as tolerated  - F/u AM labs  - Final plans to be discussed with attendings on rounds    [facility]  [id number].   50y Female s/p lap-assisted LAR on 12/23 2/2 diverticular disease.    Plan:  - Pain control as needed, continue Tylenol, Toradol, oxy, dilaudid  - Spinal morphine  - Tolerating CLD  - DVT ppx  - OOB and ambulating as tolerated  - F/u AM labs  - Shree out prior to discharge  - Final plans to be discussed with attendings on rounds    [facility]  [id number].

## 2020-12-25 NOTE — CHART NOTE - NSCHARTNOTEFT_GEN_A_CORE
MD paged due to patient complaining of chest pain. Patient seen and examined at bedside. Patient complains of mid sternal chest pain non radiating. On exam, pain is reproducible to palpation along left parasternal line. Patient states that she sees cardiologist outpatient and has recently had echo, stress test, and holter monitor, which all came back normal. EKG was normal sinus rhythm, non tachycardic and electrolytes were within normal limits. Will continue to follow

## 2020-12-25 NOTE — PROVIDER CONTACT NOTE (OTHER) - ASSESSMENT
Pt complaining of blurry vision. States that she normally uses glasses to read but now requires them to look at the clock. VS stable. Pt denies chest pain or SOB.
Patient c/o of new onset chest pain stating, "It feels like someone sitting on my chest". All VSS. Patient denies SOB.

## 2020-12-26 ENCOUNTER — TRANSCRIPTION ENCOUNTER (OUTPATIENT)
Age: 50
End: 2020-12-26

## 2020-12-26 VITALS
HEART RATE: 63 BPM | SYSTOLIC BLOOD PRESSURE: 115 MMHG | TEMPERATURE: 98 F | OXYGEN SATURATION: 99 % | RESPIRATION RATE: 20 BRPM | DIASTOLIC BLOOD PRESSURE: 73 MMHG

## 2020-12-26 PROCEDURE — C1889: CPT

## 2020-12-26 PROCEDURE — 88307 TISSUE EXAM BY PATHOLOGIST: CPT

## 2020-12-26 PROCEDURE — 93005 ELECTROCARDIOGRAM TRACING: CPT

## 2020-12-26 PROCEDURE — 84702 CHORIONIC GONADOTROPIN TEST: CPT

## 2020-12-26 PROCEDURE — 80053 COMPREHEN METABOLIC PANEL: CPT

## 2020-12-26 PROCEDURE — C9399: CPT

## 2020-12-26 PROCEDURE — 84100 ASSAY OF PHOSPHORUS: CPT

## 2020-12-26 PROCEDURE — 71045 X-RAY EXAM CHEST 1 VIEW: CPT

## 2020-12-26 PROCEDURE — 85027 COMPLETE CBC AUTOMATED: CPT

## 2020-12-26 PROCEDURE — 97161 PT EVAL LOW COMPLEX 20 MIN: CPT

## 2020-12-26 PROCEDURE — 80048 BASIC METABOLIC PNL TOTAL CA: CPT

## 2020-12-26 PROCEDURE — 82962 GLUCOSE BLOOD TEST: CPT

## 2020-12-26 PROCEDURE — 88304 TISSUE EXAM BY PATHOLOGIST: CPT

## 2020-12-26 PROCEDURE — 81025 URINE PREGNANCY TEST: CPT

## 2020-12-26 PROCEDURE — 84484 ASSAY OF TROPONIN QUANT: CPT

## 2020-12-26 PROCEDURE — 97116 GAIT TRAINING THERAPY: CPT

## 2020-12-26 PROCEDURE — 83735 ASSAY OF MAGNESIUM: CPT

## 2020-12-26 PROCEDURE — 97110 THERAPEUTIC EXERCISES: CPT

## 2020-12-26 RX ADMIN — Medication 600 MILLIGRAM(S): at 05:37

## 2020-12-26 RX ADMIN — OXYCODONE HYDROCHLORIDE 5 MILLIGRAM(S): 5 TABLET ORAL at 12:55

## 2020-12-26 RX ADMIN — OXYCODONE HYDROCHLORIDE 5 MILLIGRAM(S): 5 TABLET ORAL at 12:23

## 2020-12-26 RX ADMIN — Medication 1000 MILLIGRAM(S): at 05:36

## 2020-12-26 RX ADMIN — HEPARIN SODIUM 5000 UNIT(S): 5000 INJECTION INTRAVENOUS; SUBCUTANEOUS at 05:36

## 2020-12-26 RX ADMIN — Medication 1000 MILLIGRAM(S): at 06:06

## 2020-12-26 RX ADMIN — OXYCODONE HYDROCHLORIDE 5 MILLIGRAM(S): 5 TABLET ORAL at 05:37

## 2020-12-26 RX ADMIN — OXYCODONE HYDROCHLORIDE 5 MILLIGRAM(S): 5 TABLET ORAL at 00:32

## 2020-12-26 RX ADMIN — Medication 100 MICROGRAM(S): at 05:36

## 2020-12-26 RX ADMIN — OXYCODONE HYDROCHLORIDE 5 MILLIGRAM(S): 5 TABLET ORAL at 01:02

## 2020-12-26 RX ADMIN — Medication 1000 MILLIGRAM(S): at 00:32

## 2020-12-26 RX ADMIN — Medication 1000 MILLIGRAM(S): at 12:25

## 2020-12-26 RX ADMIN — Medication 600 MILLIGRAM(S): at 06:07

## 2020-12-26 RX ADMIN — Medication 1000 MILLIGRAM(S): at 12:55

## 2020-12-26 RX ADMIN — Medication 1000 MILLIGRAM(S): at 01:02

## 2020-12-26 RX ADMIN — AMLODIPINE BESYLATE 2.5 MILLIGRAM(S): 2.5 TABLET ORAL at 05:36

## 2020-12-26 NOTE — PROGRESS NOTE ADULT - ATTENDING COMMENTS
I have seen and examined the patient. I agree with the above surgery resident's note.
I saw and examined the patient, and reviewed  the history with the patient and   Agree with note which was also reviewed and edited where appropriate.  D/W patient, RN, residents and Fellow  Ok d/c home
I have seen and evaluated patient and discussed with team.  Tolerating diet with multiple BMs.  Needs to ambulate more.  If continues to do well then DC home this afternoon

## 2020-12-26 NOTE — DISCHARGE NOTE NURSING/CASE MANAGEMENT/SOCIAL WORK - PATIENT PORTAL LINK FT
You can access the FollowMyHealth Patient Portal offered by [facility] by registering at the following website: [email or web address]. By joining [employer]’s FollowMyHealth portal, you will also be able to view your health information using other applications (apps) compatible with our system.

## 2020-12-26 NOTE — PROGRESS NOTE ADULT - SUBJECTIVE AND OBJECTIVE BOX
GENERAL SURGERY DAILY PROGRESS NOTE:    Interval:  Had chest pain, EKG normal and trops negative    Subjective:  Pt seen and examined at bedside. No acute events overnight. Pt laying in bed comfortably. Pt tolerating CLD. Low appetite and oral intake. No nausea, vomiting. -flatus/+BM      Vital Signs Last 24 Hrs  T(C): 36.7 (26 Dec 2020 04:59), Max: 36.8 (25 Dec 2020 17:19)  T(F): 98 (26 Dec 2020 04:59), Max: 98.2 (25 Dec 2020 17:19)  HR: 59 (26 Dec 2020 04:59) (59 - 76)  BP: 114/71 (26 Dec 2020 04:59) (114/71 - 159/86)  BP(mean): --  RR: 18 (26 Dec 2020 04:59) (18 - 18)  SpO2: 98% (26 Dec 2020 04:59) (96% - 99%)    Exam:  Gen: NAD, resting in bed, alert and responding appropriately  Resp: Airway patent, non-labored respirations  Abd: soft, appropriately tender on the right, mildly distended. Lap incisions CDI and covered in steri strips. Midline incision covered with gauze and occlusive dressing  Ext: No edema, WWP  Neuro: AAOx3, no focal deficits    I&O's Detail    24 Dec 2020 07:01  -  25 Dec 2020 07:00  --------------------------------------------------------  IN:    Lactated Ringers: 960 mL    Oral Fluid: 640 mL  Total IN: 1600 mL    OUT:    Voided (mL): 1100 mL  Total OUT: 1100 mL    Total NET: 500 mL      25 Dec 2020 07:01  -  26 Dec 2020 06:08  --------------------------------------------------------  IN:    IV PiggyBack: 250 mL    Oral Fluid: 640 mL  Total IN: 890 mL    OUT:  Total OUT: 0 mL    Total NET: 890 mL          Daily     Daily     MEDICATIONS  (STANDING):  acetaminophen   Tablet .. 1000 milliGRAM(s) Oral every 6 hours  amLODIPine   Tablet 2.5 milliGRAM(s) Oral daily  atorvastatin 40 milliGRAM(s) Oral at bedtime  heparin   Injectable 5000 Unit(s) SubCutaneous every 8 hours  ibuprofen  Tablet. 600 milliGRAM(s) Oral every 6 hours  levothyroxine 100 MICROGram(s) Oral daily  magnesium oxide 1000 milliGRAM(s) Oral two times a day with meals    MEDICATIONS  (PRN):  oxyCODONE    IR 5 milliGRAM(s) Oral every 4 hours PRN Moderate Pain (4 - 6)      LABS:                        11.4   7.15  )-----------( 266      ( 25 Dec 2020 22:45 )             34.7     12-25    140  |  104  |  5<L>  ----------------------------<  85  3.7   |  25  |  0.56    Ca    8.8      25 Dec 2020 22:45  Phos  3.2     12-25  Mg     2.3     12-25    TPro  6.6  /  Alb  3.9  /  TBili  0.4  /  DBili  x   /  AST  15  /  ALT  19  /  AlkPhos  46  12-25

## 2020-12-26 NOTE — PROGRESS NOTE ADULT - ASSESSMENT
50y Female s/p lap-assisted LAR on 12/23 2/2 diverticular disease.    Plan:  - Pain control as needed  - LRD  - DVT ppx  - OOB and ambulating as tolerated  - F/u AM labs  - Shree out prior to discharge today  - Final plans to be discussed with attendings on rounds    [facility]  [id number].

## 2020-12-29 LAB — SURGICAL PATHOLOGY STUDY: SIGNIFICANT CHANGE UP

## 2021-01-04 ENCOUNTER — APPOINTMENT (OUTPATIENT)
Dept: SURGERY | Facility: CLINIC | Age: 51
End: 2021-01-04
Payer: COMMERCIAL

## 2021-01-04 VITALS
BODY MASS INDEX: 37.69 KG/M2 | DIASTOLIC BLOOD PRESSURE: 83 MMHG | SYSTOLIC BLOOD PRESSURE: 123 MMHG | OXYGEN SATURATION: 98 % | HEART RATE: 73 BPM | RESPIRATION RATE: 18 BRPM | WEIGHT: 192 LBS | HEIGHT: 60 IN

## 2021-01-04 PROCEDURE — 99024 POSTOP FOLLOW-UP VISIT: CPT

## 2021-01-04 RX ORDER — METHYLPREDNISOLONE 32 MG/1
32 TABLET ORAL
Qty: 2 | Refills: 0 | Status: DISCONTINUED | COMMUNITY
Start: 2020-12-14 | End: 2021-01-04

## 2021-01-04 NOTE — PHYSICAL EXAM
[de-identified] : Soft, nontender, incisions healed without evidence of infection.  No focal tenderness.

## 2021-01-04 NOTE — ASSESSMENT
[FreeTextEntry1] : In summary the patient is doing well 2-week status post laparoscopic assisted sigmoid colectomy for chronic diverticulitis.  She does not appear ill and has a benign exam.  If she has worsening pain or fever I will send her for CT of the abdomen and pelvis.  I instructed her that she may resume a high-fiber diet.  I will see her in 2 weeks for a checkup.

## 2021-01-04 NOTE — HISTORY OF PRESENT ILLNESS
[FreeTextEntry1] : James is a 49 y/o female here for post-operative visit. 12/23/20: laparoscopic hand-assisted sigmoid colectomy with mobilization of the splenic flexure. Pathology: colon, sigmoid, resection- segment of large bowel with colonic diverticula and acute diverticulitis. One benign lymph node. Proximal and distal donuts, excision- two colonic donuts with no significant histopathologic findings.  \par In the office today she reports lower pelvic sharp pain radiating to the back for the past two days. Denies nausea, vomiting, fever or chills. Passing flatus and having 2 bowel movements she denies rectal bleeding.  Takes OTC analgesics and took oxycodone on Friday night.

## 2021-01-05 ENCOUNTER — APPOINTMENT (OUTPATIENT)
Dept: SURGERY | Facility: CLINIC | Age: 51
End: 2021-01-05
Payer: COMMERCIAL

## 2021-01-22 ENCOUNTER — APPOINTMENT (OUTPATIENT)
Dept: SURGERY | Facility: CLINIC | Age: 51
End: 2021-01-22
Payer: COMMERCIAL

## 2021-01-22 VITALS
RESPIRATION RATE: 16 BRPM | DIASTOLIC BLOOD PRESSURE: 84 MMHG | HEART RATE: 82 BPM | OXYGEN SATURATION: 99 % | TEMPERATURE: 96 F | SYSTOLIC BLOOD PRESSURE: 125 MMHG

## 2021-01-22 DIAGNOSIS — Z09 ENCOUNTER FOR FOLLOW-UP EXAMINATION AFTER COMPLETED TREATMENT FOR CONDITIONS OTHER THAN MALIGNANT NEOPLASM: ICD-10-CM

## 2021-01-22 PROCEDURE — 99024 POSTOP FOLLOW-UP VISIT: CPT

## 2021-01-22 RX ORDER — NEOMYCIN SULFATE 500 MG/1
500 TABLET ORAL
Qty: 3 | Refills: 0 | Status: DISCONTINUED | COMMUNITY
Start: 2020-12-14 | End: 2021-01-22

## 2021-01-22 RX ORDER — CAMPHOR 0.45 %
25 GEL (GRAM) TOPICAL
Qty: 2 | Refills: 0 | Status: DISCONTINUED | COMMUNITY
Start: 2020-12-14 | End: 2021-01-22

## 2021-01-22 RX ORDER — METRONIDAZOLE 250 MG/1
250 TABLET ORAL
Qty: 3 | Refills: 0 | Status: DISCONTINUED | COMMUNITY
Start: 2020-12-14 | End: 2021-01-22

## 2021-01-22 NOTE — ASSESSMENT
[FreeTextEntry1] : In summary the patient is doing well status post laparoscopic-assisted sigmoid colectomy.  I instructed her that she may resume her normal activities and diet as tolerated.  She will follow-up with  for colorectal cancer screening.  From my standpoint I only need to see her as needed.

## 2021-01-22 NOTE — HISTORY OF PRESENT ILLNESS
[FreeTextEntry1] : James is a 51 y/o female here for post-operative visit. 12/23/20: laparoscopic hand-assisted sigmoid colectomy with mobilization of the splenic flexure. Pathology: colon, sigmoid, resection- segment of large bowel with colonic diverticula and acute diverticulitis. One benign lymph node. Proximal and distal donuts, excision- two colonic donuts with no significant histopathologic findings. Last seen on 1/04/21 with lower pelvic sharp pain radiating to her back. If she develops worsening pain or fever, CT scan will be recommended. Instructed to expand her diet. \par \par Today, patient reports moderate incisional pain. Pain is worsened by twisting/moving. Her back & pelvic pain has improved. Has daily formed BM.

## 2021-01-22 NOTE — PHYSICAL EXAM
[Abdomen Masses] : No abdominal masses [Abdomen Tenderness] : ~T No ~M abdominal tenderness [No HSM] : no hepatosplenomegaly [de-identified] : Incisions healed, no evidence of infection or hernia.

## 2021-02-03 ENCOUNTER — LABORATORY RESULT (OUTPATIENT)
Age: 51
End: 2021-02-03

## 2021-02-03 ENCOUNTER — APPOINTMENT (OUTPATIENT)
Dept: INTERNAL MEDICINE | Facility: CLINIC | Age: 51
End: 2021-02-03
Payer: COMMERCIAL

## 2021-02-03 VITALS
DIASTOLIC BLOOD PRESSURE: 83 MMHG | TEMPERATURE: 98 F | WEIGHT: 191.12 LBS | BODY MASS INDEX: 36.08 KG/M2 | HEIGHT: 60.83 IN | SYSTOLIC BLOOD PRESSURE: 128 MMHG | OXYGEN SATURATION: 100 % | HEART RATE: 73 BPM

## 2021-02-03 DIAGNOSIS — K21.9 GASTRO-ESOPHAGEAL REFLUX DISEASE W/OUT ESOPHAGITIS: ICD-10-CM

## 2021-02-03 PROCEDURE — 36415 COLL VENOUS BLD VENIPUNCTURE: CPT

## 2021-02-03 PROCEDURE — 99396 PREV VISIT EST AGE 40-64: CPT | Mod: 25

## 2021-02-03 PROCEDURE — 99072 ADDL SUPL MATRL&STAF TM PHE: CPT

## 2021-02-04 NOTE — HEALTH RISK ASSESSMENT
[Good] : ~his/her~  mood as  good [FreeTextEntry1] : Health maintenance [Intercurrent ED visits] : went to ED [Intercurrent hospitalizations] : was admitted to the hospital  [] : No [No] : No [0] : 2) Feeling down, depressed, or hopeless: Not at all (0) [de-identified] : Diverticulitis [de-identified] : None

## 2021-02-04 NOTE — HISTORY OF PRESENT ILLNESS
More frequent symptoms, instructed to take omeprazole at least every other day  [FreeTextEntry1] : Patient presents to the office for annual physical [de-identified] : Feels well and status post surgery for diverticular disease.  Does feel well gets an occasional chest pain, later to food located in the center of the chest, denies any orthopnea paroxysmal nocturnal dyspnea.  Denies any lightheadedness dizziness depression anxiety.  Attempting to watch diet.

## 2021-02-04 NOTE — ASSESSMENT
[FreeTextEntry1] : Presents for annual physical compliant with CPAP machine given chest pain advised to try PPI for 2 to 4 weeks for assessment of any improvement may consider advanced imaging in the future.  Denies any dyspnea on exertion previous medical records reviewed given calcium score as well.  Advised low-fat diet also advised Mediterranean diet check thyroid function tests and hemoglobin A1c today.

## 2021-02-07 LAB
25(OH)D3 SERPL-MCNC: 16.5 NG/ML
ALBUMIN SERPL ELPH-MCNC: 4.3 G/DL
ALP BLD-CCNC: 54 U/L
ALT SERPL-CCNC: 29 U/L
ANION GAP SERPL CALC-SCNC: 13 MMOL/L
APPEARANCE: CLEAR
APPEARANCE: CLEAR
AST SERPL-CCNC: 20 U/L
BACTERIA: NEGATIVE
BASOPHILS # BLD AUTO: 0.04 K/UL
BASOPHILS NFR BLD AUTO: 0.8 %
BILIRUB SERPL-MCNC: 0.3 MG/DL
BILIRUBIN URINE: NEGATIVE
BILIRUBIN URINE: NEGATIVE
BLOOD URINE: NEGATIVE
BLOOD URINE: NEGATIVE
BUN SERPL-MCNC: 17 MG/DL
CALCIUM SERPL-MCNC: 9.6 MG/DL
CHLORIDE SERPL-SCNC: 105 MMOL/L
CHOLEST SERPL-MCNC: 275 MG/DL
CO2 SERPL-SCNC: 24 MMOL/L
COLOR: NORMAL
COLOR: NORMAL
CREAT SERPL-MCNC: 0.76 MG/DL
EOSINOPHIL # BLD AUTO: 0.16 K/UL
EOSINOPHIL NFR BLD AUTO: 3.1 %
ESTIMATED AVERAGE GLUCOSE: 105 MG/DL
GLUCOSE QUALITATIVE U: NEGATIVE
GLUCOSE QUALITATIVE U: NEGATIVE
GLUCOSE SERPL-MCNC: 77 MG/DL
HBA1C MFR BLD HPLC: 5.3 %
HCT VFR BLD CALC: 39.3 %
HDLC SERPL-MCNC: 80 MG/DL
HGB BLD-MCNC: 11.9 G/DL
HYALINE CASTS: 3 /LPF
IMM GRANULOCYTES NFR BLD AUTO: 0.4 %
KETONES URINE: NEGATIVE
KETONES URINE: NEGATIVE
LDLC SERPL CALC-MCNC: 168 MG/DL
LEUKOCYTE ESTERASE URINE: NEGATIVE
LEUKOCYTE ESTERASE URINE: NEGATIVE
LYMPHOCYTES # BLD AUTO: 1.97 K/UL
LYMPHOCYTES NFR BLD AUTO: 37.8 %
MAN DIFF?: NORMAL
MCHC RBC-ENTMCNC: 29.1 PG
MCHC RBC-ENTMCNC: 30.3 GM/DL
MCV RBC AUTO: 96.1 FL
MICROSCOPIC-UA: NORMAL
MONOCYTES # BLD AUTO: 0.52 K/UL
MONOCYTES NFR BLD AUTO: 10 %
NEUTROPHILS # BLD AUTO: 2.5 K/UL
NEUTROPHILS NFR BLD AUTO: 47.9 %
NITRITE URINE: NEGATIVE
NITRITE URINE: NEGATIVE
NONHDLC SERPL-MCNC: 195 MG/DL
PH URINE: 6
PH URINE: 6
PLATELET # BLD AUTO: 310 K/UL
POTASSIUM SERPL-SCNC: 4.3 MMOL/L
PROT SERPL-MCNC: 7.2 G/DL
PROTEIN URINE: NORMAL
PROTEIN URINE: NORMAL
RBC # BLD: 4.09 M/UL
RBC # FLD: 13.5 %
RED BLOOD CELLS URINE: 2 /HPF
SODIUM SERPL-SCNC: 141 MMOL/L
SPECIFIC GRAVITY URINE: 1.02
SPECIFIC GRAVITY URINE: 1.02
SQUAMOUS EPITHELIAL CELLS: 6 /HPF
T3FREE SERPL-MCNC: 2.88 PG/ML
T4 FREE SERPL-MCNC: 1.4 NG/DL
TRIGL SERPL-MCNC: 134 MG/DL
TSH SERPL-ACNC: 3.68 UIU/ML
UROBILINOGEN URINE: NORMAL
UROBILINOGEN URINE: NORMAL
WBC # FLD AUTO: 5.21 K/UL
WHITE BLOOD CELLS URINE: 1 /HPF

## 2021-03-01 DIAGNOSIS — M79.662 PAIN IN RIGHT LOWER LEG: ICD-10-CM

## 2021-03-01 DIAGNOSIS — M79.661 PAIN IN RIGHT LOWER LEG: ICD-10-CM

## 2021-03-02 ENCOUNTER — OUTPATIENT (OUTPATIENT)
Dept: OUTPATIENT SERVICES | Facility: HOSPITAL | Age: 51
LOS: 1 days | End: 2021-03-02
Payer: COMMERCIAL

## 2021-03-02 ENCOUNTER — APPOINTMENT (OUTPATIENT)
Dept: ULTRASOUND IMAGING | Facility: CLINIC | Age: 51
End: 2021-03-02
Payer: COMMERCIAL

## 2021-03-02 DIAGNOSIS — T14.8XXA OTHER INJURY OF UNSPECIFIED BODY REGION, INITIAL ENCOUNTER: Chronic | ICD-10-CM

## 2021-03-02 DIAGNOSIS — Z98.890 OTHER SPECIFIED POSTPROCEDURAL STATES: Chronic | ICD-10-CM

## 2021-03-02 DIAGNOSIS — M79.661 PAIN IN RIGHT LOWER LEG: ICD-10-CM

## 2021-03-02 PROCEDURE — 93970 EXTREMITY STUDY: CPT

## 2021-03-02 PROCEDURE — 93970 EXTREMITY STUDY: CPT | Mod: 26

## 2021-04-06 ENCOUNTER — RESULT REVIEW (OUTPATIENT)
Age: 51
End: 2021-04-06

## 2021-04-06 ENCOUNTER — OUTPATIENT (OUTPATIENT)
Dept: OUTPATIENT SERVICES | Facility: HOSPITAL | Age: 51
LOS: 1 days | End: 2021-04-06
Payer: COMMERCIAL

## 2021-04-06 ENCOUNTER — APPOINTMENT (OUTPATIENT)
Dept: CT IMAGING | Facility: CLINIC | Age: 51
End: 2021-04-06
Payer: COMMERCIAL

## 2021-04-06 DIAGNOSIS — Z98.890 OTHER SPECIFIED POSTPROCEDURAL STATES: Chronic | ICD-10-CM

## 2021-04-06 DIAGNOSIS — T14.8XXA OTHER INJURY OF UNSPECIFIED BODY REGION, INITIAL ENCOUNTER: Chronic | ICD-10-CM

## 2021-04-06 DIAGNOSIS — R10.32 LEFT LOWER QUADRANT PAIN: ICD-10-CM

## 2021-04-06 PROCEDURE — 74177 CT ABD & PELVIS W/CONTRAST: CPT

## 2021-04-06 PROCEDURE — 74177 CT ABD & PELVIS W/CONTRAST: CPT | Mod: 26

## 2021-04-06 PROCEDURE — 82565 ASSAY OF CREATININE: CPT

## 2021-04-12 NOTE — DISCHARGE NOTE PROVIDER - YES NO FOR MLM POSITIVE OR NEGATIVE COVID RESULT
Impression: Dry eye syndrome of bilateral lacrimal glands: H04.123 OU. Condition: improving. Plan: Discussed diagnosis in detail with patient. Discussed treatment options with patient. No change to current treatment. Will continue to observe condition and or symptoms. Continue using current medication(s). Emphasized and explained compliance. ,

## 2021-04-15 ENCOUNTER — LABORATORY RESULT (OUTPATIENT)
Age: 51
End: 2021-04-15

## 2021-04-15 ENCOUNTER — APPOINTMENT (OUTPATIENT)
Dept: INTERNAL MEDICINE | Facility: CLINIC | Age: 51
End: 2021-04-15
Payer: COMMERCIAL

## 2021-04-15 VITALS
BODY MASS INDEX: 37.35 KG/M2 | OXYGEN SATURATION: 100 % | HEART RATE: 63 BPM | HEIGHT: 60.24 IN | TEMPERATURE: 97.5 F | WEIGHT: 192.78 LBS | DIASTOLIC BLOOD PRESSURE: 79 MMHG | SYSTOLIC BLOOD PRESSURE: 119 MMHG

## 2021-04-15 DIAGNOSIS — M79.18 MYALGIA, OTHER SITE: ICD-10-CM

## 2021-04-15 DIAGNOSIS — M25.552 PAIN IN LEFT HIP: ICD-10-CM

## 2021-04-15 DIAGNOSIS — M25.561 PAIN IN RIGHT KNEE: ICD-10-CM

## 2021-04-15 DIAGNOSIS — M25.562 PAIN IN RIGHT KNEE: ICD-10-CM

## 2021-04-15 PROCEDURE — 99072 ADDL SUPL MATRL&STAF TM PHE: CPT

## 2021-04-15 PROCEDURE — 99214 OFFICE O/P EST MOD 30 MIN: CPT | Mod: 25

## 2021-04-15 PROCEDURE — 36415 COLL VENOUS BLD VENIPUNCTURE: CPT

## 2021-04-16 ENCOUNTER — OUTPATIENT (OUTPATIENT)
Dept: OUTPATIENT SERVICES | Facility: HOSPITAL | Age: 51
LOS: 1 days | End: 2021-04-16
Payer: COMMERCIAL

## 2021-04-16 ENCOUNTER — APPOINTMENT (OUTPATIENT)
Dept: RADIOLOGY | Facility: CLINIC | Age: 51
End: 2021-04-16
Payer: COMMERCIAL

## 2021-04-16 DIAGNOSIS — T14.8XXA OTHER INJURY OF UNSPECIFIED BODY REGION, INITIAL ENCOUNTER: Chronic | ICD-10-CM

## 2021-04-16 DIAGNOSIS — M25.561 PAIN IN RIGHT KNEE: ICD-10-CM

## 2021-04-16 DIAGNOSIS — Z98.890 OTHER SPECIFIED POSTPROCEDURAL STATES: Chronic | ICD-10-CM

## 2021-04-16 PROCEDURE — 73564 X-RAY EXAM KNEE 4 OR MORE: CPT | Mod: 26,50

## 2021-04-16 PROCEDURE — 73564 X-RAY EXAM KNEE 4 OR MORE: CPT

## 2021-04-18 NOTE — ASSESSMENT
[FreeTextEntry1] : Symptoms may be secondary to local trochanteric bursitis, greater trochanteric pain syndrome arthritis to have x-rays, also to assess for inflammatory markers no signs of any synovitis on exam check rheumatoid factor Lyme titers SPEP UPEP and creatine kinase, fibromyalgia also on the differential. Will attempt of of trial of NSAID's, advised for a few days. Take with food. Advised local application of heat or ice.

## 2021-04-18 NOTE — HISTORY OF PRESENT ILLNESS
[FreeTextEntry1] : Patient presents to the office for bilateral knee pain and hip pain. [de-identified] : Does have pain in the in the knees legs left hip also has muscle aches.  Denies any fatigue denies any recent viral illness denies any changes in medications denies any fevers rashes rigors muscle weakness.  Denies any numbness recent infections or recurrent infections.  Pain uses wake patient from sleep at night however does not anymore.  Denies any dry eyes dry mouth change in bowel habits.

## 2021-04-18 NOTE — PHYSICAL EXAM
[Normal] : normal gait, coordination grossly intact, no focal deficits and deep tendon reflexes were 2+ and symmetric [de-identified] : Tenderness over the greater trochanter, tenderness in the medial aspect of the knees no synovial thickening, no tenderness of the hands.

## 2021-04-21 ENCOUNTER — NON-APPOINTMENT (OUTPATIENT)
Age: 51
End: 2021-04-21

## 2021-04-21 LAB
ALBUMIN MFR SERPL ELPH: 56 %
ALBUMIN SERPL ELPH-MCNC: 4.4 G/DL
ALBUMIN SERPL-MCNC: 4 G/DL
ALBUMIN/GLOB SERPL: 1.2 RATIO
ALBUPE: 19.3 %
ALDOLASE SERPL-CCNC: 4.5 U/L
ALP BLD-CCNC: 53 U/L
ALPHA1 GLOB MFR SERPL ELPH: 4.1 %
ALPHA1 GLOB SERPL ELPH-MCNC: 0.3 G/DL
ALPHA1UPE: 31 %
ALPHA2 GLOB MFR SERPL ELPH: 9.6 %
ALPHA2 GLOB SERPL ELPH-MCNC: 0.7 G/DL
ALPHA2UPE: 23.8 %
ALT SERPL-CCNC: 13 U/L
ANA SER IF-ACNC: NEGATIVE
ANION GAP SERPL CALC-SCNC: 11 MMOL/L
AST SERPL-CCNC: 12 U/L
B BURGDOR IGG+IGM SER QL IB: NORMAL
B-GLOBULIN MFR SERPL ELPH: 12.2 %
B-GLOBULIN SERPL ELPH-MCNC: 0.9 G/DL
BASOPHILS # BLD AUTO: 0.05 K/UL
BASOPHILS NFR BLD AUTO: 0.9 %
BETAUPE: 10.4 %
BILIRUB SERPL-MCNC: 0.4 MG/DL
BUN SERPL-MCNC: 10 MG/DL
CALCIUM SERPL-MCNC: 9.7 MG/DL
CCP AB SER IA-ACNC: 10 UNITS
CHLORIDE SERPL-SCNC: 105 MMOL/L
CK SERPL-CCNC: 144 U/L
CO2 SERPL-SCNC: 25 MMOL/L
CREAT 24H UR-MCNC: NORMAL G/24 H
CREAT SERPL-MCNC: 0.73 MG/DL
CREATININE UR (MAYO): 55 MG/DL
CRP SERPL-MCNC: <3 MG/L
EOSINOPHIL # BLD AUTO: 0.12 K/UL
EOSINOPHIL NFR BLD AUTO: 2.2 %
ERYTHROCYTE [SEDIMENTATION RATE] IN BLOOD BY WESTERGREN METHOD: 18 MM/HR
GAMMA GLOB FLD ELPH-MCNC: 1.3 G/DL
GAMMA GLOB MFR SERPL ELPH: 18.1 %
GAMMAUPE: 15.5 %
GLUCOSE SERPL-MCNC: 86 MG/DL
HCT VFR BLD CALC: 41.2 %
HGB BLD-MCNC: 12.4 G/DL
IGA 24H UR QL IFE: NORMAL
IMM GRANULOCYTES NFR BLD AUTO: 0.4 %
INTERPRETATION SERPL IEP-IMP: NORMAL
KAPPA LC 24H UR QL: NORMAL
LYMPHOCYTES # BLD AUTO: 1.83 K/UL
LYMPHOCYTES NFR BLD AUTO: 33.3 %
M PROTEIN SPEC IFE-MCNC: NORMAL
MAN DIFF?: NORMAL
MCHC RBC-ENTMCNC: 29.3 PG
MCHC RBC-ENTMCNC: 30.1 GM/DL
MCV RBC AUTO: 97.4 FL
MONOCYTES # BLD AUTO: 0.64 K/UL
MONOCYTES NFR BLD AUTO: 11.7 %
NEUTROPHILS # BLD AUTO: 2.83 K/UL
NEUTROPHILS NFR BLD AUTO: 51.5 %
PLATELET # BLD AUTO: 337 K/UL
POTASSIUM SERPL-SCNC: 4.9 MMOL/L
PROT PATTERN 24H UR ELPH-IMP: NORMAL
PROT SERPL-MCNC: 7.2 G/DL
PROT UR-MCNC: 4 MG/DL
PROT UR-MCNC: 4 MG/DL
RBC # BLD: 4.23 M/UL
RBC # FLD: 14.1 %
RF+CCP IGG SER-IMP: NEGATIVE
RHEUMATOID FACT SER QL: <10 IU/ML
SODIUM SERPL-SCNC: 140 MMOL/L
SPECIMEN VOL 24H UR: NORMAL ML
WBC # FLD AUTO: 5.49 K/UL

## 2021-04-26 ENCOUNTER — NON-APPOINTMENT (OUTPATIENT)
Age: 51
End: 2021-04-26

## 2021-04-26 ENCOUNTER — APPOINTMENT (OUTPATIENT)
Dept: OPHTHALMOLOGY | Facility: CLINIC | Age: 51
End: 2021-04-26
Payer: COMMERCIAL

## 2021-04-26 PROCEDURE — 92014 COMPRE OPH EXAM EST PT 1/>: CPT

## 2021-04-26 PROCEDURE — 99072 ADDL SUPL MATRL&STAF TM PHE: CPT

## 2021-05-04 ENCOUNTER — APPOINTMENT (OUTPATIENT)
Dept: ORTHOPEDIC SURGERY | Facility: CLINIC | Age: 51
End: 2021-05-04
Payer: COMMERCIAL

## 2021-05-04 VITALS — HEIGHT: 60 IN | BODY MASS INDEX: 36.32 KG/M2 | WEIGHT: 185 LBS

## 2021-05-04 DIAGNOSIS — M62.838 OTHER MUSCLE SPASM: ICD-10-CM

## 2021-05-04 DIAGNOSIS — M17.12 UNILATERAL PRIMARY OSTEOARTHRITIS, LEFT KNEE: ICD-10-CM

## 2021-05-04 DIAGNOSIS — M54.16 RADICULOPATHY, LUMBAR REGION: ICD-10-CM

## 2021-05-04 DIAGNOSIS — M76.31 ILIOTIBIAL BAND SYNDROME, RIGHT LEG: ICD-10-CM

## 2021-05-04 PROCEDURE — 72100 X-RAY EXAM L-S SPINE 2/3 VWS: CPT

## 2021-05-04 PROCEDURE — 99072 ADDL SUPL MATRL&STAF TM PHE: CPT

## 2021-05-04 PROCEDURE — 99214 OFFICE O/P EST MOD 30 MIN: CPT

## 2021-05-04 PROCEDURE — 72170 X-RAY EXAM OF PELVIS: CPT

## 2021-05-04 NOTE — PHYSICAL EXAM
[de-identified] : Cervical Spine/Neck\par Inspection/Palpation :\par ¦ Inspection : alignment midline, normal degree of lordosis present\par ¦ Skin : normal appearance, no masses, no tenderness, trachea midline\par ¦ Palpation : bilateral cervical paraspinal and trapezius musculature is tender to palpation with palpable tightness. \par ¦ Tests and Signs : Spurling’s (-), Lhermitte’s (-) Richardson’s Reflex (-) \par ¦ Range of Motion : arc of motion full in all planes with pain throughout all ranges,, no crepitus with ROM\par ¦ Stability : no subluxations or other evidence of instability demonstrated during range of motion testing\par o Muscle Strength : paraspinal muscle strength within normal limits\par o Muscle Tone : paraspinal muscle tone within normal limits\par o Muscle Bulk : normal, no atrophy\par o Cervical Lymph Nodes : no lymphadenopathy present\par \par Lumbosacral Spine:\par Musculoskeletal Examination\par ¦ Inspection : no visible rash, no deformities\par ¦ Palpation : bilateral paraspinal musculature tender\par ¦ Stability : no subluxations present\par ¦ Range of Motion : full and painless arc of motion in all planes\par ¦ Muscle Strength : paraspinal muscle strength and tone within normal limits\par ¦ Muscle Tone : paraspinal muscle strength and tone within normal limits\par ¦ Tests/Signs : Straight Leg Raise Test (-) bilaterally. Patellar and Achilles Reflexes (2+) bilaterally.\par ¦ Lower Extremity Muscle Strength : hip flexion 5/5, knee flexion 5/5, ankle dorsiflexion 5/5, plantar flexion 5/5, EHL 5/5\par \par Right Lower Extremity\par o Hip :\par ¦ Inspection/Palpation : diffuse IT band and greater trochanter bursa tenderness, no swelling, no deformity\par ¦ Range of Motion : full and painless in all planes, no crepitus\par ¦ Stability : joint stability intact\par ¦ Tests and Signs : all tests for stability normal\par o Muscle Tone : tone normal\par o Muscle Bulk : normal muscle bulk present\par o Skin : no erythema, no ecchymosis\par o Sensation : sensation to light touch intact\par o Vascular Exam : no edema, no cyanosis, dorsalis pedis artery pulse 2+, posterior tibial artery pulse 2+\par o Special Tests: MALACHI Test (-) FADIR Test (-)\par \par o Knee :\par ¦ Inspection/Palpation : no tenderness to palpation, no swelling, no deformity\par ¦ Range of Motion : 0 - 120 degrees, no crepitus\par ¦ Stability : no valgus or varus instability present on provocative testing, Lachman’s Test (-)\par o Muscle Bulk : normal muscle bulk present\par o Skin : no erythema, no ecchymosis\par o Sensation : sensation to pin intact\par o Vascular Exam : no edema, no cyanosis, dorsalis pedis artery pulse 2+, posterior tibial artery pulse 2+\par \par Left Lower Extremity\par o Hip :\par ¦ Inspection/Palpation : diffuse IT band and marked greater trochanter bursa tenderness, no swelling, no deformity\par ¦ Range of Motion : full and painless in all planes, no crepitus\par ¦ Stability : joint stability intact\par ¦ Tests and Signs : all tests for stability normal\par o Muscle Tone : tone normal\par o Muscle Bulk : normal muscle bulk present\par o Skin : no erythema, no ecchymosis\par o Sensation : sensation to light touch intact\par o Vascular Exam : no edema, no cyanosis, dorsalis pedis artery pulse 2+, posterior tibial artery pulse 2+\par o Special Tests: MALACHI Test (-) FADIR Test (-)\par \par o Knee :\par ¦ Inspection/Palpation : medial joint line and distal IT band tenderness to palpation, no swelling, no deformity\par ¦ Range of Motion : 0 -120 degrees, no crepitus\par ¦ Stability : no valgus or varus instability present on provocative testing, Lachman’s Test (-)\par o Muscle Bulk : normal muscle bulk present\par o Skin : no erythema, no ecchymosis\par o Sensation : sensation to pin intact\par o Vascular Exam : no edema, no cyanosis, dorsalis pedis artery pulse 2+, posterior tibial artery pulse 2+ [de-identified] : o Lumbosacral Spine : AP and lateral views were obtained, there are no soft tissue abnormalities, no fractures, alignment is normal, moderate diffuse degenerative changes, advanced degenerative changes L5/S1, foraminal stenosis noted at L4/L5, L5/S1 normal bone density, no bony lesions.\par \par o Pelvis : AP and lateral views were obtained, there are no soft tissue abnormalities, no fractures, alignment is normal, normal bone density, normal appearing joint spaces, no bony lesions.\par  \par \par Patient comes to today's visit with outside imaging already performed. I reviewed the images in detail with the patient and discussed the findings as highlighted below. \par \par o Xray of the bilateral knees performed on 04/16/2021 at [facility]: Impression: \par ¦ No fractures, dislocations, or joint effusions.\par ¦ Left patellofemoral compartment osteoarthritic change. Preserved remaining bilateral knee joint compartments and no joint margin erosions or intra-articular or periarticular calcifications.\par ¦ Unremarkable quadriceps and patellar tendon shadows.\par ¦ No destructive osseous lesions or periosteal reaction.\par \par \par  Patient

## 2021-05-04 NOTE — CONSULT LETTER
[Dear  ___] : Dear  [unfilled], [Consult Letter:] : I had the pleasure of evaluating your patient, [unfilled]. [Please see my note below.] : Please see my note below. [Consult Closing:] : Thank you very much for allowing me to participate in the care of this patient.  If you have any questions, please do not hesitate to contact me. [Sincerely,] : Sincerely, [FreeTextEntry3] : Dr. López Iverson \par \par

## 2021-05-04 NOTE — HISTORY OF PRESENT ILLNESS
[de-identified] : WARNER REYES is a 51 year female presenting to the office complaining of bilateral lower leg pain. She  presents to the office ambulating independently. Patient reports having a colon resection in December 2020 in which she had an epidural for anesthesia prior to the onset of pain. She is unsure if this is related to her current symptoms though.  . Denies any direct injury or trauma to the area.  The patient describes the pain as a dull aching, and occasionally sharp pain  that radiates down her bilateral lower extremities L>R. She notes she has a lot of pain in the lateral aspects of the hips and thighs as well. She notes a PMHx of lumbar radiculopathy but has not has any recent flare ups. Patient notes she experiences pain localized to the knees as well. She had xrays at [facility], she presents today with those images. that is intermittent in nature. Her symptoms are exacerbated with prolonged sitting or laying down. She notes great difficulty sleeping at night due to the pain. Pain is alleviated with standing and walking. Patient denies numbness or tingling in the lower extremities. Patient also notes bilateral neck pain, and tightness. denies any injury or trauma to this area. Denies numbness and tingling. Patient is taking NSAIDs for pain relief with mild to moderate relief in symptoms. Patient has been tested for autoimmune/inflammatory disorders in the past with high normal, and low abdnormal levels including a ESR of 18 on 4/15/2021 and a +GIANA in April 2016.  Patient denies any other complaints at this time.

## 2021-05-04 NOTE — DISCUSSION/SUMMARY
[de-identified] : The underlying pathophysiology was reviewed in great detail with the patient as well as the various treatment options, including ice, analgesics, NSAIDs, Physical therapy, steroid injections.\par \par A prescription was provided for a MRI of the lumbar spine to rule out HNP\par \par A prescription for Physical Therapy was provided.\par \par A home exercise sheet was given and discussed with the patient to follow.A Thera-Band was provided for exercise program. \par \par Activity modifications and restrictions were discussed. I advised avoiding overhead lifting. I advised the patient to work on good posture.\par \par Activity modifications and restrictions were discussed. I advised avoiding deep bending, squatting and high intensity activity. \par \par A prescription was provided for Diclofenac 75 mg. \par \par FU once MRI results are obtained. \par \par All questions were answered, all alternatives discussed and the patient is in complete agreement with that plan. Follow-up appointment as instructed. Any issues and the patient will call or come in sooner.

## 2021-05-11 ENCOUNTER — APPOINTMENT (OUTPATIENT)
Dept: MRI IMAGING | Facility: CLINIC | Age: 51
End: 2021-05-11
Payer: COMMERCIAL

## 2021-05-11 ENCOUNTER — OUTPATIENT (OUTPATIENT)
Dept: OUTPATIENT SERVICES | Facility: HOSPITAL | Age: 51
LOS: 1 days | End: 2021-05-11
Payer: COMMERCIAL

## 2021-05-11 DIAGNOSIS — Z98.890 OTHER SPECIFIED POSTPROCEDURAL STATES: Chronic | ICD-10-CM

## 2021-05-11 DIAGNOSIS — Z00.8 ENCOUNTER FOR OTHER GENERAL EXAMINATION: ICD-10-CM

## 2021-05-11 DIAGNOSIS — T14.8XXA OTHER INJURY OF UNSPECIFIED BODY REGION, INITIAL ENCOUNTER: Chronic | ICD-10-CM

## 2021-05-11 PROCEDURE — 72148 MRI LUMBAR SPINE W/O DYE: CPT | Mod: 26

## 2021-05-11 PROCEDURE — 72148 MRI LUMBAR SPINE W/O DYE: CPT

## 2021-05-13 ENCOUNTER — NON-APPOINTMENT (OUTPATIENT)
Age: 51
End: 2021-05-13

## 2021-06-18 ENCOUNTER — APPOINTMENT (OUTPATIENT)
Dept: RHEUMATOLOGY | Facility: CLINIC | Age: 51
End: 2021-06-18

## 2021-06-29 ENCOUNTER — TRANSCRIPTION ENCOUNTER (OUTPATIENT)
Age: 51
End: 2021-06-29

## 2021-06-29 ENCOUNTER — RESULT REVIEW (OUTPATIENT)
Age: 51
End: 2021-06-29

## 2021-07-16 ENCOUNTER — NON-APPOINTMENT (OUTPATIENT)
Age: 51
End: 2021-07-16

## 2021-07-16 ENCOUNTER — APPOINTMENT (OUTPATIENT)
Dept: CARDIOLOGY | Facility: CLINIC | Age: 51
End: 2021-07-16
Payer: COMMERCIAL

## 2021-07-16 VITALS
HEIGHT: 60 IN | OXYGEN SATURATION: 99 % | SYSTOLIC BLOOD PRESSURE: 127 MMHG | BODY MASS INDEX: 40.05 KG/M2 | TEMPERATURE: 98.1 F | WEIGHT: 204 LBS | DIASTOLIC BLOOD PRESSURE: 75 MMHG | HEART RATE: 80 BPM

## 2021-07-16 DIAGNOSIS — F41.9 ANXIETY DISORDER, UNSPECIFIED: ICD-10-CM

## 2021-07-16 PROCEDURE — 93000 ELECTROCARDIOGRAM COMPLETE: CPT

## 2021-07-16 PROCEDURE — 99214 OFFICE O/P EST MOD 30 MIN: CPT

## 2021-07-16 PROCEDURE — 99072 ADDL SUPL MATRL&STAF TM PHE: CPT

## 2021-07-16 NOTE — REVIEW OF SYSTEMS
[SOB] : shortness of breath [Dyspnea on exertion] : dyspnea during exertion [Chest Discomfort] : chest discomfort [Palpitations] : palpitations [Anxiety] : anxiety [Negative] : Heme/Lymph [Lower Ext Edema] : no extremity edema [Leg Claudication] : no intermittent leg claudication [Orthopnea] : no orthopnea [PND] : no PND [Syncope] : no syncope [Confusion] : no confusion was observed [Memory Lapses Or Loss] : no memory lapses or loss [Depression] : no depression [Under Stress] : not under stress [Suicidal] : not suicidal

## 2021-07-16 NOTE — HISTORY OF PRESENT ILLNESS
[FreeTextEntry1] : This is a 51 year old lady with a PMH f HTN, Depression, MARIAN and hypothyroidism presents today for cardiac evaluation and second opinion. Patient states that she has been experiencing chest pains on the middle of the chest that occasionally extends to her back. Patient states that there is not a specific time that the discomfort onsets. Patient also with palpitations that occur at random, however, she states that they occur mainly at night. Patient also with shortness of breath while walking, she states specifically while wearing a mask. Patient has had complete cardiac workup- including CTCA in October 20201. patient had Holter in October 2020 as well. Patient states that she has been to the pulmonologist, however that was multiple years ago. patient states that none of her symptoms have worsened in severity since that time.  Patient states that she has strong family history of heart disease. Today patient denies any symptoms. Patient denies dyspnea, palpitations, chest pain, nausea, vomiting, dizziness and lightheadedness.\par

## 2021-07-18 LAB
25(OH)D3 SERPL-MCNC: 37.5 NG/ML
ALBUMIN SERPL ELPH-MCNC: 4.1 G/DL
ALP BLD-CCNC: 54 U/L
ALT SERPL-CCNC: 12 U/L
ANION GAP SERPL CALC-SCNC: 11 MMOL/L
AST SERPL-CCNC: 15 U/L
BASOPHILS # BLD AUTO: 0.05 K/UL
BASOPHILS NFR BLD AUTO: 0.8 %
BILIRUB DIRECT SERPL-MCNC: 0.1 MG/DL
BILIRUB INDIRECT SERPL-MCNC: 0.2 MG/DL
BILIRUB SERPL-MCNC: 0.3 MG/DL
BUN SERPL-MCNC: 11 MG/DL
CALCIUM SERPL-MCNC: 9.4 MG/DL
CHLORIDE SERPL-SCNC: 103 MMOL/L
CHOLEST SERPL-MCNC: 222 MG/DL
CK SERPL-CCNC: 130 U/L
CO2 SERPL-SCNC: 24 MMOL/L
CREAT SERPL-MCNC: 0.73 MG/DL
EOSINOPHIL # BLD AUTO: 0.13 K/UL
EOSINOPHIL NFR BLD AUTO: 2 %
FERRITIN SERPL-MCNC: 21 NG/ML
FOLATE SERPL-MCNC: 14 NG/ML
GLUCOSE SERPL-MCNC: 78 MG/DL
HCT VFR BLD CALC: 38.7 %
HDLC SERPL-MCNC: 70 MG/DL
HGB BLD-MCNC: 12.1 G/DL
IMM GRANULOCYTES NFR BLD AUTO: 0.3 %
IRON SATN MFR SERPL: 11 %
IRON SERPL-MCNC: 48 UG/DL
LDLC SERPL CALC-MCNC: 124 MG/DL
LYMPHOCYTES # BLD AUTO: 2.15 K/UL
LYMPHOCYTES NFR BLD AUTO: 32.5 %
MAN DIFF?: NORMAL
MCHC RBC-ENTMCNC: 30 PG
MCHC RBC-ENTMCNC: 31.3 GM/DL
MCV RBC AUTO: 96 FL
MONOCYTES # BLD AUTO: 0.75 K/UL
MONOCYTES NFR BLD AUTO: 11.3 %
NEUTROPHILS # BLD AUTO: 3.51 K/UL
NEUTROPHILS NFR BLD AUTO: 53.1 %
NONHDLC SERPL-MCNC: 152 MG/DL
PLATELET # BLD AUTO: 315 K/UL
POTASSIUM SERPL-SCNC: 4.1 MMOL/L
PROT SERPL-MCNC: 6.9 G/DL
RBC # BLD: 4.03 M/UL
RBC # FLD: 13.7 %
SODIUM SERPL-SCNC: 138 MMOL/L
T4 FREE SERPL-MCNC: 1.3 NG/DL
TIBC SERPL-MCNC: 419 UG/DL
TRANSFERRIN SERPL-MCNC: 310 MG/DL
TRIGL SERPL-MCNC: 141 MG/DL
TSH SERPL-ACNC: 1.7 UIU/ML
UIBC SERPL-MCNC: 371 UG/DL
VIT B12 SERPL-MCNC: 422 PG/ML
WBC # FLD AUTO: 6.61 K/UL

## 2021-07-19 ENCOUNTER — APPOINTMENT (OUTPATIENT)
Dept: ULTRASOUND IMAGING | Facility: CLINIC | Age: 51
End: 2021-07-19

## 2021-07-20 PROCEDURE — 93224 XTRNL ECG REC UP TO 48 HRS: CPT

## 2021-07-21 ENCOUNTER — APPOINTMENT (OUTPATIENT)
Dept: ULTRASOUND IMAGING | Facility: CLINIC | Age: 51
End: 2021-07-21
Payer: COMMERCIAL

## 2021-07-21 ENCOUNTER — OUTPATIENT (OUTPATIENT)
Dept: OUTPATIENT SERVICES | Facility: HOSPITAL | Age: 51
LOS: 1 days | End: 2021-07-21
Payer: COMMERCIAL

## 2021-07-21 ENCOUNTER — APPOINTMENT (OUTPATIENT)
Dept: MAMMOGRAPHY | Facility: CLINIC | Age: 51
End: 2021-07-21
Payer: COMMERCIAL

## 2021-07-21 DIAGNOSIS — Z98.890 OTHER SPECIFIED POSTPROCEDURAL STATES: Chronic | ICD-10-CM

## 2021-07-21 DIAGNOSIS — T14.8XXA OTHER INJURY OF UNSPECIFIED BODY REGION, INITIAL ENCOUNTER: Chronic | ICD-10-CM

## 2021-07-21 DIAGNOSIS — Z00.8 ENCOUNTER FOR OTHER GENERAL EXAMINATION: ICD-10-CM

## 2021-07-21 PROCEDURE — 77066 DX MAMMO INCL CAD BI: CPT | Mod: 26

## 2021-07-21 PROCEDURE — G0279: CPT

## 2021-07-21 PROCEDURE — G0279: CPT | Mod: 26

## 2021-07-21 PROCEDURE — 76641 ULTRASOUND BREAST COMPLETE: CPT | Mod: 26,50

## 2021-07-21 PROCEDURE — 76641 ULTRASOUND BREAST COMPLETE: CPT

## 2021-07-21 PROCEDURE — 77066 DX MAMMO INCL CAD BI: CPT

## 2021-07-27 ENCOUNTER — APPOINTMENT (OUTPATIENT)
Dept: OTOLARYNGOLOGY | Facility: CLINIC | Age: 51
End: 2021-07-27

## 2021-08-24 ENCOUNTER — APPOINTMENT (OUTPATIENT)
Dept: RHEUMATOLOGY | Facility: CLINIC | Age: 51
End: 2021-08-24
Payer: COMMERCIAL

## 2021-08-24 VITALS
DIASTOLIC BLOOD PRESSURE: 79 MMHG | HEART RATE: 74 BPM | WEIGHT: 195 LBS | TEMPERATURE: 98.9 F | SYSTOLIC BLOOD PRESSURE: 121 MMHG | BODY MASS INDEX: 36.82 KG/M2 | HEIGHT: 61 IN | OXYGEN SATURATION: 98 %

## 2021-08-24 DIAGNOSIS — M25.511 PAIN IN RIGHT SHOULDER: ICD-10-CM

## 2021-08-24 DIAGNOSIS — M70.61 TROCHANTERIC BURSITIS, RIGHT HIP: ICD-10-CM

## 2021-08-24 DIAGNOSIS — M19.011 PRIMARY OSTEOARTHRITIS, RIGHT SHOULDER: ICD-10-CM

## 2021-08-24 DIAGNOSIS — M76.32 ILIOTIBIAL BAND SYNDROME, LEFT LEG: ICD-10-CM

## 2021-08-24 DIAGNOSIS — M70.62 TROCHANTERIC BURSITIS, RIGHT HIP: ICD-10-CM

## 2021-08-24 PROCEDURE — 99203 OFFICE O/P NEW LOW 30 MIN: CPT

## 2021-08-27 ENCOUNTER — APPOINTMENT (OUTPATIENT)
Dept: OTOLARYNGOLOGY | Facility: CLINIC | Age: 51
End: 2021-08-27
Payer: COMMERCIAL

## 2021-08-27 VITALS
WEIGHT: 195 LBS | HEIGHT: 61 IN | HEART RATE: 75 BPM | SYSTOLIC BLOOD PRESSURE: 137 MMHG | DIASTOLIC BLOOD PRESSURE: 80 MMHG | TEMPERATURE: 97.9 F | BODY MASS INDEX: 36.82 KG/M2

## 2021-08-27 DIAGNOSIS — J35.1 HYPERTROPHY OF TONSILS: ICD-10-CM

## 2021-08-27 DIAGNOSIS — H90.5 UNSPECIFIED SENSORINEURAL HEARING LOSS: ICD-10-CM

## 2021-08-27 DIAGNOSIS — R22.1 LOCALIZED SWELLING, MASS AND LUMP, NECK: ICD-10-CM

## 2021-08-27 DIAGNOSIS — H90.3 SENSORINEURAL HEARING LOSS, BILATERAL: ICD-10-CM

## 2021-08-27 PROCEDURE — 92557 COMPREHENSIVE HEARING TEST: CPT

## 2021-08-27 PROCEDURE — 99214 OFFICE O/P EST MOD 30 MIN: CPT | Mod: 25

## 2021-08-27 PROCEDURE — 92567 TYMPANOMETRY: CPT

## 2021-08-27 PROCEDURE — 31575 DIAGNOSTIC LARYNGOSCOPY: CPT

## 2021-08-27 NOTE — ASSESSMENT
[FreeTextEntry1] : Patient had a partial sigmoid resection for diverticulosis back in December since then she is really felt that her throat is not quite the same feels like this this persistent sensation of a lump in her throat she is here for evaluation she does have a history of some mild reflux of that been pretty much well under control she does have obstructive sleep apnea is on a CPAP machine nasal endoscopy was normal fiberoptic evaluation of the larynx that showed very prominent lingual tonsils I put her on a Medrol Dosepak and Flonase nasal spray fully expecting her symptoms to improve consideration for underlying reflux needs to be considered if her symptoms were to persist but she was reassured no tumors or masses she also has been feeling that her right ear is somewhat worse her last hearing test 6 years ago showed pretty much normal hearing except for slight asymmetry in the high frequencies repeat hearing test shows a slight deterioration but not anything significant of concern.

## 2021-08-27 NOTE — END OF VISIT
[FreeTextEntry3] : I saw and examined this patient in person. I have discussed with Racquel Aponte, Physician Assistant, in detail the above note and agree with the above assessment and plan of care.\par

## 2021-08-27 NOTE — HISTORY OF PRESENT ILLNESS
[de-identified] : PAtient with a  history of MARIAN on CPAP, had abdominal surgery in December 2020 and was intubated. Since then she has had a sensation of a lump in the throat which comes and goes. SHe does have a history of stomach issues and reflux but does not take medication. She does not notice that she has had any changes in her voice and she does not have any issues eating, drinking or swallowing. \par She has also had ear clogging bilaterally as well. She denies pain in the ears, ringing in the ears or dizziness. She does feel that the right ear is worse than the left in ear regrads to hearing but has not had a haering test in 6 years

## 2021-08-27 NOTE — PHYSICAL EXAM
[Midline] : trachea located in midline position [Normal] : no rashes [de-identified] : fullness in the base of tongue

## 2021-08-29 NOTE — ASSESSMENT
[FreeTextEntry1] : 31-year-old female with chronic fatigue and low back pain with radiation down thighs worsened since the Covid pandemic likely due to physical inactivity.\par = Trochanteric bursitis and physical exam as well as possible ischial bursitis\par = Right shoulder pain likely rotator cuff tendinopathy/ early shoulder OA\par \par Plan:\par -Physical therapy referral for trochanteric bursa and right shoulder pain\par -xrays of hips and shoulders\par -Ultrasound of trochanteric bursa and ischial bursa\par -Ultrasound-guided injection of left trochanteric bursa and ischial bursa

## 2021-08-29 NOTE — PHYSICAL EXAM
[General Appearance - Alert] : alert [General Appearance - In No Acute Distress] : in no acute distress [General Appearance - Well Nourished] : well nourished [Auscultation Breath Sounds / Voice Sounds] : lungs were clear to auscultation bilaterally [Sclera] : the sclera and conjunctiva were normal [Heart Sounds] : normal S1 and S2 [Heart Sounds Gallop] : no gallops [Cervical Lymph Nodes Enlarged Posterior Bilaterally] : posterior cervical [Cervical Lymph Nodes Enlarged Anterior Bilaterally] : anterior cervical [No Spinal Tenderness] : no spinal tenderness [Nail Clubbing] : no clubbing  or cyanosis of the fingernails [FreeTextEntry1] : TTP to bilateral trochanteric bursa more pronounced on left side. TTP Over ischium possibly ischal bursitis.  DP right subacromial bursa with slightly elevated range of motion on abduction [] : no rash [Sensation] : the sensory exam was normal to light touch and pinprick [Motor Exam] : the motor exam was normal [Oriented To Time, Place, And Person] : oriented to person, place, and time

## 2021-08-29 NOTE — HISTORY OF PRESENT ILLNESS
[FreeTextEntry1] : Ms. Stapleton is a 51F  coming for evaluation of fatigue and joint pain.\par \par = several years of fatigue\par = low back pain since 20s, lately with radiation to b/l waists, sides of thighs, better with stretching and movement\par = has been to PT in 6/2021, has been doing stretching exercises, no other exercise, does not have energy to do cardio\par = takes tylenol as needed does not like to take medications, avoids nsaids due to stomach issues\par = since covid pandemic feels more fatigue, more pain, more stress\par = sleeps with CPAP machine for MARIAN, believes it is well controlled\par = poor non-restorative sleep most days, gets about 8 hours of sleep\par = workup done by Dr. Valenzuela - DESTINEE, RF, CCP, Lyme, ESR CRP, CK normal.\par \par Occupation: \par FH: No personal or family history of autoimmune disease\par

## 2021-09-09 ENCOUNTER — APPOINTMENT (OUTPATIENT)
Dept: ENDOCRINOLOGY | Facility: CLINIC | Age: 51
End: 2021-09-09
Payer: COMMERCIAL

## 2021-09-09 VITALS
DIASTOLIC BLOOD PRESSURE: 70 MMHG | SYSTOLIC BLOOD PRESSURE: 111 MMHG | OXYGEN SATURATION: 98 % | TEMPERATURE: 98 F | HEIGHT: 60.63 IN | HEART RATE: 67 BPM | BODY MASS INDEX: 39.13 KG/M2 | WEIGHT: 204.57 LBS

## 2021-09-09 PROCEDURE — 99214 OFFICE O/P EST MOD 30 MIN: CPT

## 2021-09-09 NOTE — ASSESSMENT
[FreeTextEntry1] : This is a 51-year-old female with primary hypothyroidism, vitamin D insufficiency, obesity.\par 1.Primary hypothyroidism\par Check TFTs. \par Cont LT4 100mcg qd pending BW.\par She is clinically euthyroid.\par 2. Vitamin D insufficiency\par Check 25 OHD.\par 3. Obesity\par LSM. \par She will consider Saxenda versus Wegovy.

## 2021-09-09 NOTE — REVIEW OF SYSTEMS
[Fatigue] : fatigue [Recent Weight Gain (___ Lbs)] : recent weight gain: [unfilled] lbs [All other systems negative] : All other systems negative [FreeTextEntry4] : lump in throat

## 2021-09-09 NOTE — PHYSICAL EXAM
[Alert] : alert [Well Nourished] : well nourished [Healthy Appearance] : healthy appearance [Obese] : obese [No Acute Distress] : no acute distress [Well Developed] : well developed [Normal Voice/Communication] : normal voice communication [Normal Sclera/Conjunctiva] : normal sclera/conjunctiva [Normal Oropharynx] : the oropharynx was normal [No Neck Mass] : no neck mass was observed [No LAD] : no lymphadenopathy [Supple] : the neck was supple [Thyroid Not Enlarged] : the thyroid was not enlarged [No Thyroid Nodules] : no palpable thyroid nodules [No Respiratory Distress] : no respiratory distress [No Accessory Muscle Use] : no accessory muscle use [Normal Rate and Effort] : normal respiratory rate and effort [Normal Rate] : heart rate was normal [Spine Straight] : spine straight [Normal Gait] : normal gait [No Clubbing, Cyanosis] : no clubbing  or cyanosis of the fingernails [No Involuntary Movements] : no involuntary movements were seen [Acanthosis Nigricans] : no acanthosis nigricans [No Tremors] : no tremors [Normal Affect] : the affect was normal [Normal Insight/Judgement] : insight and judgment were intact [Normal Mood] : the mood was normal

## 2021-09-09 NOTE — HISTORY OF PRESENT ILLNESS
[FreeTextEntry1] : CC: Hypothyroidism\par This is a 51-year-old female with primary hypothyroidism, vitamin D insufficiency, obesity, diverticulitis status post sigmoid colectomy, here for follow up. She reports that she was diagnosed with hypothyroidism at age 46. She is currently on levothyroxine 100 mcg daily. She takes in the morning on an empty stomach.  She reports feeling tired, sensation of something stuck in her throat, and weight gain.\par She is not currently exercising.\par

## 2021-10-05 ENCOUNTER — APPOINTMENT (OUTPATIENT)
Dept: MAMMOGRAPHY | Facility: CLINIC | Age: 51
End: 2021-10-05
Payer: COMMERCIAL

## 2021-10-05 ENCOUNTER — OUTPATIENT (OUTPATIENT)
Dept: OUTPATIENT SERVICES | Facility: HOSPITAL | Age: 51
LOS: 1 days | End: 2021-10-05
Payer: COMMERCIAL

## 2021-10-05 ENCOUNTER — APPOINTMENT (OUTPATIENT)
Dept: ULTRASOUND IMAGING | Facility: CLINIC | Age: 51
End: 2021-10-05
Payer: COMMERCIAL

## 2021-10-05 DIAGNOSIS — Z00.8 ENCOUNTER FOR OTHER GENERAL EXAMINATION: ICD-10-CM

## 2021-10-05 DIAGNOSIS — Z98.890 OTHER SPECIFIED POSTPROCEDURAL STATES: Chronic | ICD-10-CM

## 2021-10-05 DIAGNOSIS — T14.8XXA OTHER INJURY OF UNSPECIFIED BODY REGION, INITIAL ENCOUNTER: Chronic | ICD-10-CM

## 2021-10-05 PROCEDURE — G0279: CPT | Mod: 26

## 2021-10-05 PROCEDURE — 76642 ULTRASOUND BREAST LIMITED: CPT | Mod: 26,LT

## 2021-10-05 PROCEDURE — 76642 ULTRASOUND BREAST LIMITED: CPT

## 2021-10-05 PROCEDURE — 77065 DX MAMMO INCL CAD UNI: CPT | Mod: 26,LT

## 2021-10-05 PROCEDURE — 77065 DX MAMMO INCL CAD UNI: CPT

## 2021-10-05 PROCEDURE — G0279: CPT

## 2021-10-13 ENCOUNTER — APPOINTMENT (OUTPATIENT)
Dept: OPHTHALMOLOGY | Facility: CLINIC | Age: 51
End: 2021-10-13
Payer: COMMERCIAL

## 2021-10-13 ENCOUNTER — NON-APPOINTMENT (OUTPATIENT)
Age: 51
End: 2021-10-13

## 2021-10-13 PROCEDURE — 92285 EXTERNAL OCULAR PHOTOGRAPHY: CPT

## 2021-10-13 PROCEDURE — 92004 COMPRE OPH EXAM NEW PT 1/>: CPT | Mod: 25

## 2021-10-13 PROCEDURE — 65222 REMOVE FOREIGN BODY FROM EYE: CPT | Mod: LT

## 2021-10-15 ENCOUNTER — APPOINTMENT (OUTPATIENT)
Dept: OPHTHALMOLOGY | Facility: CLINIC | Age: 51
End: 2021-10-15
Payer: COMMERCIAL

## 2021-10-15 ENCOUNTER — NON-APPOINTMENT (OUTPATIENT)
Age: 51
End: 2021-10-15

## 2021-10-15 DIAGNOSIS — Z01.812 ENCOUNTER FOR PREPROCEDURAL LABORATORY EXAMINATION: ICD-10-CM

## 2021-10-15 PROCEDURE — 92012 INTRM OPH EXAM EST PATIENT: CPT

## 2021-11-15 ENCOUNTER — NON-APPOINTMENT (OUTPATIENT)
Age: 51
End: 2021-11-15

## 2021-11-17 ENCOUNTER — APPOINTMENT (OUTPATIENT)
Dept: ENDOCRINOLOGY | Facility: CLINIC | Age: 51
End: 2021-11-17
Payer: COMMERCIAL

## 2021-11-17 VITALS
OXYGEN SATURATION: 97 % | HEART RATE: 72 BPM | WEIGHT: 204.13 LBS | DIASTOLIC BLOOD PRESSURE: 75 MMHG | SYSTOLIC BLOOD PRESSURE: 120 MMHG | BODY MASS INDEX: 39.04 KG/M2 | TEMPERATURE: 97.4 F | HEIGHT: 60.63 IN

## 2021-11-17 PROCEDURE — 99213 OFFICE O/P EST LOW 20 MIN: CPT | Mod: 25

## 2021-11-17 PROCEDURE — 36415 COLL VENOUS BLD VENIPUNCTURE: CPT

## 2021-11-19 ENCOUNTER — NON-APPOINTMENT (OUTPATIENT)
Age: 51
End: 2021-11-19

## 2021-11-19 ENCOUNTER — RX RENEWAL (OUTPATIENT)
Age: 51
End: 2021-11-19

## 2021-11-19 LAB
25(OH)D3 SERPL-MCNC: 21.8 NG/ML
ALBUMIN SERPL ELPH-MCNC: 4.5 G/DL
ALP BLD-CCNC: 47 U/L
ALT SERPL-CCNC: 20 U/L
ANION GAP SERPL CALC-SCNC: 14 MMOL/L
AST SERPL-CCNC: 18 U/L
BASOPHILS # BLD AUTO: 0.04 K/UL
BASOPHILS NFR BLD AUTO: 0.8 %
BILIRUB SERPL-MCNC: 0.3 MG/DL
BUN SERPL-MCNC: 14 MG/DL
CALCIUM SERPL-MCNC: 9.2 MG/DL
CHLORIDE SERPL-SCNC: 106 MMOL/L
CHOLEST SERPL-MCNC: 196 MG/DL
CO2 SERPL-SCNC: 21 MMOL/L
COVID-19 NUCLEOCAPSID  GAM ANTIBODY INTERPRETATION: NEGATIVE
COVID-19 SPIKE DOMAIN ANTIBODY INTERPRETATION: POSITIVE
CREAT SERPL-MCNC: 0.71 MG/DL
EOSINOPHIL # BLD AUTO: 0.16 K/UL
EOSINOPHIL NFR BLD AUTO: 3.1 %
ESTIMATED AVERAGE GLUCOSE: 111 MG/DL
GLUCOSE SERPL-MCNC: 95 MG/DL
HBA1C MFR BLD HPLC: 5.5 %
HCT VFR BLD CALC: 41.1 %
HDLC SERPL-MCNC: 78 MG/DL
HGB BLD-MCNC: 12.9 G/DL
IMM GRANULOCYTES NFR BLD AUTO: 0.2 %
LDLC SERPL CALC-MCNC: 99 MG/DL
LYMPHOCYTES # BLD AUTO: 1.8 K/UL
LYMPHOCYTES NFR BLD AUTO: 34.9 %
MAN DIFF?: NORMAL
MCHC RBC-ENTMCNC: 30.8 PG
MCHC RBC-ENTMCNC: 31.4 GM/DL
MCV RBC AUTO: 98.1 FL
MONOCYTES # BLD AUTO: 0.53 K/UL
MONOCYTES NFR BLD AUTO: 10.3 %
NEUTROPHILS # BLD AUTO: 2.62 K/UL
NEUTROPHILS NFR BLD AUTO: 50.7 %
NONHDLC SERPL-MCNC: 118 MG/DL
PLATELET # BLD AUTO: 306 K/UL
POTASSIUM SERPL-SCNC: 4.3 MMOL/L
PROT SERPL-MCNC: 7.2 G/DL
RBC # BLD: 4.19 M/UL
RBC # FLD: 13.2 %
SARS-COV-2 AB SERPL IA-ACNC: >250 U/ML
SARS-COV-2 AB SERPL QL IA: 0.07 INDEX
SODIUM SERPL-SCNC: 141 MMOL/L
T4 FREE SERPL-MCNC: 1.7 NG/DL
TRIGL SERPL-MCNC: 95 MG/DL
TSH SERPL-ACNC: 0.64 UIU/ML
WBC # FLD AUTO: 5.16 K/UL

## 2021-11-19 NOTE — REVIEW OF SYSTEMS
[Recent Weight Gain (___ Lbs)] : recent weight gain: [unfilled] lbs [Myalgia] : myalgia  [All other systems negative] : All other systems negative

## 2021-11-19 NOTE — HISTORY OF PRESENT ILLNESS
[FreeTextEntry1] : CC: Hypothyroidism\par This is a 51-year-old female with primary hypothyroidism, vitamin D insufficiency, obesity, diverticulitis status post sigmoid colectomy, here for follow up. She reports that she was diagnosed with hypothyroidism at age 46. She is currently on levothyroxine 100 mcg daily. She takes in the morning on an empty stomach. \par She reports weight gain, difficulty losing weight, aches in her legs.

## 2021-11-19 NOTE — ASSESSMENT
[FreeTextEntry1] : This is a 51-year-old female with primary hypothyroidism, vitamin D insufficiency, obesity.\par 1.Primary hypothyroidism\par Check TFTs. \par Cont LT4 100mcg qd pending BW.\par She is clinically euthyroid.\par Thyroid ultrasound from 2020 showed no thyroid nodules\par 2. Vitamin D insufficiency\par Check 25 OHD.\par 3. Obesity\par LSM. \par She is not interested in GLP-1 agonist.\par Appointment with KRISTINA.

## 2021-11-20 ENCOUNTER — RX RENEWAL (OUTPATIENT)
Age: 51
End: 2021-11-20

## 2021-12-07 ENCOUNTER — TRANSCRIPTION ENCOUNTER (OUTPATIENT)
Age: 51
End: 2021-12-07

## 2021-12-10 ENCOUNTER — APPOINTMENT (OUTPATIENT)
Dept: PULMONOLOGY | Facility: CLINIC | Age: 51
End: 2021-12-10
Payer: COMMERCIAL

## 2021-12-10 ENCOUNTER — NON-APPOINTMENT (OUTPATIENT)
Age: 51
End: 2021-12-10

## 2021-12-10 VITALS
HEART RATE: 72 BPM | DIASTOLIC BLOOD PRESSURE: 77 MMHG | HEIGHT: 61 IN | TEMPERATURE: 97.9 F | RESPIRATION RATE: 14 BRPM | SYSTOLIC BLOOD PRESSURE: 126 MMHG | WEIGHT: 190 LBS | OXYGEN SATURATION: 98 % | BODY MASS INDEX: 35.87 KG/M2

## 2021-12-10 DIAGNOSIS — Z87.09 PERSONAL HISTORY OF OTHER DISEASES OF THE RESPIRATORY SYSTEM: ICD-10-CM

## 2021-12-10 PROCEDURE — 99205 OFFICE O/P NEW HI 60 MIN: CPT | Mod: 25

## 2021-12-10 PROCEDURE — 94729 DIFFUSING CAPACITY: CPT

## 2021-12-10 PROCEDURE — 71046 X-RAY EXAM CHEST 2 VIEWS: CPT

## 2021-12-10 PROCEDURE — 94727 GAS DIL/WSHOT DETER LNG VOL: CPT

## 2021-12-10 PROCEDURE — ZZZZZ: CPT

## 2021-12-10 PROCEDURE — 94010 BREATHING CAPACITY TEST: CPT

## 2021-12-10 NOTE — DISCUSSION/SUMMARY
[FreeTextEntry1] : MARIAN on CPAP but still with fatigue\par SOB.  No clinical radiographic or functional evidence of underlying pulmonary disease.  Status post cardiac evaluation.  Likely dyspnea on exertion is related to weight and conditioning.

## 2021-12-10 NOTE — PROCEDURE
[FreeTextEntry1] : 12/10/2021\par Pulmonary function testing\par FEV1, FVC, and FEV1/FVC are within normal limits. TLC and subdivisions are normal. RV/TLC ratio is normal. Single breath diffusion capacity is normal.

## 2021-12-10 NOTE — HISTORY OF PRESENT ILLNESS
[Never] : never [TextBox_4] : WARNER REYES is a 51 year old  F referred for pulmonary evaluation for MARIAN\par \par On CPAP for few years but still tired. Thought would be better. Sleepy by 3 pm\par Goes to sleep around 1030-11 and wakes at 6. Not tired on awakening. Gets tired later in day. \par Severe snoring if does not wear CPAP. No snoring with CPAP. Uses most nights.Often does not wear on weekend. \par Machine is ? 5 years old. \par Had HSS.Has gained weight approx, 40 pounds.\par Not aware of settings.\par Positive ELIZALDE. Up stairs or walking from parking lot.\par No significant cough unless SOB in cold weather.\par No wheezing.\par \par Past pulmonary history. MARIAN. 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 N\par Recent travel N\par Pets Dog is allergic.\par \par Believes had cxr. \par  [TextBox_29] : Second hand tobacco.

## 2021-12-10 NOTE — ASSESSMENT
[FreeTextEntry1] : Continue CPAP therapy.\par Patient will come in and bring machine to assess function and settings.\par We will obtain split-night study.\par Program of exercise and weight loss would be beneficial.

## 2021-12-21 ENCOUNTER — TRANSCRIPTION ENCOUNTER (OUTPATIENT)
Age: 51
End: 2021-12-21

## 2021-12-29 ENCOUNTER — APPOINTMENT (OUTPATIENT)
Dept: ENDOCRINOLOGY | Facility: CLINIC | Age: 51
End: 2021-12-29

## 2022-01-18 ENCOUNTER — OUTPATIENT (OUTPATIENT)
Dept: OUTPATIENT SERVICES | Facility: HOSPITAL | Age: 52
LOS: 1 days | End: 2022-01-18
Payer: COMMERCIAL

## 2022-01-18 ENCOUNTER — APPOINTMENT (OUTPATIENT)
Dept: CT IMAGING | Facility: IMAGING CENTER | Age: 52
End: 2022-01-18
Payer: COMMERCIAL

## 2022-01-18 ENCOUNTER — APPOINTMENT (OUTPATIENT)
Dept: OTOLARYNGOLOGY | Facility: CLINIC | Age: 52
End: 2022-01-18
Payer: COMMERCIAL

## 2022-01-18 VITALS
HEART RATE: 80 BPM | SYSTOLIC BLOOD PRESSURE: 126 MMHG | TEMPERATURE: 97.5 F | DIASTOLIC BLOOD PRESSURE: 78 MMHG | BODY MASS INDEX: 35.87 KG/M2 | WEIGHT: 190 LBS | HEIGHT: 61 IN

## 2022-01-18 DIAGNOSIS — Z98.890 OTHER SPECIFIED POSTPROCEDURAL STATES: Chronic | ICD-10-CM

## 2022-01-18 DIAGNOSIS — R44.2 OTHER HALLUCINATIONS: ICD-10-CM

## 2022-01-18 DIAGNOSIS — T14.8XXA OTHER INJURY OF UNSPECIFIED BODY REGION, INITIAL ENCOUNTER: Chronic | ICD-10-CM

## 2022-01-18 PROCEDURE — 31231 NASAL ENDOSCOPY DX: CPT

## 2022-01-18 PROCEDURE — 70486 CT MAXILLOFACIAL W/O DYE: CPT | Mod: 26

## 2022-01-18 PROCEDURE — 99214 OFFICE O/P EST MOD 30 MIN: CPT | Mod: 25

## 2022-01-18 PROCEDURE — 70486 CT MAXILLOFACIAL W/O DYE: CPT

## 2022-01-18 NOTE — HISTORY OF PRESENT ILLNESS
[de-identified] : Patient has been having a smell of something smelling like cigarettes for about two weeks. She is able to smell and taste things normally otherwise. At night she does occasionally get a sharp stabbing pain in the left eye as well. She has not had any discharge from the nose recently.

## 2022-01-18 NOTE — ASSESSMENT
[FreeTextEntry1] : Patient with a recent sensation of smell of smoke.'s been going on for about a month she is here she googled certain things somewhat concerned on examination endoscopically she has a deviated septum to the right despite having had nasal surgery in the past no tumors masses or polyps ear examination is normal we sent her for a CAT scan of her sinuses and frontal lobe to rule out a frontal lobe lesion put her on a Medrol Dosepak told to use Flonase nasal spray religiously for a month and she will follow-up and see us in 3 months.  She has a stands that this could very well be self-limiting but may take some time to resolve.

## 2022-01-18 NOTE — REVIEW OF SYSTEMS
[Sinus Pressure] : sinus pressure [Sense Of Smell Problem] : sense of smell problem [Negative] : Heme/Lymph [de-identified] : p

## 2022-01-18 NOTE — PHYSICAL EXAM
[Nasal Endoscopy Performed] : nasal endoscopy was performed, see procedure section for findings [] : septum deviated to the left [Midline] : trachea located in midline position [Normal] : no rashes [de-identified] : fullness in the base of tongue

## 2022-02-02 ENCOUNTER — APPOINTMENT (OUTPATIENT)
Dept: NEUROLOGY | Facility: CLINIC | Age: 52
End: 2022-02-02
Payer: COMMERCIAL

## 2022-02-02 VITALS
HEART RATE: 76 BPM | BODY MASS INDEX: 35.87 KG/M2 | HEIGHT: 61 IN | SYSTOLIC BLOOD PRESSURE: 126 MMHG | WEIGHT: 190 LBS | DIASTOLIC BLOOD PRESSURE: 80 MMHG

## 2022-02-02 PROCEDURE — 99204 OFFICE O/P NEW MOD 45 MIN: CPT

## 2022-02-02 NOTE — PHYSICAL EXAM
[General Appearance - Alert] : alert [Oriented To Time, Place, And Person] : oriented to person, place, and time [Person] : oriented to person [Place] : oriented to place [Time] : oriented to time [Short Term Intact] : short term memory intact [Fluency] : fluency intact [Current Events] : adequate knowledge of current events [Cranial Nerves Optic (II)] : visual acuity intact bilaterally,  visual fields full to confrontation, pupils equal round and reactive to light [Cranial Nerves Oculomotor (III)] : extraocular motion intact [Cranial Nerves Vestibulocochlear (VIII)] : hearing was intact bilaterally [Cranial Nerves Accessory (XI - Cranial And Spinal)] : head turning and shoulder shrug symmetric [Motor Tone] : muscle tone was normal in all four extremities [Motor Strength] : muscle strength was normal in all four extremities [Sensation Tactile Decrease] : light touch was intact [Abnormal Walk] : normal gait [Coordination - Dysmetria Impaired Finger-to-Nose Bilateral] : not present [1+] : Patella left 1+ [FreeTextEntry5] : Able to detect sediment however not able to detect coffee.

## 2022-02-02 NOTE — HISTORY OF PRESENT ILLNESS
[FreeTextEntry1] : 51-year-old woman who is here for initial consultation of smelling cigarette smell before [place].  Patient states that it has been intermittent and she has seen ENT.  Work-up including CT of the sinuses along with course of steroids and nasal sprays have failed to alleviate her symptoms.  Patient has no fevers and no sick contacts.  Patient has no exposure to Covid.

## 2022-02-02 NOTE — DISCUSSION/SUMMARY
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  Will check for Covid with a nasal swab.  Will check MRI of the brain with and without contrast to evaluate for any lesions in the olfactory bulb.  Patient will also be sent for EEG for low suspicion of seizure activity.  Patient will follow up with me after the studies are completed.\par \par I spent the time noted on the day of this patient encounter preparing for, providing and documenting the above E/M service and counseling and educate patient on differential, workup, disease course, and treatment/management. Education was provided to the patient during this encounter. All questions and concerns were answered and addressed in detail. The patient verbalized understanding and agreed to plan. Patient was advised to continue to monitor for neurologic symptoms and to notify my office or go to the nearest emergency room if there are any changes. Any orders/referrals and communications were provided as well. \par Side effects of the above medications were discussed in detail including but not limited to applicable black box warning and teratogenicity as appropriate. \par Patient was advised to bring previous records to my office. \par \par \par

## 2022-02-03 ENCOUNTER — APPOINTMENT (OUTPATIENT)
Dept: ENDOCRINOLOGY | Facility: CLINIC | Age: 52
End: 2022-02-03
Payer: COMMERCIAL

## 2022-02-03 PROCEDURE — 97802 MEDICAL NUTRITION INDIV IN: CPT | Mod: 95

## 2022-02-10 ENCOUNTER — OUTPATIENT (OUTPATIENT)
Dept: OUTPATIENT SERVICES | Facility: HOSPITAL | Age: 52
LOS: 1 days | End: 2022-02-10
Payer: COMMERCIAL

## 2022-02-10 ENCOUNTER — APPOINTMENT (OUTPATIENT)
Dept: MRI IMAGING | Facility: CLINIC | Age: 52
End: 2022-02-10
Payer: COMMERCIAL

## 2022-02-10 DIAGNOSIS — T14.8XXA OTHER INJURY OF UNSPECIFIED BODY REGION, INITIAL ENCOUNTER: Chronic | ICD-10-CM

## 2022-02-10 DIAGNOSIS — Z98.890 OTHER SPECIFIED POSTPROCEDURAL STATES: Chronic | ICD-10-CM

## 2022-02-10 DIAGNOSIS — R44.2 OTHER HALLUCINATIONS: ICD-10-CM

## 2022-02-10 PROCEDURE — A9585: CPT

## 2022-02-10 PROCEDURE — 70553 MRI BRAIN STEM W/O & W/DYE: CPT | Mod: 26

## 2022-02-10 PROCEDURE — 70553 MRI BRAIN STEM W/O & W/DYE: CPT

## 2022-02-11 ENCOUNTER — APPOINTMENT (OUTPATIENT)
Dept: INTERNAL MEDICINE | Facility: CLINIC | Age: 52
End: 2022-02-11
Payer: COMMERCIAL

## 2022-02-11 VITALS
OXYGEN SATURATION: 97 % | TEMPERATURE: 97 F | BODY MASS INDEX: 35.87 KG/M2 | WEIGHT: 190 LBS | SYSTOLIC BLOOD PRESSURE: 140 MMHG | DIASTOLIC BLOOD PRESSURE: 84 MMHG | HEART RATE: 72 BPM | HEIGHT: 61 IN

## 2022-02-11 VITALS — DIASTOLIC BLOOD PRESSURE: 86 MMHG | SYSTOLIC BLOOD PRESSURE: 138 MMHG

## 2022-02-11 DIAGNOSIS — R22.0 LOCALIZED SWELLING, MASS AND LUMP, HEAD: ICD-10-CM

## 2022-02-11 DIAGNOSIS — R22.1 LOCALIZED SWELLING, MASS AND LUMP, HEAD: ICD-10-CM

## 2022-02-11 PROCEDURE — 36415 COLL VENOUS BLD VENIPUNCTURE: CPT

## 2022-02-11 PROCEDURE — 99213 OFFICE O/P EST LOW 20 MIN: CPT | Mod: 25

## 2022-02-11 NOTE — ASSESSMENT
[FreeTextEntry1] : Submandibular Swelling\par -blood work done today\par -US ordered for further characterization-lymph node vs sialoadenitis?\par -will check CBC\par -Assess for any lymphadenopathy \par

## 2022-02-11 NOTE — HISTORY OF PRESENT ILLNESS
[FreeTextEntry8] : Patient c/o of bruise on her neck noticed 5 days ago. She did a facial 4 days ago and the next day noticed the bruise.\par Denies pain, ear pain, sore throat, fever, night sweats, trauma to arm, bleeding gums, dry eyes, dry mouth and change in medications.\par She also reports to have headaches but just drinks water for it. She also states to be fatigue during the day. Denies changes in her diet.\par Also reports to interested in sleep study but haven't scheduled one yet.\par She reports recently did a MRI of the brain awaiting results

## 2022-02-11 NOTE — PHYSICAL EXAM
[No Acute Distress] : no acute distress [Well Nourished] : well nourished [Well Developed] : well developed [Well-Appearing] : well-appearing [Normal Sclera/Conjunctiva] : normal sclera/conjunctiva [PERRL] : pupils equal round and reactive to light [EOMI] : extraocular movements intact [Normal Outer Ear/Nose] : the outer ears and nose were normal in appearance [Normal Oropharynx] : the oropharynx was normal [No JVD] : no jugular venous distention [No Lymphadenopathy] : no lymphadenopathy [Supple] : supple [Thyroid Normal, No Nodules] : the thyroid was normal and there were no nodules present [No Respiratory Distress] : no respiratory distress  [No Accessory Muscle Use] : no accessory muscle use [Clear to Auscultation] : lungs were clear to auscultation bilaterally [Normal Rate] : normal rate  [Regular Rhythm] : with a regular rhythm [Normal S1, S2] : normal S1 and S2 [No Murmur] : no murmur heard [No Carotid Bruits] : no carotid bruits [No Abdominal Bruit] : a ~M bruit was not heard ~T in the abdomen [No Varicosities] : no varicosities [Pedal Pulses Present] : the pedal pulses are present [No Edema] : there was no peripheral edema [No Palpable Aorta] : no palpable aorta [No Extremity Clubbing/Cyanosis] : no extremity clubbing/cyanosis [Soft] : abdomen soft [Non Tender] : non-tender [Non-distended] : non-distended [No Masses] : no abdominal mass palpated [No HSM] : no HSM [Normal Bowel Sounds] : normal bowel sounds [Normal Posterior Cervical Nodes] : no posterior cervical lymphadenopathy [Normal Anterior Cervical Nodes] : no anterior cervical lymphadenopathy [No CVA Tenderness] : no CVA  tenderness [No Spinal Tenderness] : no spinal tenderness [No Joint Swelling] : no joint swelling [Grossly Normal Strength/Tone] : grossly normal strength/tone [No Rash] : no rash [Coordination Grossly Intact] : coordination grossly intact [No Focal Deficits] : no focal deficits [Normal Gait] : normal gait [Deep Tendon Reflexes (DTR)] : deep tendon reflexes were 2+ and symmetric [Normal Affect] : the affect was normal [Normal Insight/Judgement] : insight and judgment were intact [de-identified] : Right Submandibular Tenderness, slight ecchymosis

## 2022-02-14 ENCOUNTER — APPOINTMENT (OUTPATIENT)
Dept: NEUROLOGY | Facility: CLINIC | Age: 52
End: 2022-02-14
Payer: COMMERCIAL

## 2022-02-14 ENCOUNTER — TRANSCRIPTION ENCOUNTER (OUTPATIENT)
Age: 52
End: 2022-02-14

## 2022-02-14 PROCEDURE — 99214 OFFICE O/P EST MOD 30 MIN: CPT | Mod: 95

## 2022-02-14 NOTE — PHYSICAL EXAM
[Person] : oriented to person [Place] : oriented to place [Short Term Intact] : short term memory intact [Time] : oriented to time [Fluency] : fluency intact [Current Events] : adequate knowledge of current events [Cranial Nerves Optic (II)] : visual acuity intact bilaterally,  visual fields full to confrontation, pupils equal round and reactive to light [Cranial Nerves Oculomotor (III)] : extraocular motion intact [Cranial Nerves Facial (VII)] : face symmetrical [Cranial Nerves Vestibulocochlear (VIII)] : hearing was intact bilaterally [Cranial Nerves Accessory (XI - Cranial And Spinal)] : head turning and shoulder shrug symmetric [Paresis Pronator Drift Right-Sided] : no pronator drift on the right [Paresis Pronator Drift Left-Sided] : no pronator drift on the left [Abnormal Walk] : normal gait

## 2022-02-14 NOTE — DISCUSSION/SUMMARY
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  Will pursue MRI of the pituitary with and without contrast along with thin sections of the olfactory bulb.  Message was sent to neuroradiology to ensure that both will be done.  Patient has EEG that is pending and she will follow-up with me after the studies are completed.\par \par physician location: office\par patient location: home\par \par I spent 30 minutes of total time, during which more than 50% of the time was spent on counseling. I explained the diagnosis, other possible diagnoses, workup, and management, as well as answered any questions.\par \par This is a telemedicine visit that was performed using real time 2-way audiovisual technology. Verbal consent to participate in video visit was obtained and patient was aware of their right to refuse to participate in services delivered via telemedicine and the alternative and potential limitations of participating in a telemedicine visit vs a face-to-face visit; I have also informed the patient of my current location in the [facility] and the names of all persons participating in the telemedicine service and their role in the encounter. The patient agrees to have this service via Telehealth.\par \par  This visit occurred during the Coronavirus (COVID-19) Public Health Emergency. I discussed with the patient the nature of our telemedicine visits, that: \par • I would evaluate the patient and recommend diagnostics and treatments based on my assessment \par • Our sessions are not being recorded and that personal health information is protected \par • Our team would provide follow up care in person if/when the patient needs it.\par \par

## 2022-02-14 NOTE — HISTORY OF PRESENT ILLNESS
[FreeTextEntry1] : verbal consent given on 02/14/2022 and 15:02  by WARNER REYES at [address] [address] [place] [address]\[place] [address]\par \par \par 51-year-old woman who is here for initial consultation of smelling cigarette smell before Christmas.  Patient states that it has been intermittent and she has seen ENT.  Work-up including CT of the sinuses along with course of steroids and nasal sprays have failed to alleviate her symptoms.  Patient has no fevers and no sick contacts.  Patient has no exposure to Covid.\par \par Interval history: Discussed results of MRI of the brain with the patient.  Patient has no history of pituitary issues.  Patient sees a endocrinologist in the past for hypothyroidism.  The olfactory bulb was not evaluated even though the question on the MRI order sheet specified.  I have emailed neuroradiology to make sure that this is performed when she returns for MRI of the pituitary gland.  Patient states that she tested negative for Covid swab.  Patient has a EEG that is still pending.\par \par CONSENT FOR VIDEO MEDICAL VISIT1. I understand that my physician wishes me to engage in a telemedicine consultation.2. My physician has explained to me how the video conferencing technology will be used to affect such a consultation will not be the same as a direct patient/health care provider visit due to the fact that I will not be in the same room as my physician 3. I understand there are potential risks to this technology, including interruptions, unauthorized access and technical difficulties. I understand that my health care provider or I can discontinue the telemedicine consult/visit if it is felt that the videoconferencing connections are not adequate for the situation.4. I understand that my healthcare information may be shared with other individuals for scheduling and billing purposes. Others may also be present during the consultation other than my physician and consulting health care provider in order to operate the video equipment. The above mentioned people will all maintain confidentiality of the information obtained. I further understand that I will be informed of their presence in the consultation and thus will have the right to request the following: (1) omit specific details of my medical history/physical examination that are personally sensitive to me; (2) ask non-medical personnel to leave the telemedicine examination room: and or (3) terminate the consultation at any time.5. I have had the alternatives to a telemedicine consultation explained to me, and in choosing to participate in a telemedicine consultation. I understand that some parts of the exam involving physical tests may be conducted by individuals at my location at the direction of the consulting health care provider.6. In an emergent consultation, I understand that the responsibility of the telemedicine consulting specialist is to advise my local practitioner and that the specialist’s responsibility will conclude upon the termination of the video conference connection.7. I understand that billing will occur from both my physician and as a facility fee from the site from which I am presented.8. I have had a direct conversation with my physician, during which I had the opportunity to ask questions in regard to this procedure. My questions have been answered and the risks, benefits and any practical alternatives have been discussed with me in a language in which I understand\par

## 2022-02-15 ENCOUNTER — APPOINTMENT (OUTPATIENT)
Dept: ULTRASOUND IMAGING | Facility: CLINIC | Age: 52
End: 2022-02-15
Payer: COMMERCIAL

## 2022-02-15 ENCOUNTER — OUTPATIENT (OUTPATIENT)
Dept: OUTPATIENT SERVICES | Facility: HOSPITAL | Age: 52
LOS: 1 days | End: 2022-02-15
Payer: COMMERCIAL

## 2022-02-15 ENCOUNTER — NON-APPOINTMENT (OUTPATIENT)
Age: 52
End: 2022-02-15

## 2022-02-15 ENCOUNTER — APPOINTMENT (OUTPATIENT)
Dept: MRI IMAGING | Facility: CLINIC | Age: 52
End: 2022-02-15
Payer: COMMERCIAL

## 2022-02-15 DIAGNOSIS — R22.0 LOCALIZED SWELLING, MASS AND LUMP, HEAD: ICD-10-CM

## 2022-02-15 DIAGNOSIS — Z98.890 OTHER SPECIFIED POSTPROCEDURAL STATES: Chronic | ICD-10-CM

## 2022-02-15 DIAGNOSIS — R44.2 OTHER HALLUCINATIONS: ICD-10-CM

## 2022-02-15 DIAGNOSIS — T14.8XXA OTHER INJURY OF UNSPECIFIED BODY REGION, INITIAL ENCOUNTER: Chronic | ICD-10-CM

## 2022-02-15 PROCEDURE — 76536 US EXAM OF HEAD AND NECK: CPT | Mod: 26

## 2022-02-15 PROCEDURE — 70553 MRI BRAIN STEM W/O & W/DYE: CPT

## 2022-02-15 PROCEDURE — 70553 MRI BRAIN STEM W/O & W/DYE: CPT | Mod: 26

## 2022-02-15 PROCEDURE — 76536 US EXAM OF HEAD AND NECK: CPT

## 2022-02-15 PROCEDURE — A9585: CPT

## 2022-02-16 ENCOUNTER — TRANSCRIPTION ENCOUNTER (OUTPATIENT)
Age: 52
End: 2022-02-16

## 2022-02-16 ENCOUNTER — APPOINTMENT (OUTPATIENT)
Dept: INTERNAL MEDICINE | Facility: CLINIC | Age: 52
End: 2022-02-16
Payer: COMMERCIAL

## 2022-02-16 PROCEDURE — 36415 COLL VENOUS BLD VENIPUNCTURE: CPT

## 2022-02-17 LAB
ALBUMIN SERPL ELPH-MCNC: 4.3 G/DL
ALP BLD-CCNC: 48 U/L
ALT SERPL-CCNC: 14 U/L
ANION GAP SERPL CALC-SCNC: 12 MMOL/L
AST SERPL-CCNC: 13 U/L
BASOPHILS # BLD AUTO: 0.05 K/UL
BASOPHILS NFR BLD AUTO: 0.9 %
BILIRUB SERPL-MCNC: 0.4 MG/DL
BUN SERPL-MCNC: 12 MG/DL
CALCIUM SERPL-MCNC: 9.3 MG/DL
CHLORIDE SERPL-SCNC: 106 MMOL/L
CO2 SERPL-SCNC: 22 MMOL/L
CREAT SERPL-MCNC: 0.62 MG/DL
EOSINOPHIL # BLD AUTO: 0.18 K/UL
EOSINOPHIL NFR BLD AUTO: 3.4 %
GLUCOSE SERPL-MCNC: 85 MG/DL
HCT VFR BLD CALC: 39.8 %
HGB BLD-MCNC: 13 G/DL
IMM GRANULOCYTES NFR BLD AUTO: 0.2 %
LDH SERPL-CCNC: 177 U/L
LYMPHOCYTES # BLD AUTO: 1.86 K/UL
LYMPHOCYTES NFR BLD AUTO: 35.2 %
MAN DIFF?: NORMAL
MCHC RBC-ENTMCNC: 31.3 PG
MCHC RBC-ENTMCNC: 32.7 GM/DL
MCV RBC AUTO: 95.9 FL
MONOCYTES # BLD AUTO: 0.57 K/UL
MONOCYTES NFR BLD AUTO: 10.8 %
NEUTROPHILS # BLD AUTO: 2.62 K/UL
NEUTROPHILS NFR BLD AUTO: 49.5 %
PLATELET # BLD AUTO: 309 K/UL
POTASSIUM SERPL-SCNC: 4.7 MMOL/L
PROT SERPL-MCNC: 7.1 G/DL
RBC # BLD: 4.15 M/UL
RBC # FLD: 12.8 %
SODIUM SERPL-SCNC: 140 MMOL/L
WBC # FLD AUTO: 5.29 K/UL

## 2022-02-18 ENCOUNTER — NON-APPOINTMENT (OUTPATIENT)
Age: 52
End: 2022-02-18

## 2022-02-18 DIAGNOSIS — E27.40 UNSPECIFIED ADRENOCORTICAL INSUFFICIENCY: ICD-10-CM

## 2022-02-21 LAB
CORTIS SERPL-MCNC: 5.5 UG/DL
FSH SERPL-MCNC: 16.4 IU/L
IGF-1 INTERP: NORMAL
IGF-I BLD-MCNC: 111 NG/ML
LH SERPL-ACNC: 11.9 IU/L
PROLACTIN SERPL-MCNC: 19.3 NG/ML
T4 FREE SERPL-MCNC: 1.6 NG/DL
TSH SERPL-ACNC: 0.54 UIU/ML

## 2022-02-22 ENCOUNTER — APPOINTMENT (OUTPATIENT)
Dept: MRI IMAGING | Facility: CLINIC | Age: 52
End: 2022-02-22

## 2022-02-23 ENCOUNTER — EMERGENCY (EMERGENCY)
Facility: HOSPITAL | Age: 52
LOS: 1 days | Discharge: HOME | End: 2022-02-23
Attending: EMERGENCY MEDICINE
Payer: COMMERCIAL

## 2022-02-23 ENCOUNTER — NON-APPOINTMENT (OUTPATIENT)
Age: 52
End: 2022-02-23

## 2022-02-23 ENCOUNTER — APPOINTMENT (OUTPATIENT)
Dept: INTERNAL MEDICINE | Facility: CLINIC | Age: 52
End: 2022-02-23
Payer: COMMERCIAL

## 2022-02-23 ENCOUNTER — TRANSCRIPTION ENCOUNTER (OUTPATIENT)
Age: 52
End: 2022-02-23

## 2022-02-23 VITALS
OXYGEN SATURATION: 100 % | TEMPERATURE: 98 F | HEART RATE: 78 BPM | SYSTOLIC BLOOD PRESSURE: 126 MMHG | DIASTOLIC BLOOD PRESSURE: 78 MMHG | HEIGHT: 61 IN | RESPIRATION RATE: 18 BRPM | WEIGHT: 190.04 LBS

## 2022-02-23 VITALS
SYSTOLIC BLOOD PRESSURE: 125 MMHG | WEIGHT: 190 LBS | HEIGHT: 61 IN | OXYGEN SATURATION: 98 % | BODY MASS INDEX: 35.87 KG/M2 | TEMPERATURE: 97.5 F | DIASTOLIC BLOOD PRESSURE: 79 MMHG | HEART RATE: 71 BPM

## 2022-02-23 DIAGNOSIS — F40.243 FEAR OF FLYING: ICD-10-CM

## 2022-02-23 DIAGNOSIS — Z98.890 OTHER SPECIFIED POSTPROCEDURAL STATES: Chronic | ICD-10-CM

## 2022-02-23 DIAGNOSIS — T14.8XXA OTHER INJURY OF UNSPECIFIED BODY REGION, INITIAL ENCOUNTER: Chronic | ICD-10-CM

## 2022-02-23 DIAGNOSIS — R51.9 HEADACHE, UNSPECIFIED: ICD-10-CM

## 2022-02-23 LAB
ACTH-ESO: 7.8 PG/ML
ALBUMIN SERPL ELPH-MCNC: 4.2 G/DL — SIGNIFICANT CHANGE UP (ref 3.3–5)
ALP SERPL-CCNC: 46 U/L — SIGNIFICANT CHANGE UP (ref 40–120)
ALT FLD-CCNC: 14 U/L — SIGNIFICANT CHANGE UP (ref 10–45)
ANION GAP SERPL CALC-SCNC: 12 MMOL/L — SIGNIFICANT CHANGE UP (ref 5–17)
AST SERPL-CCNC: 13 U/L — SIGNIFICANT CHANGE UP (ref 10–40)
BILIRUB SERPL-MCNC: 0.2 MG/DL — SIGNIFICANT CHANGE UP (ref 0.2–1.2)
BUN SERPL-MCNC: 13 MG/DL — SIGNIFICANT CHANGE UP (ref 7–23)
CALCIUM SERPL-MCNC: 9.3 MG/DL — SIGNIFICANT CHANGE UP (ref 8.4–10.5)
CHLORIDE SERPL-SCNC: 103 MMOL/L — SIGNIFICANT CHANGE UP (ref 96–108)
CO2 SERPL-SCNC: 23 MMOL/L — SIGNIFICANT CHANGE UP (ref 22–31)
CREAT SERPL-MCNC: 0.64 MG/DL — SIGNIFICANT CHANGE UP (ref 0.5–1.3)
GLUCOSE SERPL-MCNC: 88 MG/DL — SIGNIFICANT CHANGE UP (ref 70–99)
POTASSIUM SERPL-MCNC: 4.3 MMOL/L — SIGNIFICANT CHANGE UP (ref 3.5–5.3)
POTASSIUM SERPL-SCNC: 4.3 MMOL/L — SIGNIFICANT CHANGE UP (ref 3.5–5.3)
PROT SERPL-MCNC: 7.2 G/DL — SIGNIFICANT CHANGE UP (ref 6–8.3)
SODIUM SERPL-SCNC: 138 MMOL/L — SIGNIFICANT CHANGE UP (ref 135–145)

## 2022-02-23 PROCEDURE — 99396 PREV VISIT EST AGE 40-64: CPT | Mod: 25

## 2022-02-23 PROCEDURE — 99213 OFFICE O/P EST LOW 20 MIN: CPT | Mod: 25

## 2022-02-23 PROCEDURE — 36415 COLL VENOUS BLD VENIPUNCTURE: CPT

## 2022-02-23 PROCEDURE — 99285 EMERGENCY DEPT VISIT HI MDM: CPT

## 2022-02-23 PROCEDURE — 71045 X-RAY EXAM CHEST 1 VIEW: CPT | Mod: 26

## 2022-02-23 RX ORDER — ALPRAZOLAM 0.25 MG/1
0.25 TABLET ORAL
Qty: 5 | Refills: 0 | Status: ACTIVE | COMMUNITY
Start: 2022-02-23 | End: 1900-01-01

## 2022-02-23 RX ORDER — FAMOTIDINE 10 MG/ML
20 INJECTION INTRAVENOUS ONCE
Refills: 0 | Status: COMPLETED | OUTPATIENT
Start: 2022-02-23 | End: 2022-02-23

## 2022-02-23 NOTE — ASSESSMENT
[FreeTextEntry1] : Blood work done today headache may be secondary to TMJ advised heating pads also check ESR for temporal arteritis given fatigue\par Check lipid panel\par Blood pressure stable\par Following up with cardiology\par Check TFTs

## 2022-02-23 NOTE — HISTORY OF PRESENT ILLNESS
[FreeTextEntry1] : Patient presents to the office for annual physical [de-identified] : Undergoing work-up with pituitary adenoma with endocrinology neurology\par Has a left-sided headache worse in the morning dull in nature denies any blurry vision does have tinnitus has seen ENT in\par Denies any nausea vomiting\par Does have some neck pain\par Denies any grinding of the teeth\par Denies any jaw claudication\par Will be undergoing sleep study

## 2022-02-23 NOTE — PHYSICAL EXAM
[de-identified] : Left-sided muscle spasm tenderness when opening the jaw, left paraspinal muscle spasm

## 2022-02-23 NOTE — ED CLERICAL - NS ED CLERK NOTE PRE-ARRIVAL INFORMATION; ADDITIONAL PRE-ARRIVAL INFORMATION
CC/Reason For referral: CHEST PAIN RADIATING TO BACK  Preferred Consultant(if applicable):  Who admits for you (if needed):  Do you have documents you would like to fax over?  Would you still like to speak to an ED attending? N

## 2022-02-24 ENCOUNTER — NON-APPOINTMENT (OUTPATIENT)
Age: 52
End: 2022-02-24

## 2022-02-24 VITALS
RESPIRATION RATE: 18 BRPM | TEMPERATURE: 98 F | DIASTOLIC BLOOD PRESSURE: 65 MMHG | SYSTOLIC BLOOD PRESSURE: 122 MMHG | HEART RATE: 64 BPM | OXYGEN SATURATION: 98 %

## 2022-02-24 LAB
BASOPHILS # BLD AUTO: 0.06 K/UL — SIGNIFICANT CHANGE UP (ref 0–0.2)
BASOPHILS NFR BLD AUTO: 0.7 % — SIGNIFICANT CHANGE UP (ref 0–2)
EOSINOPHIL # BLD AUTO: 0.16 K/UL — SIGNIFICANT CHANGE UP (ref 0–0.5)
EOSINOPHIL NFR BLD AUTO: 1.8 % — SIGNIFICANT CHANGE UP (ref 0–6)
HCT VFR BLD CALC: 39.7 % — SIGNIFICANT CHANGE UP (ref 34.5–45)
HGB BLD-MCNC: 13 G/DL — SIGNIFICANT CHANGE UP (ref 11.5–15.5)
IMM GRANULOCYTES NFR BLD AUTO: 0.2 % — SIGNIFICANT CHANGE UP (ref 0–1.5)
LYMPHOCYTES # BLD AUTO: 3.19 K/UL — SIGNIFICANT CHANGE UP (ref 1–3.3)
LYMPHOCYTES # BLD AUTO: 36.5 % — SIGNIFICANT CHANGE UP (ref 13–44)
MCHC RBC-ENTMCNC: 31.3 PG — SIGNIFICANT CHANGE UP (ref 27–34)
MCHC RBC-ENTMCNC: 32.7 GM/DL — SIGNIFICANT CHANGE UP (ref 32–36)
MCV RBC AUTO: 95.7 FL — SIGNIFICANT CHANGE UP (ref 80–100)
MONOCYTES # BLD AUTO: 0.91 K/UL — HIGH (ref 0–0.9)
MONOCYTES NFR BLD AUTO: 10.4 % — SIGNIFICANT CHANGE UP (ref 2–14)
NEUTROPHILS # BLD AUTO: 4.41 K/UL — SIGNIFICANT CHANGE UP (ref 1.8–7.4)
NEUTROPHILS NFR BLD AUTO: 50.4 % — SIGNIFICANT CHANGE UP (ref 43–77)
NRBC # BLD: 0 /100 WBCS — SIGNIFICANT CHANGE UP (ref 0–0)
NT-PROBNP SERPL-SCNC: 12 PG/ML — SIGNIFICANT CHANGE UP (ref 0–300)
PLATELET # BLD AUTO: 316 K/UL — SIGNIFICANT CHANGE UP (ref 150–400)
RBC # BLD: 4.15 M/UL — SIGNIFICANT CHANGE UP (ref 3.8–5.2)
RBC # FLD: 12.7 % — SIGNIFICANT CHANGE UP (ref 10.3–14.5)
SARS-COV-2 RNA SPEC QL NAA+PROBE: SIGNIFICANT CHANGE UP
TROPONIN T, HIGH SENSITIVITY RESULT: <6 NG/L — SIGNIFICANT CHANGE UP (ref 0–51)
WBC # BLD: 8.75 K/UL — SIGNIFICANT CHANGE UP (ref 3.8–10.5)
WBC # FLD AUTO: 8.75 K/UL — SIGNIFICANT CHANGE UP (ref 3.8–10.5)

## 2022-02-24 PROCEDURE — U0005: CPT

## 2022-02-24 PROCEDURE — U0003: CPT

## 2022-02-24 PROCEDURE — 36415 COLL VENOUS BLD VENIPUNCTURE: CPT

## 2022-02-24 PROCEDURE — 93005 ELECTROCARDIOGRAM TRACING: CPT

## 2022-02-24 PROCEDURE — 84484 ASSAY OF TROPONIN QUANT: CPT

## 2022-02-24 PROCEDURE — 71045 X-RAY EXAM CHEST 1 VIEW: CPT

## 2022-02-24 PROCEDURE — 80053 COMPREHEN METABOLIC PANEL: CPT

## 2022-02-24 PROCEDURE — 85025 COMPLETE CBC W/AUTO DIFF WBC: CPT

## 2022-02-24 PROCEDURE — 96374 THER/PROPH/DIAG INJ IV PUSH: CPT

## 2022-02-24 PROCEDURE — 83880 ASSAY OF NATRIURETIC PEPTIDE: CPT

## 2022-02-24 PROCEDURE — 99285 EMERGENCY DEPT VISIT HI MDM: CPT | Mod: 25

## 2022-02-24 RX ADMIN — Medication 30 MILLILITER(S): at 00:03

## 2022-02-24 RX ADMIN — FAMOTIDINE 20 MILLIGRAM(S): 10 INJECTION INTRAVENOUS at 00:05

## 2022-02-24 NOTE — ED ADULT NURSE REASSESSMENT NOTE - NS ED NURSE REASSESS COMMENT FT1
Pt resting comfortably in stretcher at this time. States that her pain has decreased and that "it's barely there." Pt denies any other complaints at this time and is well appearing. Stretcher locked and lowered.

## 2022-02-24 NOTE — ED PROVIDER NOTE - NSICDXPASTSURGICALHX_GEN_ALL_CORE_FT
PAST SURGICAL HISTORY:  C Section - x 1 - 1994     h/o  Endoscopy     h/o dental implant     H/O vaginal surgery vaginal mesh    History of Colonoscopy     Torn ligament left thumb July 2020

## 2022-02-24 NOTE — ED PROVIDER NOTE - PHYSICAL EXAMINATION
PHYSICAL EXAM:  GENERAL: Sitting comfortable in bed, in no acute distress  HENMT: Atraumatic, moist mucous membranes, no oropharyngeal exudates or vesicles, uvula is midline EYES: Clear bilaterally, PERRL, EOMs intact b/l  HEART: RRR, S1/S2, no murmur/gallops/rubs  RESPIRATORY: Clear to auscultation bilaterally, no wheezes/rhonchi/rales  ABDOMEN: +BS, soft, epigastric ttp, nondistended  EXTREMITIES: No lower extremity edema, +2 radial pulses b/l  NEURO:  A&Ox4, no focal motor deficits or sensory deficits   Heme/LYMPH: No ecchymosis or bruising, no anterior/posterior cervical or supraclavicular LAD  SKIN:  Skin normal color for race, warm, dry and intact. No evidence of rash.

## 2022-02-24 NOTE — ED PROVIDER NOTE - NSFOLLOWUPINSTRUCTIONS_ED_ALL_ED_FT
You were evaluated in the Emergency Department today for chest pain. Your evaluation has shown no signs of medical conditions requiring emergent intervention at this time, however we recommend that you follow up with your primary care physician or your cardiologist as soon as possible for further testing as an outpatient.    You should take aspirin 81mg daily until you follow-up with your primary care physician or cardiologist who may instruct you that this is no longer necessary.    Please schedule an appointment for follow up with your primary care physician and/or cardiologist  as soon as possible.    Return to the Emergency Department if you experience worsening or uncontrolled chest pain, shortness of breath, light headedness, feeling faint or passing out, nausea, vomiting, or any other concerning symptoms.    Thank you for choosing us for your care.

## 2022-02-24 NOTE — ED ADULT NURSE NOTE - CAS TRG GENERAL NORM CIRC DET
Send letter to pt. Hemoglobin A1c level is at goal at 7.1%, much improved from last level in 9/2019, which was 8.3%. Goal of A1c is less than 8%. Repeat A1c level in 6 months. Continue w/ all medications as directed. Hepatitis C antibody is negative. Shira Amato PA-C     Strong peripheral pulses/Capillary refill less/equal to 2 seconds

## 2022-02-24 NOTE — ED PROVIDER NOTE - OBJECTIVE STATEMENT
50 year old female with past medical history of, HTN, HLD, hypothyroidism, MARIAN on CPAP at home, IBS, diverticulitis, h/o intermittent substernal CP with outpatient workup including holter, echo, stress tests all wnl. Presents s/p episode of sharp substernal CP yesterday afternoon, at rest. Called her cardiologist, Dr. Valenzuela and was told to go to  for an EKG. She had an EKG at [facility] that was WNL without NANCY or depression, no TWIs, sent to ED for further workup. Here she states that her pain is almost completely resolved. She denies any SOB above baseline, cough, fevers, chills, leg swelling, abdominal pain above baseline, or other complaints at this time. 52 year old female with past medical history of, HTN, HLD, hypothyroidism, MARIAN on CPAP at home, IBS, diverticulitis, h/o intermittent substernal CP with outpatient workup including holter, echo, stress tests all wnl. Presents s/p episode of sharp substernal CP yesterday afternoon, at rest. Called her cardiologist, Dr. Valenzuela and was told to go to  for an EKG. She had an EKG at [facility] that was WNL without NANCY or depression, no TWIs, sent to ED for further workup. Here she states that her pain is almost completely resolved. She denies any SOB above baseline, cough, fevers, chills, leg swelling, abdominal pain above baseline, or other complaints at this time.

## 2022-02-24 NOTE — ED PROVIDER NOTE - PATIENT PORTAL LINK FT
You can access the FollowMyHealth Patient Portal offered by [facility] by registering at the following website: [email or web address]. By joining [employer] (acquired by [employer] in 2014)’s FollowMyHealth portal, you will also be able to view your health information using other applications (apps) compatible with our system.

## 2022-02-24 NOTE — ED PROVIDER NOTE - CLINICAL SUMMARY MEDICAL DECISION MAKING FREE TEXT BOX
50 year old female with past medical history of, HTN, HLD, hypothyroidism, MARIAN on CPAP at home, IBS, diverticulitis, h/o intermittent substernal CP with outpatient workup including holter, echo, stress tests all wnl. Presents s/p episode of sharp substernal CP yesterday afternoon, at rest. Called her cardiologist, Dr. Valenzuela and was told to go to  for an EKG. Pain almost completely resolved by time of interview. HD stable, afebrile, exam WNL other than epigastric TTP. Will obtain CBC/CMP/troponin/CXR to r/o ACS and reasses. Suspect GI related pain.

## 2022-02-24 NOTE — ED PROVIDER NOTE - ATTENDING CONTRIBUTION TO CARE
RGUJRAL 53yo f hx listed presents with chest pain x 2 d. Pain is sharp intermittent. Pt states she had outpatient cardiac work up in past, spoke to Dr. Valenzuela and sent her to  for EKG and then sent into the ED for further eval. Denies any cough, uri, sob. +Mild epigastric pain as well. No n/v/d. Nml BMs.   On exam, Patient is awake,alert,oriented x 3. Patient is well appearing and in no acute distress. Patient's chest is clear to ausculation, +s1s2. Abdomen is soft nd/nt +BS. Extremity with no swelling or calf tenderness.   Pt well appearing, EKG reviewed. Significant family hx for CAD. Discussed with patient to check labs and CDU for stress test. States she would likely prefer to follow up outpatient and re eval after results.

## 2022-02-24 NOTE — ED ADULT NURSE NOTE - OBJECTIVE STATEMENT
52 y.o. F A&Ox3, PMHx of HTN, High cholesterol, sleep apnea, IBS, and hyperthyroidism presenting to ED via walk-in for 4/10 intermittent sharp chest pain. Pt states that she has been having this chest pain on and off for over a month, and has been seeing her cardiologist with no obvious cause found in any tests. States that her cardiologist recommended she go to UC today because starting this morning her pain has been longer in duration and worse than usual. States that it was 8/10 and radiating to back this morning with associated nausea and dizziness. Upon assessment, pt is well appearing with no signs of obvious distress, no signs of dyspnea, grossly neurologically intact. Currently states that now she has nausea, but is denying any SOB, diarrhea, constipation, dizziness, fever, chills. Resting in stretcher at this time in position of comfort, with stretcher locked and lowered.

## 2022-02-27 LAB
25(OH)D3 SERPL-MCNC: 11.1 NG/ML
ALBUMIN SERPL ELPH-MCNC: 4.5 G/DL
ALP BLD-CCNC: 47 U/L
ALT SERPL-CCNC: 12 U/L
ANION GAP SERPL CALC-SCNC: 16 MMOL/L
AST SERPL-CCNC: 16 U/L
BASOPHILS # BLD AUTO: 0.07 K/UL
BASOPHILS NFR BLD AUTO: 0.9 %
BILIRUB SERPL-MCNC: 0.2 MG/DL
BUN SERPL-MCNC: 17 MG/DL
CALCIUM SERPL-MCNC: 9.2 MG/DL
CHLORIDE SERPL-SCNC: 105 MMOL/L
CHOLEST SERPL-MCNC: 263 MG/DL
CO2 SERPL-SCNC: 20 MMOL/L
COVID-19 NUCLEOCAPSID  GAM ANTIBODY INTERPRETATION: NEGATIVE
COVID-19 SPIKE DOMAIN ANTIBODY INTERPRETATION: POSITIVE
CREAT SERPL-MCNC: 0.72 MG/DL
EOSINOPHIL # BLD AUTO: 0.14 K/UL
EOSINOPHIL NFR BLD AUTO: 1.9 %
ERYTHROCYTE [SEDIMENTATION RATE] IN BLOOD BY WESTERGREN METHOD: 28 MM/HR
ESTIMATED AVERAGE GLUCOSE: 114 MG/DL
GLUCOSE SERPL-MCNC: 86 MG/DL
HBA1C MFR BLD HPLC: 5.6 %
HCT VFR BLD CALC: 42.5 %
HDLC SERPL-MCNC: 77 MG/DL
HGB BLD-MCNC: 13.5 G/DL
IMM GRANULOCYTES NFR BLD AUTO: 0.1 %
LDLC SERPL CALC-MCNC: 158 MG/DL
LYMPHOCYTES # BLD AUTO: 2.13 K/UL
LYMPHOCYTES NFR BLD AUTO: 28.8 %
MAN DIFF?: NORMAL
MCHC RBC-ENTMCNC: 30.7 PG
MCHC RBC-ENTMCNC: 31.8 GM/DL
MCV RBC AUTO: 96.6 FL
MONOCYTES # BLD AUTO: 0.7 K/UL
MONOCYTES NFR BLD AUTO: 9.5 %
NEUTROPHILS # BLD AUTO: 4.35 K/UL
NEUTROPHILS NFR BLD AUTO: 58.8 %
NONHDLC SERPL-MCNC: 186 MG/DL
PLATELET # BLD AUTO: 326 K/UL
POTASSIUM SERPL-SCNC: 5.2 MMOL/L
PROT SERPL-MCNC: 7.1 G/DL
RBC # BLD: 4.4 M/UL
RBC # FLD: 12.9 %
SARS-COV-2 AB SERPL IA-ACNC: >250 U/ML
SARS-COV-2 AB SERPL QL IA: 0.08 INDEX
SODIUM SERPL-SCNC: 141 MMOL/L
TRIGL SERPL-MCNC: 136 MG/DL
TSH SERPL-ACNC: 1.3 UIU/ML
VIT B12 SERPL-MCNC: 359 PG/ML
WBC # FLD AUTO: 7.4 K/UL

## 2022-02-27 RX ORDER — ERGOCALCIFEROL 1.25 MG/1
1.25 MG CAPSULE, LIQUID FILLED ORAL
Qty: 6 | Refills: 1 | Status: ACTIVE | COMMUNITY
Start: 2019-11-20 | End: 1900-01-01

## 2022-02-28 LAB
CORTIS SERPL-MCNC: 8.1 UG/DL
IGF-1 INTERP: NORMAL
IGF-I BLD-MCNC: 135 NG/ML

## 2022-03-01 ENCOUNTER — NON-APPOINTMENT (OUTPATIENT)
Age: 52
End: 2022-03-01

## 2022-03-03 ENCOUNTER — APPOINTMENT (OUTPATIENT)
Dept: CARDIOLOGY | Facility: CLINIC | Age: 52
End: 2022-03-03
Payer: COMMERCIAL

## 2022-03-03 ENCOUNTER — NON-APPOINTMENT (OUTPATIENT)
Age: 52
End: 2022-03-03

## 2022-03-03 ENCOUNTER — APPOINTMENT (OUTPATIENT)
Dept: ENDOCRINOLOGY | Facility: CLINIC | Age: 52
End: 2022-03-03

## 2022-03-03 ENCOUNTER — APPOINTMENT (OUTPATIENT)
Dept: OPHTHALMOLOGY | Facility: CLINIC | Age: 52
End: 2022-03-03
Payer: COMMERCIAL

## 2022-03-03 VITALS
SYSTOLIC BLOOD PRESSURE: 136 MMHG | BODY MASS INDEX: 39.46 KG/M2 | HEART RATE: 79 BPM | WEIGHT: 209 LBS | OXYGEN SATURATION: 98 % | DIASTOLIC BLOOD PRESSURE: 80 MMHG | TEMPERATURE: 98.2 F | HEIGHT: 61 IN

## 2022-03-03 DIAGNOSIS — R93.89 ABNORMAL FINDINGS ON DIAGNOSTIC IMAGING OF OTHER SPECIFIED BODY STRUCTURES: ICD-10-CM

## 2022-03-03 PROCEDURE — 92014 COMPRE OPH EXAM EST PT 1/>: CPT | Mod: 25

## 2022-03-03 PROCEDURE — 92083 EXTENDED VISUAL FIELD XM: CPT

## 2022-03-03 PROCEDURE — 99214 OFFICE O/P EST MOD 30 MIN: CPT

## 2022-03-03 PROCEDURE — 93000 ELECTROCARDIOGRAM COMPLETE: CPT

## 2022-03-03 PROCEDURE — 93306 TTE W/DOPPLER COMPLETE: CPT

## 2022-03-03 PROCEDURE — 67840 REMOVE EYELID LESION: CPT | Mod: LT

## 2022-03-03 RX ORDER — NAPROXEN 500 MG/1
500 TABLET ORAL
Qty: 1 | Refills: 0 | Status: DISCONTINUED | COMMUNITY
Start: 2021-04-15 | End: 2022-03-03

## 2022-03-03 NOTE — PHYSICAL EXAM

## 2022-03-03 NOTE — HISTORY OF PRESENT ILLNESS
[FreeTextEntry1] : pt presents for acute evaluation pt with htn /dyslipidemia .pt presents for cv eval .pt with intermittent chest discomfort mid chest .pts symptoms occur randomly .pt went to er r/o for mi pt here secondary yo above pt s/p recent cxr right elevated hemidiaphragn .pt witrh rare dyspnea pt denies any chest  pain dizziness ,lightheadedness ,nausea vomiting diaphoresis\par

## 2022-03-08 ENCOUNTER — APPOINTMENT (OUTPATIENT)
Dept: CARDIOLOGY | Facility: CLINIC | Age: 52
End: 2022-03-08
Payer: COMMERCIAL

## 2022-03-08 PROCEDURE — 93351 STRESS TTE COMPLETE: CPT

## 2022-03-15 ENCOUNTER — TRANSCRIPTION ENCOUNTER (OUTPATIENT)
Age: 52
End: 2022-03-15

## 2022-03-23 ENCOUNTER — APPOINTMENT (OUTPATIENT)
Dept: ENDOCRINOLOGY | Facility: CLINIC | Age: 52
End: 2022-03-23

## 2022-03-28 ENCOUNTER — OUTPATIENT (OUTPATIENT)
Dept: OUTPATIENT SERVICES | Facility: HOSPITAL | Age: 52
LOS: 1 days | End: 2022-03-28
Payer: COMMERCIAL

## 2022-03-28 ENCOUNTER — APPOINTMENT (OUTPATIENT)
Dept: ULTRASOUND IMAGING | Facility: CLINIC | Age: 52
End: 2022-03-28
Payer: COMMERCIAL

## 2022-03-28 DIAGNOSIS — T14.8XXA OTHER INJURY OF UNSPECIFIED BODY REGION, INITIAL ENCOUNTER: Chronic | ICD-10-CM

## 2022-03-28 DIAGNOSIS — Z00.8 ENCOUNTER FOR OTHER GENERAL EXAMINATION: ICD-10-CM

## 2022-03-28 DIAGNOSIS — Z98.890 OTHER SPECIFIED POSTPROCEDURAL STATES: Chronic | ICD-10-CM

## 2022-03-28 PROCEDURE — 76856 US EXAM PELVIC COMPLETE: CPT | Mod: 26

## 2022-03-28 PROCEDURE — 76830 TRANSVAGINAL US NON-OB: CPT

## 2022-03-28 PROCEDURE — 76830 TRANSVAGINAL US NON-OB: CPT | Mod: 26

## 2022-03-28 PROCEDURE — 76856 US EXAM PELVIC COMPLETE: CPT

## 2022-03-30 ENCOUNTER — RESULT REVIEW (OUTPATIENT)
Age: 52
End: 2022-03-30

## 2022-04-01 ENCOUNTER — NON-APPOINTMENT (OUTPATIENT)
Age: 52
End: 2022-04-01

## 2022-04-01 ENCOUNTER — APPOINTMENT (OUTPATIENT)
Dept: PULMONOLOGY | Facility: CLINIC | Age: 52
End: 2022-04-01
Payer: COMMERCIAL

## 2022-04-01 VITALS
HEART RATE: 79 BPM | SYSTOLIC BLOOD PRESSURE: 139 MMHG | TEMPERATURE: 98.4 F | OXYGEN SATURATION: 97 % | DIASTOLIC BLOOD PRESSURE: 88 MMHG

## 2022-04-01 PROCEDURE — 99214 OFFICE O/P EST MOD 30 MIN: CPT

## 2022-04-02 NOTE — ASSESSMENT
[FreeTextEntry1] : Patient to restart CPAP therapy.\par Patient will come in and bring machine to assess function and settings.\par will order BiPAP titration study\par Program of exercise and weight loss would be beneficial.\par No indication for further evaluation regarding mild right hemidiaphragmatic elevation.\par \par 35 minutes spent in evaluation management and review of studies.

## 2022-04-02 NOTE — PROCEDURE
[FreeTextEntry1] : 12/10/2021\par Pulmonary function testing\par FEV1, FVC, and FEV1/FVC are within normal limits. TLC and subdivisions are normal. RV/TLC ratio is normal. Single breath diffusion capacity is normal. \par \par poly with moderate MARIAN with moderated desaturations\par \par \par 1 / 2 Orion Torres   \par  \par \par \par \par Report date: 2/24/2022 \par  \par  View Order\par \par \par (Report matches study selected on Patient History pane)\par \par \par   \par \par \par \par \par ACC: [id number] EXAM: XR CHEST PORTABLE URGENT 1V\par \par PROCEDURE DATE: 02/23/2022\par \par \par \par INTERPRETATION: CLINICAL INFORMATION: Chest pain.\par \par TECHNIQUE: Frontal radiograph of the chest.\par \par COMPARISON: CT abdomen pelvis 4/6/2021 and frontal radiograph the chest 12/25/2020.\par \par FINDINGS:\par \par Stable elevation of the right hemidiaphragm.\par The lungs are clear.\par No pleural effusion or pneumothorax.\par Heart size is within normal limits.\par No acute abnormality within visible osseous structures.\par \par \par IMPRESSION:\par \par Stable elevation of the right hemidiaphragm.\par Clear lungs.\par \par --- End of Report ---\par \par \par \par \par NICOLAS PIMENTEL MD; Resident Radiology\par This document has been electronically signed.\par ORION TORRES MD; Attending Radiologist\par This document has been electronically signed. Feb 24 2022 7:21AM

## 2022-04-02 NOTE — DISCUSSION/SUMMARY
[FreeTextEntry1] : MARIAN not compliant to CPAP therapy.\par SOB.  No clinical radiographic or functional evidence of underlying pulmonary disease.  Status post cardiac evaluation.  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  Dates back to at least 2016.  Lung function in December 2021 within normal limits making diaphragmatic dysfunction extremely unlikely.

## 2022-04-02 NOTE — HISTORY OF PRESENT ILLNESS
[Never] : never [TextBox_4] : had a poly\par has not been using the cpap machine\par having vaginal bleeding and being evaluated\par was recently sent to ER by Dr Valenzuela because of c/p chest discomfort\par was in ER and had Cxr and f/u with Dr Valenzuela ,told heart OK but told to come to Dr Clinton\par for a elevated diaphragm\par Has continued to have sob with weight gain\par Told heart is fine. Had ST and echo. \par \par  [TextBox_29] : Second hand tobacco.

## 2022-04-04 ENCOUNTER — APPOINTMENT (OUTPATIENT)
Dept: CARDIOLOGY | Facility: CLINIC | Age: 52
End: 2022-04-04

## 2022-04-06 NOTE — ED ADULT NURSE NOTE - NSFALLRSKASSESSDT_ED_ALL_ED
21-Sep-2019 14:00 Clindamycin Pregnancy And Lactation Text: This medication can be used in pregnancy if certain situations. Clindamycin is also present in breast milk.

## 2022-04-11 PROBLEM — Z11.59 SCREENING FOR VIRAL DISEASE: Status: ACTIVE | Noted: 2020-07-07

## 2022-04-12 LAB
IGF-1 INTERP: NORMAL
IGF-I BLD-MCNC: 115 NG/ML
TSH SERPL-ACNC: 0.84 UIU/ML

## 2022-04-14 ENCOUNTER — APPOINTMENT (OUTPATIENT)
Dept: OTOLARYNGOLOGY | Facility: CLINIC | Age: 52
End: 2022-04-14

## 2022-04-14 LAB
ALBUMIN MFR SERPL ELPH: 56.6 %
ALBUMIN SERPL ELPH-MCNC: 4.5 G/DL
ALBUMIN SERPL-MCNC: 4.2 G/DL
ALBUMIN/GLOB SERPL: 1.3 RATIO
ALP BLD-CCNC: 47 U/L
ALPHA1 GLOB MFR SERPL ELPH: 4.6 %
ALPHA1 GLOB SERPL ELPH-MCNC: 0.3 G/DL
ALPHA2 GLOB MFR SERPL ELPH: 9.5 %
ALPHA2 GLOB SERPL ELPH-MCNC: 0.7 G/DL
ALT SERPL-CCNC: 14 U/L
ANION GAP SERPL CALC-SCNC: 13 MMOL/L
AST SERPL-CCNC: 18 U/L
B-GLOBULIN MFR SERPL ELPH: 12.3 %
B-GLOBULIN SERPL ELPH-MCNC: 0.9 G/DL
BASOPHILS # BLD AUTO: 0.05 K/UL
BASOPHILS NFR BLD AUTO: 0.8 %
BILIRUB SERPL-MCNC: 0.3 MG/DL
BUN SERPL-MCNC: 11 MG/DL
CALCIUM SERPL-MCNC: 9.3 MG/DL
CHLORIDE SERPL-SCNC: 104 MMOL/L
CO2 SERPL-SCNC: 24 MMOL/L
CORTIS SERPL-MCNC: 10 UG/DL
CREAT SERPL-MCNC: 0.75 MG/DL
DEPRECATED KAPPA LC FREE/LAMBDA SER: 1.14 RATIO
EGFR: 95 ML/MIN/1.73M2
EOSINOPHIL # BLD AUTO: 0.09 K/UL
EOSINOPHIL NFR BLD AUTO: 1.5 %
FERRITIN SERPL-MCNC: 9 NG/ML
FSH SERPL-MCNC: 52.2 IU/L
GAMMA GLOB FLD ELPH-MCNC: 1.3 G/DL
GAMMA GLOB MFR SERPL ELPH: 17 %
GLUCOSE SERPL-MCNC: 81 MG/DL
HCT VFR BLD CALC: 36.2 %
HGB BLD-MCNC: 11.5 G/DL
IGA SER QL IEP: 195 MG/DL
IGG SER QL IEP: 1208 MG/DL
IGM SER QL IEP: 119 MG/DL
IMM GRANULOCYTES NFR BLD AUTO: 0.3 %
INTERPRETATION SERPL IEP-IMP: NORMAL
IRON SATN MFR SERPL: 9 %
IRON SERPL-MCNC: 37 UG/DL
KAPPA LC CSF-MCNC: 1.5 MG/DL
KAPPA LC SERPL-MCNC: 1.71 MG/DL
LYMPHOCYTES # BLD AUTO: 2.27 K/UL
LYMPHOCYTES NFR BLD AUTO: 37 %
M PROTEIN SPEC IFE-MCNC: NORMAL
MAN DIFF?: NORMAL
MCHC RBC-ENTMCNC: 30.7 PG
MCHC RBC-ENTMCNC: 31.8 GM/DL
MCV RBC AUTO: 96.5 FL
MONOCYTES # BLD AUTO: 0.6 K/UL
MONOCYTES NFR BLD AUTO: 9.8 %
NEUTROPHILS # BLD AUTO: 3.1 K/UL
NEUTROPHILS NFR BLD AUTO: 50.6 %
PLATELET # BLD AUTO: 376 K/UL
POTASSIUM SERPL-SCNC: 4.2 MMOL/L
PROT SERPL-MCNC: 7.3 G/DL
PROT SERPL-MCNC: 7.4 G/DL
PROT SERPL-MCNC: 7.4 G/DL
RBC # BLD: 3.75 M/UL
RBC # FLD: 12.5 %
SODIUM SERPL-SCNC: 141 MMOL/L
T3FREE SERPL-MCNC: 2.53 PG/ML
T4 FREE SERPL-MCNC: 1.7 NG/DL
TIBC SERPL-MCNC: 390 UG/DL
TSH SERPL-ACNC: 0.94 UIU/ML
UIBC SERPL-MCNC: 353 UG/DL
WBC # FLD AUTO: 6.13 K/UL

## 2022-04-27 ENCOUNTER — TRANSCRIPTION ENCOUNTER (OUTPATIENT)
Age: 52
End: 2022-04-27

## 2022-04-29 ENCOUNTER — APPOINTMENT (OUTPATIENT)
Dept: NEUROLOGY | Facility: CLINIC | Age: 52
End: 2022-04-29
Payer: COMMERCIAL

## 2022-04-29 PROCEDURE — 95816 EEG AWAKE AND DROWSY: CPT

## 2022-04-30 PROCEDURE — 95708 EEG WO VID EA 12-26HR UNMNTR: CPT

## 2022-04-30 PROCEDURE — 95719 EEG PHYS/QHP EA INCR W/O VID: CPT

## 2022-04-30 PROCEDURE — 95700 EEG CONT REC W/VID EEG TECH: CPT

## 2022-05-04 ENCOUNTER — NON-APPOINTMENT (OUTPATIENT)
Age: 52
End: 2022-05-04

## 2022-05-06 ENCOUNTER — APPOINTMENT (OUTPATIENT)
Dept: OTOLARYNGOLOGY | Facility: CLINIC | Age: 52
End: 2022-05-06
Payer: COMMERCIAL

## 2022-05-06 VITALS
TEMPERATURE: 97.8 F | DIASTOLIC BLOOD PRESSURE: 80 MMHG | HEART RATE: 81 BPM | SYSTOLIC BLOOD PRESSURE: 131 MMHG | BODY MASS INDEX: 39.46 KG/M2 | HEIGHT: 61 IN | WEIGHT: 209 LBS

## 2022-05-06 DIAGNOSIS — J30.2 OTHER SEASONAL ALLERGIC RHINITIS: ICD-10-CM

## 2022-05-06 PROCEDURE — 31231 NASAL ENDOSCOPY DX: CPT

## 2022-05-06 PROCEDURE — 99214 OFFICE O/P EST MOD 30 MIN: CPT | Mod: 25

## 2022-05-06 NOTE — HISTORY OF PRESENT ILLNESS
[de-identified] : Since the last visit patient went to see a neurologist and had an MRI that showed a pituitary abnormality. She also had an abnormal EEG and is following up with that.\par She does admit that she has not noted the smell of cigarettes as much. \par She does not have any sinus pressure or pain and is not having any nasal congestion or runny nose . She continues to use FLonase daily. This is allergy season for her as well

## 2022-05-06 NOTE — ASSESSMENT
[FreeTextEntry1] : Follows up but sense of smell is dramatically improved almost totally resolved she is continues to use the Flonase nasal spray CAT scan ended up her getting a MRI being followed for pituitary gland and EEG abnormal results which she is being followed by neurology.  She will follow-up and see us on an annual basis no further acute ENT interventions indicated at this time she will continue using the nasal spray now that allergy season knowing to stop if she starts to get nosebleeds.

## 2022-05-11 ENCOUNTER — APPOINTMENT (OUTPATIENT)
Dept: NEUROLOGY | Facility: CLINIC | Age: 52
End: 2022-05-11
Payer: COMMERCIAL

## 2022-05-11 VITALS — DIASTOLIC BLOOD PRESSURE: 70 MMHG | SYSTOLIC BLOOD PRESSURE: 120 MMHG

## 2022-05-11 PROCEDURE — 99215 OFFICE O/P EST HI 40 MIN: CPT

## 2022-05-11 NOTE — HISTORY OF PRESENT ILLNESS
[FreeTextEntry1] : \par 52-year-old woman who is here for initial consultation of smelling cigarette smell before Kimber.  Patient states that it has been intermittent and she has seen ENT.  Work-up including CT of the sinuses along with course of steroids and nasal sprays have failed to alleviate her symptoms.  Patient has no fevers and no sick contacts.  Patient has no exposure to Covid.\par \par Interval history: Discussed results of MRI of the brain with the patient.  Patient has no history of pituitary issues.  Patient sees a endocrinologist in the past for hypothyroidism.  The olfactory bulb was not evaluated even though the question on the MRI order sheet specified.  I have emailed neuroradiology to make sure that this is performed when she returns for MRI of the pituitary gland.  Patient states that she tested negative for Covid swab.  Patient has a EEG that is still pending.\par \par Interval history: Since her last visit patient was noted to have slowing in the temporal region.  Patient's MRI of the brain shows no lesions in the temporal lobes.  Patient has upcoming appointment with epilepsy to help evaluate the situation whether patient needs longer EEG monitoring versus confirmation that this is incidental finding and should be left alone.  Patient states that her cigarette smells are now intermittent and faint.  Patient's MRI of the brain shows hypoenhancement that is indicative of microadenoma versus cyst.  Endocrinologist has seen the patient and had sent off blood work.  Patient will return to endocrinology for repeat MRI in September.

## 2022-05-11 NOTE — DISCUSSION/SUMMARY
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  Patient should have follow-up with her endocrinologist for any hormonal abnormalities.  Patient will have repeat MRI of the pituitary with and without contrast in September.  Patient has follow-up with epilepsy for further evaluation of the EEG finding of left temporal slowing.  Question would be for 72-hour EEG versus artifact given MRI brain findings.  Patient will follow up with me after the consultations.\par \par I spent the time noted on the day of this patient encounter preparing for, providing and documenting the above E/M service and counseling and educate patient on differential, workup, disease course, and treatment/management. Education was provided to the patient during this encounter. All questions and concerns were answered and addressed in detail. The patient verbalized understanding and agreed to plan. Patient was advised to continue to monitor for neurologic symptoms and to notify my office or go to the nearest emergency room if there are any changes. Any orders/referrals and communications were provided as well. \par Side effects of the above medications were discussed in detail including but not limited to applicable black box warning and teratogenicity as appropriate. \par Patient was advised to bring previous records to my office. \par \par \par

## 2022-05-11 NOTE — PHYSICAL EXAM
[General Appearance - Alert] : alert [Oriented To Time, Place, And Person] : oriented to person, place, and time [Person] : oriented to person [Place] : oriented to place [Time] : oriented to time [Short Term Intact] : short term memory intact [Fluency] : fluency intact [Current Events] : adequate knowledge of current events [Cranial Nerves Optic (II)] : visual acuity intact bilaterally,  visual fields full to confrontation, pupils equal round and reactive to light [Cranial Nerves Oculomotor (III)] : extraocular motion intact [Cranial Nerves Facial (VII)] : face symmetrical [Cranial Nerves Vestibulocochlear (VIII)] : hearing was intact bilaterally [Cranial Nerves Accessory (XI - Cranial And Spinal)] : head turning and shoulder shrug symmetric [Paresis Pronator Drift Right-Sided] : no pronator drift on the right [Paresis Pronator Drift Left-Sided] : no pronator drift on the left [Abnormal Walk] : normal gait

## 2022-05-16 ENCOUNTER — APPOINTMENT (OUTPATIENT)
Dept: NEUROLOGY | Facility: CLINIC | Age: 52
End: 2022-05-16
Payer: COMMERCIAL

## 2022-05-16 VITALS
DIASTOLIC BLOOD PRESSURE: 80 MMHG | HEIGHT: 61 IN | SYSTOLIC BLOOD PRESSURE: 128 MMHG | HEART RATE: 79 BPM | WEIGHT: 200 LBS | BODY MASS INDEX: 37.76 KG/M2

## 2022-05-16 DIAGNOSIS — Z78.9 OTHER SPECIFIED HEALTH STATUS: ICD-10-CM

## 2022-05-16 DIAGNOSIS — R44.2 OTHER HALLUCINATIONS: ICD-10-CM

## 2022-05-16 PROCEDURE — 99215 OFFICE O/P EST HI 40 MIN: CPT

## 2022-05-16 NOTE — PHYSICAL EXAM
[FreeTextEntry1] : MS: alert & oriented to person, place time, good fund of knowledge and recall for recent and remote events. \par CN: VFF to confrontation, EOM full without nystagmus, PERRL, V1-V3 intact to light touch, face symmetrical, hears finger rub bilaterally, palate elevates symmetrically, shoulders elevate symmetrically, tongue midline\par MOTOR: normal tone x 4 extremities, Power 5/5 proximally and distally bilaterally, no drift, normal rapid alternating movements. \par SENSORY: intact LT x 4 extremities\par REFLEXES: biceps 2+ bilaterally, triceps 2+ bilaterally, brachioradialis 2+ bilaterally, patella 2+ bilaterally, ankle 2+ bilaterally, plantar flexor bilaterally\par COORD: normal FNF, no tremor or dysmetria\par GAIT: normal base, Romberg negative, normal gait, able to walk on toes, heels and tandem.\par \par Constitutional: alert and in no acute distress. \par Psychiatric: oriented to person, place, and time, insight and judgment were intact and the affect was normal. \par Eyes: extraocular movements were intact. \par ENT: hearing was normal. \par Neck: the appearance of the neck was normal. \par Pulmonary: no respiratory distress. \par Vascular:. there was no peripheral edema. \par Abdomen: soft. \par Musculoskeletal: normal gait. \par Skin: normal skin color and pigmentation.

## 2022-05-16 NOTE — ASSESSMENT
[FreeTextEntry1] :  Ms. REYES is a 52-year-old woman who was initially evaluated by Dr. Spence of the epilepsy service for complaint of "brain fog" and 2015.  At that time, she had an EEG that showed intermittent slowing in the left temporal lobe, a nonspecific finding.  No further testing was done.  She now presents complaining of recurrent episodes of "burnt smell like cigarettes".  This smell is somewhat stereotyped, raising the possibility of "uncinate fits" however, the episodes may last several hours, not typical of a seizure.  Repeat ambulatory EEG, has detected extremely frequent left temporal slowing, and rare right temporal slowing.  At time, the left temporal slowing appears somewhat rhythmical though does not clearly evolve. Ms. REYES did not have her characteristic sensory symptoms during the time the ambulatory EEG was recorded.\par \par Plan:\par 1.  I recommended epilepsy monitoring unit admission to determine whether these olfactory episodes–in the setting of a significant EEG abnormality–are epileptic in nature.\par 2.  If video EEG monitoring captures seizure, I would recommend starting lacosamide 50 mg twice a day.  If EEG monitoring does not capture a seizure or typical olfactory event, I will discuss with Ms. REYES–after EMU discharge–whether she will consider trial of lacosamide.\par 3.  Return for follow-up in 6-8 weeks.\par \par I have spent 60 minutes or longer reviewing patient data or discussing with the patient  the cause of seizures or seizure-like events and comorbid conditions, assessing the risk of recurrence, educating the patient or family to recognize seizures, and discussing possible treatment options for seizures and comorbid conditions and possible side effects of medications. I also discussed seizure safety, and ways of reducing seizure risk. Greater than 50% of the encounter time was spent on counseling and coordination of care for reviewing records in [employer], discussion with patient regarding plan.

## 2022-05-16 NOTE — HISTORY OF PRESENT ILLNESS
[FreeTextEntry1] : Ms. REYES - pronounced "aguilar"\par \par *** 05/16/2022  ***\par Ms. REYES reports that several months ago she began to experience smell of burning cigarettes intermittently.  The first occasion, she noted smell like burning leaves outside, and smell persisted for most of the day, and smell would follow her when in car or at work.  Now it is not occurring as often - 1-2 x per week.  Ms. REYES thinks that she may be triggered by someone smoking near her - then had persistent smoke smell following day.  sometimes smell is triggered by foul smell, but at other times she may get the smell without provoking.  Smell is mostly the same - described as cigarette smell.  \par No clear episodes of lost time - may be forgetful of tasks to do - but has not been told of conversations that she does not remember. No tongue biting or urinary incontinence. \par \par Ms. REYES was previously seen by Dr. Acharya - MRI essentially negative - she has incidental pituitary cyst.  Ms. REYES had prior EEG testing in 2015 that showed intermittent left temporal slowing.  She underwent repeat ambulatory EEG testing in April 2022 that noted marked left temporal slowing, sometimes rhythmical left temporal delta, and infrequent right temporal intermittent slowing.  During this ambulatory recording, she did not have her characteristic olfactory event.  She initially saw an ENT physician and had a work-up including CT of the sinuses that was unrevealing.  She tried course of steroids and nasal sprays are also not helpful.\par \par *** 5/11/2022 from Dr. Acharya's Notes *** \par 52-year-old woman who is here for initial consultation of smelling cigarette smell before Kimber. Patient states that it has been intermittent and she has seen ENT. Work-up including CT of the sinuses along with course of steroids and nasal sprays have failed to alleviate her symptoms. Patient has no fevers and no sick contacts. Patient has no exposure to COVID.\par \par Interval history: Discussed results of MRI of the brain with the patient. Patient has no history of pituitary issues. Patient sees a endocrinologist in the past for hypothyroidism. The olfactory bulb was not evaluated even though the question on the MRI order sheet specified. I have emailed neuroradiology to make sure that this is performed when she returns for MRI of the pituitary gland. Patient states that she tested negative for COVID swab. Patient has a EEG that is still pending.\par \par Interval history: Since her last visit patient was noted to have slowing in the temporal region. Patient's MRI of the brain shows no lesions in the temporal lobes. Patient has upcoming appointment with epilepsy to help evaluate the situation whether patient needs longer EEG monitoring versus confirmation that this is incidental finding and should be left alone. Patient states that her cigarette smells are now intermittent and faint. Patient's MRI of the brain shows hypo enhancement that is indicative of microadenoma versus cyst. Endocrinologist has seen the patient and had sent off blood work. Patient will return to endocrinology for repeat MRI in September. \par

## 2022-06-05 ENCOUNTER — NON-APPOINTMENT (OUTPATIENT)
Age: 52
End: 2022-06-05

## 2022-06-08 ENCOUNTER — INPATIENT (INPATIENT)
Facility: HOSPITAL | Age: 52
LOS: 1 days | Discharge: HOME | DRG: 101 | End: 2022-06-10
Attending: PSYCHIATRY & NEUROLOGY | Admitting: PSYCHIATRY & NEUROLOGY
Payer: COMMERCIAL

## 2022-06-08 VITALS
OXYGEN SATURATION: 100 % | TEMPERATURE: 98 F | DIASTOLIC BLOOD PRESSURE: 78 MMHG | SYSTOLIC BLOOD PRESSURE: 128 MMHG | HEART RATE: 90 BPM | RESPIRATION RATE: 18 BRPM

## 2022-06-08 DIAGNOSIS — Z98.890 OTHER SPECIFIED POSTPROCEDURAL STATES: Chronic | ICD-10-CM

## 2022-06-08 DIAGNOSIS — G40.909 EPILEPSY, UNSPECIFIED, NOT INTRACTABLE, WITHOUT STATUS EPILEPTICUS: ICD-10-CM

## 2022-06-08 DIAGNOSIS — T14.8XXA OTHER INJURY OF UNSPECIFIED BODY REGION, INITIAL ENCOUNTER: Chronic | ICD-10-CM

## 2022-06-08 LAB
ANION GAP SERPL CALC-SCNC: 12 MMOL/L — SIGNIFICANT CHANGE UP (ref 5–17)
BUN SERPL-MCNC: 12 MG/DL — SIGNIFICANT CHANGE UP (ref 7–23)
CALCIUM SERPL-MCNC: 9.6 MG/DL — SIGNIFICANT CHANGE UP (ref 8.4–10.5)
CHLORIDE SERPL-SCNC: 103 MMOL/L — SIGNIFICANT CHANGE UP (ref 96–108)
CO2 SERPL-SCNC: 24 MMOL/L — SIGNIFICANT CHANGE UP (ref 22–31)
CREAT SERPL-MCNC: 0.71 MG/DL — SIGNIFICANT CHANGE UP (ref 0.5–1.3)
EGFR: 101 ML/MIN/1.73M2 — SIGNIFICANT CHANGE UP
GLUCOSE SERPL-MCNC: 105 MG/DL — HIGH (ref 70–99)
HCT VFR BLD CALC: 36.6 % — SIGNIFICANT CHANGE UP (ref 34.5–45)
HGB BLD-MCNC: 11.4 G/DL — LOW (ref 11.5–15.5)
MAGNESIUM SERPL-MCNC: 2.2 MG/DL — SIGNIFICANT CHANGE UP (ref 1.6–2.6)
MCHC RBC-ENTMCNC: 28.6 PG — SIGNIFICANT CHANGE UP (ref 27–34)
MCHC RBC-ENTMCNC: 31.1 GM/DL — LOW (ref 32–36)
MCV RBC AUTO: 92 FL — SIGNIFICANT CHANGE UP (ref 80–100)
NRBC # BLD: 0 /100 WBCS — SIGNIFICANT CHANGE UP (ref 0–0)
PHOSPHATE SERPL-MCNC: 2.9 MG/DL — SIGNIFICANT CHANGE UP (ref 2.5–4.5)
PLATELET # BLD AUTO: 321 K/UL — SIGNIFICANT CHANGE UP (ref 150–400)
POTASSIUM SERPL-MCNC: 4.2 MMOL/L — SIGNIFICANT CHANGE UP (ref 3.5–5.3)
POTASSIUM SERPL-SCNC: 4.2 MMOL/L — SIGNIFICANT CHANGE UP (ref 3.5–5.3)
RBC # BLD: 3.98 M/UL — SIGNIFICANT CHANGE UP (ref 3.8–5.2)
RBC # FLD: 13 % — SIGNIFICANT CHANGE UP (ref 10.3–14.5)
SODIUM SERPL-SCNC: 139 MMOL/L — SIGNIFICANT CHANGE UP (ref 135–145)
WBC # BLD: 7.07 K/UL — SIGNIFICANT CHANGE UP (ref 3.8–10.5)
WBC # FLD AUTO: 7.07 K/UL — SIGNIFICANT CHANGE UP (ref 3.8–10.5)

## 2022-06-08 PROCEDURE — 95720 EEG PHY/QHP EA INCR W/VEEG: CPT

## 2022-06-08 RX ORDER — LEVOTHYROXINE SODIUM 125 MCG
100 TABLET ORAL DAILY
Refills: 0 | Status: DISCONTINUED | OUTPATIENT
Start: 2022-06-09 | End: 2022-06-10

## 2022-06-08 RX ORDER — ENOXAPARIN SODIUM 100 MG/ML
40 INJECTION SUBCUTANEOUS EVERY 24 HOURS
Refills: 0 | Status: DISCONTINUED | OUTPATIENT
Start: 2022-06-08 | End: 2022-06-10

## 2022-06-08 RX ORDER — LACOSAMIDE 50 MG/1
100 TABLET ORAL ONCE
Refills: 0 | Status: DISCONTINUED | OUTPATIENT
Start: 2022-06-08 | End: 2022-06-10

## 2022-06-08 RX ORDER — AMLODIPINE BESYLATE 2.5 MG/1
2.5 TABLET ORAL DAILY
Refills: 0 | Status: DISCONTINUED | OUTPATIENT
Start: 2022-06-08 | End: 2022-06-10

## 2022-06-08 RX ORDER — ROSUVASTATIN CALCIUM 5 MG/1
1 TABLET ORAL
Qty: 0 | Refills: 0 | DISCHARGE

## 2022-06-08 RX ORDER — ATORVASTATIN CALCIUM 80 MG/1
80 TABLET, FILM COATED ORAL AT BEDTIME
Refills: 0 | Status: DISCONTINUED | OUTPATIENT
Start: 2022-06-08 | End: 2022-06-10

## 2022-06-08 NOTE — H&P ADULT - NSHPPHYSICALEXAM_GEN_ALL_CORE
Vital Signs Last 24 Hrs  T(C): 36.4 (08 Jun 2022 16:23), Max: 36.4 (08 Jun 2022 16:23)  T(F): 97.5 (08 Jun 2022 16:23), Max: 97.5 (08 Jun 2022 16:23)  HR: 90 (08 Jun 2022 16:23) (90 - 90)  BP: 128/78 (08 Jun 2022 16:23) (128/78 - 128/78)  BP(mean): --  RR: 18 (08 Jun 2022 16:23) (18 - 18)  SpO2: 100% (08 Jun 2022 16:23) (100% - 100%) Vital Signs Last 24 Hrs  T(C): 36.4 (08 Jun 2022 16:23), Max: 36.4 (08 Jun 2022 16:23)  T(F): 97.5 (08 Jun 2022 16:23), Max: 97.5 (08 Jun 2022 16:23)  HR: 90 (08 Jun 2022 16:23) (90 - 90)  BP: 128/78 (08 Jun 2022 16:23) (128/78 - 128/78)  BP(mean): --  RR: 18 (08 Jun 2022 16:23) (18 - 18)  SpO2: 100% (08 Jun 2022 16:23) (100% - 100%)    General:  Constitutional: Female, appears stated age, in no apparent distress, pleasant  Head: Normocephalic; Eyes: clear sclera;   Extremities: No cyanosis; Skin: No agnieszka edema of LE  Resp: breathing comfortably     Neurological (>12):  MS: Awake, alert.  Follows commands.   Language: Speech is clear, fluent, normal volume, good repetition,  comprehension, registration of words.  CNs: PERRL (R 3mm, L 3mm). VFF. EOMI. V1-3 intact LT, No facial asymmetry b/l, full. Hearing grossly normal (rubbing fingers) b/l.     Motor: Normal muscle bulk & tone. No noted tremor.               Deltoid	Biceps	Triceps	   R	5	5	5	5		 	  L	5	5	5	5			  	H-Flex	K-Ext	D-Flex	P-Flex  R	5	5	5	5			 	   L	5	5	5	5		     Sensation: Intact to LT b/l. Cortical: Extinction on DSS (neglect): none  Reflexes R/L:  Biceps(C5) 2/2  BR(C6) 2/2   Triceps(C7)  2/2 Patellar(L4)   2/2    Coordination: No dysmetria to FTN b/l

## 2022-06-08 NOTE — H&P ADULT - ATTENDING COMMENTS
probable epileptic events( uncinate ?)  agree with plan as above  may consider pet as outpatient and further AED management  for now monitor off AEDs.

## 2022-06-08 NOTE — H&P ADULT - HISTORY OF PRESENT ILLNESS
Patient AC is a 51 yo R-H F  PMH of HTN, HLD, hypothyroidism, MARIAN (on BPAP), anxiety, daya D deficiency, who presented on 6/8/22 for elective EMU admission for event capture and possible initiation of medication. Patient presented to Neurology (Dr. Acharya) in 02/2022 with complaint of recurrent episodes of smelling a "burnt smell like cigarettes", since 12/2021. Patient noted the smell was sometimes provoked (eg, someone smoking near her), but at other times would occur spontaneously and persist, following her throughout the day. Episodes reported to occur 1-2x per week and are not associated with lost time, tongue biting or urinary incontinence. She was initially evaluated by ENT 01/2022 and had work-up including CT of the sinuses along with a course of steroids and nasal sprays, which did not alleviate her symptoms. Following evaluation by Neurology, she underwent workup with MRI brain w/wo(02/10/22) which showed a small incidental pituitary cyst vs microadenoma, which was redemonstrated on MR pituitary (2/15), but no other pathology. Ambulatory EEG was also done 04/2022, which per chart review, demonstrated "marked left temporal slowing, sometimes rhythmical left temporal delta, and infrequent right temporal intermittent slowing", in the absence of her reported olfactory episodes. Of note, the patient was initially seen by Neurology (Dr. Spence) 12/2014 for complaint of lightheadedness and dizziness, thought to be vertigo and which ultimately resolved without intervention. At that time workup was done including MRI brain which was negative for acute pathology, and EEG, which demonstrated intermittent slowing in the left temporal lobe. Patient AC is a 53 yo R-H F  PMH of HTN, HLD, hypothyroidism, MARIAN (on CPAP), diverticulosis, IBS, anxiety, daya D deficiency, who presented on 6/8/22 for elective EMU admission for event capture and possible initiation of medication. Patient presented to Neurology (Dr. Acharya) in 02/2022 with complaint of recurrent episodes of smelling a "burnt smell like cigarettes", since 12/2021. Patient noted the smell was sometimes provoked (eg, someone smoking near her), but at other times would occur spontaneously and persist, following her throughout the day. Episodes reported to occur 1-2x per week and are not associated with lost time, tongue biting or urinary incontinence. She was initially evaluated by ENT 01/2022 and had work-up including CT of the sinuses along with a course of steroids and nasal sprays, which did not alleviate her symptoms. Following evaluation by Neurology, she underwent workup with MRI brain w/wo(02/10/22) which showed a small incidental pituitary cyst vs microadenoma, which was redemonstrated on MR pituitary (2/15), but no other pathology. Ambulatory EEG was also done 04/2022, which per chart review, demonstrated "marked left temporal slowing, sometimes rhythmical left temporal delta, and infrequent right temporal intermittent slowing", in the absence of her reported olfactory episodes. Of note, the patient was initially seen by Neurology (Dr. Spence) 12/2014 for complaint of lightheadedness and dizziness, thought to be vertigo and which ultimately resolved without intervention. At that time workup was done including MRI brain which was negative for acute pathology, and EEG, which demonstrated intermittent slowing in the left temporal lobe.

## 2022-06-08 NOTE — H&P ADULT - ASSESSMENT
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 Abundant polymorphic focal slowing in the left temporal lobe. Rare polymorphic focal slowing in the right temporal lobe.   - 3/27-3/29/15: Intermittent left temporal slowing c/w left temporal cerebral dysfunction. No epileptiform discharges/seizures present.      Impression: Recurrent olfactory events in the setting of abundant L>R temporal slowing on EEG concerning for focal onset seizures without impaired awareness.     Plan:   [] Video EEG  [] UA, Utox, TSH, B9, B12, vitamin D    Per Dr. Hollis:  [] If seizures captured recommend starting vimpat 50mg BID  [] If no seizures or no olfactory events, will f/u outpt with Dr. Hollis for further management (possible trial of vimpat)    Other:   - Telemetry, neurochecks, VS  - Diet:   - DVT ppx:   - Seizure rescue: Ativan 1mg / Vimpat 100mg Patient AC is a 53 yo R-H F  PMH of HTN, HLD, hypothyroidism, MARIAN (on CPAP), diverticulosis, IBS, anxiety, daya D deficiency, who presented on 6/8/22 for elective EMU admission for event capture and possible initiation of medication in recurrent episodes of smelling a "burnt smell like cigarettes", since 12/2021 occurring 1-2x per week. MRI brain w/wo(02/10/22) which showed a small incidental pituitary cyst vs microadenoma, which was redemonstrated on MR pituitary (2/15), but no other pathology. Ambulatory EEG was also done 04/2022, which per chart review, demonstrated "marked left temporal slowing, sometimes rhythmical left temporal delta, and infrequent right temporal intermittent slowing", in the absence of her reported olfactory episodes. 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 Abundant polymorphic focal slowing in the left temporal lobe. Rare polymorphic focal slowing in the right temporal lobe.   - 3/27-3/29/15: Intermittent left temporal slowing c/w left temporal cerebral dysfunction. No epileptiform discharges/seizures present.    Impression: Recurrent olfactory events in the setting of abundant L>R temporal slowing on EEG concerning for focal onset seizures without impaired awareness.     Plan:   [] Video EEG  [] UA, Utox, TSH, B9, B12, vitamin D    Per Dr. Hollis:  [] If seizures captured recommend starting vimpat 50mg BID  [] If no seizures or no olfactory events, will f/u outpt with Dr. Hollis for further management (possible trial of vimpat)    Other:   - Telemetry, neurochecks, VS  - Diet:   - DVT ppx:   - Seizure rescue: Ativan 1mg / Vimpat 100mg     Initial plan discussed with epilepsy service. To be formally staffed by attending during AM rounds. Plan finalized once signed by attending.

## 2022-06-08 NOTE — PATIENT PROFILE ADULT - FALL HARM RISK - UNIVERSAL INTERVENTIONS
Bed in lowest position, wheels locked, appropriate side rails in place/Call bell, personal items and telephone in reach/Instruct patient to call for assistance before getting out of bed or chair/Non-slip footwear when patient is out of bed/[place] to call system/Physically safe environment - no spills, clutter or unnecessary equipment/Purposeful Proactive Rounding/Room/bathroom lighting operational, light cord in reach

## 2022-06-09 LAB
ANION GAP SERPL CALC-SCNC: 11 MMOL/L — SIGNIFICANT CHANGE UP (ref 5–17)
APPEARANCE UR: CLEAR — SIGNIFICANT CHANGE UP
BACTERIA # UR AUTO: NEGATIVE — SIGNIFICANT CHANGE UP
BILIRUB UR-MCNC: NEGATIVE — SIGNIFICANT CHANGE UP
BUN SERPL-MCNC: 11 MG/DL — SIGNIFICANT CHANGE UP (ref 7–23)
CALCIUM SERPL-MCNC: 9.1 MG/DL — SIGNIFICANT CHANGE UP (ref 8.4–10.5)
CHLORIDE SERPL-SCNC: 107 MMOL/L — SIGNIFICANT CHANGE UP (ref 96–108)
CO2 SERPL-SCNC: 23 MMOL/L — SIGNIFICANT CHANGE UP (ref 22–31)
COLOR SPEC: COLORLESS — SIGNIFICANT CHANGE UP
CREAT SERPL-MCNC: 0.74 MG/DL — SIGNIFICANT CHANGE UP (ref 0.5–1.3)
DIFF PNL FLD: NEGATIVE — SIGNIFICANT CHANGE UP
EGFR: 96 ML/MIN/1.73M2 — SIGNIFICANT CHANGE UP
EPI CELLS # UR: 3 /HPF — SIGNIFICANT CHANGE UP
FOLATE SERPL-MCNC: 10.5 NG/ML — SIGNIFICANT CHANGE UP
GLUCOSE SERPL-MCNC: 99 MG/DL — SIGNIFICANT CHANGE UP (ref 70–99)
GLUCOSE UR QL: NEGATIVE — SIGNIFICANT CHANGE UP
HCT VFR BLD CALC: 34.9 % — SIGNIFICANT CHANGE UP (ref 34.5–45)
HGB BLD-MCNC: 10.8 G/DL — LOW (ref 11.5–15.5)
HYALINE CASTS # UR AUTO: 3 /LPF — HIGH (ref 0–2)
KETONES UR-MCNC: NEGATIVE — SIGNIFICANT CHANGE UP
LEUKOCYTE ESTERASE UR-ACNC: ABNORMAL
MAGNESIUM SERPL-MCNC: 2.2 MG/DL — SIGNIFICANT CHANGE UP (ref 1.6–2.6)
MCHC RBC-ENTMCNC: 28.1 PG — SIGNIFICANT CHANGE UP (ref 27–34)
MCHC RBC-ENTMCNC: 30.9 GM/DL — LOW (ref 32–36)
MCV RBC AUTO: 90.6 FL — SIGNIFICANT CHANGE UP (ref 80–100)
NITRITE UR-MCNC: NEGATIVE — SIGNIFICANT CHANGE UP
NRBC # BLD: 0 /100 WBCS — SIGNIFICANT CHANGE UP (ref 0–0)
PH UR: 6.5 — SIGNIFICANT CHANGE UP (ref 5–8)
PHOSPHATE SERPL-MCNC: 2.9 MG/DL — SIGNIFICANT CHANGE UP (ref 2.5–4.5)
PLATELET # BLD AUTO: 281 K/UL — SIGNIFICANT CHANGE UP (ref 150–400)
POTASSIUM SERPL-MCNC: 3.8 MMOL/L — SIGNIFICANT CHANGE UP (ref 3.5–5.3)
POTASSIUM SERPL-SCNC: 3.8 MMOL/L — SIGNIFICANT CHANGE UP (ref 3.5–5.3)
PROT UR-MCNC: NEGATIVE — SIGNIFICANT CHANGE UP
RBC # BLD: 3.85 M/UL — SIGNIFICANT CHANGE UP (ref 3.8–5.2)
RBC # FLD: 13 % — SIGNIFICANT CHANGE UP (ref 10.3–14.5)
RBC CASTS # UR COMP ASSIST: 1 /HPF — SIGNIFICANT CHANGE UP (ref 0–4)
SODIUM SERPL-SCNC: 141 MMOL/L — SIGNIFICANT CHANGE UP (ref 135–145)
SP GR SPEC: 1 — LOW (ref 1.01–1.02)
TSH SERPL-MCNC: 2.29 UIU/ML — SIGNIFICANT CHANGE UP (ref 0.27–4.2)
UROBILINOGEN FLD QL: NEGATIVE — SIGNIFICANT CHANGE UP
VIT B12 SERPL-MCNC: 310 PG/ML — SIGNIFICANT CHANGE UP (ref 232–1245)
WBC # BLD: 5.03 K/UL — SIGNIFICANT CHANGE UP (ref 3.8–10.5)
WBC # FLD AUTO: 5.03 K/UL — SIGNIFICANT CHANGE UP (ref 3.8–10.5)
WBC UR QL: 3 /HPF — SIGNIFICANT CHANGE UP (ref 0–5)

## 2022-06-09 PROCEDURE — 95720 EEG PHY/QHP EA INCR W/VEEG: CPT

## 2022-06-09 RX ADMIN — ATORVASTATIN CALCIUM 80 MILLIGRAM(S): 80 TABLET, FILM COATED ORAL at 22:11

## 2022-06-09 RX ADMIN — ENOXAPARIN SODIUM 40 MILLIGRAM(S): 100 INJECTION SUBCUTANEOUS at 06:25

## 2022-06-09 RX ADMIN — Medication 100 MICROGRAM(S): at 05:39

## 2022-06-10 ENCOUNTER — TRANSCRIPTION ENCOUNTER (OUTPATIENT)
Age: 52
End: 2022-06-10

## 2022-06-10 VITALS
HEART RATE: 61 BPM | DIASTOLIC BLOOD PRESSURE: 81 MMHG | RESPIRATION RATE: 18 BRPM | OXYGEN SATURATION: 95 % | TEMPERATURE: 97 F | SYSTOLIC BLOOD PRESSURE: 141 MMHG

## 2022-06-10 LAB — 24R-OH-CALCIDIOL SERPL-MCNC: 16.1 NG/ML — LOW (ref 30–80)

## 2022-06-10 PROCEDURE — 95715 VEEG EA 12-26HR INTMT MNTR: CPT

## 2022-06-10 PROCEDURE — 36415 COLL VENOUS BLD VENIPUNCTURE: CPT

## 2022-06-10 PROCEDURE — 84100 ASSAY OF PHOSPHORUS: CPT

## 2022-06-10 PROCEDURE — 82746 ASSAY OF FOLIC ACID SERUM: CPT

## 2022-06-10 PROCEDURE — 95712 VEEG 2-12 HR INTMT MNTR: CPT

## 2022-06-10 PROCEDURE — 80048 BASIC METABOLIC PNL TOTAL CA: CPT

## 2022-06-10 PROCEDURE — 82306 VITAMIN D 25 HYDROXY: CPT

## 2022-06-10 PROCEDURE — 95700 EEG CONT REC W/VID EEG TECH: CPT

## 2022-06-10 PROCEDURE — 85027 COMPLETE CBC AUTOMATED: CPT

## 2022-06-10 PROCEDURE — 84443 ASSAY THYROID STIM HORMONE: CPT

## 2022-06-10 PROCEDURE — 82607 VITAMIN B-12: CPT

## 2022-06-10 PROCEDURE — 83735 ASSAY OF MAGNESIUM: CPT

## 2022-06-10 PROCEDURE — 95718 EEG PHYS/QHP 2-12 HR W/VEEG: CPT

## 2022-06-10 PROCEDURE — 81001 URINALYSIS AUTO W/SCOPE: CPT

## 2022-06-10 RX ORDER — PREGABALIN 225 MG/1
2 CAPSULE ORAL
Qty: 60 | Refills: 2
Start: 2022-06-10 | End: 2022-09-07

## 2022-06-10 RX ORDER — PREGABALIN 225 MG/1
1000 CAPSULE ORAL DAILY
Refills: 0 | Status: DISCONTINUED | OUTPATIENT
Start: 2022-06-10 | End: 2022-06-10

## 2022-06-10 RX ORDER — LACOSAMIDE 50 MG/1
1 TABLET ORAL
Qty: 60 | Refills: 0
Start: 2022-06-10 | End: 2022-07-09

## 2022-06-10 RX ADMIN — ENOXAPARIN SODIUM 40 MILLIGRAM(S): 100 INJECTION SUBCUTANEOUS at 06:25

## 2022-06-10 RX ADMIN — Medication 100 MICROGRAM(S): at 06:25

## 2022-06-10 NOTE — DISCHARGE NOTE NURSING/CASE MANAGEMENT/SOCIAL WORK - PATIENT PORTAL LINK FT
You can access the FollowMyHealth Patient Portal offered by [facility] by registering at the following website: [email or web address]. By joining [employer]’s FollowMyHealth portal, you will also be able to view your health information using other applications (apps) compatible with our system.

## 2022-06-10 NOTE — PROGRESS NOTE ADULT - ASSESSMENT
52 y.o. right handed woman with a PMH of HTN, HLD, hypothyroidism, MARIAN (on CPAP), diverticulosis, IBS, anxiety, vitamin D deficiency, who presented on 6/8/22 for elective EMU admission for event capture and possible initiation of medication in the setting of recurrent episodes of smelling a "burnt smell like cigarettes", since 12/2021 occurring 1-2x per week and abnormal ambulatory EEG demonstrating abundant polymorphic focal slowing in the left temporal lobe, with rare polymorphic focal slowing in the right temporal lobe.    Impression: Recurrent olfactory events in the setting of abundant L>R temporal slowing on EEG concerning for focal onset seizures without impaired awareness.       Plan:   [] Video EEG  [] UA (-), Utox, TSH (2.29), B9 (10.5), B12 (310), vitamin D  [] No AEDs for now    Other:   - Telemetry, neurochecks, VS Q4H  - Diet: Regular  - DVT ppx: Lovenox 40mg SC  - Seizure rescue: Ativan 1mg / Vimpat 100mg  - May consider PET as outpt  - Outpatient follow up with Dr. Hollis upon discharge     52 y.o. right handed woman with a PMH of HTN, HLD, hypothyroidism, MARIAN (on CPAP), diverticulosis, IBS, anxiety, vitamin D deficiency, who presented on 6/8/22 for elective EMU admission for event capture and possible initiation of medication in the setting of recurrent episodes of smelling a "burnt smell like cigarettes", since 12/2021 occurring 1-2x per week and abnormal ambulatory EEG demonstrating abundant polymorphic focal slowing in the left temporal lobe, with rare polymorphic focal slowing in the right temporal lobe.    Impression: Recurrent olfactory events in the setting of abundant L>R temporal slowing on EEG concerning for focal onset seizures without impaired awareness.       Plan:   [] Video EEG  [] UA (-), Utox, TSH (2.29), B9 (10.5), B12 (310), vitamin D  [] No AEDs for now    Other:   - Telemetry, neurochecks, VS Q4H  - Diet: Regular  - DVT ppx: Lovenox 40mg SC  - Seizure rescue: Ativan 1mg / Vimpat 100mg  - May consider PET as outpt  - Outpatient follow up with Dr. Hollis upon discharge    Discussed with Neurology attending, Dr. Jasso.

## 2022-06-10 NOTE — DISCHARGE NOTE PROVIDER - NSDCFUSCHEDAPPT_GEN_ALL_CORE_FT
Moris Clinton  [facility] Physician [facility]  PULMMED [address]  Scheduled Appointment: 06/13/2022    Mahin Hollis  [facility]  NEUROLOGY [address]  Scheduled Appointment: 07/15/2022    Erwin Valenzuela  [facility]  Cardio [address]  Scheduled Appointment: 07/22/2022    Kimi Villarreal  [facility] Physician [facility]  Endocrin [address]  Scheduled Appointment: 09/08/2022

## 2022-06-10 NOTE — DISCHARGE NOTE PROVIDER - HOSPITAL COURSE
HPI:  Patient AC is a 51 yo R-H F  PMH of HTN, HLD, hypothyroidism, MARIAN (on CPAP), diverticulosis, IBS, anxiety, daya D deficiency, who presented on 6/8/22 for elective EMU admission for event capture and possible initiation of medication. Patient presented to Neurology (Dr. Acharya) in 02/2022 with complaint of recurrent episodes of smelling a "burnt smell like cigarettes", since 12/2021. Patient noted the smell was sometimes provoked (eg, someone smoking near her), but at other times would occur spontaneously and persist, following her throughout the day. Episodes reported to occur 1-2x per week and are not associated with lost time, tongue biting or urinary incontinence. She was initially evaluated by ENT 01/2022 and had work-up including CT of the sinuses along with a course of steroids and nasal sprays, which did not alleviate her symptoms. Following evaluation by Neurology, she underwent workup with MRI brain w/wo(02/10/22) which showed a small incidental pituitary cyst vs microadenoma, which was redemonstrated on MR pituitary (2/15), but no other pathology. Ambulatory EEG was also done 04/2022, which per chart review, demonstrated "marked left temporal slowing, sometimes rhythmical left temporal delta, and infrequent right temporal intermittent slowing", in the absence of her reported olfactory episodes. Of note, the patient was initially seen by Neurology (Dr. Spence) 12/2014 for complaint of lightheadedness and dizziness, thought to be vertigo and which ultimately resolved without intervention. At that time workup was done including MRI brain which was negative for acute pathology, and EEG, which demonstrated intermittent slowing in the left temporal lobe. (08 Jun 2022 16:38)    IMAGING/STUDIES:          HOSPITAL COURSE:    HPI:  Patient AC is a 53 yo R-H F  PMH of HTN, HLD, hypothyroidism, MARIAN (on CPAP), diverticulosis, IBS, anxiety, daya D deficiency, who presented on 6/8/22 for elective EMU admission for event capture and possible initiation of medication. Patient presented to Neurology (Dr. Acharya) in 02/2022 with complaint of recurrent episodes of smelling a "burnt smell like cigarettes", since 12/2021. Patient noted the smell was sometimes provoked (eg, someone smoking near her), but at other times would occur spontaneously and persist, following her throughout the day. Episodes reported to occur 1-2x per week and are not associated with lost time, tongue biting or urinary incontinence. She was initially evaluated by ENT 01/2022 and had work-up including CT of the sinuses along with a course of steroids and nasal sprays, which did not alleviate her symptoms. Following evaluation by Neurology, she underwent workup with MRI brain w/wo(02/10/22) which showed a small incidental pituitary cyst vs microadenoma, which was redemonstrated on MR pituitary (2/15), but no other pathology. Ambulatory EEG was also done 04/2022, which per chart review, demonstrated "marked left temporal slowing, sometimes rhythmical left temporal delta, and infrequent right temporal intermittent slowing", in the absence of her reported olfactory episodes. Of note, the patient was initially seen by Neurology (Dr. Spence) 12/2014 for complaint of lightheadedness and dizziness, thought to be vertigo and which ultimately resolved without intervention. At that time workup was done including MRI brain which was negative for acute pathology, and EEG, which demonstrated intermittent slowing in the left temporal lobe. (08 Jun 2022 16:38)    IMAGING/STUDIES:          HOSPITAL COURSE:   The patient was admitted to the EMU for further evaluation and management. EEG *** ***INCOMPLETE***  She was found to be deficient in vitamin B12 (310) and initiated on supplementation. She is to continue with daily supplementation as prescribed (2000mg/d) for 3 months, after which her level should be checked and need for further treatment determined. HPI:  Patient AC is a 53 yo R-H F  PMH of HTN, HLD, hypothyroidism, MARIAN (on CPAP), diverticulosis, IBS, anxiety, daya D deficiency, who presented on 6/8/22 for elective EMU admission for event capture and possible initiation of medication. Patient presented to Neurology (Dr. Acharya) in 02/2022 with complaint of recurrent episodes of smelling a "burnt smell like cigarettes", since 12/2021. Patient noted the smell was sometimes provoked (eg, someone smoking near her), but at other times would occur spontaneously and persist, following her throughout the day. Episodes reported to occur 1-2x per week and are not associated with lost time, tongue biting or urinary incontinence. She was initially evaluated by ENT 01/2022 and had work-up including CT of the sinuses along with a course of steroids and nasal sprays, which did not alleviate her symptoms. Following evaluation by Neurology, she underwent workup with MRI brain w/wo(02/10/22) which showed a small incidental pituitary cyst vs microadenoma, which was redemonstrated on MR pituitary (2/15), but no other pathology. Ambulatory EEG was also done 04/2022, which per chart review, demonstrated "marked left temporal slowing, sometimes rhythmical left temporal delta, and infrequent right temporal intermittent slowing", in the absence of her reported olfactory episodes. Of note, the patient was initially seen by Neurology (Dr. Spence) 12/2014 for complaint of lightheadedness and dizziness, thought to be vertigo and which ultimately resolved without intervention. At that time workup was done including MRI brain which was negative for acute pathology, and EEG, which demonstrated intermittent slowing in the left temporal lobe. (08 Jun 2022 16:38)      HOSPITAL COURSE:   The patient was admitted to the EMU for further evaluation and management. EEG did not demonstrate any seizure activity, however the patient did not have any symptoms characteristic of her typical events during the hospitalization. She is instructed to start taking Vimpat 50mg BID daily and to follow up with her Epileptologist Dr. Hollis, for further management. The patient was also found to be deficient in vitamin B12 (310) and initiated on supplementation. She is to continue with daily supplementation as prescribed (2000mg/d) for 3 months, after which her level should be checked by her primary care provider and need for further treatment determined.      HPI:  Patient AC is a 53 yo R-H F  PMH of HTN, HLD, hypothyroidism, MARIAN (on CPAP), diverticulosis, IBS, anxiety, daya D deficiency, who presented on 6/8/22 for elective EMU admission for event capture and possible initiation of medication. Patient presented to Neurology (Dr. Acharya) in 02/2022 with complaint of recurrent episodes of smelling a "burnt smell like cigarettes", since 12/2021. Patient noted the smell was sometimes provoked (eg, someone smoking near her), but at other times would occur spontaneously and persist, following her throughout the day. Episodes reported to occur 1-2x per week and are not associated with lost time, tongue biting or urinary incontinence. She was initially evaluated by ENT 01/2022 and had work-up including CT of the sinuses along with a course of steroids and nasal sprays, which did not alleviate her symptoms. Following evaluation by Neurology, she underwent workup with MRI brain w/wo(02/10/22) which showed a small incidental pituitary cyst vs microadenoma, which was redemonstrated on MR pituitary (2/15), but no other pathology. Ambulatory EEG was also done 04/2022, which per chart review, demonstrated "marked left temporal slowing, sometimes rhythmical left temporal delta, and infrequent right temporal intermittent slowing", in the absence of her reported olfactory episodes. Of note, the patient was initially seen by Neurology (Dr. Spence) 12/2014 for complaint of lightheadedness and dizziness, thought to be vertigo and which ultimately resolved without intervention. At that time workup was done including MRI brain which was negative for acute pathology, and EEG, which demonstrated intermittent slowing in the left temporal lobe. (08 Jun 2022 16:38)      HOSPITAL COURSE:   The patient was admitted to the EMU for further evaluation and management. EEG did not demonstrate any seizure activity, however the patient did not have any symptoms characteristic of her typical events during the hospitalization. She is instructed to start taking Vimpat 50mg BID daily and to follow up with her Epileptologist Dr. Hollis, for further management. The patient was also found to be deficient in vitamin B12 (310) and initiated on supplementation. She is to continue with daily supplementation as prescribed (2000mg/d) for 3 months, after which her level should be checked by her primary care provider and need for further treatment determined.

## 2022-06-10 NOTE — DISCHARGE NOTE PROVIDER - CARE PROVIDER_API CALL
Mahin Hollis (MD)  Clinical Neurophysiology; EEGEpilepsy; Neurology  [address]  [place] [address]  Phone: [phone]  Fax: [phone]  Established Patient  Scheduled Appointment: 07/15/2022

## 2022-06-10 NOTE — PROGRESS NOTE ADULT - SUBJECTIVE AND OBJECTIVE BOX
SUBJECTIVE/INTERVAL HISTORY:  ***    MEDICATIONS:  MEDICATIONS  (STANDING):  amLODIPine   Tablet 2.5 milliGRAM(s) Oral daily  atorvastatin 80 milliGRAM(s) Oral at bedtime  enoxaparin Injectable 40 milliGRAM(s) SubCutaneous every 24 hours  lacosamide Injectable 100 milliGRAM(s) IV Push once  levothyroxine 100 MICROGram(s) Oral daily  LORazepam   Injectable 1 milliGRAM(s) IV Push once    MEDICATIONS  (PRN):      VITALS & EXAMINATION:  Vital Signs Last 24 Hrs  T(C): 36.4 (10 Basilio 2022 05:44), Max: 36.8 (2022 17:52)  T(F): 97.6 (10 Basilio 2022 05:44), Max: 98.3 (2022 17:52)  HR: 63 (10 Basilio 2022 05:44) (63 - 77)  BP: 136/77 (10 Basilio 2022 05:44) (106/72 - 136/77)  RR: 18 (10 Basilio 2022 05:44) (18 - 18)  SpO2: 98% (10 Basilio 2022 05:44) (96% - 100%)    ***    LABORATORY:  CBC                       10.8   5.03  )-----------( 281      ( 2022 06:31 )             34.9     Chem 06-09    141  |  107  |  11  ----------------------------<  99  3.8   |  23  |  0.74    Ca    9.1      2022 06:31  Phos  2.9     06-09  Mg     2.2     06-09      LFTs   Coagulopathy   Lipid Panel   A1c   Cardiac enzymes     U/A Urinalysis Basic - ( 2022 01:31 )  Color: Colorless / Appearance: Clear / S.005 / pH: x  Gluc: x / Ketone: Negative  / Bili: Negative / Urobili: Negative   Blood: x / Protein: Negative / Nitrite: Negative   Leuk Esterase: Small / RBC: 1 /hpf / WBC 3 /HPF   Sq Epi: x / Non Sq Epi: 3 /hpf / Bacteria: Negative      CSF  Immunological  Other    STUDIES & IMAGING:  Studies (EKG, EEG, EMG, etc):     Prior EEGs  - -22: Abnormal prolonged ambulatory EEG in the awake, drowsy and asleep states. Abundant polymorphic focal slowing in the left temporal lobe. Rare polymorphic focal slowing in the right temporal lobe.   - 3/27-3/29/15: Intermittent left temporal slowing c/w left temporal cerebral dysfunction. No epileptiform discharges/seizures present.    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 No acute events overnight. Patient seated at bedside chair working on computer, eating breakfast. Reports she has no complaints at this time.       MEDICATIONS:  MEDICATIONS  (STANDING):  amLODIPine   Tablet 2.5 milliGRAM(s) Oral daily  atorvastatin 80 milliGRAM(s) Oral at bedtime  enoxaparin Injectable 40 milliGRAM(s) SubCutaneous every 24 hours  lacosamide Injectable 100 milliGRAM(s) IV Push once  levothyroxine 100 MICROGram(s) Oral daily  LORazepam   Injectable 1 milliGRAM(s) IV Push once    MEDICATIONS  (PRN):      VITALS & EXAMINATION:  Vital Signs Last 24 Hrs  T(C): 36.4 (10 Basilio 2022 05:44), Max: 36.8 (2022 17:52)  T(F): 97.6 (10 Basilio 2022 05:44), Max: 98.3 (2022 17:52)  HR: 63 (10 Basilio 2022 05:44) (63 - 77)  BP: 136/77 (10 Basilio 2022 05:44) (106/72 - 136/77)  RR: 18 (10 Basilio 2022 05:44) (18 - 18)  SpO2: 98% (10 Basilio 2022 05:44) (96% - 100%)    General:  Constitutional: Appears stated age, NAD.  Head: NC/AT.  Respiratory: Breathing comfortably on room air.    Neurological (>12):  MS: Awake, alert. Follows all commands.   Speech/Language: Clear, fluent, normal volume. Repetition and naming intact.  CNs: PERRL. VFF. EOMI. V1-3 intact LT, No facial asymmetry b/l, full. Hearing grossly normal (rubbing fingers) b/l.   Motor: Normal muscle bulk & tone. No noted tremor at rest. All extremities antigravity.    Sensation: Intact to LT throughout.  Cortical: Extinction on DSS (neglect): none  Reflexes: 2+ biceps, brachioradialis, triceps, patellar b/l.   Coordination: No dysmetria to FTN b/l.  Gait: Not assessed.      LABORATORY:  CBC                       10.8   5.03  )-----------( 281      ( 2022 06:31 )             34.9     Chem 06-09    141  |  107  |  11  ----------------------------<  99  3.8   |  23  |  0.74    Ca    9.1      2022 06:31  Phos  2.9     -  Mg     2.2     06-      U/A Urinalysis Basic - ( 2022 01:31 )  Color: Colorless / Appearance: Clear / S.005 / pH: x  Gluc: x / Ketone: Negative  / Bili: Negative / Urobili: Negative   Blood: x / Protein: Negative / Nitrite: Negative   Leuk Esterase: Small / RBC: 1 /hpf / WBC 3 /HPF   Sq Epi: x / Non Sq Epi: 3 /hpf / Bacteria: Negative      STUDIES & IMAGING:  Studies (EKG, EEG, EMG, etc):     Prior EEGs  - -22: Abnormal prolonged ambulatory EEG in the awake, drowsy and asleep states. Abundant polymorphic focal slowing in the left temporal lobe. Rare polymorphic focal slowing in the right temporal lobe.   - 3/27-3/29/15: Intermittent left temporal slowing c/w left temporal cerebral dysfunction. No epileptiform discharges/seizures present.    Radiology (XR, CT, MR, U/S, TTE/ISADORA):  Prior MRIs  - MRI brain w/wo 2/10/22 and MR pituitary 2/15/22: 1 x 4 x 2 mm microadenoma vs pars intermedia cyst, otherwise (-)

## 2022-06-10 NOTE — DISCHARGE NOTE PROVIDER - NSDCCPCAREPLAN_GEN_ALL_CORE_FT
PRINCIPAL DISCHARGE DIAGNOSIS  Diagnosis: At risk for seizures  Assessment and Plan of Treatment: You were admited to the EMU for evaluation of possible seizure activity. Upon admission continuous EEG was performed and did not demonstrate seizure activity. You were started on Vimpat 50mg BID daily due to risk of seizures and cyanocobalamin 2000mcg daily for vitamin B12 deficiency.   Follow up with your Primary Care Physician within the next 1-2 weeks and with your Neurologist as scheduled..  Bring a copy of your test results/discharge documents with you to your appointments.  Continue your medications as prescribed.  Contact your Neurologist, Primary Care Provider, or report to the Emergency Room if you experience new or worsening symptoms including sudden severe headache, new numbness/tingling, new weakness, difficulty speaking, convulsions or loss of consciousness.

## 2022-06-10 NOTE — DISCHARGE NOTE PROVIDER - NSDCMRMEDTOKEN_GEN_ALL_CORE_FT
amLODIPine 2.5 mg oral tablet: 1 tab(s) orally once a day  Crestor 20 mg oral tablet: 1 tab(s) orally once a day  levothyroxine 100 mcg (0.1 mg) oral tablet: 1 tab(s) orally once a day   amLODIPine 2.5 mg oral tablet: 1 tab(s) orally once a day  Crestor 20 mg oral tablet: 1 tab(s) orally once a day  levothyroxine 100 mcg (0.1 mg) oral tablet: 1 tab(s) orally once a day  Vimpat 50 mg oral tablet: 1 tab(s) orally 2 times a day MDD:100 mg   amLODIPine 2.5 mg oral tablet: 1 tab(s) orally once a day  Crestor 20 mg oral tablet: 1 tab(s) orally once a day  levothyroxine 100 mcg (0.1 mg) oral tablet: 1 tab(s) orally once a day  Vimpat 50 mg oral tablet: 1 tab(s) orally 2 times a day MDD:100 mg  Vitamin B12 1000 mcg oral tablet: 2 tab(s) orally once a day MDD:2000 mcg

## 2022-06-10 NOTE — DISCHARGE NOTE NURSING/CASE MANAGEMENT/SOCIAL WORK - NSDCPEFALRISK_GEN_ALL_CORE
For information on Fall & Injury Prevention, visit: [email or web address] OR  [email or web address] OR  https://www.cdc.gov/steadi/patient.html

## 2022-06-13 ENCOUNTER — APPOINTMENT (OUTPATIENT)
Dept: PULMONOLOGY | Facility: CLINIC | Age: 52
End: 2022-06-13

## 2022-06-13 ENCOUNTER — NON-APPOINTMENT (OUTPATIENT)
Age: 52
End: 2022-06-13

## 2022-06-16 ENCOUNTER — NON-APPOINTMENT (OUTPATIENT)
Age: 52
End: 2022-06-16

## 2022-06-21 ENCOUNTER — NON-APPOINTMENT (OUTPATIENT)
Age: 52
End: 2022-06-21

## 2022-06-21 NOTE — ED ADULT NURSE NOTE - TEMPLATE
General No Residual Tumor Seen Histology Text: There were no malignant cells seen in the sections examined.

## 2022-06-22 ENCOUNTER — NON-APPOINTMENT (OUTPATIENT)
Age: 52
End: 2022-06-22

## 2022-06-23 ENCOUNTER — TRANSCRIPTION ENCOUNTER (OUTPATIENT)
Age: 52
End: 2022-06-23

## 2022-06-23 ENCOUNTER — NON-APPOINTMENT (OUTPATIENT)
Age: 52
End: 2022-06-23

## 2022-06-23 DIAGNOSIS — U07.1 COVID-19: ICD-10-CM

## 2022-06-25 ENCOUNTER — TRANSCRIPTION ENCOUNTER (OUTPATIENT)
Age: 52
End: 2022-06-25

## 2022-06-25 ENCOUNTER — NON-APPOINTMENT (OUTPATIENT)
Age: 52
End: 2022-06-25

## 2022-06-25 DIAGNOSIS — U07.1 COVID-19: ICD-10-CM

## 2022-06-26 ENCOUNTER — TRANSCRIPTION ENCOUNTER (OUTPATIENT)
Age: 52
End: 2022-06-26

## 2022-07-12 ENCOUNTER — APPOINTMENT (OUTPATIENT)
Dept: RHEUMATOLOGY | Facility: CLINIC | Age: 52
End: 2022-07-12

## 2022-07-13 ENCOUNTER — APPOINTMENT (OUTPATIENT)
Dept: NUCLEAR MEDICINE | Facility: IMAGING CENTER | Age: 52
End: 2022-07-13

## 2022-07-13 ENCOUNTER — OUTPATIENT (OUTPATIENT)
Dept: OUTPATIENT SERVICES | Facility: HOSPITAL | Age: 52
LOS: 1 days | End: 2022-07-13
Payer: COMMERCIAL

## 2022-07-13 ENCOUNTER — RESULT REVIEW (OUTPATIENT)
Age: 52
End: 2022-07-13

## 2022-07-13 DIAGNOSIS — T14.8XXA OTHER INJURY OF UNSPECIFIED BODY REGION, INITIAL ENCOUNTER: Chronic | ICD-10-CM

## 2022-07-13 DIAGNOSIS — G40.109 LOCALIZATION-RELATED (FOCAL) (PARTIAL) SYMPTOMATIC EPILEPSY AND EPILEPTIC SYNDROMES WITH SIMPLE PARTIAL SEIZURES, NOT INTRACTABLE, WITHOUT STATUS EPILEPTICUS: ICD-10-CM

## 2022-07-13 DIAGNOSIS — Z00.8 ENCOUNTER FOR OTHER GENERAL EXAMINATION: ICD-10-CM

## 2022-07-13 DIAGNOSIS — Z98.890 OTHER SPECIFIED POSTPROCEDURAL STATES: Chronic | ICD-10-CM

## 2022-07-13 PROCEDURE — 78999 UNLISTED MISC PX DX NUC MED: CPT | Mod: 26

## 2022-07-13 PROCEDURE — A9552: CPT

## 2022-07-13 PROCEDURE — 78608 BRAIN IMAGING (PET): CPT | Mod: 26

## 2022-07-13 PROCEDURE — 78608 BRAIN IMAGING (PET): CPT

## 2022-07-13 PROCEDURE — 78999 UNLISTED MISC PX DX NUC MED: CPT

## 2022-07-14 ENCOUNTER — APPOINTMENT (OUTPATIENT)
Dept: ORTHOPEDIC SURGERY | Facility: CLINIC | Age: 52
End: 2022-07-14

## 2022-07-14 DIAGNOSIS — M65.331 TRIGGER FINGER, RIGHT MIDDLE FINGER: ICD-10-CM

## 2022-07-14 DIAGNOSIS — M65.341 TRIGGER FINGER, RIGHT RING FINGER: ICD-10-CM

## 2022-07-14 PROCEDURE — 99213 OFFICE O/P EST LOW 20 MIN: CPT

## 2022-07-14 PROCEDURE — 73130 X-RAY EXAM OF HAND: CPT | Mod: RT

## 2022-07-15 ENCOUNTER — APPOINTMENT (OUTPATIENT)
Dept: PULMONOLOGY | Facility: CLINIC | Age: 52
End: 2022-07-15

## 2022-07-15 ENCOUNTER — APPOINTMENT (OUTPATIENT)
Dept: NEUROLOGY | Facility: CLINIC | Age: 52
End: 2022-07-15

## 2022-07-15 VITALS
BODY MASS INDEX: 35.87 KG/M2 | TEMPERATURE: 98.4 F | OXYGEN SATURATION: 96 % | HEIGHT: 61 IN | SYSTOLIC BLOOD PRESSURE: 132 MMHG | HEART RATE: 81 BPM | DIASTOLIC BLOOD PRESSURE: 87 MMHG | WEIGHT: 190 LBS

## 2022-07-15 VITALS
BODY MASS INDEX: 37.76 KG/M2 | WEIGHT: 200 LBS | DIASTOLIC BLOOD PRESSURE: 91 MMHG | HEART RATE: 75 BPM | HEIGHT: 61 IN | SYSTOLIC BLOOD PRESSURE: 144 MMHG

## 2022-07-15 DIAGNOSIS — Z91.89 OTHER SPECIFIED PERSONAL RISK FACTORS, NOT ELSEWHERE CLASSIFIED: ICD-10-CM

## 2022-07-15 DIAGNOSIS — G40.109 LOCALIZATION-RELATED (FOCAL) (PARTIAL) SYMPTOMATIC EPILEPSY AND EPILEPTIC SYNDROMES WITH SIMPLE PARTIAL SEIZURES, NOT INTRACTABLE, W/OUT STATUS EPILEPTICUS: ICD-10-CM

## 2022-07-15 PROBLEM — M65.341 ACQUIRED TRIGGER FINGER OF RIGHT RING FINGER: Status: ACTIVE | Noted: 2022-07-15

## 2022-07-15 PROBLEM — M65.331 ACQUIRED TRIGGER FINGER OF RIGHT MIDDLE FINGER: Status: ACTIVE | Noted: 2022-07-15

## 2022-07-15 PROCEDURE — 99213 OFFICE O/P EST LOW 20 MIN: CPT

## 2022-07-15 PROCEDURE — 99214 OFFICE O/P EST MOD 30 MIN: CPT

## 2022-07-15 RX ORDER — METHYLPREDNISOLONE 4 MG/1
4 TABLET ORAL
Qty: 21 | Refills: 0 | Status: COMPLETED | COMMUNITY
Start: 2022-01-18 | End: 2022-07-15

## 2022-07-15 RX ORDER — PROMETHAZINE HYDROCHLORIDE AND DEXTROMETHORPHAN HYDROBROMIDE ORAL SOLUTION 15; 6.25 MG/5ML; MG/5ML
6.25-15 SOLUTION ORAL EVERY 6 HOURS
Qty: 1 | Refills: 0 | Status: COMPLETED | COMMUNITY
Start: 2022-06-25 | End: 2022-07-15

## 2022-07-15 RX ORDER — FLUTICASONE PROPIONATE 50 UG/1
50 SPRAY, METERED NASAL DAILY
Qty: 1 | Refills: 2 | Status: COMPLETED | COMMUNITY
Start: 2022-01-18 | End: 2022-07-15

## 2022-07-15 RX ORDER — METHYLPREDNISOLONE 4 MG/1
4 TABLET ORAL
Qty: 21 | Refills: 0 | Status: COMPLETED | COMMUNITY
Start: 2021-08-27 | End: 2022-07-15

## 2022-07-15 RX ORDER — NIRMATRELVIR AND RITONAVIR 300-100 MG
20 X 150 MG & KIT ORAL
Qty: 30 | Refills: 0 | Status: COMPLETED | COMMUNITY
Start: 2022-06-23 | End: 2022-07-15

## 2022-07-15 RX ORDER — FLUTICASONE PROPIONATE 50 UG/1
50 SPRAY, METERED NASAL DAILY
Qty: 1 | Refills: 1 | Status: COMPLETED | COMMUNITY
Start: 2021-08-27 | End: 2022-07-15

## 2022-07-15 RX ORDER — PREDNISONE 50 MG/1
50 TABLET ORAL
Qty: 3 | Refills: 0 | Status: COMPLETED | COMMUNITY
Start: 2021-04-05 | End: 2022-07-15

## 2022-07-15 NOTE — ASSESSMENT
[FreeTextEntry1] : Ms. REYES is a 52-year-old woman who was initially evaluated by Dr. Spence of the epilepsy service for complaint of "brain fog" and 2015. At that time, she had an EEG that showed intermittent slowing in the left temporal lobe, a nonspecific finding. No further testing was done. She now presents complaining of recurrent episodes of "burnt smell like cigarettes". This smell is somewhat stereotyped, raising the possibility of "uncinate fits" however, the episodes may last several hours, not typical of a seizure. Repeat ambulatory EEG, has detected extremely frequent left temporal slowing, and rare right temporal slowing. At time, the left temporal slowing appears somewhat rhythmical though does not clearly evolve.  EMU evaluation confirmed left temporal slowing along with less extensive right temporal involvement.  PET scan was done recently, results not yet available.  Neuropsychological testing is scheduled for January 2023.\par \par Plan:\par 1.  Await results of PET scan\par 2.  Continue lacosamide 50 mg twice a day for total of 3 months.  If episodes of burning smell have stopped, we will discontinue lacosamide at that point to see if episodes recur–which would suggest seizure etiology\par 3.  Return for follow-up in 2 months.\par \par I have spent 30 minutes or longer reviewing patient data or discussing with the patient  the cause of seizures or seizure-like events and comorbid conditions, assessing the risk of recurrence, educating the patient or family to recognize seizures, and discussing possible treatment options for seizures and comorbid conditions and possible side effects of medications. I also discussed seizure safety, and ways of reducing seizure risk. Greater than 50% of the encounter time was spent on counseling and coordination of care for reviewing records in [employer], discussion with patient regarding plan.\par

## 2022-07-15 NOTE — PHYSICAL EXAM
[de-identified] : Patient is WDWN, alert, and in no acute distress. Breathing is unlabored. She is grossly oriented to person, place, and time.\par \par Right Hand:\par There is mild tenderness to the A1 pulleys of the right middle and ring fingers, with intermittent locking most notably at the ring finger.\par Full arc of motion to the digits.\par Extrinsic and intrinsic strength to the hand is 5/5\par Intermittent numbness and tingling to the digits.  [de-identified] : AP, lateral and oblique views of the RIGHT hand were obtained today and revealed no abnormalities. No acute fracture. No dislocation. Cartilage spaces are maintained.

## 2022-07-15 NOTE — PROCEDURE
[FreeTextEntry1] : discussed titration study\par 12/10/2021\par Pulmonary function testing\par FEV1, FVC, and FEV1/FVC are within normal limits. TLC and subdivisions are normal. RV/TLC ratio is normal. Single breath diffusion capacity is normal. \par \par poly with moderate MARIAN with moderated desaturations\par \par \par 1 / 2 Orion Torres   \par  \par \par \par \par Report date: 2/24/2022 \par  \par  View Order\par \par \par (Report matches study selected on Patient History pane)\par \par \par   \par \par \par \par \par ACC: [id number] EXAM: XR CHEST PORTABLE URGENT 1V\par \par PROCEDURE DATE: 02/23/2022\par \par \par \par INTERPRETATION: CLINICAL INFORMATION: Chest pain.\par \par TECHNIQUE: Frontal radiograph of the chest.\par \par COMPARISON: CT abdomen pelvis 4/6/2021 and frontal radiograph the chest 12/25/2020.\par \par FINDINGS:\par \par Stable elevation of the right hemidiaphragm.\par The lungs are clear.\par No pleural effusion or pneumothorax.\par Heart size is within normal limits.\par No acute abnormality within visible osseous structures.\par \par \par IMPRESSION:\par \par Stable elevation of the right hemidiaphragm.\par Clear lungs.\par \par --- End of Report ---\par \par \par \par \par NICOLAS PIMENTEL MD; Resident Radiology\par This document has been electronically signed.\par ORION TORRES MD; Attending Radiologist\par This document has been electronically signed. Feb 24 2022 7:21AM

## 2022-07-15 NOTE — HISTORY OF PRESENT ILLNESS
[Right] : right hand dominant [FreeTextEntry1] : Pt is a 51 y/o female with right hand pain and swelling.  The pain is primarily in the third and fourth digits.  She has pain with gripping.  She feels pain in the PIP joints.  She states that the fingers lock occasionally. She complains of very mild numbness and tingling to the right hand as well.

## 2022-07-15 NOTE — HISTORY OF PRESENT ILLNESS
[Never] : never [TextBox_4] : Had poly and titration test\par has not been using the CPAP machine\par despite using it still had daytime fatigue\par Had COVID in June took paxlovid, had sore throat and fever for one day,did well\par \par mild sob with stairs no changes\par no cough\par \par  [TextBox_29] : Second hand tobacco.

## 2022-07-15 NOTE — HISTORY OF PRESENT ILLNESS
[FreeTextEntry1] : Ms. REYES - pronounced "kateej"\par \par *** 07/15/2022  ***\par Ms. REYES returns for follow-up.  She was evaluated in epilepsy monitoring unit at the beginning of June.  That monitoring revealed no epileptiform abnormalities over the course of 48 hours.  She was found to have focal slowing independently in both temporal regions, which represented a progression from prior report showing unilateral focal slowing.  She was started on lacosamide 50 mg twice a day on the possibility that the olfactory sensations she has been experiencing were due to seizures.  Even before starting lacosamide, these olfactory experiences had been rare.  She estimates they have happened twice in the 6 weeks since discharge, so it is not clear whether lacosamide has made a difference. Ms. REYES is very anxious about the change in her EEG, the additional slowing that was noted.  She is anxious that her symptoms represent a dementia or seizures.  At discharge from EMU, she was recommended to PET imaging to rule out dementia and referred to neuropsychological testing for the same reason.\par \par *** 05/16/2022  ***\par Ms. REYES reports that several months ago she began to experience smell of burning cigarettes intermittently.  The first occasion, she noted smell like burning leaves outside, and smell persisted for most of the day, and smell would follow her when in car or at work.  Now it is not occurring as often - 1-2 x per week.  Ms. REYES thinks that she may be triggered by someone smoking near her - then had persistent smoke smell following day.  sometimes smell is triggered by foul smell, but at other times she may get the smell without provoking.  Smell is mostly the same - described as cigarette smell.  \par No clear episodes of lost time - may be forgetful of tasks to do - but has not been told of conversations that she does not remember. No tongue biting or urinary incontinence. \par \par Ms. REYES was previously seen by Dr. Acharya - MRI essentially negative - she has incidental pituitary cyst.  Ms. REYES had prior EEG testing in 2015 that showed intermittent left temporal slowing.  She underwent repeat ambulatory EEG testing in April 2022 that noted marked left temporal slowing, sometimes rhythmical left temporal delta, and infrequent right temporal intermittent slowing.  During this ambulatory recording, she did not have her characteristic olfactory event.  She initially saw an ENT physician and had a work-up including CT of the sinuses that was unrevealing.  She tried course of steroids and nasal sprays are also not helpful.\par \par *** 5/11/2022 from Dr. Acharya's Notes *** \par 52-year-old woman who is here for initial consultation of smelling cigarette smell before Christmas. Patient states that it has been intermittent and she has seen ENT. Work-up including CT of the sinuses along with course of steroids and nasal sprays have failed to alleviate her symptoms. Patient has no fevers and no sick contacts. Patient has no exposure to COVID.\par \par Interval history: Discussed results of MRI of the brain with the patient. Patient has no history of pituitary issues. Patient sees a endocrinologist in the past for hypothyroidism. The olfactory bulb was not evaluated even though the question on the MRI order sheet specified. I have emailed neuroradiology to make sure that this is performed when she returns for MRI of the pituitary gland. Patient states that she tested negative for COVID swab. Patient has a EEG that is still pending.\par \par Interval history: Since her last visit patient was noted to have slowing in the temporal region. Patient's MRI of the brain shows no lesions in the temporal lobes. Patient has upcoming appointment with epilepsy to help evaluate the situation whether patient needs longer EEG monitoring versus confirmation that this is incidental finding and should be left alone. Patient states that her cigarette smells are now intermittent and faint. Patient's MRI of the brain shows hypo enhancement that is indicative of microadenoma versus cyst. Endocrinologist has seen the patient and had sent off blood work. Patient will return to endocrinology for repeat MRI in September. \par

## 2022-07-15 NOTE — ADDENDUM
[FreeTextEntry1] : I, Elva Mcclure wrote this note acting as a scribe for Dr. Maxx Arora on Jul 14, 2022.

## 2022-07-15 NOTE — END OF VISIT
[FreeTextEntry3] : All medical record entries made by the Scribe were at my,  Dr. Maxx Arora MD., direction and personally dictated by me on 07/14/2022. I have personally reviewed the chart and agree that the record accurately reflects my personal performance of the history, physical exam, assessment and plan.

## 2022-07-15 NOTE — ASSESSMENT
[FreeTextEntry1] : to start BiPAP auto bilevel Epap min 6 Ipap max 12 PS 4 8/4 with Ps 2  with Remsed air fit medium nasal mask, landuer\par \par r/t 3 months for compliance and PFT\par \par Program of exercise and weight loss would be beneficial.\par No indication for further evaluation regarding mild right hemidiaphragmatic elevation.\par \par

## 2022-07-15 NOTE — DISCUSSION/SUMMARY
[FreeTextEntry1] : The underlying pathophysiology was reviewed with the patient. XR films were reviewed with the patient. Discussed at length the nature of the patient’s condition. The right hand symptoms appear secondary to middle and ring trigger fingers.\par \par At this time, as her symptoms are quite mild I have recommended observation. I did tell her if her symptoms persist or worsen, she should return to the office for a cortisone injection.\par Topical analgesics as needed. \par Tylenol and/or NSAIDs prn.\par \par All questions answered, understanding verbalized. Patient in agreement with plan of care. Follow up as needed.

## 2022-07-20 NOTE — H&P PST ADULT - HEIGHT IN INCHES
From: Karrie Gaston  To: Rebekah Menon  Sent: 7/20/2022 2:33 PM CDT  Subject: Medication for Covid?    Tested positive for Covid on Monday as per my email on 7/18. Am experiencing increasing headache/ sinus pressure. Please provide recommendations. Thank you - Karrie [phone]   1

## 2022-07-21 ENCOUNTER — APPOINTMENT (OUTPATIENT)
Dept: INTERNAL MEDICINE | Facility: CLINIC | Age: 52
End: 2022-07-21

## 2022-07-21 VITALS
BODY MASS INDEX: 37.57 KG/M2 | OXYGEN SATURATION: 99 % | HEART RATE: 78 BPM | TEMPERATURE: 96.4 F | HEIGHT: 61 IN | WEIGHT: 199 LBS | SYSTOLIC BLOOD PRESSURE: 140 MMHG | DIASTOLIC BLOOD PRESSURE: 71 MMHG

## 2022-07-21 VITALS — SYSTOLIC BLOOD PRESSURE: 136 MMHG | DIASTOLIC BLOOD PRESSURE: 82 MMHG

## 2022-07-21 DIAGNOSIS — E03.9 HYPOTHYROIDISM, UNSPECIFIED: ICD-10-CM

## 2022-07-21 DIAGNOSIS — E55.9 VITAMIN D DEFICIENCY, UNSPECIFIED: ICD-10-CM

## 2022-07-21 PROCEDURE — 96372 THER/PROPH/DIAG INJ SC/IM: CPT

## 2022-07-21 PROCEDURE — 99214 OFFICE O/P EST MOD 30 MIN: CPT | Mod: 25

## 2022-07-21 NOTE — HISTORY OF PRESENT ILLNESS
[FreeTextEntry1] : Patient presents for follow-up.  [de-identified] : Patient is doing well overall. She is Following with neurology for seizure. \par States she will be getting CPAP machine\par Denies CP, chest tightness, SOB or LE edema.\par Denies any abdominal pain.\par \par No longer have the menstrual bleeding. Denies any lightheadedness or dizziness.

## 2022-07-21 NOTE — ADDENDUM
[FreeTextEntry1] : I, Silas Romero, acted as a scribe on behalf of Dr. Jarvis Osorio MD, on 07/21/2022. \par \par All medical entries made by the scribe were at my, Dr. Jarvis Osorio MD, direction and personally dictated by me on 07/21/2022. I have reviewed the chart and agree that the record accurately reflects my personal performance of the history, physical exam, assessment and plan. I have also personally directed, reviewed, and agreed with the chart.

## 2022-07-21 NOTE — ASSESSMENT
[FreeTextEntry1] : -Blood work done today\par -Blood Pressure is stable.\par -Check TFTs and CBC for anemia\par -B12 injection administered today.\par

## 2022-07-22 ENCOUNTER — APPOINTMENT (OUTPATIENT)
Dept: CARDIOLOGY | Facility: CLINIC | Age: 52
End: 2022-07-22

## 2022-07-25 ENCOUNTER — RX RENEWAL (OUTPATIENT)
Age: 52
End: 2022-07-25

## 2022-07-27 ENCOUNTER — NON-APPOINTMENT (OUTPATIENT)
Age: 52
End: 2022-07-27

## 2022-07-27 LAB
25(OH)D3 SERPL-MCNC: 17 NG/ML
ALBUMIN SERPL ELPH-MCNC: 4.4 G/DL
ALP BLD-CCNC: 53 U/L
ALT SERPL-CCNC: 16 U/L
ANION GAP SERPL CALC-SCNC: 10 MMOL/L
APPEARANCE: CLEAR
AST SERPL-CCNC: 15 U/L
BACTERIA: NEGATIVE
BASOPHILS # BLD AUTO: 0.04 K/UL
BASOPHILS NFR BLD AUTO: 0.8 %
BILIRUB SERPL-MCNC: 0.2 MG/DL
BILIRUBIN URINE: NEGATIVE
BLOOD URINE: NEGATIVE
BUN SERPL-MCNC: 12 MG/DL
CALCIUM SERPL-MCNC: 9.8 MG/DL
CHLORIDE SERPL-SCNC: 105 MMOL/L
CHOLEST SERPL-MCNC: 265 MG/DL
CO2 SERPL-SCNC: 26 MMOL/L
COLOR: YELLOW
CREAT SERPL-MCNC: 0.78 MG/DL
EGFR: 90 ML/MIN/1.73M2
EOSINOPHIL # BLD AUTO: 0.19 K/UL
EOSINOPHIL NFR BLD AUTO: 4 %
ESTIMATED AVERAGE GLUCOSE: 123 MG/DL
FERRITIN SERPL-MCNC: 10 NG/ML
GLUCOSE QUALITATIVE U: NEGATIVE
GLUCOSE SERPL-MCNC: 99 MG/DL
HBA1C MFR BLD HPLC: 5.9 %
HCT VFR BLD CALC: 39.4 %
HDLC SERPL-MCNC: 72 MG/DL
HGB BLD-MCNC: 12.4 G/DL
HYALINE CASTS: 1 /LPF
IMM GRANULOCYTES NFR BLD AUTO: 0 %
KETONES URINE: NEGATIVE
LDLC SERPL CALC-MCNC: 168 MG/DL
LEUKOCYTE ESTERASE URINE: NEGATIVE
LYMPHOCYTES # BLD AUTO: 1.75 K/UL
LYMPHOCYTES NFR BLD AUTO: 36.8 %
MAN DIFF?: NORMAL
MCHC RBC-ENTMCNC: 28.1 PG
MCHC RBC-ENTMCNC: 31.5 GM/DL
MCV RBC AUTO: 89.1 FL
MICROSCOPIC-UA: NORMAL
MONOCYTES # BLD AUTO: 0.67 K/UL
MONOCYTES NFR BLD AUTO: 14.1 %
NEUTROPHILS # BLD AUTO: 2.11 K/UL
NEUTROPHILS NFR BLD AUTO: 44.3 %
NITRITE URINE: NEGATIVE
NONHDLC SERPL-MCNC: 193 MG/DL
PH URINE: 6
PLATELET # BLD AUTO: 276 K/UL
POTASSIUM SERPL-SCNC: 4.8 MMOL/L
PROT SERPL-MCNC: 7.5 G/DL
PROTEIN URINE: NORMAL
RBC # BLD: 4.42 M/UL
RBC # FLD: 15.2 %
RED BLOOD CELLS URINE: 3 /HPF
SODIUM SERPL-SCNC: 140 MMOL/L
SPECIFIC GRAVITY URINE: 1.03
SQUAMOUS EPITHELIAL CELLS: 9 /HPF
TRIGL SERPL-MCNC: 128 MG/DL
TSH SERPL-ACNC: 1.15 UIU/ML
UROBILINOGEN URINE: NORMAL
VIT B12 SERPL-MCNC: >2000 PG/ML
WBC # FLD AUTO: 4.76 K/UL
WHITE BLOOD CELLS URINE: 1 /HPF

## 2022-08-17 ENCOUNTER — APPOINTMENT (OUTPATIENT)
Dept: ENDOCRINOLOGY | Facility: CLINIC | Age: 52
End: 2022-08-17

## 2022-08-17 PROCEDURE — 99443: CPT

## 2022-09-08 ENCOUNTER — APPOINTMENT (OUTPATIENT)
Dept: ENDOCRINOLOGY | Facility: CLINIC | Age: 52
End: 2022-09-08

## 2022-09-08 VITALS
SYSTOLIC BLOOD PRESSURE: 126 MMHG | WEIGHT: 203.68 LBS | BODY MASS INDEX: 39.47 KG/M2 | HEART RATE: 67 BPM | HEIGHT: 60.24 IN | TEMPERATURE: 97.2 F | OXYGEN SATURATION: 97 % | DIASTOLIC BLOOD PRESSURE: 78 MMHG

## 2022-09-08 PROCEDURE — 36415 COLL VENOUS BLD VENIPUNCTURE: CPT

## 2022-09-08 PROCEDURE — 99213 OFFICE O/P EST LOW 20 MIN: CPT | Mod: 25

## 2022-09-08 NOTE — HISTORY OF PRESENT ILLNESS
[FreeTextEntry1] : CC: Hypothyroidism\par This is a 52-year-old female with primary hypothyroidism, pituitary lesion, seizures, vitamin D insufficiency, obesity, diverticulitis status post sigmoid colectomy, here for follow up. She was diagnosed with hypothyroidism at age 46. She is currently on levothyroxine 100 mcg daily. She takes in the morning on an empty stomach. 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  Reports brain fog has improved since starting Vimpat.\par Reports difficulty losing weight.  She joined a gym yesterday.

## 2022-09-08 NOTE — PHYSICAL EXAM
[Alert] : alert [Well Nourished] : well nourished [Healthy Appearance] : healthy appearance [Obese] : obese [No Acute Distress] : no acute distress [Well Developed] : well developed [Normal Voice/Communication] : normal voice communication [Normal Sclera/Conjunctiva] : normal sclera/conjunctiva [Normal Oropharynx] : the oropharynx was normal [No Neck Mass] : no neck mass was observed [No LAD] : no lymphadenopathy [Supple] : the neck was supple [Thyroid Not Enlarged] : the thyroid was not enlarged [No Thyroid Nodules] : no palpable thyroid nodules [No Respiratory Distress] : no respiratory distress [No Accessory Muscle Use] : no accessory muscle use [Normal Rate and Effort] : normal respiratory rate and effort [Spine Straight] : spine straight [Normal Gait] : normal gait [No Clubbing, Cyanosis] : no clubbing  or cyanosis of the fingernails [No Involuntary Movements] : no involuntary movements were seen [Acanthosis Nigricans] : no acanthosis nigricans [Normal Affect] : the affect was normal [Normal Insight/Judgement] : insight and judgment were intact [Normal Mood] : the mood was normal

## 2022-09-08 NOTE — ASSESSMENT
[FreeTextEntry1] : This is a 52-year-old female with primary hypothyroidism, vitamin D insufficiency, seizures, obesity.\par 1.Primary hypothyroidism\par TFTs from July 2022 were normal.\par Cont LT4 100mcg qd.\par She is clinically euthyroid.\par Thyroid ultrasound from 2020 showed no thyroid nodules\par 2.  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 Obesity\par LSM. \par

## 2022-09-09 ENCOUNTER — APPOINTMENT (OUTPATIENT)
Dept: ENDOCRINOLOGY | Facility: CLINIC | Age: 52
End: 2022-09-09

## 2022-09-09 PROCEDURE — 98967 PH1 ASSMT&MGMT NQHP 11-20: CPT

## 2022-09-12 ENCOUNTER — NON-APPOINTMENT (OUTPATIENT)
Age: 52
End: 2022-09-12

## 2022-09-12 LAB
CORTIS SERPL-MCNC: 11 UG/DL
ESTRADIOL SERPL-MCNC: 31 PG/ML
FSH SERPL-MCNC: 44.4 IU/L
IGF-1 INTERP: NORMAL
IGF-I BLD-MCNC: 125 NG/ML
LH SERPL-ACNC: 39.1 IU/L
PROLACTIN SERPL-MCNC: 14.3 NG/ML
T4 FREE SERPL-MCNC: 1.4 NG/DL
TSH SERPL-ACNC: 3.16 UIU/ML

## 2022-09-16 LAB — ACTH-ESO: 6.8 PG/ML

## 2022-09-18 NOTE — H&P ADULT - PROBLEM/PLAN-5
Bryanna Tanner was seen and treated in our emergency department on 9/18/2022.  She may return to work on 09/19/2022.       If you have any questions or concerns, please don't hesitate to call.      Juan Velez D.O. DISPLAY PLAN FREE TEXT

## 2022-09-20 ENCOUNTER — OUTPATIENT (OUTPATIENT)
Dept: OUTPATIENT SERVICES | Facility: HOSPITAL | Age: 52
LOS: 1 days | End: 2022-09-20
Payer: COMMERCIAL

## 2022-09-20 ENCOUNTER — APPOINTMENT (OUTPATIENT)
Dept: MRI IMAGING | Facility: CLINIC | Age: 52
End: 2022-09-20

## 2022-09-20 DIAGNOSIS — Z00.8 ENCOUNTER FOR OTHER GENERAL EXAMINATION: ICD-10-CM

## 2022-09-20 DIAGNOSIS — E23.7 DISORDER OF PITUITARY GLAND, UNSPECIFIED: ICD-10-CM

## 2022-09-20 DIAGNOSIS — Z98.890 OTHER SPECIFIED POSTPROCEDURAL STATES: Chronic | ICD-10-CM

## 2022-09-20 DIAGNOSIS — T14.8XXA OTHER INJURY OF UNSPECIFIED BODY REGION, INITIAL ENCOUNTER: Chronic | ICD-10-CM

## 2022-09-20 PROCEDURE — 70553 MRI BRAIN STEM W/O & W/DYE: CPT

## 2022-09-20 PROCEDURE — 70553 MRI BRAIN STEM W/O & W/DYE: CPT | Mod: 26

## 2022-09-20 PROCEDURE — A9585: CPT

## 2022-09-29 ENCOUNTER — NON-APPOINTMENT (OUTPATIENT)
Age: 52
End: 2022-09-29

## 2022-10-25 ENCOUNTER — OUTPATIENT (OUTPATIENT)
Dept: OUTPATIENT SERVICES | Facility: HOSPITAL | Age: 52
LOS: 1 days | End: 2022-10-25
Payer: COMMERCIAL

## 2022-10-25 ENCOUNTER — APPOINTMENT (OUTPATIENT)
Dept: ULTRASOUND IMAGING | Facility: CLINIC | Age: 52
End: 2022-10-25

## 2022-10-25 ENCOUNTER — APPOINTMENT (OUTPATIENT)
Dept: MAMMOGRAPHY | Facility: CLINIC | Age: 52
End: 2022-10-25

## 2022-10-25 DIAGNOSIS — T14.8XXA OTHER INJURY OF UNSPECIFIED BODY REGION, INITIAL ENCOUNTER: Chronic | ICD-10-CM

## 2022-10-25 DIAGNOSIS — R92.2 INCONCLUSIVE MAMMOGRAM: ICD-10-CM

## 2022-10-25 DIAGNOSIS — Z98.890 OTHER SPECIFIED POSTPROCEDURAL STATES: Chronic | ICD-10-CM

## 2022-10-25 DIAGNOSIS — Z12.31 ENCOUNTER FOR SCREENING MAMMOGRAM FOR MALIGNANT NEOPLASM OF BREAST: ICD-10-CM

## 2022-10-25 PROCEDURE — 76641 ULTRASOUND BREAST COMPLETE: CPT | Mod: 26,50

## 2022-10-25 PROCEDURE — 77067 SCR MAMMO BI INCL CAD: CPT

## 2022-10-25 PROCEDURE — 77067 SCR MAMMO BI INCL CAD: CPT | Mod: 26

## 2022-10-25 PROCEDURE — 77063 BREAST TOMOSYNTHESIS BI: CPT | Mod: 26

## 2022-10-25 PROCEDURE — 76641 ULTRASOUND BREAST COMPLETE: CPT

## 2022-10-25 PROCEDURE — 77063 BREAST TOMOSYNTHESIS BI: CPT

## 2022-10-27 NOTE — DISCHARGE NOTE PROVIDER - CARE PROVIDERS DIRECT ADDRESSES
Esteban Orellana is a 33 year old old female who is here for her annual physical.    she has no additional concerns.  Mood has been stable on Zoloft 75 mg daily.      Pap 2020, normal    REVIEW OF SYSTEMS  Review of Systems   Constitutional: Negative.    HENT: Negative.    Eyes: Negative.    Respiratory: Negative.    Cardiovascular: Positive for palpitations. Negative for chest pain and leg swelling.   Gastrointestinal: Negative.    Endocrine: Negative.    Genitourinary: Negative.    Musculoskeletal: Negative.    Skin: Negative.    Allergic/Immunologic: Negative.    Neurological: Negative.    Hematological: Negative.    Psychiatric/Behavioral: Negative.        Depression Screening: normal    Does Patient Exercise? No  Counseling provided? Yes - recommend 30 minutes per day, 5 days per week     HISTORY  Past Medical History reviewed and updated  Social and family history reviewed  Allergies: is allergic to cefaclor and diphenhydramine.  Current Medications: has a current medication list which includes the following prescription(s): sertraline, levonorgestrel, and neomycin-polymyxin-hydrocortisone.    Objective   Physical Exam  Blood pressure 110/84, pulse 84, resp. rate 16, SpO2 98 %.    Physical Exam  Constitutional:       General: She is not in acute distress.     Appearance: She is normal weight. She is not ill-appearing or toxic-appearing.   HENT:      Head: Normocephalic and atraumatic.      Right Ear: Tympanic membrane, ear canal and external ear normal.      Left Ear: Tympanic membrane, ear canal and external ear normal.      Nose: Nose normal.      Mouth/Throat:      Mouth: Mucous membranes are moist.      Pharynx: Oropharynx is clear. No oropharyngeal exudate or posterior oropharyngeal erythema.      Neck: Normal range of motion and neck supple. No muscular tenderness.   Eyes:      Extraocular Movements: Extraocular movements intact.      Conjunctiva/sclera: Conjunctivae normal.      Pupils: Pupils are  equal, round, and reactive to light.   Neck:      Thyroid: No thyroid mass or thyromegaly.   Cardiovascular:      Rate and Rhythm: Normal rate and regular rhythm.      Pulses: Normal pulses.      Heart sounds: Normal heart sounds. No murmur heard.    No friction rub. No gallop.   Pulmonary:      Effort: Pulmonary effort is normal.      Breath sounds: Normal breath sounds.   Abdominal:      General: Bowel sounds are normal.      Palpations: Abdomen is soft.      Tenderness: There is no abdominal tenderness.   Musculoskeletal:         General: Normal range of motion.   Lymphadenopathy:      Cervical: No cervical adenopathy.   Skin:     General: Skin is warm and dry.      Capillary Refill: Capillary refill takes less than 2 seconds.   Neurological:      General: No focal deficit present.      Mental Status: She is alert and oriented to person, place, and time.   Psychiatric:         Mood and Affect: Mood normal.         Behavior: Behavior normal.         Thought Content: Thought content normal.         Judgment: Judgment normal.         Assessment / Plan  1. Encounter for general adult medical examination w/o abnormal findings  - discussed preventative guidelines   - COMPREHENSIVE MET PNL (FAST); Future  - LIPID PANEL WITH REFLEX; Future  - CBC WITH DIFFERENTIAL; Future  - THYROID STIMULATING HORMONE REFLEX; Future    2. Need for immunization against influenza  Risks, benefits, and side effects of immunizations discussed. and Current VIS given to patient / parent at visit and verbal consent obtained    - INFLUENZA QUADRIVALENT SPLIT PRES FREE 0.5 ML VACC, IM (FLULAVAL,FLUARIX,FLUZONE)    3.  Anxiety  - continue Zoloft 75 mg daily      Schedule follow up: in a year, at next annual exam    Signed: SEYMOUR [facility]   [email or web address]

## 2022-10-28 ENCOUNTER — APPOINTMENT (OUTPATIENT)
Dept: ENDOCRINOLOGY | Facility: CLINIC | Age: 52
End: 2022-10-28

## 2022-10-28 PROCEDURE — 98967 PH1 ASSMT&MGMT NQHP 11-20: CPT

## 2022-11-10 ENCOUNTER — APPOINTMENT (OUTPATIENT)
Dept: CT IMAGING | Facility: IMAGING CENTER | Age: 52
End: 2022-11-10

## 2022-11-10 ENCOUNTER — OUTPATIENT (OUTPATIENT)
Dept: OUTPATIENT SERVICES | Facility: HOSPITAL | Age: 52
LOS: 1 days | End: 2022-11-10
Payer: COMMERCIAL

## 2022-11-10 DIAGNOSIS — Z00.8 ENCOUNTER FOR OTHER GENERAL EXAMINATION: ICD-10-CM

## 2022-11-10 DIAGNOSIS — R10.9 UNSPECIFIED ABDOMINAL PAIN: ICD-10-CM

## 2022-11-10 DIAGNOSIS — Z98.890 OTHER SPECIFIED POSTPROCEDURAL STATES: Chronic | ICD-10-CM

## 2022-11-10 DIAGNOSIS — T14.8XXA OTHER INJURY OF UNSPECIFIED BODY REGION, INITIAL ENCOUNTER: Chronic | ICD-10-CM

## 2022-11-10 PROCEDURE — 74176 CT ABD & PELVIS W/O CONTRAST: CPT

## 2022-11-11 PROCEDURE — 74176 CT ABD & PELVIS W/O CONTRAST: CPT | Mod: 26

## 2022-11-17 NOTE — PATIENT PROFILE ADULT - DEAF OR HARD OF HEARING?
Plastic Surgeon Procedure Text (C): After obtaining clear surgical margins the patient was sent to plastics for surgical repair.  The patient understands they will receive post-surgical care and follow-up from the referring physician's office. no

## 2022-11-28 ENCOUNTER — NON-APPOINTMENT (OUTPATIENT)
Age: 52
End: 2022-11-28

## 2022-12-23 ENCOUNTER — APPOINTMENT (OUTPATIENT)
Dept: SURGERY | Facility: CLINIC | Age: 52
End: 2022-12-23

## 2022-12-23 VITALS
SYSTOLIC BLOOD PRESSURE: 154 MMHG | TEMPERATURE: 97.1 F | HEART RATE: 68 BPM | OXYGEN SATURATION: 98 % | DIASTOLIC BLOOD PRESSURE: 84 MMHG | RESPIRATION RATE: 17 BRPM

## 2022-12-23 DIAGNOSIS — M62.08 SEPARATION OF MUSCLE (NONTRAUMATIC), OTHER SITE: ICD-10-CM

## 2022-12-23 PROCEDURE — 99213 OFFICE O/P EST LOW 20 MIN: CPT

## 2022-12-23 NOTE — HISTORY OF PRESENT ILLNESS
[de-identified] : James is a 52 y.o female here for a follow up visit, hernia. \par \par S/p laparoscopic hand-assisted sigmoid colectomy with mobilization of the splenic flexure on 12/23/20.\par \par Today pt reports no pain. Does have occasional discomfort of incision area especially when bending down. Denies drainage, bleeding and swelling of surgical incision. Denies nausea and vomiting. Denies fever and chills. Daily BM, sometimes constipated, denies any bleeding. Good appetite. Not taking anticoagulants.

## 2022-12-23 NOTE — ASSESSMENT
[FreeTextEntry1] : In summary the patient is 2 years status post laparoscopic assisted sigmoid colectomy for diverticulitis.  She has mild intermittent abdominal discomfort when bending lifting or stretching.  There is no evidence of incisional hernia on exam or on recent CT abdomen pelvis.  I suspect her symptoms are musculoskeletal in origin.  I recommended conservative treatment.  She will be following up with Dr. Macias in February for a surveillance colonoscopy.

## 2022-12-23 NOTE — PHYSICAL EXAM
[Abdominal Masses] : No abdominal masses [Abdomen Tenderness] : ~T ~M No abdominal tenderness [No HSM] : no hepatosplenomegaly [No Rash or Lesion] : No rash or lesion [Alert] : alert [Calm] : calm [de-identified] : nad [de-identified] : Obese, soft, nontender.  Incisions healed without evidence of incisional hernia [de-identified] : nl

## 2022-12-27 ENCOUNTER — RX RENEWAL (OUTPATIENT)
Age: 52
End: 2022-12-27

## 2023-01-23 ENCOUNTER — APPOINTMENT (OUTPATIENT)
Dept: NEUROLOGY | Facility: CLINIC | Age: 53
End: 2023-01-23
Payer: COMMERCIAL

## 2023-01-23 PROCEDURE — 96116 NUBHVL XM PHYS/QHP 1ST HR: CPT

## 2023-01-23 PROCEDURE — 96132 NRPSYC TST EVAL PHYS/QHP 1ST: CPT

## 2023-01-23 PROCEDURE — 96138 PSYCL/NRPSYC TECH 1ST: CPT

## 2023-01-23 PROCEDURE — 96139 PSYCL/NRPSYC TST TECH EA: CPT

## 2023-01-23 PROCEDURE — 96133 NRPSYC TST EVAL PHYS/QHP EA: CPT

## 2023-01-24 ENCOUNTER — APPOINTMENT (OUTPATIENT)
Dept: PULMONOLOGY | Facility: CLINIC | Age: 53
End: 2023-01-24
Payer: COMMERCIAL

## 2023-01-24 VITALS — HEART RATE: 72 BPM | SYSTOLIC BLOOD PRESSURE: 116 MMHG | OXYGEN SATURATION: 97 % | DIASTOLIC BLOOD PRESSURE: 71 MMHG

## 2023-01-24 DIAGNOSIS — Z99.89 OBSTRUCTIVE SLEEP APNEA (ADULT) (PEDIATRIC): ICD-10-CM

## 2023-01-24 DIAGNOSIS — G47.33 OBSTRUCTIVE SLEEP APNEA (ADULT) (PEDIATRIC): ICD-10-CM

## 2023-01-24 LAB — POCT - HEMOGLOBIN (HGB), QUANTITATIVE, TRANSCUTANEOUS: 13.1

## 2023-01-24 PROCEDURE — 94727 GAS DIL/WSHOT DETER LNG VOL: CPT

## 2023-01-24 PROCEDURE — 94729 DIFFUSING CAPACITY: CPT

## 2023-01-24 PROCEDURE — 99214 OFFICE O/P EST MOD 30 MIN: CPT | Mod: 25

## 2023-01-24 PROCEDURE — ZZZZZ: CPT

## 2023-01-24 PROCEDURE — 94010 BREATHING CAPACITY TEST: CPT

## 2023-01-24 PROCEDURE — 88738 HGB QUANT TRANSCUTANEOUS: CPT

## 2023-01-24 NOTE — DISCUSSION/SUMMARY
[FreeTextEntry1] : \par Atypical chest pain of unclear etiology.  May be musculoskeletal.  No definitive clinical evidence of underlying pulmonary disease.\par SOB.  No clinical radiographic or functional evidence of underlying pulmonary disease.  Status post cardiac evaluation.  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  Dates back to at least 2016.  Lung function in December 2021 within normal limits making diaphragmatic dysfunction  unlikely.

## 2023-01-24 NOTE — ASSESSMENT
[FreeTextEntry1] : cont BiPAP auto bilevel Epap min 6 Ipap max 12 PS 4 8/4 with Ps 2  with Remsed air fit medium nasal mask, landuer\par D-dimer sent.\par CT a CTA pending above.\par r/t 6 months for compliance\par \par \par 35 minutes spent in evaluation management and review of studies.\par \par \par \par

## 2023-01-24 NOTE — PROCEDURE
[FreeTextEntry1] : cpap data downloaded and discussed with patient\par \par 01/24/2023\par Pulmonary function testing\par FEV1, FVC, and FEV1/FVC are within normal limits. TLC and subdivisions are normal. RV/TLC ratio is normal. Single breath diffusion capacity is normal. \par No significant change in function compared to December 2021.\par \par \par poly with moderate MARIAN with moderated desaturations\par \par \par 1 / 2 Orion Torres   \par  \par \par \par \par Report date: 2/24/2022 \par  \par  View Order\par \par \par (Report matches study selected on Patient History pane)\par \par \par   \par \par \par \par \par ACC: [id number] EXAM: XR CHEST PORTABLE URGENT 1V\par \par PROCEDURE DATE: 02/23/2022\par \par \par \par INTERPRETATION: CLINICAL INFORMATION: Chest pain.\par \par TECHNIQUE: Frontal radiograph of the chest.\par \par COMPARISON: CT abdomen pelvis 4/6/2021 and frontal radiograph the chest 12/25/2020.\par \par FINDINGS:\par \par Stable elevation of the right hemidiaphragm.\par The lungs are clear.\par No pleural effusion or pneumothorax.\par Heart size is within normal limits.\par No acute abnormality within visible osseous structures.\par \par \par IMPRESSION:\par \par Stable elevation of the right hemidiaphragm.\par Clear lungs.\par \par --- End of Report ---\par \par \par \par \par NICOLAS PIMENTEL MD; Resident Radiology\par This document has been electronically signed.\par ORION TORRES MD; Attending Radiologist\par This document has been electronically signed. Feb 24 2022 7:21AM

## 2023-01-24 NOTE — HISTORY OF PRESENT ILLNESS
[Never] : never [TextBox_4] : Still with pain center of chest through to back.\par Can be at rest or on exertion.\par Variable.\par Duration few minutes to 1/2 hour\par Sharp\par No remitting factor. \par Not pleruitic\par Present for greater than 1 year. \par Had cardiac workup.\par Mild ELIZALDE. ELIZALDE 1-2 flights. \par \par brought in new bipap machine for compliance doing well , sleeping better\par \par \par no cough\par \par

## 2023-01-25 LAB — DEPRECATED D DIMER PPP IA-ACNC: 176 NG/ML DDU

## 2023-01-29 ENCOUNTER — NON-APPOINTMENT (OUTPATIENT)
Age: 53
End: 2023-01-29

## 2023-01-30 ENCOUNTER — APPOINTMENT (OUTPATIENT)
Dept: NEUROLOGY | Facility: CLINIC | Age: 53
End: 2023-01-30
Payer: COMMERCIAL

## 2023-01-30 PROCEDURE — 96139 PSYCL/NRPSYC TST TECH EA: CPT

## 2023-01-30 PROCEDURE — 96133 NRPSYC TST EVAL PHYS/QHP EA: CPT

## 2023-02-06 ENCOUNTER — RX RENEWAL (OUTPATIENT)
Age: 53
End: 2023-02-06

## 2023-02-08 ENCOUNTER — APPOINTMENT (OUTPATIENT)
Dept: CT IMAGING | Facility: CLINIC | Age: 53
End: 2023-02-08
Payer: COMMERCIAL

## 2023-02-08 ENCOUNTER — OUTPATIENT (OUTPATIENT)
Dept: OUTPATIENT SERVICES | Facility: HOSPITAL | Age: 53
LOS: 1 days | End: 2023-02-08
Payer: COMMERCIAL

## 2023-02-08 DIAGNOSIS — Z00.8 ENCOUNTER FOR OTHER GENERAL EXAMINATION: ICD-10-CM

## 2023-02-08 DIAGNOSIS — R93.89 ABNORMAL FINDINGS ON DIAGNOSTIC IMAGING OF OTHER SPECIFIED BODY STRUCTURES: ICD-10-CM

## 2023-02-08 DIAGNOSIS — T14.8XXA OTHER INJURY OF UNSPECIFIED BODY REGION, INITIAL ENCOUNTER: Chronic | ICD-10-CM

## 2023-02-08 DIAGNOSIS — Z98.890 OTHER SPECIFIED POSTPROCEDURAL STATES: Chronic | ICD-10-CM

## 2023-02-08 PROCEDURE — 71250 CT THORAX DX C-: CPT

## 2023-02-08 PROCEDURE — 71250 CT THORAX DX C-: CPT | Mod: 26

## 2023-02-09 ENCOUNTER — NON-APPOINTMENT (OUTPATIENT)
Age: 53
End: 2023-02-09

## 2023-02-13 ENCOUNTER — NON-APPOINTMENT (OUTPATIENT)
Age: 53
End: 2023-02-13

## 2023-03-03 ENCOUNTER — APPOINTMENT (OUTPATIENT)
Dept: NEUROLOGY | Facility: CLINIC | Age: 53
End: 2023-03-03

## 2023-03-03 ENCOUNTER — LABORATORY RESULT (OUTPATIENT)
Age: 53
End: 2023-03-03

## 2023-03-03 ENCOUNTER — APPOINTMENT (OUTPATIENT)
Dept: INTERNAL MEDICINE | Facility: CLINIC | Age: 53
End: 2023-03-03
Payer: COMMERCIAL

## 2023-03-03 VITALS
SYSTOLIC BLOOD PRESSURE: 129 MMHG | DIASTOLIC BLOOD PRESSURE: 82 MMHG | HEIGHT: 61 IN | TEMPERATURE: 98 F | OXYGEN SATURATION: 98 % | BODY MASS INDEX: 36.82 KG/M2 | WEIGHT: 195 LBS | HEART RATE: 79 BPM

## 2023-03-03 PROCEDURE — 99214 OFFICE O/P EST MOD 30 MIN: CPT | Mod: 25

## 2023-03-03 PROCEDURE — 36415 COLL VENOUS BLD VENIPUNCTURE: CPT

## 2023-03-06 NOTE — ASSESSMENT
[FreeTextEntry1] : -Blood work done today.\par -Check lipid panel, A1c, TFTs.\par -Possible statin induced myalgia Check SPEP, UPEP, Rheumatoid autoimmune causes Assess ESR, CRP for polymyalgia rheumatica.

## 2023-03-06 NOTE — PHYSICAL EXAM
[Normal] : no acute distress, well nourished, well developed and well-appearing [de-identified] : Tenderpoint on arms. No joint swelling, synovial thickening.

## 2023-03-06 NOTE — HISTORY OF PRESENT ILLNESS
[FreeTextEntry8] : Patient c/o arm muscle aches and shoulder stiffness, \par She has been having fatigue \par Denies any weakness or numbness\par Denies any joint swelling tick bites, fevers or rashes.

## 2023-03-06 NOTE — ADDENDUM
[FreeTextEntry1] : I, Silas Romero, acted as a scribe on behalf of Dr. Jarvis Osorio MD, on 03/06/2023. \par \par All medical entries made by the scribe were at my, Dr. Jarvis Osorio MD, direction and personally dictated by me on 03/06/2023. I have reviewed the chart and agree that the record accurately reflects my personal performance of the history, physical exam, assessment and plan. I have also personally directed, reviewed, and agreed with the chart.

## 2023-03-08 DIAGNOSIS — R79.89 OTHER SPECIFIED ABNORMAL FINDINGS OF BLOOD CHEMISTRY: ICD-10-CM

## 2023-03-08 LAB
25(OH)D3 SERPL-MCNC: 20.9 NG/ML
ALBUMIN MFR SERPL ELPH: 57.4 %
ALBUMIN SERPL ELPH-MCNC: 4.5 G/DL
ALBUMIN SERPL-MCNC: 4.2 G/DL
ALBUMIN/GLOB SERPL: 1.4 RATIO
ALP BLD-CCNC: 54 U/L
ALPHA1 GLOB MFR SERPL ELPH: 4.2 %
ALPHA1 GLOB SERPL ELPH-MCNC: 0.3 G/DL
ALPHA2 GLOB MFR SERPL ELPH: 8.8 %
ALPHA2 GLOB SERPL ELPH-MCNC: 0.6 G/DL
ALT SERPL-CCNC: 19 U/L
ANION GAP SERPL CALC-SCNC: 12 MMOL/L
APPEARANCE: CLEAR
AST SERPL-CCNC: 17 U/L
B-GLOBULIN MFR SERPL ELPH: 11.7 %
B-GLOBULIN SERPL ELPH-MCNC: 0.9 G/DL
BACTERIA: NEGATIVE
BASOPHILS # BLD AUTO: 0.05 K/UL
BASOPHILS NFR BLD AUTO: 0.8 %
BILIRUB SERPL-MCNC: 0.2 MG/DL
BILIRUBIN URINE: NEGATIVE
BLOOD URINE: NEGATIVE
BUN SERPL-MCNC: 12 MG/DL
CALCIUM SERPL-MCNC: 9.9 MG/DL
CCP AB SER IA-ACNC: <8 UNITS
CHLORIDE SERPL-SCNC: 104 MMOL/L
CK SERPL-CCNC: 239 U/L
CO2 SERPL-SCNC: 26 MMOL/L
COLOR: NORMAL
CREAT SERPL-MCNC: 0.7 MG/DL
CRP SERPL-MCNC: <3 MG/L
EGFR: 102 ML/MIN/1.73M2
ENA SS-A AB SER IA-ACNC: <0.2 AL
ENA SS-B AB SER IA-ACNC: <0.2 AL
EOSINOPHIL # BLD AUTO: 0.13 K/UL
EOSINOPHIL NFR BLD AUTO: 2 %
ERYTHROCYTE [SEDIMENTATION RATE] IN BLOOD BY WESTERGREN METHOD: 34 MM/HR
ESTIMATED AVERAGE GLUCOSE: 111 MG/DL
GAMMA GLOB FLD ELPH-MCNC: 1.3 G/DL
GAMMA GLOB MFR SERPL ELPH: 17.9 %
GLUCOSE QUALITATIVE U: NEGATIVE
GLUCOSE SERPL-MCNC: 133 MG/DL
HBA1C MFR BLD HPLC: 5.5 %
HBV CORE IGM SER QL: NONREACTIVE
HBV SURFACE AG SER QL: NONREACTIVE
HCT VFR BLD CALC: 42.1 %
HCV AB SER QL: NONREACTIVE
HCV S/CO RATIO: 0.09 S/CO
HGB BLD-MCNC: 13.5 G/DL
HYALINE CASTS: 2 /LPF
IMM GRANULOCYTES NFR BLD AUTO: 0.2 %
INTERPRETATION SERPL IEP-IMP: NORMAL
KETONES URINE: NEGATIVE
LEUKOCYTE ESTERASE URINE: NEGATIVE
LYMPHOCYTES # BLD AUTO: 2.5 K/UL
LYMPHOCYTES NFR BLD AUTO: 38.2 %
MAN DIFF?: NORMAL
MCHC RBC-ENTMCNC: 30.8 PG
MCHC RBC-ENTMCNC: 32.1 GM/DL
MCV RBC AUTO: 96.1 FL
MICROSCOPIC-UA: NORMAL
MONOCYTES # BLD AUTO: 0.5 K/UL
MONOCYTES NFR BLD AUTO: 7.6 %
NEUTROPHILS # BLD AUTO: 3.35 K/UL
NEUTROPHILS NFR BLD AUTO: 51.2 %
NITRITE URINE: NEGATIVE
PH URINE: 6
PLATELET # BLD AUTO: 297 K/UL
POTASSIUM SERPL-SCNC: 4.1 MMOL/L
PROT SERPL-MCNC: 7.3 G/DL
PROTEIN URINE: NORMAL
RBC # BLD: 4.38 M/UL
RBC # FLD: 13 %
RED BLOOD CELLS URINE: 1 /HPF
RF+CCP IGG SER-IMP: NEGATIVE
RHEUMATOID FACT SER QL: <10 IU/ML
SODIUM SERPL-SCNC: 142 MMOL/L
SPECIFIC GRAVITY URINE: 1.03
SQUAMOUS EPITHELIAL CELLS: 5 /HPF
TSH SERPL-ACNC: 1.46 UIU/ML
UROBILINOGEN URINE: NORMAL
VIT B12 SERPL-MCNC: 455 PG/ML
WBC # FLD AUTO: 6.54 K/UL
WHITE BLOOD CELLS URINE: 2 /HPF

## 2023-03-08 RX ORDER — CYANOCOBALAMIN 1000 UG/ML
1000 INJECTION INTRAMUSCULAR; SUBCUTANEOUS
Qty: 3 | Refills: 0 | Status: ACTIVE | COMMUNITY
Start: 2023-03-08 | End: 1900-01-01

## 2023-03-08 NOTE — PHYSICAL THERAPY INITIAL EVALUATION ADULT - TRANSFER TRAINING, PT EVAL
[facility] home health called stating patient was referred for home health after d/c from nursing home. Nurse is asking if a video visit could be done as f/u face to face.  stated he is not able to get patient into the office. Unable to lift patient. HH is needed written orders and nursing home was unable to provide these. Could only give verbal orders.      [facility] [phone] Goal: Patient will perform transfers sit to stand independently 2 weeks

## 2023-03-09 ENCOUNTER — APPOINTMENT (OUTPATIENT)
Dept: ENDOCRINOLOGY | Facility: CLINIC | Age: 53
End: 2023-03-09
Payer: COMMERCIAL

## 2023-03-09 VITALS
TEMPERATURE: 97.4 F | SYSTOLIC BLOOD PRESSURE: 125 MMHG | HEIGHT: 61 IN | OXYGEN SATURATION: 99 % | BODY MASS INDEX: 36.82 KG/M2 | WEIGHT: 195 LBS | HEART RATE: 75 BPM | DIASTOLIC BLOOD PRESSURE: 78 MMHG

## 2023-03-09 PROCEDURE — 99214 OFFICE O/P EST MOD 30 MIN: CPT

## 2023-03-09 NOTE — REVIEW OF SYSTEMS
[All other systems negative] : All other systems negative [Myalgia] : myalgia  [FreeTextEntry9] : muscle pain

## 2023-03-09 NOTE — ADDENDUM
[FreeTextEntry1] : By signing my name below, I, Jaspreet Russo, attest that this document has been prepared under the direction and in the presence of Dr. Villarreal.\par \par I, Kimi Villarreal MD, personally performed the services described in this documentation. All medical record entries made by the scribe were at my discretion and in my presence. I have reviewed the chart and discharge instructions (if applicable) and agree that the record reflects my personal permanence and is accurate and complete.\par

## 2023-03-09 NOTE — ASSESSMENT
[FreeTextEntry1] : This is a 53-year-old female with primary hypothyroidism, vitamin D insufficiency, seizures, obesity.\par 1.Primary hypothyroidism\par TSH from March 2023 is normal. \par Cont LT4 100mcg qd.\par She is clinically euthyroid.\par Thyroid ultrasound from 2020 showed no thyroid nodules\par 2.  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 \par Check prolactin.\par Check follow-up MRI pituitary in September 2023\par \par \par

## 2023-03-09 NOTE — PHYSICAL EXAM
[Alert] : alert [Well Nourished] : well nourished [Healthy Appearance] : healthy appearance [Obese] : obese [No Acute Distress] : no acute distress [Well Developed] : well developed [Normal Voice/Communication] : normal voice communication [Normal Sclera/Conjunctiva] : normal sclera/conjunctiva [Normal Oropharynx] : the oropharynx was normal [No Neck Mass] : no neck mass was observed [No LAD] : no lymphadenopathy [Supple] : the neck was supple [Thyroid Not Enlarged] : the thyroid was not enlarged [No Thyroid Nodules] : no palpable thyroid nodules [No Respiratory Distress] : no respiratory distress [No Accessory Muscle Use] : no accessory muscle use [Normal Rate and Effort] : normal respiratory rate and effort [Spine Straight] : spine straight [Normal Gait] : normal gait [No Clubbing, Cyanosis] : no clubbing  or cyanosis of the fingernails [No Involuntary Movements] : no involuntary movements were seen [Normal Affect] : the affect was normal [Normal Insight/Judgement] : insight and judgment were intact [Normal Mood] : the mood was normal [Acanthosis Nigricans] : no acanthosis nigricans

## 2023-03-09 NOTE — HISTORY OF PRESENT ILLNESS
[FreeTextEntry1] : CC: Hypothyroidism\par This is a 53-year-old female with primary hypothyroidism, pituitary lesion, seizures, vitamin D insufficiency, obesity, diverticulitis status post sigmoid colectomy, here for follow up. She was diagnosed with hypothyroidism at age 46. She is currently on levothyroxine 100 mcg daily. She takes in the morning on an empty stomach. 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 Was told to hold statin for now.

## 2023-03-10 NOTE — REASON FOR VISIT
Lv: 1/3/23  Appt: 4/6/23   [Follow - Up] : a follow-up visit [Hypothyroidism] : hypothyroidism Special Stains Stage 1 - Results: Base On Clearance Noted Above

## 2023-03-11 LAB
ALBUPE: 21.2 %
ALPHA1UPE: 37.1 %
ALPHA2UPE: 18.8 %
BETAUPE: 14.6 %
GAMMAUPE: 8.3 %
IGA 24H UR QL IFE: NORMAL
KAPPA LC 24H UR QL: NORMAL
PROT PATTERN 24H UR ELPH-IMP: NORMAL
PROT UR-MCNC: 9 MG/DL
PROT UR-MCNC: 9 MG/DL

## 2023-03-17 ENCOUNTER — APPOINTMENT (OUTPATIENT)
Dept: OPHTHALMOLOGY | Facility: CLINIC | Age: 53
End: 2023-03-17
Payer: COMMERCIAL

## 2023-03-17 ENCOUNTER — NON-APPOINTMENT (OUTPATIENT)
Age: 53
End: 2023-03-17

## 2023-03-17 PROCEDURE — 92014 COMPRE OPH EXAM EST PT 1/>: CPT

## 2023-03-29 ENCOUNTER — APPOINTMENT (OUTPATIENT)
Dept: NEUROLOGY | Facility: CLINIC | Age: 53
End: 2023-03-29

## 2023-04-03 ENCOUNTER — APPOINTMENT (OUTPATIENT)
Dept: OPHTHALMOLOGY | Facility: CLINIC | Age: 53
End: 2023-04-03

## 2023-04-04 ENCOUNTER — APPOINTMENT (OUTPATIENT)
Dept: INTERNAL MEDICINE | Facility: CLINIC | Age: 53
End: 2023-04-04
Payer: COMMERCIAL

## 2023-04-04 ENCOUNTER — MED ADMIN CHARGE (OUTPATIENT)
Age: 53
End: 2023-04-04

## 2023-04-04 VITALS
OXYGEN SATURATION: 98 % | DIASTOLIC BLOOD PRESSURE: 81 MMHG | SYSTOLIC BLOOD PRESSURE: 140 MMHG | HEIGHT: 61 IN | BODY MASS INDEX: 36.65 KG/M2 | WEIGHT: 194.13 LBS | HEART RATE: 68 BPM | TEMPERATURE: 97.9 F

## 2023-04-04 VITALS — SYSTOLIC BLOOD PRESSURE: 132 MMHG | DIASTOLIC BLOOD PRESSURE: 86 MMHG

## 2023-04-04 DIAGNOSIS — E53.8 DEFICIENCY OF OTHER SPECIFIED B GROUP VITAMINS: ICD-10-CM

## 2023-04-04 PROCEDURE — 99396 PREV VISIT EST AGE 40-64: CPT | Mod: 25

## 2023-04-04 PROCEDURE — 36415 COLL VENOUS BLD VENIPUNCTURE: CPT

## 2023-04-04 RX ORDER — CYANOCOBALAMIN 1000 UG/ML
1000 INJECTION INTRAMUSCULAR; SUBCUTANEOUS
Qty: 0 | Refills: 0 | Status: COMPLETED | OUTPATIENT
Start: 2023-04-04

## 2023-04-04 NOTE — PHYSICAL EXAM
[de-identified] : Multiple tender points neck arms back thighs and knees [Normal] : normal gait, coordination grossly intact, no focal deficits and deep tendon reflexes were 2+ and symmetric

## 2023-04-04 NOTE — ASSESSMENT
[FreeTextEntry1] : Pressures acceptable continue current management we will check vitamin B12 levels, vitamin B12 injection given today.  Myalgia may be combination of fibromyalgia as patient does have unrefreshed sleep may be secondary to radicular symptoms as well continue follow-up with rheumatology, less likely statin induced myopathy as patient has not had any changes in symptoms with discontinuation of statin.

## 2023-04-04 NOTE — HEALTH RISK ASSESSMENT
[Good] : ~his/her~  mood as  good [FreeTextEntry1] : myalgia [No] : No [0] : 2) Feeling down, depressed, or hopeless: Not at all (0) [PHQ-2 Negative - No further assessment needed] : PHQ-2 Negative - No further assessment needed [[id number]] : 0

## 2023-04-04 NOTE — HISTORY OF PRESENT ILLNESS
[FreeTextEntry1] : Patient presents for annual physical [de-identified] : Continues to have muscle aches arms legs, has attempted to stop statin however continues to have symptoms.  Denies any fevers chills does feel fatigue unrefreshed sleep.  Recently got a new CPAP machine.  Denies any chest pain chest tightness shortness of breath.  Will have mammogram end of year up-to-date with colonoscopy.

## 2023-04-08 LAB
ALDOLASE SERPL-CCNC: 3.1 U/L
ANA SER IF-ACNC: NEGATIVE
CK SERPL-CCNC: 259 U/L
VIT B12 SERPL-MCNC: >2000 PG/ML

## 2023-04-21 ENCOUNTER — RX RENEWAL (OUTPATIENT)
Age: 53
End: 2023-04-21

## 2023-04-24 ENCOUNTER — RX RENEWAL (OUTPATIENT)
Age: 53
End: 2023-04-24

## 2023-04-26 ENCOUNTER — INPATIENT (INPATIENT)
Facility: HOSPITAL | Age: 53
LOS: 2 days | Discharge: HOME | DRG: 303 | End: 2023-04-29
Attending: STUDENT IN AN ORGANIZED HEALTH CARE EDUCATION/TRAINING PROGRAM | Admitting: STUDENT IN AN ORGANIZED HEALTH CARE EDUCATION/TRAINING PROGRAM
Payer: COMMERCIAL

## 2023-04-26 VITALS
HEART RATE: 71 BPM | SYSTOLIC BLOOD PRESSURE: 160 MMHG | HEIGHT: 61 IN | WEIGHT: 190.04 LBS | RESPIRATION RATE: 18 BRPM | TEMPERATURE: 98 F | DIASTOLIC BLOOD PRESSURE: 97 MMHG

## 2023-04-26 DIAGNOSIS — R07.89 OTHER CHEST PAIN: ICD-10-CM

## 2023-04-26 DIAGNOSIS — Z29.9 ENCOUNTER FOR PROPHYLACTIC MEASURES, UNSPECIFIED: ICD-10-CM

## 2023-04-26 DIAGNOSIS — T14.8XXA OTHER INJURY OF UNSPECIFIED BODY REGION, INITIAL ENCOUNTER: Chronic | ICD-10-CM

## 2023-04-26 DIAGNOSIS — E78.5 HYPERLIPIDEMIA, UNSPECIFIED: ICD-10-CM

## 2023-04-26 DIAGNOSIS — I10 ESSENTIAL (PRIMARY) HYPERTENSION: ICD-10-CM

## 2023-04-26 DIAGNOSIS — E03.9 HYPOTHYROIDISM, UNSPECIFIED: ICD-10-CM

## 2023-04-26 DIAGNOSIS — Z98.890 OTHER SPECIFIED POSTPROCEDURAL STATES: Chronic | ICD-10-CM

## 2023-04-26 DIAGNOSIS — F09 UNSPECIFIED MENTAL DISORDER DUE TO KNOWN PHYSIOLOGICAL CONDITION: ICD-10-CM

## 2023-04-26 LAB
ALBUMIN SERPL ELPH-MCNC: 4.6 G/DL — SIGNIFICANT CHANGE UP (ref 3.3–5)
ALP SERPL-CCNC: 58 U/L — SIGNIFICANT CHANGE UP (ref 40–120)
ALT FLD-CCNC: 22 U/L — SIGNIFICANT CHANGE UP (ref 10–45)
ANION GAP SERPL CALC-SCNC: 13 MMOL/L — SIGNIFICANT CHANGE UP (ref 5–17)
APTT BLD: 27.2 SEC — LOW (ref 27.5–35.5)
AST SERPL-CCNC: 17 U/L — SIGNIFICANT CHANGE UP (ref 10–40)
BASOPHILS # BLD AUTO: 0.05 K/UL — SIGNIFICANT CHANGE UP (ref 0–0.2)
BASOPHILS NFR BLD AUTO: 0.6 % — SIGNIFICANT CHANGE UP (ref 0–2)
BILIRUB SERPL-MCNC: 0.2 MG/DL — SIGNIFICANT CHANGE UP (ref 0.2–1.2)
BUN SERPL-MCNC: 13 MG/DL — SIGNIFICANT CHANGE UP (ref 7–23)
CALCIUM SERPL-MCNC: 9.9 MG/DL — SIGNIFICANT CHANGE UP (ref 8.4–10.5)
CHLORIDE SERPL-SCNC: 104 MMOL/L — SIGNIFICANT CHANGE UP (ref 96–108)
CO2 SERPL-SCNC: 24 MMOL/L — SIGNIFICANT CHANGE UP (ref 22–31)
CREAT SERPL-MCNC: 0.69 MG/DL — SIGNIFICANT CHANGE UP (ref 0.5–1.3)
EGFR: 102 ML/MIN/1.73M2 — SIGNIFICANT CHANGE UP
EOSINOPHIL # BLD AUTO: 0.13 K/UL — SIGNIFICANT CHANGE UP (ref 0–0.5)
EOSINOPHIL NFR BLD AUTO: 1.6 % — SIGNIFICANT CHANGE UP (ref 0–6)
FLUAV AG NPH QL: SIGNIFICANT CHANGE UP
FLUBV AG NPH QL: SIGNIFICANT CHANGE UP
GLUCOSE SERPL-MCNC: 90 MG/DL — SIGNIFICANT CHANGE UP (ref 70–99)
HCG SERPL-ACNC: 2.4 MIU/ML — SIGNIFICANT CHANGE UP
HCT VFR BLD CALC: 42.3 % — SIGNIFICANT CHANGE UP (ref 34.5–45)
HGB BLD-MCNC: 13.6 G/DL — SIGNIFICANT CHANGE UP (ref 11.5–15.5)
IMM GRANULOCYTES NFR BLD AUTO: 0.2 % — SIGNIFICANT CHANGE UP (ref 0–0.9)
INR BLD: 0.98 RATIO — SIGNIFICANT CHANGE UP (ref 0.88–1.16)
LIDOCAIN IGE QN: 29 U/L — SIGNIFICANT CHANGE UP (ref 7–60)
LYMPHOCYTES # BLD AUTO: 2.87 K/UL — SIGNIFICANT CHANGE UP (ref 1–3.3)
LYMPHOCYTES # BLD AUTO: 34.3 % — SIGNIFICANT CHANGE UP (ref 13–44)
MCHC RBC-ENTMCNC: 30.6 PG — SIGNIFICANT CHANGE UP (ref 27–34)
MCHC RBC-ENTMCNC: 32.2 GM/DL — SIGNIFICANT CHANGE UP (ref 32–36)
MCV RBC AUTO: 95.1 FL — SIGNIFICANT CHANGE UP (ref 80–100)
MONOCYTES # BLD AUTO: 0.84 K/UL — SIGNIFICANT CHANGE UP (ref 0–0.9)
MONOCYTES NFR BLD AUTO: 10 % — SIGNIFICANT CHANGE UP (ref 2–14)
NEUTROPHILS # BLD AUTO: 4.45 K/UL — SIGNIFICANT CHANGE UP (ref 1.8–7.4)
NEUTROPHILS NFR BLD AUTO: 53.3 % — SIGNIFICANT CHANGE UP (ref 43–77)
NRBC # BLD: 0 /100 WBCS — SIGNIFICANT CHANGE UP (ref 0–0)
PLATELET # BLD AUTO: 299 K/UL — SIGNIFICANT CHANGE UP (ref 150–400)
POTASSIUM SERPL-MCNC: 3.9 MMOL/L — SIGNIFICANT CHANGE UP (ref 3.5–5.3)
POTASSIUM SERPL-SCNC: 3.9 MMOL/L — SIGNIFICANT CHANGE UP (ref 3.5–5.3)
PROT SERPL-MCNC: 7.7 G/DL — SIGNIFICANT CHANGE UP (ref 6–8.3)
PROTHROM AB SERPL-ACNC: 11.3 SEC — SIGNIFICANT CHANGE UP (ref 10.5–13.4)
RBC # BLD: 4.45 M/UL — SIGNIFICANT CHANGE UP (ref 3.8–5.2)
RBC # FLD: 12.4 % — SIGNIFICANT CHANGE UP (ref 10.3–14.5)
RSV RNA NPH QL NAA+NON-PROBE: SIGNIFICANT CHANGE UP
SARS-COV-2 RNA SPEC QL NAA+PROBE: SIGNIFICANT CHANGE UP
SODIUM SERPL-SCNC: 141 MMOL/L — SIGNIFICANT CHANGE UP (ref 135–145)
TROPONIN T, HIGH SENSITIVITY RESULT: <6 NG/L — SIGNIFICANT CHANGE UP (ref 0–51)
WBC # BLD: 8.36 K/UL — SIGNIFICANT CHANGE UP (ref 3.8–10.5)
WBC # FLD AUTO: 8.36 K/UL — SIGNIFICANT CHANGE UP (ref 3.8–10.5)

## 2023-04-26 PROCEDURE — 99223 1ST HOSP IP/OBS HIGH 75: CPT

## 2023-04-26 PROCEDURE — 99285 EMERGENCY DEPT VISIT HI MDM: CPT

## 2023-04-26 PROCEDURE — 71045 X-RAY EXAM CHEST 1 VIEW: CPT | Mod: 26

## 2023-04-26 RX ORDER — AMLODIPINE BESYLATE 2.5 MG/1
2.5 TABLET ORAL DAILY
Refills: 0 | Status: DISCONTINUED | OUTPATIENT
Start: 2023-04-26 | End: 2023-04-28

## 2023-04-26 RX ORDER — NITROGLYCERIN 6.5 MG
0.4 CAPSULE, EXTENDED RELEASE ORAL
Refills: 0 | Status: DISCONTINUED | OUTPATIENT
Start: 2023-04-26 | End: 2023-04-29

## 2023-04-26 RX ORDER — LEVOTHYROXINE SODIUM 125 MCG
100 TABLET ORAL DAILY
Refills: 0 | Status: DISCONTINUED | OUTPATIENT
Start: 2023-04-26 | End: 2023-04-29

## 2023-04-26 RX ORDER — ACETAMINOPHEN 500 MG
650 TABLET ORAL EVERY 6 HOURS
Refills: 0 | Status: DISCONTINUED | OUTPATIENT
Start: 2023-04-26 | End: 2023-04-29

## 2023-04-26 RX ORDER — ASPIRIN/CALCIUM CARB/MAGNESIUM 324 MG
324 TABLET ORAL ONCE
Refills: 0 | Status: COMPLETED | OUTPATIENT
Start: 2023-04-26 | End: 2023-04-26

## 2023-04-26 RX ORDER — LACOSAMIDE 50 MG/1
50 TABLET ORAL
Refills: 0 | Status: DISCONTINUED | OUTPATIENT
Start: 2023-04-26 | End: 2023-04-29

## 2023-04-26 RX ORDER — PANTOPRAZOLE SODIUM 20 MG/1
40 TABLET, DELAYED RELEASE ORAL
Refills: 0 | Status: DISCONTINUED | OUTPATIENT
Start: 2023-04-26 | End: 2023-04-29

## 2023-04-26 RX ORDER — ENOXAPARIN SODIUM 100 MG/ML
40 INJECTION SUBCUTANEOUS EVERY 24 HOURS
Refills: 0 | Status: DISCONTINUED | OUTPATIENT
Start: 2023-04-26 | End: 2023-04-29

## 2023-04-26 RX ADMIN — Medication 324 MILLIGRAM(S): at 18:53

## 2023-04-26 RX ADMIN — Medication 0.4 MILLIGRAM(S): at 18:53

## 2023-04-26 RX ADMIN — Medication 0.4 MILLIGRAM(S): at 19:14

## 2023-04-26 NOTE — H&P ADULT - NSHPPHYSICALEXAM_GEN_ALL_CORE
Vital Signs Last 24 Hrs  T(C): 36.6 (26 Apr 2023 22:26), Max: 37 (26 Apr 2023 21:42)  T(F): 97.9 (26 Apr 2023 22:26), Max: 98.6 (26 Apr 2023 21:42)  HR: 67 (26 Apr 2023 22:26) (67 - 85)  BP: 122/79 (26 Apr 2023 22:26) (122/79 - 160/97)  RR: 18 (26 Apr 2023 22:26) (16 - 18)  SpO2: 98% (26 Apr 2023 22:26) (98% - 100%)    CONSTITUTIONAL: Well-groomed, in no apparent distress  EYES: No conjunctival or scleral injection, non-icteric;   ENMT: No external nasal lesions; MMM  NECK: Trachea midline without palpable neck mass; thyroid not enlarged and non-tender  RESPIRATORY: Breathing comfortably; no dullness to percussion; lungs CTA without wheeze/rhonchi/rales  CARDIOVASCULAR: +S1S2, RRR, no M/G/R; pedal pulses full and symmetric; no lower extremity edema  GASTROINTESTINAL: No palpable masses or tenderness, +BS throughout, no rebound/guarding; no hepatosplenomegaly; no hernia palpated  LYMPHATIC: No cervical LAD or tenderness  SKIN: No rashes or ulcers noted  NEUROLOGIC: CN II-XII intact; sensation intact in LEs b/l to light touch  PSYCHIATRIC: A&Ox3; mood and affect appropriate; appropriate insight and judgment

## 2023-04-26 NOTE — H&P ADULT - HISTORY OF PRESENT ILLNESS
Pt is a 54yo F w/ PMH of HTN, HLD, hypothyroid, anxiety, and ?Cognitive dysfxn on Lacosamide pw CP.    States has been having intermittent mid-sternal CP for the past few days but today woke up w/ the pain which radiated to her back and to her b/l UE. States the pain is stabbing in nature, rated a 6-7/10 lasting 5-10 minutes w/ no inciting factors and resolves on its own. Associated symptoms include lightheadedness and nausea, though attributes the nausea to drinking coffee and not consuming any food.    Of note pt also endorses chronic LBP for which she takes Tylenol and Advil for w/ last use of Advil about 3-4 days ago. Reports a hx of EGD notable for esophagitis, not on PPI at the moment.     In the ED hypertensive to 160s (reports running out of medication 5 days ago), otherwise stable. labs notable for CK of 213 and Trop <6 w/ ECG nonspecific TWI. She was administered SL nitro and ASA.

## 2023-04-26 NOTE — H&P ADULT - PROBLEM SELECTOR PLAN 1
- ddx as above  - Monitor on tele for arrythmia   - TTE in AM to assess LV wall fxn   - Start Protonix for hx of esophagitis and f/u lipase r/o pancreatitis  - If CP ON consider repeat ECG and cardiac enzymes   - Consider cards eval in AM to assess need for repeat stress (Pt reports last ~1yr ago, which was unremarkable and last on system from 2019, unremarkable) given hx of early CAD in family  - f/u A1c, lipip

## 2023-04-26 NOTE — ED ADULT NURSE NOTE - OBJECTIVE STATEMENT
54 y/o female presenting to the ER c/o chest pain. Pt reports midsternal/epigastric chest pain intermittently x couple of days, today pt woke up w/ constant midsternal/epigastric chest pain radiating towards back, pt came to ER for further eval. Pt presents to [facility] A&Ox3, pt placed on continuous cardiac monitoring and pulse oximetry, 20 G IV placed in L. AC by ER RN, pt endorsing intermittent midsternal/epigastric chest pain x couple of days, this morning pt woke up w/ 5/10 constant midsternal/epigastric pain that's sharp in nature, radiating towards back and L. shoulder associated w/ HA, pt felt intermittently lightheaded in the shower which resolved on it's own, pt has significant cardiac Hx in the family, endorsing heaviness in the arms. Pt denies SOB/difficulty breathing, fever/chills, abd pain, N/V/D, dizziness, numbness/tingling. Pt A&Ox3, breathing spontaneous and unlabored, IV locked and patent, pt instructed on use of call bell, bed locked and in lowest position.

## 2023-04-26 NOTE — H&P ADULT - PROBLEM SELECTOR PLAN 5
- hx of c/f seizure like activity w/ neuro f/u outpt  - Last documentation from 07/22 w/ report to f/u in 2 months, but no further record noted  - Is still on Lacosamide 50mg BID, will continue for now and encourage outpt neuro f/u on dc

## 2023-04-26 NOTE — ED PROVIDER NOTE - CLINICAL SUMMARY MEDICAL DECISION MAKING FREE TEXT BOX
Moderate risk chest pain unlikely pulmonary embolism much less likely aortic dissection.  Basic blood work is unremarkable EKG is concerning with ST depressions inferiorly however this is consistent with the priors.  Patient will require stress test/further work-up given her heart score and the nature of her pain.–Kaleb Marlow

## 2023-04-26 NOTE — H&P ADULT - PROBLEM SELECTOR PLAN 2
- Resume Amlodipine 2.5mg QD w/ hold parameters   - Here BP 160s, states usually no higher than 140s when on medication  - DASH diet

## 2023-04-26 NOTE — H&P ADULT - NSHPLABSRESULTS_GEN_ALL_CORE
LABS:                      13.6   8.36  )-----------( 299      ( 26 Apr 2023 18:56 )             42.3     04-26    141  |  104  |  13  ----------------------------<  90  3.9   |  24  |  0.69    Ca    9.9      26 Apr 2023 18:56    TPro  7.7  /  Alb  4.6  /  TBili  0.2  /  DBili  x   /  AST  17  /  ALT  22  /  AlkPhos  58  04-26    CARDIAC MARKERS ( 26 Apr 2023 18:56 )  x     / x     / 213 U/L / x     / x        LIVER FUNCTIONS - ( 26 Apr 2023 18:56 )  Alb: 4.6 g/dL / Pro: 7.7 g/dL / ALK PHOS: 58 U/L / ALT: 22 U/L / AST: 17 U/L / GGT: x           PT/INR - ( 26 Apr 2023 18:56 )   PT: 11.3 sec;   INR: 0.98 ratio    PTT - ( 26 Apr 2023 18:56 )  PTT:27.2 sec    IMAGING:  Xray Chest 1 View- PORTABLE-Urgent (04.26.23 @ 19:19)  IMPRESSION:  - Clear lungs.  ******PRELIMINARY REPORT******      [X] Imaging personally reviewed by me-   [X] ECG personally reviewed by me- NSR HR 78 w/out acute ischemic changes

## 2023-04-26 NOTE — H&P ADULT - ASSESSMENT
52yo F w/ PMH of HTN, HLD, hypothyroid, anxiety, and ?Cognitive dysfxn on Lacosamide pw CP. ECG w/out acute ischemic changes and troponin <6, less likely ACS. DDx includes esophagitis, given hx, vs hypertensive urgency given missed home doses (though on small dose), vs pancreatitis given location lower chest/epigastric w/ radiation to back, but less likely as no ttp on exam and no n/v w/ lipase pending

## 2023-04-26 NOTE — H&P ADULT - NSHPREVIEWOFSYSTEMS_GEN_ALL_CORE
CONSTITUTIONAL: No fever, weight loss  EYES: No eye pain, visual disturbances, or discharge  ENMT:  No difficulty hearing, tinnitus, vertigo; No sinus or throat pain  RESPIRATORY: No SOB. No cough, wheezing, chills or hemoptysis  CARDIOVASCULAR: Chest pain, light headed. No palpitations, or leg swelling  GASTROINTESTINAL: Nausea. No abdominal or epigastric pain. No vomiting, or hematemesis; No diarrhea or constipation. No melena or hematochezia.  GENITOURINARY: No dysuria, frequency, hematuria, or incontinence  NEUROLOGICAL: No headaches, memory loss, loss of strength, numbness, or tremors  SKIN: No itching, burning, rashes, or lesions   LYMPH NODES: No enlarged glands  ENDOCRINE: No heat or cold intolerance; No hair loss  MUSCULOSKELETAL: No joint pain or swelling; No muscle, back pain  PSYCHIATRIC: No depression, anxiety, mood swings, or difficulty sleeping  HEME/LYMPH: No easy bruising, or bleeding gums

## 2023-04-26 NOTE — ED PROVIDER NOTE - PROGRESS NOTE DETAILS
no beds available in cdu. discussed the case with the admitting MD who accepts the patient for admission .

## 2023-04-26 NOTE — ED PROVIDER NOTE - OBJECTIVE STATEMENT
53 female history HTN, HLD, hypothyroidism presents with chest pain.  Reports 2 days of midsternal chest pressure radiating to left arm, feels at rest and with exertion.  Patient also notes aching pain to bilateral arms, reports that she has had this pain for several weeks as was recently told her CK level was mildly elevated.  Was briefly off Crestor, and now has resumed Crestor.  Reports stress test 1 year ago was WNL.  Denies associated diaphoresis or nausea with this chest pain.  Denies palpitations, dizziness, SOB, LOC, abdominal pain, urinary symptoms.

## 2023-04-27 LAB
A1C WITH ESTIMATED AVERAGE GLUCOSE RESULT: 5.9 % — HIGH (ref 4–5.6)
ANION GAP SERPL CALC-SCNC: 11 MMOL/L — SIGNIFICANT CHANGE UP (ref 5–17)
BUN SERPL-MCNC: 12 MG/DL — SIGNIFICANT CHANGE UP (ref 7–23)
CALCIUM SERPL-MCNC: 9.5 MG/DL — SIGNIFICANT CHANGE UP (ref 8.4–10.5)
CHLORIDE SERPL-SCNC: 105 MMOL/L — SIGNIFICANT CHANGE UP (ref 96–108)
CHOLEST SERPL-MCNC: 212 MG/DL — HIGH
CO2 SERPL-SCNC: 25 MMOL/L — SIGNIFICANT CHANGE UP (ref 22–31)
CREAT SERPL-MCNC: 0.7 MG/DL — SIGNIFICANT CHANGE UP (ref 0.5–1.3)
EGFR: 102 ML/MIN/1.73M2 — SIGNIFICANT CHANGE UP
ESTIMATED AVERAGE GLUCOSE: 123 MG/DL — HIGH (ref 68–114)
GLUCOSE SERPL-MCNC: 82 MG/DL — SIGNIFICANT CHANGE UP (ref 70–99)
HDLC SERPL-MCNC: 69 MG/DL — SIGNIFICANT CHANGE UP
LIPID PNL WITH DIRECT LDL SERPL: 119 MG/DL — HIGH
MAGNESIUM SERPL-MCNC: 2.3 MG/DL — SIGNIFICANT CHANGE UP (ref 1.6–2.6)
NON HDL CHOLESTEROL: 143 MG/DL — HIGH
PHOSPHATE SERPL-MCNC: 3.5 MG/DL — SIGNIFICANT CHANGE UP (ref 2.5–4.5)
POTASSIUM SERPL-MCNC: 4.1 MMOL/L — SIGNIFICANT CHANGE UP (ref 3.5–5.3)
POTASSIUM SERPL-SCNC: 4.1 MMOL/L — SIGNIFICANT CHANGE UP (ref 3.5–5.3)
SODIUM SERPL-SCNC: 141 MMOL/L — SIGNIFICANT CHANGE UP (ref 135–145)
T4 FREE SERPL-MCNC: 1.3 NG/DL — SIGNIFICANT CHANGE UP (ref 0.9–1.8)
TRIGL SERPL-MCNC: 120 MG/DL — SIGNIFICANT CHANGE UP
TROPONIN T, HIGH SENSITIVITY RESULT: 19 NG/L — SIGNIFICANT CHANGE UP (ref 0–51)
TSH SERPL-MCNC: 4.88 UIU/ML — HIGH (ref 0.27–4.2)

## 2023-04-27 PROCEDURE — 93356 MYOCRD STRAIN IMG SPCKL TRCK: CPT

## 2023-04-27 PROCEDURE — 76376 3D RENDER W/INTRP POSTPROCES: CPT | Mod: 26

## 2023-04-27 PROCEDURE — 99233 SBSQ HOSP IP/OBS HIGH 50: CPT

## 2023-04-27 PROCEDURE — 93306 TTE W/DOPPLER COMPLETE: CPT | Mod: 26

## 2023-04-27 RX ADMIN — LACOSAMIDE 50 MILLIGRAM(S): 50 TABLET ORAL at 00:04

## 2023-04-27 RX ADMIN — LACOSAMIDE 50 MILLIGRAM(S): 50 TABLET ORAL at 11:38

## 2023-04-27 RX ADMIN — PANTOPRAZOLE SODIUM 40 MILLIGRAM(S): 20 TABLET, DELAYED RELEASE ORAL at 06:39

## 2023-04-27 RX ADMIN — AMLODIPINE BESYLATE 2.5 MILLIGRAM(S): 2.5 TABLET ORAL at 06:39

## 2023-04-27 RX ADMIN — Medication 50 MILLIGRAM(S): at 22:59

## 2023-04-27 RX ADMIN — ENOXAPARIN SODIUM 40 MILLIGRAM(S): 100 INJECTION SUBCUTANEOUS at 05:31

## 2023-04-27 RX ADMIN — Medication 100 MICROGRAM(S): at 05:31

## 2023-04-27 RX ADMIN — Medication 650 MILLIGRAM(S): at 17:42

## 2023-04-27 NOTE — PROGRESS NOTE ADULT - SUBJECTIVE AND OBJECTIVE BOX
continues to have substernal chest pain with radiation to her mid back. no dyspnea, no palpitations.    GENERAL: No fevers, no chills.  EYES: No blurry vision,  No photophobia  ENT: No sore throat.  No dysphagia  Cardiovascular: No chest pain, palpitations, orthopnea  Pulmonary: No cough, no wheezing. No shortness of breath  Gastrointestinal: No abdominal pain, no diarrhea, no constipation.   Musculoskeletal: No weakness.  No myalgias.  Dermatology:  No rashes.  Neuro: No Headache.  No vertigo.  No dizziness.  Psych: No anxiety, no depression.  Denies suicidal thoughts.    MEDICATIONS  (STANDING):  amLODIPine   Tablet 2.5 milliGRAM(s) Oral daily  enoxaparin Injectable 40 milliGRAM(s) SubCutaneous every 24 hours  lacosamide 50 milliGRAM(s) Oral two times a day  levothyroxine 100 MICROGram(s) Oral daily  pantoprazole    Tablet 40 milliGRAM(s) Oral before breakfast    MEDICATIONS  (PRN):  acetaminophen     Tablet .. 650 milliGRAM(s) Oral every 6 hours PRN Temp greater or equal to 38C (100.4F), Mild Pain (1 - 3)  nitroglycerin     SubLingual 0.4 milliGRAM(s) SubLingual every 5 minutes PRN Chest Pain    Vital Signs Last 24 Hrs  T(C): 36.4 (27 Apr 2023 04:24), Max: 37 (26 Apr 2023 21:42)  T(F): 97.6 (27 Apr 2023 04:24), Max: 98.6 (26 Apr 2023 21:42)  HR: 63 (27 Apr 2023 04:24) (63 - 85)  BP: 120/73 (27 Apr 2023 04:24) (120/73 - 160/97)  BP(mean): --  RR: 18 (27 Apr 2023 04:24) (16 - 18)  SpO2: 98% (27 Apr 2023 04:24) (98% - 100%)    Parameters below as of 27 Apr 2023 04:24  Patient On (Oxygen Delivery Method): room air        GENERAL: NAD  HEAD:  Atraumatic, Normocephalic  EYES: EOMI, PERRLA, conjunctiva and sclera clear  ENT: Pharynx not erythematous  PULMONARY: Clear to auscultation bilaterally; No wheeze  CARDIOVASCULAR: Regular rate and rhythm; No murmurs, rubs, or gallops  ABDOMEN: Soft, Nontender, Nondistended; Bowel sounds present  EXTREMITIES:  2+ Peripheral Pulses, No clubbing, cyanosis, or edema  MUSCULOSKELETAL: No calf tenderness, substernal pain not reproducible to deep palpation   PSYCH: AAOx3, normal affect  SKIN: warm and dry, No rashes or lesions    .  LABS:                         13.6   8.36  )-----------( 299      ( 26 Apr 2023 18:56 )             42.3     04-27    141  |  105  |  12  ----------------------------<  82  4.1   |  25  |  0.70    Ca    9.5      27 Apr 2023 06:42  Phos  3.5     04-27  Mg     2.3     04-27    TPro  7.7  /  Alb  4.6  /  TBili  0.2  /  DBili  x   /  AST  17  /  ALT  22  /  AlkPhos  58  04-26    PT/INR - ( 26 Apr 2023 18:56 )   PT: 11.3 sec;   INR: 0.98 ratio         PTT - ( 26 Apr 2023 18:56 )  PTT:27.2 sec          RADIOLOGY, EKG & ADDITIONAL TESTS: Reviewed.

## 2023-04-27 NOTE — CONSULT NOTE ADULT - SUBJECTIVE AND OBJECTIVE BOX
DATE OF SERVICE: 04-27-23     CHIEF COMPLAINT:Patient is a 53y old  Female who presents with a chief complaint of CP (27 Apr 2023 12:07)      HISTORY OF PRESENT ILLNESS:HPI:  Pt is a 54yo F w/ PMH of HTN, HLD, hypothyroid, anxiety, and ?Cognitive dysfxn on Lacosamide pw CP.    States has been having intermittent mid-sternal CP for the past few days but today woke up w/ the pain which radiated to her back and to her b/l UE. States the pain is stabbing in nature, rated a 6-7/10 lasting 5-10 minutes w/ no inciting factors and resolves on its own. Associated symptoms include lightheadedness and nausea, though attributes the nausea to drinking coffee and not consuming any food.    Of note pt also endorses chronic LBP for which she takes Tylenol and Advil for w/ last use of Advil about 3-4 days ago. Reports a hx of EGD notable for esophagitis, not on PPI at the moment.     In the ED hypertensive to 160s (reports running out of medication 5 days ago), otherwise stable. labs notable for CK of 213 and Trop <6 w/ ECG nonspecific TWI. She was administered SL nitro and ASA.    (26 Apr 2023 23:05)      PAST MEDICAL & SURGICAL HISTORY:  Irritable Bowel Syndrome      h/o Heart Palpitations      Diverticulitis  last hospitalized 9.19      Hypothyroid      Sleep apnea  nasal mask- settings unknown      Hypertension      C Section - x 1 - 1994      h/o  Endoscopy      History of Colonoscopy      h/o dental implant      H/O vaginal surgery  vaginal mesh      Torn ligament  left thumb July 2020              MEDICATIONS:  amLODIPine   Tablet 2.5 milliGRAM(s) Oral daily  enoxaparin Injectable 40 milliGRAM(s) SubCutaneous every 24 hours  nitroglycerin     SubLingual 0.4 milliGRAM(s) SubLingual every 5 minutes PRN        acetaminophen     Tablet .. 650 milliGRAM(s) Oral every 6 hours PRN  lacosamide 50 milliGRAM(s) Oral two times a day    pantoprazole    Tablet 40 milliGRAM(s) Oral before breakfast    levothyroxine 100 MICROGram(s) Oral daily  predniSONE   Tablet 50 milliGRAM(s) Oral <User Schedule>        FAMILY HISTORY:  Family history of colon cancer (Grandparent)    Family history of early CAD  Father-45, Paternal Aunt Maternal Uncle        Non-contributory    SOCIAL HISTORY:    [ ] not a smoker    Allergies    Zosyn (Urticaria)  iodine (Hives)    Intolerances    	    REVIEW OF SYSTEMS:  CONSTITUTIONAL: No fever  EYES: No eye pain, visual disturbances, or discharge  ENMT:  No difficulty hearing, tinnitus  NECK: No pain or stiffness  RESPIRATORY: No cough, wheezing,  CARDIOVASCULAR: +chest pain, no palpitations, passing out, dizziness, or leg swelling  GASTROINTESTINAL:  No nausea, vomiting, diarrhea or constipation. No melena.  GENITOURINARY: No dysuria, hematuria  NEUROLOGICAL: No stroke like symptoms  SKIN: No burning or lesions   ENDOCRINE: No heat or cold intolerance  MUSCULOSKELETAL: No joint pain or swelling  PSYCHIATRIC: No  anxiety, mood swings  HEME/LYMPH: No bleeding gums  ALLERGY AND IMMUNOLOGIC: No hives or eczema	    All other ROS negative    PHYSICAL EXAM:  T(C): 36.9 (04-27-23 @ 18:59), Max: 36.9 (04-27-23 @ 18:59)  HR: 62 (04-27-23 @ 18:59) (62 - 66)  BP: 123/81 (04-27-23 @ 18:59) (120/73 - 136/69)  RR: 18 (04-27-23 @ 18:59) (18 - 18)  SpO2: 99% (04-27-23 @ 18:59) (98% - 99%)  Wt(kg): --  I&O's Summary      Appearance: Normal	  HEENT:   Normal oral mucosa, EOMI	  Cardiovascular:  S1 S2, No JVD,    Respiratory: Lungs clear to auscultation	  Psychiatry: Alert  Gastrointestinal:  Soft, Non-tender, + BS	  Skin: No rashes   Neurologic: Non-focal  Extremities:  No edema  Vascular: Peripheral pulses palpable    	    	  	  CARDIAC MARKERS:  Labs personally reviewed by me                                  13.6   8.36  )-----------( 299      ( 26 Apr 2023 18:56 )             42.3     04-27    141  |  105  |  12  ----------------------------<  82  4.1   |  25  |  0.70    Ca    9.5      27 Apr 2023 06:42  Phos  3.5     04-27  Mg     2.3     04-27    TPro  7.7  /  Alb  4.6  /  TBili  0.2  /  DBili  x   /  AST  17  /  ALT  22  /  AlkPhos  58  04-26          EKG: Personally reviewed by me - NSR  Radiology: Personally reviewed by me - CXR clear lungs      TTE  1. The left ventricular systolic function is normal with an ejection fraction of 57 % by 3D.   2. There is normal LV mass and normal geometry.   3. Global longitudinal strain is -22.4 % (normal < -18%). GLS was assessed on [employer] echo machine with a heart rate of 60 bpm and a blood pressure of 120/70 mmHg.   4. There is normal left ventricular diastolic function, with normal left atrial filling pressure.   5. Normal right ventricular cavity size and normal systolic function.   6. No significant valvular disease.   7. No pericardial effusion seen.   8. Compared to the transthoracic echocardiogram performed on 8/31/2023 there have been no significant interval changes.          Assessment and Plan:   · Assessment	  54yo F w/ PMH of HTN, HLD, hypothyroid, anxiety, and ?Cognitive dysfxn on Lacosamide pw CP admitted for concerns of unstable angina.       Problem/Plan - 1:  ·  Problem: Chest pain.   ·  Plan: Multiple family members with premature CAD/MI/death in their 40s  - TTE performed 4/27, normal    - CT coronaries in AM, will need premedication for iodine allergy. Will start tonight    Problem/Plan - 2:  ·  Problem: HTN (hypertension).   ·  Plan: - bp stable  - c/w amlodipine daily.    Problem/Plan - 3:  ·  Problem: HLD (hyperlipidemia).   ·  Plan: - resume statin.    Problem/Plan - 4:  ·  Problem: Cognitive dysfunction.   ·  Plan: - hx of c/f seizure like activity w/ neuro f/u outpt  - Is still on Lacosamide 50mg BID, will continue for now and encourage outpt neuro f/u on dc.    Problem/Plan - 5:  ·  Problem: Prophylactic measure.   ·  Plan: DVT ppx: Lovenox.          Differential diagnosis and plan of care discussed with patient after the evaluation. Counseling on diet, nutritional counseling, weight management, exercise and medication compliance was done.   Advanced care planning/advanced directives discussed with patient/family. DNR status including forceful chest compressions to attempt to restart the heart, ventilator support/artificial breathing, electric shock, artificial nutrition, health care proxy, Molst form all discussed with pt. Pt wishes to consider. More than fifteen minutes spent on discussing advanced directives. One hundred ten minutes spent on encounter, of which more than fifty percent of the encounter was spent counseling and/or coordinating care by the attending physician        Neri Atkinson DO [facility]  Cardiovascular Medicine  [address] [place] [address], [address]  Office [phone]  Available via call/text on [employer] Teams

## 2023-04-28 LAB
ANION GAP SERPL CALC-SCNC: 12 MMOL/L — SIGNIFICANT CHANGE UP (ref 5–17)
BUN SERPL-MCNC: 14 MG/DL — SIGNIFICANT CHANGE UP (ref 7–23)
CALCIUM SERPL-MCNC: 9.7 MG/DL — SIGNIFICANT CHANGE UP (ref 8.4–10.5)
CHLORIDE SERPL-SCNC: 103 MMOL/L — SIGNIFICANT CHANGE UP (ref 96–108)
CO2 SERPL-SCNC: 23 MMOL/L — SIGNIFICANT CHANGE UP (ref 22–31)
CREAT SERPL-MCNC: 0.7 MG/DL — SIGNIFICANT CHANGE UP (ref 0.5–1.3)
EGFR: 102 ML/MIN/1.73M2 — SIGNIFICANT CHANGE UP
GLUCOSE SERPL-MCNC: 144 MG/DL — HIGH (ref 70–99)
POTASSIUM SERPL-MCNC: 4.1 MMOL/L — SIGNIFICANT CHANGE UP (ref 3.5–5.3)
POTASSIUM SERPL-SCNC: 4.1 MMOL/L — SIGNIFICANT CHANGE UP (ref 3.5–5.3)
SODIUM SERPL-SCNC: 138 MMOL/L — SIGNIFICANT CHANGE UP (ref 135–145)
TROPONIN T, HIGH SENSITIVITY RESULT: <6 NG/L — SIGNIFICANT CHANGE UP (ref 0–51)

## 2023-04-28 PROCEDURE — 99232 SBSQ HOSP IP/OBS MODERATE 35: CPT

## 2023-04-28 RX ORDER — METOPROLOL TARTRATE 50 MG
25 TABLET ORAL
Refills: 0 | Status: DISCONTINUED | OUTPATIENT
Start: 2023-04-28 | End: 2023-04-29

## 2023-04-28 RX ORDER — METOPROLOL TARTRATE 50 MG
2.5 TABLET ORAL ONCE
Refills: 0 | Status: COMPLETED | OUTPATIENT
Start: 2023-04-28 | End: 2023-04-28

## 2023-04-28 RX ORDER — METOPROLOL TARTRATE 50 MG
25 TABLET ORAL ONCE
Refills: 0 | Status: COMPLETED | OUTPATIENT
Start: 2023-04-28 | End: 2023-04-28

## 2023-04-28 RX ORDER — DIPHENHYDRAMINE HCL 50 MG
50 CAPSULE ORAL ONCE
Refills: 0 | Status: COMPLETED | OUTPATIENT
Start: 2023-04-28 | End: 2023-04-28

## 2023-04-28 RX ORDER — CHLORHEXIDINE GLUCONATE 213 G/1000ML
1 SOLUTION TOPICAL
Refills: 0 | Status: DISCONTINUED | OUTPATIENT
Start: 2023-04-28 | End: 2023-04-29

## 2023-04-28 RX ADMIN — Medication 2.5 MILLIGRAM(S): at 12:39

## 2023-04-28 RX ADMIN — LACOSAMIDE 50 MILLIGRAM(S): 50 TABLET ORAL at 23:20

## 2023-04-28 RX ADMIN — LACOSAMIDE 50 MILLIGRAM(S): 50 TABLET ORAL at 12:39

## 2023-04-28 RX ADMIN — Medication 25 MILLIGRAM(S): at 21:53

## 2023-04-28 RX ADMIN — Medication 25 MILLIGRAM(S): at 14:00

## 2023-04-28 RX ADMIN — PANTOPRAZOLE SODIUM 40 MILLIGRAM(S): 20 TABLET, DELAYED RELEASE ORAL at 06:43

## 2023-04-28 RX ADMIN — CHLORHEXIDINE GLUCONATE 1 APPLICATION(S): 213 SOLUTION TOPICAL at 12:41

## 2023-04-28 RX ADMIN — Medication 50 MILLIGRAM(S): at 09:27

## 2023-04-28 RX ADMIN — Medication 650 MILLIGRAM(S): at 18:37

## 2023-04-28 RX ADMIN — Medication 650 MILLIGRAM(S): at 18:07

## 2023-04-28 RX ADMIN — LACOSAMIDE 50 MILLIGRAM(S): 50 TABLET ORAL at 00:20

## 2023-04-28 RX ADMIN — Medication 100 MICROGRAM(S): at 05:19

## 2023-04-28 RX ADMIN — ENOXAPARIN SODIUM 40 MILLIGRAM(S): 100 INJECTION SUBCUTANEOUS at 05:19

## 2023-04-28 RX ADMIN — Medication 50 MILLIGRAM(S): at 03:09

## 2023-04-28 RX ADMIN — Medication 50 MILLIGRAM(S): at 21:00

## 2023-04-28 RX ADMIN — AMLODIPINE BESYLATE 2.5 MILLIGRAM(S): 2.5 TABLET ORAL at 06:43

## 2023-04-28 NOTE — PROGRESS NOTE ADULT - SUBJECTIVE AND OBJECTIVE BOX
[facility] Division of Hospital Medicine  Leslie Bartholomew MD  Available via MS Teams    SUBJECTIVE / OVERNIGHT EVENTS:  no acute events overnight  had an episode of dizziness this AM lasting 1 min, no associated CP, SOB  still having very mild midsternal CP, improved significantly from admission  denies all other symptoms        ADDITIONAL REVIEW OF SYSTEMS:  14 point ROS negative except as noted above     MEDICATIONS  (STANDING):  amLODIPine   Tablet 2.5 milliGRAM(s) Oral daily  chlorhexidine 2% Cloths 1 Application(s) Topical <User Schedule>  enoxaparin Injectable 40 milliGRAM(s) SubCutaneous every 24 hours  lacosamide 50 milliGRAM(s) Oral two times a day  levothyroxine 100 MICROGram(s) Oral daily  pantoprazole    Tablet 40 milliGRAM(s) Oral before breakfast    MEDICATIONS  (PRN):  acetaminophen     Tablet .. 650 milliGRAM(s) Oral every 6 hours PRN Temp greater or equal to 38C (100.4F), Mild Pain (1 - 3)  nitroglycerin     SubLingual 0.4 milliGRAM(s) SubLingual every 5 minutes PRN Chest Pain      I&O's Summary      PHYSICAL EXAM:  Vital Signs Last 24 Hrs  T(C): 36.7 (28 Apr 2023 11:12), Max: 36.9 (27 Apr 2023 18:59)  T(F): 98.1 (28 Apr 2023 11:12), Max: 98.4 (27 Apr 2023 18:59)  HR: 84 (28 Apr 2023 12:37) (61 - 88)  BP: 125/71 (28 Apr 2023 12:37) (112/72 - 128/82)  BP(mean): --  RR: 18 (28 Apr 2023 11:12) (18 - 18)  SpO2: 97% (28 Apr 2023 11:12) (97% - 99%)    Parameters below as of 28 Apr 2023 11:12  Patient On (Oxygen Delivery Method): room air      PHYSICAL EXAM:  GENERAL: NAD, well-developed  HEAD:  Atraumatic, normocephalic  EYES: EOMI, conjunctiva and sclera clear  NECK: Supple, no JVD  CHEST/LUNG: Clear to auscultation bilaterally; no wheezing or rales  HEART: Regular rate and rhythm; no murmurs, no LE edema   ABDOMEN: Soft, nontender, nondistended; bowel sounds present  EXTREMITIES:  2+ Peripheral Pulses, no clubbing, cyanosis  PSYCH: AAOx3, calm affect, not anxious  SKIN: No rashes or lesions      LABS:                        13.6   8.36  )-----------( 299      ( 26 Apr 2023 18:56 )             42.3     04-28    138  |  103  |  14  ----------------------------<  144<H>  4.1   |  23  |  0.70    Ca    9.7      28 Apr 2023 06:51  Phos  3.5     04-27  Mg     2.3     04-27    TPro  7.7  /  Alb  4.6  /  TBili  0.2  /  DBili  x   /  AST  17  /  ALT  22  /  AlkPhos  58  04-26    PT/INR - ( 26 Apr 2023 18:56 )   PT: 11.3 sec;   INR: 0.98 ratio         PTT - ( 26 Apr 2023 18:56 )  PTT:27.2 sec  CARDIAC MARKERS ( 26 Apr 2023 18:56 )  x     / x     / 213 U/L / x     / x                  RADIOLOGY & ADDITIONAL TESTS:  New Imaging Personally Reviewed Today:  New Electrocardiogram Personally Reviewed Today:  Other Results Reviewed Today:   Prior or Outpatient Records Reviewed Today with Summary:    COORDINATION OF CARE:  Consultant Communication and Details of Discussion (where applicable):

## 2023-04-28 NOTE — PROGRESS NOTE ADULT - SUBJECTIVE AND OBJECTIVE BOX
DATE OF SERVICE: 04-28-23 @ 15:14    Patient is a 53y old  Female who presents with a chief complaint of CP (28 Apr 2023 13:37)      INTERVAL HISTORY: Feels ok.     REVIEW OF SYSTEMS:  CONSTITUTIONAL: No weakness  EYES/ENT: No visual changes;  No throat pain   NECK: No pain or stiffness  RESPIRATORY: No cough, wheezing; No shortness of breath  CARDIOVASCULAR: No chest pain or palpitations  GASTROINTESTINAL: No abdominal  pain. No nausea, vomiting, or hematemesis  GENITOURINARY: No dysuria, frequency or hematuria  NEUROLOGICAL: No stroke like symptoms  SKIN: No rashes    TELEMETRY Personally reviewed: SR/ST   	  MEDICATIONS:  amLODIPine   Tablet 2.5 milliGRAM(s) Oral daily  nitroglycerin     SubLingual 0.4 milliGRAM(s) SubLingual every 5 minutes PRN        PHYSICAL EXAM:  T(C): 36.7 (04-28-23 @ 11:12), Max: 36.9 (04-27-23 @ 18:59)  HR: 79 (04-28-23 @ 13:43) (61 - 88)  BP: 125/78 (04-28-23 @ 13:43) (112/72 - 128/82)  RR: 18 (04-28-23 @ 11:12) (18 - 18)  SpO2: 97% (04-28-23 @ 11:12) (97% - 99%)  Wt(kg): --  I&O's Summary        Appearance: In no distress	  HEENT:    PERRL, EOMI	  Cardiovascular:  S1 S2, No JVD  Respiratory: Lungs clear to auscultation	  Gastrointestinal:  Soft, Non-tender, + BS	  Vascularature:  No edema of LE  Psychiatric: Appropriate affect   Neuro: no acute focal deficits                               13.6   8.36  )-----------( 299      ( 26 Apr 2023 18:56 )             42.3     04-28    138  |  103  |  14  ----------------------------<  144<H>  4.1   |  23  |  0.70    Ca    9.7      28 Apr 2023 06:51  Phos  3.5     04-27  Mg     2.3     04-27    TPro  7.7  /  Alb  4.6  /  TBili  0.2  /  DBili  x   /  AST  17  /  ALT  22  /  AlkPhos  58  04-26        Labs personally reviewed      ASSESSMENT/PLAN: 	    54yo F w/ PMH of HTN, HLD, hypothyroid, anxiety, and ?Cognitive dysfxn on Lacosamide pw CP admitted for concerns of unstable angina.       Problem/Plan - 1:  ·  Problem: Chest pain.   ·  Plan: Multiple family members with premature CAD/MI/death in their 40s  - TTE performed 4/27, normal    - CT coronaries pending    Problem/Plan - 2:  ·  Problem: HTN (hypertension).   ·  Plan: - bp stable  - c/w amlodipine daily.    Problem/Plan - 3:  ·  Problem: HLD (hyperlipidemia).   ·  Plan: - resume statin.    Problem/Plan - 4:  ·  Problem: Cognitive dysfunction.   ·  Plan: - hx of c/f seizure like activity w/ neuro f/u outpt  - Is still on Lacosamide 50mg BID, will continue for now and encourage outpt neuro f/u on dc.    Problem/Plan - 5:  ·  Problem: Prophylactic measure.   ·  Plan: DVT ppx: Lovenox.              Nazanin Dickerson, AG-NP   Neri Atkinson,  [facility]  Cardiovascular Medicine  [address], [address]  Available through call or text on [employer] TEAM[employer]  Office: [phone]

## 2023-04-29 ENCOUNTER — TRANSCRIPTION ENCOUNTER (OUTPATIENT)
Age: 53
End: 2023-04-29

## 2023-04-29 VITALS
DIASTOLIC BLOOD PRESSURE: 60 MMHG | HEART RATE: 65 BPM | SYSTOLIC BLOOD PRESSURE: 112 MMHG | OXYGEN SATURATION: 97 % | RESPIRATION RATE: 18 BRPM

## 2023-04-29 LAB
ANION GAP SERPL CALC-SCNC: 14 MMOL/L — SIGNIFICANT CHANGE UP (ref 5–17)
BUN SERPL-MCNC: 14 MG/DL — SIGNIFICANT CHANGE UP (ref 7–23)
CALCIUM SERPL-MCNC: 9.5 MG/DL — SIGNIFICANT CHANGE UP (ref 8.4–10.5)
CHLORIDE SERPL-SCNC: 104 MMOL/L — SIGNIFICANT CHANGE UP (ref 96–108)
CO2 SERPL-SCNC: 21 MMOL/L — LOW (ref 22–31)
CREAT SERPL-MCNC: 0.63 MG/DL — SIGNIFICANT CHANGE UP (ref 0.5–1.3)
EGFR: 105 ML/MIN/1.73M2 — SIGNIFICANT CHANGE UP
GLUCOSE SERPL-MCNC: 149 MG/DL — HIGH (ref 70–99)
MRSA PCR RESULT.: SIGNIFICANT CHANGE UP
POTASSIUM SERPL-MCNC: 4 MMOL/L — SIGNIFICANT CHANGE UP (ref 3.5–5.3)
POTASSIUM SERPL-SCNC: 4 MMOL/L — SIGNIFICANT CHANGE UP (ref 3.5–5.3)
S AUREUS DNA NOSE QL NAA+PROBE: SIGNIFICANT CHANGE UP
SODIUM SERPL-SCNC: 139 MMOL/L — SIGNIFICANT CHANGE UP (ref 135–145)

## 2023-04-29 PROCEDURE — 85025 COMPLETE CBC W/AUTO DIFF WBC: CPT

## 2023-04-29 PROCEDURE — 85610 PROTHROMBIN TIME: CPT

## 2023-04-29 PROCEDURE — 84100 ASSAY OF PHOSPHORUS: CPT

## 2023-04-29 PROCEDURE — 85730 THROMBOPLASTIN TIME PARTIAL: CPT

## 2023-04-29 PROCEDURE — 80048 BASIC METABOLIC PNL TOTAL CA: CPT

## 2023-04-29 PROCEDURE — 93356 MYOCRD STRAIN IMG SPCKL TRCK: CPT

## 2023-04-29 PROCEDURE — 93306 TTE W/DOPPLER COMPLETE: CPT

## 2023-04-29 PROCEDURE — 99232 SBSQ HOSP IP/OBS MODERATE 35: CPT

## 2023-04-29 PROCEDURE — 76376 3D RENDER W/INTRP POSTPROCES: CPT

## 2023-04-29 PROCEDURE — 80053 COMPREHEN METABOLIC PANEL: CPT

## 2023-04-29 PROCEDURE — 75574 CT ANGIO HRT W/3D IMAGE: CPT

## 2023-04-29 PROCEDURE — 84702 CHORIONIC GONADOTROPIN TEST: CPT

## 2023-04-29 PROCEDURE — 83036 HEMOGLOBIN GLYCOSYLATED A1C: CPT

## 2023-04-29 PROCEDURE — 87637 SARSCOV2&INF A&B&RSV AMP PRB: CPT

## 2023-04-29 PROCEDURE — 83690 ASSAY OF LIPASE: CPT

## 2023-04-29 PROCEDURE — 36415 COLL VENOUS BLD VENIPUNCTURE: CPT

## 2023-04-29 PROCEDURE — 87640 STAPH A DNA AMP PROBE: CPT

## 2023-04-29 PROCEDURE — 84439 ASSAY OF FREE THYROXINE: CPT

## 2023-04-29 PROCEDURE — 84484 ASSAY OF TROPONIN QUANT: CPT

## 2023-04-29 PROCEDURE — 82550 ASSAY OF CK (CPK): CPT

## 2023-04-29 PROCEDURE — 71045 X-RAY EXAM CHEST 1 VIEW: CPT

## 2023-04-29 PROCEDURE — 83735 ASSAY OF MAGNESIUM: CPT

## 2023-04-29 PROCEDURE — 87641 MR-STAPH DNA AMP PROBE: CPT

## 2023-04-29 PROCEDURE — 80061 LIPID PANEL: CPT

## 2023-04-29 PROCEDURE — 84443 ASSAY THYROID STIM HORMONE: CPT

## 2023-04-29 PROCEDURE — 99285 EMERGENCY DEPT VISIT HI MDM: CPT

## 2023-04-29 PROCEDURE — 75574 CT ANGIO HRT W/3D IMAGE: CPT | Mod: 26

## 2023-04-29 RX ORDER — DIPHENHYDRAMINE HCL 50 MG
50 CAPSULE ORAL ONCE
Refills: 0 | Status: COMPLETED | OUTPATIENT
Start: 2023-04-29

## 2023-04-29 RX ORDER — ACETAMINOPHEN 500 MG
2 TABLET ORAL
Qty: 0 | Refills: 0 | DISCHARGE
Start: 2023-04-29

## 2023-04-29 RX ORDER — DIPHENHYDRAMINE HCL 50 MG
50 CAPSULE ORAL ONCE
Refills: 0 | Status: COMPLETED | OUTPATIENT
Start: 2023-04-29 | End: 2023-04-29

## 2023-04-29 RX ADMIN — Medication 100 MICROGRAM(S): at 05:04

## 2023-04-29 RX ADMIN — Medication 650 MILLIGRAM(S): at 04:08

## 2023-04-29 RX ADMIN — Medication 50 MILLIGRAM(S): at 10:54

## 2023-04-29 RX ADMIN — Medication 50 MILLIGRAM(S): at 08:59

## 2023-04-29 RX ADMIN — Medication 25 MILLIGRAM(S): at 05:04

## 2023-04-29 RX ADMIN — CHLORHEXIDINE GLUCONATE 1 APPLICATION(S): 213 SOLUTION TOPICAL at 05:04

## 2023-04-29 RX ADMIN — Medication 50 MILLIGRAM(S): at 03:03

## 2023-04-29 RX ADMIN — LACOSAMIDE 50 MILLIGRAM(S): 50 TABLET ORAL at 12:06

## 2023-04-29 RX ADMIN — ENOXAPARIN SODIUM 40 MILLIGRAM(S): 100 INJECTION SUBCUTANEOUS at 05:05

## 2023-04-29 RX ADMIN — Medication 650 MILLIGRAM(S): at 03:08

## 2023-04-29 RX ADMIN — PANTOPRAZOLE SODIUM 40 MILLIGRAM(S): 20 TABLET, DELAYED RELEASE ORAL at 05:04

## 2023-04-29 NOTE — PROGRESS NOTE ADULT - PROBLEM SELECTOR PLAN 2
- bp stable  - c/w amlodipine daily

## 2023-04-29 NOTE — PROGRESS NOTE ADULT - NSPROGADDITIONALINFOA_GEN_ALL_CORE
possible d/c home today if CTA cors is non actionable and cleared by cardiology   d/w pt and ACP    37 minutes spent coordinating discharge
disposition: ct coronaries vs. [facility]  discussed with ed acp maribel Barnhart D.O.  available on MS teams
if CTA cor without actionable pathology, will d/w cardiology ad if cleared, will plan for d/c today    38 minutes spent coordinating discharge

## 2023-04-29 NOTE — PROGRESS NOTE ADULT - ASSESSMENT
52yo F w/ PMH of HTN, HLD, hypothyroid, anxiety, and ?Cognitive dysfxn on Lacosamide pw CP admitted for concerns of unstable angina. 
54yo F w/ PMH of HTN, HLD, hypothyroid, anxiety, and ?Cognitive dysfxn on Lacosamide pw CP admitted for concerns of unstable angina. 
54yo F w/ PMH of HTN, HLD, hypothyroid, anxiety, and ?Cognitive dysfxn on Lacosamide pw CP admitted for concerns of unstable angina.

## 2023-04-29 NOTE — DISCHARGE NOTE PROVIDER - NSDCFUADDAPPT_GEN_ALL_CORE_FT
APPTS ARE READY TO BE MADE: [X] YES    Best Family or Patient Contact (if needed):    Additional Information about above appointments (if needed):    1:   2:   3:     Other comments or requests:    APPTS ARE READY TO BE MADE: [X] YES    Best Family or Patient Contact (if needed):    Additional Information about above appointments (if needed):    1:   2:   3:     Other comments or requests:   Pt has prior appt quique with Dr. Valenzuela on 5/23, at [address] location.  Pt has prior appt quique with Dr. Hollis on 7/17, at [address] [place] [address] location  Patient will contact providers to re-schedule appt within requested time frame, no assistance needed at this time.

## 2023-04-29 NOTE — DISCHARGE NOTE PROVIDER - HOSPITAL COURSE
52yo F w/ PMH of HTN, HLD, hypothyroid, anxiety, and ?Cognitive dysfxn on Lacosamide pw CP admitted for concerns of unstable angina.     Stabbing chest pain.   - concerning for unstable angina  - trops negative x 2  - TTE performed 4/27, normal LV systolic function, no valvular/WMA abnormalities   - CTA coronary negative. Shows nonobstructive CAD.    HTN (hypertension).   - bp stable  - c/w amlodipine daily.  - no need for Metoprolol on discharge per cardiology    HLD (hyperlipidemia).   - c/w statin.    Hypothyroidism.   c/w home Synthroid 100mcg qd.    Cognitive dysfunction.   - hx of c/f seizure like activity w/ neuro f/u outpt  - Last documentation from 07/22 w/ report to f/u in 2 months, but no further record noted  - Is still on Lacosamide 50mg BID, will continue for now and encourage outpt neuro f/u on dc.    Medically cleared for discharge home as per medicine attending.

## 2023-04-29 NOTE — DISCHARGE NOTE NURSING/CASE MANAGEMENT/SOCIAL WORK - PATIENT PORTAL LINK FT
You can access the FollowMyHealth Patient Portal offered by [facility] by registering at the following website: [email or web address]. By joining [employer]’s FollowMyHealth portal, you will also be able to view your health information using other applications (apps) compatible with our system.

## 2023-04-29 NOTE — DISCHARGE NOTE PROVIDER - CARE PROVIDER_API CALL
Erwin Valenzuela)  Cardiology; Internal Medicine  [address], [address]  [place] [address]  Phone: [phone]  Fax: [phone]  Follow Up Time: 1 week    Mahin Hollis)  Clinical Neurophysiology; EEGEpilepsy; Neurology  [address]  [place] [address]  Phone: [phone]  Fax: [phone]  Follow Up Time:

## 2023-04-29 NOTE — PROGRESS NOTE ADULT - SUBJECTIVE AND OBJECTIVE BOX
[facility] Division of Hospital Medicine  Leslie Bartholomew MD  Available via MS Teams    SUBJECTIVE / OVERNIGHT EVENTS:  no acute events overnight   minimal CP; has episode of b/l shoulder heaviness overnight, now resolved   no dizziness, no SOB, no palpitations     ADDITIONAL REVIEW OF SYSTEMS:  14 point ROS negative except as noted above     MEDICATIONS  (STANDING):  chlorhexidine 2% Cloths 1 Application(s) Topical <User Schedule>  enoxaparin Injectable 40 milliGRAM(s) SubCutaneous every 24 hours  lacosamide 50 milliGRAM(s) Oral two times a day  levothyroxine 100 MICROGram(s) Oral daily  metoprolol tartrate 25 milliGRAM(s) Oral two times a day  pantoprazole    Tablet 40 milliGRAM(s) Oral before breakfast    MEDICATIONS  (PRN):  acetaminophen     Tablet .. 650 milliGRAM(s) Oral every 6 hours PRN Temp greater or equal to 38C (100.4F), Mild Pain (1 - 3)  nitroglycerin     SubLingual 0.4 milliGRAM(s) SubLingual every 5 minutes PRN Chest Pain      I&O's Summary    28 Apr 2023 07:01  -  29 Apr 2023 07:00  --------------------------------------------------------  IN: 240 mL / OUT: 0 mL / NET: 240 mL        PHYSICAL EXAM:  Vital Signs Last 24 Hrs  T(C): 36.9 (29 Apr 2023 11:10), Max: 36.9 (28 Apr 2023 20:23)  T(F): 98.4 (29 Apr 2023 11:10), Max: 98.4 (28 Apr 2023 20:23)  HR: 55 (29 Apr 2023 11:10) (55 - 84)  BP: 132/80 (29 Apr 2023 11:10) (115/69 - 133/85)  BP(mean): --  RR: 18 (29 Apr 2023 11:10) (17 - 18)  SpO2: 98% (29 Apr 2023 11:10) (96% - 99%)    Parameters below as of 29 Apr 2023 11:10  Patient On (Oxygen Delivery Method): room air      PHYSICAL EXAM:  GENERAL: NAD, well-developed  HEAD:  Atraumatic, normocephalic  EYES: EOMI, conjunctiva and sclera clear  NECK: Supple, no JVD  CHEST/LUNG: Clear to auscultation bilaterally; no wheezing or rales  HEART: Regular rate and rhythm; no murmurs, no LE edema   ABDOMEN: Soft, nontender, nondistended; bowel sounds present  EXTREMITIES:  2+ Peripheral Pulses, no clubbing, cyanosis  PSYCH: AAOx3, calm affect, not anxious  SKIN: No rashes or lesions      LABS:    04-29    139  |  104  |  14  ----------------------------<  149<H>  4.0   |  21<L>  |  0.63    Ca    9.5      29 Apr 2023 06:11                    RADIOLOGY & ADDITIONAL TESTS:  New Imaging Personally Reviewed Today:  New Electrocardiogram Personally Reviewed Today:  Other Results Reviewed Today:   Prior or Outpatient Records Reviewed Today with Summary:    COORDINATION OF CARE:  Consultant Communication and Details of Discussion (where applicable):

## 2023-04-29 NOTE — DISCHARGE NOTE NURSING/CASE MANAGEMENT/SOCIAL WORK - NSDCPEFALRISK_GEN_ALL_CORE
For information on Fall & Injury Prevention, visit: [email or web address] OR  [email or web address] OR  https://www.cdc.gov/steadi/patient.html

## 2023-04-29 NOTE — DISCHARGE NOTE PROVIDER - CARE PROVIDERS DIRECT ADDRESSES
[email or web address]

## 2023-04-29 NOTE — DISCHARGE NOTE PROVIDER - NSDCADMDATE_GEN_ALL_CORE_FT
· S/P subdural hematoma reported on CT of 06/27/2021  · Currently stable - No change in mental status, no focal deficits, or headaches reported  · Eliquis and aspirin have been discontinued at this time  · Conservative treatment recommended by Neuro service  · Patient has a followup with neurosurgeon  · Bruising is gradually improving  26-Apr-2023 20:47

## 2023-04-29 NOTE — PATIENT PROFILE ADULT - FALL HARM RISK - UNIVERSAL INTERVENTIONS
Bed in lowest position, wheels locked, appropriate side rails in place/Call bell, personal items and telephone in reach/Instruct patient to call for assistance before getting out of bed or chair/Non-slip footwear when patient is out of bed/[place] to call system/Physically safe environment - no spills, clutter or unnecessary equipment/Purposeful Proactive Rounding/Room/bathroom lighting operational, light cord in reach

## 2023-04-29 NOTE — PROGRESS NOTE ADULT - SUBJECTIVE AND OBJECTIVE BOX
DATE OF SERVICE: 04-29-23 @ 15:45    Patient is a 53y old  Female who presents with a chief complaint of CP (29 Apr 2023 11:59)      INTERVAL HISTORY: non obst CAD on CT cors.     REVIEW OF SYSTEMS:  CONSTITUTIONAL: No weakness  EYES/ENT: No visual changes;  No throat pain   NECK: No pain or stiffness  RESPIRATORY: No cough, wheezing; No shortness of breath  CARDIOVASCULAR: No chest pain or palpitations  GASTROINTESTINAL: No abdominal  pain. No nausea, vomiting, or hematemesis  GENITOURINARY: No dysuria, frequency or hematuria  NEUROLOGICAL: No stroke like symptoms  SKIN: No rashes      	  MEDICATIONS:  metoprolol tartrate 25 milliGRAM(s) Oral two times a day  nitroglycerin     SubLingual 0.4 milliGRAM(s) SubLingual every 5 minutes PRN        PHYSICAL EXAM:  T(C): 36.9 (04-29-23 @ 11:10), Max: 36.9 (04-28-23 @ 20:23)  HR: 55 (04-29-23 @ 11:10) (55 - 76)  BP: 132/80 (04-29-23 @ 11:10) (115/69 - 133/85)  RR: 18 (04-29-23 @ 11:10) (18 - 18)  SpO2: 98% (04-29-23 @ 11:10) (97% - 99%)  Wt(kg): --  I&O's Summary    28 Apr 2023 07:01  -  29 Apr 2023 07:00  --------------------------------------------------------  IN: 240 mL / OUT: 0 mL / NET: 240 mL    29 Apr 2023 07:01  -  29 Apr 2023 15:45  --------------------------------------------------------  IN: 400 mL / OUT: 0 mL / NET: 400 mL          Appearance: In no distress	  HEENT:    PERRL, EOMI	  Cardiovascular:  S1 S2, No JVD  Respiratory: Lungs clear to auscultation	  Gastrointestinal:  Soft, Non-tender, + BS	  Vascularature:  No edema of LE  Psychiatric: Appropriate affect   Neuro: no acute focal deficits           04-29    139  |  104  |  14  ----------------------------<  149<H>  4.0   |  21<L>  |  0.63    Ca    9.5      29 Apr 2023 06:11          Labs personally reviewed      ASSESSMENT/PLAN: 	    54yo F w/ PMH of HTN, HLD, hypothyroid, anxiety, and ?Cognitive dysfxn on Lacosamide pw CP admitted for concerns of unstable angina. CTA heart with nonobstructive CAD. Clear for d/c with outpatient cards f/u       Problem/Plan - 1:  ·  Problem: Chest pain.   ·  Plan: Multiple family members with premature CAD/MI/death in their 40s  - TTE performed 4/27, normal    - CT coronaries 4/29 nonobstructive CAD. CAC score 0   - Start aspirin 81mg QD on d/c due to strong FHx CAD     Problem/Plan - 2:  ·  Problem: HTN (hypertension).   ·  Plan: - bp stable  - c/w amlodipine daily.    Problem/Plan - 3:  ·  Problem: HLD (hyperlipidemia).   ·  Plan: - resume statin.    Problem/Plan - 4:  ·  Problem: Cognitive dysfunction.   ·  Plan: - hx of c/f seizure like activity w/ neuro f/u outpt  - Is still on Lacosamide 50mg BID, will continue for now and encourage outpt neuro f/u on dc.    Problem/Plan - 5:  ·  Problem: Prophylactic measure.   ·  Plan: DVT ppx: Lovenox.          PENELOPE Clark DO [facility]  Cardiovascular Medicine  [address] [place] [address], [address]  Office: [phone]  Available via call/text on [employer] Teams  DATE OF SERVICE: 04-29-23 @ 15:45    Patient is a 53y old  Female who presents with a chief complaint of CP (29 Apr 2023 11:59)      INTERVAL HISTORY: non obst CAD on CT cors.     REVIEW OF SYSTEMS:  CONSTITUTIONAL: No weakness  EYES/ENT: No visual changes;  No throat pain   NECK: No pain or stiffness  RESPIRATORY: No cough, wheezing; No shortness of breath  CARDIOVASCULAR: No chest pain or palpitations  GASTROINTESTINAL: No abdominal  pain. No nausea, vomiting, or hematemesis  GENITOURINARY: No dysuria, frequency or hematuria  NEUROLOGICAL: No stroke like symptoms  SKIN: No rashes      	  MEDICATIONS:  metoprolol tartrate 25 milliGRAM(s) Oral two times a day  nitroglycerin     SubLingual 0.4 milliGRAM(s) SubLingual every 5 minutes PRN        PHYSICAL EXAM:  T(C): 36.9 (04-29-23 @ 11:10), Max: 36.9 (04-28-23 @ 20:23)  HR: 55 (04-29-23 @ 11:10) (55 - 76)  BP: 132/80 (04-29-23 @ 11:10) (115/69 - 133/85)  RR: 18 (04-29-23 @ 11:10) (18 - 18)  SpO2: 98% (04-29-23 @ 11:10) (97% - 99%)  Wt(kg): --  I&O's Summary    28 Apr 2023 07:01  -  29 Apr 2023 07:00  --------------------------------------------------------  IN: 240 mL / OUT: 0 mL / NET: 240 mL    29 Apr 2023 07:01  -  29 Apr 2023 15:45  --------------------------------------------------------  IN: 400 mL / OUT: 0 mL / NET: 400 mL          Appearance: In no distress	  HEENT:    PERRL, EOMI	  Cardiovascular:  S1 S2, No JVD  Respiratory: Lungs clear to auscultation	  Gastrointestinal:  Soft, Non-tender, + BS	  Vascularature:  No edema of LE  Psychiatric: Appropriate affect   Neuro: no acute focal deficits           04-29    139  |  104  |  14  ----------------------------<  149<H>  4.0   |  21<L>  |  0.63    Ca    9.5      29 Apr 2023 06:11          Labs personally reviewed      ASSESSMENT/PLAN: 	    52yo F w/ PMH of HTN, HLD, hypothyroid, anxiety, and ?Cognitive dysfxn on Lacosamide pw CP admitted for concerns of unstable angina. CTA heart with nonobstructive CAD. Clear for d/c with outpatient cards f/u       Problem/Plan - 1:  ·  Problem: Chest pain.   ·  Plan: Multiple family members with premature CAD/MI/death in their 40s  - TTE performed 4/27, normal    - CT coronaries 4/29 nonobstructive CAD. CAC score 0     Problem/Plan - 2:  ·  Problem: HTN (hypertension).   ·  Plan: - bp stable  - c/w amlodipine daily.    Problem/Plan - 3:  ·  Problem: HLD (hyperlipidemia).   ·  Plan: - resume statin.    Problem/Plan - 4:  ·  Problem: Cognitive dysfunction.   ·  Plan: - hx of c/f seizure like activity w/ neuro f/u outpt  - Is still on Lacosamide 50mg BID, will continue for now and encourage outpt neuro f/u on dc.    Problem/Plan - 5:  ·  Problem: Prophylactic measure.   ·  Plan: DVT ppx: Lovenox.          PENELOPE Clark DO [facility]  Cardiovascular Medicine  [address] [place] [address], [address]  Office: [phone]  Available via call/text on [employer] Teams

## 2023-04-29 NOTE — DISCHARGE NOTE PROVIDER - NSDCFUSCHEDAPPT_GEN_ALL_CORE_FT
Maile Velazquez  [facility]  RHEUM [address]  Scheduled Appointment: 05/02/2023    Jorge Luis Solis  [facility]  OTOLARYNG [address]  Scheduled Appointment: 05/12/2023    Erwin Valenzuela  [facility]  CARDIOLOGY [address]   Scheduled Appointment: 05/23/2023    Moris Clinton  [facility]  PULMMED [address]  Scheduled Appointment: 07/11/2023    Mahin Hollis  [facility]  NEUROLOGY [address]  Scheduled Appointment: 07/17/2023

## 2023-04-29 NOTE — DISCHARGE NOTE PROVIDER - NSDCMRMEDTOKEN_GEN_ALL_CORE_FT
amLODIPine 2.5 mg oral tablet: 1 tab(s) orally once a day  Crestor 20 mg oral tablet: 1 tab(s) orally once a day  lacosamide 50 mg oral tablet: 1 tab(s) orally 2 times a day  levothyroxine 100 mcg (0.1 mg) oral tablet: 1 tab(s) orally once a day   acetaminophen 325 mg oral tablet: 2 tab(s) orally every 6 hours As needed Temp greater or equal to 38C (100.4F), Mild Pain (1 - 3)  amLODIPine 2.5 mg oral tablet: 1 tab(s) orally once a day  Crestor 20 mg oral tablet: 1 tab(s) orally once a day  lacosamide 50 mg oral tablet: 1 tab(s) orally 2 times a day  levothyroxine 100 mcg (0.1 mg) oral tablet: 1 tab(s) orally once a day

## 2023-04-29 NOTE — DISCHARGE NOTE PROVIDER - NSDCCPCAREPLAN_GEN_ALL_CORE_FT
PRINCIPAL DISCHARGE DIAGNOSIS  Diagnosis: Chest discomfort  Assessment and Plan of Treatment: Cardiac enzymes negative x 2.  Echocardiogram performed 4/27, normal LV systolic function, no valvular/wall motion abnormalities  CTA coronary negative for obstructive disease  Follow up with cardiology and your primary care provider within 1 week.  Continue current medicaiton regimen, including your Amlodipine and Crestor.      SECONDARY DISCHARGE DIAGNOSES  Diagnosis: HTN (hypertension)  Assessment and Plan of Treatment: Take medications for your blood pressure as recommended. Continue with Amlodipine as directed.  Eat a heart healthy diet that is low in saturated fats and salt, and includes whole grains, fruits, vegetables and lean protein   Exercise regularly (consult with your physician or cardiologist first); maintain a heart healthy weight.   If you smoke - quit (A resource to help you stop smoking is the [facility] – phone number [phone].). Continue to follow with your primary physician or cardiologist.   Seek medical help for dizziness, Lightheadedness, Blurry vision, Headache, Chest pain, Shortness of breath  Follow up with your medical doctor to establish long term blood pressure treatment goals.    Diagnosis: Cognitive dysfunction  Assessment and Plan of Treatment: Continue with Lancosamide  Outpatient follow up with neurology recommended    Diagnosis: Hypothyroidism  Assessment and Plan of Treatment: Continue with Levothyroxine.

## 2023-04-29 NOTE — PROGRESS NOTE ADULT - PROBLEM SELECTOR PLAN 1
- concerning for unstable angina  - trops negative x 2  - c/w telemetry  - TTE performed 4/27, normal LV systolic function, no valvular/WMA abnormalities   - cardiology following - plan for CTA cors today (cancelled yesterday 2/2 HR)
- concerning for unstable angina  - trops negative x 2  - c/w telemetry  - TTE performed 4/27, results pending  - CT coronaries vs. [facility]  - cards dr baez consulted- TBD CT coronaries vs. [facility]
- concerning for unstable angina  - trops negative x 2  - c/w telemetry  - TTE performed 4/27, normal LV systolic function, no valvular/WMA abnoramlities   - cardiology following - plan for CTA cors today

## 2023-05-01 ENCOUNTER — TRANSCRIPTION ENCOUNTER (OUTPATIENT)
Age: 53
End: 2023-05-01

## 2023-05-01 ENCOUNTER — APPOINTMENT (OUTPATIENT)
Dept: INTERNAL MEDICINE | Facility: CLINIC | Age: 53
End: 2023-05-01
Payer: COMMERCIAL

## 2023-05-01 ENCOUNTER — NON-APPOINTMENT (OUTPATIENT)
Age: 53
End: 2023-05-01

## 2023-05-01 VITALS
HEART RATE: 75 BPM | WEIGHT: 194 LBS | HEIGHT: 61 IN | BODY MASS INDEX: 36.63 KG/M2 | TEMPERATURE: 97.7 F | SYSTOLIC BLOOD PRESSURE: 122 MMHG | OXYGEN SATURATION: 98 % | DIASTOLIC BLOOD PRESSURE: 80 MMHG

## 2023-05-01 DIAGNOSIS — M79.10 MYALGIA, UNSPECIFIED SITE: ICD-10-CM

## 2023-05-01 PROCEDURE — 99496 TRANSJ CARE MGMT HIGH F2F 7D: CPT | Mod: 25

## 2023-05-01 PROCEDURE — 93000 ELECTROCARDIOGRAM COMPLETE: CPT

## 2023-05-01 RX ORDER — DICLOFENAC SODIUM 75 MG/1
75 TABLET, DELAYED RELEASE ORAL
Qty: 60 | Refills: 1 | Status: DISCONTINUED | COMMUNITY
Start: 2021-05-04 | End: 2023-05-01

## 2023-05-01 RX ORDER — BENZONATATE 100 MG/1
100 CAPSULE ORAL
Qty: 30 | Refills: 0 | Status: DISCONTINUED | COMMUNITY
Start: 2022-06-25 | End: 2023-05-01

## 2023-05-01 NOTE — REVIEW OF SYSTEMS
[Negative] : Heme/Lymph [Chest Pain] : chest pain [Muscle Pain] : muscle pain [Back Pain] : back pain [Palpitations] : no palpitations [Leg Claudication] : no leg claudication [Lower Ext Edema] : no lower extremity edema [Orthopnea] : no orthopnea [Paroxysmal Nocturnal Dyspnea] : no paroxysmal nocturnal dyspnea

## 2023-05-01 NOTE — HISTORY OF PRESENT ILLNESS
[Post-hospitalization from ___ Hospital] : Post-hospitalization from [unfilled] Hospital [Discharge Summary] : discharge summary [Pertinent Labs] : pertinent labs [Radiology Findings] : radiology findings [Discharge Med List] : discharge medication list [Med Reconciliation] : medication reconciliation has been completed [Admitted on: ___] : The patient was admitted on [unfilled] [Discharged on ___] : discharged on [unfilled] [FreeTextEntry2] : WARNER REYES 54 y/o F with strong family history of MI and PMHx of HLD presents to the office today for a HFU. HAd TTE performed 4/27, normal LV systolic function, no valvular/WMA abnormalities. CTA coronary limited but shows Ca score of 0 only mild nonobstructive CAD in LCx. Pt states she still feels soreness (not pleuritic) in her left chest, arms and neck on palpation, different from the pain which sent her to the hospital. Pt denies any reflux symptoms. \par She had this Arm and leg pain in past, stopped statin for weeks but pain persisted thus she restarted it and it has not increased her painl\par Denies sob, martínez, dizziness, diaphoresis, palpitations, LE swelling, orthopnea, syncope, n/v, headache.\par Denies fam hx or personal hx of clots, no recent immobility.\par Follows with pulm Dr Fagan, had negative d-dimer and negative CT chest in 2/2023

## 2023-05-01 NOTE — PHYSICAL EXAM
[No Acute Distress] : no acute distress [Well Nourished] : well nourished [Well Developed] : well developed [Well-Appearing] : well-appearing [Normal Sclera/Conjunctiva] : normal sclera/conjunctiva [Normal Outer Ear/Nose] : the outer ears and nose were normal in appearance [Normal Oropharynx] : the oropharynx was normal [No JVD] : no jugular venous distention [No Lymphadenopathy] : no lymphadenopathy [Supple] : supple [No Respiratory Distress] : no respiratory distress  [No Accessory Muscle Use] : no accessory muscle use [Clear to Auscultation] : lungs were clear to auscultation bilaterally [Normal Rate] : normal rate  [Regular Rhythm] : with a regular rhythm [Normal S1, S2] : normal S1 and S2 [No Murmur] : no murmur heard [No Carotid Bruits] : no carotid bruits [No Varicosities] : no varicosities [Pedal Pulses Present] : the pedal pulses are present [No Edema] : there was no peripheral edema [No Extremity Clubbing/Cyanosis] : no extremity clubbing/cyanosis [Soft] : abdomen soft [Non Tender] : non-tender [Non-distended] : non-distended [Normal Bowel Sounds] : normal bowel sounds [Normal Anterior Cervical Nodes] : no anterior cervical lymphadenopathy [No CVA Tenderness] : no CVA  tenderness [No Spinal Tenderness] : no spinal tenderness [No Joint Swelling] : no joint swelling [Grossly Normal Strength/Tone] : grossly normal strength/tone [No Rash] : no rash [Coordination Grossly Intact] : coordination grossly intact [No Focal Deficits] : no focal deficits [Normal Gait] : normal gait [Normal Affect] : the affect was normal [Alert and Oriented x3] : oriented to person, place, and time [de-identified] : mild left sided reproducible chest tenderness+ [de-identified] : no LE tenderness or swelling b/l

## 2023-05-02 ENCOUNTER — TRANSCRIPTION ENCOUNTER (OUTPATIENT)
Age: 53
End: 2023-05-02

## 2023-05-03 ENCOUNTER — APPOINTMENT (OUTPATIENT)
Dept: CARDIOLOGY | Facility: CLINIC | Age: 53
End: 2023-05-03
Payer: COMMERCIAL

## 2023-05-03 ENCOUNTER — NON-APPOINTMENT (OUTPATIENT)
Age: 53
End: 2023-05-03

## 2023-05-03 VITALS
SYSTOLIC BLOOD PRESSURE: 130 MMHG | HEART RATE: 76 BPM | WEIGHT: 194 LBS | HEIGHT: 61 IN | OXYGEN SATURATION: 99 % | TEMPERATURE: 97.6 F | DIASTOLIC BLOOD PRESSURE: 70 MMHG | BODY MASS INDEX: 36.63 KG/M2

## 2023-05-03 DIAGNOSIS — R52 PAIN, UNSPECIFIED: ICD-10-CM

## 2023-05-03 PROCEDURE — 93000 ELECTROCARDIOGRAM COMPLETE: CPT

## 2023-05-03 PROCEDURE — 99214 OFFICE O/P EST MOD 30 MIN: CPT

## 2023-05-03 NOTE — HISTORY OF PRESENT ILLNESS
[FreeTextEntry1] : James is a 53 y.o female w/MHx of HLD, HTN, Hypothyroid, PreDM, Obesity, FHx significant for cardiac disease, who presents for cardiac follow up. PCP Dr. Osorio. Endocrinologist Dr. Villarreal. Neurologist Dr. Hollis. Pt. recently presented to [facility] 4/26/2023 to 4/29/2023 for chest discomfort. 4/26/2023 SR @ 78, diffuse ST depression. 4/26/2023 Xray clear. 4/26/2023 CCTA CT Heart Calcium score 0, NOCA. TSH 4.8. Saw Dr. Longo, was Rx Meloxicam, however, did not start yet. Does note that she felt tired in 2/2023. Too w/ intermittent c/o B/L arm pain. In March did have trending ESR. Denies palpitations, diaphoresis, vision changes, HA, dizziness, syncope, cough, wheezing, SOB/ELIZALDE, edema, fever, chills, infection.

## 2023-05-03 NOTE — CARDIOLOGY SUMMARY
[de-identified] : 5/3/2023 SR @ 70, NSSTA.\par 5/1/2023 SR @ 68, NSSTA.\par 4/26/2023 SR @ 78, diffuse ST depression. [de-identified] : 4/27/2023 LVEF 57%, trace MR, trace TR. [de-identified] : 4/29/2023 CCTA CT Heart Calcium score 0, LM nL, LAD distal/diagonal not well visualized, LCx mild 25-49%.

## 2023-05-05 ENCOUNTER — APPOINTMENT (OUTPATIENT)
Dept: CARDIOLOGY | Facility: CLINIC | Age: 53
End: 2023-05-05
Payer: COMMERCIAL

## 2023-05-05 PROCEDURE — 93306 TTE W/DOPPLER COMPLETE: CPT

## 2023-05-09 ENCOUNTER — APPOINTMENT (OUTPATIENT)
Dept: NEUROLOGY | Facility: CLINIC | Age: 53
End: 2023-05-09
Payer: COMMERCIAL

## 2023-05-09 VITALS
SYSTOLIC BLOOD PRESSURE: 151 MMHG | WEIGHT: 194 LBS | BODY MASS INDEX: 36.63 KG/M2 | HEIGHT: 61 IN | HEART RATE: 69 BPM | DIASTOLIC BLOOD PRESSURE: 93 MMHG

## 2023-05-09 PROCEDURE — 99213 OFFICE O/P EST LOW 20 MIN: CPT

## 2023-05-10 ENCOUNTER — NON-APPOINTMENT (OUTPATIENT)
Age: 53
End: 2023-05-10

## 2023-05-10 ENCOUNTER — APPOINTMENT (OUTPATIENT)
Dept: CARDIOLOGY | Facility: CLINIC | Age: 53
End: 2023-05-10
Payer: COMMERCIAL

## 2023-05-10 DIAGNOSIS — Z91.041 RADIOGRAPHIC DYE ALLERGY STATUS: ICD-10-CM

## 2023-05-10 PROCEDURE — 93351 STRESS TTE COMPLETE: CPT

## 2023-05-10 NOTE — DISCHARGE NOTE PROVIDER - NSDCCONDITION_GEN_ALL_CORE
Subjective:      Patient ID: Yadi Shelby is a 56 y.o. female who presents today for:  Chief Complaint   Patient presents with    Urinary Tract Infection     Back pain lower to the middle , dark urine , start 4/18       HPI      Had a bad flu after easter   Vomiting  Went to the ER 4-17  Dehydration  CT showed some different things   Had this little cough since the ER  The right ear feels funky   Exhausted   No appetite   She saw urology and they cleared her   Her urine no matter how much she drinks its very concentrate   Now its more terrell flank pain   A lot of fatigue  No fevers       A dry frequent cough that started after this flu   Sometimes its hard to talk   But no SOB   No wheezing       No urgency  No burning   No frequency   Really just the back   Its not an injury           Past Medical History:   Diagnosis Date    Essential hypertension 05/20/2019    Kidney stone     Parathyroid adenoma      Past Surgical History:   Procedure Laterality Date    COLONOSCOPY N/A 06/30/2021    COLONOSCOPY WITH POLYPECTOMY performed by Sada Quispe MD at [facility] N/A 08/07/2018    NECK EXPLORATION- PARATHYROID ADENOMA (2 1/2 HOURS) performed by Yasemin Butt MD at [address] [place] [address] History     Socioeconomic History    Marital status:      Spouse name: Not on file    Number of children: Not on file    Years of education: Not on file    Highest education level: Not on file   Occupational History    Not on file   Tobacco Use    Smoking status: Never    Smokeless tobacco: Never   Vaping Use    Vaping Use: Never used   Substance and Sexual Activity    Alcohol use: No    Drug use: No    Sexual activity: Not on file   Other Topics Concern    Not on file   Social History Narrative    Not on file     Social Determinants of Health     Financial Resource Strain: Low Risk     Difficulty of Paying Living Expenses: Not hard at all   Food Insecurity: No Stable

## 2023-05-11 ENCOUNTER — TRANSCRIPTION ENCOUNTER (OUTPATIENT)
Age: 53
End: 2023-05-11

## 2023-05-11 ENCOUNTER — OUTPATIENT (OUTPATIENT)
Dept: OUTPATIENT SERVICES | Facility: HOSPITAL | Age: 53
LOS: 1 days | End: 2023-05-11
Payer: COMMERCIAL

## 2023-05-11 ENCOUNTER — NON-APPOINTMENT (OUTPATIENT)
Age: 53
End: 2023-05-11

## 2023-05-11 VITALS — WEIGHT: 194.01 LBS

## 2023-05-11 VITALS
HEART RATE: 70 BPM | DIASTOLIC BLOOD PRESSURE: 70 MMHG | OXYGEN SATURATION: 98 % | RESPIRATION RATE: 18 BRPM | SYSTOLIC BLOOD PRESSURE: 132 MMHG

## 2023-05-11 DIAGNOSIS — Z98.890 OTHER SPECIFIED POSTPROCEDURAL STATES: Chronic | ICD-10-CM

## 2023-05-11 DIAGNOSIS — Z90.49 ACQUIRED ABSENCE OF OTHER SPECIFIED PARTS OF DIGESTIVE TRACT: Chronic | ICD-10-CM

## 2023-05-11 DIAGNOSIS — T14.8XXA OTHER INJURY OF UNSPECIFIED BODY REGION, INITIAL ENCOUNTER: Chronic | ICD-10-CM

## 2023-05-11 DIAGNOSIS — R07.89 OTHER CHEST PAIN: ICD-10-CM

## 2023-05-11 LAB
ANION GAP SERPL CALC-SCNC: 12 MMOL/L — SIGNIFICANT CHANGE UP (ref 5–17)
BUN SERPL-MCNC: 21 MG/DL — SIGNIFICANT CHANGE UP (ref 7–23)
CALCIUM SERPL-MCNC: 9.5 MG/DL — SIGNIFICANT CHANGE UP (ref 8.4–10.5)
CHLORIDE SERPL-SCNC: 104 MMOL/L — SIGNIFICANT CHANGE UP (ref 96–108)
CO2 SERPL-SCNC: 24 MMOL/L — SIGNIFICANT CHANGE UP (ref 22–31)
CREAT SERPL-MCNC: 0.62 MG/DL — SIGNIFICANT CHANGE UP (ref 0.5–1.3)
EGFR: 105 ML/MIN/1.73M2 — SIGNIFICANT CHANGE UP
GLUCOSE SERPL-MCNC: 135 MG/DL — HIGH (ref 70–99)
HCT VFR BLD CALC: 41.7 % — SIGNIFICANT CHANGE UP (ref 34.5–45)
HGB BLD-MCNC: 13.7 G/DL — SIGNIFICANT CHANGE UP (ref 11.5–15.5)
MCHC RBC-ENTMCNC: 30.8 PG — SIGNIFICANT CHANGE UP (ref 27–34)
MCHC RBC-ENTMCNC: 32.9 GM/DL — SIGNIFICANT CHANGE UP (ref 32–36)
MCV RBC AUTO: 93.7 FL — SIGNIFICANT CHANGE UP (ref 80–100)
NRBC # BLD: 0 /100 WBCS — SIGNIFICANT CHANGE UP (ref 0–0)
PLATELET # BLD AUTO: 306 K/UL — SIGNIFICANT CHANGE UP (ref 150–400)
POTASSIUM SERPL-MCNC: 4.2 MMOL/L — SIGNIFICANT CHANGE UP (ref 3.5–5.3)
POTASSIUM SERPL-SCNC: 4.2 MMOL/L — SIGNIFICANT CHANGE UP (ref 3.5–5.3)
RBC # BLD: 4.45 M/UL — SIGNIFICANT CHANGE UP (ref 3.8–5.2)
RBC # FLD: 12.6 % — SIGNIFICANT CHANGE UP (ref 10.3–14.5)
SODIUM SERPL-SCNC: 140 MMOL/L — SIGNIFICANT CHANGE UP (ref 135–145)
WBC # BLD: 7.88 K/UL — SIGNIFICANT CHANGE UP (ref 3.8–10.5)
WBC # FLD AUTO: 7.88 K/UL — SIGNIFICANT CHANGE UP (ref 3.8–10.5)

## 2023-05-11 PROCEDURE — 93458 L HRT ARTERY/VENTRICLE ANGIO: CPT | Mod: 26

## 2023-05-11 PROCEDURE — 85027 COMPLETE CBC AUTOMATED: CPT

## 2023-05-11 PROCEDURE — 80048 BASIC METABOLIC PNL TOTAL CA: CPT

## 2023-05-11 PROCEDURE — 93005 ELECTROCARDIOGRAM TRACING: CPT

## 2023-05-11 PROCEDURE — 93010 ELECTROCARDIOGRAM REPORT: CPT

## 2023-05-11 PROCEDURE — 93458 L HRT ARTERY/VENTRICLE ANGIO: CPT

## 2023-05-11 RX ORDER — DIPHENHYDRAMINE HCL 50 MG
50 CAPSULE ORAL ONCE
Refills: 0 | Status: COMPLETED | OUTPATIENT
Start: 2023-05-11 | End: 2023-05-11

## 2023-05-11 RX ORDER — LEVOTHYROXINE SODIUM 125 MCG
1 TABLET ORAL
Qty: 0 | Refills: 0 | DISCHARGE

## 2023-05-11 RX ORDER — LACOSAMIDE 50 MG/1
1 TABLET ORAL
Refills: 0 | DISCHARGE

## 2023-05-11 RX ORDER — ATORVASTATIN CALCIUM 80 MG/1
1 TABLET, FILM COATED ORAL
Qty: 30 | Refills: 0
Start: 2023-05-11 | End: 2023-06-09

## 2023-05-11 RX ORDER — SODIUM CHLORIDE 9 MG/ML
150 INJECTION INTRAMUSCULAR; INTRAVENOUS; SUBCUTANEOUS
Refills: 0 | Status: DISCONTINUED | OUTPATIENT
Start: 2023-05-11 | End: 2023-05-25

## 2023-05-11 RX ORDER — ROSUVASTATIN CALCIUM 5 MG/1
1 TABLET ORAL
Qty: 0 | Refills: 0 | DISCHARGE

## 2023-05-11 RX ORDER — AMLODIPINE BESYLATE 2.5 MG/1
1 TABLET ORAL
Qty: 0 | Refills: 0 | DISCHARGE

## 2023-05-11 RX ORDER — AMLODIPINE BESYLATE 2.5 MG/1
1 TABLET ORAL
Qty: 30 | Refills: 0
Start: 2023-05-11 | End: 2023-06-09

## 2023-05-11 RX ORDER — SODIUM CHLORIDE 9 MG/ML
250 INJECTION INTRAMUSCULAR; INTRAVENOUS; SUBCUTANEOUS ONCE
Refills: 0 | Status: COMPLETED | OUTPATIENT
Start: 2023-05-11 | End: 2023-05-11

## 2023-05-11 RX ORDER — SODIUM CHLORIDE 9 MG/ML
250 INJECTION INTRAMUSCULAR; INTRAVENOUS; SUBCUTANEOUS
Refills: 0 | Status: DISCONTINUED | OUTPATIENT
Start: 2023-05-11 | End: 2023-05-11

## 2023-05-11 RX ORDER — AMLODIPINE BESYLATE 5 MG
1 TABLET ORAL
Qty: 30 | Refills: 0 | DISCHARGE
Start: 2023-05-11 | End: 2023-06-09

## 2023-05-11 RX ADMIN — SODIUM CHLORIDE 750 MILLILITER(S): 9 INJECTION INTRAMUSCULAR; INTRAVENOUS; SUBCUTANEOUS at 08:59

## 2023-05-11 RX ADMIN — Medication 50 MILLIGRAM(S): at 08:59

## 2023-05-11 RX ADMIN — SODIUM CHLORIDE 75 MILLILITER(S): 9 INJECTION INTRAMUSCULAR; INTRAVENOUS; SUBCUTANEOUS at 08:59

## 2023-05-11 NOTE — ASU DISCHARGE PLAN (ADULT/PEDIATRIC) - CARE PROVIDER_API CALL
Erwin Valenzuela)  Cardiology; Internal Medicine  [address] [facility], [address]  [place] [address]  Phone: [phone]  Fax: [phone]  Established Patient  Follow Up Time: 2 weeks

## 2023-05-11 NOTE — H&P CARDIOLOGY - NSICDXFAMILYHX_GEN_ALL_CORE_FT
FAMILY HISTORY:  Family history of early CAD, Father-45, Paternal Aunt Maternal Uncle    Grandparent  Still living? No  Family history of colon cancer, Age at diagnosis: Age Unknown     FAMILY HISTORY:  Family history of early CAD, Father-45, Paternal Aunt Maternal Uncle    Father  Still living? Unknown  Family history of early CAD, Age at diagnosis: Age Unknown    Grandparent  Still living? No  Family history of colon cancer, Age at diagnosis: Age Unknown    Aunt  Still living? Unknown  Family history of early CAD, Age at diagnosis: Age Unknown    Uncle  Still living? Unknown  Family history of early CAD, Age at diagnosis: Age Unknown

## 2023-05-11 NOTE — H&P CARDIOLOGY - LIVES WITH, PROFILE
Refill request from [facility] for Losartan 50mg. LOV 12/11/19 with orders to continue Losartan for long-term HTN treatment. Follow-up prn. 90 day supply with 2 refills sent. HOLLY Rios RN.   
spouse

## 2023-05-11 NOTE — H&P CARDIOLOGY - NSICDXPASTSURGICALHX_GEN_ALL_CORE_FT
PAST SURGICAL HISTORY:  C Section - x 1 - 1994     h/o  Endoscopy     h/o dental implant     H/O vaginal surgery vaginal mesh    History of Colonoscopy     Torn ligament left thumb July 2020     PAST SURGICAL HISTORY:  C Section - x 1 - 1994     h/o  Endoscopy     h/o dental implant     H/O vaginal surgery vaginal mesh    History of colectomy     History of Colonoscopy     Torn ligament left thumb July 2020

## 2023-05-11 NOTE — ASU DISCHARGE PLAN (ADULT/PEDIATRIC) - NS MD DC FALL RISK RISK
For information on Fall & Injury Prevention, visit: [email or web address] OR  [email or web address] OR  https://www.cdc.gov/steadi/patient.html

## 2023-05-11 NOTE — H&P CARDIOLOGY - HISTORY OF PRESENT ILLNESS
52 yo F with PMHx of HTN, HLD, hypothyroid, ?Cognitive dysfxn on Lacosamide, anxiety presents for cardiac cath. Pt reports she was admitted for chest pain in April and discharged. Cardiac CTA non obstructive CAD. Pt denies chest pain, SOB or dizziness currently.     Card: Dr. ANAYA Valenzuela       < from: TTE W or WO Ultrasound Enhancing Agent (04.27.23 @ 06:49) >   1. The left ventricular systolic function is normal with an ejection fraction of 57 % by 3D.   2. There is normal LV mass and normal geometry.   3. Global longitudinal strain is -22.4 % (normal < -18%). GLS was assessed on TomTeAkdemia echo machine with a heart rate of 60 bpm and a blood pressure of 120/70 mmHg.   4. There is normal left ventricular diastolic function, with normal left atrial filling pressure.   5. Normal right ventricular cavity size and normal systolic function.   6. No significant valvular disease.   7. No pericardial effusion seen.   8. Compared to the transthoracic echocardiogram performed on 8/31/2023 there have been no significant interval changes.      < from: CT Angio Heart and Coronaries w/ IV Cont (04.29.23 @ 11:55) >  1.  Limited study due to decreased signal to noise ratio and poor   contrast opacification.  2.  Nonobstructive CAD is detected in the coronary arteries visualized.  3.  The calculated Agatston score is zero.     < from: CT Angio Heart and Coronaries w/ IV Cont (04.29.23 @ 11:55) >  The LAD coronary artery is angiographically normal.  Distal segment is   not well visualized.  The diagonal branch is not well visualized.    The LCX coronary artery has a mild (25-49%) noncalcified plaque in the proximal segment  The obtuse marginal (OM) branch is not well visualized.  The RCA is angiographically normal.  The right posterior descending artery (PDA) and the right posterolateral (RPL) branch are angiographically normal. < end of copied text >       54 yo F with PMHx of HTN, HLD, hypothyroid, ?Cognitive dysfxn on Lacosamide, anxiety and strong FHX of CAD Father MI @ 45,  2 uncles, 1 aunt, 2 cousins) presents for cardiac cath. Pt reports she was admitted for chest pain in April and discharged. Cardiac CTA non obstructive CAD. pt had f/u with Dr. Valenzuela and referred for cath.   Pt reports she still feel intermittent  chest discomfort, SOB.     Card: Dr. ANAYA Valenzuela       < from: TTE W or WO Ultrasound Enhancing Agent (04.27.23 @ 06:49) >   1. The left ventricular systolic function is normal with an ejection fraction of 57 % by 3D.   2. There is normal LV mass and normal geometry.   3. Global longitudinal strain is -22.4 % (normal < -18%). GLS was assessed on proteonomix echo machine with a heart rate of 60 bpm and a blood pressure of 120/70 mmHg.   4. There is normal left ventricular diastolic function, with normal left atrial filling pressure.   5. Normal right ventricular cavity size and normal systolic function.   6. No significant valvular disease.   7. No pericardial effusion seen.   8. Compared to the transthoracic echocardiogram performed on 8/31/2023 there have been no significant interval changes.      < from: CT Angio Heart and Coronaries w/ IV Cont (04.29.23 @ 11:55) >  1.  Limited study due to decreased signal to noise ratio and poor   contrast opacification.  2.  Nonobstructive CAD is detected in the coronary arteries visualized.  3.  The calculated Agatston score is zero.     < from: CT Angio Heart and Coronaries w/ IV Cont (04.29.23 @ 11:55) >  The LAD coronary artery is angiographically normal.  Distal segment is   not well visualized.  The diagonal branch is not well visualized.    The LCX coronary artery has a mild (25-49%) noncalcified plaque in the proximal segment  The obtuse marginal (OM) branch is not well visualized.  The RCA is angiographically normal.  The right posterior descending artery (PDA) and the right posterolateral (RPL) branch are angiographically normal. < end of copied text >       54 yo F with PMHx of HTN, HLD, hypothyroid, ?Cognitive dysfxn on Lacosamide, anxiety, obesity (BMI>36), MARIAN and strong FHX of CAD Father MI @ 45,  2 uncles, 1 aunt, 2 cousins) presents for cardiac cath. Pt reports she was admitted for chest pain in April and discharged. Cardiac CTA non obstructive CAD. pt had f/u with Dr. Valenzuela and referred for cath.   Pt reports she still feel intermittent  chest discomfort, SOB.     Card: Dr. ANAYA Valenzuela       < from: TTE W or WO Ultrasound Enhancing Agent (04.27.23 @ 06:49) >   1. The left ventricular systolic function is normal with an ejection fraction of 57 % by 3D.   2. There is normal LV mass and normal geometry.   3. Global longitudinal strain is -22.4 % (normal < -18%). GLS was assessed on TomTeYouAppi echo machine with a heart rate of 60 bpm and a blood pressure of 120/70 mmHg.   4. There is normal left ventricular diastolic function, with normal left atrial filling pressure.   5. Normal right ventricular cavity size and normal systolic function.   6. No significant valvular disease.   7. No pericardial effusion seen.   8. Compared to the transthoracic echocardiogram performed on 8/31/2023 there have been no significant interval changes.      < from: CT Angio Heart and Coronaries w/ IV Cont (04.29.23 @ 11:55) >  1.  Limited study due to decreased signal to noise ratio and poor   contrast opacification.  2.  Nonobstructive CAD is detected in the coronary arteries visualized.  3.  The calculated Agatston score is zero.     < from: CT Angio Heart and Coronaries w/ IV Cont (04.29.23 @ 11:55) >  The LAD coronary artery is angiographically normal.  Distal segment is   not well visualized.  The diagonal branch is not well visualized.    The LCX coronary artery has a mild (25-49%) noncalcified plaque in the proximal segment  The obtuse marginal (OM) branch is not well visualized.  The RCA is angiographically normal.  The right posterior descending artery (PDA) and the right posterolateral (RPL) branch are angiographically normal. < end of copied text >

## 2023-05-12 ENCOUNTER — APPOINTMENT (OUTPATIENT)
Dept: OTOLARYNGOLOGY | Facility: CLINIC | Age: 53
End: 2023-05-12

## 2023-05-14 NOTE — HISTORY OF PRESENT ILLNESS
[FreeTextEntry1] : Ms. REYES - pronounced "kateej"\par \par ***UPDATE:5/9/2023***\par Ms Reyes is here today for a scheduled follow up office visit\par Although Ms Mohan feel 'Clearer" on Lacosamide She used to report "brain fog" prior to Lacosamide she still reports smelling burning or now foul smells\par Lsst week felt "numbness" left side of head and same feeling of right leg lasted 1-2 minutes\par Reports dizziness intermittently not daily she will try to find somewhere to sit so that she does not fall\par \par Lacosamide 50mg BD\par \par *** 07/15/2022  ***\par Ms. REYES returns for follow-up.  She was evaluated in epilepsy monitoring unit at the beginning of June.  That monitoring revealed no epileptiform abnormalities over the course of 48 hours.  She was found to have focal slowing independently in both temporal regions, which represented a progression from prior report showing unilateral focal slowing.  She was started on lacosamide 50 mg twice a day on the possibility that the olfactory sensations she has been experiencing were due to seizures.  Even before starting lacosamide, these olfactory experiences had been rare.  She estimates they have happened twice in the 6 weeks since discharge, so it is not clear whether lacosamide has made a difference. Ms. REYES is very anxious about the change in her EEG, the additional slowing that was noted.  She is anxious that her symptoms represent a dementia or seizures.  At discharge from EMU, she was recommended to PET imaging to rule out dementia and referred to neuropsychological testing for the same reason.\par \par *** 05/16/2022  ***\par Ms. REYES reports that several months ago she began to experience smell of burning cigarettes intermittently.  The first occasion, she noted smell like burning leaves outside, and smell persisted for most of the day, and smell would follow her when in car or at work.  Now it is not occurring as often - 1-2 x per week.  Ms. REYES thinks that she may be triggered by someone smoking near her - then had persistent smoke smell following day.  sometimes smell is triggered by foul smell, but at other times she may get the smell without provoking.  Smell is mostly the same - described as cigarette smell.  \par No clear episodes of lost time - may be forgetful of tasks to do - but has not been told of conversations that she does not remember. No tongue biting or urinary incontinence. \par \par Ms. REYES was previously seen by Dr. Acharya - MRI essentially negative - she has incidental pituitary cyst.  Ms. REYES had prior EEG testing in 2015 that showed intermittent left temporal slowing.  She underwent repeat ambulatory EEG testing in April 2022 that noted marked left temporal slowing, sometimes rhythmical left temporal delta, and infrequent right temporal intermittent slowing.  During this ambulatory recording, she did not have her characteristic olfactory event.  She initially saw an ENT physician and had a work-up including CT of the sinuses that was unrevealing.  She tried course of steroids and nasal sprays are also not helpful.\par \par *** 5/11/2022 from Dr. Acharya's Notes *** \par 52-year-old woman who is here for initial consultation of smelling cigarette smell before Christmas. Patient states that it has been intermittent and she has seen ENT. Work-up including CT of the sinuses along with course of steroids and nasal sprays have failed to alleviate her symptoms. Patient has no fevers and no sick contacts. Patient has no exposure to COVID.\par \par Interval history: Discussed results of MRI of the brain with the patient. Patient has no history of pituitary issues. Patient sees a endocrinologist in the past for hypothyroidism. The olfactory bulb was not evaluated even though the question on the MRI order sheet specified. I have emailed neuroradiology to make sure that this is performed when she returns for MRI of the pituitary gland. Patient states that she tested negative for COVID swab. Patient has a EEG that is still pending.\par \par Interval history: Since her last visit patient was noted to have slowing in the temporal region. Patient's MRI of the brain shows no lesions in the temporal lobes. Patient has upcoming appointment with epilepsy to help evaluate the situation whether patient needs longer EEG monitoring versus confirmation that this is incidental finding and should be left alone. Patient states that her cigarette smells are now intermittent and faint. Patient's MRI of the brain shows hypo enhancement that is indicative of microadenoma versus cyst. Endocrinologist has seen the patient and had sent off blood work. Patient will return to endocrinology for repeat MRI in September. \par

## 2023-05-14 NOTE — ASSESSMENT
[FreeTextEntry1] : Ms. REYES is a 53-year-old woman who was initially evaluated by Dr. Spence of the epilepsy service for complaint of "brain fog" and 2015. At that time, she had an EEG that showed intermittent slowing in the left temporal lobe, a nonspecific finding. No further testing was done. She now presents complaining of recurrent episodes of "burnt smell like cigarettes". This smell is somewhat stereotyped, raising the possibility of "uncinate fits" however, the episodes may last several hours, not typical of a seizure. Repeat ambulatory EEG, has detected extremely frequent left temporal slowing, and rare right temporal slowing. At time, the left temporal slowing appears somewhat rhythmical though does not clearly evolve.  EMU evaluation confirmed left temporal slowing along with less extensive right temporal involvement.  PET scan was done recently, results not yet available.  Neuropsychological testing is scheduled for January 2023.\par \par Plan:\par 1.  Continue lacosamide 50 mg twice a day\par 2.48 hr AEEG (on lacosamide)\par 3. EKG scheduled for tomorrow\par 4. reviewed seizure triggers\par 3.  Return for follow-up  after 48hr AEEG (can be TEB)\par \par

## 2023-05-14 NOTE — PHYSICAL EXAM
4 per cent otc lidocaine patch daily  
Meds sent over.   
Patient's wife would like a prescription for Lidocaine patch. Please send to [facility] in [place].  
Pts wife said they would like to get these prescribed because they will be cheaper/covered. She states Rahul needs these for his back pain. They had been using the tens unit but can't anymore because of his seizures. Lavern wants to know if an appointment should be made for evaluation so they can be prescribed then.   
Send as prescription then  
[FreeTextEntry1] : Alert and oriented x 3, speech fluent, names easily, follows requests, good recall for recent and remote events.\par EOM full without sustained nystagmus, PERRL, face symmetrical, no dysarthria\par Motor - symmetric strength. normal rapid-alternating movements.\par Sensory - intact LT bilaterally\par Coord - no tremor, ataxia\par Gait - stands without difficulty, normal gait.

## 2023-05-15 ENCOUNTER — NON-APPOINTMENT (OUTPATIENT)
Age: 53
End: 2023-05-15

## 2023-05-16 ENCOUNTER — NON-APPOINTMENT (OUTPATIENT)
Age: 53
End: 2023-05-16

## 2023-05-16 ENCOUNTER — OUTPATIENT (OUTPATIENT)
Dept: OUTPATIENT SERVICES | Facility: HOSPITAL | Age: 53
LOS: 1 days | End: 2023-05-16
Payer: COMMERCIAL

## 2023-05-16 ENCOUNTER — APPOINTMENT (OUTPATIENT)
Dept: ULTRASOUND IMAGING | Facility: IMAGING CENTER | Age: 53
End: 2023-05-16
Payer: COMMERCIAL

## 2023-05-16 ENCOUNTER — EMERGENCY (EMERGENCY)
Facility: HOSPITAL | Age: 53
LOS: 1 days | Discharge: HOME | End: 2023-05-16
Attending: EMERGENCY MEDICINE
Payer: COMMERCIAL

## 2023-05-16 ENCOUNTER — APPOINTMENT (OUTPATIENT)
Dept: INTERNAL MEDICINE | Facility: CLINIC | Age: 53
End: 2023-05-16
Payer: COMMERCIAL

## 2023-05-16 ENCOUNTER — APPOINTMENT (OUTPATIENT)
Dept: RHEUMATOLOGY | Facility: CLINIC | Age: 53
End: 2023-05-16
Payer: COMMERCIAL

## 2023-05-16 ENCOUNTER — TRANSCRIPTION ENCOUNTER (OUTPATIENT)
Age: 53
End: 2023-05-16

## 2023-05-16 VITALS
TEMPERATURE: 98.4 F | SYSTOLIC BLOOD PRESSURE: 110 MMHG | WEIGHT: 199 LBS | HEART RATE: 79 BPM | HEIGHT: 61 IN | OXYGEN SATURATION: 98 % | DIASTOLIC BLOOD PRESSURE: 60 MMHG | BODY MASS INDEX: 37.57 KG/M2

## 2023-05-16 VITALS
BODY MASS INDEX: 37 KG/M2 | DIASTOLIC BLOOD PRESSURE: 79 MMHG | WEIGHT: 196 LBS | HEIGHT: 61 IN | HEART RATE: 74 BPM | SYSTOLIC BLOOD PRESSURE: 129 MMHG | OXYGEN SATURATION: 99 % | TEMPERATURE: 98 F

## 2023-05-16 VITALS
WEIGHT: 194.01 LBS | RESPIRATION RATE: 18 BRPM | DIASTOLIC BLOOD PRESSURE: 74 MMHG | HEART RATE: 88 BPM | OXYGEN SATURATION: 95 % | SYSTOLIC BLOOD PRESSURE: 127 MMHG | TEMPERATURE: 98 F | HEIGHT: 61 IN

## 2023-05-16 DIAGNOSIS — Z90.49 ACQUIRED ABSENCE OF OTHER SPECIFIED PARTS OF DIGESTIVE TRACT: Chronic | ICD-10-CM

## 2023-05-16 DIAGNOSIS — T14.8XXA OTHER INJURY OF UNSPECIFIED BODY REGION, INITIAL ENCOUNTER: Chronic | ICD-10-CM

## 2023-05-16 DIAGNOSIS — M25.531 PAIN IN RIGHT WRIST: ICD-10-CM

## 2023-05-16 DIAGNOSIS — Z98.890 OTHER SPECIFIED POSTPROCEDURAL STATES: Chronic | ICD-10-CM

## 2023-05-16 PROCEDURE — 93000 ELECTROCARDIOGRAM COMPLETE: CPT

## 2023-05-16 PROCEDURE — 93931 UPPER EXTREMITY STUDY: CPT | Mod: 26,RT

## 2023-05-16 PROCEDURE — 99213 OFFICE O/P EST LOW 20 MIN: CPT

## 2023-05-16 PROCEDURE — 99285 EMERGENCY DEPT VISIT HI MDM: CPT

## 2023-05-16 PROCEDURE — 99214 OFFICE O/P EST MOD 30 MIN: CPT | Mod: 25

## 2023-05-16 PROCEDURE — 93931 UPPER EXTREMITY STUDY: CPT

## 2023-05-16 RX ORDER — COLCHICINE 0.6 MG/1
0.6 TABLET ORAL
Qty: 9 | Refills: 0 | Status: DISCONTINUED | COMMUNITY
Start: 2023-05-10 | End: 2023-05-16

## 2023-05-16 RX ORDER — MELOXICAM 15 MG/1
15 TABLET ORAL
Qty: 15 | Refills: 0 | Status: DISCONTINUED | COMMUNITY
Start: 2022-03-03 | End: 2023-05-16

## 2023-05-16 RX ORDER — NAPROXEN 500 MG/1
500 TABLET ORAL
Qty: 60 | Refills: 0 | Status: DISCONTINUED | COMMUNITY
Start: 2023-05-10 | End: 2023-05-16

## 2023-05-16 NOTE — ED PROVIDER NOTE - NSFOLLOWUPINSTRUCTIONS_ED_ALL_ED_FT
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 Please take Aspirin 81 mg oral every day once. Follow up with Vascular surgery at the number below. Return to the ER for worsening symptoms, numbness, tingling, pain.

## 2023-05-16 NOTE — ED PROVIDER NOTE - OBJECTIVE STATEMENT
52 yo F with PMHx of HTN, HLD, hypothyroid, anxiety, MARIAN presents to ED complaining of pain to her right forearm with finding of radial artery occlusion on ultrasound today.  Patient had a cardiac cath on 5/11 reports that she had very mild discomfort after procedure.  2 days ago she started having worsened pain in her right forearm near her radial access site for her cath.  She reports that the pain was getting progressively worse and she had a "strange sensation in her hand "so called her cardiologist who advised to come to the office.  Patient had an ultrasound performed today which showed thrombus throughout the radial artery.  She denies numbness, tingling, weakness, cold sensation to her hand.

## 2023-05-16 NOTE — ED CLERICAL - NS ED CLERK NOTE PRE-ARRIVAL INFORMATION; ADDITIONAL PRE-ARRIVAL INFORMATION
CC/Reason For referral: thrombocystic radial artery   Preferred Consultant(if applicable): vascular surgery  Who admits for you (if needed):   Do you have documents you would like to fax over?no   Would you still like to speak to an ED attending? yes [phone]

## 2023-05-16 NOTE — HEALTH RISK ASSESSMENT
[0] : 2) Feeling down, depressed, or hopeless: Not at all (0) [PHQ-2 Negative - No further assessment needed] : PHQ-2 Negative - No further assessment needed [Never] : Never [ATS0Ipomg] : 0

## 2023-05-16 NOTE — HISTORY OF PRESENT ILLNESS
[FreeTextEntry1] : R forearm/wrist pain [de-identified] : WARNER REYES 52 y/o F with strong family history of MI and PMHx of HLD presents to the office today for R wrist/forearm pain after cardiac cath on Thursday. Pt c/o pain, bruising and swelling at in R forearm and wrist which she says has been getting worse since the procedure. Denies f/c, numbness/tingling, weakness in RUE or R hand. \par \par Denies chest pain, sob, martínez, dizziness, diaphoresis, palpitations, LE swelling, orthopnea, syncope, n/v, headache.\par Cath revealed nonobstructive CAD and stents were not placed, also thought to have spasm, thus pt medically managed  and amlo was increased to 5mg from 2.5

## 2023-05-16 NOTE — REVIEW OF SYSTEMS
[Chest Pain] : chest pain [Negative] : Heme/Lymph [Palpitations] : no palpitations [Leg Claudication] : no leg claudication [Lower Ext Edema] : no lower extremity edema [Orthopnea] : no orthopnea [Paroxysmal Nocturnal Dyspnea] : no paroxysmal nocturnal dyspnea [FreeTextEntry9] : r ofre arm pain

## 2023-05-16 NOTE — ED PROVIDER NOTE - PATIENT PORTAL LINK FT
You can access the FollowMyHealth Patient Portal offered by [facility] by registering at the following website: [email or web address]. By joining [employer]’s FollowMyHealth portal, you will also be able to view your health information using other applications (apps) compatible with our system.

## 2023-05-16 NOTE — ED PROVIDER NOTE - PHYSICAL EXAMINATION
CONSTITUTIONAL: Patient is awake, alert and oriented x 3. Patient is well appearing and in no acute distress  HEAD: NCAT  NECK: supple, FROM  LUNGS: CTA b/l, no wheezing or rales   HEART: RRR.+S1S2 no murmurs  ABDOMEN: Soft, non-distended, nttp, no rebound or guarding  EXTREMITY: no edema or calf tenderness b/l, FROM upper and lower ext b/l. There is healed puncture wound to the R radial side volar wrist with ecchymosis overlying the area. 2+ palpable R radial pulse RUE is warm and perfused with cap refill <2 sec. Normal gross sensation throughout RUE   SKIN: with no rash or lesions  NEURO: No focal deficits

## 2023-05-16 NOTE — ED PROVIDER NOTE - PROGRESS NOTE DETAILS
Pt signed out me by Dr. Pino pending vascular consult. States patient can be discharged home with Aspirin 81mg. Pt currently not on any AC and discussed plan and outpatient follow up and return precautions. [facility]UJ[facility] Vascular saw patient - patient will have outpatient follow up with Vascular. Recommend ASA 81 qd.     Mari Marino MD, PGY2

## 2023-05-16 NOTE — ED PROVIDER NOTE - ATTENDING APP SHARED VISIT CONTRIBUTION OF CARE
Attending MD Pino:   I personally have seen and examined this patient.  Physician assistant note reviewed and agree on plan of care and except where noted.  See below for details.     Seen in [place] [facility]    53F with PMH/PSH including HTN, HLD, hypothyroid, anxiety, MARIAN presents to the ED with R radial artery occlusion on outpatient ultrasound.  Reports had cardiac cath on 5/11/15 via R radial cath and two days ago had worsened pain near site and since then has been worsening.  Reports saw cards who had US done and sent in.  Reports US showed a thrombus in radial artery.  Denies numbness, weakness or tingling in extremity.  Denies loss of  strength, loss of sensation.    Exam:   General: NAD  HENT: head NCAT, airway patent  Eyes: anicteric, no conjunctival injection   Lungs: lungs CTAB with good inspiratory effort, no wheezing, no rhonchi, no rales  Cardiac: +S1S2, no obvious m/r/g  GI: abdomen soft with +BS, NT, ND  : no CVAT  MSK: ranging neck and extremities freely, healed puncture wound to R distal forearm at ventral aspect of wrist, radial side, +2 radial, cap refill <2s, +ecchymosis to distal forearm/wrist ventral aspect, FROM at hand/wrist/elbow  Neuro: moving all extremities spontaneously, nonfocal  Psych: normal mood and affect     A/P: 53F with R radial artery thrombus on outpatient imaging, will obtain labs in event meds need to be started and consult vascular, will await

## 2023-05-16 NOTE — ED ADULT TRIAGE NOTE - ESI TRIAGE ACUITY LEVEL, MLM
"[facility] [[id number]]  Chief Complaint   Patient presents with     Consult     new finger ulcer     Initial /69   Pulse 99   Ht 4' 7.91\" (142 cm)   Wt 95 lb 3.8 oz (43.2 kg)   BMI 21.42 kg/m   Estimated body mass index is 21.42 kg/m  as calculated from the following:    Height as of this encounter: 4' 7.91\" (142 cm).    Weight as of this encounter: 95 lb 3.8 oz (43.2 kg).  Medication Reconciliation: complete  " 3

## 2023-05-16 NOTE — PHYSICAL EXAM
[No Acute Distress] : no acute distress [Well Nourished] : well nourished [Well Developed] : well developed [Well-Appearing] : well-appearing [Normal Sclera/Conjunctiva] : normal sclera/conjunctiva [Normal Outer Ear/Nose] : the outer ears and nose were normal in appearance [Normal Oropharynx] : the oropharynx was normal [No JVD] : no jugular venous distention [No Lymphadenopathy] : no lymphadenopathy [Supple] : supple [No Respiratory Distress] : no respiratory distress  [No Accessory Muscle Use] : no accessory muscle use [Clear to Auscultation] : lungs were clear to auscultation bilaterally [Normal Rate] : normal rate  [Regular Rhythm] : with a regular rhythm [Normal S1, S2] : normal S1 and S2 [No Murmur] : no murmur heard [No Carotid Bruits] : no carotid bruits [No Varicosities] : no varicosities [Pedal Pulses Present] : the pedal pulses are present [No Edema] : there was no peripheral edema [No Extremity Clubbing/Cyanosis] : no extremity clubbing/cyanosis [Soft] : abdomen soft [Non Tender] : non-tender [Non-distended] : non-distended [Normal Bowel Sounds] : normal bowel sounds [Normal Anterior Cervical Nodes] : no anterior cervical lymphadenopathy [No CVA Tenderness] : no CVA  tenderness [No Spinal Tenderness] : no spinal tenderness [No Joint Swelling] : no joint swelling [Grossly Normal Strength/Tone] : grossly normal strength/tone [Coordination Grossly Intact] : coordination grossly intact [No Focal Deficits] : no focal deficits [Normal Gait] : normal gait [Normal Affect] : the affect was normal [Alert and Oriented x3] : oriented to person, place, and time [de-identified] : R radial pulse 1+,  ulnar pulse 2+, hand/fingers warm well perfused mild, full rom of extension/flexion of elbow, wrist,, hand grid. Mild tenderness in R forearm with some eccymosis [de-identified] : mild eccymosis in R forearm

## 2023-05-16 NOTE — CHART NOTE - NSCHARTNOTEFT_GEN_A_CORE
Sent in by Dr Hebert Valenzuela for Thrombosis of the radial artery throughout its course (seen on US) post cardiac cath and recommend vascular surgery evaluation.

## 2023-05-17 ENCOUNTER — TRANSCRIPTION ENCOUNTER (OUTPATIENT)
Age: 53
End: 2023-05-17

## 2023-05-17 ENCOUNTER — NON-APPOINTMENT (OUTPATIENT)
Age: 53
End: 2023-05-17

## 2023-05-17 ENCOUNTER — APPOINTMENT (OUTPATIENT)
Dept: CARDIOLOGY | Facility: CLINIC | Age: 53
End: 2023-05-17
Payer: COMMERCIAL

## 2023-05-17 VITALS
DIASTOLIC BLOOD PRESSURE: 84 MMHG | SYSTOLIC BLOOD PRESSURE: 134 MMHG | HEART RATE: 71 BPM | WEIGHT: 199 LBS | OXYGEN SATURATION: 98 % | HEIGHT: 61 IN | BODY MASS INDEX: 37.57 KG/M2

## 2023-05-17 VITALS
RESPIRATION RATE: 18 BRPM | DIASTOLIC BLOOD PRESSURE: 80 MMHG | TEMPERATURE: 98 F | HEART RATE: 70 BPM | OXYGEN SATURATION: 98 % | SYSTOLIC BLOOD PRESSURE: 133 MMHG

## 2023-05-17 LAB
ALBUMIN SERPL ELPH-MCNC: 4 G/DL — SIGNIFICANT CHANGE UP (ref 3.3–5)
ALP SERPL-CCNC: 50 U/L — SIGNIFICANT CHANGE UP (ref 40–120)
ALT FLD-CCNC: 17 U/L — SIGNIFICANT CHANGE UP (ref 10–45)
ANION GAP SERPL CALC-SCNC: 16 MMOL/L — SIGNIFICANT CHANGE UP (ref 5–17)
APTT BLD: 26.1 SEC — LOW (ref 27.5–35.5)
AST SERPL-CCNC: 19 U/L — SIGNIFICANT CHANGE UP (ref 10–40)
BASOPHILS # BLD AUTO: 0.05 K/UL — SIGNIFICANT CHANGE UP (ref 0–0.2)
BASOPHILS NFR BLD AUTO: 0.7 % — SIGNIFICANT CHANGE UP (ref 0–2)
BILIRUB SERPL-MCNC: 0.3 MG/DL — SIGNIFICANT CHANGE UP (ref 0.2–1.2)
BUN SERPL-MCNC: 20 MG/DL — SIGNIFICANT CHANGE UP (ref 7–23)
CALCIUM SERPL-MCNC: 9.2 MG/DL — SIGNIFICANT CHANGE UP (ref 8.4–10.5)
CHLORIDE SERPL-SCNC: 107 MMOL/L — SIGNIFICANT CHANGE UP (ref 96–108)
CO2 SERPL-SCNC: 17 MMOL/L — LOW (ref 22–31)
CREAT SERPL-MCNC: 0.65 MG/DL — SIGNIFICANT CHANGE UP (ref 0.5–1.3)
EGFR: 104 ML/MIN/1.73M2 — SIGNIFICANT CHANGE UP
EOSINOPHIL # BLD AUTO: 0.24 K/UL — SIGNIFICANT CHANGE UP (ref 0–0.5)
EOSINOPHIL NFR BLD AUTO: 3.4 % — SIGNIFICANT CHANGE UP (ref 0–6)
GLUCOSE SERPL-MCNC: 100 MG/DL — HIGH (ref 70–99)
HCT VFR BLD CALC: 40.2 % — SIGNIFICANT CHANGE UP (ref 34.5–45)
HGB BLD-MCNC: 13.2 G/DL — SIGNIFICANT CHANGE UP (ref 11.5–15.5)
IMM GRANULOCYTES NFR BLD AUTO: 0.3 % — SIGNIFICANT CHANGE UP (ref 0–0.9)
INR BLD: 0.88 RATIO — SIGNIFICANT CHANGE UP (ref 0.88–1.16)
LYMPHOCYTES # BLD AUTO: 3.41 K/UL — HIGH (ref 1–3.3)
LYMPHOCYTES # BLD AUTO: 48.7 % — HIGH (ref 13–44)
MCHC RBC-ENTMCNC: 31.5 PG — SIGNIFICANT CHANGE UP (ref 27–34)
MCHC RBC-ENTMCNC: 32.8 GM/DL — SIGNIFICANT CHANGE UP (ref 32–36)
MCV RBC AUTO: 95.9 FL — SIGNIFICANT CHANGE UP (ref 80–100)
MONOCYTES # BLD AUTO: 0.7 K/UL — SIGNIFICANT CHANGE UP (ref 0–0.9)
MONOCYTES NFR BLD AUTO: 10 % — SIGNIFICANT CHANGE UP (ref 2–14)
NEUTROPHILS # BLD AUTO: 2.58 K/UL — SIGNIFICANT CHANGE UP (ref 1.8–7.4)
NEUTROPHILS NFR BLD AUTO: 36.9 % — LOW (ref 43–77)
NRBC # BLD: 0 /100 WBCS — SIGNIFICANT CHANGE UP (ref 0–0)
PLATELET # BLD AUTO: 279 K/UL — SIGNIFICANT CHANGE UP (ref 150–400)
POTASSIUM SERPL-MCNC: 4.5 MMOL/L — SIGNIFICANT CHANGE UP (ref 3.5–5.3)
POTASSIUM SERPL-SCNC: 4.5 MMOL/L — SIGNIFICANT CHANGE UP (ref 3.5–5.3)
PROT SERPL-MCNC: 6.8 G/DL — SIGNIFICANT CHANGE UP (ref 6–8.3)
PROTHROM AB SERPL-ACNC: 10.1 SEC — LOW (ref 10.5–13.4)
RBC # BLD: 4.19 M/UL — SIGNIFICANT CHANGE UP (ref 3.8–5.2)
RBC # FLD: 12.8 % — SIGNIFICANT CHANGE UP (ref 10.3–14.5)
SODIUM SERPL-SCNC: 140 MMOL/L — SIGNIFICANT CHANGE UP (ref 135–145)
WBC # BLD: 7 K/UL — SIGNIFICANT CHANGE UP (ref 3.8–10.5)
WBC # FLD AUTO: 7 K/UL — SIGNIFICANT CHANGE UP (ref 3.8–10.5)

## 2023-05-17 PROCEDURE — 99283 EMERGENCY DEPT VISIT LOW MDM: CPT

## 2023-05-17 PROCEDURE — 85025 COMPLETE CBC W/AUTO DIFF WBC: CPT

## 2023-05-17 PROCEDURE — 99284 EMERGENCY DEPT VISIT MOD MDM: CPT

## 2023-05-17 PROCEDURE — 99215 OFFICE O/P EST HI 40 MIN: CPT

## 2023-05-17 PROCEDURE — 85730 THROMBOPLASTIN TIME PARTIAL: CPT

## 2023-05-17 PROCEDURE — 93000 ELECTROCARDIOGRAM COMPLETE: CPT

## 2023-05-17 PROCEDURE — 80053 COMPREHEN METABOLIC PANEL: CPT

## 2023-05-17 PROCEDURE — 36415 COLL VENOUS BLD VENIPUNCTURE: CPT

## 2023-05-17 PROCEDURE — 85610 PROTHROMBIN TIME: CPT

## 2023-05-17 RX ORDER — ASPIRIN ENTERIC COATED TABLETS 81 MG 81 MG/1
81 TABLET, DELAYED RELEASE ORAL
Refills: 0 | Status: ACTIVE | COMMUNITY

## 2023-05-17 RX ORDER — DIPHENHYDRAMINE HCL 50 MG/1
50 CAPSULE ORAL
Qty: 1 | Refills: 0 | Status: DISCONTINUED | COMMUNITY
Start: 2023-05-10 | End: 2023-05-17

## 2023-05-17 RX ORDER — PREDNISONE 50 MG/1
50 TABLET ORAL
Qty: 3 | Refills: 0 | Status: DISCONTINUED | COMMUNITY
Start: 2023-05-10 | End: 2023-05-17

## 2023-05-17 RX ORDER — METHYLPREDNISOLONE 4 MG/1
4 TABLET ORAL
Qty: 1 | Refills: 0 | Status: DISCONTINUED | COMMUNITY
Start: 2023-05-05 | End: 2023-05-17

## 2023-05-17 RX ORDER — ERGOCALCIFEROL 1.25 MG/1
1.25 MG CAPSULE, LIQUID FILLED ORAL
Qty: 12 | Refills: 0 | Status: DISCONTINUED | COMMUNITY
Start: 2023-03-08 | End: 2023-05-17

## 2023-05-17 RX ORDER — OMEPRAZOLE 40 MG/1
40 CAPSULE, DELAYED RELEASE ORAL
Qty: 30 | Refills: 1 | Status: DISCONTINUED | COMMUNITY
Start: 2022-03-03 | End: 2023-05-17

## 2023-05-17 RX ORDER — CAMPHOR 0.45 %
25 GEL (GRAM) TOPICAL
Qty: 2 | Refills: 0 | Status: DISCONTINUED | COMMUNITY
Start: 2021-04-05 | End: 2023-05-17

## 2023-05-17 NOTE — CONSULT NOTE ADULT - ATTENDING COMMENTS
Patient seen/examined. Presents with right wrist ecchymosis and pain s/p cardiac catheterization (patient reports no intervention performed, has mild CAD). Arterial duplex shows occluded radial artery, patent ulnar. On exam, ulnar pulse palpable, palmar arch signal present, no sensorimotor deficits. No indication for additional testing/intervention. Recommend Aspirin and conservative management.

## 2023-05-17 NOTE — CONSULT NOTE ADULT - ASSESSMENT
53F with PMHx of HTN, HLD, hypothyroid, anxiety, MARIAN presents to ED complaining of pain to her right forearm with finding of radial artery occlusion on ultrasound today. Patient had a cardiac cath on 5/11 for chest pain, no intervention. Reports mild discomfort after procedure, which worsened over the next few days. Her cardiologist saw her. Ultrasound performed today which showed thrombus throughout the radial artery.  She denies numbness, tingling, weakness, cold sensation to her hand. Is not on AC.     PLAN  - No vascular surgery intervention  - OTC pain meds PRN  - Recommend ASA 81 QD    Seen and discussed with Dr. Taylor    Vascular Surgery  [id number]

## 2023-05-17 NOTE — ED ADULT NURSE NOTE - NSFALLUNIVINTERV_ED_ALL_ED
Bed/Stretcher in lowest position, wheels locked, appropriate side rails in place/Call bell, personal items and telephone in reach/Instruct patient to call for assistance before getting out of bed/chair/stretcher/Non-slip footwear applied when patient is off stretcher/[place] to call system/Physically safe environment - no spills, clutter or unnecessary equipment/Purposeful proactive rounding/Room/bathroom lighting operational, light cord in reach

## 2023-05-17 NOTE — ED ADULT NURSE NOTE - OBJECTIVE STATEMENT
53y female PMH HTN, HLD, MARIAN to the ED from doctors office c/o of DVT. Pt reports to the ED after having an ultrasound outpatient which revealed a right radial artery occlusion. Pt reports that pt recently had a cardiac catheretization on 5/11. Pt began developing discomfort in the right arm as well as a "sensation" in the right arm- denies numbness/tingling. Pt has been getting worse for a few days. Pt endorses chest pain and SOB but states these symptoms are unchanged from before the catheretization. Stretcher in lowest position and locked, appropriate side rails in place, room cleared of clutter and safety hazards, blankets given for comfort

## 2023-05-17 NOTE — CONSULT NOTE ADULT - SUBJECTIVE AND OBJECTIVE BOX
VASCULAR SURGERY CONSULT  53F with PMHx of HTN, HLD, hypothyroid, anxiety, MARIAN presents to ED complaining of pain to her right forearm with finding of radial artery occlusion on ultrasound today. Patient had a cardiac cath on 5/11 for chest pain, no intervention. Reports mild discomfort after procedure, which worsened over the next few days. Her cardiologist saw her. Ultrasound performed today which showed thrombus throughout the radial artery.  She denies numbness, tingling, weakness, cold sensation to her hand. Is not on AC.       PAST MEDICAL & SURGICAL HISTORY:  Irritable Bowel Syndrome      h/o Heart Palpitations      Diverticulitis  last hospitalized 9.19      Hypothyroid      Sleep apnea  nasal mask- settings unknown      Hypertension      C Section - x 1 - 1994      h/o  Endoscopy      History of Colonoscopy      h/o dental implant      H/O vaginal surgery  vaginal mesh      Torn ligament  left thumb July 2020      History of colectomy          MEDICATIONS  (STANDING):    MEDICATIONS  (PRN):      Allergies    Zosyn (Urticaria)  iodine (Hives)    Intolerances        SOCIAL HISTORY:    FAMILY HISTORY:  Family history of colon cancer (Grandparent)    Family history of early CAD  Father-45, Paternal Aunt Maternal Uncle    Family history of early CAD (Father, Aunt, Uncle)      Physical Exam:  General: NAD, resting comfortably  HEENT: NC/AT, EOMI, normal hearing  Pulmonary: normal resp effort, patent airway  Cardiovascular: well perfused  RUE: palpable brachial and ulnar pulses, arch signal, radial signal distal to puncture site, ecchymosis around puncture site, generalize mild tenderness    Vital Signs Last 24 Hrs  T(C): 36.5 (17 May 2023 01:20), Max: 36.9 (16 May 2023 20:27)  T(F): 97.7 (17 May 2023 01:20), Max: 98.5 (16 May 2023 20:27)  HR: 66 (17 May 2023 01:20) (66 - 88)  BP: 123/77 (17 May 2023 01:20) (123/77 - 127/74)  BP(mean): --  RR: 18 (17 May 2023 01:20) (18 - 18)  SpO2: 97% (17 May 2023 01:20) (95% - 97%)    Parameters below as of 17 May 2023 01:20  Patient On (Oxygen Delivery Method): room air      LABS:                        13.2   7.00  )-----------( 279      ( 17 May 2023 00:18 )             40.2     05-17    140  |  107  |  20  ----------------------------<  100<H>  4.5   |  17<L>  |  0.65    Ca    9.2      17 May 2023 00:18    TPro  6.8  /  Alb  4.0  /  TBili  0.3  /  DBili  x   /  AST  19  /  ALT  17  /  AlkPhos  50  05-17    PT/INR - ( 17 May 2023 00:18 )   PT: 10.1 sec;   INR: 0.88 ratio         PTT - ( 17 May 2023 00:18 )  PTT:26.1 sec    CAPILLARY BLOOD GLUCOSE        LIVER FUNCTIONS - ( 17 May 2023 00:18 )  Alb: 4.0 g/dL / Pro: 6.8 g/dL / ALK PHOS: 50 U/L / ALT: 17 U/L / AST: 19 U/L / GGT: x           RADIOLOGY & ADDITIONAL STUDIES:  < from: US Duplex Arterial Upper Ext Ltd, Right (05.16.23 @ 18:42) >  EXAM: [id number] - US DPLX [facility] EXT ARTS LTD RT  - ORDERED BY: LISA ESTES      PROCEDURE DATE:  05/16/2023           INTERPRETATION:  Clinical data: 53-year-old female status post recent   cardiac catheterization with right radial pain.    COMPARISON: None.    TECHNIQUE: Sonography of the right upper extremity arteries with color   flow Doppler and duplex interrogation    FINDINGS: The right axillary, brachial, and ulnar arteries are patent   with triphasic spectral tracing. The right radial artery appears   thrombosed throughout its course.    Impression:    Thrombosis of the radial artery throughout its course.    Findings discussed with Dr. Estes on 05/16/2023 at 6:55 PM  with read   back.    < end of copied text >

## 2023-05-18 ENCOUNTER — APPOINTMENT (OUTPATIENT)
Dept: VASCULAR SURGERY | Facility: CLINIC | Age: 53
End: 2023-05-18
Payer: COMMERCIAL

## 2023-05-18 VITALS
HEIGHT: 61 IN | SYSTOLIC BLOOD PRESSURE: 130 MMHG | HEART RATE: 71 BPM | DIASTOLIC BLOOD PRESSURE: 86 MMHG | WEIGHT: 190 LBS | BODY MASS INDEX: 35.87 KG/M2 | TEMPERATURE: 98 F

## 2023-05-18 LAB
CK SERPL-CCNC: 139 U/L
CRP SERPL-MCNC: <3 MG/L
ENA JO1 AB SER IA-ACNC: <0.2 AL
ERYTHROCYTE [SEDIMENTATION RATE] IN BLOOD BY WESTERGREN METHOD: 18 MM/HR
MAGNESIUM SERPL-MCNC: 2.4 MG/DL
MYOGLOBIN SERPL-MCNC: 21 NG/ML

## 2023-05-18 PROCEDURE — 99204 OFFICE O/P NEW MOD 45 MIN: CPT

## 2023-05-22 LAB — HMGCR ANTIBODY, IGG: <3 UNITS

## 2023-05-22 NOTE — ASSESSMENT
[FreeTextEntry1] : 53-year-old female with bilateral shoulder pain and neck pain \par = Low suspicion for inflammatory arthritis\par \par Plan:\par -Physical therapy referral \par -cyclobenzaprine trial\par -recommend to see orthopedics if symptoms persist despite PT

## 2023-05-22 NOTE — HISTORY OF PRESENT ILLNESS
[FreeTextEntry1] : Interval History:\par 5/16/2023\par Since 3/2023 Soreness in neck, arms, leg heaviness, more fatigue\par Statin stopped, did not help \par Chest pains, ACS ruled out, angiogram 50%\par SOB\par Meloxicam has not helped.\par Medrol dose pack did not help with chest pain or joint pain\par Colchicine has not helped\par Tylenol takes the edge off\par

## 2023-05-22 NOTE — PHYSICAL EXAM
[General Appearance - Alert] : alert [General Appearance - In No Acute Distress] : in no acute distress [General Appearance - Well Nourished] : well nourished [Sclera] : the sclera and conjunctiva were normal [Auscultation Breath Sounds / Voice Sounds] : lungs were clear to auscultation bilaterally [Heart Sounds] : normal S1 and S2 [Heart Sounds Gallop] : no gallops [Cervical Lymph Nodes Enlarged Posterior Bilaterally] : posterior cervical [Cervical Lymph Nodes Enlarged Anterior Bilaterally] : anterior cervical [No Spinal Tenderness] : no spinal tenderness [Nail Clubbing] : no clubbing  or cyanosis of the fingernails [] : no rash [Sensation] : the sensory exam was normal to light touch and pinprick [Motor Exam] : the motor exam was normal [Oriented To Time, Place, And Person] : oriented to person, place, and time [FreeTextEntry1] : TTP to bilateral trochanteric bursa more pronounced on left side. TTP Over ischium possibly ischal bursitis.  DP right subacromial bursa with slightly elevated range of motion on abduction

## 2023-05-23 ENCOUNTER — APPOINTMENT (OUTPATIENT)
Dept: CARDIOLOGY | Facility: CLINIC | Age: 53
End: 2023-05-23

## 2023-05-24 ENCOUNTER — NON-APPOINTMENT (OUTPATIENT)
Age: 53
End: 2023-05-24

## 2023-05-24 ENCOUNTER — APPOINTMENT (OUTPATIENT)
Dept: INTERNAL MEDICINE | Facility: CLINIC | Age: 53
End: 2023-05-24
Payer: COMMERCIAL

## 2023-05-24 VITALS
DIASTOLIC BLOOD PRESSURE: 80 MMHG | HEIGHT: 61 IN | SYSTOLIC BLOOD PRESSURE: 126 MMHG | WEIGHT: 190 LBS | HEART RATE: 71 BPM | OXYGEN SATURATION: 98 % | BODY MASS INDEX: 35.87 KG/M2

## 2023-05-24 DIAGNOSIS — M25.531 PAIN IN RIGHT WRIST: ICD-10-CM

## 2023-05-24 DIAGNOSIS — M54.2 CERVICALGIA: ICD-10-CM

## 2023-05-24 PROCEDURE — 93000 ELECTROCARDIOGRAM COMPLETE: CPT

## 2023-05-24 PROCEDURE — 99214 OFFICE O/P EST MOD 30 MIN: CPT | Mod: 25

## 2023-05-24 NOTE — PHYSICAL EXAM
[No Acute Distress] : no acute distress [Well Nourished] : well nourished [Well Developed] : well developed [Well-Appearing] : well-appearing [Normal Sclera/Conjunctiva] : normal sclera/conjunctiva [Normal Outer Ear/Nose] : the outer ears and nose were normal in appearance [Normal Oropharynx] : the oropharynx was normal [No JVD] : no jugular venous distention [No Lymphadenopathy] : no lymphadenopathy [Supple] : supple [No Respiratory Distress] : no respiratory distress  [No Accessory Muscle Use] : no accessory muscle use [Clear to Auscultation] : lungs were clear to auscultation bilaterally [Normal Rate] : normal rate  [Regular Rhythm] : with a regular rhythm [Normal S1, S2] : normal S1 and S2 [No Murmur] : no murmur heard [No Carotid Bruits] : no carotid bruits [No Varicosities] : no varicosities [Pedal Pulses Present] : the pedal pulses are present [No Edema] : there was no peripheral edema [No Extremity Clubbing/Cyanosis] : no extremity clubbing/cyanosis [Soft] : abdomen soft [Non Tender] : non-tender [Non-distended] : non-distended [Normal Bowel Sounds] : normal bowel sounds [Normal Anterior Cervical Nodes] : no anterior cervical lymphadenopathy [No CVA Tenderness] : no CVA  tenderness [No Spinal Tenderness] : no spinal tenderness [No Joint Swelling] : no joint swelling [Grossly Normal Strength/Tone] : grossly normal strength/tone [Coordination Grossly Intact] : coordination grossly intact [No Focal Deficits] : no focal deficits [Normal Gait] : normal gait [Normal Affect] : the affect was normal [Alert and Oriented x3] : oriented to person, place, and time [de-identified] : R radial pulse 2+,  ulnar pulse 2+, hand/fingers warm well perfused mild, full rom of extension/flexion of elbow, wrist,, hand grid. Mild tenderness in R forearm with almost resolved eccymosis, nontender, no swelling [de-identified] : mild eccymosis in R forearm

## 2023-05-24 NOTE — HEALTH RISK ASSESSMENT
[0] : 2) Feeling down, depressed, or hopeless: Not at all (0) [PHQ-2 Negative - No further assessment needed] : PHQ-2 Negative - No further assessment needed [[id number]] : 0 [Never] : Never

## 2023-05-24 NOTE — HISTORY OF PRESENT ILLNESS
[FreeTextEntry1] : R forearm/wrist pain followup [de-identified] : WARNER REYES 52 y/o F with strong family history of MI and PMHx of HLD nonobstructive CAD presents to the office today here for followup on R wrist/forearm pain after having found to have R radial occlusion on US following cardiac cath about 2 weeks ago\par Pt was sent to ER, evaluated by vascular who rec aspirin 81mg daily and rec outpatient followup\par Pt says vasc Dr Min who rec no further intervention given good perfusion of hand.\par Also saw IC DR Main who rec no change in management\par Pt Says pain is not really better in forearm, uses tylenol once a day. Bruising almost resolved, Denies f/c, numbness/tingling, weakness in RUE or R hand, change of color\par Says chest pain has decreased and less intermittent since starting ranexa and amlo 5mg daily\par Denies  sob, martínez, dizziness, diaphoresis, palpitations, LE swelling, orthopnea, syncope, n/v, headache.\par

## 2023-05-25 ENCOUNTER — NON-APPOINTMENT (OUTPATIENT)
Age: 53
End: 2023-05-25

## 2023-05-31 ENCOUNTER — APPOINTMENT (OUTPATIENT)
Dept: CARDIOLOGY | Facility: CLINIC | Age: 53
End: 2023-05-31
Payer: COMMERCIAL

## 2023-05-31 VITALS
DIASTOLIC BLOOD PRESSURE: 70 MMHG | HEIGHT: 61 IN | WEIGHT: 199 LBS | OXYGEN SATURATION: 99 % | SYSTOLIC BLOOD PRESSURE: 110 MMHG | HEART RATE: 71 BPM | TEMPERATURE: 98.1 F | BODY MASS INDEX: 37.57 KG/M2

## 2023-05-31 DIAGNOSIS — M54.9 DORSALGIA, UNSPECIFIED: ICD-10-CM

## 2023-05-31 DIAGNOSIS — R07.89 OTHER CHEST PAIN: ICD-10-CM

## 2023-05-31 PROCEDURE — 99214 OFFICE O/P EST MOD 30 MIN: CPT

## 2023-05-31 NOTE — HISTORY OF PRESENT ILLNESS
[FreeTextEntry1] : This is a 54 y/o female with a PMHx of HLD nonobstructive CAD s/p recent cath non obstructive CAD presents to the office today here for follow up. pt last seenb y internist Dr Longo on 5/24 for follow up for R wrist/forearm pain after having found to have R radial occlusion on US following cardiac cath about 2 weeks ago. Pt was sent to ER, evaluated by vascular who rec aspirin 81mg daily and rec outpatient followup Pt says vasc Dr Min who rec no further intervention given good perfusion of hand. Also saw IC DR Main who rec no change in management. called for 2nd opinion with dr Cueva as per notes-- no further recommendations to continue ASA and warm compresses -  continues to reports some discomfort. getting better with Tylenol PRN\par on ranexa and amlo 5mg daily for possible cardiac vasospasm - reoprts sx are getting better\par \par pt continues to reports b/l shoulder pain. keeps her up at night. follows with rhuem work up is negative so far.- MD recommended PT and to follow up with her neurolgist Dr. Gallo \par \par Denies sob, martínez, dizziness, diaphoresis, palpitations, LE swelling, orthopnea, syncope, n/v, headache.\par

## 2023-05-31 NOTE — PHYSICAL EXAM
[Well Developed] : well developed [Well Nourished] : well nourished [No Acute Distress] : no acute distress [Normal Conjunctiva] : normal conjunctiva [Normal Venous Pressure] : normal venous pressure [No Carotid Bruit] : no carotid bruit [Normal S1, S2] : normal S1, S2 [No Murmur] : no murmur [No Rub] : no rub [No Gallop] : no gallop [Clear Lung Fields] : clear lung fields [Good Air Entry] : good air entry [No Respiratory Distress] : no respiratory distress  [Soft] : abdomen soft [Non Tender] : non-tender [No Masses/organomegaly] : no masses/organomegaly [Normal Bowel Sounds] : normal bowel sounds [Normal Gait] : normal gait [No Edema] : no edema [No Cyanosis] : no cyanosis [No Clubbing] : no clubbing [No Varicosities] : no varicosities [No Rash] : no rash [No Skin Lesions] : no skin lesions [Moves all extremities] : moves all extremities [No Focal Deficits] : no focal deficits [Normal Speech] : normal speech [Alert and Oriented] : alert and oriented [Normal memory] : normal memory [de-identified] : decreased swelling right arm an d normal pulse noted

## 2023-06-11 NOTE — PHYSICAL EXAM
[Well Developed] : well developed [Well Nourished] : well nourished [No Acute Distress] : no acute distress [Normal Conjunctiva] : normal conjunctiva [Normal Venous Pressure] : normal venous pressure [No Carotid Bruit] : no carotid bruit [Normal S1, S2] : normal S1, S2 [No Murmur] : no murmur [No Rub] : no rub [Clear Lung Fields] : clear lung fields [No Gallop] : no gallop [Good Air Entry] : good air entry [No Respiratory Distress] : no respiratory distress  [Soft] : abdomen soft [Non Tender] : non-tender [No Masses/organomegaly] : no masses/organomegaly [Normal Bowel Sounds] : normal bowel sounds [Normal Gait] : normal gait [No Edema] : no edema [No Cyanosis] : no cyanosis [No Clubbing] : no clubbing [No Varicosities] : no varicosities [No Rash] : no rash [No Skin Lesions] : no skin lesions [No Focal Deficits] : no focal deficits [Moves all extremities] : moves all extremities [Normal Speech] : normal speech [Alert and Oriented] : alert and oriented [Normal memory] : normal memory

## 2023-06-11 NOTE — HISTORY OF PRESENT ILLNESS
[FreeTextEntry1] : 53 year old female with HTN, HLD, CAD with mild nonobstructive CAD on reecnt cath presents with right radial artery occlusion with right wrist and arm pain

## 2023-06-11 NOTE — DISCUSSION/SUMMARY
[FreeTextEntry1] : Cath- mild NOD\par \par Radial- confirmed radial artery occlusion without any ischemic changes in hand- reassurance and pain control. Sx should resolve in 3-4 weeks\par \par HTN- stable\par \par HLD- controlled\par \par Followup with Dr Valenzuela\par \par Time spent reviewing history, MD notes, cath films, exam, EKG, pt discussion and note writing [EKG obtained to assist in diagnosis and management of assessed problem(s)] : EKG obtained to assist in diagnosis and management of assessed problem(s)

## 2023-06-12 NOTE — CARDIOLOGY SUMMARY
Anesthesia Pre Eval Note    Anesthesia ROS/Med Hx    Overall Review:  EKG was reviewed     Anesthetic Complication History:  Patient does not have a history of anesthetic complications      Pulmonary Review:  Patient does not have a pulmonary history      Neuro/Psych Review:  Patient does not have a neuro/psych history       Cardiovascular Review:  Patient does not have a cardiovascular history       GI/HEPATIC/RENAL Review:    Positive for GERD - well controlled    End/Other Review:  Patient does not have an endo/other history    Additional Results:     ALLERGIES:  No Known Allergies       Last Labs        Component                Value               Date/Time                  WBC                      10.3                06/12/2023 0524            RBC                      5.28                06/12/2023 0524            HGB                      15.6                06/12/2023 0524            HCT                      45.3                06/12/2023 0524            MCV                      85.8                06/12/2023 0524            MCH                      29.5                06/12/2023 0524            MCHC                     34.4                06/12/2023 0524            RDW-CV                   13.1                06/12/2023 0524            Sodium                   141                 06/12/2023 0524            Potassium                4.0                 06/12/2023 0524            Chloride                 104                 06/12/2023 0524            Carbon Dioxide           30                  06/12/2023 0524            Glucose                  107 (H)             06/12/2023 0524            BUN                      10                  06/12/2023 0524            Creatinine               0.82                06/12/2023 0524            Glomerular Filtrati*     >90                 06/12/2023 0524            Calcium                  8.6                 06/12/2023 0524            PLT                      229            [Normal] : normal       06/12/2023 0524            PTT                      29                  06/11/2023 2116            INR                      1.0                 06/11/2023 2116        History reviewed. No pertinent past medical history.    History reviewed. No pertinent surgical history.       Prior to Admission medications :  Not on File       Patient Vitals in the past 24 hrs:  06/12/23 0501, BP:130/74, Temp:36.3 °C (97.3 °F), Pulse:65, Resp:17, SpO2:98 %  06/12/23 0142, BP:127/81, Temp:37 °C (98.6 °F), Pulse:(!) 56, Resp:16, SpO2:95 %  06/12/23 0112, BP:123/76, SpO2:95 %  06/12/23 0042, BP:126/74, SpO2:98 %  06/12/23 0028, BP:127/76, SpO2:96 %  06/12/23 0013, BP:126/77, SpO2:97 %  06/11/23 2358, BP:136/73, SpO2:97 %  06/11/23 2200, BP:(!) 128/94, Pulse:68, Resp:18, SpO2:93 %  06/11/23 2100, BP:(!) 126/98, Pulse:65, Resp:19, SpO2:99 %  06/11/23 2028, BP:136/89, Temp:36.9 °C (98.4 °F), Temp src:Oral, Pulse:62, Resp:20, SpO2:99 %, Height:5' 11\" (1.803 m), Weight:86.2 kg (190 lb)      Relevant Problems   No relevant active problems       Physical Exam     Airway   Mallampati: I  TM Distance: >3 FB  Neck ROM: Full  Neck: Non-tender and Able to place in sniff position  TMJ Mobility: Good    Cardiovascular  Cardiovascular exam normal  Cardio Rhythm: Regular  Cardio Rate: Normal    Head Assessment  Head assessment: Normocephalic and Atraumatic    General Assessment  General Assessment: Alert and oriented and No acute distress    Dental Exam  Dental exam normal  Patient has:  Denied broken/chipped/loose teeth    Pulmonary Exam  Pulmonary exam normal  Breath sounds clear to auscultation:  Yes    Abdominal Exam  Abdominal exam normal      Anesthesia Plan:  No phone call attempted due to:  ASA Status: 2  Anesthesia Type: General    Induction: Intravenous  Preferred Airway Type: ETT  Maintenance: Inhalational  Premedication: IV      Post-op Pain Management: Per Surgeon      Checklist  Reviewed: Lab Results, EKG, Chest X-Rays, Nursing  [___] : [unfilled] Notes, Patient Summary, Beta Blocker Status, Outside Records, NPO Status, Problem list, Medications, Allergies and Past Med History  Consent/Risks Discussed Statement:  The proposed anesthetic plan, including its risks and benefits, have been discussed with the Patient along with the risks and benefits of alternatives. Questions were encouraged and answered and the patient and/or representative understands and agrees to proceed.        I discussed with the patient (and/or patient's legal representative) the risks and benefits of the proposed anesthesia plan, General, which may include services performed by other anesthesia providers.    Alternative anesthesia plans, if available, were reviewed with the patient (and/or patient's legal representative). Discussion has been held with the patient (and/or patient's legal representative) regarding risks of anesthesia, which include Nausea, Vomiting, Headache, Sore Throat, Dental Injury, Hypotension, Allergic Reaction, Nerve Injury, Aspiration and Intra-operative Awareness and emergent situations that may require change in anesthesia plan.    The patient (and/or patient's legal representative) has indicated understanding, his/her questions have been answered, and he/she wishes to proceed with the planned anesthetic.    Blood Products: Not Anticipated

## 2023-06-13 ENCOUNTER — NON-APPOINTMENT (OUTPATIENT)
Age: 53
End: 2023-06-13

## 2023-06-13 ENCOUNTER — APPOINTMENT (OUTPATIENT)
Dept: NEUROLOGY | Facility: CLINIC | Age: 53
End: 2023-06-13
Payer: COMMERCIAL

## 2023-06-13 LAB
EJ AB SER QL: NEGATIVE
ENA JO1 AB SER IA-ACNC: <20 UNITS
ENA PM/SCL AB SER-ACNC: <20 UNITS
ENA SM+RNP AB SER IA-ACNC: <20 UNITS
ENA SS-A IGG SER QL: <20 UNITS
FIBRILLARIN AB SER QL: NEGATIVE
KU AB SER QL: NEGATIVE
MDA-5 (P140)(CADM-140): <20 UNITS
MI2 AB SER QL: NEGATIVE
NXP-2 (P140): <20 UNITS
OJ AB SER QL: NEGATIVE
PL12 AB SER QL: NEGATIVE
PL7 AB SER QL: NEGATIVE
SRP AB SERPL QL: NEGATIVE
TIF GAMMA (P155/140): <20 UNITS
U2 SNRNP AB SER QL: NEGATIVE

## 2023-06-13 PROCEDURE — 95816 EEG AWAKE AND DROWSY: CPT

## 2023-06-14 PROCEDURE — 95708 EEG WO VID EA 12-26HR UNMNTR: CPT

## 2023-06-15 PROCEDURE — 95708 EEG WO VID EA 12-26HR UNMNTR: CPT

## 2023-06-15 PROCEDURE — 95700 EEG CONT REC W/VID EEG TECH: CPT

## 2023-06-15 PROCEDURE — 95721 EEG PHY/QHP>36<60 HR W/O VID: CPT

## 2023-06-19 ENCOUNTER — APPOINTMENT (OUTPATIENT)
Dept: CARDIOLOGY | Facility: CLINIC | Age: 53
End: 2023-06-19
Payer: COMMERCIAL

## 2023-06-19 ENCOUNTER — RX RENEWAL (OUTPATIENT)
Age: 53
End: 2023-06-19

## 2023-06-19 VITALS
OXYGEN SATURATION: 98 % | HEART RATE: 70 BPM | SYSTOLIC BLOOD PRESSURE: 120 MMHG | HEIGHT: 61 IN | DIASTOLIC BLOOD PRESSURE: 79 MMHG

## 2023-06-19 DIAGNOSIS — M79.641 PAIN IN RIGHT HAND: ICD-10-CM

## 2023-06-19 PROCEDURE — 93000 ELECTROCARDIOGRAM COMPLETE: CPT

## 2023-06-19 PROCEDURE — 99214 OFFICE O/P EST MOD 30 MIN: CPT

## 2023-06-19 RX ORDER — LACOSAMIDE 200 MG/1
TABLET, FILM COATED ORAL
Refills: 0 | Status: DISCONTINUED | COMMUNITY
End: 2023-06-19

## 2023-06-19 RX ORDER — CYCLOBENZAPRINE HYDROCHLORIDE 5 MG/1
5 TABLET, FILM COATED ORAL
Qty: 30 | Refills: 0 | Status: DISCONTINUED | COMMUNITY
Start: 2023-05-17 | End: 2023-06-19

## 2023-06-19 RX ORDER — COVID-19 ANTIGEN TEST
KIT MISCELLANEOUS
Qty: 8 | Refills: 0 | Status: DISCONTINUED | COMMUNITY
Start: 2023-02-06 | End: 2023-06-19

## 2023-06-26 NOTE — DISCHARGE NOTE PROVIDER - NSDCCPGOAL_GEN_ALL_CORE_FT
To get better and follow your care plan as instructed. SIRS criteria met w/ lactate, WBC, and tachycardia, pt remains afebrile and non toxic appearing   -Initial WBC 16.8K, likely i/s/o dehydration, now improved and wnl  -Lactate 7, s/p total of 5L NS bolus w/ improvement to 2.3  -ESR 48 and CRP 32.5  -Serial BCx 6/20, 6/21 and 6/23 NGTD  -UA: (+) WBCs, trace leuks, neg nitrite, +bacteria. UCx (+) Ecoli  -CTA Chest and CT Abd/Pelvis w/o acute findings  -ID consulted, no indication for abx and recommending removal of suprapubic catheter however pt refusing due to immobility. If pt becomes febrile, can trial Tigecycline 100mg IV x 1 > 50mg BID (12hr after loading dose)  -Ostomy nurse consulted and no active signs of infection  -of note, recent admission at [facility] for Urosepsis (Klebsiella and Ecoli discharged with left midline for tx w/ Ertapenem 6/9 and Gentamicin 6/5; pt reports missing two days tx of Ertapenem due to midline malfunction; removed ~1week ago by home care RN)

## 2023-06-28 ENCOUNTER — NON-APPOINTMENT (OUTPATIENT)
Age: 53
End: 2023-06-28

## 2023-06-28 ENCOUNTER — APPOINTMENT (OUTPATIENT)
Dept: CARDIOLOGY | Facility: CLINIC | Age: 53
End: 2023-06-28
Payer: COMMERCIAL

## 2023-06-28 VITALS
BODY MASS INDEX: 38.14 KG/M2 | SYSTOLIC BLOOD PRESSURE: 100 MMHG | DIASTOLIC BLOOD PRESSURE: 70 MMHG | TEMPERATURE: 98.4 F | HEIGHT: 61 IN | WEIGHT: 202 LBS | RESPIRATION RATE: 18 BRPM | OXYGEN SATURATION: 99 % | HEART RATE: 71 BPM

## 2023-06-28 PROCEDURE — 99214 OFFICE O/P EST MOD 30 MIN: CPT

## 2023-06-28 PROCEDURE — 93000 ELECTROCARDIOGRAM COMPLETE: CPT

## 2023-06-28 RX ORDER — ATORVASTATIN CALCIUM 20 MG/1
20 TABLET, FILM COATED ORAL
Qty: 30 | Refills: 0 | Status: DISCONTINUED | COMMUNITY
End: 2023-06-28

## 2023-06-28 NOTE — HISTORY OF PRESENT ILLNESS
[FreeTextEntry1] : This is a 54 y/o female with a PMHx of HLD nonobstructive CAD s/p recent cath non obstructive CAD presents to the office today here for follow up. pt last seenb y internist Dr Longo on 5/24 for follow up for R wrist/forearm pain after having found to have R radial occlusion on US following cardiac cath about 2 weeks ago. Pt was sent to ER, evaluated by vascular who rec aspirin 81mg daily and rec outpatient followup\par \par  Pt says vasc Dr Min who rec no further intervention given good perfusion of hand. Also saw IC DR Main who rec no change in management. called for 2nd opinion with dr Cueva as per notes-- no further recommendations to continue ASA and warm compresses -\par \par  continues to reports some discomfort. getting better with Tylenol PRN on ranexa and amlo 5mg daily for possible cardiac vasospasm - reoprts sx are getting better  pt continues to reports b/l shoulder pain. keeps her up at night. follows with rhuem work up is negative so far.- MD recommended PT and to follow up with \par her neurolgist Dr. Gallo   Denies martínez, dizziness, diaphoresis, palpitations, LE swelling, orthopnea, syncope, n/v, headache.\par   stillc/o occasional chest pain .Does report SOB at rest and feels like have to catch her breath\par  in between.Patient c/odizziness.Sees neurologist

## 2023-06-28 NOTE — PHYSICAL EXAM
[Well Developed] : well developed [Well Nourished] : well nourished [No Acute Distress] : no acute distress [Normal Conjunctiva] : normal conjunctiva [Normal Venous Pressure] : normal venous pressure [No Carotid Bruit] : no carotid bruit [Normal S1, S2] : normal S1, S2 [No Murmur] : no murmur [No Rub] : no rub [No Gallop] : no gallop [Clear Lung Fields] : clear lung fields [Good Air Entry] : good air entry [No Respiratory Distress] : no respiratory distress  [Soft] : abdomen soft [Non Tender] : non-tender [No Masses/organomegaly] : no masses/organomegaly [Normal Bowel Sounds] : normal bowel sounds [Normal Gait] : normal gait [No Edema] : no edema [No Cyanosis] : no cyanosis [No Clubbing] : no clubbing [No Varicosities] : no varicosities [No Rash] : no rash [No Skin Lesions] : no skin lesions [No Focal Deficits] : no focal deficits [Moves all extremities] : moves all extremities [Normal Speech] : normal speech [Alert and Oriented] : alert and oriented [Normal memory] : normal memory [de-identified] : right forearm mildly tender absent radial pulse normal perfusion hands

## 2023-07-04 ENCOUNTER — NON-APPOINTMENT (OUTPATIENT)
Age: 53
End: 2023-07-04

## 2023-07-06 ENCOUNTER — APPOINTMENT (OUTPATIENT)
Dept: ULTRASOUND IMAGING | Facility: IMAGING CENTER | Age: 53
End: 2023-07-06
Payer: COMMERCIAL

## 2023-07-06 ENCOUNTER — OUTPATIENT (OUTPATIENT)
Dept: OUTPATIENT SERVICES | Facility: HOSPITAL | Age: 53
LOS: 1 days | End: 2023-07-06
Payer: COMMERCIAL

## 2023-07-06 DIAGNOSIS — Z90.49 ACQUIRED ABSENCE OF OTHER SPECIFIED PARTS OF DIGESTIVE TRACT: Chronic | ICD-10-CM

## 2023-07-06 DIAGNOSIS — T14.8XXA OTHER INJURY OF UNSPECIFIED BODY REGION, INITIAL ENCOUNTER: Chronic | ICD-10-CM

## 2023-07-06 DIAGNOSIS — M79.89 OTHER SPECIFIED SOFT TISSUE DISORDERS: ICD-10-CM

## 2023-07-06 DIAGNOSIS — Z98.890 OTHER SPECIFIED POSTPROCEDURAL STATES: Chronic | ICD-10-CM

## 2023-07-06 PROCEDURE — 93970 EXTREMITY STUDY: CPT

## 2023-07-06 PROCEDURE — 93970 EXTREMITY STUDY: CPT | Mod: 26

## 2023-07-07 ENCOUNTER — APPOINTMENT (OUTPATIENT)
Dept: INTERNAL MEDICINE | Facility: CLINIC | Age: 53
End: 2023-07-07
Payer: COMMERCIAL

## 2023-07-07 VITALS
BODY MASS INDEX: 37.76 KG/M2 | HEIGHT: 61 IN | HEART RATE: 72 BPM | DIASTOLIC BLOOD PRESSURE: 70 MMHG | SYSTOLIC BLOOD PRESSURE: 100 MMHG | WEIGHT: 200 LBS | OXYGEN SATURATION: 98 % | TEMPERATURE: 98.8 F

## 2023-07-07 VITALS — DIASTOLIC BLOOD PRESSURE: 70 MMHG | SYSTOLIC BLOOD PRESSURE: 110 MMHG

## 2023-07-07 DIAGNOSIS — M79.604 PAIN IN RIGHT LEG: ICD-10-CM

## 2023-07-07 DIAGNOSIS — M79.606 PAIN IN LEG, UNSPECIFIED: ICD-10-CM

## 2023-07-07 DIAGNOSIS — M79.605 PAIN IN RIGHT LEG: ICD-10-CM

## 2023-07-07 PROCEDURE — 99214 OFFICE O/P EST MOD 30 MIN: CPT

## 2023-07-07 RX ORDER — AMLODIPINE BESYLATE 2.5 MG/1
2.5 TABLET ORAL
Qty: 90 | Refills: 1 | Status: DISCONTINUED | COMMUNITY
Start: 2023-05-31 | End: 2023-07-07

## 2023-07-10 ENCOUNTER — APPOINTMENT (OUTPATIENT)
Dept: NEUROLOGY | Facility: CLINIC | Age: 53
End: 2023-07-10
Payer: COMMERCIAL

## 2023-07-10 VITALS
SYSTOLIC BLOOD PRESSURE: 129 MMHG | HEIGHT: 61 IN | DIASTOLIC BLOOD PRESSURE: 84 MMHG | HEART RATE: 65 BPM | BODY MASS INDEX: 37.76 KG/M2 | WEIGHT: 200 LBS

## 2023-07-10 PROCEDURE — 99213 OFFICE O/P EST LOW 20 MIN: CPT

## 2023-07-10 RX ORDER — LACOSAMIDE 100 MG/1
100 TABLET ORAL TWICE DAILY
Qty: 60 | Refills: 5 | Status: ACTIVE | COMMUNITY
Start: 2023-07-10 | End: 1900-01-01

## 2023-07-10 RX ORDER — LACOSAMIDE 50 MG/1
50 TABLET ORAL
Qty: 60 | Refills: 5 | Status: COMPLETED | COMMUNITY
Start: 2022-07-06 | End: 2023-07-10

## 2023-07-11 ENCOUNTER — APPOINTMENT (OUTPATIENT)
Dept: PULMONOLOGY | Facility: CLINIC | Age: 53
End: 2023-07-11

## 2023-07-12 ENCOUNTER — APPOINTMENT (OUTPATIENT)
Dept: NEUROLOGY | Facility: CLINIC | Age: 53
End: 2023-07-12
Payer: COMMERCIAL

## 2023-07-12 VITALS
BODY MASS INDEX: 37.76 KG/M2 | SYSTOLIC BLOOD PRESSURE: 138 MMHG | DIASTOLIC BLOOD PRESSURE: 85 MMHG | WEIGHT: 200 LBS | HEART RATE: 76 BPM | HEIGHT: 61 IN

## 2023-07-12 DIAGNOSIS — R94.01 ABNORMAL ELECTROENCEPHALOGRAM [EEG]: ICD-10-CM

## 2023-07-12 PROCEDURE — 99213 OFFICE O/P EST LOW 20 MIN: CPT

## 2023-07-12 NOTE — ASSESSMENT
[FreeTextEntry1] : Ms. REYES is a 52-year-old woman who was initially evaluated by Dr. Spence of the epilepsy service for complaint of "brain fog" and 2015. At that time, she had an EEG that showed intermittent slowing in the left temporal lobe, a nonspecific finding. No further testing was done. She now presents complaining of recurrent episodes of "burnt smell like cigarettes". This smell is somewhat stereotyped, raising the possibility of "uncinate fits" however, the episodes may last several hours, not typical of a seizure. Repeat ambulatory EEG, has detected extremely frequent left temporal slowing, and rare right temporal slowing. At time, the left temporal slowing appears somewhat rhythmical though does not clearly evolve. EMU evaluation confirmed left temporal slowing along with less extensive right temporal involvement. PET scan July 2022 showed "moderate to severe decreased tracer uptake in the anteriormesial temporal lobes (left greater than right). Neuropsychological testing in Jan 2023 was normal. \par \par Plan:\par 1. discontinue lacosamide \par 2. f/u with Dr. Acharya for c/o L scalp pulling, brain fog, f/u with me as needed if there is renewed concern for seizures.\par

## 2023-07-12 NOTE — HISTORY OF PRESENT ILLNESS
[FreeTextEntry1] : Ms. REYES - pronounced "kateej"\par \par *** 07/12/2023  ***\par  Ms. REYES returns for follow-up.  In the interval, she reports no new problems.  She continues to report of episodes of "pulling sensation" over the left scalp.  Does not seem to be having episodes of experiencing a smell burning cigarettes as frequently.  She had a repeat ambulatory EEG last month that again showed abundant slowing in the left temporal lobe and intermittent slowing in the right temporal lobe.  Clinically, she is functioning at the same level.  She continues taking lacosamide 50 mg twice a day and feels that it has helped with her "brain fog".   Ms. REYES also endorses that she has episodic dizziness that can occur at any moment.  She has been seeing cardiologist, who does not feel etiology is cardiac.\par \par Ms. REYES underwent neuropsychological testing in January 2023, the results of which indicated no evidence of major cognitive or functional impairment or decline.\par \par *** 07/15/2022  ***\par Ms. REYES returns for follow-up.  She was evaluated in epilepsy monitoring unit at the beginning of June.  That monitoring revealed no epileptiform abnormalities over the course of 48 hours.  She was found to have focal slowing independently in both temporal regions, which represented a progression from prior report showing unilateral focal slowing.  She was started on lacosamide 50 mg twice a day on the possibility that the olfactory sensations she has been experiencing were due to seizures.  Even before starting lacosamide, these olfactory experiences had been rare.  She estimates they have happened twice in the 6 weeks since discharge, so it is not clear whether lacosamide has made a difference. Ms. REYES is very anxious about the change in her EEG, the additional slowing that was noted.  She is anxious that her symptoms represent a dementia or seizures.  At discharge from EMU, she was recommended to PET imaging to rule out dementia and referred to neuropsychological testing for the same reason.\par \par *** 05/16/2022  ***\par Ms. REYES reports that several months ago she began to experience smell of burning cigarettes intermittently.  The first occasion, she noted smell like burning leaves outside, and smell persisted for most of the day, and smell would follow her when in car or at work.  Now it is not occurring as often - 1-2 x per week.  Ms. REYES thinks that she may be triggered by someone smoking near her - then had persistent smoke smell following day.  sometimes smell is triggered by foul smell, but at other times she may get the smell without provoking.  Smell is mostly the same - described as cigarette smell.  \par No clear episodes of lost time - may be forgetful of tasks to do - but has not been told of conversations that she does not remember. No tongue biting or urinary incontinence. \par \par Ms. REYES was previously seen by Dr. Acharya - MRI essentially negative - she has incidental pituitary cyst.  Ms. REYES had prior EEG testing in 2015 that showed intermittent left temporal slowing.  She underwent repeat ambulatory EEG testing in April 2022 that noted marked left temporal slowing, sometimes rhythmical left temporal delta, and infrequent right temporal intermittent slowing.  During this ambulatory recording, she did not have her characteristic olfactory event.  She initially saw an ENT physician and had a work-up including CT of the sinuses that was unrevealing.  She tried course of steroids and nasal sprays are also not helpful.\par \par *** 5/11/2022 from Dr. Acharya's Notes *** \par 52-year-old woman who is here for initial consultation of smelling cigarette smell before Christmas. Patient states that it has been intermittent and she has seen ENT. Work-up including CT of the sinuses along with course of steroids and nasal sprays have failed to alleviate her symptoms. Patient has no fevers and no sick contacts. Patient has no exposure to COVID.\par \par Interval history: Discussed results of MRI of the brain with the patient. Patient has no history of pituitary issues. Patient sees a endocrinologist in the past for hypothyroidism. The olfactory bulb was not evaluated even though the question on the MRI order sheet specified. I have emailed neuroradiology to make sure that this is performed when she returns for MRI of the pituitary gland. Patient states that she tested negative for COVID swab. Patient has a EEG that is still pending.\par \par Interval history: Since her last visit patient was noted to have slowing in the temporal region. Patient's MRI of the brain shows no lesions in the temporal lobes. Patient has upcoming appointment with epilepsy to help evaluate the situation whether patient needs longer EEG monitoring versus confirmation that this is incidental finding and should be left alone. Patient states that her cigarette smells are now intermittent and faint. Patient's MRI of the brain shows hypo enhancement that is indicative of microadenoma versus cyst. Endocrinologist has seen the patient and had sent off blood work. Patient will return to endocrinology for repeat MRI in September. \par

## 2023-07-14 ENCOUNTER — APPOINTMENT (OUTPATIENT)
Dept: MRI IMAGING | Facility: HOSPITAL | Age: 53
End: 2023-07-14
Payer: COMMERCIAL

## 2023-07-14 ENCOUNTER — OUTPATIENT (OUTPATIENT)
Dept: OUTPATIENT SERVICES | Facility: HOSPITAL | Age: 53
LOS: 1 days | End: 2023-07-14
Payer: COMMERCIAL

## 2023-07-14 DIAGNOSIS — N91.2 AMENORRHEA, UNSPECIFIED: ICD-10-CM

## 2023-07-14 DIAGNOSIS — T14.8XXA OTHER INJURY OF UNSPECIFIED BODY REGION, INITIAL ENCOUNTER: Chronic | ICD-10-CM

## 2023-07-14 DIAGNOSIS — Z98.890 OTHER SPECIFIED POSTPROCEDURAL STATES: Chronic | ICD-10-CM

## 2023-07-14 DIAGNOSIS — Z90.49 ACQUIRED ABSENCE OF OTHER SPECIFIED PARTS OF DIGESTIVE TRACT: Chronic | ICD-10-CM

## 2023-07-14 PROCEDURE — 72148 MRI LUMBAR SPINE W/O DYE: CPT

## 2023-07-14 PROCEDURE — 72148 MRI LUMBAR SPINE W/O DYE: CPT | Mod: 26

## 2023-07-15 PROBLEM — M79.641 HAND PAIN, RIGHT: Status: ACTIVE | Noted: 2022-07-14

## 2023-07-15 NOTE — DISCUSSION/SUMMARY
[FreeTextEntry1] : Cath- mild NOD\par \par Radial- confirmed radial artery occlusion without any ischemic changes in hand- reassurance and pain control.\par \par HTN- stable\par \par HLD- controlled\par \par Followup with Dr Valenzuela\par \par Time spent reviewing history, MD notes, cath films, exam, EKG, pt discussion and note writing [EKG obtained to assist in diagnosis and management of assessed problem(s)] : EKG obtained to assist in diagnosis and management of assessed problem(s)

## 2023-07-15 NOTE — PHYSICAL EXAM
[Well Developed] : well developed [Well Nourished] : well nourished [No Acute Distress] : no acute distress [Normal Venous Pressure] : normal venous pressure [Normal Conjunctiva] : normal conjunctiva [No Carotid Bruit] : no carotid bruit [Normal S1, S2] : normal S1, S2 [No Murmur] : no murmur [No Rub] : no rub [No Gallop] : no gallop [Clear Lung Fields] : clear lung fields [Good Air Entry] : good air entry [No Respiratory Distress] : no respiratory distress  [Soft] : abdomen soft [Non Tender] : non-tender [No Masses/organomegaly] : no masses/organomegaly [Normal Bowel Sounds] : normal bowel sounds [Normal Gait] : normal gait [No Edema] : no edema [No Cyanosis] : no cyanosis [No Clubbing] : no clubbing [No Rash] : no rash [No Varicosities] : no varicosities [No Skin Lesions] : no skin lesions [Moves all extremities] : moves all extremities [No Focal Deficits] : no focal deficits [Normal Speech] : normal speech [Alert and Oriented] : alert and oriented [Normal memory] : normal memory

## 2023-07-17 ENCOUNTER — APPOINTMENT (OUTPATIENT)
Dept: NEUROLOGY | Facility: CLINIC | Age: 53
End: 2023-07-17

## 2023-07-17 ENCOUNTER — RX RENEWAL (OUTPATIENT)
Age: 53
End: 2023-07-17

## 2023-07-17 PROBLEM — M79.606 PAIN IN LEG, UNSPECIFIED: Status: ACTIVE | Noted: 2023-07-07

## 2023-07-17 NOTE — REVIEW OF SYSTEMS
[Chest Pain] : chest pain [Lower Ext Edema] : lower extremity edema [Dizziness] : dizziness [Negative] : Heme/Lymph [Shortness Of Breath] : no shortness of breath [Cough] : no cough [Dyspnea on Exertion] : dyspnea on exertion

## 2023-07-17 NOTE — PHYSICAL EXAM
[No Acute Distress] : no acute distress [Well Nourished] : well nourished [Well Developed] : well developed [Well-Appearing] : well-appearing [Normal Sclera/Conjunctiva] : normal sclera/conjunctiva [Normal Outer Ear/Nose] : the outer ears and nose were normal in appearance [Normal Oropharynx] : the oropharynx was normal [No JVD] : no jugular venous distention [No Lymphadenopathy] : no lymphadenopathy [Supple] : supple [No Respiratory Distress] : no respiratory distress  [No Accessory Muscle Use] : no accessory muscle use [Clear to Auscultation] : lungs were clear to auscultation bilaterally [Normal Rate] : normal rate  [Regular Rhythm] : with a regular rhythm [Normal S1, S2] : normal S1 and S2 [No Murmur] : no murmur heard [No Carotid Bruits] : no carotid bruits [No Varicosities] : no varicosities [Pedal Pulses Present] : the pedal pulses are present [No Extremity Clubbing/Cyanosis] : no extremity clubbing/cyanosis [Soft] : abdomen soft [Non Tender] : non-tender [Non-distended] : non-distended [Normal Bowel Sounds] : normal bowel sounds [Normal Anterior Cervical Nodes] : no anterior cervical lymphadenopathy [No CVA Tenderness] : no CVA  tenderness [No Spinal Tenderness] : no spinal tenderness [No Joint Swelling] : no joint swelling [Grossly Normal Strength/Tone] : grossly normal strength/tone [No Rash] : no rash [Coordination Grossly Intact] : coordination grossly intact [No Focal Deficits] : no focal deficits [Normal Gait] : normal gait [Alert and Oriented x3] : oriented to person, place, and time [de-identified] : nontoxic [de-identified] : trace LE edema bilateral to the ankles

## 2023-07-17 NOTE — HISTORY OF PRESENT ILLNESS
[FreeTextEntry1] : Ms. REYES - pronounced "kateej"\par \par **UPDATE:7/10/2023**\par Ms James Reyes is here today for a scheduled follow up office visit\par \par 48 hr AEEG no epileptiform discharges\par Ms Reyes feels that she has no more brain fog since  she's been on Vimpat\par Unclear if recent episode of confusion\par \par Ms Reyes c/o righ LE pain especially at night (sometimes starts at hip and radiates downward\par 2021 Dopplers done  for same complaint NO DVT\par she had a recent Doppler last months with no DVT\par \par Vimpat 50 mg BID\par \par ***UPDATE:5/9/2023***\par Ms Reyes is here today for a scheduled follow up office visit\par Although Ms Mohan feel 'Clearer" on Lacosamide She used to report "brain fog" prior to Lacosamide she still reports smelling burning or now foul smells\par Lsst week felt "numbness" left side of head and same feeling of right leg lasted 1-2 minutes\par Reports dizziness intermittently not daily she will try to find somewhere to sit so that she does not fall\par \par Lacosamide 50mg BD\par \par *** 07/15/2022  ***\par Ms. REYES returns for follow-up.  She was evaluated in epilepsy monitoring unit at the beginning of June.  That monitoring revealed no epileptiform abnormalities over the course of 48 hours.  She was found to have focal slowing independently in both temporal regions, which represented a progression from prior report showing unilateral focal slowing.  She was started on lacosamide 50 mg twice a day on the possibility that the olfactory sensations she has been experiencing were due to seizures.  Even before starting lacosamide, these olfactory experiences had been rare.  She estimates they have happened twice in the 6 weeks since discharge, so it is not clear whether lacosamide has made a difference. Ms. REYES is very anxious about the change in her EEG, the additional slowing that was noted.  She is anxious that her symptoms represent a dementia or seizures.  At discharge from EMU, she was recommended to PET imaging to rule out dementia and referred to neuropsychological testing for the same reason.\par \par *** 05/16/2022  ***\par Ms. REYES reports that several months ago she began to experience smell of burning cigarettes intermittently.  The first occasion, she noted smell like burning leaves outside, and smell persisted for most of the day, and smell would follow her when in car or at work.  Now it is not occurring as often - 1-2 x per week.  Ms. REYES thinks that she may be triggered by someone smoking near her - then had persistent smoke smell following day.  sometimes smell is triggered by foul smell, but at other times she may get the smell without provoking.  Smell is mostly the same - described as cigarette smell.  \par No clear episodes of lost time - may be forgetful of tasks to do - but has not been told of conversations that she does not remember. No tongue biting or urinary incontinence. \par \par Ms. REYES was previously seen by Dr. Acharya - MRI essentially negative - she has incidental pituitary cyst.  Ms. REYES had prior EEG testing in 2015 that showed intermittent left temporal slowing.  She underwent repeat ambulatory EEG testing in April 2022 that noted marked left temporal slowing, sometimes rhythmical left temporal delta, and infrequent right temporal intermittent slowing.  During this ambulatory recording, she did not have her characteristic olfactory event.  She initially saw an ENT physician and had a work-up including CT of the sinuses that was unrevealing.  She tried course of steroids and nasal sprays are also not helpful.\par \par *** 5/11/2022 from Dr. Acharya's Notes *** \par 52-year-old woman who is here for initial consultation of smelling cigarette smell before [place]. Patient states that it has been intermittent and she has seen ENT. Work-up including CT of the sinuses along with course of steroids and nasal sprays have failed to alleviate her symptoms. Patient has no fevers and no sick contacts. Patient has no exposure to COVID.\par \par Interval history: Discussed results of MRI of the brain with the patient. Patient has no history of pituitary issues. Patient sees a endocrinologist in the past for hypothyroidism. The olfactory bulb was not evaluated even though the question on the MRI order sheet specified. I have emailed neuroradiology to make sure that this is performed when she returns for MRI of the pituitary gland. Patient states that she tested negative for COVID swab. Patient has a EEG that is still pending.\par \par Interval history: Since her last visit patient was noted to have slowing in the temporal region. Patient's MRI of the brain shows no lesions in the temporal lobes. Patient has upcoming appointment with epilepsy to help evaluate the situation whether patient needs longer EEG monitoring versus confirmation that this is incidental finding and should be left alone. Patient states that her cigarette smells are now intermittent and faint. Patient's MRI of the brain shows hypo enhancement that is indicative of microadenoma versus cyst. Endocrinologist has seen the patient and had sent off blood work. Patient will return to endocrinology for repeat MRI in September. \par

## 2023-07-17 NOTE — HISTORY OF PRESENT ILLNESS
[FreeTextEntry1] : pain in R leg [de-identified] : 52 y/o female with a PMHx of HLD nonobstructive CAD s/p recent cath non obstructive CAD presents to the office today here for pain in R leg, mostly worse at night.. Went to urgent care because of LE edema, reports have LE  duplex negative DVT recently, will get us report. Still had ELIZALDE and chest pain at times, not worsening. Had nonobstructive cad on recent cath\par Denies diaphoresis, palpitations, orthopnea, syncope, n/v, headache.\par

## 2023-07-17 NOTE — HEALTH RISK ASSESSMENT
[0] : 2) Feeling down, depressed, or hopeless: Not at all (0) [PHQ-2 Negative - No further assessment needed] : PHQ-2 Negative - No further assessment needed [Never] : Never [[id number]] : 0

## 2023-07-20 ENCOUNTER — APPOINTMENT (OUTPATIENT)
Dept: INTERNAL MEDICINE | Facility: CLINIC | Age: 53
End: 2023-07-20
Payer: COMMERCIAL

## 2023-07-20 VITALS
SYSTOLIC BLOOD PRESSURE: 110 MMHG | WEIGHT: 201 LBS | DIASTOLIC BLOOD PRESSURE: 70 MMHG | HEIGHT: 61 IN | HEART RATE: 75 BPM | BODY MASS INDEX: 37.95 KG/M2 | TEMPERATURE: 97.8 F | OXYGEN SATURATION: 98 %

## 2023-07-20 DIAGNOSIS — M54.10 RADICULOPATHY, SITE UNSPECIFIED: ICD-10-CM

## 2023-07-20 PROCEDURE — 99213 OFFICE O/P EST LOW 20 MIN: CPT

## 2023-08-03 ENCOUNTER — APPOINTMENT (OUTPATIENT)
Dept: PHYSICAL MEDICINE AND REHAB | Facility: CLINIC | Age: 53
End: 2023-08-03
Payer: COMMERCIAL

## 2023-08-03 ENCOUNTER — NON-APPOINTMENT (OUTPATIENT)
Age: 53
End: 2023-08-03

## 2023-08-03 VITALS — OXYGEN SATURATION: 99 % | SYSTOLIC BLOOD PRESSURE: 135 MMHG | DIASTOLIC BLOOD PRESSURE: 82 MMHG | HEART RATE: 71 BPM

## 2023-08-03 PROCEDURE — 99204 OFFICE O/P NEW MOD 45 MIN: CPT

## 2023-08-03 NOTE — PHYSICAL EXAM
[FreeTextEntry1] : General exam   Constitutional: The patient appears well-developed, well-nourished, and in no apparent distress. Patient is well-groomed.    Skin: The skin is warm and dry, with normal turgor.  Eyes: PERRL.    ENMT: Ears: Hearing is grossly within normal limits.    Neck: Supple: The neck is supple.    Respiratory: Inspection: Breathing unlabored.    Neurologic: Alert and oriented x 3.   Psychiatric: Patient is cooperative and appropriate.  Mood and affect are normal.  Patient's insight is good, and memory and judgment are intact.  LUMBAR EXAM  APPEARANCE: No visible scars No gross deformity or malalignment No erythema, swelling or ecchymosis Decreased lumbar lordosis No muscle atrophy of the left lower extremity No muscle atrophy of the right lower extremity No clubbing, cyanosis, swelling or erythema on the left lower extremity No clubbing, cyanosis, swelling or erythema on the right lower extremity  TENDERNESS: +trigger points over bilateral lumbar paraspinal muscles Absent over midline spinous processes Absent over left lumbar facet joints Absent over right lumbar facet joints  Absent over left lumbar paraspinal muscles: erector spinae and quadratus lumborum +over right lumbar paraspinal muscles: erector spinae and quadratus lumborum Absent over left sacroiliac joint Absent over right sacroiliac joint  ROM: Functional A/PROM of the lumbar spine No pain with flexion, extension of the lumbar spine No pain with left side bending No pain with right side bending No pain with left rotation No pain with right rotation +hamstring tightness on the left + hamstring tightness on the right  SPECIAL TESTS: Normal left straight leg raising test +right straight leg raising test FABERE left normal FABERE right normal FADIR left normal FADIR right normal=  SENSORY TESTING: Intact to light touch Left L1-S2 Intact to light touch Right L1-S2  MOTOR TESTING: Muscle tone of the left lower extremity is normal Muscle tone of the right lower extremity is normal  Left hip flexion strength is 5/5 Right hip flexion strength is 5/5  Left quadriceps strength is 5/5 Right quadriceps strength is 5/5  Left hamstrings strength is 5/5 Right hamstrings strength is 5/5  Left EHL strength is 5/5 Right EHL strength is 5/5  Left ankle dorsiflexion strength is 5/5 Right ankle dorsiflexion strength is 5/5  Left ankle plantar flexion strength is 5/5 Right ankle plantar flexion strength is 5/5  REFLEXES: Patella (L4) left 2+ Patella (L4) right 2+  GAIT: Non-antalgic gait Balance normal with ambulation

## 2023-08-03 NOTE — REVIEW OF SYSTEMS
[Negative] : Neurological [Chest Pain] : no chest pain [Shortness Of Breath] : no shortness of breath [Abdominal Pain] : no abdominal pain [Dysuria] : no dysuria [FreeTextEntry9] : back pain

## 2023-08-03 NOTE — ASSESSMENT
[FreeTextEntry1] : Ms. MARIANA REYES is a 53 year F with pain in the lower back on the RIGHT with radiation down the leg to the FOOT. She reports chronic long standing pain and is noting an acute on chronic exacerbation of this pain due to lumbar radiculopathy. There are no myelopathic signs on today's exam.  Patient reassured and educated on the diagnosis and treatment options. Risks and benefits of treatment and of delaying treatment discussed with patient. Risks discussed include but not limited to: progression of symptoms, worsening pain and functional status, etc.  This note was generated using Dragon medical dictation software. A reasonable effort had been made for proofreading its contents, but spelling mistakes or grammatical errors may still remain. If there are any questions or points of clarification needed please notify my office.  Dr. Longo's note reviewed MRI lumbar spine imaging reviewed with patient in office today. Imaging and report demonstrate: RIGHT S1 nerve compression  Start Celebrex 100 mg PO BID x 30 days PRN pain with food #30. Denies CKD, CAD, or gastritis. Recommend that if patient develops GI symptoms including abdominal pain, nausea, or vomiting to discontinue use of medication immediately.  Start Tizanidine 2mg 1-2 tablets PO qHS PRN pain, dispense #30. Rx sent. Patient denies any liver problems. Monitor LFTs. Patient warned about the sedative nature of this medication and recommended not to drive or to handle heavy machinery.  Sending patient for PT for lumbar radiculopathy to help relieve pain and improve function. Stretching, strengthening, ROM, home education and other appropriate interventions. Precautions include fall prevention.  Follow up 2 months If no relief will consider right L5 and S1 TFESI Neck workup in the future for right arm tingling  I have personally spent a total of at least 45  minutes preparing, reviewing internal and external records, explaining, counseling, and coordinating care for this patient encounter.  Patient was advised if the following symptoms develop: chills, fever, loss of bladder control, bowel incontinence or urinary retention, numbness/tingling or weakness is present in upper or lower extremities, to go to the nearest emergency room. This may be a new clinical condition not present at the time of the patient visit that may lead to paralysis and/or death. Patient advised if the above symptoms developed to also call the office immediately to inform us and to go to the nearest emergency room.

## 2023-08-03 NOTE — HISTORY OF PRESENT ILLNESS
[FreeTextEntry1] : MARIANA REYES is an 53 year old F here for initial evaluation of back pain. Ms. REYES was sent for consultation by Dr. Longo. 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  From 7/20/23 note: " 54 y/o female with a PMHx of HLD nonobstructive CAD s/p recent cath non obstructive CAD presents to the office today here for f/u. She c/o itching and tingling in her hands yesterday which lasted for 10-15 seconds. She had a f/u with neuro and had an MRI of her back done for her right leg pain."  Pain location: right lower back Quality: shooting, stabbing Radiation: right leg into foot Severity: 5/10 today, 10/10 at times Onset: February 2023 Associated symptoms: tingling, muscle spasms Numbness: right leg Weakness: denies Exacerbated by: activity, laying in bed Improved by: rest, changing positions Bowel or bladder involvement: denies  Denies bowel/bladder dysfunction, saddle anesthesia, fevers, chills, weight loss, night pain, or night sweats at this time.  The pain interferes with function, ADLs and quality of life. Patient had tried Acetaminophen, NSAIDs, prescription pain medications, muscle relaxants without any lasting relief of pain.  Patient had imaging studies to evaluate the pain.

## 2023-08-09 ENCOUNTER — LABORATORY RESULT (OUTPATIENT)
Age: 53
End: 2023-08-09

## 2023-08-09 ENCOUNTER — APPOINTMENT (OUTPATIENT)
Dept: CARDIOLOGY | Facility: CLINIC | Age: 53
End: 2023-08-09
Payer: COMMERCIAL

## 2023-08-09 VITALS
OXYGEN SATURATION: 99 % | TEMPERATURE: 98.6 F | SYSTOLIC BLOOD PRESSURE: 140 MMHG | RESPIRATION RATE: 18 BRPM | WEIGHT: 205 LBS | HEIGHT: 61 IN | BODY MASS INDEX: 38.71 KG/M2 | DIASTOLIC BLOOD PRESSURE: 90 MMHG | HEART RATE: 80 BPM

## 2023-08-09 DIAGNOSIS — M25.512 PAIN IN RIGHT SHOULDER: ICD-10-CM

## 2023-08-09 DIAGNOSIS — M25.511 PAIN IN RIGHT SHOULDER: ICD-10-CM

## 2023-08-09 PROCEDURE — 99214 OFFICE O/P EST MOD 30 MIN: CPT

## 2023-08-09 PROCEDURE — 93000 ELECTROCARDIOGRAM COMPLETE: CPT

## 2023-08-09 NOTE — PHYSICAL EXAM
[Well Developed] : well developed [Well Nourished] : well nourished [No Acute Distress] : no acute distress [Normal Conjunctiva] : normal conjunctiva [Normal Venous Pressure] : normal venous pressure [No Carotid Bruit] : no carotid bruit [Normal S1, S2] : normal S1, S2 [No Murmur] : no murmur [No Rub] : no rub [No Gallop] : no gallop [Clear Lung Fields] : clear lung fields [Good Air Entry] : good air entry [No Respiratory Distress] : no respiratory distress  [Soft] : abdomen soft [Non Tender] : non-tender [No Masses/organomegaly] : no masses/organomegaly [Normal Bowel Sounds] : normal bowel sounds [Normal Gait] : normal gait [No Edema] : no edema [No Cyanosis] : no cyanosis [No Clubbing] : no clubbing [No Varicosities] : no varicosities [No Rash] : no rash [No Skin Lesions] : no skin lesions [Moves all extremities] : moves all extremities [No Focal Deficits] : no focal deficits [Normal Speech] : normal speech [Alert and Oriented] : alert and oriented [Normal memory] : normal memory [de-identified] : reeproducible pain right wrist area improved right arm similar warmth as left mild decreased radail pulse right left normal

## 2023-08-09 NOTE — PHYSICAL EXAM
[Well Developed] : well developed [Well Nourished] : well nourished [No Acute Distress] : no acute distress [Normal Conjunctiva] : normal conjunctiva [Normal Venous Pressure] : normal venous pressure [No Carotid Bruit] : no carotid bruit [Normal S1, S2] : normal S1, S2 [No Murmur] : no murmur [No Rub] : no rub [No Gallop] : no gallop [Clear Lung Fields] : clear lung fields [Good Air Entry] : good air entry [No Respiratory Distress] : no respiratory distress  [Soft] : abdomen soft [Non Tender] : non-tender [No Masses/organomegaly] : no masses/organomegaly [Normal Bowel Sounds] : normal bowel sounds [Normal Gait] : normal gait [No Edema] : no edema [No Cyanosis] : no cyanosis [No Clubbing] : no clubbing [No Varicosities] : no varicosities [No Rash] : no rash [No Skin Lesions] : no skin lesions [Moves all extremities] : moves all extremities [No Focal Deficits] : no focal deficits [Normal Speech] : normal speech [Alert and Oriented] : alert and oriented [Normal memory] : normal memory [de-identified] : reeproducible pain right wrist area improved right arm similar warmth as left mild decreased radail pulse right left normal

## 2023-08-10 NOTE — HISTORY OF PRESENT ILLNESS
[FreeTextEntry1] : This is a 52 y/o female with a PMHx of HLD nonobstructive CAD s/p recent cath non obstructive CAD presents to the office today here for follow up. pt last seen by LUISANA Longo july 2023. follows with [facility] for [facility] and leg pain pt reports n/t in hands and feet. reports on going sob and chest pain denies any dizziness lgiht headedness n/v/d/ fever or chills

## 2023-08-12 ENCOUNTER — EMERGENCY (EMERGENCY)
Facility: HOSPITAL | Age: 53
LOS: 1 days | Discharge: HOME | End: 2023-08-12
Attending: EMERGENCY MEDICINE
Payer: COMMERCIAL

## 2023-08-12 VITALS
RESPIRATION RATE: 18 BRPM | HEART RATE: 78 BPM | SYSTOLIC BLOOD PRESSURE: 135 MMHG | DIASTOLIC BLOOD PRESSURE: 76 MMHG | WEIGHT: 179.9 LBS | OXYGEN SATURATION: 97 % | TEMPERATURE: 98 F

## 2023-08-12 VITALS
TEMPERATURE: 98 F | OXYGEN SATURATION: 97 % | DIASTOLIC BLOOD PRESSURE: 85 MMHG | RESPIRATION RATE: 16 BRPM | HEART RATE: 71 BPM | SYSTOLIC BLOOD PRESSURE: 136 MMHG

## 2023-08-12 DIAGNOSIS — Z90.49 ACQUIRED ABSENCE OF OTHER SPECIFIED PARTS OF DIGESTIVE TRACT: Chronic | ICD-10-CM

## 2023-08-12 DIAGNOSIS — T14.8XXA OTHER INJURY OF UNSPECIFIED BODY REGION, INITIAL ENCOUNTER: Chronic | ICD-10-CM

## 2023-08-12 DIAGNOSIS — Z98.890 OTHER SPECIFIED POSTPROCEDURAL STATES: Chronic | ICD-10-CM

## 2023-08-12 PROCEDURE — 71045 X-RAY EXAM CHEST 1 VIEW: CPT

## 2023-08-12 PROCEDURE — 72070 X-RAY EXAM THORAC SPINE 2VWS: CPT | Mod: 26

## 2023-08-12 PROCEDURE — 93005 ELECTROCARDIOGRAM TRACING: CPT

## 2023-08-12 PROCEDURE — 71045 X-RAY EXAM CHEST 1 VIEW: CPT | Mod: 26

## 2023-08-12 PROCEDURE — 99284 EMERGENCY DEPT VISIT MOD MDM: CPT

## 2023-08-12 PROCEDURE — 72070 X-RAY EXAM THORAC SPINE 2VWS: CPT

## 2023-08-12 PROCEDURE — 99284 EMERGENCY DEPT VISIT MOD MDM: CPT | Mod: 25

## 2023-08-12 RX ORDER — IBUPROFEN 200 MG
600 TABLET ORAL ONCE
Refills: 0 | Status: COMPLETED | OUTPATIENT
Start: 2023-08-12 | End: 2023-08-12

## 2023-08-12 RX ORDER — LIDOCAINE 4 G/100G
1 CREAM TOPICAL ONCE
Refills: 0 | Status: COMPLETED | OUTPATIENT
Start: 2023-08-12 | End: 2023-08-12

## 2023-08-12 RX ADMIN — Medication 600 MILLIGRAM(S): at 16:05

## 2023-08-12 RX ADMIN — LIDOCAINE 1 PATCH: 4 CREAM TOPICAL at 20:21

## 2023-08-12 NOTE — ED PROVIDER NOTE - NSICDXPASTSURGICALHX_GEN_ALL_CORE_FT
PAST SURGICAL HISTORY:  C Section - x 1 - 1994     h/o  Endoscopy     h/o dental implant     H/O vaginal surgery vaginal mesh    History of colectomy     History of Colonoscopy     Torn ligament left thumb July 2020

## 2023-08-12 NOTE — ED PROVIDER NOTE - PHYSICAL EXAMINATION
VITALS: reviewed  GEN: NAD, A & O x 4  HEAD/EYES: NCAT, PERRL, EOMI, anicteric sclerae, no conjunctival pallor  ENT: mucus membranes moist, oropharynx WNL, trachea midline, no JVD  RESP: Mild tenderness over inferior sternum no crepitus, no ecchymosis no obvious deformity. lungs CTA with equal breath sounds bilaterally  CV: heart with reg rhythm S1, S2, no murmur; distal pulses intact and symmetric bilaterally  ABDOMEN: normoactive bowel sounds, soft, nondistended, nontender, no palpable masses  : no CVAT  MSK:  Positive midline thoracic tenderness, there is no spinal deformity or stepoff and the back is ranged painlessly. the neck has no midline tenderness, deformity, or stepoff, and is ranged painlessly. extremities atraumatic and nontender, no edema, no asymmetry.  SKIN: warm, dry, no rash, no bruising, no cyanosis. color appropriate for ethnicity  NEURO: alert, mentating appropriately, no facial asymmetry. gross sensation, motor, coordination are intact  PSYCH: Affect appropriate
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
## 2023-08-12 NOTE — ED PROVIDER NOTE - NSICDXFAMILYHX_GEN_ALL_CORE_FT
FAMILY HISTORY:  Family history of early CAD, Father-45, Paternal Aunt Maternal Uncle    Father  Still living? Unknown  Family history of early CAD, Age at diagnosis: Age Unknown    Grandparent  Still living? No  Family history of colon cancer, Age at diagnosis: Age Unknown    Aunt  Still living? Unknown  Family history of early CAD, Age at diagnosis: Age Unknown    Uncle  Still living? Unknown  Family history of early CAD, Age at diagnosis: Age Unknown
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
## 2023-08-12 NOTE — ED PROVIDER NOTE - PROGRESS NOTE DETAILS
Thoracic X ray did negative for fractures. Waiting on Chest X-ray. Patient endorses mild dizziness which feels like spinning, particularly when standing. Will observe.
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
## 2023-08-12 NOTE — ED ADULT NURSE NOTE - NSFALLHARMRISKINTERV_ED_ALL_ED

## 2023-08-12 NOTE — ED PROVIDER NOTE - PATIENT PORTAL LINK FT
You can access the FollowMyHealth Patient Portal offered by [facility] by registering at the following website: [email or web address]. By joining [employer]’s FollowMyHealth portal, you will also be able to view your health information using other applications (apps) compatible with our system.

## 2023-08-15 ENCOUNTER — APPOINTMENT (OUTPATIENT)
Dept: PAIN MANAGEMENT | Facility: CLINIC | Age: 53
End: 2023-08-15

## 2023-08-22 ENCOUNTER — APPOINTMENT (OUTPATIENT)
Dept: INTERNAL MEDICINE | Facility: CLINIC | Age: 53
End: 2023-08-22

## 2023-08-22 PROBLEM — M54.10 RADICULAR PAIN OF RIGHT LOWER EXTREMITY: Status: ACTIVE | Noted: 2023-07-20

## 2023-08-22 NOTE — HISTORY OF PRESENT ILLNESS
[FreeTextEntry1] : f/u for right leg pain [de-identified] : This is a 52 y/o female with a PMHx of HLD nonobstructive CAD s/p recent cath non obstructive CAD presents to the office today here for f/u. She c/o itching and tingling in her hands yesterday which lasted for 10-15 seconds. She had a f/u with neuro and had an MRI of her back done for her right leg pain. She cannot sleep at night because the pain in her leg still persists, experiences relief when leg is elevated. . Her Bp this morning was 110/70, she has not taken her meds for today yet.  Denies chest pain, SOB, ELIZALDE, dizziness, diaphoresis, palpitations, LE swelling, orthopnea, syncope, n/v, headache.

## 2023-08-22 NOTE — HEALTH RISK ASSESSMENT
[0] : 2) Feeling down, depressed, or hopeless: Not at all (0) [PHQ-2 Negative - No further assessment needed] : PHQ-2 Negative - No further assessment needed [Never] : Never [[id number]] : 0

## 2023-08-24 ENCOUNTER — APPOINTMENT (OUTPATIENT)
Dept: VASCULAR SURGERY | Facility: CLINIC | Age: 53
End: 2023-08-24
Payer: COMMERCIAL

## 2023-08-24 VITALS
BODY MASS INDEX: 36.82 KG/M2 | HEART RATE: 67 BPM | DIASTOLIC BLOOD PRESSURE: 83 MMHG | SYSTOLIC BLOOD PRESSURE: 143 MMHG | HEIGHT: 61 IN | WEIGHT: 195 LBS

## 2023-08-24 PROCEDURE — 99212 OFFICE O/P EST SF 10 MIN: CPT

## 2023-08-24 NOTE — HISTORY OF PRESENT ILLNESS
[FreeTextEntry1] : WARNER REYES (: 1970) is a 53 year old female who presents today for R arm pain.   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 Since then, she has been experiencing R forearm, mostly with activity (shampooing hair, ironing, housework, and to a lesser extent typing). At its worst, the pain is 7 or 8 out of 10. Resolves with rest. No associated R hand/finger pain, color or temperature changes. She also complains of bilateral fingertip numbness, R>L.

## 2023-08-24 NOTE — PHYSICAL EXAM
[Calm] : calm [de-identified] : well appearing female, NAD [de-identified] : unlabored [FreeTextEntry1] : Palpable RUE brachial, ulnar, and snuffbox pulses Non-palpable R radial pulse R hand and finger pink with brisk capillary refill No UE swelling or tenderness

## 2023-08-24 NOTE — ASSESSMENT
[FreeTextEntry1] : A/P   Right radial artery occlusion, likely from access for cardiac cath. RUE otherwise appears well perfused with no concerns for ischemia at this time. Consider alternative etiology for R forearm pain (radiculopathy/neuropathy). Patient to discuss further management with PMD.   Follow up as needed.

## 2023-08-28 ENCOUNTER — APPOINTMENT (OUTPATIENT)
Dept: PHYSICAL MEDICINE AND REHAB | Facility: CLINIC | Age: 53
End: 2023-08-28
Payer: COMMERCIAL

## 2023-08-28 VITALS — OXYGEN SATURATION: 99 % | SYSTOLIC BLOOD PRESSURE: 137 MMHG | DIASTOLIC BLOOD PRESSURE: 83 MMHG | HEART RATE: 77 BPM

## 2023-08-28 PROCEDURE — 99442: CPT
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
## 2023-08-28 NOTE — PHYSICAL EXAM
[FreeTextEntry1] : General exam   Constitutional: The patient appears well-developed, well-nourished, and in no apparent distress. Patient is well-groomed.    Skin: The skin is warm and dry, with normal turgor.  Eyes: PERRL.    ENMT: Ears: Hearing is grossly within normal limits.    Neck: Supple: The neck is supple.    Respiratory: Inspection: Breathing unlabored.    Neurologic: Alert and oriented x 3.   Psychiatric: Patient is cooperative and appropriate.  Mood and affect are normal.  Patient's insight is good, and memory and judgment are intact.

## 2023-08-28 NOTE — HISTORY OF PRESENT ILLNESS
[FreeTextEntry1] : Televisit encounter Patient consented to be evaluated via televisit today using audio Present during today's encounter:  WARNER REYES and myself I was located at: [address] [place] [address]  WARNER REYES is here for follow up. Since last visit patient reports that her pain is note getting better. She had tried Tizanidine but it only made her sleepy. She also notes that the Celebrex upset her stomach. She is asking for something else. She was also given Gabapentin 100mg PO TID.   Denies any new pain, numbness or weakness, bowel/bladder dysfunction, saddle anesthesia, fevers, chills, weight loss, night pain, or night sweats at this time.

## 2023-08-28 NOTE — ASSESSMENT
[FreeTextEntry1] : Ms. MARIANA REYES is a 53 year F with pain in the lower back on the RIGHT with radiation down the leg to the FOOT. She reports chronic long standing pain and is noting an acute on chronic exacerbation of this pain due to lumbar radiculopathy. There are no myelopathic signs on today's exam.  Patient reassured and educated on the diagnosis and treatment options. Risks and benefits of treatment and of delaying treatment discussed with patient. Risks discussed include but not limited to: progression of symptoms, worsening pain and functional status, etc.  This note was generated using Dragon medical dictation software. A reasonable effort had been made for proofreading its contents, but spelling mistakes or grammatical errors may still remain. If there are any questions or points of clarification needed please notify my office.  Stop Celebrex and Tizanidine  Titrating Gabapentin  Patient was prescribed Gabapentin 300mg PO for neuropathic pain. Titration was explained to patient and patient expressed understanding: start QHS for 1 week, then BID for 1 week, then TID. Monitor for sedation, dizziness and any signs of respiratory depression. Avoid taking together with opioid pain medicines and other drugs that depress the central nervous system function. Avoid sudden cessation after prolonged use, due to risk of seizures. Risks and benefits were explained and patient expressed understanding.  Start Ibuprofen 800 mg PO q12 x 30 days PRN pain with food #60 and advised to take together with Famotidine 20mg PO 12 PRN. Denies CKD, CAD, or gastritis. Recommend that if patient develops GI symptoms including abdominal pain, nausea, or vomiting to discontinue use of medication immediately.  Continue for PT for lumbar radiculopathy to help relieve pain and improve function. Stretching, strengthening, ROM, home education and other appropriate interventions. Precautions include fall prevention.  Follow up 1 month If no relief will consider right L5 and S1 TFESI  Total time spent on medical discussion: 11 minutes  Patient was advised if the following symptoms develop: chills, fever, loss of bladder control, bowel incontinence or urinary retention, numbness/tingling or weakness is present in upper or lower extremities, to go to the nearest emergency room. This may be a new clinical condition not present at the time of the patient visit that may lead to paralysis and/or death. Patient advised if the above symptoms developed to also call the office immediately to inform us and to go to the nearest emergency room.

## 2023-08-28 NOTE — REVIEW OF SYSTEMS
[Chest Pain] : no chest pain [Shortness Of Breath] : no shortness of breath [Abdominal Pain] : no abdominal pain [Dysuria] : no dysuria [Negative] : Neurological [FreeTextEntry9] : neck, midback, lower back pains
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
## 2023-09-05 ENCOUNTER — APPOINTMENT (OUTPATIENT)
Dept: CARDIOLOGY | Facility: CLINIC | Age: 53
End: 2023-09-05
Payer: COMMERCIAL

## 2023-09-05 VITALS
SYSTOLIC BLOOD PRESSURE: 120 MMHG | DIASTOLIC BLOOD PRESSURE: 80 MMHG | TEMPERATURE: 97.8 F | OXYGEN SATURATION: 98 % | BODY MASS INDEX: 38.51 KG/M2 | HEIGHT: 61 IN | HEART RATE: 75 BPM | WEIGHT: 204 LBS

## 2023-09-05 DIAGNOSIS — R06.02 SHORTNESS OF BREATH: ICD-10-CM
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
## 2023-09-05 DIAGNOSIS — R82.90 UNSPECIFIED ABNORMAL FINDINGS IN URINE: ICD-10-CM

## 2023-09-05 DIAGNOSIS — R00.2 PALPITATIONS: ICD-10-CM

## 2023-09-05 PROCEDURE — 93000 ELECTROCARDIOGRAM COMPLETE: CPT

## 2023-09-05 PROCEDURE — 99214 OFFICE O/P EST MOD 30 MIN: CPT

## 2023-09-05 RX ORDER — ERGOCALCIFEROL 1.25 MG/1
1.25 MG CAPSULE, LIQUID FILLED ORAL
Qty: 6 | Refills: 0 | Status: ACTIVE | COMMUNITY
Start: 2023-09-05 | End: 1900-01-01

## 2023-09-05 RX ORDER — GABAPENTIN 100 MG/1
100 CAPSULE ORAL
Qty: 90 | Refills: 0 | Status: DISCONTINUED | COMMUNITY
Start: 2023-08-09 | End: 2023-09-05

## 2023-09-05 NOTE — PHYSICAL EXAM
[Well Developed] : well developed [Well Nourished] : well nourished [No Acute Distress] : no acute distress [Normal Conjunctiva] : normal conjunctiva [Normal Venous Pressure] : normal venous pressure [No Carotid Bruit] : no carotid bruit [Normal S1, S2] : normal S1, S2 [No Murmur] : no murmur [No Rub] : no rub [No Gallop] : no gallop [Clear Lung Fields] : clear lung fields [Good Air Entry] : good air entry [No Respiratory Distress] : no respiratory distress  [Soft] : abdomen soft [Non Tender] : non-tender [No Masses/organomegaly] : no masses/organomegaly [Normal Bowel Sounds] : normal bowel sounds [Normal Gait] : normal gait [No Edema] : no edema [No Cyanosis] : no cyanosis [No Clubbing] : no clubbing [No Varicosities] : no varicosities [No Rash] : no rash [No Skin Lesions] : no skin lesions [Moves all extremities] : moves all extremities [No Focal Deficits] : no focal deficits [Normal Speech] : normal speech [Alert and Oriented] : alert and oriented [Normal memory] : normal memory [de-identified] : decreased pulse right radial ulner intact normal distribution and temperature of extremities
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
## 2023-09-08 LAB
APPEARANCE: CLEAR
BACTERIA UR CULT: NORMAL
BACTERIA: NEGATIVE /HPF
BILIRUBIN URINE: NEGATIVE
BLOOD URINE: NEGATIVE
CAST: 0 /LPF
COLOR: YELLOW
EPITHELIAL CELLS: 6 /HPF
GLUCOSE QUALITATIVE U: NEGATIVE MG/DL
KETONES URINE: NEGATIVE MG/DL
LEUKOCYTE ESTERASE URINE: ABNORMAL
MICROSCOPIC-UA: NORMAL
NITRITE URINE: NEGATIVE
PH URINE: 6.5
PROTEIN URINE: NEGATIVE MG/DL
RED BLOOD CELLS URINE: 0 /HPF
SPECIFIC GRAVITY URINE: 1.01
UROBILINOGEN URINE: 0.2 MG/DL
WHITE BLOOD CELLS URINE: 1 /HPF

## 2023-09-14 LAB
25(OH)D3 SERPL-MCNC: 19.5 NG/ML
ALBUMIN SERPL ELPH-MCNC: 4.2 G/DL
ALP BLD-CCNC: 51 U/L
ALT SERPL-CCNC: 17 U/L
ANION GAP SERPL CALC-SCNC: 12 MMOL/L
APPEARANCE: CLEAR
AST SERPL-CCNC: 20 U/L
BACTERIA: NEGATIVE /HPF
BILIRUB SERPL-MCNC: 0.3 MG/DL
BILIRUBIN URINE: NEGATIVE
BLOOD URINE: NEGATIVE
BUN SERPL-MCNC: 16 MG/DL
CALCIUM SERPL-MCNC: 9.4 MG/DL
CAST: 0 /LPF
CHLORIDE SERPL-SCNC: 103 MMOL/L
CHOLEST SERPL-MCNC: 196 MG/DL
CK SERPL-CCNC: 128 U/L
CO2 SERPL-SCNC: 23 MMOL/L
COLOR: NORMAL
CREAT SERPL-MCNC: 0.77 MG/DL
EGFR: 91 ML/MIN/1.73M2
EPITHELIAL CELLS: 8 /HPF
ERYTHROCYTE [SEDIMENTATION RATE] IN BLOOD BY WESTERGREN METHOD: 29 MM/HR
ESTIMATED AVERAGE GLUCOSE: 105 MG/DL
FERRITIN SERPL-MCNC: 30 NG/ML
FOLATE SERPL-MCNC: 12.6 NG/ML
GLUCOSE QUALITATIVE U: NEGATIVE MG/DL
GLUCOSE SERPL-MCNC: 81 MG/DL
HBA1C MFR BLD HPLC: 5.3 %
HDLC SERPL-MCNC: 79 MG/DL
IRON SATN MFR SERPL: 19 %
IRON SERPL-MCNC: 72 UG/DL
KETONES URINE: ABNORMAL MG/DL
LDLC SERPL CALC-MCNC: 89 MG/DL
LDLC SERPL DIRECT ASSAY-MCNC: 89 MG/DL
LEUKOCYTE ESTERASE URINE: ABNORMAL
MICROSCOPIC-UA: NORMAL
NITRITE URINE: NEGATIVE
NONHDLC SERPL-MCNC: 117 MG/DL
PH URINE: 7
POTASSIUM SERPL-SCNC: 4.4 MMOL/L
PROT SERPL-MCNC: 7.5 G/DL
PROTEIN URINE: NORMAL MG/DL
RED BLOOD CELLS URINE: 4 /HPF
SODIUM SERPL-SCNC: 138 MMOL/L
SPECIFIC GRAVITY URINE: >1.03
T4 FREE SERPL-MCNC: 1.4 NG/DL
TIBC SERPL-MCNC: 388 UG/DL
TRANSFERRIN SERPL-MCNC: 283 MG/DL
TRIGL SERPL-MCNC: 168 MG/DL
TSH SERPL-ACNC: 2.42 UIU/ML
UIBC SERPL-MCNC: 316 UG/DL
UROBILINOGEN URINE: 1 MG/DL
VIT B12 SERPL-MCNC: 434 PG/ML
WHITE BLOOD CELLS URINE: 3 /HPF

## 2023-09-18 NOTE — REASON FOR VISIT
A message was left asking the patient to call the  clinic regarding the results of his Holter monitor and follow-up.      One event: One run of ventricular tachycardia lasting 6 beats at a rate of 209 bpm occurring on 08/09/2023 at 8:57 p.m.    Follow-up:  Per Dr Barth, the patient should have a proper Cardiology follow up in regards to his short run of ventricular tachycardia.      [Follow-Up: _____] : a [unfilled] follow-up visit

## 2023-09-20 RX ORDER — FLUTICASONE PROPIONATE 50 UG/1
50 SPRAY, METERED NASAL DAILY
Qty: 1 | Refills: 3 | Status: ACTIVE | COMMUNITY
Start: 2022-05-06 | End: 1900-01-01

## 2023-09-21 NOTE — H&P CARDIOLOGY - BP NONINVASIVE DIASTOLIC (MM HG)
78 General Sunscreen Counseling: I recommended a broad spectrum sunscreen with a SPF of 30 or higher.  I explained that SPF 30 sunscreens block approximately 97 percent of the sun's harmful rays.  Sunscreens should be applied at least 15 minutes prior to expected sun exposure and then every 2 hours after that as long as sun exposure continues. If swimming or exercising sunscreen should be reapplied every 45 minutes to an hour after getting wet or sweating.  One ounce, or the equivalent of a shot glass full of sunscreen, is adequate to protect the skin not covered by a bathing suit. I also recommended a lip balm with a sunscreen as well. Sun protective clothing can be used in lieu of sunscreen but must be worn the entire time you are exposed to the sun's rays. Detail Level: Zone room air

## 2023-09-24 ENCOUNTER — RX RENEWAL (OUTPATIENT)
Age: 53
End: 2023-09-24

## 2023-10-01 ENCOUNTER — RX RENEWAL (OUTPATIENT)
Age: 53
End: 2023-10-01

## 2023-10-03 ENCOUNTER — NON-APPOINTMENT (OUTPATIENT)
Age: 53
End: 2023-10-03

## 2023-10-05 ENCOUNTER — APPOINTMENT (OUTPATIENT)
Dept: PHYSICAL MEDICINE AND REHAB | Facility: CLINIC | Age: 53
End: 2023-10-05

## 2023-10-06 ENCOUNTER — APPOINTMENT (OUTPATIENT)
Dept: INTERNAL MEDICINE | Facility: CLINIC | Age: 53
End: 2023-10-06
Payer: COMMERCIAL

## 2023-10-06 VITALS
WEIGHT: 205 LBS | BODY MASS INDEX: 38.71 KG/M2 | HEART RATE: 78 BPM | SYSTOLIC BLOOD PRESSURE: 120 MMHG | DIASTOLIC BLOOD PRESSURE: 85 MMHG | OXYGEN SATURATION: 99 % | HEIGHT: 61 IN | TEMPERATURE: 98.4 F

## 2023-10-06 PROCEDURE — 99214 OFFICE O/P EST MOD 30 MIN: CPT

## 2023-10-06 RX ORDER — COVID-19 ANTIGEN TEST
KIT MISCELLANEOUS
Qty: 1 | Refills: 0 | Status: ACTIVE | COMMUNITY
Start: 2023-10-06 | End: 1900-01-01

## 2023-10-10 ENCOUNTER — NON-APPOINTMENT (OUTPATIENT)
Age: 53
End: 2023-10-10

## 2023-10-12 ENCOUNTER — TRANSCRIPTION ENCOUNTER (OUTPATIENT)
Age: 53
End: 2023-10-12

## 2023-10-13 NOTE — PATIENT PROFILE ADULT - NSPROMEDSBROUGHTTOHOSP_GEN_A_NUR
Patient has new onset left posterior heel wound post fall on 10/13/2023 at 12:30am.  Caregiver is requesting SN 2x a week visits.   no

## 2023-10-19 ENCOUNTER — APPOINTMENT (OUTPATIENT)
Dept: MAMMOGRAPHY | Facility: CLINIC | Age: 53
End: 2023-10-19
Payer: COMMERCIAL

## 2023-10-19 ENCOUNTER — APPOINTMENT (OUTPATIENT)
Dept: ULTRASOUND IMAGING | Facility: CLINIC | Age: 53
End: 2023-10-19
Payer: COMMERCIAL

## 2023-10-19 PROCEDURE — 77067 SCR MAMMO BI INCL CAD: CPT

## 2023-10-19 PROCEDURE — 77063 BREAST TOMOSYNTHESIS BI: CPT

## 2023-10-19 PROCEDURE — 76641 ULTRASOUND BREAST COMPLETE: CPT | Mod: 50

## 2023-11-02 NOTE — PATIENT PROFILE ADULT - HAS THE PATIENT RECEIVED THE INFLUENZA VACCINE THIS SEASON?
no... Complex Repair And Burow's Graft Text: The defect edges were debeveled with a #15 scalpel blade.  The primary defect was closed partially with a complex linear closure.  Given the location of the defect, shape of the defect, the proximity to free margins and the presence of a standing cone deformity a Burow's graft was deemed most appropriate to repair the remaining defect.  The graft was trimmed to fit the size of the remaining defect.  The graft was then placed in the primary defect, oriented appropriately, and sutured into place.

## 2023-11-03 ENCOUNTER — APPOINTMENT (OUTPATIENT)
Dept: INTERNAL MEDICINE | Facility: CLINIC | Age: 53
End: 2023-11-03
Payer: COMMERCIAL

## 2023-11-03 VITALS — DIASTOLIC BLOOD PRESSURE: 84 MMHG | SYSTOLIC BLOOD PRESSURE: 122 MMHG

## 2023-11-03 VITALS
TEMPERATURE: 97.9 F | BODY MASS INDEX: 40.64 KG/M2 | HEIGHT: 60 IN | DIASTOLIC BLOOD PRESSURE: 80 MMHG | SYSTOLIC BLOOD PRESSURE: 120 MMHG | HEART RATE: 73 BPM | OXYGEN SATURATION: 98 % | WEIGHT: 207 LBS

## 2023-11-03 VITALS — DIASTOLIC BLOOD PRESSURE: 82 MMHG | SYSTOLIC BLOOD PRESSURE: 122 MMHG

## 2023-11-03 DIAGNOSIS — G47.33 OBSTRUCTIVE SLEEP APNEA (ADULT) (PEDIATRIC): ICD-10-CM

## 2023-11-03 DIAGNOSIS — M53.9 DORSOPATHY, UNSPECIFIED: ICD-10-CM

## 2023-11-03 DIAGNOSIS — D36.9 BENIGN NEOPLASM, UNSPECIFIED SITE: ICD-10-CM

## 2023-11-03 DIAGNOSIS — R42 DIZZINESS AND GIDDINESS: ICD-10-CM

## 2023-11-03 DIAGNOSIS — I10 ESSENTIAL (PRIMARY) HYPERTENSION: ICD-10-CM

## 2023-11-03 DIAGNOSIS — Z12.9 ENCOUNTER FOR SCREENING FOR MALIGNANT NEOPLASM, SITE UNSPECIFIED: ICD-10-CM

## 2023-11-03 PROCEDURE — 99214 OFFICE O/P EST MOD 30 MIN: CPT

## 2023-11-03 RX ORDER — IBUPROFEN 800 MG/1
800 TABLET, FILM COATED ORAL
Qty: 60 | Refills: 0 | Status: DISCONTINUED | COMMUNITY
Start: 2023-08-28 | End: 2023-11-03

## 2023-11-03 RX ORDER — SEMAGLUTIDE 0.25 MG/.5ML
0.25 INJECTION, SOLUTION SUBCUTANEOUS
Qty: 1 | Refills: 0 | Status: DISCONTINUED | COMMUNITY
Start: 2023-10-06 | End: 2023-11-03

## 2023-11-05 PROBLEM — R42 DIZZINESS, NONSPECIFIC: Status: ACTIVE | Noted: 2022-07-27

## 2023-11-05 PROBLEM — D36.9 MICROADENOMA: Status: ACTIVE | Noted: 2023-11-03

## 2023-11-05 PROBLEM — I10 BENIGN ESSENTIAL HYPERTENSION: Status: ACTIVE | Noted: 2018-10-25

## 2023-11-05 PROBLEM — M53.9 MULTILEVEL DEGENERATIVE DISC DISEASE: Status: ACTIVE | Noted: 2023-07-07

## 2023-11-14 ENCOUNTER — APPOINTMENT (OUTPATIENT)
Dept: ENDOCRINOLOGY | Facility: CLINIC | Age: 53
End: 2023-11-14
Payer: COMMERCIAL

## 2023-11-14 VITALS
TEMPERATURE: 97.2 F | DIASTOLIC BLOOD PRESSURE: 77 MMHG | BODY MASS INDEX: 40.43 KG/M2 | SYSTOLIC BLOOD PRESSURE: 129 MMHG | OXYGEN SATURATION: 98 % | HEART RATE: 74 BPM | WEIGHT: 207 LBS | RESPIRATION RATE: 14 BRPM

## 2023-11-14 DIAGNOSIS — E66.01 MORBID (SEVERE) OBESITY DUE TO EXCESS CALORIES: ICD-10-CM

## 2023-11-14 PROCEDURE — 99214 OFFICE O/P EST MOD 30 MIN: CPT | Mod: 25

## 2023-11-14 PROCEDURE — 36415 COLL VENOUS BLD VENIPUNCTURE: CPT

## 2023-11-14 RX ORDER — TIZANIDINE 2 MG/1
2 TABLET ORAL
Qty: 45 | Refills: 0 | Status: COMPLETED | COMMUNITY
Start: 2023-08-03 | End: 2023-11-14

## 2023-11-14 RX ORDER — GABAPENTIN 300 MG/1
300 CAPSULE ORAL 3 TIMES DAILY
Qty: 90 | Refills: 1 | Status: COMPLETED | COMMUNITY
Start: 2023-08-28 | End: 2023-11-14

## 2023-11-14 RX ORDER — FAMOTIDINE 20 MG/1
20 TABLET, FILM COATED ORAL
Qty: 60 | Refills: 1 | Status: COMPLETED | COMMUNITY
Start: 2023-08-28 | End: 2023-11-14

## 2023-11-14 RX ORDER — CELECOXIB 100 MG/1
100 CAPSULE ORAL TWICE DAILY
Qty: 60 | Refills: 0 | Status: COMPLETED | COMMUNITY
Start: 2023-08-03 | End: 2023-11-14

## 2023-11-21 LAB
25(OH)D3 SERPL-MCNC: 19 NG/ML
ALBUMIN SERPL ELPH-MCNC: 4.5 G/DL
ALP BLD-CCNC: 57 U/L
ALT SERPL-CCNC: 17 U/L
ANION GAP SERPL CALC-SCNC: 11 MMOL/L
AST SERPL-CCNC: 15 U/L
BASOPHILS # BLD AUTO: 0.05 K/UL
BASOPHILS NFR BLD AUTO: 1 %
BILIRUB SERPL-MCNC: 0.4 MG/DL
BUN SERPL-MCNC: 11 MG/DL
CALCIUM SERPL-MCNC: 10.1 MG/DL
CHLORIDE SERPL-SCNC: 105 MMOL/L
CHOLEST SERPL-MCNC: 217 MG/DL
CO2 SERPL-SCNC: 26 MMOL/L
CORTIS SERPL-MCNC: 5.9 UG/DL
CREAT SERPL-MCNC: 0.79 MG/DL
EGFR: 88 ML/MIN/1.73M2
EOSINOPHIL # BLD AUTO: 0.15 K/UL
EOSINOPHIL NFR BLD AUTO: 2.9 %
ESTIMATED AVERAGE GLUCOSE: 105 MG/DL
GLUCOSE SERPL-MCNC: 90 MG/DL
HBA1C MFR BLD HPLC: 5.3 %
HCT VFR BLD CALC: 42.2 %
HDLC SERPL-MCNC: 91 MG/DL
HGB BLD-MCNC: 13.7 G/DL
IGF-1 INTERP: NORMAL
IGF-I BLD-MCNC: 95 NG/ML
IMM GRANULOCYTES NFR BLD AUTO: 0.2 %
LDLC SERPL CALC-MCNC: 106 MG/DL
LYMPHOCYTES # BLD AUTO: 1.88 K/UL
LYMPHOCYTES NFR BLD AUTO: 36.9 %
MAN DIFF?: NORMAL
MCHC RBC-ENTMCNC: 32.1 PG
MCHC RBC-ENTMCNC: 32.5 GM/DL
MCV RBC AUTO: 98.8 FL
MONOCYTES # BLD AUTO: 0.57 K/UL
MONOCYTES NFR BLD AUTO: 11.2 %
NEUTROPHILS # BLD AUTO: 2.44 K/UL
NEUTROPHILS NFR BLD AUTO: 47.8 %
NONHDLC SERPL-MCNC: 127 MG/DL
PLATELET # BLD AUTO: 316 K/UL
POTASSIUM SERPL-SCNC: 4.9 MMOL/L
PROLACTIN SERPL-MCNC: 7.8 NG/ML
PROT SERPL-MCNC: 7.2 G/DL
RBC # BLD: 4.27 M/UL
RBC # FLD: 13.1 %
SODIUM SERPL-SCNC: 141 MMOL/L
T4 FREE SERPL-MCNC: 1.5 NG/DL
TRIGL SERPL-MCNC: 123 MG/DL
TSH SERPL-ACNC: 1.7 UIU/ML
VIT B12 SERPL-MCNC: 447 PG/ML
WBC # FLD AUTO: 5.1 K/UL

## 2023-11-28 RX ORDER — LEVOTHYROXINE SODIUM 0.1 MG/1
100 TABLET ORAL DAILY
Qty: 90 | Refills: 2 | Status: ACTIVE | COMMUNITY
Start: 2020-05-26 | End: 1900-01-01

## 2023-11-29 RX ORDER — TIRZEPATIDE 2.5 MG/.5ML
2.5 INJECTION, SOLUTION SUBCUTANEOUS
Qty: 1 | Refills: 1 | Status: DISCONTINUED | COMMUNITY
Start: 2023-11-29 | End: 2023-11-29

## 2023-11-29 RX ORDER — LEVOTHYROXINE SODIUM 100 UG/1
100 TABLET ORAL
Qty: 1 | Refills: 2 | Status: ACTIVE | COMMUNITY
Start: 2023-11-29 | End: 2024-08-25

## 2023-12-03 LAB
ACTH SER-ACNC: 10.8 PG/ML
ACTH STIM ACTH BASELINE: 11.8 PG/ML
CORTIS SERPL-MCNC: 13.9 UG/DL

## 2023-12-11 ENCOUNTER — APPOINTMENT (OUTPATIENT)
Dept: NEUROLOGY | Facility: CLINIC | Age: 53
End: 2023-12-11
Payer: COMMERCIAL

## 2023-12-11 ENCOUNTER — APPOINTMENT (OUTPATIENT)
Dept: OTOLARYNGOLOGY | Facility: CLINIC | Age: 53
End: 2023-12-11
Payer: COMMERCIAL

## 2023-12-11 ENCOUNTER — APPOINTMENT (OUTPATIENT)
Dept: CARDIOLOGY | Facility: CLINIC | Age: 53
End: 2023-12-11
Payer: COMMERCIAL

## 2023-12-11 VITALS
DIASTOLIC BLOOD PRESSURE: 82 MMHG | HEIGHT: 61 IN | WEIGHT: 195 LBS | SYSTOLIC BLOOD PRESSURE: 142 MMHG | BODY MASS INDEX: 36.82 KG/M2 | HEART RATE: 67 BPM

## 2023-12-11 VITALS
SYSTOLIC BLOOD PRESSURE: 117 MMHG | DIASTOLIC BLOOD PRESSURE: 66 MMHG | HEART RATE: 73 BPM | WEIGHT: 206 LBS | TEMPERATURE: 97.7 F | HEIGHT: 61 IN | BODY MASS INDEX: 38.89 KG/M2

## 2023-12-11 VITALS
DIASTOLIC BLOOD PRESSURE: 70 MMHG | BODY MASS INDEX: 39.27 KG/M2 | HEIGHT: 61 IN | TEMPERATURE: 97 F | WEIGHT: 208 LBS | SYSTOLIC BLOOD PRESSURE: 130 MMHG | RESPIRATION RATE: 18 BRPM | HEART RATE: 93 BPM | OXYGEN SATURATION: 98 %

## 2023-12-11 DIAGNOSIS — M54.16 RADICULOPATHY, LUMBAR REGION: ICD-10-CM

## 2023-12-11 DIAGNOSIS — R09.81 NASAL CONGESTION: ICD-10-CM

## 2023-12-11 DIAGNOSIS — G62.9 POLYNEUROPATHY, UNSPECIFIED: ICD-10-CM

## 2023-12-11 DIAGNOSIS — Z13.228 ENCOUNTER FOR SCREENING FOR OTHER METABOLIC DISORDERS: ICD-10-CM

## 2023-12-11 DIAGNOSIS — E55.9 VITAMIN D DEFICIENCY, UNSPECIFIED: ICD-10-CM

## 2023-12-11 DIAGNOSIS — H93.13 TINNITUS, BILATERAL: ICD-10-CM

## 2023-12-11 DIAGNOSIS — R20.9 UNSPECIFIED DISTURBANCES OF SKIN SENSATION: ICD-10-CM

## 2023-12-11 PROCEDURE — 31231 NASAL ENDOSCOPY DX: CPT

## 2023-12-11 PROCEDURE — 99215 OFFICE O/P EST HI 40 MIN: CPT

## 2023-12-11 PROCEDURE — 92557 COMPREHENSIVE HEARING TEST: CPT

## 2023-12-11 PROCEDURE — 99214 OFFICE O/P EST MOD 30 MIN: CPT

## 2023-12-11 PROCEDURE — 92567 TYMPANOMETRY: CPT

## 2023-12-11 PROCEDURE — 93000 ELECTROCARDIOGRAM COMPLETE: CPT

## 2023-12-11 PROCEDURE — 99213 OFFICE O/P EST LOW 20 MIN: CPT | Mod: 25

## 2023-12-11 RX ORDER — BUPROPION HYDROCHLORIDE 100 MG/1
100 TABLET, FILM COATED, EXTENDED RELEASE ORAL DAILY
Qty: 30 | Refills: 0 | Status: ACTIVE | COMMUNITY
Start: 2023-12-11 | End: 1900-01-01

## 2023-12-11 RX ORDER — FLUTICASONE PROPIONATE 50 UG/1
50 SPRAY, METERED NASAL DAILY
Qty: 3 | Refills: 3 | Status: ACTIVE | COMMUNITY
Start: 2023-12-11 | End: 1900-01-01

## 2023-12-11 RX ORDER — METHYLPREDNISOLONE 4 MG/1
4 TABLET ORAL
Qty: 1 | Refills: 0 | Status: ACTIVE | COMMUNITY
Start: 2023-12-11 | End: 1900-01-01

## 2023-12-13 RX ORDER — METHYLPREDNISOLONE 4 MG/1
4 TABLET ORAL
Qty: 1 | Refills: 0 | Status: ACTIVE | COMMUNITY
Start: 2023-12-13 | End: 1900-01-01

## 2023-12-13 RX ORDER — FLUTICASONE PROPIONATE 50 UG/1
50 SPRAY, METERED NASAL DAILY
Qty: 3 | Refills: 3 | Status: ACTIVE | COMMUNITY
Start: 2023-12-13 | End: 1900-01-01

## 2024-01-04 ENCOUNTER — RX RENEWAL (OUTPATIENT)
Age: 54
End: 2024-01-04

## 2024-01-05 NOTE — H&P ADULT - ASSESSMENT
Please review ASSESSMENT:  James is a very pleasant 50 year old lady with multiple prior episodes of diverticulitis, presenting with acute uncomplicated diverticulitis    PLAN:  - Admit to surgery, Dr. Medrano  - NPO, IVF  - IV Abx  - ID consult for recurrent flare while on Ertapenem  - Pain control  - OR planning   - Case discussed with surgical attending       Orion Dozier, PGY 3  Surgery [phone]

## 2024-01-10 ENCOUNTER — OUTPATIENT (OUTPATIENT)
Dept: OUTPATIENT SERVICES | Facility: HOSPITAL | Age: 54
LOS: 1 days | End: 2024-01-10
Payer: COMMERCIAL

## 2024-01-10 ENCOUNTER — APPOINTMENT (OUTPATIENT)
Dept: MRI IMAGING | Facility: CLINIC | Age: 54
End: 2024-01-10
Payer: COMMERCIAL

## 2024-01-10 ENCOUNTER — RESULT REVIEW (OUTPATIENT)
Age: 54
End: 2024-01-10

## 2024-01-10 DIAGNOSIS — Z98.890 OTHER SPECIFIED POSTPROCEDURAL STATES: Chronic | ICD-10-CM

## 2024-01-10 DIAGNOSIS — Z90.49 ACQUIRED ABSENCE OF OTHER SPECIFIED PARTS OF DIGESTIVE TRACT: Chronic | ICD-10-CM

## 2024-01-10 DIAGNOSIS — T14.8XXA OTHER INJURY OF UNSPECIFIED BODY REGION, INITIAL ENCOUNTER: Chronic | ICD-10-CM

## 2024-01-10 DIAGNOSIS — E23.7 DISORDER OF PITUITARY GLAND, UNSPECIFIED: ICD-10-CM

## 2024-01-10 PROCEDURE — 70553 MRI BRAIN STEM W/O & W/DYE: CPT | Mod: 26

## 2024-01-10 PROCEDURE — A9585: CPT

## 2024-01-10 PROCEDURE — 70553 MRI BRAIN STEM W/O & W/DYE: CPT

## 2024-01-26 ENCOUNTER — EMERGENCY (EMERGENCY)
Facility: HOSPITAL | Age: 54
LOS: 1 days | Discharge: HOME | End: 2024-01-26
Attending: EMERGENCY MEDICINE
Payer: COMMERCIAL

## 2024-01-26 VITALS
HEART RATE: 80 BPM | RESPIRATION RATE: 16 BRPM | WEIGHT: 195.11 LBS | SYSTOLIC BLOOD PRESSURE: 147 MMHG | OXYGEN SATURATION: 97 % | DIASTOLIC BLOOD PRESSURE: 88 MMHG | HEIGHT: 61 IN | TEMPERATURE: 98 F

## 2024-01-26 DIAGNOSIS — Z98.890 OTHER SPECIFIED POSTPROCEDURAL STATES: Chronic | ICD-10-CM

## 2024-01-26 DIAGNOSIS — T14.8XXA OTHER INJURY OF UNSPECIFIED BODY REGION, INITIAL ENCOUNTER: Chronic | ICD-10-CM

## 2024-01-26 DIAGNOSIS — Z90.49 ACQUIRED ABSENCE OF OTHER SPECIFIED PARTS OF DIGESTIVE TRACT: Chronic | ICD-10-CM

## 2024-01-26 PROCEDURE — 99285 EMERGENCY DEPT VISIT HI MDM: CPT

## 2024-01-26 NOTE — ED PROVIDER NOTE - CLINICAL SUMMARY MEDICAL DECISION MAKING FREE TEXT BOX
53-year-old female history of hypertension, hyperlipidemia, MARIAN, hypothyroidism, diverticulitis, IBS, palpitations, presents for evaluation for left-sided chest pain and left-sided abdominal pain that has been intermittent for the last week associated with shortness of breath.  Patient states the chest pain is sharp in nature and radiates to the back.     Patient is afebrile, hemodynamically stable.    Differential diagnosis includes but is not limited to ACS versus pulm embolism versus dissection versus pneumonia versus diverticulitis versus colitis versus electrolyte derangement versus urinary tract infection versus  viral syndrome. patient states she develops a rash to IV contrast, states she has tolerated IV contrast studies in the past with premedication protocol.  Is amenable to receiving a CAT scan with IV contrast and premedication protocol.  Will obtain a CTA chest abdomen pelvis to evaluate for dissection versus PE versus intra-abdominal intrathoracic causes of patient's pain.  Will obtain labs including troponin and lipase, D-dimer thank you, also obtain chest x-ray.  Zofran for nausea.  Dispo pending findings.

## 2024-01-26 NOTE — ED PROVIDER NOTE - PATIENT PORTAL LINK FT
normal (ped)... You can access the FollowMyHealth Patient Portal offered by [facility] by registering at the following website: [email or web address]. By joining [facility]’s FollowMyHealth portal, you will also be able to view your health information using other applications (apps) compatible with our system.

## 2024-01-26 NOTE — ED PROVIDER NOTE - PHYSICAL EXAMINATION
GENERAL: well appearing in no acute distress  HEAD: normocephalic, atraumatic  HEENT: normal conjunctiva, oral mucosa moist,   CARDIAC: regular rate and rhythm, no appreciable murmurs, 2+ pulses in UE/LE b/l  PULM: normal breath sounds, clear to ascultation bilaterally, no rales, rhonchi, wheezing  GI: abdomen nondistended, soft, nontender, no guarding, rebound tenderness  NEURO: AAOx3  MSK: no peripheral edema, no calf tenderness b/l  SKIN: well-perfused, extremities warm, no visible rashes  PSYCH: appropriate mood and affect

## 2024-01-26 NOTE — ED PROVIDER NOTE - OBJECTIVE STATEMENT
53-year-old female history of hypertension, hyperlipidemia, MARIAN, hypothyroidism, diverticulitis, IBS, palpitations, presents for evaluation for left-sided chest pain and left-sided abdominal pain that has been intermittent for the last week associated with shortness of breath.  Patient states the chest pain is sharp in nature and radiates to the back.  Patient reports that she has a family history of early cardiac death in her father side, has had multiple family members on her dad side of the family develop severe heart disease and or die from heart related issues in their 40s and 50s.  Patient has has never had any heart attacks or known coronary artery disease in the past.  Has been told that she has palpitations, is on medication for management.  pt c/o nausea. Patient denies any fever, cough, congestion, vomiting, diarrhea, black or bloody stools, urinary symptoms.

## 2024-01-26 NOTE — ED ADULT TRIAGE NOTE - MODE OF ARRIVAL
Walk in [place] Complex Repair Preamble Text (Leave Blank If You Do Not Want): Extensive wide undermining was performed.

## 2024-01-26 NOTE — ED PROVIDER NOTE - PROGRESS NOTE DETAILS
Valarie Castellanos MD, PGY2:    Called to bedside to evaluate a pruritic rash on the posterior right shoulder after receiving IV contrast.  An area of erythema on the right posterior shoulder that is blanchable to palpation, no other areas of rashes noted on body, patient also describing chest pressure.  Lungs are clear to auscultation without wheezing, airways patent without evidence of swelling in the oropharynx or the face, patient is tachycardic to 115.  Will give 50 mg of Benadryl IV and 40 mg of Solu-Medrol, in addition to 20 mg of Pepcid.  Will continue to monitor patient. Attending MD Pino: Called to bedside, patient with singular urticarial hive on R scapula, no other rash.  Then patient reports feeling like her throat is tightening.  Epi given, Solu-medrol given, Benadryl given.  Will continue to monitor. Lukas Acharya MD PGY2: pt signed out to me at sign out. Now with nonactionable labs, ct without aortic etiology. With reaction following contrast, now feeling improved, to monitor. If no recurrence/decompensation to dc. Valarie Castellanos MD, PGY2:    Called to bedside to evaluate a pruritic rash on the posterior right shoulder after receiving IV contrast.  An area of erythema on the right posterior shoulder that is blanchable to palpation, no other areas of rashes noted on body, patient also describing chest pressure.  Lungs are clear to auscultation without wheezing, airways patent without evidence of swelling in the oropharynx or the face, patient is tachycardic to 115.  Will give 50 mg of Benadryl IV and 40 mg of Solu-Medrol, in addition to 20 mg of Pepcid.  Will continue to monitor patient. may need to give EPi if sx worsen. Lukas Acharya MD PGY2: Reassessed patient, patient continues to feel improved without any throat tightness or difficulty breathing.  Discussed results, discharge, follow-up, return precautions.  Patient understands and is minimal at this time.

## 2024-01-26 NOTE — ED PROVIDER NOTE - NSFOLLOWUPINSTRUCTIONS_ED_ALL_ED_FT
You were seen in the ED for chest pain    Your evaluation showed no findings requiring further evaluation or treatment in the hospital at this time.    Please follow up with your PCP as discussed within 1 week.  Also follow-up with your cardiologist within 1 week.  Discuss your symptoms, results which are included in this packet.    Seek immediate medical attention if you experience new or worsening symptoms, new or worsening chest pain, new sweatiness or nausea or dizziness or loss of consciousness.  No shortness of breath or difficulty breathing or throat tightness

## 2024-01-26 NOTE — ED PROVIDER NOTE - ATTENDING CONTRIBUTION TO CARE
Attending MD Pino: I personally have seen and examined this patient.  Resident note reviewed and agree on plan of care and except where noted.  See below for details.     Seen in [facility]  Allergy: Iodine allergy (distant >30 yrs, "hives"), Zosyn ("hives")  Cards: Dr. Valenzuela  PMD: Dr. Alcantara    53F with PMH/PSH including HTN on Amlodipine, on ASA 81mg, hypothyroidism on Synthroid, HLD on Crestor, MARIAN on CPAP qHS, seizures "silent" not on anti-epileptic (dc'ed months ago), diverticulitis s/p laparoscopic hand assisted low anterior resection (20 Dr. Medrano), s/p C section presents to the ED with chest pain.  Reports last spring had chest pain and had angio and had heart spasm.  Reports at that time had 50% blockage.  Reports complicated by "damage to the artery" and developing a "blood clot".  Reports was on Ranolazine.  Reports stopped Ranolazine in 2023.  Reports had not had chest pain until about a week ago.  Reports pain is mid sternal, sharp severe pain.  Reports pain has been constant with periods where pain is sharper.  Reports pain feels like someone is stabbing her through mid chest into back.  Reports had associated shortness of breath one of the nights (can't remember which) while she was wearing CPAP.  Denies shortness of breath at any other time.  Reports developed LE edema last spring but unchanged since then.  Denies fevers, URI symptoms, reports nausea, denies vomiting, diarrhea.  Reports chronic constipation.   Reports last BM yesterday, pellets, reports has IBS.  Denies bloody or black stools.  Reports FHx of father first MI at age 45,  at age 67 from "arrhythmia".  Reports maternal uncles were in early 40s with first heart attack.  Reports paternal aunts needed heart transplant and other had quadruple bypass. Attending MD Pino: I personally have seen and examined this patient.  Resident note reviewed and agree on plan of care and except where noted.  See below for details.     Seen in [facility]  Allergy: Iodine allergy (distant >30 yrs, "hives"), Zosyn ("hives")  Cards: Dr. Valenzuela  PMD: Dr. Alcantara    53F with PMH/PSH including HTN on Amlodipine, on ASA 81mg, hypothyroidism on Synthroid, HLD on Crestor, MARIAN on CPAP qHS, seizures "silent" not on anti-epileptic (dc'ed months ago), diverticulitis s/p laparoscopic hand assisted low anterior resection (20 Dr. Medrano), s/p C section presents to the ED with chest pain.  Reports last spring had chest pain and had angio and had heart spasm.  Reports at that time had 50% blockage.  Reports complicated by "damage to the artery" and developing a "blood clot".  Reports was on Ranolazine.  Reports stopped Ranolazine in 2023.  Reports had not had chest pain until about a week ago.  Reports pain is mid sternal, sharp severe pain.  Reports pain has been constant with periods where pain is sharper.  Reports pain feels like someone is stabbing her through mid chest into back.  Reports had associated shortness of breath one of the nights (can't remember which) while she was wearing CPAP.  Denies shortness of breath at any other time.  Reports developed LE edema last spring but unchanged since then.  Denies fevers, URI symptoms, reports nausea, denies vomiting, diarrhea.  Reports chronic constipation.   Reports last BM yesterday, pellets, reports has IBS.  Denies bloody or black stools.  Reports FHx of father first MI at age 45,  at age 69 from "arrhythmia".  Reports maternal uncles were in early 40s with first heart attack.  Reports paternal aunts needed heart transplant and other had quadruple bypass.    Exam:   General: NAD  HENT: head NCAT, airway patent  Eyes: anicteric, no conjunctival injection   Lungs: lungs CTAB with good inspiratory effort, no wheezing, no rhonchi, no rales  Cardiac: +S1S2, no obvious m/r/g  GI: abdomen soft with +BS, +tenderness to palpation at LUQ, no rebound, no guarding, ND  : no CVAT  MSK: ranging neck and extremities freely, no calf tenderness, swelling, erythema or warmth   Neuro: moving all extremities spontaneously, nonfocal  Psych: normal mood and affect     A/P: 53F with chest and abdominal pain, radiating to back, will evaluate for but not limited to aortic pathology, ACS/unstable angina, Prinzmetal's angina, will obtain CTA CAP, EKG, CXR, labs including trop, will keep on monitor, will pretreat given reported allergy

## 2024-01-27 VITALS
TEMPERATURE: 98 F | DIASTOLIC BLOOD PRESSURE: 77 MMHG | RESPIRATION RATE: 18 BRPM | HEART RATE: 95 BPM | OXYGEN SATURATION: 95 % | SYSTOLIC BLOOD PRESSURE: 117 MMHG

## 2024-01-27 LAB
ALBUMIN SERPL ELPH-MCNC: 4.6 G/DL — SIGNIFICANT CHANGE UP (ref 3.3–5)
ALP SERPL-CCNC: 59 U/L — SIGNIFICANT CHANGE UP (ref 40–120)
ALT FLD-CCNC: 24 U/L — SIGNIFICANT CHANGE UP (ref 10–45)
ANION GAP SERPL CALC-SCNC: 14 MMOL/L — SIGNIFICANT CHANGE UP (ref 5–17)
APTT BLD: 28.4 SEC — SIGNIFICANT CHANGE UP (ref 24.5–35.6)
AST SERPL-CCNC: 25 U/L — SIGNIFICANT CHANGE UP (ref 10–40)
BASOPHILS # BLD AUTO: 0.07 K/UL — SIGNIFICANT CHANGE UP (ref 0–0.2)
BASOPHILS NFR BLD AUTO: 0.8 % — SIGNIFICANT CHANGE UP (ref 0–2)
BILIRUB SERPL-MCNC: 0.6 MG/DL — SIGNIFICANT CHANGE UP (ref 0.2–1.2)
BUN SERPL-MCNC: 11 MG/DL — SIGNIFICANT CHANGE UP (ref 7–23)
CALCIUM SERPL-MCNC: 10.2 MG/DL — SIGNIFICANT CHANGE UP (ref 8.4–10.5)
CHLORIDE SERPL-SCNC: 105 MMOL/L — SIGNIFICANT CHANGE UP (ref 96–108)
CO2 SERPL-SCNC: 25 MMOL/L — SIGNIFICANT CHANGE UP (ref 22–31)
CREAT SERPL-MCNC: 0.71 MG/DL — SIGNIFICANT CHANGE UP (ref 0.5–1.3)
EGFR: 100 ML/MIN/1.73M2 — SIGNIFICANT CHANGE UP
EOSINOPHIL # BLD AUTO: 0.14 K/UL — SIGNIFICANT CHANGE UP (ref 0–0.5)
EOSINOPHIL NFR BLD AUTO: 1.6 % — SIGNIFICANT CHANGE UP (ref 0–6)
GLUCOSE SERPL-MCNC: 91 MG/DL — SIGNIFICANT CHANGE UP (ref 70–99)
HCT VFR BLD CALC: 42.3 % — SIGNIFICANT CHANGE UP (ref 34.5–45)
HGB BLD-MCNC: 14 G/DL — SIGNIFICANT CHANGE UP (ref 11.5–15.5)
IMM GRANULOCYTES NFR BLD AUTO: 0.3 % — SIGNIFICANT CHANGE UP (ref 0–0.9)
INR BLD: 1.04 RATIO — SIGNIFICANT CHANGE UP (ref 0.85–1.18)
LYMPHOCYTES # BLD AUTO: 3.22 K/UL — SIGNIFICANT CHANGE UP (ref 1–3.3)
LYMPHOCYTES # BLD AUTO: 35.7 % — SIGNIFICANT CHANGE UP (ref 13–44)
MCHC RBC-ENTMCNC: 31 PG — SIGNIFICANT CHANGE UP (ref 27–34)
MCHC RBC-ENTMCNC: 33.1 GM/DL — SIGNIFICANT CHANGE UP (ref 32–36)
MCV RBC AUTO: 93.6 FL — SIGNIFICANT CHANGE UP (ref 80–100)
MONOCYTES # BLD AUTO: 0.92 K/UL — HIGH (ref 0–0.9)
MONOCYTES NFR BLD AUTO: 10.2 % — SIGNIFICANT CHANGE UP (ref 2–14)
NEUTROPHILS # BLD AUTO: 4.65 K/UL — SIGNIFICANT CHANGE UP (ref 1.8–7.4)
NEUTROPHILS NFR BLD AUTO: 51.4 % — SIGNIFICANT CHANGE UP (ref 43–77)
NRBC # BLD: 0 /100 WBCS — SIGNIFICANT CHANGE UP (ref 0–0)
PLATELET # BLD AUTO: 265 K/UL — SIGNIFICANT CHANGE UP (ref 150–400)
POTASSIUM SERPL-MCNC: 4.5 MMOL/L — SIGNIFICANT CHANGE UP (ref 3.5–5.3)
POTASSIUM SERPL-SCNC: 4.5 MMOL/L — SIGNIFICANT CHANGE UP (ref 3.5–5.3)
PROT SERPL-MCNC: 7.7 G/DL — SIGNIFICANT CHANGE UP (ref 6–8.3)
PROTHROM AB SERPL-ACNC: 10.9 SEC — SIGNIFICANT CHANGE UP (ref 9.5–13)
RBC # BLD: 4.52 M/UL — SIGNIFICANT CHANGE UP (ref 3.8–5.2)
RBC # FLD: 12.6 % — SIGNIFICANT CHANGE UP (ref 10.3–14.5)
SODIUM SERPL-SCNC: 144 MMOL/L — SIGNIFICANT CHANGE UP (ref 135–145)
TROPONIN T, HIGH SENSITIVITY RESULT: <6 NG/L — SIGNIFICANT CHANGE UP (ref 0–51)
WBC # BLD: 9.03 K/UL — SIGNIFICANT CHANGE UP (ref 3.8–10.5)
WBC # FLD AUTO: 9.03 K/UL — SIGNIFICANT CHANGE UP (ref 3.8–10.5)

## 2024-01-27 PROCEDURE — 71045 X-RAY EXAM CHEST 1 VIEW: CPT

## 2024-01-27 PROCEDURE — 96375 TX/PRO/DX INJ NEW DRUG ADDON: CPT | Mod: XU

## 2024-01-27 PROCEDURE — 85025 COMPLETE CBC W/AUTO DIFF WBC: CPT

## 2024-01-27 PROCEDURE — 85379 FIBRIN DEGRADATION QUANT: CPT

## 2024-01-27 PROCEDURE — 74174 CTA ABD&PLVS W/CONTRAST: CPT | Mod: 26,MA

## 2024-01-27 PROCEDURE — 84484 ASSAY OF TROPONIN QUANT: CPT

## 2024-01-27 PROCEDURE — 83880 ASSAY OF NATRIURETIC PEPTIDE: CPT

## 2024-01-27 PROCEDURE — 71275 CT ANGIOGRAPHY CHEST: CPT | Mod: 26,MA

## 2024-01-27 PROCEDURE — 85610 PROTHROMBIN TIME: CPT

## 2024-01-27 PROCEDURE — 96374 THER/PROPH/DIAG INJ IV PUSH: CPT | Mod: XU

## 2024-01-27 PROCEDURE — 99285 EMERGENCY DEPT VISIT HI MDM: CPT | Mod: 25

## 2024-01-27 PROCEDURE — 96376 TX/PRO/DX INJ SAME DRUG ADON: CPT | Mod: XU

## 2024-01-27 PROCEDURE — 80053 COMPREHEN METABOLIC PANEL: CPT

## 2024-01-27 PROCEDURE — 96372 THER/PROPH/DIAG INJ SC/IM: CPT | Mod: XU

## 2024-01-27 PROCEDURE — 85730 THROMBOPLASTIN TIME PARTIAL: CPT

## 2024-01-27 PROCEDURE — 93005 ELECTROCARDIOGRAM TRACING: CPT

## 2024-01-27 PROCEDURE — 71275 CT ANGIOGRAPHY CHEST: CPT | Mod: MA

## 2024-01-27 PROCEDURE — 71045 X-RAY EXAM CHEST 1 VIEW: CPT | Mod: 26

## 2024-01-27 PROCEDURE — 74174 CTA ABD&PLVS W/CONTRAST: CPT | Mod: MA

## 2024-01-27 RX ORDER — DIPHENHYDRAMINE HCL 50 MG
50 CAPSULE ORAL ONCE
Refills: 0 | Status: COMPLETED | OUTPATIENT
Start: 2024-01-27 | End: 2024-01-27

## 2024-01-27 RX ORDER — FAMOTIDINE 10 MG/ML
20 INJECTION INTRAVENOUS ONCE
Refills: 0 | Status: COMPLETED | OUTPATIENT
Start: 2024-01-27 | End: 2024-01-27

## 2024-01-27 RX ORDER — ONDANSETRON 8 MG/1
4 TABLET, FILM COATED ORAL ONCE
Refills: 0 | Status: COMPLETED | OUTPATIENT
Start: 2024-01-27 | End: 2024-01-27

## 2024-01-27 RX ORDER — SODIUM CHLORIDE 9 MG/ML
1000 INJECTION, SOLUTION INTRAVENOUS ONCE
Refills: 0 | Status: COMPLETED | OUTPATIENT
Start: 2024-01-27 | End: 2024-01-27

## 2024-01-27 RX ORDER — EPINEPHRINE 0.3 MG/.3ML
0.3 INJECTION INTRAMUSCULAR; SUBCUTANEOUS ONCE
Refills: 0 | Status: COMPLETED | OUTPATIENT
Start: 2024-01-27 | End: 2024-01-27

## 2024-01-27 RX ADMIN — ONDANSETRON 4 MILLIGRAM(S): 8 TABLET, FILM COATED ORAL at 01:13

## 2024-01-27 RX ADMIN — Medication 40 MILLIGRAM(S): at 00:37

## 2024-01-27 RX ADMIN — Medication 40 MILLIGRAM(S): at 06:30

## 2024-01-27 RX ADMIN — Medication 50 MILLIGRAM(S): at 04:45

## 2024-01-27 RX ADMIN — EPINEPHRINE 0.3 MILLIGRAM(S): 0.3 INJECTION INTRAMUSCULAR; SUBCUTANEOUS at 06:30

## 2024-01-27 RX ADMIN — Medication 50 MILLIGRAM(S): at 06:29

## 2024-01-27 RX ADMIN — FAMOTIDINE 20 MILLIGRAM(S): 10 INJECTION INTRAVENOUS at 06:30

## 2024-01-27 RX ADMIN — SODIUM CHLORIDE 1000 MILLILITER(S): 9 INJECTION, SOLUTION INTRAVENOUS at 00:37

## 2024-01-27 NOTE — ED ADULT NURSE REASSESSMENT NOTE - NS ED NURSE REASSESS COMMENT FT1
Pt returned from CT scan, c/o urticaria, palpitations, dizziness, "feeling of something in back of throat". MD arrata at bedside, pt given Benedryl, Solumedrol, pepcid, and epi as per MD orders. Pt placed on cardiac monitor, safety and comfort measures maintained, nad.
Received report from Tanisha Salazar. pt  A&Ox4 able to follow all commands. Pulse, motor, sensation present and equal in all 4 extremities. pt Breathing spontaneous and unlabored on, Skin warm and dry and of color appropriate for ethnicity , moves all extremities, speech clear. pending dispo. no further nurse intervention needed at this time.

## 2024-01-27 NOTE — ED ADULT NURSE NOTE - OBJECTIVE STATEMENT
PT is a 52yo F coming from home c/o epigastric pain r/t back and abd pain. Pt states that she has had a week of chest pain a/w nausea. Pt states that she had a cardiac pisode approx 1 year PT is a 52yo F coming from home c/o epigastric pain r/t back and abd pain. Pt states that she has had a week of chest pain a/w nausea. Pt states that she had a cardiac pisode approx 1 year ago and was told that she had 50% blockage. PT is a 54yo F coming from home c/o epigastric pain r/t back and abd pain. Pt states that she has had a week of chest pain a/w nausea. Pt states that she had a cardiac episode approx 1 year ago and was told that she had 50% blockage. Pt states that the chest pain is media sternum and goes directly into the back a/w abd pain. PMH of hypothyroid, HTN, sleep apnea. PT A,Ox4, ambulatory at baseline, pt placed on cardiac monitor, in NSR, EKG performed in ED. Respirations even and unlabored, abd soft, nondistended and nontender, skin warm, dry and intact, MCARTHUR. Denies HA, SOB, n/v/d, fever, chills and urinary symptoms. Stretcher locked in lowest position, appropriate side rails up for safety, pt instructed to call for RN if anything needed.

## 2024-01-27 NOTE — ED ADULT NURSE NOTE - NSFALLUNIVINTERV_ED_ALL_ED
Bed/Stretcher in lowest position, wheels locked, appropriate side rails in place/Call bell, personal items and telephone in reach/Instruct patient to call for assistance before getting out of bed/chair/stretcher/Non-slip footwear applied when patient is off stretcher/[place] to call system/Physically safe environment - no spills, clutter or unnecessary equipment/Purposeful proactive rounding/Room/bathroom lighting operational, light cord in reach

## 2024-02-02 ENCOUNTER — APPOINTMENT (OUTPATIENT)
Dept: CARDIOLOGY | Facility: CLINIC | Age: 54
End: 2024-02-02
Payer: COMMERCIAL

## 2024-02-02 ENCOUNTER — APPOINTMENT (OUTPATIENT)
Dept: PULMONOLOGY | Facility: CLINIC | Age: 54
End: 2024-02-02
Payer: COMMERCIAL

## 2024-02-02 VITALS
HEART RATE: 75 BPM | HEIGHT: 61 IN | BODY MASS INDEX: 38.89 KG/M2 | WEIGHT: 206 LBS | SYSTOLIC BLOOD PRESSURE: 130 MMHG | DIASTOLIC BLOOD PRESSURE: 74 MMHG | TEMPERATURE: 97.7 F | OXYGEN SATURATION: 99 %

## 2024-02-02 DIAGNOSIS — R06.02 SHORTNESS OF BREATH: ICD-10-CM

## 2024-02-02 DIAGNOSIS — I25.10 ATHEROSCLEROTIC HEART DISEASE OF NATIVE CORONARY ARTERY W/OUT ANGINA PECTORIS: ICD-10-CM

## 2024-02-02 DIAGNOSIS — E78.2 MIXED HYPERLIPIDEMIA: ICD-10-CM

## 2024-02-02 DIAGNOSIS — G47.33 OBSTRUCTIVE SLEEP APNEA (ADULT) (PEDIATRIC): ICD-10-CM

## 2024-02-02 DIAGNOSIS — R53.83 OTHER FATIGUE: ICD-10-CM

## 2024-02-02 DIAGNOSIS — R07.9 CHEST PAIN, UNSPECIFIED: ICD-10-CM

## 2024-02-02 DIAGNOSIS — I74.2 EMBOLISM AND THROMBOSIS OF ARTERIES OF THE UPPER EXTREMITIES: ICD-10-CM

## 2024-02-02 DIAGNOSIS — J98.4 OTHER DISORDERS OF LUNG: ICD-10-CM

## 2024-02-02 DIAGNOSIS — R73.03 PREDIABETES.: ICD-10-CM

## 2024-02-02 DIAGNOSIS — R42 DIZZINESS AND GIDDINESS: ICD-10-CM

## 2024-02-02 DIAGNOSIS — J98.6 DISORDERS OF DIAPHRAGM: ICD-10-CM

## 2024-02-02 PROCEDURE — 99214 OFFICE O/P EST MOD 30 MIN: CPT

## 2024-02-02 PROCEDURE — 94010 BREATHING CAPACITY TEST: CPT

## 2024-02-02 PROCEDURE — ZZZZZ: CPT

## 2024-02-02 PROCEDURE — 94729 DIFFUSING CAPACITY: CPT

## 2024-02-02 PROCEDURE — 94727 GAS DIL/WSHOT DETER LNG VOL: CPT

## 2024-02-02 PROCEDURE — 99214 OFFICE O/P EST MOD 30 MIN: CPT | Mod: 25

## 2024-02-02 PROCEDURE — 93000 ELECTROCARDIOGRAM COMPLETE: CPT

## 2024-02-02 RX ORDER — AMLODIPINE BESYLATE 5 MG/1
5 TABLET ORAL
Qty: 180 | Refills: 2 | Status: ACTIVE | COMMUNITY
Start: 2018-10-25 | End: 1900-01-01

## 2024-02-02 RX ORDER — RANOLAZINE 500 MG/1
500 TABLET, EXTENDED RELEASE ORAL
Qty: 180 | Refills: 0 | Status: DISCONTINUED | COMMUNITY
Start: 2023-05-16 | End: 2024-02-02

## 2024-02-02 NOTE — HISTORY OF PRESENT ILLNESS
[FreeTextEntry1] : This is a 54 y/o female with a pmhx of HLD, nonobstructive CAD, right radial artery thrombosis, Hypothyroid, Pituitary lesion, obesity here today for a cardiac f/u. Patient is s/p recent ED visit for chest pain.  Labs including troponins, ddimer were negative, CTA chest and CT A/P with No evidence of acute aortic syndrome. Patient continues to reports intermittent sharp chest pain, non exertional which radiates to the back and resolves after a few minutes. Patient continues to reports dyspnea on exertion. Patient admits to intermittent dizziness, denies syncope.  Patient denies palpitations, syncope, changes in bowel/bladder habits or appetite.

## 2024-02-03 ENCOUNTER — APPOINTMENT (OUTPATIENT)
Dept: CARDIOLOGY | Facility: CLINIC | Age: 54
End: 2024-02-03
Payer: COMMERCIAL

## 2024-02-03 PROCEDURE — 93880 EXTRACRANIAL BILAT STUDY: CPT

## 2024-02-03 PROCEDURE — 93306 TTE W/DOPPLER COMPLETE: CPT

## 2024-02-04 PROBLEM — J98.6 ELEVATED DIAPHRAGM: Status: ACTIVE | Noted: 2022-04-01

## 2024-02-04 PROBLEM — J98.4 RESTRICTIVE LUNG DISEASE: Status: ACTIVE | Noted: 2024-02-04

## 2024-02-04 NOTE — HISTORY OF PRESENT ILLNESS
[Never] : never [TextBox_4] : Has been using bipap  and was doing well until recently had a bad episode of episode of gasping, at first thought the machine wasn't working but it was.Occurred nocturnally.  was to see Dr Valenzuela last week with chest pain and increase sob past month and had a cta last week. Chest pain resolved. going for stress test and echo next week. ELIZALDE can be 1 flight stairs. Some variability. Present for months. ?? Progressive.  no recent uri  no Cough or mucus production. Went to Er last week for CP and SOB

## 2024-02-04 NOTE — PROCEDURE
[FreeTextEntry1] : Prior D Dimer normal  cpap data downloaded and discussed with patient  02/02/2024 Pulmonary function testing There is a mild ventilatory impairment in a restrictive pattern. Normal Lung Volumes. There is a mild diffusion impairment. Corrects to normal with lung volume correction    1 / 1 Tad Vilchis  Report date:1/27/2024 View Order (Report matches exam selected in Patient History pane)     ACC: [id number] EXAM: CT ANGIO CHEST AORTA WAWIC ORDERED BY: EMILY HARPER  ACC: [id number] EXAM: CT ANGIO ABD PELV (W)AW IC ORDERED BY: EMILY HARPER  PROCEDURE DATE: 01/27/2024    INTERPRETATION: CLINICAL INFORMATION: Abdominal pain.  COMPARISON: CT abdomen pelvis 11/10/2022. CT chest 2/20/2023..  CONTRAST/COMPLICATIONS: IV Contrast: Omnipaque 350 (accession [id number]), IV contrast documented in unlinked concurrent exam (accession [id number]) 90 cc administered 10 cc discarded Oral Contrast: NONE Complications: None reported at time of study completion  PROCEDURE: CT Angiography of the Chest, Abdomen and Pelvis. Gated precontrast imaging was performed through the heart followed by gated CT Angiography of the heart with subsequent non-gated arterial phase imaging of the chest, abdomen and pelvis. Sagittal and coronal reformats were performed as well as 3D (MIP) reconstructions.  FINDINGS: CHEST: LUNGS AND LARGE AIRWAYS: Patent central airways. Focal consolidation, pulmonary edema, pleural effusion, or pneumothorax. Bilateral subsegmental atelectasis VESSELS: No intramural hematoma or aortic dissection. HEART: Heart size is normal. No pericardial effusion. MEDIASTINUM AND KEISHA: No lymphadenopathy. CHEST WALL AND LOWER NECK: Within normal limits.  ABDOMEN AND PELVIS: LIVER: Steatosis. BILE DUCTS: Normal caliber. GALLBLADDER: Within normal limits. SPLEEN: Within normal limits. PANCREAS: Within normal limits. ADRENALS: Within normal limits. KIDNEYS/URETERS: Within normal limits.  BLADDER: Within normal limits. REPRODUCTIVE ORGANS: Uterus and adnexa within normal limits.  BOWEL: No bowel obstruction. Appendix is normal. Colonic diverticulosis. No diverticulitis. Colorectal anastomosis. PERITONEUM: No ascites. VESSELS: Within normal limits. RETROPERITONEUM/LYMPH NODES: No lymphadenopathy. ABDOMINAL WALL: Within normal limits. BONES: Within normal limits.  IMPRESSION: No evidence of acute aortic syndrome.  --- End of Report ---     FRANCESCA CHAVIRA MD; Resident Radiologist This document has been electronically signed. TAD VILCHIS MD; Attending Radiologist This document has been electronically signed. Jan 27 2024 6:41AM Notes     ACC: [id number]     EXAM:  CT ANGIO CHEST AORTA WAWIC   ORDERED BY: EMILY HARPER  ACC: [id number]     EXAM:  CT ANGIO ABD PELV (W)AW IC   ORDERED BY: EMILY HARPER  PROCEDURE DATE:  01/27/2024    INTERPRETATION:  CLINICAL INFORMATION: Abdominal pain.  COMPARISON: CT abdomen pelvis 11/10/2022. CT chest 2/20/2023..  CONTRAST/COMPLICATIONS: IV Contrast: Omnipaque 350 (accession [id number]), IV contrast documented in unlinked concurrent exam (accession [id number])  90 cc administered   10 cc discarded Oral Contrast: NONE Complications: None reported at time of study completion  PROCEDURE: CT Angiography of the Chest, Abdomen and Pelvis. Gated precontrast imaging was performed through the heart followed by gated CT Angiography of the heart with subsequent non-gated arterial phase imaging of the chest, abdomen and pelvis. Sagittal and coronal reformats were performed as well as 3D (MIP) reconstructions.  FINDINGS: CHEST: LUNGS AND LARGE AIRWAYS: Patent central airways. Focal consolidation, pulmonary edema, pleural effusion, or pneumothorax. Bilateral subsegmental atelectasis VESSELS: No intramural hematoma or aortic dissection. HEART: Heart size is normal. No pericardial effusion. MEDIASTINUM AND KEISHA: No lymphadenopathy. CHEST WALL AND LOWER NECK: Within normal limits.  ABDOMEN AND PELVIS: LIVER: Steatosis. BILE DUCTS: Normal caliber. GALLBLADDER: Within normal limits. SPLEEN: Within normal limits. PANCREAS: Within normal limits. ADRENALS: Within normal limits. KIDNEYS/URETERS: Within normal limits.  BLADDER: Within normal limits. REPRODUCTIVE ORGANS: Uterus and adnexa within normal limits.  BOWEL: No bowel obstruction. Appendix is normal. Colonic diverticulosis. No diverticulitis. Colorectal anastomosis. PERITONEUM: No ascites. VESSELS: Within normal limits. RETROPERITONEUM/LYMPH NODES: No lymphadenopathy. ABDOMINAL WALL: Within normal limits. BONES: Within normal limits.  IMPRESSION: No evidence of acute aortic syndrome.  --- End of Report ---      FRANCESCA CHAVIRA MD; Resident Radiologist This document has been electronically signed.  TAD VILCHIS MD; Attending Radiologist This document has been electronically signed. Jan 27 2024  6:41AM   01/24/2023 Pulmonary function testing FEV1, FVC, and FEV1/FVC are within normal limits. TLC and subdivisions are normal. RV/TLC ratio is normal. Single breath diffusion capacity is normal.  No significant change in function compared to December 2021.   poly with moderate MARIAN with moderated desaturations   1 / 2 Orion Torres         Report date: 2/24/2022     View Order   (Report matches study selected on Patient History pane)          ACC: [id number] EXAM: XR CHEST PORTABLE URGENT 1V  PROCEDURE DATE: 02/23/2022    INTERPRETATION: CLINICAL INFORMATION: Chest pain.  TECHNIQUE: Frontal radiograph of the chest.  COMPARISON: CT abdomen pelvis 4/6/2021 and frontal radiograph the chest 12/25/2020.  FINDINGS:  Stable elevation of the right hemidiaphragm. The lungs are clear. No pleural effusion or pneumothorax. Heart size is within normal limits. No acute abnormality within visible osseous structures.   IMPRESSION:  Stable elevation of the right hemidiaphragm. Clear lungs.  --- End of Report ---     NICOLAS PIMENTEL MD; Resident Radiology This document has been electronically signed. ORION TORRES MD; Attending Radiologist This document has been electronically signed. Feb 24 2022 7:21AM

## 2024-02-04 NOTE — DISCUSSION/SUMMARY
[FreeTextEntry1] : Atypical chest pain of unclear etiology.  May be musculoskeletal.  Receiving cardiac workup. Mild restrictive physiology likely related to body habitus. Possible component related to elevated diaphragm. Episode of nocturnal shortness of breath of unclear etiology.  Consider episode of GERD.  Likely not related to parenchymal lung or airways disease.  Had negative D-dimer.  Will observe. Dyspnea on exertion.  Likely not related to underlying pulmonary disease.  Does have mild restrictive physiology likely related to body habitus.  Do not see clinical or radiographic evidence of parenchymal lung disease. Mild borderline restrictive physiology likely related to body habitus. Elevated right hemidiaphragm is old.  Dates back to at least 2016.  Lung function in December 2021 within normal limits making diaphragmatic dysfunction  unlikely.

## 2024-02-04 NOTE — ASSESSMENT
[FreeTextEntry1] : cont. BiPAP auto changed  Epap min 8 Ipap max 114 PS 4 8/4 with Ps 4  with Remsed air fit medium nasal mask, mya Consider Sniff Test. Consider CP stress test If etiology of dyspnea remains elusive. Complete Cardiac workup and f/u F/U after completion cardiac workup.    35 minutes spent in evaluation management and review of studies.

## 2024-02-05 ENCOUNTER — NON-APPOINTMENT (OUTPATIENT)
Age: 54
End: 2024-02-05

## 2024-02-06 DIAGNOSIS — R93.1 ABNORMAL FINDINGS ON DIAGNOSTIC IMAGING OF HEART AND CORONARY CIRCULATION: ICD-10-CM

## 2024-02-06 NOTE — ED ADULT NURSE NOTE - SCORE
74-year-old male past medical history of CAD status post stents, A-fib on Xarelto, diabetes, hypertension, hyperlipidemia, hypothyroidism, BPH presented after mechanical fall this morning.  Patient states that he tripped on unlevel concrete floor outside and fell onto his left side and face.  No LOC.  Patient is complaining of left lower back pain left chest wall pain.  Patient was ambulatory after fall.  Patient went to his doctor for follow-up and was advised to come to ER for CT and x-ray. 2

## 2024-02-09 ENCOUNTER — APPOINTMENT (OUTPATIENT)
Dept: CARDIOLOGY | Facility: CLINIC | Age: 54
End: 2024-02-09
Payer: COMMERCIAL

## 2024-02-09 PROCEDURE — A9500: CPT

## 2024-02-09 PROCEDURE — 78452 HT MUSCLE IMAGE SPECT MULT: CPT

## 2024-02-09 PROCEDURE — 93015 CV STRESS TEST SUPVJ I&R: CPT

## 2024-02-15 ENCOUNTER — APPOINTMENT (OUTPATIENT)
Dept: MRI IMAGING | Facility: CLINIC | Age: 54
End: 2024-02-15
Payer: COMMERCIAL

## 2024-02-15 ENCOUNTER — OUTPATIENT (OUTPATIENT)
Dept: OUTPATIENT SERVICES | Facility: HOSPITAL | Age: 54
LOS: 1 days | End: 2024-02-15
Payer: COMMERCIAL

## 2024-02-15 DIAGNOSIS — T14.8XXA OTHER INJURY OF UNSPECIFIED BODY REGION, INITIAL ENCOUNTER: Chronic | ICD-10-CM

## 2024-02-15 DIAGNOSIS — Z90.49 ACQUIRED ABSENCE OF OTHER SPECIFIED PARTS OF DIGESTIVE TRACT: Chronic | ICD-10-CM

## 2024-02-15 DIAGNOSIS — R93.1 ABNORMAL FINDINGS ON DIAGNOSTIC IMAGING OF HEART AND CORONARY CIRCULATION: ICD-10-CM

## 2024-02-15 DIAGNOSIS — Z98.890 OTHER SPECIFIED POSTPROCEDURAL STATES: Chronic | ICD-10-CM

## 2024-02-15 PROCEDURE — 70549 MR ANGIOGRAPH NECK W/O&W/DYE: CPT

## 2024-02-15 PROCEDURE — A9585: CPT

## 2024-02-15 PROCEDURE — 70549 MR ANGIOGRAPH NECK W/O&W/DYE: CPT | Mod: 26

## 2024-02-26 RX ORDER — LIRAGLUTIDE 6 MG/ML
18 INJECTION, SOLUTION SUBCUTANEOUS
Qty: 1 | Refills: 2 | Status: DISCONTINUED | COMMUNITY
Start: 2023-11-14 | End: 2023-11-29

## 2024-02-29 RX ORDER — TIRZEPATIDE 5 MG/.5ML
5 INJECTION, SOLUTION SUBCUTANEOUS
Qty: 1 | Refills: 0 | Status: COMPLETED | COMMUNITY
Start: 2024-01-24 | End: 2024-03-28

## 2024-03-26 ENCOUNTER — APPOINTMENT (OUTPATIENT)
Dept: RHEUMATOLOGY | Facility: CLINIC | Age: 54
End: 2024-03-26

## 2024-03-27 ENCOUNTER — APPOINTMENT (OUTPATIENT)
Dept: GASTROENTEROLOGY | Facility: CLINIC | Age: 54
End: 2024-03-27
Payer: COMMERCIAL

## 2024-03-27 ENCOUNTER — APPOINTMENT (OUTPATIENT)
Dept: CARDIOLOGY | Facility: CLINIC | Age: 54
End: 2024-03-27

## 2024-03-27 VITALS
TEMPERATURE: 98 F | RESPIRATION RATE: 14 BRPM | SYSTOLIC BLOOD PRESSURE: 120 MMHG | WEIGHT: 193 LBS | OXYGEN SATURATION: 99 % | DIASTOLIC BLOOD PRESSURE: 84 MMHG | BODY MASS INDEX: 36.44 KG/M2 | HEART RATE: 78 BPM | HEIGHT: 61 IN

## 2024-03-27 DIAGNOSIS — R79.89 OTHER SPECIFIED ABNORMAL FINDINGS OF BLOOD CHEMISTRY: ICD-10-CM

## 2024-03-27 DIAGNOSIS — N91.2 AMENORRHEA, UNSPECIFIED: ICD-10-CM

## 2024-03-27 DIAGNOSIS — R10.32 LEFT LOWER QUADRANT PAIN: ICD-10-CM

## 2024-03-27 DIAGNOSIS — K59.09 OTHER CONSTIPATION: ICD-10-CM

## 2024-03-27 DIAGNOSIS — K57.90 DIVERTICULOSIS OF INTESTINE, PART UNSPECIFIED, W/OUT PERFORATION OR ABSCESS W/OUT BLEEDING: ICD-10-CM

## 2024-03-27 DIAGNOSIS — K59.00 CONSTIPATION, UNSPECIFIED: ICD-10-CM

## 2024-03-27 PROCEDURE — 99204 OFFICE O/P NEW MOD 45 MIN: CPT

## 2024-03-27 NOTE — ASSESSMENT
[FreeTextEntry1] : 55 yo female hx of subtotal colectomy with severe constipation. Last colonoscopy with Dr. Macias 2023 with anastomosis noted. Pt under care of Dr. Macias. S/p sigmoid resection with Dr. Medrano three due due to recurrent diverticulitis. Usually bowels qod since sgy, at most every 3 days. Uses miralax if doesn't have bm every 2-3 days. Last bm last Thursday after using Miralax. Pt describes bloating and urge to diverticulosis. Has also low back pain. Using zep bound for 2months which as d/w pt can cause altered bms. SHe denies n/v.  IMP: 1. IBS-c 2. hx of diverticulosis 3. Obesity, 4. On GLP1  PLAN": 1. advised two dulcolax tonight and tomorrow if no bm overnight 2. Cat scan abd/pelvis  3. Pt to followup with Dr. Macias as d/w  pt 4. may need to hold Zepbound if sxs persist.

## 2024-03-27 NOTE — HISTORY OF PRESENT ILLNESS
[FreeTextEntry1] : 55 yo female hx of subtotal colectomy with severe constipation. Last colonoscopy with Dr. Macias 2023 with anastomosis noted. Pt under care of Dr. Macias. S/p sigmoid resection with Dr. Medrano three due due to recurrent diverticulitis. Usually bowels qod since sgy, at most every 3 days. Uses miralax if doesn't have bm every 2-3 days. Last bm last Thursday after using Miralax. Pt describes bloating and urge to diverticulosis. Has also low back pain. Using zep bound for 2months which as d/w pt can cause altered bms.

## 2024-03-28 ENCOUNTER — NON-APPOINTMENT (OUTPATIENT)
Age: 54
End: 2024-03-28

## 2024-04-04 ENCOUNTER — LABORATORY RESULT (OUTPATIENT)
Age: 54
End: 2024-04-04

## 2024-04-04 ENCOUNTER — APPOINTMENT (OUTPATIENT)
Dept: INTERNAL MEDICINE | Facility: CLINIC | Age: 54
End: 2024-04-04
Payer: COMMERCIAL

## 2024-04-04 VITALS
SYSTOLIC BLOOD PRESSURE: 125 MMHG | WEIGHT: 190 LBS | HEART RATE: 78 BPM | TEMPERATURE: 97.6 F | HEIGHT: 61 IN | BODY MASS INDEX: 35.87 KG/M2 | OXYGEN SATURATION: 99 % | DIASTOLIC BLOOD PRESSURE: 82 MMHG

## 2024-04-04 DIAGNOSIS — Z00.00 ENCOUNTER FOR GENERAL ADULT MEDICAL EXAMINATION W/OUT ABNORMAL FINDINGS: ICD-10-CM

## 2024-04-04 PROCEDURE — 99396 PREV VISIT EST AGE 40-64: CPT | Mod: 25

## 2024-04-04 PROCEDURE — 36415 COLL VENOUS BLD VENIPUNCTURE: CPT

## 2024-04-04 RX ORDER — SEMAGLUTIDE 0.25 MG/.5ML
0.25 INJECTION, SOLUTION SUBCUTANEOUS
Qty: 1 | Refills: 0 | Status: DISCONTINUED | COMMUNITY
Start: 2023-12-06 | End: 2024-04-04

## 2024-04-04 NOTE — ASSESSMENT
[FreeTextEntry1] : Annual Physical Exam -BP is stable. -Check A1c, lipid panel and Vitamin levels. -Continue to f/u with Specialists. -Continue with healthy diet and weight management -RTO annually or as needed

## 2024-04-04 NOTE — ADDENDUM
[FreeTextEntry1] : I, Silas Romero, acted as a scribe on behalf of Dr. Jarvis Osorio MD, on 04/04/2024.   All medical entries made by the scribe were at my, Dr. Jarvis Osorio MD, direction and personally dictated by me on 04/04/2024. I have reviewed the chart and agree that the record accurately reflects my personal performance of the history, physical exam, assessment and plan. I have also personally directed, reviewed, and agreed with the chart.

## 2024-04-04 NOTE — HEALTH RISK ASSESSMENT
[Good] : ~his/her~  mood as  good [Yes] : Yes [Monthly or less (1 pt)] : Monthly or less (1 point) [1 or 2 (0 pts)] : 1 or 2 (0 points) [Never (0 pts)] : Never (0 points) [No] : In the past 12 months have you used drugs other than those required for medical reasons? No [Never] : Never [FreeTextEntry1] : Health Maintenance [de-identified] : GI, Pulmonology

## 2024-04-08 RX ORDER — ROSUVASTATIN CALCIUM 40 MG/1
40 TABLET, FILM COATED ORAL
Qty: 90 | Refills: 3 | Status: ACTIVE | COMMUNITY
Start: 2018-08-02

## 2024-04-10 ENCOUNTER — NON-APPOINTMENT (OUTPATIENT)
Age: 54
End: 2024-04-10

## 2024-04-10 ENCOUNTER — APPOINTMENT (OUTPATIENT)
Dept: OPHTHALMOLOGY | Facility: CLINIC | Age: 54
End: 2024-04-10
Payer: COMMERCIAL

## 2024-04-10 PROCEDURE — 92083 EXTENDED VISUAL FIELD XM: CPT

## 2024-04-10 PROCEDURE — 92014 COMPRE OPH EXAM EST PT 1/>: CPT

## 2024-04-11 LAB
25(OH)D3 SERPL-MCNC: 22.7 NG/ML
ALBUMIN SERPL ELPH-MCNC: 4.8 G/DL
ALP BLD-CCNC: 62 U/L
ALT SERPL-CCNC: 23 U/L
ANION GAP SERPL CALC-SCNC: 12 MMOL/L
APPEARANCE: CLEAR
AST SERPL-CCNC: 20 U/L
BASOPHILS # BLD AUTO: 0.05 K/UL
BASOPHILS NFR BLD AUTO: 1.1 %
BILIRUB SERPL-MCNC: 0.5 MG/DL
BILIRUBIN URINE: ABNORMAL
BLOOD URINE: NEGATIVE
BUN SERPL-MCNC: 9 MG/DL
CALCIUM SERPL-MCNC: 10.4 MG/DL
CHLORIDE SERPL-SCNC: 103 MMOL/L
CHOLEST SERPL-MCNC: 172 MG/DL
CO2 SERPL-SCNC: 24 MMOL/L
COLOR: NORMAL
CREAT SERPL-MCNC: 0.87 MG/DL
EGFR: 78 ML/MIN/1.73M2
EOSINOPHIL # BLD AUTO: 0.13 K/UL
EOSINOPHIL NFR BLD AUTO: 2.8 %
ESTIMATED AVERAGE GLUCOSE: 105 MG/DL
FOLATE SERPL-MCNC: 8.8 NG/ML
GLUCOSE QUALITATIVE U: NEGATIVE MG/DL
GLUCOSE SERPL-MCNC: 85 MG/DL
HBA1C MFR BLD HPLC: 5.3 %
HCT VFR BLD CALC: 44.2 %
HDLC SERPL-MCNC: 79 MG/DL
HGB BLD-MCNC: 14.4 G/DL
IMM GRANULOCYTES NFR BLD AUTO: 0.2 %
KETONES URINE: ABNORMAL MG/DL
LDLC SERPL CALC-MCNC: 77 MG/DL
LEUKOCYTE ESTERASE URINE: ABNORMAL
LYMPHOCYTES # BLD AUTO: 1.92 K/UL
LYMPHOCYTES NFR BLD AUTO: 41 %
MAGNESIUM SERPL-MCNC: 2.5 MG/DL
MAN DIFF?: NORMAL
MCHC RBC-ENTMCNC: 30.8 PG
MCHC RBC-ENTMCNC: 32.6 GM/DL
MCV RBC AUTO: 94.4 FL
MONOCYTES # BLD AUTO: 0.54 K/UL
MONOCYTES NFR BLD AUTO: 11.5 %
NEUTROPHILS # BLD AUTO: 2.03 K/UL
NEUTROPHILS NFR BLD AUTO: 43.4 %
NITRITE URINE: NEGATIVE
NONHDLC SERPL-MCNC: 94 MG/DL
PH URINE: 5.5
PLATELET # BLD AUTO: 278 K/UL
POTASSIUM SERPL-SCNC: 5.2 MMOL/L
PROT SERPL-MCNC: 7.4 G/DL
PROTEIN URINE: 30 MG/DL
RBC # BLD: 4.68 M/UL
RBC # FLD: 12.7 %
SODIUM SERPL-SCNC: 140 MMOL/L
SPECIFIC GRAVITY URINE: 1.03
TRIGL SERPL-MCNC: 90 MG/DL
TSH SERPL-ACNC: 1.39 UIU/ML
UROBILINOGEN URINE: 1 MG/DL
VIT B12 SERPL-MCNC: 431 PG/ML
WBC # FLD AUTO: 4.68 K/UL

## 2024-04-12 DIAGNOSIS — B37.49 OTHER UROGENITAL CANDIDIASIS: ICD-10-CM

## 2024-04-12 RX ORDER — FLUCONAZOLE 150 MG/1
150 TABLET ORAL
Qty: 2 | Refills: 1 | Status: ACTIVE | COMMUNITY
Start: 2024-04-12 | End: 1900-01-01

## 2024-04-25 ENCOUNTER — NON-APPOINTMENT (OUTPATIENT)
Age: 54
End: 2024-04-25

## 2024-04-25 ENCOUNTER — TRANSCRIPTION ENCOUNTER (OUTPATIENT)
Age: 54
End: 2024-04-25

## 2024-04-26 ENCOUNTER — APPOINTMENT (OUTPATIENT)
Dept: ORTHOPEDIC SURGERY | Facility: CLINIC | Age: 54
End: 2024-04-26
Payer: COMMERCIAL

## 2024-04-26 VITALS — WEIGHT: 185 LBS | HEIGHT: 61 IN | BODY MASS INDEX: 34.93 KG/M2

## 2024-04-26 PROCEDURE — 99204 OFFICE O/P NEW MOD 45 MIN: CPT

## 2024-04-26 PROCEDURE — 28470 CLTX METATARSAL FX WO MNP EA: CPT | Mod: T4

## 2024-04-26 RX ORDER — ERGOCALCIFEROL 1.25 MG/1
1.25 MG CAPSULE, LIQUID FILLED ORAL
Qty: 6 | Refills: 1 | Status: ACTIVE | COMMUNITY
Start: 2024-04-26 | End: 1900-01-01

## 2024-04-26 NOTE — PHYSICAL EXAM
[de-identified] : Left foot Physical Examination:   General: Alert and oriented x3.  In no acute distress.  Pleasant in nature with a normal affect.  No apparent respiratory distress. Erythema, Warmth, Rubor: Negative Swelling: Negative   ROM Ankle: Not performed as there is a fracture   ROM of digits: Normal Pes Planus: Negative Pes Cavus: Negative   Bunion: Negative Tailor's Bunion (Bunionette): Negative Hammer Toe Deformity/Deformities: Negative   Tenderness to Palpation: 1. Heel Pain: Negative 2. Midfoot Pain: Negative 3. First MTP Joint: Negative 4. Lis Franc Joint: Negative 5. Lateral Malleolus: Negative 6. Medial Malleolus: Negative   Tenderness Metatarsals: 1st MT: Negative 2nd MT: Negative 3rd MT: Negative 4th MT: Negative 5th MT: Negative Base of the 5th MT: Negative   Tendon Pain: 1. Posterior Tibialis: Negative 2. Peroneus Brevis/Longus: Negative   Ligament Pain: 1. Lis Franc Ligament: Negative 2. Plantar Fascia Ligament: Negative 3. ATFL/CFL/PTFL: Negative 4. Deltoid Ligaments: Negative   Stability: 1. Anterior Drawer: Negative 2. Posterior Drawer: Negative   Strength: 5/5 TA/GS/EHL/FHL/EDL/ADD/ABD   Pulses: 2+ DP/PT Pulses   Capillary Refill Toes: <2 seconds   Neuro: Intact motor and sensory throughout   Additional Test: 1. Fagan's Squeeze Test: Negative 2. Calcaneal Squeeze Test: Negative 3. Snyder Test: Negative 4. Tarsal Tunnel Tinel's Sign (Posterior Tibial Nerve Impingement): Negative 5. Single Heel Rise: Negative  [de-identified] : I have reviewed the patient's XR imaging from [facility] on 4/26/24 with them in great detail. 3V of her left foot revealed: oblique fracture of the 5th metatarsal

## 2024-04-26 NOTE — ADDENDUM
[FreeTextEntry1] : I, Costa Aman, acted solely as a scribe for Dr. Jamey Bernal on this date 04/26/2024.   All medical record entries made by the Scribe were at my, Dr. Jamey Bernal, direction and personally dictated by me on 04/26/2024 . I have reviewed the chart and agree that the record accurately reflects my personal performance of the history, physical exam, assessment and plan. I have also personally directed, reviewed, and agreed with the chart.

## 2024-04-26 NOTE — DISCUSSION/SUMMARY
[de-identified] : Today I had a lengthy discussion with the patient regarding their left foot, 5th metatarsal oblique fracture. I have addressed all the patient's concerns surrounding the pathology of their condition. I have reviewed the patient's XR imaging with them in great detail. Fracture of the 5th metatarsal. Conservative management was discussed in great detail.    Plan: 1. I recommend that the patient utilize 5,000 units of vitamin D. A prescription was given in the office today. 2. The patient should continue wearing the stiff shoe. 3. I recommend that the patient utilize ice, NSAIDS PRN, and heat. They can also elevate their LLE above the level of the heart.  f/u in 4 weeks to reassess   The patient understood and verbally agreed to the treatment plan. All of their questions were answered, and they were satisfied with the visit. The patient should call the office if they have any questions or experience worsening symptoms.

## 2024-04-26 NOTE — HISTORY OF PRESENT ILLNESS
[FreeTextEntry1] : 04/26/2024: WARNER REYES is a 54 year old female presenting to the office for an initial evaluation of left foot, 5th metatarsal oblique fracture. She reports that the pain began on 4/25/24, where she twisted her foot while walking. The patient presents to the office in a stiff shoe and ambulating without assistance.

## 2024-04-30 DIAGNOSIS — B00.1 HERPESVIRAL VESICULAR DERMATITIS: ICD-10-CM

## 2024-04-30 RX ORDER — VALACYCLOVIR 500 MG/1
500 TABLET, FILM COATED ORAL TWICE DAILY
Qty: 14 | Refills: 3 | Status: ACTIVE | COMMUNITY
Start: 2024-04-30 | End: 1900-01-01

## 2024-05-02 ENCOUNTER — APPOINTMENT (OUTPATIENT)
Dept: ULTRASOUND IMAGING | Facility: CLINIC | Age: 54
End: 2024-05-02
Payer: COMMERCIAL

## 2024-05-02 ENCOUNTER — OUTPATIENT (OUTPATIENT)
Dept: OUTPATIENT SERVICES | Facility: HOSPITAL | Age: 54
LOS: 1 days | End: 2024-05-02
Payer: COMMERCIAL

## 2024-05-02 DIAGNOSIS — R82.90 UNSPECIFIED ABNORMAL FINDINGS IN URINE: ICD-10-CM

## 2024-05-02 DIAGNOSIS — Z00.8 ENCOUNTER FOR OTHER GENERAL EXAMINATION: ICD-10-CM

## 2024-05-02 DIAGNOSIS — Z98.890 OTHER SPECIFIED POSTPROCEDURAL STATES: Chronic | ICD-10-CM

## 2024-05-02 DIAGNOSIS — T14.8XXA OTHER INJURY OF UNSPECIFIED BODY REGION, INITIAL ENCOUNTER: Chronic | ICD-10-CM

## 2024-05-02 DIAGNOSIS — Z90.49 ACQUIRED ABSENCE OF OTHER SPECIFIED PARTS OF DIGESTIVE TRACT: Chronic | ICD-10-CM

## 2024-05-02 LAB
ALBUMIN SERPL ELPH-MCNC: 4.3 G/DL
ALP BLD-CCNC: 54 U/L
ALT SERPL-CCNC: 13 U/L
ANION GAP SERPL CALC-SCNC: 13 MMOL/L
ANION GAP SERPL CALC-SCNC: 13 MMOL/L
AST SERPL-CCNC: 20 U/L
BASOPHILS # BLD AUTO: 0.05 K/UL
BASOPHILS # BLD AUTO: 0.05 K/UL
BASOPHILS NFR BLD AUTO: 0.5 %
BASOPHILS NFR BLD AUTO: 0.5 %
BILIRUB DIRECT SERPL-MCNC: 0.1 MG/DL
BILIRUB INDIRECT SERPL-MCNC: 0.2 MG/DL
BILIRUB SERPL-MCNC: 0.3 MG/DL
BUN SERPL-MCNC: 13 MG/DL
BUN SERPL-MCNC: 17 MG/DL
CALCIUM SERPL-MCNC: 10.2 MG/DL
CALCIUM SERPL-MCNC: 9.9 MG/DL
CCP AB SER IA-ACNC: <8 UNITS
CHLORIDE SERPL-SCNC: 101 MMOL/L
CHLORIDE SERPL-SCNC: 105 MMOL/L
CHOLEST SERPL-MCNC: 222 MG/DL
CK SERPL-CCNC: 180 U/L
CO2 SERPL-SCNC: 23 MMOL/L
CO2 SERPL-SCNC: 26 MMOL/L
CREAT SERPL-MCNC: 0.76 MG/DL
CREAT SERPL-MCNC: 0.76 MG/DL
DEPRECATED D DIMER PPP IA-ACNC: <150 NG/ML DDU
DSDNA AB SER-ACNC: <12 IU/ML
EGFR: 92 ML/MIN/1.73M2
EGFR: 92 ML/MIN/1.73M2
ENA SCL70 IGG SER IA-ACNC: <0.2 AL
ENA SS-A AB SER IA-ACNC: <0.2 AL
ENA SS-B AB SER IA-ACNC: <0.2 AL
EOSINOPHIL # BLD AUTO: 0.01 K/UL
EOSINOPHIL # BLD AUTO: 0.22 K/UL
EOSINOPHIL NFR BLD AUTO: 0.1 %
EOSINOPHIL NFR BLD AUTO: 2.1 %
ERYTHROCYTE [SEDIMENTATION RATE] IN BLOOD BY WESTERGREN METHOD: 26 MM/HR
ERYTHROCYTE [SEDIMENTATION RATE] IN BLOOD BY WESTERGREN METHOD: 44 MM/HR
GLUCOSE SERPL-MCNC: 77 MG/DL
GLUCOSE SERPL-MCNC: 81 MG/DL
HCT VFR BLD CALC: 43.5 %
HCT VFR BLD CALC: 44.5 %
HDLC SERPL-MCNC: 76 MG/DL
HGB BLD-MCNC: 13.8 G/DL
HGB BLD-MCNC: 14.2 G/DL
IMM GRANULOCYTES NFR BLD AUTO: 0.4 %
IMM GRANULOCYTES NFR BLD AUTO: 0.7 %
LDLC SERPL CALC-MCNC: 126 MG/DL
LYMPHOCYTES # BLD AUTO: 3.01 K/UL
LYMPHOCYTES # BLD AUTO: 3.48 K/UL
LYMPHOCYTES NFR BLD AUTO: 29.2 %
LYMPHOCYTES NFR BLD AUTO: 32.8 %
MAN DIFF?: NORMAL
MAN DIFF?: NORMAL
MCHC RBC-ENTMCNC: 30.6 PG
MCHC RBC-ENTMCNC: 30.7 PG
MCHC RBC-ENTMCNC: 31.7 GM/DL
MCHC RBC-ENTMCNC: 31.9 GM/DL
MCV RBC AUTO: 95.9 FL
MCV RBC AUTO: 96.7 FL
MONOCYTES # BLD AUTO: 0.77 K/UL
MONOCYTES # BLD AUTO: 0.78 K/UL
MONOCYTES NFR BLD AUTO: 7.3 %
MONOCYTES NFR BLD AUTO: 7.5 %
NEUTROPHILS # BLD AUTO: 6.05 K/UL
NEUTROPHILS # BLD AUTO: 6.39 K/UL
NEUTROPHILS NFR BLD AUTO: 56.9 %
NEUTROPHILS NFR BLD AUTO: 62 %
NONHDLC SERPL-MCNC: 146 MG/DL
PLATELET # BLD AUTO: 309 K/UL
PLATELET # BLD AUTO: 324 K/UL
POTASSIUM SERPL-SCNC: 4.2 MMOL/L
POTASSIUM SERPL-SCNC: 4.6 MMOL/L
PROT SERPL-MCNC: 7.7 G/DL
RBC # BLD: 4.5 M/UL
RBC # BLD: 4.64 M/UL
RBC # FLD: 12.8 %
RBC # FLD: 12.9 %
RF+CCP IGG SER-IMP: NEGATIVE
SODIUM SERPL-SCNC: 139 MMOL/L
SODIUM SERPL-SCNC: 142 MMOL/L
T4 FREE SERPL-MCNC: 1.4 NG/DL
TRIGL SERPL-MCNC: 100 MG/DL
TROPONIN-T, HIGH SENSITIVITY: 6 NG/L
TSH SERPL-ACNC: 3.24 UIU/ML
WBC # FLD AUTO: 10.3 K/UL
WBC # FLD AUTO: 10.62 K/UL

## 2024-05-02 PROCEDURE — 76770 US EXAM ABDO BACK WALL COMP: CPT | Mod: 26

## 2024-05-02 PROCEDURE — 76770 US EXAM ABDO BACK WALL COMP: CPT

## 2024-05-05 LAB
ALBUMIN SERPL ELPH-MCNC: 4.4 G/DL
ALP BLD-CCNC: 61 U/L
ALT SERPL-CCNC: 18 U/L
ANION GAP SERPL CALC-SCNC: 14 MMOL/L
AST SERPL-CCNC: 15 U/L
BASOPHILS # BLD AUTO: 0.06 K/UL
BASOPHILS NFR BLD AUTO: 0.9 %
BILIRUB DIRECT SERPL-MCNC: 0.1 MG/DL
BILIRUB INDIRECT SERPL-MCNC: 0.3 MG/DL
BILIRUB SERPL-MCNC: 0.4 MG/DL
BUN SERPL-MCNC: 16 MG/DL
CALCIUM SERPL-MCNC: 9.2 MG/DL
CHLORIDE SERPL-SCNC: 104 MMOL/L
CHOLEST SERPL-MCNC: 194 MG/DL
CK SERPL-CCNC: 91 U/L
CO2 SERPL-SCNC: 22 MMOL/L
CREAT SERPL-MCNC: 0.81 MG/DL
EGFR: 86 ML/MIN/1.73M2
EOSINOPHIL # BLD AUTO: 0.18 K/UL
EOSINOPHIL NFR BLD AUTO: 2.8 %
ESTIMATED AVERAGE GLUCOSE: 114 MG/DL
FERRITIN SERPL-MCNC: 35 NG/ML
GLUCOSE SERPL-MCNC: 77 MG/DL
HBA1C MFR BLD HPLC: 5.6 %
HCT VFR BLD CALC: 43.1 %
HDLC SERPL-MCNC: 78 MG/DL
HGB BLD-MCNC: 13.8 G/DL
IMM GRANULOCYTES NFR BLD AUTO: 0.3 %
IRON SATN MFR SERPL: 35 %
IRON SERPL-MCNC: 126 UG/DL
LDLC SERPL CALC-MCNC: 94 MG/DL
LYMPHOCYTES # BLD AUTO: 2.49 K/UL
LYMPHOCYTES NFR BLD AUTO: 38.4 %
MAGNESIUM SERPL-MCNC: 2.4 MG/DL
MAN DIFF?: NORMAL
MCHC RBC-ENTMCNC: 30.3 PG
MCHC RBC-ENTMCNC: 32 GM/DL
MCV RBC AUTO: 94.7 FL
MONOCYTES # BLD AUTO: 0.72 K/UL
MONOCYTES NFR BLD AUTO: 11.1 %
NEUTROPHILS # BLD AUTO: 3.01 K/UL
NEUTROPHILS NFR BLD AUTO: 46.5 %
NONHDLC SERPL-MCNC: 117 MG/DL
PLATELET # BLD AUTO: 299 K/UL
POTASSIUM SERPL-SCNC: 4.5 MMOL/L
PROT SERPL-MCNC: 7.1 G/DL
RBC # BLD: 4.55 M/UL
RBC # FLD: 12.4 %
SODIUM SERPL-SCNC: 140 MMOL/L
T4 FREE SERPL-MCNC: 1.8 NG/DL
TIBC SERPL-MCNC: 361 UG/DL
TRIGL SERPL-MCNC: 131 MG/DL
TSH SERPL-ACNC: 2.68 UIU/ML
UIBC SERPL-MCNC: 236 UG/DL
WBC # FLD AUTO: 6.48 K/UL

## 2024-05-06 ENCOUNTER — APPOINTMENT (OUTPATIENT)
Dept: ORTHOPEDIC SURGERY | Facility: CLINIC | Age: 54
End: 2024-05-06

## 2024-05-08 ENCOUNTER — NON-APPOINTMENT (OUTPATIENT)
Age: 54
End: 2024-05-08

## 2024-05-09 DIAGNOSIS — R39.9 UNSPECIFIED SYMPTOMS AND SIGNS INVOLVING THE GENITOURINARY SYSTEM: ICD-10-CM

## 2024-05-10 ENCOUNTER — APPOINTMENT (OUTPATIENT)
Dept: ORTHOPEDIC SURGERY | Facility: CLINIC | Age: 54
End: 2024-05-10
Payer: COMMERCIAL

## 2024-05-10 PROCEDURE — 73630 X-RAY EXAM OF FOOT: CPT | Mod: LT

## 2024-05-10 PROCEDURE — 99024 POSTOP FOLLOW-UP VISIT: CPT

## 2024-05-10 NOTE — DISCUSSION/SUMMARY
[de-identified] : Today I had a lengthy discussion with the patient regarding their left foot, 5th metatarsal oblique fracture. I have addressed all the patient's concerns surrounding the pathology of their condition. I have reviewed the patient's XR imaging with them in great detail. Healing noted. Oblique fracture of the 5th metatarsal that is stable. I believe that her pain is from overusing her foot. Continue with conservative treatments. At this time, surgery is not recommended.     Plan: 1. I recommend that the patient continue to utilize vitamin D for bone healing.  2. The patient should continue wearing the stiff shoe. 3. I recommend that the patient utilize ice, NSAIDS PRN, and heat for her swelling. They can also elevate their LLE above the level of the heart. 4. I recommended that the patient utilize a CAM boot if she feels like the stiff shoe has not helped. The patient was fitted for the CAM boot in the office today. The patient was educated about the boot wear pattern and utilization, as well as the timeframe to come out of the boot. She was also given full instructions for using the boot. If she does not wish to use the CAM boot, then she can continue using the stiff shoe.  5. At this time, I would like to obtain advanced imaging of the patient's left foot without contrast to evaluate for oblique fracture. The patient should follow up with the office after obtaining the MRI.  f/u in 3 weeks or after MRI to reassess. X-ray at next visit.    The patient understood and verbally agreed to the treatment plan. All of their questions were answered, and they were satisfied with the visit. The patient should call the office if they have any questions or experience worsening symptoms.

## 2024-05-10 NOTE — ADDENDUM
[FreeTextEntry1] : I, Costa Aman, acted solely as a scribe for Dr. Jamey Bernal on this date 05/10/2024.   All medical record entries made by the Scribe were at my, Dr. Jamey Bernal, direction and personally dictated by me on 05/10/2024 . I have reviewed the chart and agree that the record accurately reflects my personal performance of the history, physical exam, assessment and plan. I have also personally directed, reviewed, and agreed with the chart.

## 2024-05-10 NOTE — PHYSICAL EXAM
[de-identified] : Left foot Physical Examination:   General: Alert and oriented x3.  In no acute distress.  Pleasant in nature with a normal affect.  No apparent respiratory distress. Erythema, Warmth, Rubor: Negative Swelling: Negative   ROM Ankle: Not performed as there is a fracture   ROM of digits: Normal Pes Planus: Negative Pes Cavus: Negative   Bunion: Negative Tailor's Bunion (Bunionette): Negative Hammer Toe Deformity/Deformities: Negative   Tenderness to Palpation: 1. Heel Pain: Negative 2. Midfoot Pain: Negative 3. First MTP Joint: Negative 4. Lis Franc Joint: Negative 5. Lateral Malleolus: Negative 6. Medial Malleolus: Negative   Tenderness Metatarsals: 1st MT: Negative 2nd MT: Negative 3rd MT: Negative 4th MT: Negative 5th MT: Negative Base of the 5th MT: Negative   Tendon Pain: 1. Posterior Tibialis: Negative 2. Peroneus Brevis/Longus: Negative   Ligament Pain: 1. Lis Franc Ligament: Negative 2. Plantar Fascia Ligament: Negative 3. ATFL/CFL/PTFL: Negative 4. Deltoid Ligaments: Negative   Stability: 1. Anterior Drawer: Negative 2. Posterior Drawer: Negative   Strength: 5/5 TA/GS/EHL/FHL/EDL/ADD/ABD   Pulses: 2+ DP/PT Pulses   Capillary Refill Toes: <2 seconds   Neuro: Intact motor and sensory throughout   Additional Test: 1. Fagan's Squeeze Test: Negative 2. Calcaneal Squeeze Test: Negative 3. Snyder Test: Negative 4. Tarsal Tunnel Tinel's Sign (Posterior Tibial Nerve Impingement): Negative 5. Single Heel Rise: Negative  [de-identified] : 3V of the left foot were ordered, obtained and reviewed by me today, 05/10/2024, and revealed: Healing noted. Oblique fracture of the 5th metatarsal that is stable.

## 2024-05-10 NOTE — HISTORY OF PRESENT ILLNESS
[FreeTextEntry1] : 05/10/2024: WARNER REYES is a 54 year old female presenting to the office for a follow up evaluation of her left foot, 5th metatarsal oblique fracture. she reports that her pain has worsened since last visit. The patient presents to the office in a stiff show and ambulating without assistance.  04/26/2024: WARNER REYES is a 54 year old female presenting to the office for an initial evaluation of left foot, 5th metatarsal oblique fracture. She reports that the pain began on 4/25/24, where she twisted her foot while walking. The patient presents to the office in a stiff shoe and ambulating without assistance.

## 2024-05-16 ENCOUNTER — APPOINTMENT (OUTPATIENT)
Dept: ENDOCRINOLOGY | Facility: CLINIC | Age: 54
End: 2024-05-16
Payer: COMMERCIAL

## 2024-05-16 VITALS
TEMPERATURE: 98 F | DIASTOLIC BLOOD PRESSURE: 80 MMHG | HEIGHT: 61 IN | HEART RATE: 78 BPM | OXYGEN SATURATION: 100 % | SYSTOLIC BLOOD PRESSURE: 125 MMHG | BODY MASS INDEX: 33.42 KG/M2 | WEIGHT: 177 LBS

## 2024-05-16 DIAGNOSIS — E23.7 DISORDER OF PITUITARY GLAND, UNSPECIFIED: ICD-10-CM

## 2024-05-16 DIAGNOSIS — E66.9 OBESITY, UNSPECIFIED: ICD-10-CM

## 2024-05-16 DIAGNOSIS — E03.9 HYPOTHYROIDISM, UNSPECIFIED: ICD-10-CM

## 2024-05-16 PROCEDURE — 99213 OFFICE O/P EST LOW 20 MIN: CPT

## 2024-05-16 NOTE — PHYSICAL EXAM
[Alert] : alert [Well Nourished] : well nourished [Healthy Appearance] : healthy appearance [Obese] : obese [No Acute Distress] : no acute distress [Well Developed] : well developed [Normal Voice/Communication] : normal voice communication [Normal Sclera/Conjunctiva] : normal sclera/conjunctiva [No Neck Mass] : no neck mass was observed [No LAD] : no lymphadenopathy [Supple] : the neck was supple [Thyroid Not Enlarged] : the thyroid was not enlarged [No Thyroid Nodules] : no palpable thyroid nodules [No Respiratory Distress] : no respiratory distress [No Accessory Muscle Use] : no accessory muscle use [Normal Rate and Effort] : normal respiratory rate and effort [Normal Affect] : the affect was normal [Normal Insight/Judgement] : insight and judgment were intact [Normal Mood] : the mood was normal

## 2024-05-24 ENCOUNTER — APPOINTMENT (OUTPATIENT)
Dept: MRI IMAGING | Facility: CLINIC | Age: 54
End: 2024-05-24
Payer: COMMERCIAL

## 2024-05-24 ENCOUNTER — OUTPATIENT (OUTPATIENT)
Dept: OUTPATIENT SERVICES | Facility: HOSPITAL | Age: 54
LOS: 1 days | End: 2024-05-24
Payer: COMMERCIAL

## 2024-05-24 DIAGNOSIS — Z90.49 ACQUIRED ABSENCE OF OTHER SPECIFIED PARTS OF DIGESTIVE TRACT: Chronic | ICD-10-CM

## 2024-05-24 DIAGNOSIS — Z98.890 OTHER SPECIFIED POSTPROCEDURAL STATES: Chronic | ICD-10-CM

## 2024-05-24 DIAGNOSIS — S92.352A DISPLACED FRACTURE OF FIFTH METATARSAL BONE, LEFT FOOT, INITIAL ENCOUNTER FOR CLOSED FRACTURE: ICD-10-CM

## 2024-05-24 DIAGNOSIS — T14.8XXA OTHER INJURY OF UNSPECIFIED BODY REGION, INITIAL ENCOUNTER: Chronic | ICD-10-CM

## 2024-05-24 DIAGNOSIS — Z00.8 ENCOUNTER FOR OTHER GENERAL EXAMINATION: ICD-10-CM

## 2024-05-24 PROCEDURE — 73718 MRI LOWER EXTREMITY W/O DYE: CPT

## 2024-05-24 PROCEDURE — 73718 MRI LOWER EXTREMITY W/O DYE: CPT | Mod: 26,LT

## 2024-05-26 PROBLEM — E03.9 HYPOTHYROIDISM, ADULT: Status: ACTIVE | Noted: 2022-09-08

## 2024-05-26 PROBLEM — E23.7 PITUITARY LESION: Status: ACTIVE | Noted: 2022-09-08

## 2024-05-26 PROBLEM — E66.9 OBESITY (BMI 30-39.9): Status: ACTIVE | Noted: 2017-07-27

## 2024-05-26 NOTE — REVIEW OF SYSTEMS
[Recent Weight Loss (___ Lbs)] : recent weight loss: [unfilled] lbs [Nausea] : nausea [All other systems negative] : All other systems negative [FreeTextEntry7] : IBS

## 2024-05-26 NOTE — ASSESSMENT
[FreeTextEntry1] : This is a 54-year-old female with primary hypothyroidism, vitamin D insufficiency, seizures, obesity, here for follow-up.  1.Primary hypothyroidism Recent TFTs are normal. Continue Synthroid 100mcg daily. Will consider resuming levothyroxine as she is unsure is Synthroid brand is helping her.  She is clinically euthyroid. Thyroid ultrasound from 2020 showed no thyroid nodules.  2. 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 Anterior pituitary hormones normal.  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  3. Obesity Continue Zepbound, increase to 12.5 mg weekly and then 15 mg weekly if tolerated.

## 2024-05-26 NOTE — ADDENDUM
[FreeTextEntry1] :  By signing my name below, I, Jodi Infante, attest that this document has been prepared under the direction and in the presence of Dr. Villarreal.  I, Kimi Villarreal MD, personally performed the services described in this documentation. All medical record entries made by the scribe were at my discretion and in my presence. I have reviewed the chart and discharge instructions (if applicable) and agree that the record reflects my personal permanence and is accurate and complete.

## 2024-05-26 NOTE — HISTORY OF PRESENT ILLNESS
[FreeTextEntry1] : CC: Hypothyroidism This is a 54-year-old female with primary hypothyroidism, pituitary lesion, seizures, vitamin D insufficiency, obesity, diverticulitis status post sigmoid colectomy, here for follow up.  She was diagnosed with hypothyroidism at age 46.  She is currently on Synthroid 100 mcg daily. She takes in the morning on an empty stomach.  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 She is currently on Zepbound 10 mg weekly. She lost approximately 30 pounds since starting Zepbound. Reports occasional nausea which is resolved by drinking tea or eating a light meal.

## 2024-05-31 ENCOUNTER — APPOINTMENT (OUTPATIENT)
Dept: PULMONOLOGY | Facility: CLINIC | Age: 54
End: 2024-05-31

## 2024-06-02 ENCOUNTER — RX RENEWAL (OUTPATIENT)
Age: 54
End: 2024-06-02

## 2024-06-07 NOTE — ED PROVIDER NOTE - PHYSICAL EXAMINATION
Adult PHYSICAL EXAMINATION:  VITALS REVIEWED.    GENERALIZED APPEARANCE:  Comfortable, no acute distress, ambulating without difficulty  SKIN:  Warm, dry, no cyanosis  HEAD:  No obvious scalp lesions  EYES:  Conjunctiva pink, no icterus  ENMT:  Mucus membranes moist, no stridor  NECK:  Supple, non-tender  CHEST AND RESPIRATORY:  Clear to auscultation B/L, good air entry B/L, equal chest expansion  HEART AND CARDIOVASCULAR:  Regular rate, no obvious murmur  ABDOMEN AND GI:  Soft, +LLQ and suprapubic tenderness, non-distended.  No rebound, no guarding, no CVA-area tenderness  EXTREMITIES:  No deformity, edema, or calf tenderness  NEURO: AAOx3, gross motor and sensory intact  PSYCH: Normal affect

## 2024-06-08 ENCOUNTER — NON-APPOINTMENT (OUTPATIENT)
Age: 54
End: 2024-06-08

## 2024-06-10 ENCOUNTER — APPOINTMENT (OUTPATIENT)
Dept: ORTHOPEDIC SURGERY | Facility: CLINIC | Age: 54
End: 2024-06-10
Payer: COMMERCIAL

## 2024-06-10 DIAGNOSIS — S92.352A DISPLACED FRACTURE OF FIFTH METATARSAL BONE, LEFT FOOT, INITIAL ENCOUNTER FOR CLOSED FRACTURE: ICD-10-CM

## 2024-06-10 PROCEDURE — 99024 POSTOP FOLLOW-UP VISIT: CPT

## 2024-06-10 PROCEDURE — 73630 X-RAY EXAM OF FOOT: CPT | Mod: LT

## 2024-06-10 NOTE — DISCUSSION/SUMMARY
[de-identified] : The patient is over 6 weeks out from the injury.  She can now transition out of the hard soled shoe and go into a regular, comfortable supportive sneaker.  She has minimal to no pain over the fracture and x-rays were reviewed with the patient, fracture is just about healed.  I do want her to protect the fracture for the next 2 weeks while she is in transition phase.  She can slowly increase her activities over the next month returning to normal activities if she has no pain in 1 month.  All of her questions were answered.  She can follow-up as needed.   The patient understood and verbally agreed to the treatment plan. All of their questions were answered, and they were satisfied with the visit. The patient should call the office if they have any questions or experience worsening symptoms.

## 2024-06-10 NOTE — PHYSICAL EXAM
[de-identified] : Left foot Physical Examination:  General: Alert and oriented x3.  In no acute distress.  Pleasant in nature with a normal affect.  No apparent respiratory distress.  Erythema, Warmth, Rubor: Negative Swelling: Negative  ROM Ankle: 1. Dorsiflexion: 10 degrees 2. Plantarflexion: 40 degrees 3. Inversion: 30 degrees 4. Eversion: 20 degrees  ROM of digits: Normal  Pes Planus: Negative Pes Cavus: Negative  Bunion: Negative Tailor's Bunion (Bunionette): Negative Hammer Toe Deformity/Deformities: Negative  Tenderness to Palpation:  1. Heel Pain: Negative 2. Midfoot Pain: Negative 3. First MTP Joint: Negative 4. Lis Franc Joint: Negative  Tenderness Metatarsals: 1st MT: Negative 2nd MT: Negative 3rd MT: Negative 4th MT: Negative 5th MT: Very mild discomfort when palpating the fifth metatarsal.  Much improved since previous office visit. Base of the 5th MT: Negative  Ligament Pain: 1. Lis Franc Ligament: Negative 2. Plantar Fascia Ligament: Negative  Strength:  5/5 TA/GS/EHL/FHL/EDL/ADD/ABD  Pulses: 2+ DP/PT Pulses  Capillary Refill Toes: <2 seconds  Neuro: Intact motor and sensory throughout  Additional Test: 1. Fagan's Squeeze Test: Negative 2. Calcaneal Squeeze Test: Negative [de-identified] : Left foot x-rays reviewed, 3 views total, 6/10/2024: Oblique fracture of the fifth metatarsal stable with good healing noted.

## 2024-06-10 NOTE — HISTORY OF PRESENT ILLNESS
[FreeTextEntry1] : 06/10/2024: WARNER REYES is a 54 year old female presenting to the office for a follow up evaluation of her left foot, 5th metatarsal oblique fracture. She reports improvement in her symptoms.  She is fully weightbearing with the hard soled shoe.  Her pain scale is minimal today in the office.  No other complaints.  05/10/2024: WARNER REYES is a 54 year old female presenting to the office for a follow up evaluation of her left foot, 5th metatarsal oblique fracture. she reports that her pain has worsened since last visit. The patient presents to the office in a stiff show and ambulating without assistance.  04/26/2024: WARNER REYES is a 54 year old female presenting to the office for an initial evaluation of left foot, 5th metatarsal oblique fracture. She reports that the pain began on 4/25/24, where she twisted her foot while walking. The patient presents to the office in a stiff shoe and ambulating without assistance.

## 2024-06-12 ENCOUNTER — NON-APPOINTMENT (OUTPATIENT)
Age: 54
End: 2024-06-12

## 2024-06-12 NOTE — ED PROVIDER NOTE - NS ED MD DISPO DIVISION
no [facility] Mucous membranes moist and pink without lesions; alveolar ridge smooth and edentulous; lip, palate and uvula with acceptable anatomic shape; normal tongue, frenulum and cheek exam; mandible size acceptable.

## 2024-06-14 RX ORDER — TIRZEPATIDE 15 MG/.5ML
15 INJECTION, SOLUTION SUBCUTANEOUS
Qty: 1 | Refills: 0 | Status: ACTIVE | COMMUNITY
Start: 2024-04-01 | End: 1900-01-01

## 2024-06-15 ENCOUNTER — OUTPATIENT (OUTPATIENT)
Dept: OUTPATIENT SERVICES | Facility: HOSPITAL | Age: 54
LOS: 1 days | End: 2024-06-15
Payer: COMMERCIAL

## 2024-06-15 ENCOUNTER — APPOINTMENT (OUTPATIENT)
Dept: ULTRASOUND IMAGING | Facility: CLINIC | Age: 54
End: 2024-06-15
Payer: COMMERCIAL

## 2024-06-15 DIAGNOSIS — T14.8XXA OTHER INJURY OF UNSPECIFIED BODY REGION, INITIAL ENCOUNTER: Chronic | ICD-10-CM

## 2024-06-15 DIAGNOSIS — Z00.8 ENCOUNTER FOR OTHER GENERAL EXAMINATION: ICD-10-CM

## 2024-06-15 DIAGNOSIS — Z90.49 ACQUIRED ABSENCE OF OTHER SPECIFIED PARTS OF DIGESTIVE TRACT: Chronic | ICD-10-CM

## 2024-06-15 DIAGNOSIS — Z98.890 OTHER SPECIFIED POSTPROCEDURAL STATES: Chronic | ICD-10-CM

## 2024-06-15 PROCEDURE — 76856 US EXAM PELVIC COMPLETE: CPT

## 2024-06-15 PROCEDURE — 76856 US EXAM PELVIC COMPLETE: CPT | Mod: 26

## 2024-06-15 PROCEDURE — 76830 TRANSVAGINAL US NON-OB: CPT | Mod: 26

## 2024-06-15 PROCEDURE — 76830 TRANSVAGINAL US NON-OB: CPT

## 2024-06-19 ENCOUNTER — APPOINTMENT (OUTPATIENT)
Dept: UROGYNECOLOGY | Facility: CLINIC | Age: 54
End: 2024-06-19
Payer: COMMERCIAL

## 2024-06-19 DIAGNOSIS — R10.2 PELVIC AND PERINEAL PAIN: ICD-10-CM

## 2024-06-19 PROCEDURE — 51701 INSERT BLADDER CATHETER: CPT

## 2024-06-19 PROCEDURE — 99203 OFFICE O/P NEW LOW 30 MIN: CPT | Mod: 25

## 2024-06-19 PROCEDURE — 99459 PELVIC EXAMINATION: CPT

## 2024-06-19 NOTE — PROCEDURE
[Straight Catheterization] : insertion of a straight catheter [Patient] : the patient [___ Fr Straight Tip] : a [unfilled] in [place] straight tip catheter [None] : there were no complications with the catheter insertion [Clear] : clear [No Complications] : no complications [Tolerated Well] : the patient tolerated the procedure well

## 2024-06-21 ENCOUNTER — NON-APPOINTMENT (OUTPATIENT)
Age: 54
End: 2024-06-21

## 2024-06-21 LAB
APPEARANCE: CLEAR
BACTERIA UR CULT: NORMAL
BACTERIA: NEGATIVE /HPF
BILIRUBIN URINE: NEGATIVE
BLOOD URINE: NEGATIVE
CAST: 2 /LPF
COLOR: YELLOW
EPITHELIAL CELLS: 4 /HPF
GLUCOSE QUALITATIVE U: NEGATIVE
KETONES URINE: NEGATIVE
LEUKOCYTE ESTERASE URINE: NEGATIVE
MICROSCOPIC-UA: NORMAL
MUCUS: PRESENT
NITRITE URINE: NEGATIVE
PH URINE: 6
PROTEIN URINE: ABNORMAL
RED BLOOD CELLS URINE: NORMAL /HPF
REVIEW: NORMAL
SPECIFIC GRAVITY URINE: >=1.03
UROBILINOGEN URINE: NORMAL
WHITE BLOOD CELLS URINE: 0 /HPF

## 2024-06-23 NOTE — PHYSICAL EXAM
[Chaperone Present] : A chaperone was present in the examining room during all aspects of the physical examination [[id number]] : A chaperone was present during the pelvic exam. [No Acute Distress] : in no acute distress [Well developed] : well developed [Normal Memory] : ~T memory was ~L unimpaired [Respirations regular] : ~T respiratory rate was regular [Normal Gait] : gait was normal [Labia Majora] : were normal [Labia Minora] : were normal [Normal Appearance] : general appearance was normal [Atrophy] : atrophy [Normal] : no abnormalities [Post Void Residual ____ml] : post void residual was [unfilled] ml [Normal rectal exam] : was normal [FreeTextEntry2] : Felicia [Tenderness] : ~T no ~M abdominal tenderness observed [Distended] : not distended [FreeTextEntry4] : no mesh exposure or erosion seen [de-identified] : no prolapse visualized

## 2024-06-23 NOTE — HISTORY OF PRESENT ILLNESS
[Vaginal Wall Prolapse] : no [Unable To Restrain Bowel Movement] : no [Urinary Frequency More Than Twice At Night (Nocturia)] : no nocturia [de-identified] : 5-6x [de-identified] : for one month [de-identified] : yes [FreeTextEntry1] : James is a 54F here for evaluation of one month of bladder pressure and pain.  Patient reports that her stream is now stop and go and that she has had to push to void.  Does have some urinary urgency but denies RON and UUI.  Denies dysuria.  In the past month, she has gone to urgent care twice and her GYN and has since completed 3 course of abx (bactrim, cefpodoxime, other one unknown).  She did have some relief from pyridium.  UCx (2024): contaminated UCx (6/10/2024): >100K E. coli (resistant to levaquin and bactrim)  RBUS (2024): wnl; PVR 2 Pelvic US (2024): uterus 7.2 x 3.3 x 4.6, EMS 6 mm; right ovary wnl, left ovary not visualized  PMH: diverticulitis, hypothyroidism, IBS, MARIAN, HTN PSH: TOT (), , sigmoidectomy

## 2024-06-23 NOTE — END OF VISIT
[FreeTextEntry3] : Patient seen and examined and discussion by me (Dr. Marquis) [Time Spent: ___ minutes] : I have spent [unfilled] minutes of time on the encounter.

## 2024-06-23 NOTE — DISCUSSION/SUMMARY
[FreeTextEntry1] : We discussed the above findings with the patient. Will follow up formal urinalysis and culture.  We discussed that I was unsure if her symptoms are urogynecologic in nature and we also discussed the possibility that her recent UTI is still causing some inflammatory symptoms.  We discussed possibly trialing vaginal estrogen if the culture returns positive.  Recommended that patient continue pyridium for symptomatic relief at this time.  We also suggested behavioral modifications including increasing fluid intake to 6-8 glasses of fluid, probiotics, and cranberry supplements.  We discussed follow-up with GYN.  We discussed follow-up here in approximately 1-2 months if still symptomatic

## 2024-06-27 ENCOUNTER — TRANSCRIPTION ENCOUNTER (OUTPATIENT)
Age: 54
End: 2024-06-27

## 2024-06-28 ENCOUNTER — TRANSCRIPTION ENCOUNTER (OUTPATIENT)
Age: 54
End: 2024-06-28

## 2024-07-01 ENCOUNTER — EMERGENCY (EMERGENCY)
Facility: HOSPITAL | Age: 54
LOS: 1 days | Discharge: HOME | End: 2024-07-01
Attending: STUDENT IN AN ORGANIZED HEALTH CARE EDUCATION/TRAINING PROGRAM
Payer: COMMERCIAL

## 2024-07-01 VITALS
RESPIRATION RATE: 18 BRPM | SYSTOLIC BLOOD PRESSURE: 118 MMHG | TEMPERATURE: 98 F | HEIGHT: 61 IN | OXYGEN SATURATION: 99 % | HEART RATE: 89 BPM | WEIGHT: 169.09 LBS | DIASTOLIC BLOOD PRESSURE: 76 MMHG

## 2024-07-01 DIAGNOSIS — T14.8XXA OTHER INJURY OF UNSPECIFIED BODY REGION, INITIAL ENCOUNTER: Chronic | ICD-10-CM

## 2024-07-01 DIAGNOSIS — Z90.49 ACQUIRED ABSENCE OF OTHER SPECIFIED PARTS OF DIGESTIVE TRACT: Chronic | ICD-10-CM

## 2024-07-01 DIAGNOSIS — Z98.890 OTHER SPECIFIED POSTPROCEDURAL STATES: Chronic | ICD-10-CM

## 2024-07-01 PROCEDURE — 99284 EMERGENCY DEPT VISIT MOD MDM: CPT

## 2024-07-02 VITALS
HEART RATE: 78 BPM | OXYGEN SATURATION: 97 % | DIASTOLIC BLOOD PRESSURE: 80 MMHG | SYSTOLIC BLOOD PRESSURE: 116 MMHG | TEMPERATURE: 98 F | RESPIRATION RATE: 16 BRPM

## 2024-07-02 LAB
ALBUMIN SERPL ELPH-MCNC: 4.4 G/DL — SIGNIFICANT CHANGE UP (ref 3.3–5)
ALP SERPL-CCNC: 50 U/L — SIGNIFICANT CHANGE UP (ref 40–120)
ALT FLD-CCNC: 21 U/L — SIGNIFICANT CHANGE UP (ref 10–45)
ANION GAP SERPL CALC-SCNC: 14 MMOL/L — SIGNIFICANT CHANGE UP (ref 5–17)
APPEARANCE UR: CLEAR — SIGNIFICANT CHANGE UP
AST SERPL-CCNC: 18 U/L — SIGNIFICANT CHANGE UP (ref 10–40)
BACTERIA # UR AUTO: NEGATIVE /HPF — SIGNIFICANT CHANGE UP
BASOPHILS # BLD AUTO: 0.05 K/UL — SIGNIFICANT CHANGE UP (ref 0–0.2)
BASOPHILS NFR BLD AUTO: 0.6 % — SIGNIFICANT CHANGE UP (ref 0–2)
BILIRUB SERPL-MCNC: 0.5 MG/DL — SIGNIFICANT CHANGE UP (ref 0.2–1.2)
BILIRUB UR-MCNC: NEGATIVE — SIGNIFICANT CHANGE UP
BUN SERPL-MCNC: 17 MG/DL — SIGNIFICANT CHANGE UP (ref 7–23)
CALCIUM SERPL-MCNC: 9.9 MG/DL — SIGNIFICANT CHANGE UP (ref 8.4–10.5)
CAST: 0 /LPF — SIGNIFICANT CHANGE UP (ref 0–4)
CHLORIDE SERPL-SCNC: 105 MMOL/L — SIGNIFICANT CHANGE UP (ref 96–108)
CO2 SERPL-SCNC: 22 MMOL/L — SIGNIFICANT CHANGE UP (ref 22–31)
COLOR SPEC: ABNORMAL
CREAT SERPL-MCNC: 1.02 MG/DL — SIGNIFICANT CHANGE UP (ref 0.5–1.3)
DIFF PNL FLD: NEGATIVE — SIGNIFICANT CHANGE UP
EGFR: 65 ML/MIN/1.73M2 — SIGNIFICANT CHANGE UP
EOSINOPHIL # BLD AUTO: 0.15 K/UL — SIGNIFICANT CHANGE UP (ref 0–0.5)
EOSINOPHIL NFR BLD AUTO: 1.9 % — SIGNIFICANT CHANGE UP (ref 0–6)
GLUCOSE SERPL-MCNC: 90 MG/DL — SIGNIFICANT CHANGE UP (ref 70–99)
GLUCOSE UR QL: NEGATIVE MG/DL — SIGNIFICANT CHANGE UP
HCT VFR BLD CALC: 38.7 % — SIGNIFICANT CHANGE UP (ref 34.5–45)
HGB BLD-MCNC: 13.3 G/DL — SIGNIFICANT CHANGE UP (ref 11.5–15.5)
IMM GRANULOCYTES NFR BLD AUTO: 0.1 % — SIGNIFICANT CHANGE UP (ref 0–0.9)
KETONES UR-MCNC: NEGATIVE MG/DL — SIGNIFICANT CHANGE UP
LEUKOCYTE ESTERASE UR-ACNC: ABNORMAL
LIDOCAIN IGE QN: 47 U/L — SIGNIFICANT CHANGE UP (ref 7–60)
LYMPHOCYTES # BLD AUTO: 1.81 K/UL — SIGNIFICANT CHANGE UP (ref 1–3.3)
LYMPHOCYTES # BLD AUTO: 23.4 % — SIGNIFICANT CHANGE UP (ref 13–44)
MCHC RBC-ENTMCNC: 31.8 PG — SIGNIFICANT CHANGE UP (ref 27–34)
MCHC RBC-ENTMCNC: 34.4 GM/DL — SIGNIFICANT CHANGE UP (ref 32–36)
MCV RBC AUTO: 92.6 FL — SIGNIFICANT CHANGE UP (ref 80–100)
MONOCYTES # BLD AUTO: 0.86 K/UL — SIGNIFICANT CHANGE UP (ref 0–0.9)
MONOCYTES NFR BLD AUTO: 11.1 % — SIGNIFICANT CHANGE UP (ref 2–14)
NEUTROPHILS # BLD AUTO: 4.86 K/UL — SIGNIFICANT CHANGE UP (ref 1.8–7.4)
NEUTROPHILS NFR BLD AUTO: 62.9 % — SIGNIFICANT CHANGE UP (ref 43–77)
NITRITE UR-MCNC: POSITIVE
NRBC # BLD: 0 /100 WBCS — SIGNIFICANT CHANGE UP (ref 0–0)
PH UR: 5.5 — SIGNIFICANT CHANGE UP (ref 5–8)
PLATELET # BLD AUTO: 263 K/UL — SIGNIFICANT CHANGE UP (ref 150–400)
POTASSIUM SERPL-MCNC: 3.6 MMOL/L — SIGNIFICANT CHANGE UP (ref 3.5–5.3)
POTASSIUM SERPL-SCNC: 3.6 MMOL/L — SIGNIFICANT CHANGE UP (ref 3.5–5.3)
PROT SERPL-MCNC: 7.6 G/DL — SIGNIFICANT CHANGE UP (ref 6–8.3)
PROT UR-MCNC: SIGNIFICANT CHANGE UP MG/DL
RBC # BLD: 4.18 M/UL — SIGNIFICANT CHANGE UP (ref 3.8–5.2)
RBC # FLD: 12.7 % — SIGNIFICANT CHANGE UP (ref 10.3–14.5)
RBC CASTS # UR COMP ASSIST: 0 /HPF — SIGNIFICANT CHANGE UP (ref 0–4)
REVIEW: SIGNIFICANT CHANGE UP
SODIUM SERPL-SCNC: 141 MMOL/L — SIGNIFICANT CHANGE UP (ref 135–145)
SP GR SPEC: 1.01 — SIGNIFICANT CHANGE UP (ref 1–1.03)
SQUAMOUS # UR AUTO: 3 /HPF — SIGNIFICANT CHANGE UP (ref 0–5)
UROBILINOGEN FLD QL: 1 MG/DL — SIGNIFICANT CHANGE UP (ref 0.2–1)
WBC # BLD: 7.74 K/UL — SIGNIFICANT CHANGE UP (ref 3.8–10.5)
WBC # FLD AUTO: 7.74 K/UL — SIGNIFICANT CHANGE UP (ref 3.8–10.5)
WBC UR QL: 1 /HPF — SIGNIFICANT CHANGE UP (ref 0–5)

## 2024-07-02 PROCEDURE — 76705 ECHO EXAM OF ABDOMEN: CPT | Mod: 26

## 2024-07-02 PROCEDURE — 83690 ASSAY OF LIPASE: CPT

## 2024-07-02 PROCEDURE — 76830 TRANSVAGINAL US NON-OB: CPT

## 2024-07-02 PROCEDURE — 81001 URINALYSIS AUTO W/SCOPE: CPT

## 2024-07-02 PROCEDURE — 99284 EMERGENCY DEPT VISIT MOD MDM: CPT | Mod: 25

## 2024-07-02 PROCEDURE — 96374 THER/PROPH/DIAG INJ IV PUSH: CPT

## 2024-07-02 PROCEDURE — 80053 COMPREHEN METABOLIC PANEL: CPT

## 2024-07-02 PROCEDURE — 85025 COMPLETE CBC W/AUTO DIFF WBC: CPT

## 2024-07-02 PROCEDURE — 74176 CT ABD & PELVIS W/O CONTRAST: CPT | Mod: MC

## 2024-07-02 PROCEDURE — 76830 TRANSVAGINAL US NON-OB: CPT | Mod: 26

## 2024-07-02 PROCEDURE — 74176 CT ABD & PELVIS W/O CONTRAST: CPT | Mod: 26,MC

## 2024-07-02 PROCEDURE — 76705 ECHO EXAM OF ABDOMEN: CPT

## 2024-07-02 PROCEDURE — 87086 URINE CULTURE/COLONY COUNT: CPT

## 2024-07-02 RX ORDER — ACETAMINOPHEN 325 MG
975 TABLET ORAL ONCE
Refills: 0 | Status: COMPLETED | OUTPATIENT
Start: 2024-07-02 | End: 2024-07-02

## 2024-07-02 RX ORDER — KETOROLAC TROMETHAMINE 30 MG/ML
15 INJECTION, SOLUTION INTRAMUSCULAR ONCE
Refills: 0 | Status: DISCONTINUED | OUTPATIENT
Start: 2024-07-02 | End: 2024-07-02

## 2024-07-02 RX ADMIN — KETOROLAC TROMETHAMINE 15 MILLIGRAM(S): 30 INJECTION, SOLUTION INTRAMUSCULAR at 05:10

## 2024-07-03 LAB
CULTURE RESULTS: NO GROWTH — SIGNIFICANT CHANGE UP
SPECIMEN SOURCE: SIGNIFICANT CHANGE UP

## 2024-07-09 ENCOUNTER — APPOINTMENT (OUTPATIENT)
Dept: INTERNAL MEDICINE | Facility: CLINIC | Age: 54
End: 2024-07-09
Payer: COMMERCIAL

## 2024-07-09 VITALS
SYSTOLIC BLOOD PRESSURE: 110 MMHG | DIASTOLIC BLOOD PRESSURE: 77 MMHG | HEART RATE: 74 BPM | BODY MASS INDEX: 31.34 KG/M2 | OXYGEN SATURATION: 99 % | WEIGHT: 166 LBS | HEIGHT: 61 IN | TEMPERATURE: 97.4 F

## 2024-07-09 DIAGNOSIS — R10.11 RIGHT UPPER QUADRANT PAIN: ICD-10-CM

## 2024-07-09 DIAGNOSIS — T14.8XXA OTHER INJURY OF UNSPECIFIED BODY REGION, INITIAL ENCOUNTER: ICD-10-CM

## 2024-07-09 DIAGNOSIS — K82.8 OTHER SPECIFIED DISEASES OF GALLBLADDER: ICD-10-CM

## 2024-07-09 DIAGNOSIS — E03.9 HYPOTHYROIDISM, UNSPECIFIED: ICD-10-CM

## 2024-07-09 DIAGNOSIS — I25.10 ATHEROSCLEROTIC HEART DISEASE OF NATIVE CORONARY ARTERY W/OUT ANGINA PECTORIS: ICD-10-CM

## 2024-07-09 PROCEDURE — 36415 COLL VENOUS BLD VENIPUNCTURE: CPT

## 2024-07-09 PROCEDURE — 99214 OFFICE O/P EST MOD 30 MIN: CPT

## 2024-07-19 ENCOUNTER — APPOINTMENT (OUTPATIENT)
Dept: UROGYNECOLOGY | Facility: CLINIC | Age: 54
End: 2024-07-19
Payer: COMMERCIAL

## 2024-07-19 DIAGNOSIS — R10.2 PELVIC AND PERINEAL PAIN: ICD-10-CM

## 2024-07-19 DIAGNOSIS — R39.9 UNSPECIFIED SYMPTOMS AND SIGNS INVOLVING THE GENITOURINARY SYSTEM: ICD-10-CM

## 2024-07-19 LAB
BILIRUB UR QL STRIP: NORMAL
CLARITY UR: NORMAL
COLLECTION METHOD: NORMAL
GLUCOSE UR-MCNC: NEGATIVE
HCG UR QL: 1 EU/DL
HGB UR QL STRIP.AUTO: NORMAL
KETONES UR-MCNC: NEGATIVE
LEUKOCYTE ESTERASE UR QL STRIP: NORMAL
NITRITE UR QL STRIP: NEGATIVE
PH UR STRIP: 6
PROT UR STRIP-MCNC: NORMAL
SP GR UR STRIP: 1.03

## 2024-07-19 PROCEDURE — 99214 OFFICE O/P EST MOD 30 MIN: CPT | Mod: 25

## 2024-07-19 PROCEDURE — 51701 INSERT BLADDER CATHETER: CPT

## 2024-07-19 PROCEDURE — 81003 URINALYSIS AUTO W/O SCOPE: CPT | Mod: QW

## 2024-07-19 RX ORDER — ESTRADIOL 0.1 MG/G
0.1 CREAM VAGINAL AT BEDTIME
Qty: 1 | Refills: 1 | Status: ACTIVE | COMMUNITY
Start: 2024-07-19 | End: 1900-01-01

## 2024-07-22 ENCOUNTER — APPOINTMENT (OUTPATIENT)
Dept: ORTHOPEDIC SURGERY | Facility: CLINIC | Age: 54
End: 2024-07-22

## 2024-07-22 DIAGNOSIS — N39.0 URINARY TRACT INFECTION, SITE NOT SPECIFIED: ICD-10-CM

## 2024-07-22 LAB
25(OH)D3 SERPL-MCNC: 28.9 NG/ML
ALP BLD-CCNC: 50 U/L
ANION GAP SERPL CALC-SCNC: 12 MMOL/L
APTT BLD: 29.2 SEC
AST SERPL-CCNC: 14 U/L
BASOPHILS NFR BLD AUTO: 1.2 %
BILIRUB SERPL-MCNC: 0.4 MG/DL
BUN SERPL-MCNC: 8 MG/DL
CALCIUM SERPL-MCNC: 9.7 MG/DL
CHLORIDE SERPL-SCNC: 108 MMOL/L
CHOLEST SERPL-MCNC: 235 MG/DL
CO2 SERPL-SCNC: 23 MMOL/L
CREAT SERPL-MCNC: 0.84 MG/DL
EGFR: 82 ML/MIN/1.73M2
EOSINOPHIL # BLD AUTO: 0.11 K/UL
EOSINOPHIL NFR BLD AUTO: 2.2 %
ESTIMATED AVERAGE GLUCOSE: 97 MG/DL
GLUCOSE SERPL-MCNC: 88 MG/DL
HCT VFR BLD CALC: 40.5 %
HDLC SERPL-MCNC: 66 MG/DL
IMM GRANULOCYTES NFR BLD AUTO: 0.4 %
INR PPP: 0.89 RATIO
LDLC SERPL CALC-MCNC: 149 MG/DL
LYMPHOCYTES # BLD AUTO: 1.95 K/UL
LYMPHOCYTES NFR BLD AUTO: 38.8 %
MCHC RBC-ENTMCNC: 31.4 PG
MCHC RBC-ENTMCNC: 33.8 GM/DL
MCV RBC AUTO: 92.9 FL
MONOCYTES # BLD AUTO: 0.51 K/UL
MONOCYTES NFR BLD AUTO: 10.1 %
NEUTROPHILS # BLD AUTO: 2.38 K/UL
NEUTROPHILS NFR BLD AUTO: 47.3 %
NONHDLC SERPL-MCNC: 169 MG/DL
POTASSIUM SERPL-SCNC: 3.9 MMOL/L
PROT SERPL-MCNC: 7 G/DL
PT BLD: 10.1 SEC
RBC # BLD: 4.36 M/UL
RBC # FLD: 13.2 %
SODIUM SERPL-SCNC: 143 MMOL/L
TRIGL SERPL-MCNC: 112 MG/DL
TSH SERPL-ACNC: 1.18 UIU/ML
VIT B12 SERPL-MCNC: 360 PG/ML
VIT C SERPL-MCNC: <0.1 MG/DL
VWF:RCO ACT/NOR PPP PL AGG: 126 %
WBC # FLD AUTO: 5.03 K/UL

## 2024-07-22 RX ORDER — NITROFURANTOIN MACROCRYSTALS 100 MG/1
100 CAPSULE ORAL
Qty: 10 | Refills: 0 | Status: ACTIVE | COMMUNITY
Start: 2024-07-22 | End: 1900-01-01

## 2024-07-23 ENCOUNTER — NON-APPOINTMENT (OUTPATIENT)
Age: 54
End: 2024-07-23

## 2024-07-24 LAB
APPEARANCE: ABNORMAL
BACTERIA UR CULT: ABNORMAL
BACTERIA: ABNORMAL /HPF
BILIRUBIN URINE: ABNORMAL
BLOOD URINE: ABNORMAL
CAST: 4 /LPF
COLOR: NORMAL
EPITHELIAL CELLS: 2 /HPF
GLUCOSE QUALITATIVE U: NEGATIVE MG/DL
KETONES URINE: ABNORMAL MG/DL
LEUKOCYTE ESTERASE URINE: ABNORMAL
MICROSCOPIC-UA: NORMAL
MUCUS: PRESENT
NITRITE URINE: NEGATIVE
PH URINE: 6
PROTEIN URINE: 30 MG/DL
RED BLOOD CELLS URINE: 1 /HPF
REVIEW: NORMAL
SPECIFIC GRAVITY URINE: 1.02
UROBILINOGEN URINE: 1 MG/DL
WHITE BLOOD CELLS URINE: 262 /HPF

## 2024-07-25 ENCOUNTER — EMERGENCY (EMERGENCY)
Facility: HOSPITAL | Age: 54
LOS: 1 days | Discharge: HOME | End: 2024-07-25
Attending: EMERGENCY MEDICINE
Payer: COMMERCIAL

## 2024-07-25 VITALS
HEART RATE: 93 BPM | OXYGEN SATURATION: 97 % | WEIGHT: 162.04 LBS | DIASTOLIC BLOOD PRESSURE: 79 MMHG | HEIGHT: 61 IN | TEMPERATURE: 98 F | RESPIRATION RATE: 20 BRPM | SYSTOLIC BLOOD PRESSURE: 108 MMHG

## 2024-07-25 VITALS
RESPIRATION RATE: 18 BRPM | TEMPERATURE: 98 F | SYSTOLIC BLOOD PRESSURE: 118 MMHG | DIASTOLIC BLOOD PRESSURE: 78 MMHG | OXYGEN SATURATION: 98 % | HEART RATE: 77 BPM

## 2024-07-25 DIAGNOSIS — Z90.49 ACQUIRED ABSENCE OF OTHER SPECIFIED PARTS OF DIGESTIVE TRACT: Chronic | ICD-10-CM

## 2024-07-25 DIAGNOSIS — T14.8XXA OTHER INJURY OF UNSPECIFIED BODY REGION, INITIAL ENCOUNTER: Chronic | ICD-10-CM

## 2024-07-25 LAB
ALBUMIN SERPL ELPH-MCNC: 4.4 G/DL — SIGNIFICANT CHANGE UP (ref 3.3–5)
ALP SERPL-CCNC: 54 U/L — SIGNIFICANT CHANGE UP (ref 40–120)
ALT FLD-CCNC: 19 U/L — SIGNIFICANT CHANGE UP (ref 10–45)
ANION GAP SERPL CALC-SCNC: 15 MMOL/L — SIGNIFICANT CHANGE UP (ref 5–17)
APPEARANCE UR: CLEAR — SIGNIFICANT CHANGE UP
AST SERPL-CCNC: 19 U/L — SIGNIFICANT CHANGE UP (ref 10–40)
BACTERIA # UR AUTO: NEGATIVE /HPF — SIGNIFICANT CHANGE UP
BASOPHILS # BLD AUTO: 0.05 K/UL — SIGNIFICANT CHANGE UP (ref 0–0.2)
BASOPHILS NFR BLD AUTO: 0.7 % — SIGNIFICANT CHANGE UP (ref 0–2)
BILIRUB SERPL-MCNC: 0.5 MG/DL — SIGNIFICANT CHANGE UP (ref 0.2–1.2)
BILIRUB UR-MCNC: ABNORMAL
BUN SERPL-MCNC: 19 MG/DL — SIGNIFICANT CHANGE UP (ref 7–23)
CALCIUM SERPL-MCNC: 10.3 MG/DL — SIGNIFICANT CHANGE UP (ref 8.4–10.5)
CAST: 1 /LPF — SIGNIFICANT CHANGE UP (ref 0–4)
CHLORIDE SERPL-SCNC: 106 MMOL/L — SIGNIFICANT CHANGE UP (ref 96–108)
CO2 SERPL-SCNC: 22 MMOL/L — SIGNIFICANT CHANGE UP (ref 22–31)
COLOR SPEC: SIGNIFICANT CHANGE UP
CREAT SERPL-MCNC: 1.14 MG/DL — SIGNIFICANT CHANGE UP (ref 0.5–1.3)
DIFF PNL FLD: NEGATIVE — SIGNIFICANT CHANGE UP
EGFR: 56 ML/MIN/1.73M2 — LOW
EOSINOPHIL # BLD AUTO: 0.14 K/UL — SIGNIFICANT CHANGE UP (ref 0–0.5)
EOSINOPHIL NFR BLD AUTO: 2.1 % — SIGNIFICANT CHANGE UP (ref 0–6)
GLUCOSE SERPL-MCNC: 94 MG/DL — SIGNIFICANT CHANGE UP (ref 70–99)
GLUCOSE UR QL: NEGATIVE MG/DL — SIGNIFICANT CHANGE UP
HCT VFR BLD CALC: 39.7 % — SIGNIFICANT CHANGE UP (ref 34.5–45)
HGB BLD-MCNC: 13.2 G/DL — SIGNIFICANT CHANGE UP (ref 11.5–15.5)
IMM GRANULOCYTES NFR BLD AUTO: 0.3 % — SIGNIFICANT CHANGE UP (ref 0–0.9)
KETONES UR-MCNC: ABNORMAL MG/DL
LEUKOCYTE ESTERASE UR-ACNC: ABNORMAL
LYMPHOCYTES # BLD AUTO: 2.15 K/UL — SIGNIFICANT CHANGE UP (ref 1–3.3)
LYMPHOCYTES # BLD AUTO: 31.7 % — SIGNIFICANT CHANGE UP (ref 13–44)
MCHC RBC-ENTMCNC: 31.2 PG — SIGNIFICANT CHANGE UP (ref 27–34)
MCHC RBC-ENTMCNC: 33.2 GM/DL — SIGNIFICANT CHANGE UP (ref 32–36)
MCV RBC AUTO: 93.9 FL — SIGNIFICANT CHANGE UP (ref 80–100)
MONOCYTES # BLD AUTO: 0.82 K/UL — SIGNIFICANT CHANGE UP (ref 0–0.9)
MONOCYTES NFR BLD AUTO: 12.1 % — SIGNIFICANT CHANGE UP (ref 2–14)
NEUTROPHILS # BLD AUTO: 3.61 K/UL — SIGNIFICANT CHANGE UP (ref 1.8–7.4)
NEUTROPHILS NFR BLD AUTO: 53.1 % — SIGNIFICANT CHANGE UP (ref 43–77)
NITRITE UR-MCNC: POSITIVE
NRBC # BLD: 0 /100 WBCS — SIGNIFICANT CHANGE UP (ref 0–0)
PH UR: 5.5 — SIGNIFICANT CHANGE UP (ref 5–8)
PLATELET # BLD AUTO: 264 K/UL — SIGNIFICANT CHANGE UP (ref 150–400)
POTASSIUM SERPL-MCNC: 4.1 MMOL/L — SIGNIFICANT CHANGE UP (ref 3.5–5.3)
POTASSIUM SERPL-SCNC: 4.1 MMOL/L — SIGNIFICANT CHANGE UP (ref 3.5–5.3)
PROT SERPL-MCNC: 7.8 G/DL — SIGNIFICANT CHANGE UP (ref 6–8.3)
PROT UR-MCNC: SIGNIFICANT CHANGE UP MG/DL
RBC # BLD: 4.23 M/UL — SIGNIFICANT CHANGE UP (ref 3.8–5.2)
RBC # FLD: 12.6 % — SIGNIFICANT CHANGE UP (ref 10.3–14.5)
RBC CASTS # UR COMP ASSIST: 2 /HPF — SIGNIFICANT CHANGE UP (ref 0–4)
REVIEW: SIGNIFICANT CHANGE UP
SODIUM SERPL-SCNC: 143 MMOL/L — SIGNIFICANT CHANGE UP (ref 135–145)
SP GR SPEC: 1.02 — SIGNIFICANT CHANGE UP (ref 1–1.03)
SQUAMOUS # UR AUTO: 3 /HPF — SIGNIFICANT CHANGE UP (ref 0–5)
UROBILINOGEN FLD QL: 1 MG/DL — SIGNIFICANT CHANGE UP (ref 0.2–1)
WBC # BLD: 6.79 K/UL — SIGNIFICANT CHANGE UP (ref 3.8–10.5)
WBC # FLD AUTO: 6.79 K/UL — SIGNIFICANT CHANGE UP (ref 3.8–10.5)
WBC UR QL: 2 /HPF — SIGNIFICANT CHANGE UP (ref 0–5)

## 2024-07-25 PROCEDURE — 96375 TX/PRO/DX INJ NEW DRUG ADDON: CPT

## 2024-07-25 PROCEDURE — 87086 URINE CULTURE/COLONY COUNT: CPT

## 2024-07-25 PROCEDURE — 81001 URINALYSIS AUTO W/SCOPE: CPT

## 2024-07-25 PROCEDURE — 99284 EMERGENCY DEPT VISIT MOD MDM: CPT | Mod: 25

## 2024-07-25 PROCEDURE — 85025 COMPLETE CBC W/AUTO DIFF WBC: CPT

## 2024-07-25 PROCEDURE — 76770 US EXAM ABDO BACK WALL COMP: CPT | Mod: 26

## 2024-07-25 PROCEDURE — 96374 THER/PROPH/DIAG INJ IV PUSH: CPT

## 2024-07-25 PROCEDURE — 80053 COMPREHEN METABOLIC PANEL: CPT

## 2024-07-25 PROCEDURE — 99284 EMERGENCY DEPT VISIT MOD MDM: CPT

## 2024-07-25 PROCEDURE — 76770 US EXAM ABDO BACK WALL COMP: CPT

## 2024-07-25 RX ORDER — CEFTRIAXONE SODIUM 500 MG
1000 VIAL (EA) INJECTION ONCE
Refills: 0 | Status: COMPLETED | OUTPATIENT
Start: 2024-07-25 | End: 2024-07-25

## 2024-07-25 RX ORDER — CEFTRIAXONE SODIUM 500 MG
1000 VIAL (EA) INJECTION ONCE
Refills: 0 | Status: DISCONTINUED | OUTPATIENT
Start: 2024-07-25 | End: 2024-07-25

## 2024-07-25 RX ORDER — ACETAMINOPHEN 325 MG
1000 TABLET ORAL ONCE
Refills: 0 | Status: COMPLETED | OUTPATIENT
Start: 2024-07-25 | End: 2024-07-25

## 2024-07-25 RX ORDER — KETOROLAC TROMETHAMINE 30 MG/ML
15 INJECTION, SOLUTION INTRAMUSCULAR ONCE
Refills: 0 | Status: DISCONTINUED | OUTPATIENT
Start: 2024-07-25 | End: 2024-07-25

## 2024-07-25 RX ADMIN — KETOROLAC TROMETHAMINE 15 MILLIGRAM(S): 30 INJECTION, SOLUTION INTRAMUSCULAR at 19:25

## 2024-07-25 RX ADMIN — Medication 400 MILLIGRAM(S): at 20:38

## 2024-07-25 RX ADMIN — Medication 100 MILLIGRAM(S): at 20:38

## 2024-07-25 RX ADMIN — Medication 100 MILLIGRAM(S): at 21:34

## 2024-07-25 RX ADMIN — Medication 1000 MILLIGRAM(S): at 21:37

## 2024-07-25 RX ADMIN — KETOROLAC TROMETHAMINE 15 MILLIGRAM(S): 30 INJECTION, SOLUTION INTRAMUSCULAR at 21:37

## 2024-07-25 NOTE — ED ADULT NURSE REASSESSMENT NOTE - NS ED NURSE REASSESS COMMENT FT1
Handoff taken from KATHLEEN Montilla in purple. Introduced self to pt, resting in bed, VSS. Awaiting update on POC from MD. Comfort and safety maintained at this time.

## 2024-07-25 NOTE — ED PROVIDER NOTE - NSFOLLOWUPINSTRUCTIONS_ED_ALL_ED_FT
You were seen in the emergency department today for urinary symptoms.  You have a UTI.  We gave you IV antibiotics.  We have sent antibiotics (cefpodoxime) to your pharmacy.  Please take them twice a day for 10 days.  Please follow-up with your neurologist as scheduled or sooner.    You may take 500-1000 mg acetaminophen (Tylenol) every 6 hours, as needed for pain.  You may take 600 mg ibuprofen every 8 hours, with food, as needed for pain.  You can take Tylenol and ibuprofen at the same time.     PLEASE RETURN TO THE EMERGENCY DEPARTMENT if you experience worsening of your symptoms or any of the following: fever, uncontrollable headache, loss of consciousness, chest pain, difficulty breathing, uncontrollable nausea/vomiting, weakness/numbness/tingling.    We hope you feel better! Thank you for trusting us with your care!

## 2024-07-25 NOTE — ED ADULT NURSE NOTE - NS ED NURSE LEVEL OF CONSCIOUSNESS ORIENTATION
Patient : Prashanth Patton Age: 50 year old Sex: male   MRN: [id number] Encounter Date: 3/14/2022      History     Chief Complaint   Patient presents with   • Medical Screening Exam     HPI  Prashanth Patton is a 50-year-old male with a previous medical history of hypertension, explosive personality disorder, intellectual disability, borderline personality disorder presenting to the emergency department via [facility] for “balance issues”.  Patient is minimally verbal at baseline.  Patient is stating his name on exam and states “Burger “.  No Known Allergies    Current Discharge Medication List      Prior to Admission Medications    Details   cloNIDine (CATAPRES) 0.1 MG tablet Take 0.1 mg by mouth 2 times daily.      PARoxetine (PAXIL) 40 MG tablet Take 1 tablet by mouth daily.      QUEtiapine (SEROQUEL) 400 MG tablet Take 1/2 tablet po in am and 1 and 1/2 at bedtime      traZODONE (DESYREL) 50 MG tablet Take 3 tablets by mouth nightly.      HALOPERIDOL 10 MG PO TABS 1 tab by mouth twice  daily at 8 am and 4 pm  Refills: 0      ZYPREXA 10 MG PO TABS 1 tablet by mouth twice daily at 8 am and 4 pm  Refills: 0      BENZTROPINE MESYLATE 1 MG PO TABS 1 tab by mouth every other day  Refills: 0      CLORAZEPATE DIPOTASSIUM 7.5 MG PO TABS 1 tab by mout every day at 12 noon  Refills: 0      DEPAKOTE 500 MG PO TBEC 1 TABLET  every morning  Qty: 60, Refills: 0      DEPAKOTE 500 MG PO TBEC take 2 tabs daily at 4 pm  Refills: 0      CLORAZEPATE DIPOTASSIUM 7.5 MG PO TABS 1 tab by mouth at bedtime  Qty: 90, Refills: 0      ACETAMINOPHEN 325 MG PO TABS 2 TABLETS EVERY 4 HOURS AS NEEDED  Refills: 0             Past Medical History:   Diagnosis Date   • Anxiety    • Atopic dermatitis    • Borderline personality disorder    • Constipation    • Essential (primary) hypertension    • Explosive personality disorder    • PMH of     schizoid personality disorder   • Unspecified intellectual disabilities        No past surgical history  on file.    No family history on file.    Social History     Tobacco Use   • Smoking status: Never Smoker   • Smokeless tobacco: Not on file   Substance Use Topics   • Alcohol use: Not on file   • Drug use: Not on file       E-cigarette/Vaping     E-Cigarette/Vaping Substances & Devices       Review of Systems   Unable to perform ROS: Patient nonverbal       Physical Exam     ED Triage Vitals [03/14/22 1127]   ED Triage Vitals Group      Temp 97.7 °F (36.5 °C)      Heart Rate 95      Resp 17      /82      SpO2 96 %      EtCO2 mmHg       Height       Weight       Weight Scale Used       BMI (Calculated)       IBW/kg (Calculated)        Physical Exam  Vitals and nursing note reviewed.   HENT:      Mouth/Throat:      Mouth: Mucous membranes are moist.   Eyes:      Comments: Pupils round and equal bilaterally   Cardiovascular:      Rate and Rhythm: Normal rate.      Pulses: Normal pulses.   Abdominal:      General: There is no distension.      Tenderness: There is no abdominal tenderness.   Skin:     Capillary Refill: Capillary refill takes less than 2 seconds.         ED Course   12:32 PM: Discussed plan of care with Vincent at [facility] care facility. [phone].    2:21 PM: Pt ambulatory with steady gait. CT head unremarkable. No apparent electrolyte disturbances.   Procedures    Lab Results     Results for orders placed or performed during the hospital encounter of 03/14/22   Valproic Acid   Result Value Ref Range    Valproate <3 (L) 50 - 125 mcg/mL   Comprehensive Metabolic Panel   Result Value Ref Range    Fasting Status      Sodium 143 135 - 145 mmol/L    Potassium 3.6 3.4 - 5.1 mmol/L    Chloride 107 98 - 107 mmol/L    Carbon Dioxide 30 21 - 32 mmol/L    Anion Gap 10 10 - 20 mmol/L    Glucose 117 (H) 70 - 99 mg/dL    BUN 21 (H) 6 - 20 mg/dL    Creatinine 0.98 0.67 - 1.17 mg/dL    Glomerular Filtration Rate 88 >=60    BUN/ Creatinine Ratio 21 7 - 25    Calcium 8.0 (L) 8.4 - 10.2 mg/dL    Bilirubin, Total 0.3  0.2 - 1.0 mg/dL    GOT/AST 17 <=37 Units/L    GPT/ALT 22 <64 Units/L    Alkaline Phosphatase 88 45 - 117 Units/L    Albumin 3.6 3.6 - 5.1 g/dL    Protein, Total 6.9 6.4 - 8.2 g/dL    Globulin 3.3 2.0 - 4.0 g/dL    A/G Ratio 1.1 1.0 - 2.4   CBC with Automated Differential (performable only)   Result Value Ref Range    WBC 6.7 4.2 - 11.0 K/mcL    RBC 4.75 4.50 - 5.90 mil/mcL    HGB 14.0 13.0 - 17.0 g/dL    HCT 42.1 39.0 - 51.0 %    MCV 88.6 78.0 - 100.0 fl    MCH 29.5 26.0 - 34.0 pg    MCHC 33.3 32.0 - 36.5 g/dL    RDW-CV 12.1 11.0 - 15.0 %    RDW-SD 39.7 39.0 - 50.0 fL     140 - 450 K/mcL    NRBC 0 <=0 /100 WBC    Neutrophil, Percent 75 %    Lymphocytes, Percent 18 %    Mono, Percent 6 %    Eosinophils, Percent 1 %    Basophils, Percent 0 %    Immature Granulocytes 0 %    Absolute Neutrophils 5.0 1.8 - 7.7 K/mcL    Absolute Lymphocytes 1.2 1.0 - 4.0 K/mcL    Absolute Monocytes 0.4 0.3 - 0.9 K/mcL    Absolute Eosinophils  0.1 0.0 - 0.5 K/mcL    Absolute Basophils 0.0 0.0 - 0.3 K/mcL    Absolute Immmature Granulocytes 0.0 0.0 - 0.2 K/mcL       Radiology Results     Imaging Results          CT HEAD WO CONTRAST (Final result)  Result time 03/14/22 13:04:26    Final result                 Impression:    IMPRESSION:      1.  No acute intracranial abnormality.             Narrative:    CT HEAD WO CONTRAST    CLINICAL INFORMATION:  50 years-old Male with history of Neuro deficit,  acute, stroke suspected.    COMPARISON:  None available.    TECHNIQUE:  Routine noncontrast head CT spanning cranial vertex through  foramen magnum.  Coronal and sagittal reformats were generated and  reviewed.    FINDINGS:    No acute intracranial hemorrhage, mass effect or abnormal extra-axial fluid  collection.  Brain morphology and volume are normal for age.  No  significant white matter abnormality.  No CT evidence of an acute  territorial vascular insult, however if there is concern for acute ischemia  MRI follow-up could be  considered.  Mild mucosal thickening in the  maxillary and ethmoid sinuses..  The mastoid air cells are clear.  The  osseous structures are unremarkable.                                ED Medication Orders (From admission, onward)    None               BRENDA  Prashanth Patton is a 50-year-old developmentally delayed male who presented to the emergency department with complaints of apparently being off balance recently.  The ER RN spoke with the facility where the patient resides stating that the patient has a off balance intermittently for the last few days.  Today the patient was ambulated and ambulated with a steady gait.  He had equal  strength in upper extremities.  He has developmental delay and his barely verbal at baseline, difficult to obtain a history.  Patient is well kept and has no signs of trauma.  He is wearing a helmet.  Labs were evaluated.  No leukocytosis.  He does have a subtherapeutic valproic acid level.  Comprehensive metabolic panel is unremarkable.  Normal electrolytes and kidney function.  Hemoglobin stable at 14.0.  CT scan of the brain was performed as it is unclear if the patient struck his head.  No acute intracranial abnormality was noted.    After the workup with the ED RN kindly spoke with the facility and the patient was discharged back to the facility.  The patient was ambulatory and is unlikely that he has a posterior circulation abnormality.  I am unable to the differentiate central versus peripheral vertigo however it is unlikely to be central vertigo as the patient has a steady gait on ambulation and equal strength in all extremities.  Ultimately unclear etiology of symptoms however workup in the emergency department was reassuring today.  Patient discharged back to facility with [facility].    Case discussed with Dr. Patton  Clinical Impression     ED Diagnosis   1. Balance problem         Disposition        Discharge 3/14/2022  1:35 PM  Prashanth Patton discharge to  home/self care.                         Good Rene, APNP  03/14/22 1500     Oriented - self; Oriented - place; Oriented - time

## 2024-07-25 NOTE — ED PROVIDER NOTE - PATIENT PORTAL LINK FT
You can access the FollowMyHealth Patient Portal offered by [facility] by registering at the following website: [email or web address]. By joining [facility]’s FollowMyHealth portal, you will also be able to view your health information using other applications (apps) compatible with our system.

## 2024-07-25 NOTE — ED PROVIDER NOTE - PROGRESS NOTE DETAILS
Saint González, DO (PGY2): US with no acute findings. Patient s/p ceftriaxone. has urology appointment in two weeks. She is well-appearing. Antibiotics sent to patient preferred pharmacy. All questions answered.

## 2024-07-25 NOTE — ED ADULT NURSE REASSESSMENT NOTE - NS ED NURSE REASSESS COMMENT FT1
Pt given discharge instructions. Pt verbalized understanding of instructions and follow up care. All pt questions were answered. Safe d/c in place.

## 2024-07-25 NOTE — ED ADULT NURSE NOTE - OBJECTIVE STATEMENT
patient received alert & oriented x4, ambulatory. Complaining of pain especially after urination. Dx with recurrence of  UTI since May & was placed on several rounds of oral antibiotics & urine tests. PAtient verbalized frustrations & concerns due to her unresolved condition. Afebrile. Denies chills, nausea, vomiting. Last seen a Urologist 7/22 & placed back on nitrofurantoin which she was taking last may. Also complaining of persistent decreased energy & bilateral lower back pain since the initial diagnosis. Noticed orange darker urine.

## 2024-07-25 NOTE — ED PROVIDER NOTE - CLINICAL SUMMARY MEDICAL DECISION MAKING FREE TEXT BOX
Saint González, DO (PGY2): 54-year-old female, history of hypertension, sleep apnea, hyperlipidemia, CAD, presenting to the ED today for suprapubic pain and pain with urination.  Patient reports that since May, she has been experiencing recurrent UTIs, has been on multiple antibiotics for this.  States that she experiences days of improvement before her symptoms started recurring.  Her most recent symptoms started on Monday, she saw her urologist, urinalysis and urine culture were sent.  Her urine grew E. coli and Klebsiella.  No fever or chills.      - Vital signs stable. Patient is afebrile, not tachycardic  - Lying on stretcher in NAD  - A&Ox3 and is well-appearing  - Head is atraumatic, normal conjunctiva  - Neck is supple with normal ROM   - Mucous membranes are moist  - Lungs are clear to auscultation b/l, no wheezing, rales, or rhonchi  - Normal S1, S2, no murmurs, rubs or gallops  - Abdomen is soft and nontender with normal bowel sounds in all 4 quadrants  - No CVA tenderness  - No lower extremity edema noted     Workup initiated.  Labs are largely unremarkable.  Urine concerning for UTI.  Last culture showing E. coli and Klebsiella sensitive to cefepime.  Will give ceftriaxone.  Will give pain control.  Likely DC with cefpodoxime. Saint González, DO (PGY2): 54-year-old female, history of hypertension, sleep apnea, hyperlipidemia, CAD, presenting to the ED today for suprapubic pain and pain with urination.  Patient reports that since May, she has been experiencing recurrent UTIs, has been on multiple antibiotics for this.  States that she experiences days of improvement before her symptoms started recurring.  Her most recent symptoms started on Monday, she saw her urologist, urinalysis and urine culture were sent.  Her urine grew E. coli and Klebsiella.  No fever or chills.      - Vital signs stable. Patient is afebrile, not tachycardic  - Lying on stretcher in NAD  - A&Ox3 and is well-appearing  - Head is atraumatic, normal conjunctiva  - Neck is supple with normal ROM   - Mucous membranes are moist  - Lungs are clear to auscultation b/l, no wheezing, rales, or rhonchi  - Normal S1, S2, no murmurs, rubs or gallops  - Abdomen is soft and nontender with normal bowel sounds in all 4 quadrants  - No CVA tenderness  - No lower extremity edema noted     Workup initiated.  Labs are largely unremarkable.  Urine concerning for UTI.  Last culture showing E. coli and Klebsiella sensitive to cefepime.  Will give ceftriaxone.  Will give pain control. Will obtain renal US to eval for hydro. Likely DC with cefpodoxime. Saint González, DO (PGY2): 54-year-old female, history of hypertension, sleep apnea, hyperlipidemia, CAD, presenting to the ED today for suprapubic pain and pain with urination.  Patient reports that since May, she has been experiencing recurrent UTIs, has been on multiple antibiotics for this.  States that she experiences days of improvement before her symptoms started recurring.  Her most recent symptoms started on Monday, she saw her urologist, urinalysis and urine culture were sent.  Her urine grew E. coli and Klebsiella.  No fever or chills.      - Vital signs stable. Patient is afebrile, not tachycardic  - Lying on stretcher in NAD  - A&Ox3 and is well-appearing  - Head is atraumatic, normal conjunctiva  - Neck is supple with normal ROM   - Mucous membranes are moist  - Lungs are clear to auscultation b/l, no wheezing, rales, or rhonchi  - Normal S1, S2, no murmurs, rubs or gallops  - Abdomen is soft and nontender with normal bowel sounds in all 4 quadrants  - No CVA tenderness  - No lower extremity edema noted     Workup initiated.  Labs are largely unremarkable.  Urine concerning for UTI.  Last culture showing E. coli and Klebsiella sensitive to cefepime.  Will give ceftriaxone.  Will give pain control. Will obtain renal US to eval for hydro. Likely DC with cefpodoxime.  Attending Prisca Marlow: 54-year-old female with history of recurrent urinary tract infections currently on Macrobid, high blood pressure, hypothyroid presenting with concern for dysuria and back pain.  Upon arrival patient is awake and alert following commands.  Patient is afebrile.  Reviewed prior imaging and ED visits.  Reviewed prior urinary cultures and patient has grown E. coli as well as Klebsiella both sensitive to penicillin but resistant to Cipro and Levaquin.  Patient did have a CAT scan on July 2, 2024 without any evidence of hydronephrosis or renal abscess.  Less likely acute ureteral stone as cause of patient's back discomfort as on prior CT no evidence of hydronephrosis.  Patient is afebrile making infected stone less likely.  Consider possible bladder spasm as cause of patient's discomfort.  Patient has been following up with urology for concern for recurrent stones.  No evidence of urinary retention as a cause of urinary discomfort.  Renal ultrasound performed showing no evidence of hydronephrosis.  Blood work obtained showed no evidence of leukocytosis.  UA is positive for nitrates.  Given Rocephin 2 g for higher doses patient very concerned about persistent urinary symptoms.  As patient very well-appearing, afebrile, no leukocytosis and no evidence of hydro less likely renal abscess or severe pyelonephritis.  Will change antibiotics to cefpodoxime for better coverage.  Patient has follow-up in place with urology.  No rash on exam to suggest shingles.  Nonfocal neurologic exam and no midline spinal tenderness to palpation.  Will DC with close return precautions.  Urine culture sent and pending.

## 2024-07-25 NOTE — ED PROVIDER NOTE - ATTENDING CONTRIBUTION TO CARE
Attending MD Prisca Marlow:  I personally have seen and examined this patient.  Resident note reviewed and agree on plan of care and except where noted.  See HPI, PE, and MDM for details.

## 2024-07-25 NOTE — ED ADULT TRIAGE NOTE - CHIEF COMPLAINT QUOTE
5th confirmed UTI since May. Pt states she was put on abx Monday night however symptoms are worsening. pain with urination, burning, frequency and now having flank pain R>L

## 2024-07-25 NOTE — ED PROVIDER NOTE - RAPID ASSESSMENT
54 F HTN, hypothyroid, MARIAN, HLD, CAD presents to ED c/o intermittent urinary sxs since May reports seen by urology last week 7/19 by urology and had a catheterized urine specimen showing reportedly E. coli and klebsiella, currently on nitrofurantoin. pt reports feeling fatigue, pain going to the back/flank. Urine culture from 7/19/2024 reviewed on [facility] showing greater than 100 K CFU E. coli 50-99 K CFU Klebsiella.  E. coli resistant to Cipro and levofloxacin, [id number]-[id number].  Klebsiella resistant to ampicillin.  Both sensitive to cephalosporins. pt denies f/c. Patient had CT abdomen pelvis noncontrast 7/2/2024 showing no hydronephrosis no hydroureter or significant perinephric stranding.  No radiopaque urinary tract stones.  [phone]  Patient was seen as a rapid assessment (QPA) patient. The patient will be seen and further worked up in the main emergency department and their care will be completed by the main emergency department team along with a thorough physical exam. Receiving team will follow up on labs, analgesia, any clinical imaging, reassess and disposition as clinically indicated, all decisions regarding the progression of care will be made at their discretion. - Ritesh Millan PA-C

## 2024-07-25 NOTE — ED PROVIDER NOTE - PHYSICAL EXAMINATION
GENERAL: no acute distress, non-toxic appearing  HEAD: normocephalic, atraumatic  HEENT: oral mucosa moist, full ROM of neck  CARDIAC: regular rate rhythm, normal S1/S2  CHEST: CTA BL, no wheeze or crackles  ABDOMEN: normal BS, soft, no tenderness. No CVA tenderness  EXTREMITY: no gross deformity, no edema, good perfusion   NEURO: alert and orientedx3 GENERAL: no acute distress, non-toxic appearing  HEAD: normocephalic, atraumatic  HEENT: oral mucosa moist, full ROM of neck  CARDIAC: regular rate rhythm, normal S1/S2  CHEST: CTA BL, no wheeze or crackles  ABDOMEN: normal BS, soft, no tenderness. No CVA tenderness  EXTREMITY: no gross deformity, no edema, good perfusion   NEURO: alert and orientedx3  Attending Prisca Marlow: Gen: NAD, heent: atrauamtic, eomi, perrla, mmm, op pink, uvula midline, neck; nttp, no nuchal rigidity, chest: nttp, no crepitus, cv: rrr, no murmurs, lungs: ctab, abd: soft, nontender, nondistended, no peritoneal signs, S, no guarding, ext: wwp, no midline spinal ttp skin: no rash, neuro: awake and alert, following commands, speech clear, sensation and strength intact, no focal deficits stable gait

## 2024-07-25 NOTE — ED ADULT NURSE NOTE - NSFALLRISK_ED_ALL_ED
Goal Outcome Evaluation:  Plan of Care Reviewed With: patient        Progress: declining  Outcome Evaluation: pt resting this shift. pt requesting percocet, given x1 with relief. pt planning on going home with hospice on monday. safety maintained. no tele.   No

## 2024-07-25 NOTE — ED ADULT TRIAGE NOTE - HEART RATE (BEATS/MIN)
[facility] Medication Refill Request Information:  * Please contact your pharmacy regarding ANY request for medication refills.  ** [facility] Prescription Fax = [phone]  * Please allow 3 business days for routine medication refills.  * Please allow 5 business days for controlled substance medication refills.     [facility] Test Result notification information:  *You will be notified with in 7-10 days of your appointment day regarding the results of your test.  If you are on MyChart you will be notified as soon as the provider has reviewed the results and signed off on them.    [facility]: [phone]      93

## 2024-07-26 RX ORDER — CEFPODOXIME PROXETIL 50 MG/5 ML
1 SUSPENSION, RECONSTITUTED, ORAL (ML) ORAL
Qty: 20 | Refills: 0
Start: 2024-07-26 | End: 2024-08-04

## 2024-07-27 LAB
CULTURE RESULTS: SIGNIFICANT CHANGE UP
SPECIMEN SOURCE: SIGNIFICANT CHANGE UP

## 2024-07-29 ENCOUNTER — APPOINTMENT (OUTPATIENT)
Dept: PULMONOLOGY | Facility: CLINIC | Age: 54
End: 2024-07-29

## 2024-08-01 ENCOUNTER — NON-APPOINTMENT (OUTPATIENT)
Age: 54
End: 2024-08-01

## 2024-08-06 ENCOUNTER — APPOINTMENT (OUTPATIENT)
Dept: UROLOGY | Facility: CLINIC | Age: 54
End: 2024-08-06

## 2024-08-06 PROBLEM — N39.0 RECURRENT UTI: Status: ACTIVE | Noted: 2024-08-06

## 2024-08-06 PROCEDURE — 51798 US URINE CAPACITY MEASURE: CPT

## 2024-08-06 PROCEDURE — G2211 COMPLEX E/M VISIT ADD ON: CPT

## 2024-08-06 PROCEDURE — 99204 OFFICE O/P NEW MOD 45 MIN: CPT

## 2024-08-06 NOTE — REASON FOR VISIT
[TextEntry] : 54-year-old female who presents for recurrent UTI  Patient reports issues with UTIs since 2024 when she developed suprapubic pressure.  She had the sensation of incomplete emptying, urgency, frequency.  She went to urgent care on  and had a positive urine culture for E. coli.  She completed antibiotics but had no relief.  She subsequently had repeat culture on 6/10 which showed E. coli.  She then had a positive urine culture on  which showed E. coli.  On  she also grew E. coli and Klebsiella in her urine.  She denied urinary issues prior to the onset of these UTIs.  She denied any inciting event.  She has a history of a mid urethral sling status post placement in .  She has had no issues.  She denies any vaginal bleeding or hematuria.  She is a G4, P3 status post 1 vaginal delivery 1  and 1 .  She has been postmenopausal since .  She denies a vaginal bulge.  She is a history of diverticulitis status post sigmoidectomy in .  She has had no issues since then.  She is currently dealing with constipation since starting Zepbound for weight loss in 2024.   She denies diabetes Has a history of possible silent seizures  Renal bladder ultrasound negative on  CT abdomen pelvis with normal kidneys, normal bladder, no abnormalities. Bladder scan 43 cc  Creatinine 0.84  Urine culture E. coli and Klebsiella 2024 Urine culture - 2024 Urine culture + e.coli 2024 Urine culture + e.coli 2024 Urine culture - 2024

## 2024-08-06 NOTE — ASSESSMENT
[FreeTextEntry1] : 54-year-old female who presents for recurrent UTI  Had a long discussion about UTI prevention including water intake, estrogen cream, cranberry tablets, probiotic.  Patient wants to start methenamine twice daily with vitamin C as well.  She is also interested in a cystoscopy to think is reasonable given her history of a midurethral sling.  We will set her up for this.  Return to office for cystoscopy

## 2024-08-07 ENCOUNTER — APPOINTMENT (OUTPATIENT)
Dept: UROGYNECOLOGY | Facility: CLINIC | Age: 54
End: 2024-08-07
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
## 2024-08-13 ENCOUNTER — APPOINTMENT (OUTPATIENT)
Dept: UROLOGY | Facility: CLINIC | Age: 54
End: 2024-08-13
Payer: COMMERCIAL

## 2024-08-13 VITALS
OXYGEN SATURATION: 100 % | DIASTOLIC BLOOD PRESSURE: 79 MMHG | HEART RATE: 74 BPM | BODY MASS INDEX: 31.34 KG/M2 | WEIGHT: 166 LBS | TEMPERATURE: 98 F | RESPIRATION RATE: 16 BRPM | HEIGHT: 61 IN | SYSTOLIC BLOOD PRESSURE: 115 MMHG

## 2024-08-13 PROCEDURE — 52000 CYSTOURETHROSCOPY: CPT

## 2024-08-14 LAB
APPEARANCE: ABNORMAL
BACTERIA: NEGATIVE /HPF
BILIRUBIN URINE: ABNORMAL
BLOOD URINE: NEGATIVE
CAST: 1 /LPF
COLOR: ABNORMAL
EPITHELIAL CELLS: 3 /HPF
GLUCOSE QUALITATIVE U: NEGATIVE MG/DL
KETONES URINE: NEGATIVE MG/DL
LEUKOCYTE ESTERASE URINE: ABNORMAL
MICROSCOPIC-UA: NORMAL
MUCUS: PRESENT
NITRITE URINE: POSITIVE
PH URINE: 5
PROTEIN URINE: NORMAL MG/DL
RED BLOOD CELLS URINE: 1 /HPF
REVIEW: NORMAL
SPECIFIC GRAVITY URINE: 1.03
UROBILINOGEN URINE: 0.2 MG/DL
WHITE BLOOD CELLS URINE: 0 /HPF

## 2024-08-15 LAB — BACTERIA UR CULT: NORMAL

## 2024-08-16 ENCOUNTER — APPOINTMENT (OUTPATIENT)
Dept: PULMONOLOGY | Facility: CLINIC | Age: 54
End: 2024-08-16

## 2024-08-26 ENCOUNTER — APPOINTMENT (OUTPATIENT)
Dept: UROGYNECOLOGY | Facility: CLINIC | Age: 54
End: 2024-08-26

## 2024-08-26 ENCOUNTER — APPOINTMENT (OUTPATIENT)
Dept: PULMONOLOGY | Facility: CLINIC | Age: 54
End: 2024-08-26

## 2024-09-03 NOTE — HISTORY OF PRESENT ILLNESS
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        Post Anesthesia Care and Evaluation    Patient location during evaluation: bedside  Patient participation: complete - patient participated  Level of consciousness: awake and alert  Pain score: 0  Pain management: adequate    Airway patency: patent  Anesthetic complications: No anesthetic complications  PONV Status: none  Cardiovascular status: acceptable  Respiratory status: acceptable  Hydration status: acceptable    
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        Post Anesthesia Care and Evaluation    Patient location during evaluation: PACU  Patient participation: complete - patient participated  Level of consciousness: awake and alert  Pain score: 0  Pain management: adequate    Airway patency: patent  Anesthetic complications: No anesthetic complications  PONV Status: none  Cardiovascular status: acceptable  Respiratory status: acceptable  Hydration status: acceptable    
[FreeTextEntry1] : Patient is a 49-year-old morbidly obese female with past medical history significant for acquired hypothyroidism , moderate MARIAN, asthma  who presents for follow up. Patient denies any recent fevers, chills, chest pain, abdominal pain or hemoptysis. Patient states that she has been noncompliant with her CPAP machine for the last month

## 2024-09-04 ENCOUNTER — NON-APPOINTMENT (OUTPATIENT)
Age: 54
End: 2024-09-04

## 2024-09-10 NOTE — DISCHARGE NOTE NURSING/CASE MANAGEMENT/SOCIAL WORK - NSDCPETBCESMAN_GEN_ALL_CORE
If you are a smoker, it is important for your health to stop smoking. Please be aware that second hand smoke is also harmful. watch in locker 9, glasses on pt for consent/Wrist Watch/Clothing

## 2024-09-17 ENCOUNTER — APPOINTMENT (OUTPATIENT)
Dept: PULMONOLOGY | Facility: CLINIC | Age: 54
End: 2024-09-17
Payer: COMMERCIAL

## 2024-09-17 ENCOUNTER — APPOINTMENT (OUTPATIENT)
Dept: CARDIOLOGY | Facility: CLINIC | Age: 54
End: 2024-09-17
Payer: COMMERCIAL

## 2024-09-17 VITALS
RESPIRATION RATE: 16 BRPM | SYSTOLIC BLOOD PRESSURE: 120 MMHG | WEIGHT: 154 LBS | DIASTOLIC BLOOD PRESSURE: 76 MMHG | TEMPERATURE: 98.3 F | BODY MASS INDEX: 29.07 KG/M2 | HEART RATE: 82 BPM | HEIGHT: 61 IN | OXYGEN SATURATION: 97 %

## 2024-09-17 DIAGNOSIS — R06.02 SHORTNESS OF BREATH: ICD-10-CM

## 2024-09-17 DIAGNOSIS — R00.2 PALPITATIONS: ICD-10-CM

## 2024-09-17 DIAGNOSIS — E66.9 OBESITY, UNSPECIFIED: ICD-10-CM

## 2024-09-17 DIAGNOSIS — R53.83 OTHER FATIGUE: ICD-10-CM

## 2024-09-17 DIAGNOSIS — T14.8XXA OTHER INJURY OF UNSPECIFIED BODY REGION, INITIAL ENCOUNTER: ICD-10-CM

## 2024-09-17 DIAGNOSIS — F32.A DEPRESSION, UNSPECIFIED: ICD-10-CM

## 2024-09-17 DIAGNOSIS — R07.9 CHEST PAIN, UNSPECIFIED: ICD-10-CM

## 2024-09-17 DIAGNOSIS — R42 DIZZINESS AND GIDDINESS: ICD-10-CM

## 2024-09-17 DIAGNOSIS — E03.9 HYPOTHYROIDISM, UNSPECIFIED: ICD-10-CM

## 2024-09-17 DIAGNOSIS — E78.2 MIXED HYPERLIPIDEMIA: ICD-10-CM

## 2024-09-17 DIAGNOSIS — R73.03 PREDIABETES.: ICD-10-CM

## 2024-09-17 DIAGNOSIS — I25.10 ATHEROSCLEROTIC HEART DISEASE OF NATIVE CORONARY ARTERY W/OUT ANGINA PECTORIS: ICD-10-CM

## 2024-09-17 DIAGNOSIS — E23.7 DISORDER OF PITUITARY GLAND, UNSPECIFIED: ICD-10-CM

## 2024-09-17 DIAGNOSIS — N39.0 URINARY TRACT INFECTION, SITE NOT SPECIFIED: ICD-10-CM

## 2024-09-17 PROCEDURE — 93000 ELECTROCARDIOGRAM COMPLETE: CPT

## 2024-09-17 PROCEDURE — G2211 COMPLEX E/M VISIT ADD ON: CPT

## 2024-09-17 PROCEDURE — 71046 X-RAY EXAM CHEST 2 VIEWS: CPT

## 2024-09-17 PROCEDURE — 99214 OFFICE O/P EST MOD 30 MIN: CPT

## 2024-09-17 NOTE — HISTORY OF PRESENT ILLNESS
[FreeTextEntry1] : This is a 55 y/o female with a pmhx of HLD, nonobstructive CAD, right radial artery thrombosis, Hypothyroid, Pituitary lesion, obesity here today for a follow up.  Patient was last seen 2/2024 reporting chest pain and SOB. Echo 2/2024 with normal EF. Patient is s/p normal STN 2/2024.  Patient reports chest pain has improved. She continues to report ELIZALDE. Patient reports intermittent dizziness and palpitations.  Patient lost 50 pounds since February.  Patient saw urology for recurrent UTI, s/p normal cystoscopy 8/2024.

## 2024-09-17 NOTE — REASON FOR VISIT
[FreeTextEntry1] : Here for follow up
I met with pt with Dr. Tasha Melara , pt was not in distress, She reported her mod si fine, denied any loss of interest, no SI HI IP. She denied any psychosis. Discussed with pt about possible side effects of elavil resulted in elevated BP, she agreed to taper it down , also discussed with her about Xanax and  orthostatic hypotension for her and may result in fall risk. She is currently on xanax 1 mg . Istop was [id number]  Please consider to taper down Amitriptyline to 20 mg then 15 mg in 2 days

## 2024-09-18 ENCOUNTER — APPOINTMENT (OUTPATIENT)
Dept: CARDIOLOGY | Facility: CLINIC | Age: 54
End: 2024-09-18
Payer: COMMERCIAL

## 2024-09-18 PROCEDURE — A9500: CPT

## 2024-09-18 PROCEDURE — 78452 HT MUSCLE IMAGE SPECT MULT: CPT

## 2024-09-18 PROCEDURE — 93015 CV STRESS TEST SUPVJ I&R: CPT

## 2024-09-19 ENCOUNTER — APPOINTMENT (OUTPATIENT)
Dept: CARDIOLOGY | Facility: CLINIC | Age: 54
End: 2024-09-19
Payer: COMMERCIAL

## 2024-09-19 DIAGNOSIS — R89.9 UNSPECIFIED ABNORMAL FINDING IN SPECIMENS FROM OTHER ORGANS, SYSTEMS AND TISSUES: ICD-10-CM

## 2024-09-19 PROCEDURE — 93306 TTE W/DOPPLER COMPLETE: CPT

## 2024-09-19 RX ORDER — PREDNISONE 50 MG/1
50 TABLET ORAL
Qty: 3 | Refills: 0 | Status: ACTIVE | COMMUNITY
Start: 2024-09-19 | End: 1900-01-01

## 2024-09-19 RX ORDER — NITROGLYCERIN 20 MG/1
0.1 PATCH TRANSDERMAL
Qty: 1 | Refills: 0 | Status: ACTIVE | COMMUNITY
Start: 2024-09-19 | End: 1900-01-01

## 2024-09-19 RX ORDER — DIPHENHYDRAMINE HCL 50 MG/1
50 CAPSULE ORAL
Qty: 1 | Refills: 0 | Status: ACTIVE | COMMUNITY
Start: 2024-09-19 | End: 1900-01-01

## 2024-09-20 LAB
APPEARANCE: CLEAR
BACTERIA: ABNORMAL /HPF
BILIRUBIN URINE: NEGATIVE
BLOOD URINE: NEGATIVE
CAST: 0 /LPF
COLOR: YELLOW
EPITHELIAL CELLS: 3 /HPF
GLUCOSE QUALITATIVE U: NEGATIVE MG/DL
KETONES URINE: NEGATIVE MG/DL
LEUKOCYTE ESTERASE URINE: ABNORMAL
MICROSCOPIC-UA: NORMAL
NITRITE URINE: POSITIVE
PH URINE: 6.5
PROTEIN URINE: NORMAL MG/DL
RED BLOOD CELLS URINE: 2 /HPF
SPECIFIC GRAVITY URINE: 1.02
UROBILINOGEN URINE: 1 MG/DL
WHITE BLOOD CELLS URINE: 4 /HPF

## 2024-09-20 RX ORDER — CEFDINIR 300 MG/1
300 CAPSULE ORAL
Qty: 10 | Refills: 0 | Status: ACTIVE | COMMUNITY
Start: 2024-09-20 | End: 1900-01-01

## 2024-09-23 LAB — BACTERIA UR CULT: ABNORMAL

## 2024-09-24 LAB
ANION GAP SERPL CALC-SCNC: 15 MMOL/L
APTT BLD: 29.2 SEC
BASOPHILS # BLD AUTO: 0.05 K/UL
BASOPHILS NFR BLD AUTO: 0.8 %
BUN SERPL-MCNC: 14 MG/DL
CALCIUM SERPL-MCNC: 10.1 MG/DL
CHLORIDE SERPL-SCNC: 108 MMOL/L
CO2 SERPL-SCNC: 23 MMOL/L
CREAT SERPL-MCNC: 0.77 MG/DL
DEPRECATED D DIMER PPP IA-ACNC: 217 NG/ML DDU
EGFR: 90 ML/MIN/1.73M2
EOSINOPHIL # BLD AUTO: 0.13 K/UL
EOSINOPHIL NFR BLD AUTO: 2 %
FERRITIN SERPL-MCNC: 97 NG/ML
FOLATE SERPL-MCNC: 4.4 NG/ML
GLUCOSE SERPL-MCNC: 89 MG/DL
HCT VFR BLD CALC: 42 %
HGB BLD-MCNC: 13.2 G/DL
IMM GRANULOCYTES NFR BLD AUTO: 0.3 %
INR PPP: 0.92 RATIO
IRON SATN MFR SERPL: 27 %
IRON SERPL-MCNC: 74 UG/DL
LYMPHOCYTES # BLD AUTO: 2.42 K/UL
LYMPHOCYTES NFR BLD AUTO: 37.8 %
MAGNESIUM SERPL-MCNC: 2.4 MG/DL
MAN DIFF?: NORMAL
MCHC RBC-ENTMCNC: 31.4 GM/DL
MCHC RBC-ENTMCNC: 31.7 PG
MCV RBC AUTO: 101 FL
MONOCYTES # BLD AUTO: 0.56 K/UL
MONOCYTES NFR BLD AUTO: 8.8 %
NEUTROPHILS # BLD AUTO: 3.22 K/UL
NEUTROPHILS NFR BLD AUTO: 50.3 %
PLATELET # BLD AUTO: 301 K/UL
POTASSIUM SERPL-SCNC: 4.4 MMOL/L
PT BLD: 10.4 SEC
RBC # BLD: 4.16 M/UL
RBC # FLD: 13.5 %
SODIUM SERPL-SCNC: 144 MMOL/L
SODIUM SERPL-SCNC: 146 MMOL/L
TIBC SERPL-MCNC: 272 UG/DL
UIBC SERPL-MCNC: 198 UG/DL
VIT B12 SERPL-MCNC: 1091 PG/ML
WBC # FLD AUTO: 6.4 K/UL

## 2024-09-25 ENCOUNTER — NON-APPOINTMENT (OUTPATIENT)
Age: 54
End: 2024-09-25

## 2024-09-26 ENCOUNTER — APPOINTMENT (OUTPATIENT)
Dept: NEUROLOGY | Facility: CLINIC | Age: 54
End: 2024-09-26

## 2024-09-27 NOTE — HISTORY OF PRESENT ILLNESS
[FreeTextEntry1] : Ms. REYES - pronounced "kateej"   ***09/26/2024*** WARNER REYES  *** 07/12/2023  ***  Ms. REYES returns for follow-up.  In the interval, she reports no new problems.  She continues to report of episodes of "pulling sensation" over the left scalp.  Does not seem to be having episodes of experiencing a smell burning cigarettes as frequently.  She had a repeat ambulatory EEG last month that again showed abundant slowing in the left temporal lobe and intermittent slowing in the right temporal lobe.  Clinically, she is functioning at the same level.  She continues taking lacosamide 50 mg twice a day and feels that it has helped with her "brain fog".   Ms. REYES also endorses that she has episodic dizziness that can occur at any moment.  She has been seeing cardiologist, who does not feel etiology is cardiac.  Ms. REYES underwent neuropsychological testing in January 2023, the results of which indicated no evidence of major cognitive or functional impairment or decline.  *** 07/15/2022  *** Ms. REYES returns for follow-up.  She was evaluated in epilepsy monitoring unit at the beginning of June.  That monitoring revealed no epileptiform abnormalities over the course of 48 hours.  She was found to have focal slowing independently in both temporal regions, which represented a progression from prior report showing unilateral focal slowing.  She was started on lacosamide 50 mg twice a day on the possibility that the olfactory sensations she has been experiencing were due to seizures.  Even before starting lacosamide, these olfactory experiences had been rare.  She estimates they have happened twice in the 6 weeks since discharge, so it is not clear whether lacosamide has made a difference. Ms. REYES is very anxious about the change in her EEG, the additional slowing that was noted.  She is anxious that her symptoms represent a dementia or seizures.  At discharge from EMU, she was recommended to PET imaging to rule out dementia and referred to neuropsychological testing for the same reason.  *** 05/16/2022  *** Ms. REYES reports that several months ago she began to experience smell of burning cigarettes intermittently.  The first occasion, she noted smell like burning leaves outside, and smell persisted for most of the day, and smell would follow her when in car or at work.  Now it is not occurring as often - 1-2 x per week.  Ms. REYES thinks that she may be triggered by someone smoking near her - then had persistent smoke smell following day.  sometimes smell is triggered by foul smell, but at other times she may get the smell without provoking.  Smell is mostly the same - described as cigarette smell.   No clear episodes of lost time - may be forgetful of tasks to do - but has not been told of conversations that she does not remember. No tongue biting or urinary incontinence.   Ms. REYES was previously seen by Dr. Acharya - MRI essentially negative - she has incidental pituitary cyst.  Ms. REYES had prior EEG testing in 2015 that showed intermittent left temporal slowing.  She underwent repeat ambulatory EEG testing in April 2022 that noted marked left temporal slowing, sometimes rhythmical left temporal delta, and infrequent right temporal intermittent slowing.  During this ambulatory recording, she did not have her characteristic olfactory event.  She initially saw an ENT physician and had a work-up including CT of the sinuses that was unrevealing.  She tried course of steroids and nasal sprays are also not helpful.  *** 5/11/2022 from Dr. Acharya's Notes ***  52-year-old woman who is here for initial consultation of smelling cigarette smell before Christmas. Patient states that it has been intermittent and she has seen ENT. Work-up including CT of the sinuses along with course of steroids and nasal sprays have failed to alleviate her symptoms. Patient has no fevers and no sick contacts. Patient has no exposure to COVID.  Interval history: Discussed results of MRI of the brain with the patient. Patient has no history of pituitary issues. Patient sees a endocrinologist in the past for hypothyroidism. The olfactory bulb was not evaluated even though the question on the MRI order sheet specified. I have emailed neuroradiology to make sure that this is performed when she returns for MRI of the pituitary gland. Patient states that she tested negative for COVID swab. Patient has a EEG that is still pending.  Interval history: Since her last visit patient was noted to have slowing in the temporal region. Patient's MRI of the brain shows no lesions in the temporal lobes. Patient has upcoming appointment with epilepsy to help evaluate the situation whether patient needs longer EEG monitoring versus confirmation that this is incidental finding and should be left alone. Patient states that her cigarette smells are now intermittent and faint. Patient's MRI of the brain shows hypo enhancement that is indicative of microadenoma versus cyst. Endocrinologist has seen the patient and had sent off blood work. Patient will return to endocrinology for repeat MRI in September.

## 2024-09-30 ENCOUNTER — TRANSCRIPTION ENCOUNTER (OUTPATIENT)
Age: 54
End: 2024-09-30

## 2024-09-30 ENCOUNTER — OUTPATIENT (OUTPATIENT)
Dept: OUTPATIENT SERVICES | Facility: HOSPITAL | Age: 54
LOS: 1 days | End: 2024-09-30
Payer: COMMERCIAL

## 2024-09-30 VITALS
SYSTOLIC BLOOD PRESSURE: 116 MMHG | RESPIRATION RATE: 19 BRPM | HEART RATE: 77 BPM | OXYGEN SATURATION: 98 % | DIASTOLIC BLOOD PRESSURE: 62 MMHG

## 2024-09-30 VITALS
WEIGHT: 154.98 LBS | SYSTOLIC BLOOD PRESSURE: 134 MMHG | OXYGEN SATURATION: 100 % | RESPIRATION RATE: 15 BRPM | HEART RATE: 77 BPM | HEIGHT: 61 IN | TEMPERATURE: 98 F | DIASTOLIC BLOOD PRESSURE: 74 MMHG

## 2024-09-30 DIAGNOSIS — T14.8XXA OTHER INJURY OF UNSPECIFIED BODY REGION, INITIAL ENCOUNTER: Chronic | ICD-10-CM

## 2024-09-30 DIAGNOSIS — Z90.49 ACQUIRED ABSENCE OF OTHER SPECIFIED PARTS OF DIGESTIVE TRACT: Chronic | ICD-10-CM

## 2024-09-30 DIAGNOSIS — R07.89 OTHER CHEST PAIN: ICD-10-CM

## 2024-09-30 DIAGNOSIS — Z98.890 OTHER SPECIFIED POSTPROCEDURAL STATES: Chronic | ICD-10-CM

## 2024-09-30 LAB
ANION GAP SERPL CALC-SCNC: 13 MMOL/L — SIGNIFICANT CHANGE UP (ref 5–17)
BUN SERPL-MCNC: 15 MG/DL — SIGNIFICANT CHANGE UP (ref 7–23)
CALCIUM SERPL-MCNC: 9.8 MG/DL — SIGNIFICANT CHANGE UP (ref 8.4–10.5)
CHLORIDE SERPL-SCNC: 108 MMOL/L — SIGNIFICANT CHANGE UP (ref 96–108)
CO2 SERPL-SCNC: 19 MMOL/L — LOW (ref 22–31)
CREAT SERPL-MCNC: 0.71 MG/DL — SIGNIFICANT CHANGE UP (ref 0.5–1.3)
EGFR: 100 ML/MIN/1.73M2 — SIGNIFICANT CHANGE UP
GLUCOSE SERPL-MCNC: 128 MG/DL — HIGH (ref 70–99)
HCT VFR BLD CALC: 36.6 % — SIGNIFICANT CHANGE UP (ref 34.5–45)
HGB BLD-MCNC: 12.1 G/DL — SIGNIFICANT CHANGE UP (ref 11.5–15.5)
MAGNESIUM SERPL-MCNC: 2.4 MG/DL — SIGNIFICANT CHANGE UP (ref 1.6–2.6)
MCHC RBC-ENTMCNC: 31.3 PG — SIGNIFICANT CHANGE UP (ref 27–34)
MCHC RBC-ENTMCNC: 33.1 GM/DL — SIGNIFICANT CHANGE UP (ref 32–36)
MCV RBC AUTO: 94.8 FL — SIGNIFICANT CHANGE UP (ref 80–100)
NRBC # BLD: 0 /100 WBCS — SIGNIFICANT CHANGE UP (ref 0–0)
PLATELET # BLD AUTO: 257 K/UL — SIGNIFICANT CHANGE UP (ref 150–400)
POTASSIUM SERPL-MCNC: 4 MMOL/L — SIGNIFICANT CHANGE UP (ref 3.5–5.3)
POTASSIUM SERPL-SCNC: 4 MMOL/L — SIGNIFICANT CHANGE UP (ref 3.5–5.3)
RBC # BLD: 3.86 M/UL — SIGNIFICANT CHANGE UP (ref 3.8–5.2)
RBC # FLD: 13.5 % — SIGNIFICANT CHANGE UP (ref 10.3–14.5)
SODIUM SERPL-SCNC: 140 MMOL/L — SIGNIFICANT CHANGE UP (ref 135–145)
WBC # BLD: 4.67 K/UL — SIGNIFICANT CHANGE UP (ref 3.8–10.5)
WBC # FLD AUTO: 4.67 K/UL — SIGNIFICANT CHANGE UP (ref 3.8–10.5)

## 2024-09-30 PROCEDURE — 80048 BASIC METABOLIC PNL TOTAL CA: CPT

## 2024-09-30 PROCEDURE — 93010 ELECTROCARDIOGRAM REPORT: CPT

## 2024-09-30 PROCEDURE — 83735 ASSAY OF MAGNESIUM: CPT

## 2024-09-30 PROCEDURE — 85027 COMPLETE CBC AUTOMATED: CPT

## 2024-09-30 PROCEDURE — 93005 ELECTROCARDIOGRAM TRACING: CPT

## 2024-09-30 PROCEDURE — C1894: CPT

## 2024-09-30 PROCEDURE — 93454 CORONARY ARTERY ANGIO S&I: CPT | Mod: 26

## 2024-09-30 PROCEDURE — C1769: CPT

## 2024-09-30 PROCEDURE — 99152 MOD SED SAME PHYS/QHP 5/>YRS: CPT

## 2024-09-30 PROCEDURE — C1887: CPT

## 2024-09-30 PROCEDURE — 93454 CORONARY ARTERY ANGIO S&I: CPT

## 2024-09-30 RX ORDER — PREDNISONE 5 MG/1
50 TABLET ORAL ONCE
Refills: 0 | Status: COMPLETED | OUTPATIENT
Start: 2024-09-30 | End: 2024-09-30

## 2024-09-30 RX ORDER — ROSUVASTATIN CALCIUM 20 MG/1
1 TABLET, COATED ORAL
Refills: 0 | DISCHARGE

## 2024-09-30 RX ORDER — SODIUM CHLORIDE 0.9 % (FLUSH) 0.9 %
1000 SYRINGE (ML) INJECTION
Refills: 0 | Status: DISCONTINUED | OUTPATIENT
Start: 2024-09-30 | End: 2024-10-14

## 2024-09-30 RX ORDER — DIPHENHYDRAMINE HCL 12.5MG/5ML
50 LIQUID (ML) ORAL ONCE
Refills: 0 | Status: COMPLETED | OUTPATIENT
Start: 2024-09-30 | End: 2024-09-30

## 2024-09-30 RX ORDER — SODIUM CHLORIDE 0.9 % (FLUSH) 0.9 %
250 SYRINGE (ML) INJECTION ONCE
Refills: 0 | Status: COMPLETED | OUTPATIENT
Start: 2024-09-30 | End: 2024-09-30

## 2024-09-30 RX ORDER — ASPIRIN 325 MG
1 TABLET ORAL
Refills: 0 | DISCHARGE

## 2024-09-30 RX ADMIN — PREDNISONE 50 MILLIGRAM(S): 5 TABLET ORAL at 08:57

## 2024-09-30 RX ADMIN — Medication 50 MILLIGRAM(S): at 09:03

## 2024-09-30 RX ADMIN — Medication 75 MILLILITER(S): at 08:50

## 2024-09-30 RX ADMIN — Medication 500 MILLILITER(S): at 08:30

## 2024-09-30 RX ADMIN — Medication 75 MILLILITER(S): at 09:00

## 2024-09-30 NOTE — H&P CARDIOLOGY - HISTORY OF PRESENT ILLNESS
54  years old Female with PMHx of CAD s/p diagnostic cath 5/11/23 (result below, endorses vascular injury to rt radial artery post cath), HTN, HLD, hypothyroid, Hx of silent seizures (not currently on Lacosamide), anxiety, Hx obesity (used to be BMI>36 now lost 50 lbs since April on Zepbound 12.5mg inj weekly), MARIAN and strong FHX of CAD Father MI @ 45,  2 uncles, 1 aunt, 2 cousins) presents for cardiac cath. Pt  had f/u with Dr. Valenzueal and referred for cath. Pt reports SOB with minimal exertion.     < from: Cardiac Catheterization (05.11.23 @ 10:01) >  Diagnostic Findings:     Coronary Angiography   The coronary circulation is right dominant.      LM   Left main artery: Angiography shows no disease.      LAD   Left anterior descending artery: Angiography shows no disease.      CX   Mid circumflex: There is a 50 % stenosis.      RCA   Right posterior descending artery: There is a 50 % stenosis. First  right posterolateral: There is a 30 % stenosis.      < end of copied text >      Card: Dr. ANAYA Valenzuela       < from: TTE W or WO Ultrasound Enhancing Agent (04.27.23 @ 06:49) >   1. The left ventricular systolic function is normal with an ejection fraction of 57 % by 3D.   2. There is normal LV mass and normal geometry.   3. Global longitudinal strain is -22.4 % (normal < -18%). GLS was assessed on [employer] echo machine with a heart rate of 60 bpm and a blood pressure of 120/70 mmHg.   4. There is normal left ventricular diastolic function, with normal left atrial filling pressure.   5. Normal right ventricular cavity size and normal systolic function.   6. No significant valvular disease.   7. No pericardial effusion seen.   8. Compared to the transthoracic echocardiogram performed on 8/31/2023 there have been no significant interval changes.      < from: CT Angio Heart and Coronaries w/ IV Cont (04.29.23 @ 11:55) >  1.  Limited study due to decreased signal to noise ratio and poor   contrast opacification.  2.  Nonobstructive CAD is detected in the coronary arteries visualized.  3.  The calculated Agatston score is zero.     < from: CT Angio Heart and Coronaries w/ IV Cont (04.29.23 @ 11:55) >  The LAD coronary artery is angiographically normal.  Distal segment is   not well visualized.  The diagonal branch is not well visualized.    The LCX coronary artery has a mild (25-49%) noncalcified plaque in the proximal segment  The obtuse marginal (OM) branch is not well visualized.  The RCA is angiographically normal.  The right posterior descending artery (PDA) and the right posterolateral (RPL) branch are angiographically normal. < end of copied text >

## 2024-09-30 NOTE — ASU DISCHARGE PLAN (ADULT/PEDIATRIC) - NS MD DC FALL RISK RISK
For information on Fall & Injury Prevention, visit: [email or web address] OR  [email or web address] OR  https://www.cdc.gov/steadi/patient.html

## 2024-09-30 NOTE — ASU DISCHARGE PLAN (ADULT/PEDIATRIC) - ASU DC SPECIAL INSTRUCTIONSFT
Wound Care: The day AFTER your procedure:  Remove bandage GENTLY, and clean using mild soap and gentle warm, water stream, pat dry.   Leave OPEN to air. YOU MAY SHOWER  DO NOT SOAK your procedure site for 1 week (no baths, no pools, no tubs)  Check your wrist or groin puncture site everyday.  A small amount of soreness and bruising is normal  ACTIVITY for the next 24 hours:  1) DO NOT DRIVE  2) DO NOT make any important decisions or sign legal documents  3) DO NOT operate heavy [employer]   You may resume sexual activity in 48 hours, unless otherwise instructed by your cardiologist  If your procedure was done through the WRIST, for the NEXT 3 DAYS:  AVOID pushing pulling  or repeated movement of that hand and wrist (eg: typing, hammering)  DO NOT LIFT anything more than 5 lbs     If your procedure was done through the GROIN, for the NEXT 5 DAYS:  Limit climbing the stairs, no strenuous activities, no pushing, no pulling, no straining  Do not lift anything that is 10lbs or heavier   MEDICATION:  Take your medications as explained (see discharge paperwork). If you recieved a stent, you will be taking medication to KEEP YOUR STENT OPEN. DO NOT STOP THESE MEDICATIONS UNLESS DIRECTED BY A CARDIOLOGIST  If you smoke, WE RECOMMEND YOU QUIT (you may call [phone] the Center of Tobacco Control if you need assistance)   FOLLOW UP: Please follow up with your private cardiologist (insert name) in 2 weeks.  Please call immediately for an appointment upon discharge from the hospital.    ***CALL YOUR DOCTOR***   If you experience: chest pain, fever, chills, body aches, or severe pain, swelling, redness, heat or yellow discharge at incision site or if you experience bleeding, temperature change, numbness or excruciating pain at the procedural site  If you are unable to reach your doctor, you may contact:    Cardiology Office at [facility] at [phone]   Cardiac Short Stay Unit (CSSU) [phone]    Cardiac Recovery Suite (CRS) [phone]

## 2024-09-30 NOTE — ASU DISCHARGE PLAN (ADULT/PEDIATRIC) - CARE PROVIDER_API CALL
Erwin Valenzuela  Cardiology  [address]  [place] [address]  Phone: [phone]  Fax: [phone]  Follow Up Time: 1 month

## 2024-09-30 NOTE — ASU PATIENT PROFILE, ADULT - FALL HARM RISK - UNIVERSAL INTERVENTIONS
Bed in lowest position, wheels locked, appropriate side rails in place/Call bell, personal items and telephone in reach/Instruct patient to call for assistance before getting out of bed or chair/Non-slip footwear when patient is out of bed/[place] to call system/Physically safe environment - no spills, clutter or unnecessary equipment/Purposeful Proactive Rounding/Room/bathroom lighting operational, light cord in reach

## 2024-10-02 ENCOUNTER — APPOINTMENT (OUTPATIENT)
Dept: NEUROLOGY | Facility: CLINIC | Age: 54
End: 2024-10-02

## 2024-10-07 ENCOUNTER — NON-APPOINTMENT (OUTPATIENT)
Age: 54
End: 2024-10-07

## 2024-10-07 ENCOUNTER — APPOINTMENT (OUTPATIENT)
Dept: CARDIOLOGY | Facility: CLINIC | Age: 54
End: 2024-10-07
Payer: COMMERCIAL

## 2024-10-07 ENCOUNTER — APPOINTMENT (OUTPATIENT)
Dept: INTERNAL MEDICINE | Facility: CLINIC | Age: 54
End: 2024-10-07
Payer: COMMERCIAL

## 2024-10-07 VITALS
HEIGHT: 61 IN | HEART RATE: 81 BPM | WEIGHT: 152 LBS | OXYGEN SATURATION: 99 % | DIASTOLIC BLOOD PRESSURE: 80 MMHG | BODY MASS INDEX: 28.7 KG/M2 | SYSTOLIC BLOOD PRESSURE: 132 MMHG

## 2024-10-07 DIAGNOSIS — T14.8XXA OTHER INJURY OF UNSPECIFIED BODY REGION, INITIAL ENCOUNTER: ICD-10-CM

## 2024-10-07 DIAGNOSIS — I10 ESSENTIAL (PRIMARY) HYPERTENSION: ICD-10-CM

## 2024-10-07 DIAGNOSIS — R10.31 RIGHT LOWER QUADRANT PAIN: ICD-10-CM

## 2024-10-07 PROCEDURE — 93926 LOWER EXTREMITY STUDY: CPT

## 2024-10-07 PROCEDURE — 93000 ELECTROCARDIOGRAM COMPLETE: CPT

## 2024-10-07 PROCEDURE — 99204 OFFICE O/P NEW MOD 45 MIN: CPT

## 2024-10-08 ENCOUNTER — RX RENEWAL (OUTPATIENT)
Age: 54
End: 2024-10-08

## 2024-10-08 ENCOUNTER — APPOINTMENT (OUTPATIENT)
Dept: VASCULAR SURGERY | Facility: CLINIC | Age: 54
End: 2024-10-08
Payer: COMMERCIAL

## 2024-10-08 ENCOUNTER — APPOINTMENT (OUTPATIENT)
Dept: INTERNAL MEDICINE | Facility: CLINIC | Age: 54
End: 2024-10-08
Payer: COMMERCIAL

## 2024-10-08 ENCOUNTER — NON-APPOINTMENT (OUTPATIENT)
Age: 54
End: 2024-10-08

## 2024-10-08 VITALS
DIASTOLIC BLOOD PRESSURE: 80 MMHG | BODY MASS INDEX: 28.7 KG/M2 | SYSTOLIC BLOOD PRESSURE: 120 MMHG | HEART RATE: 78 BPM | HEIGHT: 61 IN | WEIGHT: 152 LBS

## 2024-10-08 VITALS
WEIGHT: 152 LBS | SYSTOLIC BLOOD PRESSURE: 112 MMHG | HEART RATE: 76 BPM | OXYGEN SATURATION: 99 % | BODY MASS INDEX: 28.7 KG/M2 | DIASTOLIC BLOOD PRESSURE: 72 MMHG | TEMPERATURE: 98.2 F | HEIGHT: 61 IN

## 2024-10-08 DIAGNOSIS — E87.6 HYPOKALEMIA: ICD-10-CM

## 2024-10-08 LAB
ALBUMIN SERPL ELPH-MCNC: 4.2 G/DL
ALP BLD-CCNC: 49 U/L
ALT SERPL-CCNC: 10 U/L
ANION GAP SERPL CALC-SCNC: 15 MMOL/L
APPEARANCE: CLEAR
APTT BLD: 27.6 SEC
AST SERPL-CCNC: 13 U/L
BACTERIA: ABNORMAL /HPF
BILIRUB SERPL-MCNC: 0.8 MG/DL
BILIRUBIN URINE: ABNORMAL
BLOOD URINE: NEGATIVE
BUN SERPL-MCNC: 16 MG/DL
CALCIUM SERPL-MCNC: 9.7 MG/DL
CAST: 0 /LPF
CHLORIDE SERPL-SCNC: 104 MMOL/L
CO2 SERPL-SCNC: 22 MMOL/L
COLOR: ABNORMAL
CREAT SERPL-MCNC: 0.72 MG/DL
EGFR: 98 ML/MIN/1.73M2
EPITHELIAL CELLS: 2 /HPF
GLUCOSE QUALITATIVE U: NEGATIVE MG/DL
GLUCOSE SERPL-MCNC: 48 MG/DL
HCT VFR BLD CALC: 38.4 %
HGB BLD-MCNC: 12.2 G/DL
INR PPP: 0.9 RATIO
KETONES URINE: NEGATIVE MG/DL
LEUKOCYTE ESTERASE URINE: ABNORMAL
MCHC RBC-ENTMCNC: 31.4 PG
MCHC RBC-ENTMCNC: 31.8 GM/DL
MCV RBC AUTO: 98.7 FL
MICROSCOPIC-UA: NORMAL
NITRITE URINE: POSITIVE
PH URINE: 5
PLATELET # BLD AUTO: 307 K/UL
POTASSIUM SERPL-SCNC: 3.4 MMOL/L
PROT SERPL-MCNC: 7.6 G/DL
PROTEIN URINE: 30 MG/DL
PT BLD: 10.6 SEC
RBC # BLD: 3.89 M/UL
RBC # FLD: 13.9 %
RED BLOOD CELLS URINE: 4 /HPF
REVIEW: NORMAL
SODIUM SERPL-SCNC: 141 MMOL/L
SPECIFIC GRAVITY URINE: 1.03
UROBILINOGEN URINE: 1 MG/DL
WBC # FLD AUTO: 7.27 K/UL
WHITE BLOOD CELLS URINE: 6 /HPF

## 2024-10-08 PROCEDURE — 93926 LOWER EXTREMITY STUDY: CPT

## 2024-10-08 PROCEDURE — 99214 OFFICE O/P EST MOD 30 MIN: CPT

## 2024-10-08 RX ORDER — RIVAROXABAN 15 MG-20MG
15 & 20 KIT ORAL
Qty: 1 | Refills: 0 | Status: ACTIVE | COMMUNITY
Start: 2024-10-08 | End: 1900-01-01

## 2024-10-08 RX ORDER — POTASSIUM CHLORIDE 1.5 G/1.58G
20 POWDER, FOR SOLUTION ORAL
Qty: 3 | Refills: 0 | Status: ACTIVE | COMMUNITY
Start: 2024-10-08 | End: 1900-01-01

## 2024-10-09 PROBLEM — I82.409 DVT (DEEP VENOUS THROMBOSIS): Status: ACTIVE | Noted: 2024-10-08

## 2024-10-09 LAB — VWF AG PPP IA-ACNC: 145 %

## 2024-10-09 RX ORDER — SULFAMETHOXAZOLE AND TRIMETHOPRIM 800; 160 MG/1; MG/1
800-160 TABLET ORAL
Qty: 10 | Refills: 0 | Status: ACTIVE | COMMUNITY
Start: 2024-10-09 | End: 1900-01-01

## 2024-10-11 LAB — BACTERIA UR CULT: ABNORMAL

## 2024-10-13 LAB
ALDOSTERONE/RENIN RATIO: 3.9 RATIO
ANION GAP SERPL CALC-SCNC: 14 MMOL/L
BUN SERPL-MCNC: 9 MG/DL
CALCIUM SERPL-MCNC: 9.7 MG/DL
CHLORIDE SERPL-SCNC: 108 MMOL/L
CO2 SERPL-SCNC: 22 MMOL/L
CREAT SERPL-MCNC: 0.76 MG/DL
EGFR: 92 ML/MIN/1.73M2
FIBRINOGEN AG PPP IA-MCNC: 599 MG/DL
GLUCOSE SERPL-MCNC: 79 MG/DL
MAGNESIUM SERPL-MCNC: 2.4 MG/DL
POTASSIUM SERPL-SCNC: 4 MMOL/L
SODIUM SERPL-SCNC: 144 MMOL/L

## 2024-10-14 LAB — VWF MULTIMERS PPP IA-ACNC: NORMAL

## 2024-10-15 ENCOUNTER — RX RENEWAL (OUTPATIENT)
Age: 54
End: 2024-10-15

## 2024-10-15 LAB — RENIN ACTIVITY, PLASMA: 0.68 NG/ML/HR

## 2024-10-16 ENCOUNTER — APPOINTMENT (OUTPATIENT)
Dept: CARDIOLOGY | Facility: CLINIC | Age: 54
End: 2024-10-16
Payer: COMMERCIAL

## 2024-10-16 ENCOUNTER — TRANSCRIPTION ENCOUNTER (OUTPATIENT)
Age: 54
End: 2024-10-16

## 2024-10-16 VITALS
BODY MASS INDEX: 28.32 KG/M2 | OXYGEN SATURATION: 98 % | TEMPERATURE: 97.6 F | HEIGHT: 61 IN | HEART RATE: 70 BPM | SYSTOLIC BLOOD PRESSURE: 98 MMHG | WEIGHT: 150 LBS | DIASTOLIC BLOOD PRESSURE: 70 MMHG

## 2024-10-16 DIAGNOSIS — E78.2 MIXED HYPERLIPIDEMIA: ICD-10-CM

## 2024-10-16 DIAGNOSIS — R53.83 OTHER FATIGUE: ICD-10-CM

## 2024-10-16 DIAGNOSIS — N39.0 URINARY TRACT INFECTION, SITE NOT SPECIFIED: ICD-10-CM

## 2024-10-16 DIAGNOSIS — R06.02 SHORTNESS OF BREATH: ICD-10-CM

## 2024-10-16 DIAGNOSIS — R42 DIZZINESS AND GIDDINESS: ICD-10-CM

## 2024-10-16 DIAGNOSIS — R89.9 UNSPECIFIED ABNORMAL FINDING IN SPECIMENS FROM OTHER ORGANS, SYSTEMS AND TISSUES: ICD-10-CM

## 2024-10-16 DIAGNOSIS — E23.7 DISORDER OF PITUITARY GLAND, UNSPECIFIED: ICD-10-CM

## 2024-10-16 DIAGNOSIS — I25.10 ATHEROSCLEROTIC HEART DISEASE OF NATIVE CORONARY ARTERY W/OUT ANGINA PECTORIS: ICD-10-CM

## 2024-10-16 DIAGNOSIS — R07.9 CHEST PAIN, UNSPECIFIED: ICD-10-CM

## 2024-10-16 DIAGNOSIS — E03.9 HYPOTHYROIDISM, UNSPECIFIED: ICD-10-CM

## 2024-10-16 DIAGNOSIS — R73.03 PREDIABETES.: ICD-10-CM

## 2024-10-16 DIAGNOSIS — I82.409 ACUTE EMBOLISM AND THROMBOSIS OF UNSPECIFIED DEEP VEINS OF UNSPECIFIED LOWER EXTREMITY: ICD-10-CM

## 2024-10-16 PROCEDURE — 99214 OFFICE O/P EST MOD 30 MIN: CPT

## 2024-10-16 PROCEDURE — G2211 COMPLEX E/M VISIT ADD ON: CPT

## 2024-10-16 PROCEDURE — 93000 ELECTROCARDIOGRAM COMPLETE: CPT

## 2024-10-16 RX ORDER — DIPHENHYDRAMINE HCL 50 MG/1
50 CAPSULE ORAL
Qty: 1 | Refills: 0 | Status: ACTIVE | COMMUNITY
Start: 2024-10-16 | End: 1900-01-01

## 2024-10-16 RX ORDER — PREDNISONE 50 MG/1
50 TABLET ORAL
Qty: 3 | Refills: 0 | Status: ACTIVE | COMMUNITY
Start: 2024-10-16

## 2024-10-17 ENCOUNTER — OUTPATIENT (OUTPATIENT)
Dept: OUTPATIENT SERVICES | Facility: HOSPITAL | Age: 54
LOS: 1 days | End: 2024-10-17
Payer: COMMERCIAL

## 2024-10-17 ENCOUNTER — APPOINTMENT (OUTPATIENT)
Dept: CT IMAGING | Facility: IMAGING CENTER | Age: 54
End: 2024-10-17
Payer: COMMERCIAL

## 2024-10-17 ENCOUNTER — APPOINTMENT (OUTPATIENT)
Dept: ULTRASOUND IMAGING | Facility: IMAGING CENTER | Age: 54
End: 2024-10-17
Payer: COMMERCIAL

## 2024-10-17 ENCOUNTER — RESULT REVIEW (OUTPATIENT)
Age: 54
End: 2024-10-17

## 2024-10-17 DIAGNOSIS — R06.02 SHORTNESS OF BREATH: ICD-10-CM

## 2024-10-17 DIAGNOSIS — Z00.8 ENCOUNTER FOR OTHER GENERAL EXAMINATION: ICD-10-CM

## 2024-10-17 DIAGNOSIS — I82.409 ACUTE EMBOLISM AND THROMBOSIS OF UNSPECIFIED DEEP VEINS OF UNSPECIFIED LOWER EXTREMITY: ICD-10-CM

## 2024-10-17 DIAGNOSIS — T14.8XXA OTHER INJURY OF UNSPECIFIED BODY REGION, INITIAL ENCOUNTER: Chronic | ICD-10-CM

## 2024-10-17 LAB
ALBUMIN SERPL ELPH-MCNC: 4.4 G/DL
ALDOSTERONE/RENIN RATIO: 0.7 RATIO
ALP BLD-CCNC: 46 U/L
ALT SERPL-CCNC: 11 U/L
AST SERPL-CCNC: 13 U/L
BASOPHILS # BLD AUTO: 0.06 K/UL
BASOPHILS NFR BLD AUTO: 1 %
BILIRUB DIRECT SERPL-MCNC: 0.1 MG/DL
BILIRUB INDIRECT SERPL-MCNC: 0.2 MG/DL
BILIRUB SERPL-MCNC: 0.3 MG/DL
CHOLEST SERPL-MCNC: 207 MG/DL
CK SERPL-CCNC: 158 U/L
EOSINOPHIL # BLD AUTO: 0.13 K/UL
EOSINOPHIL NFR BLD AUTO: 2.2 %
HCT VFR BLD CALC: 37.9 %
HDLC SERPL-MCNC: 72 MG/DL
HGB BLD-MCNC: 12.3 G/DL
IMM GRANULOCYTES NFR BLD AUTO: 0.2 %
LDLC SERPL CALC-MCNC: 112 MG/DL
LYMPHOCYTES # BLD AUTO: 2.52 K/UL
LYMPHOCYTES NFR BLD AUTO: 42.3 %
MAN DIFF?: NORMAL
MCHC RBC-ENTMCNC: 31.8 PG
MCHC RBC-ENTMCNC: 32.5 GM/DL
MCV RBC AUTO: 97.9 FL
MONOCYTES # BLD AUTO: 0.68 K/UL
MONOCYTES NFR BLD AUTO: 11.4 %
NEUTROPHILS # BLD AUTO: 2.56 K/UL
NEUTROPHILS NFR BLD AUTO: 42.9 %
NONHDLC SERPL-MCNC: 135 MG/DL
PLATELET # BLD AUTO: 353 K/UL
PROT SERPL-MCNC: 7.1 G/DL
RBC # BLD: 3.87 M/UL
RBC # FLD: 13.7 %
TRIGL SERPL-MCNC: 130 MG/DL
WBC # FLD AUTO: 5.96 K/UL

## 2024-10-17 PROCEDURE — 93970 EXTREMITY STUDY: CPT | Mod: 26

## 2024-10-17 PROCEDURE — 71275 CT ANGIOGRAPHY CHEST: CPT | Mod: 26

## 2024-10-17 PROCEDURE — 93970 EXTREMITY STUDY: CPT

## 2024-10-17 PROCEDURE — 71275 CT ANGIOGRAPHY CHEST: CPT

## 2024-10-21 ENCOUNTER — RX RENEWAL (OUTPATIENT)
Age: 54
End: 2024-10-21

## 2024-10-22 ENCOUNTER — APPOINTMENT (OUTPATIENT)
Dept: NEUROLOGY | Facility: CLINIC | Age: 54
End: 2024-10-22

## 2024-10-22 VITALS
DIASTOLIC BLOOD PRESSURE: 82 MMHG | HEART RATE: 80 BPM | HEIGHT: 61 IN | BODY MASS INDEX: 28.32 KG/M2 | SYSTOLIC BLOOD PRESSURE: 117 MMHG | WEIGHT: 150 LBS

## 2024-10-22 DIAGNOSIS — G40.119 LOCALIZATION-RELATED (FOCAL) (PARTIAL) SYMPTOMATIC EPILEPSY AND EPILEPTIC SYNDROMES WITH SIMPLE PARTIAL SEIZURES, INTRACTABLE, W/OUT STATUS EPILEPTICUS: ICD-10-CM

## 2024-10-22 PROCEDURE — 99205 OFFICE O/P NEW HI 60 MIN: CPT

## 2024-10-23 ENCOUNTER — APPOINTMENT (OUTPATIENT)
Dept: ULTRASOUND IMAGING | Facility: CLINIC | Age: 54
End: 2024-10-23
Payer: COMMERCIAL

## 2024-10-23 ENCOUNTER — APPOINTMENT (OUTPATIENT)
Dept: MAMMOGRAPHY | Facility: CLINIC | Age: 54
End: 2024-10-23
Payer: COMMERCIAL

## 2024-10-23 PROCEDURE — 77067 SCR MAMMO BI INCL CAD: CPT

## 2024-10-23 PROCEDURE — 76641 ULTRASOUND BREAST COMPLETE: CPT | Mod: 50

## 2024-10-23 PROCEDURE — 77063 BREAST TOMOSYNTHESIS BI: CPT

## 2024-10-23 RX ORDER — RIVAROXABAN 20 MG/1
20 TABLET, FILM COATED ORAL
Qty: 90 | Refills: 1 | Status: ACTIVE | COMMUNITY
Start: 2024-10-23 | End: 1900-01-01

## 2024-10-24 LAB — RENIN ACTIVITY, PLASMA: 4.21 NG/ML/HR

## 2024-11-05 ENCOUNTER — APPOINTMENT (OUTPATIENT)
Dept: PULMONOLOGY | Facility: CLINIC | Age: 54
End: 2024-11-05
Payer: COMMERCIAL

## 2024-11-05 VITALS — DIASTOLIC BLOOD PRESSURE: 73 MMHG | OXYGEN SATURATION: 97 % | HEART RATE: 77 BPM | SYSTOLIC BLOOD PRESSURE: 111 MMHG

## 2024-11-05 DIAGNOSIS — G47.33 OBSTRUCTIVE SLEEP APNEA (ADULT) (PEDIATRIC): ICD-10-CM

## 2024-11-05 DIAGNOSIS — R06.09 OTHER FORMS OF DYSPNEA: ICD-10-CM

## 2024-11-05 DIAGNOSIS — R93.89 ABNORMAL FINDINGS ON DIAGNOSTIC IMAGING OF OTHER SPECIFIED BODY STRUCTURES: ICD-10-CM

## 2024-11-05 LAB
APPEARANCE: ABNORMAL
BACTERIA UR CULT: ABNORMAL
BACTERIA: ABNORMAL /HPF
BILIRUBIN URINE: ABNORMAL
BLOOD URINE: ABNORMAL
CAST: 18 /LPF
COLOR: ABNORMAL
EPITHELIAL CELLS: 15 /HPF
GLUCOSE QUALITATIVE U: NEGATIVE MG/DL
HYALINE CASTS: PRESENT
KETONES URINE: NEGATIVE MG/DL
LEUKOCYTE ESTERASE URINE: ABNORMAL
MICROSCOPIC-UA: NORMAL
MUCUS: PRESENT
NITRITE URINE: POSITIVE
PH URINE: 5
PROTEIN URINE: 100 MG/DL
RED BLOOD CELLS URINE: 50 /HPF
REVIEW: NORMAL
SPECIFIC GRAVITY URINE: 1.03
UROBILINOGEN URINE: 1 MG/DL
WHITE BLOOD CELLS URINE: 18 /HPF

## 2024-11-05 PROCEDURE — 94727 GAS DIL/WSHOT DETER LNG VOL: CPT

## 2024-11-05 PROCEDURE — 99214 OFFICE O/P EST MOD 30 MIN: CPT | Mod: 25

## 2024-11-05 PROCEDURE — 94729 DIFFUSING CAPACITY: CPT

## 2024-11-05 PROCEDURE — 94010 BREATHING CAPACITY TEST: CPT

## 2024-11-05 PROCEDURE — ZZZZZ: CPT

## 2024-11-06 ENCOUNTER — APPOINTMENT (OUTPATIENT)
Dept: CARDIOLOGY | Facility: CLINIC | Age: 54
End: 2024-11-06
Payer: COMMERCIAL

## 2024-11-06 VITALS
BODY MASS INDEX: 27.56 KG/M2 | HEART RATE: 82 BPM | HEIGHT: 61 IN | SYSTOLIC BLOOD PRESSURE: 116 MMHG | RESPIRATION RATE: 16 BRPM | TEMPERATURE: 98.1 F | DIASTOLIC BLOOD PRESSURE: 66 MMHG | OXYGEN SATURATION: 97 % | WEIGHT: 146 LBS

## 2024-11-06 DIAGNOSIS — R06.02 SHORTNESS OF BREATH: ICD-10-CM

## 2024-11-06 DIAGNOSIS — R73.03 PREDIABETES.: ICD-10-CM

## 2024-11-06 DIAGNOSIS — R53.83 OTHER FATIGUE: ICD-10-CM

## 2024-11-06 DIAGNOSIS — E78.2 MIXED HYPERLIPIDEMIA: ICD-10-CM

## 2024-11-06 DIAGNOSIS — E55.9 VITAMIN D DEFICIENCY, UNSPECIFIED: ICD-10-CM

## 2024-11-06 DIAGNOSIS — E03.9 HYPOTHYROIDISM, UNSPECIFIED: ICD-10-CM

## 2024-11-06 DIAGNOSIS — Z13.228 ENCOUNTER FOR SCREENING FOR OTHER METABOLIC DISORDERS: ICD-10-CM

## 2024-11-06 PROCEDURE — 99214 OFFICE O/P EST MOD 30 MIN: CPT

## 2024-11-06 PROCEDURE — G2211 COMPLEX E/M VISIT ADD ON: CPT

## 2024-11-06 PROCEDURE — 93000 ELECTROCARDIOGRAM COMPLETE: CPT

## 2024-11-06 RX ORDER — RANOLAZINE 500 MG/1
500 TABLET, EXTENDED RELEASE ORAL
Qty: 180 | Refills: 3 | Status: ACTIVE | COMMUNITY
Start: 2024-11-06 | End: 1900-01-01

## 2024-11-07 PROBLEM — R06.09 DOE (DYSPNEA ON EXERTION): Status: ACTIVE | Noted: 2024-11-07

## 2024-11-07 LAB
ALBUMIN SERPL ELPH-MCNC: 4.4 G/DL
ALP BLD-CCNC: 46 U/L
ALT SERPL-CCNC: 6 U/L
ANION GAP SERPL CALC-SCNC: 11 MMOL/L
AST SERPL-CCNC: 12 U/L
BASOPHILS # BLD AUTO: 0.06 K/UL
BASOPHILS NFR BLD AUTO: 1 %
BILIRUB SERPL-MCNC: 0.3 MG/DL
BUN SERPL-MCNC: 14 MG/DL
CALCIUM SERPL-MCNC: 10.1 MG/DL
CHLORIDE SERPL-SCNC: 105 MMOL/L
CO2 SERPL-SCNC: 27 MMOL/L
CREAT SERPL-MCNC: 0.88 MG/DL
EGFR: 77 ML/MIN/1.73M2
EOSINOPHIL # BLD AUTO: 0.14 K/UL
EOSINOPHIL NFR BLD AUTO: 2.3 %
GLUCOSE SERPL-MCNC: 81 MG/DL
HCT VFR BLD CALC: 40.7 %
HGB BLD-MCNC: 12.6 G/DL
IMM GRANULOCYTES NFR BLD AUTO: 0.2 %
LYMPHOCYTES # BLD AUTO: 2.64 K/UL
LYMPHOCYTES NFR BLD AUTO: 42.9 %
MAN DIFF?: NORMAL
MCHC RBC-ENTMCNC: 31 G/DL
MCHC RBC-ENTMCNC: 31.7 PG
MCV RBC AUTO: 102.3 FL
MONOCYTES # BLD AUTO: 0.71 K/UL
MONOCYTES NFR BLD AUTO: 11.5 %
NEUTROPHILS # BLD AUTO: 2.6 K/UL
NEUTROPHILS NFR BLD AUTO: 42.1 %
PLATELET # BLD AUTO: 294 K/UL
POTASSIUM SERPL-SCNC: 4.3 MMOL/L
PROT SERPL-MCNC: 7.7 G/DL
RBC # BLD: 3.98 M/UL
RBC # FLD: 13.1 %
SODIUM SERPL-SCNC: 143 MMOL/L
WBC # FLD AUTO: 6.16 K/UL

## 2024-11-08 ENCOUNTER — INPATIENT (INPATIENT)
Facility: HOSPITAL | Age: 54
LOS: 2 days | Discharge: HOME | DRG: 690 | End: 2024-11-11
Attending: INTERNAL MEDICINE | Admitting: STUDENT IN AN ORGANIZED HEALTH CARE EDUCATION/TRAINING PROGRAM
Payer: COMMERCIAL

## 2024-11-08 ENCOUNTER — OUTPATIENT (OUTPATIENT)
Dept: OUTPATIENT SERVICES | Facility: HOSPITAL | Age: 54
LOS: 1 days | Discharge: HOME | End: 2024-11-08

## 2024-11-08 VITALS
HEIGHT: 61 IN | RESPIRATION RATE: 19 BRPM | TEMPERATURE: 98 F | WEIGHT: 147.93 LBS | HEART RATE: 84 BPM | DIASTOLIC BLOOD PRESSURE: 84 MMHG | SYSTOLIC BLOOD PRESSURE: 127 MMHG | OXYGEN SATURATION: 99 %

## 2024-11-08 DIAGNOSIS — Z90.49 ACQUIRED ABSENCE OF OTHER SPECIFIED PARTS OF DIGESTIVE TRACT: Chronic | ICD-10-CM

## 2024-11-08 DIAGNOSIS — T14.8XXA OTHER INJURY OF UNSPECIFIED BODY REGION, INITIAL ENCOUNTER: Chronic | ICD-10-CM

## 2024-11-08 DIAGNOSIS — Z98.890 OTHER SPECIFIED POSTPROCEDURAL STATES: Chronic | ICD-10-CM

## 2024-11-08 DIAGNOSIS — D64.9 ANEMIA, UNSPECIFIED: ICD-10-CM

## 2024-11-08 LAB
ALBUMIN SERPL ELPH-MCNC: 4.3 G/DL — SIGNIFICANT CHANGE UP (ref 3.3–5)
ALP SERPL-CCNC: 46 U/L — SIGNIFICANT CHANGE UP (ref 40–120)
ALT FLD-CCNC: 9 U/L — LOW (ref 10–45)
ANION GAP SERPL CALC-SCNC: 13 MMOL/L — SIGNIFICANT CHANGE UP (ref 5–17)
APPEARANCE UR: CLEAR — SIGNIFICANT CHANGE UP
AST SERPL-CCNC: 16 U/L — SIGNIFICANT CHANGE UP (ref 10–40)
BACTERIA # UR AUTO: NEGATIVE /HPF — SIGNIFICANT CHANGE UP
BASOPHILS # BLD AUTO: 0.05 K/UL — SIGNIFICANT CHANGE UP (ref 0–0.2)
BASOPHILS NFR BLD AUTO: 0.7 % — SIGNIFICANT CHANGE UP (ref 0–2)
BILIRUB SERPL-MCNC: 0.4 MG/DL — SIGNIFICANT CHANGE UP (ref 0.2–1.2)
BILIRUB UR-MCNC: ABNORMAL
BUN SERPL-MCNC: 15 MG/DL — SIGNIFICANT CHANGE UP (ref 7–23)
CALCIUM SERPL-MCNC: 9.9 MG/DL — SIGNIFICANT CHANGE UP (ref 8.4–10.5)
CAST: 0 /LPF — SIGNIFICANT CHANGE UP (ref 0–4)
CHLORIDE SERPL-SCNC: 104 MMOL/L — SIGNIFICANT CHANGE UP (ref 96–108)
CO2 SERPL-SCNC: 23 MMOL/L — SIGNIFICANT CHANGE UP (ref 22–31)
COLOR SPEC: ABNORMAL
CREAT SERPL-MCNC: 1.9 MG/DL — HIGH (ref 0.5–1.3)
DIFF PNL FLD: NEGATIVE — SIGNIFICANT CHANGE UP
EGFR: 31 ML/MIN/1.73M2 — LOW
EOSINOPHIL # BLD AUTO: 0.09 K/UL — SIGNIFICANT CHANGE UP (ref 0–0.5)
EOSINOPHIL NFR BLD AUTO: 1.3 % — SIGNIFICANT CHANGE UP (ref 0–6)
GAS PNL BLDV: SIGNIFICANT CHANGE UP
GLUCOSE SERPL-MCNC: 80 MG/DL — SIGNIFICANT CHANGE UP (ref 70–99)
GLUCOSE UR QL: NEGATIVE MG/DL — SIGNIFICANT CHANGE UP
HCT VFR BLD CALC: 37.2 % — SIGNIFICANT CHANGE UP (ref 34.5–45)
HGB BLD-MCNC: 12.2 G/DL — SIGNIFICANT CHANGE UP (ref 11.5–15.5)
IMM GRANULOCYTES NFR BLD AUTO: 0.3 % — SIGNIFICANT CHANGE UP (ref 0–0.9)
KETONES UR-MCNC: NEGATIVE MG/DL — SIGNIFICANT CHANGE UP
LEUKOCYTE ESTERASE UR-ACNC: ABNORMAL
LIDOCAIN IGE QN: 36 U/L — SIGNIFICANT CHANGE UP (ref 7–60)
LYMPHOCYTES # BLD AUTO: 1.95 K/UL — SIGNIFICANT CHANGE UP (ref 1–3.3)
LYMPHOCYTES # BLD AUTO: 27.4 % — SIGNIFICANT CHANGE UP (ref 13–44)
MCHC RBC-ENTMCNC: 31 PG — SIGNIFICANT CHANGE UP (ref 27–34)
MCHC RBC-ENTMCNC: 32.8 G/DL — SIGNIFICANT CHANGE UP (ref 32–36)
MCV RBC AUTO: 94.7 FL — SIGNIFICANT CHANGE UP (ref 80–100)
MONOCYTES # BLD AUTO: 0.8 K/UL — SIGNIFICANT CHANGE UP (ref 0–0.9)
MONOCYTES NFR BLD AUTO: 11.2 % — SIGNIFICANT CHANGE UP (ref 2–14)
NEUTROPHILS # BLD AUTO: 4.21 K/UL — SIGNIFICANT CHANGE UP (ref 1.8–7.4)
NEUTROPHILS NFR BLD AUTO: 59.1 % — SIGNIFICANT CHANGE UP (ref 43–77)
NITRITE UR-MCNC: POSITIVE
NRBC # BLD: 0 /100 WBCS — SIGNIFICANT CHANGE UP (ref 0–0)
PH UR: 5 — SIGNIFICANT CHANGE UP (ref 5–8)
PLATELET # BLD AUTO: 273 K/UL — SIGNIFICANT CHANGE UP (ref 150–400)
POTASSIUM SERPL-MCNC: 3.8 MMOL/L — SIGNIFICANT CHANGE UP (ref 3.5–5.3)
POTASSIUM SERPL-SCNC: 3.8 MMOL/L — SIGNIFICANT CHANGE UP (ref 3.5–5.3)
PROT SERPL-MCNC: 7.4 G/DL — SIGNIFICANT CHANGE UP (ref 6–8.3)
PROT UR-MCNC: SIGNIFICANT CHANGE UP MG/DL
RBC # BLD: 3.93 M/UL — SIGNIFICANT CHANGE UP (ref 3.8–5.2)
RBC # FLD: 12.3 % — SIGNIFICANT CHANGE UP (ref 10.3–14.5)
RBC CASTS # UR COMP ASSIST: 1 /HPF — SIGNIFICANT CHANGE UP (ref 0–4)
REVIEW: SIGNIFICANT CHANGE UP
SODIUM SERPL-SCNC: 140 MMOL/L — SIGNIFICANT CHANGE UP (ref 135–145)
SP GR SPEC: 1.01 — SIGNIFICANT CHANGE UP (ref 1–1.03)
SQUAMOUS # UR AUTO: 2 /HPF — SIGNIFICANT CHANGE UP (ref 0–5)
UROBILINOGEN FLD QL: 1 MG/DL — SIGNIFICANT CHANGE UP (ref 0.2–1)
WBC # BLD: 7.12 K/UL — SIGNIFICANT CHANGE UP (ref 3.8–10.5)
WBC # FLD AUTO: 7.12 K/UL — SIGNIFICANT CHANGE UP (ref 3.8–10.5)
WBC UR QL: 0 /HPF — SIGNIFICANT CHANGE UP (ref 0–5)

## 2024-11-08 PROCEDURE — 99223 1ST HOSP IP/OBS HIGH 75: CPT

## 2024-11-08 PROCEDURE — 76770 US EXAM ABDO BACK WALL COMP: CPT | Mod: 26

## 2024-11-08 RX ORDER — RIVAROXABAN 20 MG/1
20 TABLET, FILM COATED ORAL
Refills: 0 | Status: DISCONTINUED | OUTPATIENT
Start: 2024-11-09 | End: 2024-11-11

## 2024-11-08 RX ORDER — AMLODIPINE BESYLATE 10 MG
5 TABLET ORAL
Refills: 0 | Status: DISCONTINUED | OUTPATIENT
Start: 2024-11-08 | End: 2024-11-11

## 2024-11-08 RX ORDER — RANOLAZINE 500 MG/1
500 TABLET, FILM COATED, EXTENDED RELEASE ORAL EVERY 12 HOURS
Refills: 0 | Status: DISCONTINUED | OUTPATIENT
Start: 2024-11-08 | End: 2024-11-11

## 2024-11-08 RX ORDER — ROSUVASTATIN CALCIUM 10 MG
40 TABLET ORAL AT BEDTIME
Refills: 0 | Status: DISCONTINUED | OUTPATIENT
Start: 2024-11-08 | End: 2024-11-11

## 2024-11-08 RX ORDER — MORPHINE SULFATE 30 MG/1
2 TABLET, EXTENDED RELEASE ORAL ONCE
Refills: 0 | Status: DISCONTINUED | OUTPATIENT
Start: 2024-11-08 | End: 2024-11-08

## 2024-11-08 RX ORDER — DIPHENHYDRAMINE HCL 12.5MG/5ML
50 ELIXIR ORAL ONCE
Refills: 0 | Status: COMPLETED | OUTPATIENT
Start: 2024-11-09 | End: 2024-11-09

## 2024-11-08 RX ORDER — LEVOTHYROXINE SODIUM 88 MCG
100 TABLET ORAL DAILY
Refills: 0 | Status: DISCONTINUED | OUTPATIENT
Start: 2024-11-08 | End: 2024-11-11

## 2024-11-08 RX ORDER — ONDANSETRON HYDROCHLORIDE 2 MG/ML
4 INJECTION, SOLUTION INTRAMUSCULAR; INTRAVENOUS ONCE
Refills: 0 | Status: COMPLETED | OUTPATIENT
Start: 2024-11-08 | End: 2024-11-08

## 2024-11-08 RX ORDER — SODIUM CHLORIDE 9 MG/ML
1000 INJECTION, SOLUTION INTRAMUSCULAR; INTRAVENOUS; SUBCUTANEOUS ONCE
Refills: 0 | Status: COMPLETED | OUTPATIENT
Start: 2024-11-08 | End: 2024-11-08

## 2024-11-08 RX ORDER — SODIUM CHLORIDE 9 MG/ML
1000 INJECTION, SOLUTION INTRAMUSCULAR; INTRAVENOUS; SUBCUTANEOUS
Refills: 0 | Status: DISCONTINUED | OUTPATIENT
Start: 2024-11-08 | End: 2024-11-11

## 2024-11-08 RX ORDER — CEFTRIAXONE SODIUM 10 G
1000 VIAL (EA) INJECTION ONCE
Refills: 0 | Status: COMPLETED | OUTPATIENT
Start: 2024-11-08 | End: 2024-11-08

## 2024-11-08 RX ORDER — ACETAMINOPHEN 500 MG
1000 TABLET ORAL ONCE
Refills: 0 | Status: COMPLETED | OUTPATIENT
Start: 2024-11-08 | End: 2024-11-08

## 2024-11-08 RX ADMIN — Medication 400 MILLIGRAM(S): at 18:04

## 2024-11-08 RX ADMIN — RANOLAZINE 500 MILLIGRAM(S): 500 TABLET, FILM COATED, EXTENDED RELEASE ORAL at 22:51

## 2024-11-08 RX ADMIN — Medication 40 MILLIGRAM(S): at 22:51

## 2024-11-08 RX ADMIN — Medication 5 MILLIGRAM(S): at 22:50

## 2024-11-08 RX ADMIN — ONDANSETRON HYDROCHLORIDE 4 MILLIGRAM(S): 2 INJECTION, SOLUTION INTRAMUSCULAR; INTRAVENOUS at 20:28

## 2024-11-08 RX ADMIN — SODIUM CHLORIDE 1000 MILLILITER(S): 9 INJECTION, SOLUTION INTRAMUSCULAR; INTRAVENOUS; SUBCUTANEOUS at 18:04

## 2024-11-08 RX ADMIN — MORPHINE SULFATE 2 MILLIGRAM(S): 30 TABLET, EXTENDED RELEASE ORAL at 23:34

## 2024-11-08 RX ADMIN — SODIUM CHLORIDE 100 MILLILITER(S): 9 INJECTION, SOLUTION INTRAMUSCULAR; INTRAVENOUS; SUBCUTANEOUS at 22:49

## 2024-11-08 RX ADMIN — Medication 100 MILLIGRAM(S): at 20:28

## 2024-11-08 RX ADMIN — MORPHINE SULFATE 2 MILLIGRAM(S): 30 TABLET, EXTENDED RELEASE ORAL at 22:51

## 2024-11-08 RX ADMIN — Medication 50 MILLIGRAM(S): at 18:28

## 2024-11-08 NOTE — ED ADULT NURSE NOTE - OBJECTIVE STATEMENT
female 54 years old with history of CAD s/p Cath 5 /11/23 , HTN , HLD and frequent UTI  came in for urinary symptoms. Pt has reports pelvic and back pain with urgency in urination. States last night she was incontinent. Pt was on Nitrofurantoin last week and last night was started on Bactrim. Denies fever, chills, N/V or vaginal discharge. Will monitor.

## 2024-11-08 NOTE — ED ADULT NURSE NOTE - NSFALLUNIVINTERV_ED_ALL_ED
Bed/Stretcher in lowest position, wheels locked, appropriate side rails in place/Call bell, personal items and telephone in reach/Instruct patient to call for assistance before getting out of bed/chair/stretcher/Non-slip footwear applied when patient is off stretcher/[place] to call system/Physically safe environment - no spills, clutter or unnecessary equipment/Purposeful proactive rounding/Room/bathroom lighting operational, light cord in reach

## 2024-11-08 NOTE — ED ADULT NURSE REASSESSMENT NOTE - NS ED NURSE REASSESS COMMENT FT1
Received a 54F aaox4 ambulatory came to ED for urinary Sx, currently on oral antibiotics for UTI, c/o back and low pelvic pain, denies any chills or fever, Rt AC 20g IV access noted, flushes well. Due meds given as ordered and tolerated well. Patient for Abdominal CT scan with contrast needing pre medications secondary to allergy to IV contrast dye. Due meds given as ordered and tolerated well. VS WDL. Plan  of care explained and verbalized understanding.

## 2024-11-08 NOTE — ED PROVIDER NOTE - CLINICAL SUMMARY MEDICAL DECISION MAKING FREE TEXT BOX
Mckenna Shaffer MD  54  years old Female with PMHx of CAD s/p diagnostic cath 5/11/23 with vascular injury to rt radial artery post cath,  HTN, HLD, hypothyroid, Hx of silent seizures (not currently on Lacosamide), anxiety, Hx obesity (used to be BMI>36 now lost 50 lbs since April on Zepbound 12.5mg inj weekly), MARIAN and strong FHX of CAD presents to the ED with history of frequent UTIs na was on nitrofurantoin, and last night was started on Bactrim, patient has urgency and incontinence. Denies any past STI, no vaginal discharge, some dyspareunia on sexual intercourse, no fever, no chills, no flank pain.  Patient reports past history of contrast material allergy but was able to have a CTA recently with a prolonged prep that started 14 hours before the test.  concern for UTI, less likely pyelonephritis, will send labs, IV fluids, pain management, will start with U/S kidneys and bladder.

## 2024-11-08 NOTE — ED PROVIDER NOTE - PROGRESS NOTE DETAILS
Racquel Omer PGY3 - sign out -54-year-old female presenting with flank pain after recurrent UTIs and been treated with antibiotics.  Lab work notable for an KRISTEN, creatinine 1.9.  UA concerning for positive nitrites and leukocyte esterase.  Patient will require 13-hour premedication protocol for CT scan.  Medications ordered.  CT scan ordered for tomorrow.  CDU for observation and premedicated CT scan.

## 2024-11-09 ENCOUNTER — NON-APPOINTMENT (OUTPATIENT)
Age: 54
End: 2024-11-09

## 2024-11-09 DIAGNOSIS — N12 TUBULO-INTERSTITIAL NEPHRITIS, NOT SPECIFIED AS ACUTE OR CHRONIC: ICD-10-CM

## 2024-11-09 DIAGNOSIS — Z86.718 PERSONAL HISTORY OF OTHER VENOUS THROMBOSIS AND EMBOLISM: ICD-10-CM

## 2024-11-09 DIAGNOSIS — E78.5 HYPERLIPIDEMIA, UNSPECIFIED: ICD-10-CM

## 2024-11-09 DIAGNOSIS — Z29.9 ENCOUNTER FOR PROPHYLACTIC MEASURES, UNSPECIFIED: ICD-10-CM

## 2024-11-09 DIAGNOSIS — I10 ESSENTIAL (PRIMARY) HYPERTENSION: ICD-10-CM

## 2024-11-09 DIAGNOSIS — E03.9 HYPOTHYROIDISM, UNSPECIFIED: ICD-10-CM

## 2024-11-09 DIAGNOSIS — I25.10 ATHEROSCLEROTIC HEART DISEASE OF NATIVE CORONARY ARTERY WITHOUT ANGINA PECTORIS: ICD-10-CM

## 2024-11-09 DIAGNOSIS — F41.9 ANXIETY DISORDER, UNSPECIFIED: ICD-10-CM

## 2024-11-09 LAB
ALBUMIN SERPL ELPH-MCNC: 3.8 G/DL — SIGNIFICANT CHANGE UP (ref 3.3–5)
ALP SERPL-CCNC: 40 U/L — SIGNIFICANT CHANGE UP (ref 40–120)
ALT FLD-CCNC: 7 U/L — LOW (ref 10–45)
ANION GAP SERPL CALC-SCNC: 14 MMOL/L — SIGNIFICANT CHANGE UP (ref 5–17)
AST SERPL-CCNC: 12 U/L — SIGNIFICANT CHANGE UP (ref 10–40)
BASOPHILS # BLD AUTO: 0 K/UL — SIGNIFICANT CHANGE UP (ref 0–0.2)
BASOPHILS NFR BLD AUTO: 0 % — SIGNIFICANT CHANGE UP (ref 0–2)
BILIRUB SERPL-MCNC: 0.3 MG/DL — SIGNIFICANT CHANGE UP (ref 0.2–1.2)
BUN SERPL-MCNC: 14 MG/DL — SIGNIFICANT CHANGE UP (ref 7–23)
CALCIUM SERPL-MCNC: 9.1 MG/DL — SIGNIFICANT CHANGE UP (ref 8.4–10.5)
CHLORIDE SERPL-SCNC: 105 MMOL/L — SIGNIFICANT CHANGE UP (ref 96–108)
CO2 SERPL-SCNC: 19 MMOL/L — LOW (ref 22–31)
CREAT SERPL-MCNC: 1.54 MG/DL — HIGH (ref 0.5–1.3)
CULTURE RESULTS: NO GROWTH — SIGNIFICANT CHANGE UP
EGFR: 39 ML/MIN/1.73M2 — LOW
EOSINOPHIL # BLD AUTO: 0 K/UL — SIGNIFICANT CHANGE UP (ref 0–0.5)
EOSINOPHIL NFR BLD AUTO: 0 % — SIGNIFICANT CHANGE UP (ref 0–6)
GLUCOSE SERPL-MCNC: 123 MG/DL — HIGH (ref 70–99)
HCT VFR BLD CALC: 35.6 % — SIGNIFICANT CHANGE UP (ref 34.5–45)
HGB BLD-MCNC: 11.8 G/DL — SIGNIFICANT CHANGE UP (ref 11.5–15.5)
IMM GRANULOCYTES NFR BLD AUTO: 0.4 % — SIGNIFICANT CHANGE UP (ref 0–0.9)
LYMPHOCYTES # BLD AUTO: 0.65 K/UL — LOW (ref 1–3.3)
LYMPHOCYTES # BLD AUTO: 11.5 % — LOW (ref 13–44)
MCHC RBC-ENTMCNC: 31.1 PG — SIGNIFICANT CHANGE UP (ref 27–34)
MCHC RBC-ENTMCNC: 33.1 G/DL — SIGNIFICANT CHANGE UP (ref 32–36)
MCV RBC AUTO: 93.9 FL — SIGNIFICANT CHANGE UP (ref 80–100)
MONOCYTES # BLD AUTO: 0.04 K/UL — SIGNIFICANT CHANGE UP (ref 0–0.9)
MONOCYTES NFR BLD AUTO: 0.7 % — LOW (ref 2–14)
NEUTROPHILS # BLD AUTO: 4.92 K/UL — SIGNIFICANT CHANGE UP (ref 1.8–7.4)
NEUTROPHILS NFR BLD AUTO: 87.4 % — HIGH (ref 43–77)
NRBC # BLD: 0 /100 WBCS — SIGNIFICANT CHANGE UP (ref 0–0)
PLATELET # BLD AUTO: 232 K/UL — SIGNIFICANT CHANGE UP (ref 150–400)
POTASSIUM SERPL-MCNC: 3.7 MMOL/L — SIGNIFICANT CHANGE UP (ref 3.5–5.3)
POTASSIUM SERPL-SCNC: 3.7 MMOL/L — SIGNIFICANT CHANGE UP (ref 3.5–5.3)
PROT SERPL-MCNC: 6.6 G/DL — SIGNIFICANT CHANGE UP (ref 6–8.3)
RBC # BLD: 3.79 M/UL — LOW (ref 3.8–5.2)
RBC # FLD: 11.9 % — SIGNIFICANT CHANGE UP (ref 10.3–14.5)
SODIUM SERPL-SCNC: 138 MMOL/L — SIGNIFICANT CHANGE UP (ref 135–145)
SPECIMEN SOURCE: SIGNIFICANT CHANGE UP
WBC # BLD: 5.63 K/UL — SIGNIFICANT CHANGE UP (ref 3.8–10.5)
WBC # FLD AUTO: 5.63 K/UL — SIGNIFICANT CHANGE UP (ref 3.8–10.5)

## 2024-11-09 PROCEDURE — 99233 SBSQ HOSP IP/OBS HIGH 50: CPT

## 2024-11-09 PROCEDURE — 99223 1ST HOSP IP/OBS HIGH 75: CPT

## 2024-11-09 PROCEDURE — 74177 CT ABD & PELVIS W/CONTRAST: CPT | Mod: 26,MC

## 2024-11-09 RX ORDER — NITROGLYCERIN 0.6MG/HR
0 PATCH, TRANSDERMAL 24 HOURS TRANSDERMAL
Refills: 0 | DISCHARGE

## 2024-11-09 RX ORDER — NITROGLYCERIN 0.6MG/HR
1 PATCH, TRANSDERMAL 24 HOURS TRANSDERMAL
Refills: 0 | DISCHARGE

## 2024-11-09 RX ORDER — ACETAMINOPHEN 500 MG
650 TABLET ORAL EVERY 6 HOURS
Refills: 0 | Status: DISCONTINUED | OUTPATIENT
Start: 2024-11-09 | End: 2024-11-10

## 2024-11-09 RX ORDER — ACETAMINOPHEN 500 MG
1000 TABLET ORAL ONCE
Refills: 0 | Status: COMPLETED | OUTPATIENT
Start: 2024-11-09 | End: 2024-11-09

## 2024-11-09 RX ORDER — MELATONIN 5 MG
3 TABLET ORAL AT BEDTIME
Refills: 0 | Status: DISCONTINUED | OUTPATIENT
Start: 2024-11-09 | End: 2024-11-11

## 2024-11-09 RX ORDER — ONDANSETRON HYDROCHLORIDE 2 MG/ML
4 INJECTION, SOLUTION INTRAMUSCULAR; INTRAVENOUS EVERY 8 HOURS
Refills: 0 | Status: DISCONTINUED | OUTPATIENT
Start: 2024-11-09 | End: 2024-11-11

## 2024-11-09 RX ORDER — MAGNESIUM, ALUMINUM HYDROXIDE 200-200 MG
30 TABLET,CHEWABLE ORAL EVERY 4 HOURS
Refills: 0 | Status: DISCONTINUED | OUTPATIENT
Start: 2024-11-09 | End: 2024-11-11

## 2024-11-09 RX ORDER — NITROGLYCERIN 0.6MG/HR
1 PATCH, TRANSDERMAL 24 HOURS TRANSDERMAL DAILY
Refills: 0 | Status: DISCONTINUED | OUTPATIENT
Start: 2024-11-09 | End: 2024-11-11

## 2024-11-09 RX ORDER — CEFTRIAXONE SODIUM 10 G
1000 VIAL (EA) INJECTION EVERY 24 HOURS
Refills: 0 | Status: DISCONTINUED | OUTPATIENT
Start: 2024-11-09 | End: 2024-11-11

## 2024-11-09 RX ADMIN — Medication 400 MILLIGRAM(S): at 03:58

## 2024-11-09 RX ADMIN — Medication 50 MILLIGRAM(S): at 06:19

## 2024-11-09 RX ADMIN — RANOLAZINE 500 MILLIGRAM(S): 500 TABLET, FILM COATED, EXTENDED RELEASE ORAL at 17:05

## 2024-11-09 RX ADMIN — Medication 100 MILLIGRAM(S): at 21:18

## 2024-11-09 RX ADMIN — RANOLAZINE 500 MILLIGRAM(S): 500 TABLET, FILM COATED, EXTENDED RELEASE ORAL at 06:19

## 2024-11-09 RX ADMIN — RIVAROXABAN 20 MILLIGRAM(S): 20 TABLET, FILM COATED ORAL at 11:48

## 2024-11-09 RX ADMIN — Medication 50 MILLIGRAM(S): at 00:14

## 2024-11-09 RX ADMIN — Medication 40 MILLIGRAM(S): at 21:16

## 2024-11-09 RX ADMIN — Medication 400 MILLIGRAM(S): at 12:22

## 2024-11-09 RX ADMIN — Medication 50 MILLIGRAM(S): at 06:20

## 2024-11-09 RX ADMIN — SODIUM CHLORIDE 100 MILLILITER(S): 9 INJECTION, SOLUTION INTRAMUSCULAR; INTRAVENOUS; SUBCUTANEOUS at 17:06

## 2024-11-09 NOTE — ED CDU PROVIDER SUBSEQUENT DAY NOTE - PHYSICAL EXAMINATION
· CONSTITUTIONAL: Well appearing, awake, alert, oriented to person, place, time/situation and in no apparent distress.  · ENMT: Airway patent.  · EYES: Clear bilaterally  · CARDIAC: Normal rate, regular rhythm.  Heart sounds S1, S2  · RESPIRATORY: Breath sounds clear and equal bilaterally.  · GASTROINTESTINAL: Abdomen soft, non-tender, NO CVAT  · MUSCULOSKELETAL: Moving all extremities  · SKIN: No visible rash  · PSYCHIATRIC: normal mood and affect.

## 2024-11-09 NOTE — ED CDU PROVIDER DISPOSITION NOTE - ATTENDING APP SHARED VISIT CONTRIBUTION OF CARE
Orion Kaminski MD, Attending Physician:     Summary: 54-year-old female with history of CAD s/p cath 5/11/2023 with vascular injury to the right radial artery post cath, DVT on DOAC, hypertension, hyperlipidemia, hypothyroidism, silent seizures not on meds, anxiety, MARIAN, frequent UTIs, currently on Bactrim, who presents with urinary frequency, urgency and incontinence with associated chills, nausea, cloudy urine.    In the ED, labs performed which showed no leukocytosis, creatinine elevated to 1.9, no lactate elevation, urinalysis with trace leukocyte esterase and positive nitrites, ultrasound renal bladder showed normal renal ultrasound, and apparent wall thickening of the urinary bladder which may be related to underdistention or cystitis.  Patient was started on IV antibiotics and had premedication started for contrast allergy to obtain CT abdomen after 14-hour prep.    Patient was placed in the CDU for further monitoring, frequent reassessments, Q4 hour vital signs, pain control, CT scan with premedication, IV antibiotics.    While in the CDU, patient obtain CT with no allergic reaction repeat labs showed improvement in creatinine.    Patient was evaluated by me in the morning, and reports improved symptoms, bilateral back/flank pain is minimal.  On examination, patient is well-appearing, in no acute distress. Cardiac examination RRR, lungs CTAB with no W/R/R.  ABD: Abdomen soft, non-tender and non-distended, no rebound, no guarding, no rigidity. No CVA tenderness..  Neurovascularly intact in all 4 extremities.     CT w/ findings concerning for b/l pyelo. The patient will need to be admitted to the hospital for continued evaluation and management.  Discussed with the accepting physician regarding the initial presentation, diagnostic studies, treatments given in the ED, and current plan of care.   The patient was accepted by and endorsed to the medicine team.

## 2024-11-09 NOTE — H&P ADULT - HISTORY OF PRESENT ILLNESS
54 year old female with past medical history PMHx of CAD s/p diagnostic cath 5/11/23 with vascular injury to rt radial artery post cath,  HTN, HLD, hypothyroid, Hx of silent seizures (not currently on Lacosamide), anxiety, recurrent UTIs,  Hx obesity (used to be BMI>36 now lost 50 lbs since April on Zepbound 12.5mg inj weekly), MARIAN and strong FHX of CAD who presents with urinary urgency and incontinence. She denies any flank pain, fever, nausea, vomiting. She describes that she is having associated back pain and abdominal pain. Of note, she was taking a courses of Bactrim and Nitrofurantoin prior to presenting to hospital.

## 2024-11-09 NOTE — H&P ADULT - TIME BILLING
Time-based billing (NON-critical care).     The necessity of the time spent during the encounter on this date of service was due to:     - Ordering, reviewing, and interpreting labs, testing, and imaging.  - Independently obtaining a review of systems and performing a physical exam  - Reviewing prior hospitalization and where necessary, outpatient records.  - Counselling and educating patient and family regarding interpretation of aforementioned items and plan of care

## 2024-11-09 NOTE — H&P ADULT - PROBLEM SELECTOR PLAN 1
Renal US--> thickening of urinary bladder   CT abdomen/pelvis--> Mild heterogeneous enhancement of the bilateral kidneys, may represent bilateral pyelonephritis. No evidence of hydronephrosis, perinephric fluid or collection.  Urinalysis shows + LE/Nitrites, bacteria   Pt previously on Bactrim and Nitrofurantoin  Started CTX in ED, will continue  Followup Urine and Blood cultures

## 2024-11-09 NOTE — ED CDU PROVIDER INITIAL DAY NOTE - DETAILS
Detail Level: Zone
Include Location In Plan?: No
Statement Selected
ivf, pain control, antibiotics, CT scan with premedication (ordered), DW Barata, vitals every 4hours, frequent reevaluations

## 2024-11-09 NOTE — ED CDU PROVIDER SUBSEQUENT DAY NOTE - HISTORY
No interval changes since initial CDU provider note. Pt without new complaint. NAD VSS. Pending CT this morning. - DESHAWN Cage

## 2024-11-09 NOTE — PATIENT PROFILE ADULT - NSPROPTRIGHTBILLOFRIGHTS_GEN_A_NUR
Done for 2/6/20  Through 12/31/20  For 12 visits    Certification # [id number]  
Jayne from [facility] called requesting an AUTHORIZATION for patient's office visit scheduled on 2/17.  [id number]  Diag code I26.99    Fax to [phone]    If you have questions, call Jayne [phone]  
patient

## 2024-11-09 NOTE — ED CDU PROVIDER INITIAL DAY NOTE - CLINICAL SUMMARY MEDICAL DECISION MAKING FREE TEXT BOX
Mckenna Shaffer MD  54 years old Female with PMHx of CAD s/p diagnostic cath 5/11/23 with vascular injury to rt radial artery post cath, DVT, HTN, HLD, Hypothyroidism, Hx of silent seizures (not currently on medications), Anxiety, MARIAN (no CPAP use), frequent UTIs, currently on Bactrim (initially took Cefdinir) for UTI presents to the ER with urinary frequency, urgency, and incontinence. She also endorses chills, nausea, cloudy urine, dysuria. Denies fevers, numbness, vomiting.  ED course: Afebrile. Creatine 1.90. UA with nitrites. Ultrasound showed "Normal renal ultrasound. There is apparent wall thickening of the urinary bladder which may be related to underdistention or cystitis." Started on Ceftriaxone. Pending CT with contrast with premedication. Patient with contrast allergy, reported that she recently had contrast study with 14 hour prep prior to without adverse reaction. Plan for pain control, antibiotics, pain control, and CT in CDU.

## 2024-11-09 NOTE — H&P ADULT - ASSESSMENT
54 year old with history of CAD, DVT, hypothyroid, frequent UTIs presents for urinary incontinence/ab pain--> CT shows bilateral pyelonephritis.

## 2024-11-09 NOTE — ED CDU PROVIDER INITIAL DAY NOTE - ATTENDING APP SHARED VISIT CONTRIBUTION OF CARE
I performed a history and physical exam of the patient and discussed their management with the ACP. I reviewed the ACP's note and agree with the documented findings and plan of care.  Mckenna Shaffer MD  see MDM

## 2024-11-09 NOTE — H&P ADULT - PROBLEM SELECTOR PLAN 2
-- DO NOT REPLY / DO NOT REPLY ALL --  -- Message is from Engagement Center [employer] ([facility]) --    General Patient Message: patient requesting a call back for the rest of her results     Caller Information       Type Contact Phone/Fax    08/04/2023 03:05 PM CDT Phone (Outgoing) Huong Adam (Self) [phone] (M)    08/11/2023 04:22 PM CDT Phone (Incoming) Huong Adam (Self) [phone] (M)        Alternative phone number: none    Can a detailed message be left? Yes    Message Turnaround:     Is it Working Hours? Yes - Working Hours     [place]:    Please give this turnaround time to the caller:   \"This message will be sent to [state Provider's name]. The clinical team will fulfill your request as soon as they review your message.\"                
Called pt re: lab results. Advised pt of positive bacterial vaginosis results. E-Rx for metronidazole 500mg po BID x 7 days sent to [facility]. Patient verbalized understanding. Denies any questions.  
Continue home meds  AC with Xarelto

## 2024-11-09 NOTE — ED CDU PROVIDER DISPOSITION NOTE - NSFOLLOWUPINSTRUCTIONS_ED_ALL_ED_FT
Hydrate.     Please take Tylenol 650mg every 6 hours as needed for pain. For breakthrough/severe pain you can take Oxycodone 5mg every 6 hours. THIS MEDICATION CAN MAKE YOU DROWSY, PLEASE DO NOT DRIVE ON THIS MEDICATION.     We recommend you follow up with your primary care provider within the next 2-3 days, please bring all of your results with you.     Please return to the Emergency Department with new, worsening, or concerning symptoms, such as:  -Worsening or severe pain  -Uncontrollable vomiting    *More detailed information regarding your visit and discharge can be found by reviewing this packet

## 2024-11-09 NOTE — ED CDU PROVIDER SUBSEQUENT DAY NOTE - NS ED ROS FT
· GENITOURINARY: - - -  · Genitourinary [+]: +increased urinary frequency  · ROS STATEMENT: all other ROS negative except as per HPI

## 2024-11-09 NOTE — ED CDU PROVIDER INITIAL DAY NOTE - OBJECTIVE STATEMENT
54 years old Female with PMHx of CAD s/p diagnostic cath 5/11/23 with vascular injury to rt radial artery post cath, DVT, HTN, HLD, Hypothyroidism, Hx of silent seizures (not currently on medications), Anxiety, MARIAN (no CPAP use), frequent UTIs, currently on Bactrim (initially took Cefdinir) for UTI presents to the ER with urinary frequency, urgency, and incontinence. She also endorses chills, nausea, cloudy urine, dysuria. Denies fevers, numbness, vomiting.  ED course: Afebrile. Creatine 1.90. UA with nitrites. Ultrasound showed "Normal renal ultrasound. There is apparent wall thickening of the urinary bladder which may be related to underdistention or cystitis." Started on Ceftriaxone. Pending CT with contrast with premedication. Patient with contrast allergy, reported that she recently had contrast study with 14 hour prep prior to without adverse reaction. Plan for pain control, antibiotics, pain control, and CT in CDU.

## 2024-11-09 NOTE — ED CDU PROVIDER SUBSEQUENT DAY NOTE - PROGRESS NOTE DETAILS
Pt resting comfortably. NAD. No complaints. VSS. Pt returned from CT scan, has hx of anaphylaxis to contrast and has been premedicated. Pt is well appearing with no complaints, no swelling of the lips, tongue or throat, no shortness of breath or difficulty breathing no wheezing. No new or worsening abdominal pain. Pts abdomen is soft and nontender, no CVA ttp. will continue to monitor, await CT read. Pt resting comfortably. NAD. No complaints. VSS. discussed CT scan results c/f BL pyelo, since this is pts 3rd type of abx and KRISTEN, will admit for continued care. -Nicki Huntley PA-C Pt resting comfortably. NAD. No complaints. VSS. Pt returned from CT scan, has hx of anaphylaxis to contrast and has been premedicated. Pt is well appearing with no complaints, no swelling of the lips, tongue or throat, no shortness of breath or difficulty breathing no wheezing. No new or worsening abdominal pain. Pts abdomen is soft and nontender, ?+bl cva ttp, will continue to monitor, await CT read.

## 2024-11-09 NOTE — ED CDU PROVIDER DISPOSITION NOTE - CLINICAL COURSE
54 years old Female with PMHx of CAD s/p diagnostic cath 5/11/23 with vascular injury to rt radial artery post cath, DVT, HTN, HLD, Hypothyroidism, Hx of silent seizures (not currently on medications), Anxiety, MARIAN (no CPAP use), frequent UTIs, currently on Bactrim (initially took Cefdinir) for UTI presents to the ER with urinary frequency, urgency, and incontinence. She also endorses chills, nausea, cloudy urine, dysuria. Denies fevers, numbness, vomiting.  ED course: Afebrile. Creatine 1.90. UA with nitrites. Ultrasound showed "Normal renal ultrasound. There is apparent wall thickening of the urinary bladder which may be related to underdistention or cystitis." Started on Ceftriaxone. Pending CT with contrast with premedication. Patient with contrast allergy, reported that she recently had contrast study with 14 hour prep prior to without adverse reaction. Plan for pain control, antibiotics, pain control, and CT in CDU. 54 years old Female with PMHx of CAD s/p diagnostic cath 5/11/23 with vascular injury to rt radial artery post cath, DVT, HTN, HLD, Hypothyroidism, Hx of silent seizures (not currently on medications), Anxiety, MARIAN (no CPAP use), frequent UTIs, currently on Bactrim (initially took Cefdinir) for UTI presents to the ER with urinary frequency, urgency, and incontinence. She also endorses chills, nausea, cloudy urine, dysuria. Denies fevers, numbness, vomiting.  ED course: Afebrile. Creatine 1.90. UA with nitrites. Ultrasound showed "Normal renal ultrasound. There is apparent wall thickening of the urinary bladder which may be related to underdistention or cystitis." Started on Ceftriaxone. Pending CT with contrast with premedication. Patient with contrast allergy, reported that she recently had contrast study with 14 hour prep prior to without adverse reaction. Plan for pain control, antibiotics, pain control, and CT in CDU.     In CDU, Pt resting comfortably. NAD. No complaints. VSS.  CR improved to 1.5, CT concerning for bilateral pyelonephritis, pt admitted for continued monitoring. D/w Dr. Kaminski

## 2024-11-09 NOTE — H&P ADULT - NSHPPHYSICALEXAM_GEN_ALL_CORE
Vital Signs Last 24 Hrs  T(C): 36.7 (09 Nov 2024 11:55), Max: 37 (08 Nov 2024 17:50)  T(F): 98.1 (09 Nov 2024 11:55), Max: 98.6 (08 Nov 2024 17:50)  HR: 80 (09 Nov 2024 11:55) (72 - 84)  BP: 101/66 (09 Nov 2024 11:55) (97/63 - 136/77)  BP(mean): 74 (09 Nov 2024 06:33) (74 - 78)  RR: 17 (09 Nov 2024 11:55) (16 - 20)  SpO2: 97% (09 Nov 2024 11:55) (95% - 100%)    Parameters below as of 09 Nov 2024 11:55  Patient On (Oxygen Delivery Method): room air        CONSTITUTIONAL: NAD, well-developed, well-groomed  EYES: PERRLA; conjunctiva and sclera clear  ENMT: Moist oral mucosa, no pharyngeal injection or exudates; normal dentition  NECK: Supple, no palpable masses; no thyromegaly  RESPIRATORY: Normal respiratory effort; lungs are clear to auscultation bilaterally  CARDIOVASCULAR: Regular rate and rhythm, normal S1 and S2, no murmur/rub/gallop; No lower extremity edema; Peripheral pulses are 2+ bilaterally  ABDOMEN: Nontender to palpation, normoactive bowel sounds, no rebound/guarding; No hepatosplenomegaly  MUSCULOSKELETAL:  Normal gait; no clubbing or cyanosis of digits; no joint swelling or tenderness to palpation  PSYCH: A+O to person, place, and time; affect appropriate  NEUROLOGY: CN 2-12 are intact and symmetric; no gross sensory deficits  SKIN: No rashes; no palpable lesions

## 2024-11-09 NOTE — ED CDU PROVIDER SUBSEQUENT DAY NOTE - ATTENDING APP SHARED VISIT CONTRIBUTION OF CARE
Orion Kaminski MD, Attending Physician:     Summary: 54-year-old female with history of CAD s/p cath 5/11/2023 with vascular injury to the right radial artery post cath, DVT on DOAC, hypertension, hyperlipidemia, hypothyroidism, silent seizures not on meds, anxiety, MARIAN, frequent UTIs, currently on Bactrim, who presents with urinary frequency, urgency and incontinence with associated chills, nausea, cloudy urine.    In the ED, labs performed which showed no leukocytosis, creatinine elevated to 1.9, no lactate elevation, urinalysis with trace leukocyte esterase and positive nitrites, ultrasound renal bladder showed normal renal ultrasound, and apparent wall thickening of the urinary bladder which may be related to underdistention or cystitis.  Patient was started on IV antibiotics and had premedication started for contrast allergy to obtain CT abdomen after 14-hour prep.    Patient was placed in the CDU for further monitoring, frequent reassessments, Q4 hour vital signs, pain control, CT scan with premedication, IV antibiotics.    While in the CDU, patient obtain CT with no allergic reaction repeat labs showed improvement in creatinine.    Patient was evaluated by me in the morning, and reports improved symptoms, bilateral back/flank pain is minimal.  On examination, patient is well-appearing, in no acute distress. Cardiac examination RRR, lungs CTAB with no W/R/R.  ABD: Abdomen soft, non-tender and non-distended, no rebound, no guarding, no rigidity. No CVA tenderness..  Neurovascularly intact in all 4 extremities.     Pending CT abdomen results Orion Kaminski MD, Attending Physician:     Summary: 54-year-old female with history of CAD s/p cath 5/11/2023 with vascular injury to the right radial artery post cath, DVT on DOAC, hypertension, hyperlipidemia, hypothyroidism, silent seizures not on meds, anxiety, MARAIN, frequent UTIs, currently on Bactrim, who presents with urinary frequency, urgency and incontinence with associated chills, nausea, cloudy urine.    In the ED, labs performed which showed no leukocytosis, creatinine elevated to 1.9, no lactate elevation, urinalysis with trace leukocyte esterase and positive nitrites, ultrasound renal bladder showed normal renal ultrasound, and apparent wall thickening of the urinary bladder which may be related to underdistention or cystitis.  Patient was started on IV antibiotics and had premedication started for contrast allergy to obtain CT abdomen after 14-hour prep.    Patient was placed in the CDU for further monitoring, frequent reassessments, Q4 hour vital signs, pain control, CT scan with premedication, IV antibiotics.    While in the CDU, patient obtain CT with no allergic reaction repeat labs showed improvement in creatinine.    Patient was evaluated by me in the morning, and reports improved symptoms, bilateral back/flank pain is minimal.  On examination, patient is well-appearing, in no acute distress. Cardiac examination RRR, lungs CTAB with no W/R/R.  ABD: Abdomen soft, non-tender and non-distended, no rebound, no guarding, no rigidity. No CVA tenderness..  Neurovascularly intact in all 4 extremities.     CT w/ findings concerning for b/l pyelo. The patient will need to be admitted to the hospital for continued evaluation and management.  Discussed with the accepting physician regarding the initial presentation, diagnostic studies, treatments given in the ED, and current plan of care.   The patient was accepted by and endorsed to the medicine team.

## 2024-11-10 DIAGNOSIS — N17.9 ACUTE KIDNEY FAILURE, UNSPECIFIED: ICD-10-CM

## 2024-11-10 LAB
A1C WITH ESTIMATED AVERAGE GLUCOSE RESULT: 4.9 % — SIGNIFICANT CHANGE UP (ref 4–5.6)
ALBUMIN SERPL ELPH-MCNC: 3.4 G/DL — SIGNIFICANT CHANGE UP (ref 3.3–5)
ALP SERPL-CCNC: 35 U/L — LOW (ref 40–120)
ALT FLD-CCNC: 7 U/L — LOW (ref 10–45)
ANION GAP SERPL CALC-SCNC: 12 MMOL/L — SIGNIFICANT CHANGE UP (ref 5–17)
AST SERPL-CCNC: 9 U/L — LOW (ref 10–40)
BILIRUB SERPL-MCNC: 0.2 MG/DL — SIGNIFICANT CHANGE UP (ref 0.2–1.2)
BUN SERPL-MCNC: 21 MG/DL — SIGNIFICANT CHANGE UP (ref 7–23)
CALCIUM SERPL-MCNC: 8.5 MG/DL — SIGNIFICANT CHANGE UP (ref 8.4–10.5)
CHLORIDE SERPL-SCNC: 106 MMOL/L — SIGNIFICANT CHANGE UP (ref 96–108)
CO2 SERPL-SCNC: 20 MMOL/L — LOW (ref 22–31)
CREAT SERPL-MCNC: 1.4 MG/DL — HIGH (ref 0.5–1.3)
EGFR: 44 ML/MIN/1.73M2 — LOW
ESTIMATED AVERAGE GLUCOSE: 94 MG/DL — SIGNIFICANT CHANGE UP (ref 68–114)
GLUCOSE SERPL-MCNC: 102 MG/DL — HIGH (ref 70–99)
HCT VFR BLD CALC: 32.8 % — LOW (ref 34.5–45)
HGB BLD-MCNC: 10.8 G/DL — LOW (ref 11.5–15.5)
MAGNESIUM SERPL-MCNC: 2.2 MG/DL — SIGNIFICANT CHANGE UP (ref 1.6–2.6)
MCHC RBC-ENTMCNC: 32.2 PG — SIGNIFICANT CHANGE UP (ref 27–34)
MCHC RBC-ENTMCNC: 32.9 G/DL — SIGNIFICANT CHANGE UP (ref 32–36)
MCV RBC AUTO: 97.9 FL — SIGNIFICANT CHANGE UP (ref 80–100)
NRBC # BLD: 0 /100 WBCS — SIGNIFICANT CHANGE UP (ref 0–0)
PHOSPHATE SERPL-MCNC: 3.2 MG/DL — SIGNIFICANT CHANGE UP (ref 2.5–4.5)
PLATELET # BLD AUTO: 249 K/UL — SIGNIFICANT CHANGE UP (ref 150–400)
POTASSIUM SERPL-MCNC: 4 MMOL/L — SIGNIFICANT CHANGE UP (ref 3.5–5.3)
POTASSIUM SERPL-SCNC: 4 MMOL/L — SIGNIFICANT CHANGE UP (ref 3.5–5.3)
PROT SERPL-MCNC: 5.8 G/DL — LOW (ref 6–8.3)
RBC # BLD: 3.35 M/UL — LOW (ref 3.8–5.2)
RBC # FLD: 12.2 % — SIGNIFICANT CHANGE UP (ref 10.3–14.5)
SODIUM SERPL-SCNC: 138 MMOL/L — SIGNIFICANT CHANGE UP (ref 135–145)
TSH SERPL-MCNC: 0.23 UIU/ML — LOW (ref 0.27–4.2)
WBC # BLD: 12.52 K/UL — HIGH (ref 3.8–10.5)
WBC # FLD AUTO: 12.52 K/UL — HIGH (ref 3.8–10.5)

## 2024-11-10 PROCEDURE — 99233 SBSQ HOSP IP/OBS HIGH 50: CPT | Mod: GC

## 2024-11-10 RX ORDER — ACETAMINOPHEN 500 MG
1000 TABLET ORAL ONCE
Refills: 0 | Status: COMPLETED | OUTPATIENT
Start: 2024-11-10 | End: 2024-11-10

## 2024-11-10 RX ORDER — ACETAMINOPHEN 500 MG
675 TABLET ORAL EVERY 6 HOURS
Refills: 0 | Status: DISCONTINUED | OUTPATIENT
Start: 2024-11-10 | End: 2024-11-11

## 2024-11-10 RX ADMIN — Medication 100 MILLIGRAM(S): at 21:09

## 2024-11-10 RX ADMIN — RIVAROXABAN 20 MILLIGRAM(S): 20 TABLET, FILM COATED ORAL at 09:25

## 2024-11-10 RX ADMIN — SODIUM CHLORIDE 100 MILLILITER(S): 9 INJECTION, SOLUTION INTRAMUSCULAR; INTRAVENOUS; SUBCUTANEOUS at 09:25

## 2024-11-10 RX ADMIN — Medication 1 PATCH: at 07:22

## 2024-11-10 RX ADMIN — Medication 1 PATCH: at 18:10

## 2024-11-10 RX ADMIN — Medication 400 MILLIGRAM(S): at 14:20

## 2024-11-10 RX ADMIN — RANOLAZINE 500 MILLIGRAM(S): 500 TABLET, FILM COATED, EXTENDED RELEASE ORAL at 17:17

## 2024-11-10 RX ADMIN — Medication 40 MILLIGRAM(S): at 21:10

## 2024-11-10 RX ADMIN — Medication 1 PATCH: at 06:29

## 2024-11-10 RX ADMIN — RANOLAZINE 500 MILLIGRAM(S): 500 TABLET, FILM COATED, EXTENDED RELEASE ORAL at 06:28

## 2024-11-10 RX ADMIN — Medication 100 MICROGRAM(S): at 05:29

## 2024-11-10 RX ADMIN — Medication 1000 MILLIGRAM(S): at 15:00

## 2024-11-10 NOTE — PROGRESS NOTE ADULT - PROBLEM SELECTOR PLAN 1
Renal US--> thickening of urinary bladder   CT abdomen/pelvis--> Mild heterogeneous enhancement of the bilateral kidneys, may represent bilateral pyelonephritis. No evidence of hydronephrosis, perinephric fluid or collection.  Urinalysis shows + LE/Nitrites, bacteria   Pt previously on Bactrim and Nitrofurantoin    - c/w CTX  - Urine Cx NGTD

## 2024-11-10 NOTE — DISCHARGE NOTE PROVIDER - CARE PROVIDER_API CALL
Aneta Resendiz  Urology  [address] [place] [id number]  Phone: [phone]  Fax: [phone]  Established Patient  Follow Up Time:

## 2024-11-10 NOTE — PROGRESS NOTE ADULT - SUBJECTIVE AND OBJECTIVE BOX
************************  Fadi Espinoza MD  Internal Medicine PGY-2  ************************    Patient is a 54y old  Female who presents with a chief complaint of Pyelonephritis (09 Nov 2024 13:46)    Overnight no events, this morning patient with no acute complaints. Patient urinating well with BM(s). Denies CP, SOB, fevers/chills, N/V, or abdominal pain. Patient reminded of ongoing careplan.    MEDICATIONS  (STANDING):  amLODIPine   Tablet 5 milliGRAM(s) Oral <User Schedule>  cefTRIAXone   IVPB 1000 milliGRAM(s) IV Intermittent every 24 hours  levothyroxine 100 MICROGram(s) Oral daily  nitroglycerin    Patch 0.2 mG/Hr(s) 1 patch Transdermal daily  ranolazine 500 milliGRAM(s) Oral every 12 hours  rivaroxaban 20 milliGRAM(s) Oral <User Schedule>  rosuvastatin 40 milliGRAM(s) Oral at bedtime  sodium chloride 0.9%. 1000 milliLiter(s) (100 mL/Hr) IV Continuous <Continuous>    MEDICATIONS  (PRN):  acetaminophen     Tablet .. 650 milliGRAM(s) Oral every 6 hours PRN Temp greater or equal to 38C (100.4F), Mild Pain (1 - 3)  aluminum hydroxide/magnesium hydroxide/simethicone Suspension 30 milliLiter(s) Oral every 4 hours PRN Dyspepsia  melatonin 3 milliGRAM(s) Oral at bedtime PRN Insomnia  ondansetron Injectable 4 milliGRAM(s) IV Push every 8 hours PRN Nausea and/or Vomiting  oxycodone    5 mG/acetaminophen 325 mG 1 Tablet(s) Oral every 6 hours PRN Severe Pain (7 - 10)      CAPILLARY BLOOD GLUCOSE        I&O's Summary    09 Nov 2024 07:01  -  10 Nov 2024 07:00  --------------------------------------------------------  IN: 1350 mL / OUT: 0 mL / NET: 1350 mL        PHYSICAL EXAM:  Vital Signs Last 24 Hrs  T(C): 36.6 (10 Nov 2024 05:38), Max: 36.7 (09 Nov 2024 11:55)  T(F): 97.9 (10 Nov 2024 05:38), Max: 98.1 (09 Nov 2024 11:55)  HR: 77 (10 Nov 2024 05:38) (77 - 90)  BP: 102/54 (10 Nov 2024 05:38) (101/66 - 104/63)  BP(mean): --  RR: 18 (10 Nov 2024 05:38) (17 - 18)  SpO2: 98% (10 Nov 2024 05:38) (97% - 98%)    Parameters below as of 10 Nov 2024 05:38  Patient On (Oxygen Delivery Method): room air        PHYSICAL EXAM:  GENERAL: NAD, lying in bed comfortably  HEENT: NC/AT  NECK: Supple, No JVD  CHEST/LUNG: CTAB, no increased WOB  HEART: RRR, no m/r/g  ABDOMEN: soft, NT, ND, BS+  EXTREMITIES:  2+ peripheral pulses, no edema  NERVOUS SYSTEM:  A&Ox3  MSK: FROM all 4 extremities, full and equal strength  SKIN: No rashes or lesions    LABS:                        10.8   12.52 )-----------( 249      ( 10 Nov 2024 07:02 )             32.8     11-10    138  |  106  |  21  ----------------------------<  102[H]  4.0   |  20[L]  |  1.40[H]    Ca    8.5      10 Nov 2024 07:02  Phos  3.2     11-10  Mg     2.2     11-10    TPro  5.8[L]  /  Alb  3.4  /  TBili  0.2  /  DBili  x   /  AST  9[L]  /  ALT  7[L]  /  AlkPhos  35[L]  11-10          Urinalysis Basic - ( 10 Nov 2024 07:02 )    Color: x / Appearance: x / SG: x / pH: x  Gluc: 102 mg/dL / Ketone: x  / Bili: x / Urobili: x   Blood: x / Protein: x / Nitrite: x   Leuk Esterase: x / RBC: x / WBC x   Sq Epi: x / Non Sq Epi: x / Bacteria: x        Culture - Urine (collected 08 Nov 2024 18:21)  Source: Clean Catch Clean Catch (Midstream)  Final Report (09 Nov 2024 22:12):    No growth    Culture - Blood (collected 08 Nov 2024 18:15)  Source: .Blood BLOOD  Preliminary Report (10 Nov 2024 01:02):    No growth at 24 hours    Culture - Blood (collected 08 Nov 2024 17:50)  Source: .Blood BLOOD  Preliminary Report (10 Nov 2024 01:02):    No growth at 24 hours

## 2024-11-10 NOTE — DISCHARGE NOTE PROVIDER - HOSPITAL COURSE
HPI:  54 year old female with past medical history PMHx of CAD s/p diagnostic cath 5/11/23 with vascular injury to rt radial artery post cath,  HTN, HLD, hypothyroid, Hx of silent seizures (not currently on Lacosamide), anxiety, recurrent UTIs,  Hx obesity (used to be BMI>36 now lost 50 lbs since April on Zepbound 12.5mg inj weekly), MARIAN and strong FHX of CAD who presents with urinary urgency and incontinence. She denies any flank pain, fever, nausea, vomiting. She describes that she is having associated back pain and abdominal pain. Of note, she was taking a courses of Bactrim and Nitrofurantoin prior to presenting to hospital.  (09 Nov 2024 13:46)    Hospital Course: CT in ED c/w pyelonephritis, admitted for IV CTX. KRISTEN on initial labs, improved through course. Developed leukocytosis on hospital day 2, improved prior to dc. Transitioned from CTX to fluoroquinolone after initial inpatient cultures negative, and outpatient culture showed E. coli.    The patient is afebrile, hemodynamically stable and medically optimized for discharge to home with follow up with PCP, urologist. On day of discharge, patient is clinically stable with no new exam findings or acute symptoms compared to prior. The patient was seen by the attending physician on the date of discharge and deemed stable and acceptable for discharge. The patient's chronic medical conditions were treated accordingly per the patient's home medication regimen. The patient's medication reconciliation (with changes made to chronic medications), follow up appointments, discharge orders, instructions, and significant lab and diagnostic studies are as noted.    Important Medication Changes and Reason:  ABX + course length    Active or Pending Issues Requiring Follow-up:  uro f/u for frequent UTIs    Advanced Directives:   [X] Full code  [ ] DNR  [ ] Hospice       HPI:  54 year old female with past medical history PMHx of CAD s/p diagnostic cath 5/11/23 with vascular injury to rt radial artery post cath,  HTN, HLD, hypothyroid, Hx of silent seizures (not currently on Lacosamide), anxiety, recurrent UTIs,  Hx obesity (used to be BMI>36 now lost 50 lbs since April on Zepbound 12.5mg inj weekly), MARIAN and strong FHX of CAD who presents with urinary urgency and incontinence. She denies any flank pain, fever, nausea, vomiting. She describes that she is having associated back pain and abdominal pain. Of note, she was taking a courses of Bactrim and Nitrofurantoin prior to presenting to hospital.  (09 Nov 2024 13:46)    Hospital Course: CT in ED c/w pyelonephritis, admitted for IV CTX. KRISTEN on initial labs, improved through course. Developed leukocytosis on hospital day 2, improved prior to dc. Initial inpatient cultures negative, and outpatient culture showed E. coli. Discharged on cefpodoxime 200mg BID x8 days to complete a 10 day course.    Of note, TSH 0.23. Will need outpatient PCP follow up and repeat TSH.     The patient is afebrile, hemodynamically stable and medically optimized for discharge to home with follow up with PCP, urologist. On day of discharge, patient is clinically stable with no new exam findings or acute symptoms compared to prior. The patient was seen by the attending physician on the date of discharge and deemed stable and acceptable for discharge. The patient's chronic medical conditions were treated accordingly per the patient's home medication regimen. The patient's medication reconciliation (with changes made to chronic medications), follow up appointments, discharge orders, instructions, and significant lab and diagnostic studies are as noted.    Important Medication Changes and Reason:  1) Cefpodoxime 200mg BID x8 days    Active or Pending Issues Requiring Follow-up:  1) uro f/u for frequent UTIs  2) PCP f/u for TSH    Advanced Directives:   [X] Full code  [ ] DNR  [ ] Hospice

## 2024-11-10 NOTE — DISCHARGE NOTE PROVIDER - NSDCCPCAREPLAN_GEN_ALL_CORE_FT
PRINCIPAL DISCHARGE DIAGNOSIS  Diagnosis: Pyelonephritis  Assessment and Plan of Treatment: Diagnosis:  Pyelonephritis, a kidney infection, with a history of frequent urinary tract infections (UTIs).  Medications:  Oral Antibiotics:  [Antibiotic Name]  Dose: [Dose] mg  Frequency: [e.g., twice daily] for [Duration] days  Instructions: Take as prescribed, finish the entire course even if you feel better.  Note: Take with food to minimize stomach upset.  If you miss a dose, take it as soon as remembered unless it’s close to the next dose.  Follow-Up Care:  Urology Referral:  Due to your history of recurrent UTIs, a follow-up appointment with Urology is recommended to evaluate underlying causes and discuss preventive measures.  Monitor Symptoms:  Watch for any recurrence of symptoms, including fever, chills, back pain, or changes in urination (e.g., pain, frequency, or blood in urine).  If these occur, contact your healthcare provider immediately.  Preventive Measures:  Hydration:  Drink plenty of fluids (water is best) to help flush bacteria from your urinary system.  Personal Hygiene:  Wipe front to back after using the restroom to prevent bacteria from spreading to the urinary tract.  Urination Habits:  Avoid holding urine for long periods. Urinate regularly, especially after sexual activity.  When to Seek Immediate Medical Attention:  Worsening Symptoms:  If you develop high fever, chills, severe back pain, or blood in the urine.  Signs of Sepsis:  Confusion, difficulty breathing, rapid heart rate, or fainting.      SECONDARY DISCHARGE DIAGNOSES  Diagnosis: KRISTEN (acute kidney injury)  Assessment and Plan of Treatment:      PRINCIPAL DISCHARGE DIAGNOSIS  Diagnosis: Pyelonephritis  Assessment and Plan of Treatment: Diagnosis:  Pyelonephritis, a kidney infection, with a history of frequent urinary tract infections (UTIs).  Medications:  Oral Antibiotics:  Cefpodoxime 200mg BID x6 days starting on 11/12  Instructions: Take as prescribed, finish the entire course even if you feel better.  Note: Take with food to minimize stomach upset.  If you miss a dose, take it as soon as remembered unless it’s close to the next dose.  Follow-Up Care:  Urology Referral:  Due to your history of recurrent UTIs, a follow-up appointment with Urology is recommended to evaluate underlying causes and discuss preventive measures.  Monitor Symptoms:  Watch for any recurrence of symptoms, including fever, chills, back pain, or changes in urination (e.g., pain, frequency, or blood in urine).  If these occur, contact your healthcare provider immediately.  Preventive Measures:  Hydration:  Drink plenty of fluids (water is best) to help flush bacteria from your urinary system.  Personal Hygiene:  Wipe front to back after using the restroom to prevent bacteria from spreading to the urinary tract.  Urination Habits:  Avoid holding urine for long periods. Urinate regularly, especially after sexual activity.  When to Seek Immediate Medical Attention:  Worsening Symptoms:  If you develop high fever, chills, severe back pain, or blood in the urine.  Signs of Sepsis:  Confusion, difficulty breathing, rapid heart rate, or fainting.      SECONDARY DISCHARGE DIAGNOSES  Diagnosis: KRISTEN (acute kidney injury)  Assessment and Plan of Treatment:

## 2024-11-10 NOTE — DISCHARGE NOTE PROVIDER - NSDCFUADDAPPT_GEN_ALL_CORE_FT
APPTS ARE READY TO BE MADE: [X] YES    Best Family or Patient Contact (if needed):    Additional Information about above appointments (if needed):    1: urology within 2 weeks  2:   3:     Other comments or requests:    APPTS ARE READY TO BE MADE: [X] YES    Best Family or Patient Contact (if needed):    Additional Information about above appointments (if needed):    1: urology within 2 weeks  2:   3:     Other comments or requests:     Patient advised they did not want to proceed with scheduling appointments with the providers on their referrals. They will coordinate care on their own.

## 2024-11-10 NOTE — DISCHARGE NOTE PROVIDER - NSDCMRMEDTOKEN_GEN_ALL_CORE_FT
amLODIPine 5 mg oral tablet: 1 tab(s) orally 2 times a day  levothyroxine 100 mcg (0.1 mg) oral tablet: 1 tab(s) orally once a day  nitroglycerin 0.2 mg/hr transdermal film, extended release: 1 patch transdermally once a day as needed for  chest pain  ranolazine 500 mg oral granule, extended release: 500 milligram(s) orally  rosuvastatin 40 mg oral tablet: 1 tab(s) orally  Xarelto 20 mg oral tablet: 1 tab(s) orally   amLODIPine 5 mg oral tablet: 1 tab(s) orally 2 times a day  cefpodoxime 200 mg oral tablet: 1 tab(s) orally 2 times a day Start 11/12/24  levothyroxine 100 mcg (0.1 mg) oral tablet: 1 tab(s) orally once a day  nitroglycerin 0.2 mg/hr transdermal film, extended release: 1 patch transdermally once a day as needed for  chest pain  ranolazine 500 mg oral tablet, extended release: 1 tab(s) orally every 12 hours  rosuvastatin 40 mg oral tablet: 1 tab(s) orally once a day (at bedtime)  Xarelto 20 mg oral tablet: 1 tab(s) orally once a day

## 2024-11-10 NOTE — DISCHARGE NOTE PROVIDER - NSDCFUSCHEDAPPT_GEN_ALL_CORE_FT
Aaron Dorsey  [facility]  [facility]  Scheduled Appointment: 11/11/2024    Kimi Villarreal  [facility]  ENDOCRIN [address]  Scheduled Appointment: 11/13/2024    Aneta Resendiz  [facility]  UROLOGY [address]  Scheduled Appointment: 11/15/2024    [facility]  NEUROLOGY [address] [facility]  Scheduled Appointment: 12/10/2024    Erwin Valenzuela  [facility]  CARDIOLOGY [address]   Scheduled Appointment: 12/13/2024    Yvonne Jasso  [facility]  NEUROLOGY [address] [facility]  Scheduled Appointment: 01/14/2025    [facility]  VASCULAR [address]  Scheduled Appointment: 01/21/2025    Martinez Cueva  [facility]  VASCULAR [address]  Scheduled Appointment: 01/21/2025     Kimi Villarreal  [facility]  ENDOCRIN [address]  Scheduled Appointment: 11/13/2024    Aneta Resendiz  [facility]  UROLOGY [address]  Scheduled Appointment: 11/15/2024    [facility]  NEUROLOGY [address] [facility]  Scheduled Appointment: 12/10/2024    Erwin Valenzuela  [facility]  CARDIOLOGY [address]   Scheduled Appointment: 12/13/2024    Yvonne Jasso  [facility]  NEUROLOGY [address] [facility]  Scheduled Appointment: 01/14/2025    [facility]  VASCULAR [address]  Scheduled Appointment: 01/21/2025    Martinez Cueva  [facility]  VASCULAR [address]  Scheduled Appointment: 01/21/2025     Mingo Longo  [facility]  INTMED [address]  Scheduled Appointment: 11/22/2024    [facility]  NEUROLOGY [address] [facility]  Scheduled Appointment: 12/10/2024    Erwin Valenzuela  [facility]  CARDIOLOGY [address]   Scheduled Appointment: 12/13/2024    Yvonne Jasso  [facility]  NEUROLOGY [address] [facility]  Scheduled Appointment: 01/14/2025    [facility]  VASCULAR [address]  Scheduled Appointment: 01/21/2025    Martinez Cueva  [facility]  VASCULAR [address]  Scheduled Appointment: 01/21/2025

## 2024-11-10 NOTE — PROGRESS NOTE ADULT - ATTENDING COMMENTS
54 year old female with past medical history PMHx of CAD s/p diagnostic cath 5/11/23 with vascular injury to rt radial artery post cath,  HTN, HLD, hypothyroid, Hx of silent seizures (not currently on Lacosamide), anxiety, recurrent UTIs,  Hx obesity (used to be BMI>36 now lost 50 lbs since April on Zepbound 12.5mg inj weekly), MARIAN and strong FHX of CAD who presents with urinary urgency and incontinence.     #Acute pyelonephritis  - Outpt cx reportedly growing E. coli. Previous urince cx with klebsiella.   - Failed outpatient Bactrim and macrobid  - Continue IV Rocephin. Consider transition to PO tomorrow if improving.     #KRISTEN  - Improving, continue IVF  - Avoid nephrotoxins, monitor UOP    - Reviewing, and interpreting labs and testing.  - Independently obtaining a review of systems and performing a physical exam  - Reviewing consultant documentation/recommendations in addition to discussing plan of care with consultants.  - Counselling and educating patient and family regarding interpretation of aforementioned items and plan of care.  - This excludes time spent teaching residents/medical students    Time Spent: 54 minutes

## 2024-11-11 ENCOUNTER — APPOINTMENT (OUTPATIENT)
Dept: HEMATOLOGY ONCOLOGY | Facility: CLINIC | Age: 54
End: 2024-11-11

## 2024-11-11 ENCOUNTER — TRANSCRIPTION ENCOUNTER (OUTPATIENT)
Age: 54
End: 2024-11-11

## 2024-11-11 VITALS
RESPIRATION RATE: 18 BRPM | HEART RATE: 63 BPM | OXYGEN SATURATION: 97 % | DIASTOLIC BLOOD PRESSURE: 56 MMHG | TEMPERATURE: 98 F | SYSTOLIC BLOOD PRESSURE: 106 MMHG

## 2024-11-11 LAB
ALBUMIN SERPL ELPH-MCNC: 3.3 G/DL — SIGNIFICANT CHANGE UP (ref 3.3–5)
ALP SERPL-CCNC: 32 U/L — LOW (ref 40–120)
ALT FLD-CCNC: 7 U/L — LOW (ref 10–45)
ANION GAP SERPL CALC-SCNC: 9 MMOL/L — SIGNIFICANT CHANGE UP (ref 5–17)
AST SERPL-CCNC: 8 U/L — LOW (ref 10–40)
BASOPHILS # BLD AUTO: 0.04 K/UL — SIGNIFICANT CHANGE UP (ref 0–0.2)
BASOPHILS NFR BLD AUTO: 0.6 % — SIGNIFICANT CHANGE UP (ref 0–2)
BILIRUB SERPL-MCNC: 0.2 MG/DL — SIGNIFICANT CHANGE UP (ref 0.2–1.2)
BUN SERPL-MCNC: 13 MG/DL — SIGNIFICANT CHANGE UP (ref 7–23)
CALCIUM SERPL-MCNC: 8.6 MG/DL — SIGNIFICANT CHANGE UP (ref 8.4–10.5)
CHLORIDE SERPL-SCNC: 109 MMOL/L — HIGH (ref 96–108)
CO2 SERPL-SCNC: 23 MMOL/L — SIGNIFICANT CHANGE UP (ref 22–31)
CREAT SERPL-MCNC: 1.07 MG/DL — SIGNIFICANT CHANGE UP (ref 0.5–1.3)
EGFR: 61 ML/MIN/1.73M2 — SIGNIFICANT CHANGE UP
EOSINOPHIL # BLD AUTO: 0.03 K/UL — SIGNIFICANT CHANGE UP (ref 0–0.5)
EOSINOPHIL NFR BLD AUTO: 0.5 % — SIGNIFICANT CHANGE UP (ref 0–6)
GLUCOSE SERPL-MCNC: 75 MG/DL — SIGNIFICANT CHANGE UP (ref 70–99)
HCT VFR BLD CALC: 33.2 % — LOW (ref 34.5–45)
HGB BLD-MCNC: 10.9 G/DL — LOW (ref 11.5–15.5)
IMM GRANULOCYTES NFR BLD AUTO: 0.2 % — SIGNIFICANT CHANGE UP (ref 0–0.9)
LYMPHOCYTES # BLD AUTO: 3.58 K/UL — HIGH (ref 1–3.3)
LYMPHOCYTES # BLD AUTO: 56.6 % — HIGH (ref 13–44)
MAGNESIUM SERPL-MCNC: 1.8 MG/DL — SIGNIFICANT CHANGE UP (ref 1.6–2.6)
MCHC RBC-ENTMCNC: 32.2 PG — SIGNIFICANT CHANGE UP (ref 27–34)
MCHC RBC-ENTMCNC: 32.8 G/DL — SIGNIFICANT CHANGE UP (ref 32–36)
MCV RBC AUTO: 98.2 FL — SIGNIFICANT CHANGE UP (ref 80–100)
MONOCYTES # BLD AUTO: 0.64 K/UL — SIGNIFICANT CHANGE UP (ref 0–0.9)
MONOCYTES NFR BLD AUTO: 10.1 % — SIGNIFICANT CHANGE UP (ref 2–14)
NEUTROPHILS # BLD AUTO: 2.02 K/UL — SIGNIFICANT CHANGE UP (ref 1.8–7.4)
NEUTROPHILS NFR BLD AUTO: 32 % — LOW (ref 43–77)
NRBC # BLD: 0 /100 WBCS — SIGNIFICANT CHANGE UP (ref 0–0)
PHOSPHATE SERPL-MCNC: 2.3 MG/DL — LOW (ref 2.5–4.5)
PLATELET # BLD AUTO: 257 K/UL — SIGNIFICANT CHANGE UP (ref 150–400)
POTASSIUM SERPL-MCNC: 3.5 MMOL/L — SIGNIFICANT CHANGE UP (ref 3.5–5.3)
POTASSIUM SERPL-SCNC: 3.5 MMOL/L — SIGNIFICANT CHANGE UP (ref 3.5–5.3)
PROT SERPL-MCNC: 5.8 G/DL — LOW (ref 6–8.3)
RBC # BLD: 3.38 M/UL — LOW (ref 3.8–5.2)
RBC # FLD: 12.3 % — SIGNIFICANT CHANGE UP (ref 10.3–14.5)
SODIUM SERPL-SCNC: 141 MMOL/L — SIGNIFICANT CHANGE UP (ref 135–145)
WBC # BLD: 6.32 K/UL — SIGNIFICANT CHANGE UP (ref 3.8–10.5)
WBC # FLD AUTO: 6.32 K/UL — SIGNIFICANT CHANGE UP (ref 3.8–10.5)

## 2024-11-11 PROCEDURE — 96375 TX/PRO/DX INJ NEW DRUG ADDON: CPT

## 2024-11-11 PROCEDURE — 85027 COMPLETE CBC AUTOMATED: CPT

## 2024-11-11 PROCEDURE — 84295 ASSAY OF SERUM SODIUM: CPT

## 2024-11-11 PROCEDURE — 83735 ASSAY OF MAGNESIUM: CPT

## 2024-11-11 PROCEDURE — 76770 US EXAM ABDO BACK WALL COMP: CPT

## 2024-11-11 PROCEDURE — 74177 CT ABD & PELVIS W/CONTRAST: CPT | Mod: MC

## 2024-11-11 PROCEDURE — 99285 EMERGENCY DEPT VISIT HI MDM: CPT | Mod: 25

## 2024-11-11 PROCEDURE — 99239 HOSP IP/OBS DSCHRG MGMT >30: CPT | Mod: GC

## 2024-11-11 PROCEDURE — 87086 URINE CULTURE/COLONY COUNT: CPT

## 2024-11-11 PROCEDURE — 83690 ASSAY OF LIPASE: CPT

## 2024-11-11 PROCEDURE — 36415 COLL VENOUS BLD VENIPUNCTURE: CPT

## 2024-11-11 PROCEDURE — 83036 HEMOGLOBIN GLYCOSYLATED A1C: CPT

## 2024-11-11 PROCEDURE — 82435 ASSAY OF BLOOD CHLORIDE: CPT

## 2024-11-11 PROCEDURE — 87040 BLOOD CULTURE FOR BACTERIA: CPT

## 2024-11-11 PROCEDURE — 85014 HEMATOCRIT: CPT

## 2024-11-11 PROCEDURE — 82330 ASSAY OF CALCIUM: CPT

## 2024-11-11 PROCEDURE — 82803 BLOOD GASES ANY COMBINATION: CPT

## 2024-11-11 PROCEDURE — 80053 COMPREHEN METABOLIC PANEL: CPT

## 2024-11-11 PROCEDURE — 85025 COMPLETE CBC W/AUTO DIFF WBC: CPT

## 2024-11-11 PROCEDURE — 83605 ASSAY OF LACTIC ACID: CPT

## 2024-11-11 PROCEDURE — 82947 ASSAY GLUCOSE BLOOD QUANT: CPT

## 2024-11-11 PROCEDURE — 84100 ASSAY OF PHOSPHORUS: CPT

## 2024-11-11 PROCEDURE — 84132 ASSAY OF SERUM POTASSIUM: CPT

## 2024-11-11 PROCEDURE — G0378: CPT

## 2024-11-11 PROCEDURE — 84443 ASSAY THYROID STIM HORMONE: CPT

## 2024-11-11 PROCEDURE — 85018 HEMOGLOBIN: CPT

## 2024-11-11 PROCEDURE — 96376 TX/PRO/DX INJ SAME DRUG ADON: CPT

## 2024-11-11 PROCEDURE — 96374 THER/PROPH/DIAG INJ IV PUSH: CPT

## 2024-11-11 PROCEDURE — 81001 URINALYSIS AUTO W/SCOPE: CPT

## 2024-11-11 RX ORDER — RIVAROXABAN 20 MG/1
1 TABLET, FILM COATED ORAL
Qty: 0 | Refills: 0 | DISCHARGE

## 2024-11-11 RX ORDER — CEFTRIAXONE SODIUM 10 G
1000 VIAL (EA) INJECTION ONCE
Refills: 0 | Status: COMPLETED | OUTPATIENT
Start: 2024-11-11 | End: 2024-11-11

## 2024-11-11 RX ORDER — CEFPODOXIME PROXETIL 200 MG/1
1 TABLET, FILM COATED ORAL
Qty: 12 | Refills: 0
Start: 2024-11-11 | End: 2024-11-16

## 2024-11-11 RX ORDER — DIBASIC SODIUM PHOSPHATE, MONOBASIC POTASSIUM PHOSPHATE AND MONOBASIC SODIUM PHOSPHATE 852; 155; 130 MG/1; MG/1; MG/1
1 TABLET ORAL ONCE
Refills: 0 | Status: COMPLETED | OUTPATIENT
Start: 2024-11-11 | End: 2024-11-11

## 2024-11-11 RX ORDER — RANOLAZINE 500 MG/1
500 TABLET, FILM COATED, EXTENDED RELEASE ORAL
Refills: 0 | DISCHARGE

## 2024-11-11 RX ORDER — RANOLAZINE 500 MG/1
1 TABLET, FILM COATED, EXTENDED RELEASE ORAL
Qty: 0 | Refills: 0 | DISCHARGE
Start: 2024-11-11

## 2024-11-11 RX ADMIN — Medication 1 PATCH: at 05:59

## 2024-11-11 RX ADMIN — SODIUM CHLORIDE 100 MILLILITER(S): 9 INJECTION, SOLUTION INTRAMUSCULAR; INTRAVENOUS; SUBCUTANEOUS at 06:14

## 2024-11-11 RX ADMIN — DIBASIC SODIUM PHOSPHATE, MONOBASIC POTASSIUM PHOSPHATE AND MONOBASIC SODIUM PHOSPHATE 1 PACKET(S): 852; 155; 130 TABLET ORAL at 08:18

## 2024-11-11 RX ADMIN — Medication 100 MICROGRAM(S): at 05:03

## 2024-11-11 RX ADMIN — RIVAROXABAN 20 MILLIGRAM(S): 20 TABLET, FILM COATED ORAL at 10:09

## 2024-11-11 RX ADMIN — Medication 1 PATCH: at 06:14

## 2024-11-11 RX ADMIN — Medication 100 MILLIGRAM(S): at 13:04

## 2024-11-11 RX ADMIN — RANOLAZINE 500 MILLIGRAM(S): 500 TABLET, FILM COATED, EXTENDED RELEASE ORAL at 05:59

## 2024-11-11 NOTE — PROGRESS NOTE ADULT - ASSESSMENT
54 year old with history of CAD, DVT, hypothyroid, frequent UTIs presents for urinary incontinence/ab pain--> CT shows bilateral pyelonephritis. 
54 year old with history of CAD, DVT, hypothyroid, frequent UTIs presents for urinary incontinence/ab pain--> CT shows bilateral pyelonephritis.

## 2024-11-11 NOTE — DISCHARGE NOTE NURSING/CASE MANAGEMENT/SOCIAL WORK - PATIENT PORTAL LINK FT
You can access the FollowMyHealth Patient Portal offered by [facility] by registering at the following website: [email or web address]. By joining [facility]’s FollowMyHealth portal, you will also be able to view your health information using other applications (apps) compatible with our system.

## 2024-11-11 NOTE — PROGRESS NOTE ADULT - ATTENDING COMMENTS
54 year old female with past medical history PMHx of CAD s/p diagnostic cath 5/11/23 with vascular injury to rt radial artery post cath,  HTN, HLD, hypothyroid, Hx of silent seizures (not currently on Lacosamide), anxiety, recurrent UTIs,  Hx obesity (used to be BMI>36 now lost 50 lbs since April on Zepbound 12.5mg inj weekly), MARIAN and strong FHX of CAD who presents with urinary urgency and incontinence, CT with evidence of b/l acute pyelonephritis. transition to PO Cefpodoxime for total 10 day course. KRISTEN resolved. dc time 55 min 54 year old female with past medical history PMHx of CAD s/p diagnostic cath 5/11/23 with vascular injury to rt radial artery post cath,  HTN, HLD, hypothyroid, Hx of silent seizures (not currently on Lacosamide), anxiety, recurrent UTIs,  Hx obesity (used to be BMI>36 now lost 50 lbs since April on Zepbound 12.5mg inj weekly), MARIAN and strong FHX of CAD who presents with urinary urgency and incontinence, CT with evidence of b/l acute pyelonephritis. transition to PO Cefpodoxime for total 10 day course. KRISTEN resolved. TSH is borderline low. no symptoms. needs opt f/u TFTs dc time 55 min.

## 2024-11-11 NOTE — PROGRESS NOTE ADULT - SUBJECTIVE AND OBJECTIVE BOX
***************************************************************  Garett Yeung, PGY3  Internal Medicine   TEAMS Preferred  ***************************************************************    WARNER REYES  54y  MRN: [id number]  11-09-24 (2d)    Patient is a 54y old  Female who presents with a chief complaint of Pyelonephritis (10 Nov 2024 16:29)      SUBJECTIVE / OVERNIGHT EVENTS:   No acute overnight events. Pt seen and examined at bedside. Denies fevers, chills, CP, SOB, Abdominal pain, N/V, Constipation, Diarrhea. Last BM     12 Point ROS negative with the exception of the above    MEDICATIONS  (STANDING):  amLODIPine   Tablet 5 milliGRAM(s) Oral <User Schedule>  cefTRIAXone   IVPB 1000 milliGRAM(s) IV Intermittent every 24 hours  levothyroxine 100 MICROGram(s) Oral daily  nitroglycerin    Patch 0.2 mG/Hr(s) 1 patch Transdermal daily  ranolazine 500 milliGRAM(s) Oral every 12 hours  rivaroxaban 20 milliGRAM(s) Oral <User Schedule>  rosuvastatin 40 milliGRAM(s) Oral at bedtime  sodium chloride 0.9%. 1000 milliLiter(s) (100 mL/Hr) IV Continuous <Continuous>    MEDICATIONS  (PRN):  acetaminophen   Oral Liquid .. 675 milliGRAM(s) Oral every 6 hours PRN Moderate Pain (4 - 6), Severe Pain (7 - 10)  aluminum hydroxide/magnesium hydroxide/simethicone Suspension 30 milliLiter(s) Oral every 4 hours PRN Dyspepsia  melatonin 3 milliGRAM(s) Oral at bedtime PRN Insomnia  ondansetron Injectable 4 milliGRAM(s) IV Push every 8 hours PRN Nausea and/or Vomiting  oxycodone    5 mG/acetaminophen 325 mG 1 Tablet(s) Oral every 6 hours PRN Severe Pain (7 - 10)      OBJECTIVE:  Vital Signs Last 24 Hrs  T(C): 36.6 (11 Nov 2024 05:27), Max: 37.1 (10 Nov 2024 12:56)  T(F): 97.9 (11 Nov 2024 05:27), Max: 98.8 (10 Nov 2024 12:56)  HR: 72 (11 Nov 2024 05:27) (71 - 75)  BP: 111/66 (11 Nov 2024 05:27) (99/52 - 122/76)  BP(mean): --  RR: 18 (11 Nov 2024 05:27) (18 - 18)  SpO2: 98% (11 Nov 2024 05:27) (95% - 98%)    Parameters below as of 11 Nov 2024 05:27  Patient On (Oxygen Delivery Method): room air        I&O's Summary    09 Nov 2024 07:01  -  10 Nov 2024 07:00  --------------------------------------------------------  IN: 1350 mL / OUT: 0 mL / NET: 1350 mL    10 Nov 2024 07:01  -  11 Nov 2024 06:51  --------------------------------------------------------  IN: 1490 mL / OUT: 0 mL / NET: 1490 mL        PHYSICAL EXAM:  GENERAL: Laying comfortably, NAD  HEENT: NCAT, PERRLA, EOMI, no scleral icterus, no LAD  NECK: No JVD, supple  LUNG: CTABL; No wheezes, crackles, or rhonchi  HEART: RRR; normal S1/S2; No murmurs, rubs, or gallops  ABDOMEN: +BS, soft, nontender, nondistended, no HSM; No rebound, guarding, or rigidity  EXTREMITIES:  No LE edema b/l, 2+ Peripheral Pulses, No clubbing or cyanosis  NEUROLOGY: AOx3, non-focal, strength 5/5 in all extremities, sensation intact  PSYCH: calm and cooperative  SKIN: No rashes or lesions    LABS:                        10.8   12.52 )-----------( 249      ( 10 Nov 2024 07:02 )             32.8     Auto Eosinophil # x     / Auto Eosinophil % x     / Auto Neutrophil # x     / Auto Neutrophil % x     / BANDS % x        11-10    138  |  106  |  21  ----------------------------<  102[H]  4.0   |  20[L]  |  1.40[H]  11-09    138  |  105  |  14  ----------------------------<  123[H]  3.7   |  19[L]  |  1.54[H]  11-08    140  |  104  |  15  ----------------------------<  80  3.8   |  23  |  1.90[H]    Ca    8.5      10 Nov 2024 07:02  Ca    9.1      09 Nov 2024 06:25  Ca    9.9      08 Nov 2024 18:15  Phos  3.2     11-10  Mg     2.2     11-10    TPro  5.8[L]  /  Alb  3.4  /  TBili  0.2  /  DBili  x   /  AST  9[L]  /  ALT  7[L]  /  AlkPhos  35[L]  11-10  TPro  6.6  /  Alb  3.8  /  TBili  0.3  /  DBili  x   /  AST  12  /  ALT  7[L]  /  AlkPhos  40  11-09  TPro  7.4  /  Alb  4.3  /  TBili  0.4  /  DBili  x   /  AST  16  /  ALT  9[L]  /  AlkPhos  46  11-08          Urinalysis Basic - ( 10 Nov 2024 07:02 )    Color: x / Appearance: x / SG: x / pH: x  Gluc: 102 mg/dL / Ketone: x  / Bili: x / Urobili: x   Blood: x / Protein: x / Nitrite: x   Leuk Esterase: x / RBC: x / WBC x   Sq Epi: x / Non Sq Epi: x / Bacteria: x          CAPILLARY BLOOD GLUCOSE            RADIOLOGY & ADDITIONAL TESTS:  CT Abdomen and Pelvis w/ IV Cont:   ACC: [id number] EXAM:  CT ABDOMEN AND PELVIS IC   ORDERED BY:  TAN JUNIOR     PROCEDURE DATE:  11/09/2024          INTERPRETATION:  CLINICAL INFORMATION: Flank pain. Evaluate for   pyelonephritis.    COMPARISON: CT abdomen pelvis 7/2/2024.    CONTRAST/COMPLICATIONS:  IV Contrast: Omnipaque 350  90 cc administered   10 cc discarded  Oral Contrast: NONE  Complications: None reported at time of study completion    PROCEDURE:  CT of the Abdomen and Pelvis was performed.  Sagittal and coronal reformats were performed.    FINDINGS:  LOWER CHEST: Mild bibasilar subsegmental atelectasis.    LIVER: Right hepatic lobe subcentimeter hypodensity too small to   characterize. Focal fat adjacent to the falciform ligament.  BILE DUCTS: Normal caliber.  GALLBLADDER: Within normal limits.  SPLEEN: Within normal limits.  PANCREAS: Within normal limits.  ADRENALS: Within normal limits.  KIDNEYS/URETERS: No hydronephrosis. Mild heterogeneous enhancement of the   bilateral kidneys.    BLADDER: Within normal limits.  REPRODUCTIVE ORGANS: Uterus and adnexa within normal limits.    BOWEL: No bowel obstruction. Colonic diverticulosis. Sigmoid colon   anastomosis. Appendix is normal.  PERITONEUM/RETROPERITONEUM: Within normal limits.  VESSELS: Within normal limits.  LYMPH NODES: No lymphadenopathy.  ABDOMINAL WALL: Within normal limits.  BONES: Degenerative changes. Disc space narrowing at L5-S1.    IMPRESSION:  Mild heterogeneous enhancement of the bilateral kidneys, may represent   bilateral pyelonephritis. No evidence of hydronephrosis, perinephric   fluid or collection.    --- End of Report ---          JOAQUIN FIGUEROA MD; Resident Radiologist  This document has been electronically signed.  ZAINAB FORTE MD; Attending Radiologist  This document hasbeen electronically signed. Nov 9 2024  9:58AM (11-09-24 @ 07:43)     ***************************************************************  Garett Yeung, PGY3  Internal Medicine   TEAMS Preferred  ***************************************************************    WARNER REYES  54y  MRN: [id number]  11-09-24 (2d)    Patient is a 54y old  Female who presents with a chief complaint of Pyelonephritis (10 Nov 2024 16:29)      SUBJECTIVE / OVERNIGHT EVENTS:   No acute overnight events. Pt seen and examined at bedside. Endorses bilateral back "soreness," improved from admission. Reports continued 5/10 pelvic pain, improved from admission. Reports slight dysuria. Denies fevers, CP, SOB, Abdominal pain, N/V. Appetite improved.     12 Point ROS negative with the exception of the above    MEDICATIONS  (STANDING):  amLODIPine   Tablet 5 milliGRAM(s) Oral <User Schedule>  cefTRIAXone   IVPB 1000 milliGRAM(s) IV Intermittent every 24 hours  levothyroxine 100 MICROGram(s) Oral daily  nitroglycerin    Patch 0.2 mG/Hr(s) 1 patch Transdermal daily  ranolazine 500 milliGRAM(s) Oral every 12 hours  rivaroxaban 20 milliGRAM(s) Oral <User Schedule>  rosuvastatin 40 milliGRAM(s) Oral at bedtime  sodium chloride 0.9%. 1000 milliLiter(s) (100 mL/Hr) IV Continuous <Continuous>    MEDICATIONS  (PRN):  acetaminophen   Oral Liquid .. 675 milliGRAM(s) Oral every 6 hours PRN Moderate Pain (4 - 6), Severe Pain (7 - 10)  aluminum hydroxide/magnesium hydroxide/simethicone Suspension 30 milliLiter(s) Oral every 4 hours PRN Dyspepsia  melatonin 3 milliGRAM(s) Oral at bedtime PRN Insomnia  ondansetron Injectable 4 milliGRAM(s) IV Push every 8 hours PRN Nausea and/or Vomiting  oxycodone    5 mG/acetaminophen 325 mG 1 Tablet(s) Oral every 6 hours PRN Severe Pain (7 - 10)      OBJECTIVE:  Vital Signs Last 24 Hrs  T(C): 36.6 (11 Nov 2024 05:27), Max: 37.1 (10 Nov 2024 12:56)  T(F): 97.9 (11 Nov 2024 05:27), Max: 98.8 (10 Nov 2024 12:56)  HR: 72 (11 Nov 2024 05:27) (71 - 75)  BP: 111/66 (11 Nov 2024 05:27) (99/52 - 122/76)  BP(mean): --  RR: 18 (11 Nov 2024 05:27) (18 - 18)  SpO2: 98% (11 Nov 2024 05:27) (95% - 98%)    Parameters below as of 11 Nov 2024 05:27  Patient On (Oxygen Delivery Method): room air        I&O's Summary    09 Nov 2024 07:01  -  10 Nov 2024 07:00  --------------------------------------------------------  IN: 1350 mL / OUT: 0 mL / NET: 1350 mL    10 Nov 2024 07:01  -  11 Nov 2024 06:51  --------------------------------------------------------  IN: 1490 mL / OUT: 0 mL / NET: 1490 mL        PHYSICAL EXAM:  GENERAL: Laying comfortably, NAD  HEENT: NCAT, PERRLA, EOMI, no scleral icterus, no LAD  LUNG: CTABL; No wheezes, crackles, or rhonchi  HEART: RRR; normal S1/S2; No murmurs, rubs, or gallops  ABDOMEN: +BS, soft, nontender, nondistended, no HSM; No rebound, guarding, or rigidity  EXTREMITIES:  No LE edema b/l, 2+ Peripheral Pulses, No clubbing or cyanosis  NEUROLOGY: AOx3, non-focal, strength 5/5 in all extremities, sensation intact  PSYCH: calm and cooperative  SKIN: No rashes or lesions    LABS:                        10.9   6.32  )-----------( 257      ( 11 Nov 2024 06:47 )             33.2   11-11    141  |  109[H]  |  13  ----------------------------<  75  3.5   |  23  |  1.07    Ca    8.6      11 Nov 2024 06:50  Phos  2.3     11-11  Mg     1.8     11-11    TPro  5.8[L]  /  Alb  3.3  /  TBili  0.2  /  DBili  x   /  AST  8[L]  /  ALT  7[L]  /  AlkPhos  32[L]  11-11                          10.8   12.52 )-----------( 249      ( 10 Nov 2024 07:02 )             32.8     Auto Eosinophil # x     / Auto Eosinophil % x     / Auto Neutrophil # x     / Auto Neutrophil % x     / BANDS % x        11-10    138  |  106  |  21  ----------------------------<  102[H]  4.0   |  20[L]  |  1.40[H]  11-09    138  |  105  |  14  ----------------------------<  123[H]  3.7   |  19[L]  |  1.54[H]  11-08    140  |  104  |  15  ----------------------------<  80  3.8   |  23  |  1.90[H]    Ca    8.5      10 Nov 2024 07:02  Ca    9.1      09 Nov 2024 06:25  Ca    9.9      08 Nov 2024 18:15  Phos  3.2     11-10  Mg     2.2     11-10    TPro  5.8[L]  /  Alb  3.4  /  TBili  0.2  /  DBili  x   /  AST  9[L]  /  ALT  7[L]  /  AlkPhos  35[L]  11-10  TPro  6.6  /  Alb  3.8  /  TBili  0.3  /  DBili  x   /  AST  12  /  ALT  7[L]  /  AlkPhos  40  11-09  TPro  7.4  /  Alb  4.3  /  TBili  0.4  /  DBili  x   /  AST  16  /  ALT  9[L]  /  AlkPhos  46  11-08          Urinalysis Basic - ( 10 Nov 2024 07:02 )    Color: x / Appearance: x / SG: x / pH: x  Gluc: 102 mg/dL / Ketone: x  / Bili: x / Urobili: x   Blood: x / Protein: x / Nitrite: x   Leuk Esterase: x / RBC: x / WBC x   Sq Epi: x / Non Sq Epi: x / Bacteria: x          CAPILLARY BLOOD GLUCOSE            RADIOLOGY & ADDITIONAL TESTS:  CT Abdomen and Pelvis w/ IV Cont:   ACC: [id number] EXAM:  CT ABDOMEN AND PELVIS IC   ORDERED BY:  TAN JUNIOR     PROCEDURE DATE:  11/09/2024          INTERPRETATION:  CLINICAL INFORMATION: Flank pain. Evaluate for   pyelonephritis.    COMPARISON: CT abdomen pelvis 7/2/2024.    CONTRAST/COMPLICATIONS:  IV Contrast: Omnipaque 350  90 cc administered   10 cc discarded  Oral Contrast: NONE  Complications: None reported at time of study completion    PROCEDURE:  CT of the Abdomen and Pelvis was performed.  Sagittal and coronal reformats were performed.    FINDINGS:  LOWER CHEST: Mild bibasilar subsegmental atelectasis.    LIVER: Right hepatic lobe subcentimeter hypodensity too small to   characterize. Focal fat adjacent to the falciform ligament.  BILE DUCTS: Normal caliber.  GALLBLADDER: Within normal limits.  SPLEEN: Within normal limits.  PANCREAS: Within normal limits.  ADRENALS: Within normal limits.  KIDNEYS/URETERS: No hydronephrosis. Mild heterogeneous enhancement of the   bilateral kidneys.    BLADDER: Within normal limits.  REPRODUCTIVE ORGANS: Uterus and adnexa within normal limits.    BOWEL: No bowel obstruction. Colonic diverticulosis. Sigmoid colon   anastomosis. Appendix is normal.  PERITONEUM/RETROPERITONEUM: Within normal limits.  VESSELS: Within normal limits.  LYMPH NODES: No lymphadenopathy.  ABDOMINAL WALL: Within normal limits.  BONES: Degenerative changes. Disc space narrowing at L5-S1.    IMPRESSION:  Mild heterogeneous enhancement of the bilateral kidneys, may represent   bilateral pyelonephritis. No evidence of hydronephrosis, perinephric   fluid or collection.    --- End of Report ---          JOAQUIN FIGUEROA MD; Resident Radiologist  This document has been electronically signed.  ZAINAB FORTE MD; Attending Radiologist  This document hasbeen electronically signed. Nov 9 2024  9:58AM (11-09-24 @ 07:43)     ***************************************************************  Garett Yeung, PGY3  Internal Medicine   TEAMS Preferred  ***************************************************************    WARNER REYES  54y  MRN: [id number]  11-09-24 (2d)    Patient is a 54y old  Female who presents with a chief complaint of Pyelonephritis (10 Nov 2024 16:29)      SUBJECTIVE / OVERNIGHT EVENTS:   No acute overnight events. Pt seen and examined at bedside. Endorses bilateral back "soreness," improved from admission. Reports continued 5/10 pelvic pain, improved from admission. Reports slight dysuria. Denies fevers, CP, SOB, Abdominal pain, N/V. Appetite improved.     12 Point ROS negative with the exception of the above    MEDICATIONS  (STANDING):  amLODIPine   Tablet 5 milliGRAM(s) Oral <User Schedule>  cefTRIAXone   IVPB 1000 milliGRAM(s) IV Intermittent every 24 hours  levothyroxine 100 MICROGram(s) Oral daily  nitroglycerin    Patch 0.2 mG/Hr(s) 1 patch Transdermal daily  ranolazine 500 milliGRAM(s) Oral every 12 hours  rivaroxaban 20 milliGRAM(s) Oral <User Schedule>  rosuvastatin 40 milliGRAM(s) Oral at bedtime  sodium chloride 0.9%. 1000 milliLiter(s) (100 mL/Hr) IV Continuous <Continuous>    MEDICATIONS  (PRN):  acetaminophen   Oral Liquid .. 675 milliGRAM(s) Oral every 6 hours PRN Moderate Pain (4 - 6), Severe Pain (7 - 10)  aluminum hydroxide/magnesium hydroxide/simethicone Suspension 30 milliLiter(s) Oral every 4 hours PRN Dyspepsia  melatonin 3 milliGRAM(s) Oral at bedtime PRN Insomnia  ondansetron Injectable 4 milliGRAM(s) IV Push every 8 hours PRN Nausea and/or Vomiting  oxycodone    5 mG/acetaminophen 325 mG 1 Tablet(s) Oral every 6 hours PRN Severe Pain (7 - 10)      OBJECTIVE:  Vital Signs Last 24 Hrs  T(C): 36.6 (11 Nov 2024 05:27), Max: 37.1 (10 Nov 2024 12:56)  T(F): 97.9 (11 Nov 2024 05:27), Max: 98.8 (10 Nov 2024 12:56)  HR: 72 (11 Nov 2024 05:27) (71 - 75)  BP: 111/66 (11 Nov 2024 05:27) (99/52 - 122/76)  BP(mean): --  RR: 18 (11 Nov 2024 05:27) (18 - 18)  SpO2: 98% (11 Nov 2024 05:27) (95% - 98%)    Parameters below as of 11 Nov 2024 05:27  Patient On (Oxygen Delivery Method): room air        I&O's Summary    09 Nov 2024 07:01  -  10 Nov 2024 07:00  --------------------------------------------------------  IN: 1350 mL / OUT: 0 mL / NET: 1350 mL    10 Nov 2024 07:01  -  11 Nov 2024 06:51  --------------------------------------------------------  IN: 1490 mL / OUT: 0 mL / NET: 1490 mL        PHYSICAL EXAM:  GENERAL: Laying comfortably, NAD  HEENT: NCAT, PERRLA, EOMI, no scleral icterus, no LAD  LUNG: CTABL; No wheezes, crackles, or rhonchi  HEART: RRR; normal S1/S2; No murmurs, rubs, or gallops  ABDOMEN: +BS, soft, nondistended, mild tenderness  EXTREMITIES:  No LE edema b/l, 2+ Peripheral Pulses, No clubbing or cyanosis  NEUROLOGY: AOx3, non-focal, strength 5/5 in all extremities, sensation intact  PSYCH: calm and cooperative  SKIN: No rashes or lesions    LABS:                        10.9   6.32  )-----------( 257      ( 11 Nov 2024 06:47 )             33.2   11-11    141  |  109[H]  |  13  ----------------------------<  75  3.5   |  23  |  1.07    Ca    8.6      11 Nov 2024 06:50  Phos  2.3     11-11  Mg     1.8     11-11    TPro  5.8[L]  /  Alb  3.3  /  TBili  0.2  /  DBili  x   /  AST  8[L]  /  ALT  7[L]  /  AlkPhos  32[L]  11-11                          10.8   12.52 )-----------( 249      ( 10 Nov 2024 07:02 )             32.8     Auto Eosinophil # x     / Auto Eosinophil % x     / Auto Neutrophil # x     / Auto Neutrophil % x     / BANDS % x        11-10    138  |  106  |  21  ----------------------------<  102[H]  4.0   |  20[L]  |  1.40[H]  11-09    138  |  105  |  14  ----------------------------<  123[H]  3.7   |  19[L]  |  1.54[H]  11-08    140  |  104  |  15  ----------------------------<  80  3.8   |  23  |  1.90[H]    Ca    8.5      10 Nov 2024 07:02  Ca    9.1      09 Nov 2024 06:25  Ca    9.9      08 Nov 2024 18:15  Phos  3.2     11-10  Mg     2.2     11-10    TPro  5.8[L]  /  Alb  3.4  /  TBili  0.2  /  DBili  x   /  AST  9[L]  /  ALT  7[L]  /  AlkPhos  35[L]  11-10  TPro  6.6  /  Alb  3.8  /  TBili  0.3  /  DBili  x   /  AST  12  /  ALT  7[L]  /  AlkPhos  40  11-09  TPro  7.4  /  Alb  4.3  /  TBili  0.4  /  DBili  x   /  AST  16  /  ALT  9[L]  /  AlkPhos  46  11-08          Urinalysis Basic - ( 10 Nov 2024 07:02 )    Color: x / Appearance: x / SG: x / pH: x  Gluc: 102 mg/dL / Ketone: x  / Bili: x / Urobili: x   Blood: x / Protein: x / Nitrite: x   Leuk Esterase: x / RBC: x / WBC x   Sq Epi: x / Non Sq Epi: x / Bacteria: x          CAPILLARY BLOOD GLUCOSE            RADIOLOGY & ADDITIONAL TESTS:  CT Abdomen and Pelvis w/ IV Cont:   ACC: [id number] EXAM:  CT ABDOMEN AND PELVIS IC   ORDERED BY:  TAN JUNIOR     PROCEDURE DATE:  11/09/2024          INTERPRETATION:  CLINICAL INFORMATION: Flank pain. Evaluate for   pyelonephritis.    COMPARISON: CT abdomen pelvis 7/2/2024.    CONTRAST/COMPLICATIONS:  IV Contrast: Omnipaque 350  90 cc administered   10 cc discarded  Oral Contrast: NONE  Complications: None reported at time of study completion    PROCEDURE:  CT of the Abdomen and Pelvis was performed.  Sagittal and coronal reformats were performed.    FINDINGS:  LOWER CHEST: Mild bibasilar subsegmental atelectasis.    LIVER: Right hepatic lobe subcentimeter hypodensity too small to   characterize. Focal fat adjacent to the falciform ligament.  BILE DUCTS: Normal caliber.  GALLBLADDER: Within normal limits.  SPLEEN: Within normal limits.  PANCREAS: Within normal limits.  ADRENALS: Within normal limits.  KIDNEYS/URETERS: No hydronephrosis. Mild heterogeneous enhancement of the   bilateral kidneys.    BLADDER: Within normal limits.  REPRODUCTIVE ORGANS: Uterus and adnexa within normal limits.    BOWEL: No bowel obstruction. Colonic diverticulosis. Sigmoid colon   anastomosis. Appendix is normal.  PERITONEUM/RETROPERITONEUM: Within normal limits.  VESSELS: Within normal limits.  LYMPH NODES: No lymphadenopathy.  ABDOMINAL WALL: Within normal limits.  BONES: Degenerative changes. Disc space narrowing at L5-S1.    IMPRESSION:  Mild heterogeneous enhancement of the bilateral kidneys, may represent   bilateral pyelonephritis. No evidence of hydronephrosis, perinephric   fluid or collection.    --- End of Report ---          JOAQUIN FIGUEROA MD; Resident Radiologist  This document has been electronically signed.  ZAINAB FORTE MD; Attending Radiologist  This document hasbeen electronically signed. Nov 9 2024  9:58AM (11-09-24 @ 07:43)

## 2024-11-11 NOTE — DISCHARGE NOTE NURSING/CASE MANAGEMENT/SOCIAL WORK - NSDCFUADDAPPT_GEN_ALL_CORE_FT
APPTS ARE READY TO BE MADE: [X] YES    Best Family or Patient Contact (if needed):    Additional Information about above appointments (if needed):    1: urology within 2 weeks  2:   3:     Other comments or requests:

## 2024-11-11 NOTE — DISCHARGE NOTE NURSING/CASE MANAGEMENT/SOCIAL WORK - FINANCIAL ASSISTANCE
[facility] provides services at a reduced cost to those who are determined to be eligible through [facility]’s financial assistance program. Information regarding [facility]’s financial assistance program can be found by going to [email or web address] or by calling [phone].

## 2024-11-11 NOTE — PROGRESS NOTE ADULT - PROBLEM SELECTOR PLAN 1
Renal US--> thickening of urinary bladder   CT abdomen/pelvis--> Mild heterogeneous enhancement of the bilateral kidneys, may represent bilateral pyelonephritis. No evidence of hydronephrosis, perinephric fluid or collection.  Urinalysis shows + LE/Nitrites, bacteria   Pt previously on Bactrim and Nitrofurantoin    - c/w CTX  - Urine Cx NGTD Renal US--> thickening of urinary bladder   CT abdomen/pelvis--> Mild heterogeneous enhancement of the bilateral kidneys, may represent bilateral pyelonephritis. No evidence of hydronephrosis, perinephric fluid or collection.  Urinalysis shows + LE/Nitrites, bacteria   Pt previously on Bactrim and Nitrofurantoin    - c/w CTX, d/c today on cefpodoxime to complete a 10 day course  - Urine Cx NGTD

## 2024-11-12 ENCOUNTER — RESULT REVIEW (OUTPATIENT)
Age: 54
End: 2024-11-12

## 2024-11-12 ENCOUNTER — APPOINTMENT (OUTPATIENT)
Dept: HEMATOLOGY ONCOLOGY | Facility: CLINIC | Age: 54
End: 2024-11-12
Payer: COMMERCIAL

## 2024-11-12 ENCOUNTER — APPOINTMENT (OUTPATIENT)
Dept: INTERNAL MEDICINE | Facility: CLINIC | Age: 54
End: 2024-11-12
Payer: COMMERCIAL

## 2024-11-12 ENCOUNTER — NON-APPOINTMENT (OUTPATIENT)
Age: 54
End: 2024-11-12

## 2024-11-12 VITALS
SYSTOLIC BLOOD PRESSURE: 100 MMHG | HEIGHT: 60.24 IN | WEIGHT: 147.31 LBS | BODY MASS INDEX: 28.54 KG/M2 | HEART RATE: 86 BPM | DIASTOLIC BLOOD PRESSURE: 70 MMHG | TEMPERATURE: 97.6 F | OXYGEN SATURATION: 97 %

## 2024-11-12 VITALS — SYSTOLIC BLOOD PRESSURE: 100 MMHG | DIASTOLIC BLOOD PRESSURE: 70 MMHG

## 2024-11-12 VITALS
HEART RATE: 77 BPM | SYSTOLIC BLOOD PRESSURE: 124 MMHG | OXYGEN SATURATION: 99 % | RESPIRATION RATE: 16 BRPM | WEIGHT: 144.84 LBS | TEMPERATURE: 97.1 F | DIASTOLIC BLOOD PRESSURE: 81 MMHG | HEIGHT: 60.24 IN | BODY MASS INDEX: 28.07 KG/M2

## 2024-11-12 VITALS — DIASTOLIC BLOOD PRESSURE: 75 MMHG | SYSTOLIC BLOOD PRESSURE: 98 MMHG

## 2024-11-12 DIAGNOSIS — D69.9 HEMORRHAGIC CONDITION, UNSPECIFIED: ICD-10-CM

## 2024-11-12 DIAGNOSIS — I25.10 ATHEROSCLEROTIC HEART DISEASE OF NATIVE CORONARY ARTERY W/OUT ANGINA PECTORIS: ICD-10-CM

## 2024-11-12 DIAGNOSIS — Z86.39 PERSONAL HISTORY OF OTHER ENDOCRINE, NUTRITIONAL AND METABOLIC DISEASE: ICD-10-CM

## 2024-11-12 DIAGNOSIS — T14.8XXA OTHER INJURY OF UNSPECIFIED BODY REGION, INITIAL ENCOUNTER: ICD-10-CM

## 2024-11-12 DIAGNOSIS — Z87.19 PERSONAL HISTORY OF OTHER DISEASES OF THE DIGESTIVE SYSTEM: ICD-10-CM

## 2024-11-12 DIAGNOSIS — I82.409 ACUTE EMBOLISM AND THROMBOSIS OF UNSPECIFIED DEEP VEINS OF UNSPECIFIED LOWER EXTREMITY: ICD-10-CM

## 2024-11-12 DIAGNOSIS — R07.9 CHEST PAIN, UNSPECIFIED: ICD-10-CM

## 2024-11-12 DIAGNOSIS — N12 TUBULO-INTERSTITIAL NEPHRITIS, NOT SPECIFIED AS ACUTE OR CHRONIC: ICD-10-CM

## 2024-11-12 DIAGNOSIS — D64.9 ANEMIA, UNSPECIFIED: ICD-10-CM

## 2024-11-12 DIAGNOSIS — Z82.49 FAMILY HISTORY OF ISCHEMIC HEART DISEASE AND OTHER DISEASES OF THE CIRCULATORY SYSTEM: ICD-10-CM

## 2024-11-12 DIAGNOSIS — F41.9 ANXIETY DISORDER, UNSPECIFIED: ICD-10-CM

## 2024-11-12 DIAGNOSIS — Z78.9 OTHER SPECIFIED HEALTH STATUS: ICD-10-CM

## 2024-11-12 LAB
ALBUMIN SERPL ELPH-MCNC: 4.5 G/DL
ALP BLD-CCNC: 40 U/L
ALT SERPL-CCNC: 14 U/L
ANION GAP SERPL CALC-SCNC: 13 MMOL/L
AST SERPL-CCNC: 16 U/L
BASOPHILS # BLD AUTO: 0.04 K/UL — SIGNIFICANT CHANGE UP (ref 0–0.2)
BASOPHILS NFR BLD AUTO: 0.7 % — SIGNIFICANT CHANGE UP (ref 0–2)
BILIRUB SERPL-MCNC: 0.3 MG/DL
BUN SERPL-MCNC: 18 MG/DL
CALCIUM SERPL-MCNC: 9.9 MG/DL
CHLORIDE SERPL-SCNC: 104 MMOL/L
CO2 SERPL-SCNC: 25 MMOL/L
CREAT SERPL-MCNC: 1.35 MG/DL
EGFR: 46 ML/MIN/1.73M2
EOSINOPHIL # BLD AUTO: 0.09 K/UL — SIGNIFICANT CHANGE UP (ref 0–0.5)
EOSINOPHIL NFR BLD AUTO: 1.5 % — SIGNIFICANT CHANGE UP (ref 0–6)
GLUCOSE SERPL-MCNC: 86 MG/DL
HCT VFR BLD CALC: 40 % — SIGNIFICANT CHANGE UP (ref 34.5–45)
HGB BLD-MCNC: 13.6 G/DL — SIGNIFICANT CHANGE UP (ref 11.5–15.5)
IMM GRANULOCYTES NFR BLD AUTO: 0.2 % — SIGNIFICANT CHANGE UP (ref 0–0.9)
LDH SERPL-CCNC: 195 U/L
LYMPHOCYTES # BLD AUTO: 2.42 K/UL — SIGNIFICANT CHANGE UP (ref 1–3.3)
LYMPHOCYTES # BLD AUTO: 40.8 % — SIGNIFICANT CHANGE UP (ref 13–44)
MCHC RBC-ENTMCNC: 32.4 PG — SIGNIFICANT CHANGE UP (ref 27–34)
MCHC RBC-ENTMCNC: 34 G/DL — SIGNIFICANT CHANGE UP (ref 32–36)
MCV RBC AUTO: 95.2 FL — SIGNIFICANT CHANGE UP (ref 80–100)
MONOCYTES # BLD AUTO: 0.58 K/UL — SIGNIFICANT CHANGE UP (ref 0–0.9)
MONOCYTES NFR BLD AUTO: 9.8 % — SIGNIFICANT CHANGE UP (ref 2–14)
NEUTROPHILS # BLD AUTO: 2.79 K/UL — SIGNIFICANT CHANGE UP (ref 1.8–7.4)
NEUTROPHILS NFR BLD AUTO: 47 % — SIGNIFICANT CHANGE UP (ref 43–77)
NRBC # BLD: 0 /100 WBCS — SIGNIFICANT CHANGE UP (ref 0–0)
NRBC BLD-RTO: 0 /100 WBCS — SIGNIFICANT CHANGE UP (ref 0–0)
PLATELET # BLD AUTO: 282 K/UL — SIGNIFICANT CHANGE UP (ref 150–400)
POTASSIUM SERPL-SCNC: 4 MMOL/L
PROT SERPL-MCNC: 6.8 G/DL
RBC # BLD: 4.2 M/UL — SIGNIFICANT CHANGE UP (ref 3.8–5.2)
RBC # FLD: 12.1 % — SIGNIFICANT CHANGE UP (ref 10.3–14.5)
SODIUM SERPL-SCNC: 141 MMOL/L
WBC # BLD: 5.93 K/UL — SIGNIFICANT CHANGE UP (ref 3.8–10.5)
WBC # FLD AUTO: 5.93 K/UL — SIGNIFICANT CHANGE UP (ref 3.8–10.5)

## 2024-11-12 PROCEDURE — 99204 OFFICE O/P NEW MOD 45 MIN: CPT

## 2024-11-12 PROCEDURE — 99496 TRANSJ CARE MGMT HIGH F2F 7D: CPT

## 2024-11-12 PROCEDURE — 93000 ELECTROCARDIOGRAM COMPLETE: CPT

## 2024-11-12 RX ORDER — SULFAMETHOXAZOLE AND TRIMETHOPRIM 800; 160 MG/1; MG/1
800-160 TABLET ORAL
Qty: 14 | Refills: 0 | Status: COMPLETED | COMMUNITY
Start: 2024-11-06 | End: 2024-11-12

## 2024-11-12 RX ORDER — FOLIC ACID 1 MG/1
1 TABLET ORAL
Qty: 30 | Refills: 6 | Status: ACTIVE | COMMUNITY
Start: 2024-11-12 | End: 1900-01-01

## 2024-11-12 RX ORDER — OMEPRAZOLE 40 MG/1
40 CAPSULE, DELAYED RELEASE ORAL
Qty: 30 | Refills: 0 | Status: COMPLETED | COMMUNITY
Start: 2024-11-06 | End: 2024-11-12

## 2024-11-12 RX ORDER — CEFDINIR 300 MG/1
300 CAPSULE ORAL
Qty: 14 | Refills: 0 | Status: COMPLETED | COMMUNITY
Start: 2024-11-04 | End: 2024-11-12

## 2024-11-13 ENCOUNTER — APPOINTMENT (OUTPATIENT)
Dept: ENDOCRINOLOGY | Facility: CLINIC | Age: 54
End: 2024-11-13
Payer: COMMERCIAL

## 2024-11-13 VITALS
OXYGEN SATURATION: 99 % | HEIGHT: 60.24 IN | HEART RATE: 81 BPM | DIASTOLIC BLOOD PRESSURE: 78 MMHG | TEMPERATURE: 97.2 F | SYSTOLIC BLOOD PRESSURE: 109 MMHG

## 2024-11-13 DIAGNOSIS — E23.7 DISORDER OF PITUITARY GLAND, UNSPECIFIED: ICD-10-CM

## 2024-11-13 DIAGNOSIS — E03.9 HYPOTHYROIDISM, UNSPECIFIED: ICD-10-CM

## 2024-11-13 PROBLEM — N12 PYELONEPHRITIS: Status: ACTIVE | Noted: 2024-11-12

## 2024-11-13 PROCEDURE — 36415 COLL VENOUS BLD VENIPUNCTURE: CPT

## 2024-11-13 PROCEDURE — 99214 OFFICE O/P EST MOD 30 MIN: CPT

## 2024-11-14 LAB
25(OH)D3 SERPL-MCNC: 20.9 NG/ML
ALBUMIN SERPL ELPH-MCNC: 4.4 G/DL
ALP BLD-CCNC: 42 U/L
ALT SERPL-CCNC: 18 U/L
ANION GAP SERPL CALC-SCNC: 13 MMOL/L
AST SERPL-CCNC: 15 U/L
BASOPHILS # BLD AUTO: 0.04 K/UL
BASOPHILS NFR BLD AUTO: 0.8 %
BILIRUB SERPL-MCNC: 0.4 MG/DL
BUN SERPL-MCNC: 21 MG/DL
CALCIUM SERPL-MCNC: 10.2 MG/DL
CHLORIDE SERPL-SCNC: 106 MMOL/L
CHOLEST SERPL-MCNC: 192 MG/DL
CO2 SERPL-SCNC: 25 MMOL/L
CORTIS SERPL-MCNC: 17 UG/DL
CREAT SERPL-MCNC: 1.13 MG/DL
CULTURE RESULTS: SIGNIFICANT CHANGE UP
CULTURE RESULTS: SIGNIFICANT CHANGE UP
EGFR: 57 ML/MIN/1.73M2
EOSINOPHIL # BLD AUTO: 0.12 K/UL
EOSINOPHIL NFR BLD AUTO: 2.3 %
ESTIMATED AVERAGE GLUCOSE: 91 MG/DL
GLUCOSE SERPL-MCNC: 91 MG/DL
HBA1C MFR BLD HPLC: 4.8 %
HCT VFR BLD CALC: 40.7 %
HDLC SERPL-MCNC: 69 MG/DL
HGB BLD-MCNC: 13.4 G/DL
IGF-1 INTERP: NORMAL
IGF-I BLD-MCNC: 63 NG/ML
IMM GRANULOCYTES NFR BLD AUTO: 0.2 %
LDLC SERPL CALC-MCNC: 96 MG/DL
LYMPHOCYTES # BLD AUTO: 2.39 K/UL
LYMPHOCYTES NFR BLD AUTO: 44.9 %
MAN DIFF?: NORMAL
MCHC RBC-ENTMCNC: 31.2 PG
MCHC RBC-ENTMCNC: 32.9 G/DL
MCV RBC AUTO: 94.9 FL
MONOCYTES # BLD AUTO: 0.58 K/UL
MONOCYTES NFR BLD AUTO: 10.9 %
NEUTROPHILS # BLD AUTO: 2.18 K/UL
NEUTROPHILS NFR BLD AUTO: 40.9 %
NONHDLC SERPL-MCNC: 123 MG/DL
PLATELET # BLD AUTO: 331 K/UL
POTASSIUM SERPL-SCNC: 4.8 MMOL/L
PROLACTIN SERPL-MCNC: 13.8 NG/ML
PROT SERPL-MCNC: 7 G/DL
RBC # BLD: 4.29 M/UL
RBC # FLD: 12.7 %
SODIUM SERPL-SCNC: 143 MMOL/L
SPECIMEN SOURCE: SIGNIFICANT CHANGE UP
SPECIMEN SOURCE: SIGNIFICANT CHANGE UP
T4 FREE SERPL-MCNC: 1.9 NG/DL
TRIGL SERPL-MCNC: 162 MG/DL
TSH SERPL-ACNC: 1.87 UIU/ML
VIT B12 SERPL-MCNC: 746 PG/ML
WBC # FLD AUTO: 5.32 K/UL

## 2024-11-15 ENCOUNTER — APPOINTMENT (OUTPATIENT)
Dept: UROLOGY | Facility: CLINIC | Age: 54
End: 2024-11-15
Payer: COMMERCIAL

## 2024-11-15 DIAGNOSIS — N39.0 URINARY TRACT INFECTION, SITE NOT SPECIFIED: ICD-10-CM

## 2024-11-15 DIAGNOSIS — R10.2 PELVIC AND PERINEAL PAIN: ICD-10-CM

## 2024-11-15 PROCEDURE — G2211 COMPLEX E/M VISIT ADD ON: CPT

## 2024-11-15 PROCEDURE — 51701 INSERT BLADDER CATHETER: CPT

## 2024-11-15 PROCEDURE — 99459 PELVIC EXAMINATION: CPT

## 2024-11-15 PROCEDURE — 99214 OFFICE O/P EST MOD 30 MIN: CPT | Mod: 25

## 2024-11-15 RX ORDER — SOLIFENACIN SUCCINATE 5 MG/1
5 TABLET ORAL
Qty: 30 | Refills: 3 | Status: ACTIVE | COMMUNITY
Start: 2024-11-15 | End: 1900-01-01

## 2024-11-15 RX ORDER — HYOSCYAMINE SULFATE, METHENAMINE, METHYLENE BLUE, PHENYL SALICYLATE, AND SODIUM PHOSPHATE, MONOBASIC, MONOHYDRATE .12; 81; 10.8; 32.4; 40.8 MG/1; MG/1; MG/1; MG/1; MG/1
81 TABLET ORAL 3 TIMES DAILY
Qty: 90 | Refills: 3 | Status: ACTIVE | COMMUNITY
Start: 2024-11-15 | End: 1900-01-01

## 2024-11-17 ENCOUNTER — NON-APPOINTMENT (OUTPATIENT)
Age: 54
End: 2024-11-17

## 2024-11-18 ENCOUNTER — TRANSCRIPTION ENCOUNTER (OUTPATIENT)
Age: 54
End: 2024-11-18

## 2024-11-18 LAB
ANION GAP SERPL CALC-SCNC: 9 MMOL/L
BUN SERPL-MCNC: 16 MG/DL
CALCIUM SERPL-MCNC: 9.7 MG/DL
CHLORIDE SERPL-SCNC: 106 MMOL/L
CO2 SERPL-SCNC: 27 MMOL/L
CREAT SERPL-MCNC: 0.93 MG/DL
EGFR: 72 ML/MIN/1.73M2
GLUCOSE SERPL-MCNC: 81 MG/DL
MENADIONE SERPL-MCNC: 1.12 NG/ML
POTASSIUM SERPL-SCNC: 4.6 MMOL/L
SODIUM SERPL-SCNC: 141 MMOL/L

## 2024-11-21 ENCOUNTER — TRANSCRIPTION ENCOUNTER (OUTPATIENT)
Age: 54
End: 2024-11-21

## 2024-11-21 LAB

## 2024-11-22 ENCOUNTER — APPOINTMENT (OUTPATIENT)
Dept: INTERNAL MEDICINE | Facility: CLINIC | Age: 54
End: 2024-11-22
Payer: COMMERCIAL

## 2024-11-22 DIAGNOSIS — R42 DIZZINESS AND GIDDINESS: ICD-10-CM

## 2024-11-22 DIAGNOSIS — I10 ESSENTIAL (PRIMARY) HYPERTENSION: ICD-10-CM

## 2024-11-22 PROCEDURE — 99213 OFFICE O/P EST LOW 20 MIN: CPT

## 2024-11-22 PROCEDURE — G2211 COMPLEX E/M VISIT ADD ON: CPT

## 2024-12-04 RX ORDER — METHENAMINE, SODIUM PHOSPHATE, MONOBASIC, ANHYDROUS, PHENYL SALICYLATE, METHYLENE BLUE AND HYOSCYAMINE SULFATE 118; 40.8; 36; 10; .12 MG/1; MG/1; MG/1; MG/1; MG/1
118 CAPSULE ORAL
Qty: 30 | Refills: 1 | Status: ACTIVE | COMMUNITY
Start: 2024-12-04 | End: 1900-01-01

## 2024-12-09 LAB
APPEARANCE: CLEAR
BACTERIA: NEGATIVE /HPF
BILIRUBIN URINE: NEGATIVE
BLOOD URINE: NEGATIVE
CAST: 0 /LPF
COLOR: NORMAL
EPITHELIAL CELLS: 3 /HPF
GLUCOSE QUALITATIVE U: NEGATIVE MG/DL
KETONES URINE: NEGATIVE MG/DL
LEUKOCYTE ESTERASE URINE: NEGATIVE
MICROSCOPIC-UA: NORMAL
NITRITE URINE: POSITIVE
PH URINE: 6
PROTEIN URINE: NEGATIVE MG/DL
RED BLOOD CELLS URINE: NORMAL /HPF
REVIEW: NORMAL
SPECIFIC GRAVITY URINE: 1.02
UROBILINOGEN URINE: 1 MG/DL
WHITE BLOOD CELLS URINE: 0 /HPF

## 2024-12-10 ENCOUNTER — APPOINTMENT (OUTPATIENT)
Dept: NEUROLOGY | Facility: CLINIC | Age: 54
End: 2024-12-10

## 2024-12-11 LAB — BACTERIA UR CULT: NORMAL

## 2024-12-13 ENCOUNTER — APPOINTMENT (OUTPATIENT)
Dept: CARDIOLOGY | Facility: CLINIC | Age: 54
End: 2024-12-13
Payer: COMMERCIAL

## 2024-12-13 VITALS
HEIGHT: 60 IN | OXYGEN SATURATION: 98 % | HEART RATE: 88 BPM | DIASTOLIC BLOOD PRESSURE: 80 MMHG | BODY MASS INDEX: 28.47 KG/M2 | TEMPERATURE: 98 F | WEIGHT: 145 LBS | SYSTOLIC BLOOD PRESSURE: 116 MMHG

## 2024-12-13 DIAGNOSIS — E78.2 MIXED HYPERLIPIDEMIA: ICD-10-CM

## 2024-12-13 DIAGNOSIS — R06.02 SHORTNESS OF BREATH: ICD-10-CM

## 2024-12-13 DIAGNOSIS — R42 DIZZINESS AND GIDDINESS: ICD-10-CM

## 2024-12-13 DIAGNOSIS — I25.10 ATHEROSCLEROTIC HEART DISEASE OF NATIVE CORONARY ARTERY W/OUT ANGINA PECTORIS: ICD-10-CM

## 2024-12-13 DIAGNOSIS — R07.9 CHEST PAIN, UNSPECIFIED: ICD-10-CM

## 2024-12-13 DIAGNOSIS — I82.409 ACUTE EMBOLISM AND THROMBOSIS OF UNSPECIFIED DEEP VEINS OF UNSPECIFIED LOWER EXTREMITY: ICD-10-CM

## 2024-12-13 DIAGNOSIS — T14.8XXA OTHER INJURY OF UNSPECIFIED BODY REGION, INITIAL ENCOUNTER: ICD-10-CM

## 2024-12-13 DIAGNOSIS — R73.03 PREDIABETES.: ICD-10-CM

## 2024-12-13 DIAGNOSIS — E66.9 OBESITY, UNSPECIFIED: ICD-10-CM

## 2024-12-13 DIAGNOSIS — R53.83 OTHER FATIGUE: ICD-10-CM

## 2024-12-13 DIAGNOSIS — E03.9 HYPOTHYROIDISM, UNSPECIFIED: ICD-10-CM

## 2024-12-13 DIAGNOSIS — E23.7 DISORDER OF PITUITARY GLAND, UNSPECIFIED: ICD-10-CM

## 2024-12-13 PROCEDURE — G2211 COMPLEX E/M VISIT ADD ON: CPT

## 2024-12-13 PROCEDURE — 99214 OFFICE O/P EST MOD 30 MIN: CPT

## 2024-12-13 PROCEDURE — 93000 ELECTROCARDIOGRAM COMPLETE: CPT

## 2024-12-15 LAB
ANION GAP SERPL CALC-SCNC: 10 MMOL/L
BASOPHILS # BLD AUTO: 0.06 K/UL
BASOPHILS NFR BLD AUTO: 1.1 %
BUN SERPL-MCNC: 15 MG/DL
CALCIUM SERPL-MCNC: 9.8 MG/DL
CHLORIDE SERPL-SCNC: 106 MMOL/L
CO2 SERPL-SCNC: 23 MMOL/L
CREAT SERPL-MCNC: 1 MG/DL
EGFR: 66 ML/MIN/1.73M2
EOSINOPHIL # BLD AUTO: 0.1 K/UL
EOSINOPHIL NFR BLD AUTO: 1.8 %
GLUCOSE SERPL-MCNC: 79 MG/DL
HCT VFR BLD CALC: 40.4 %
HGB BLD-MCNC: 13.2 G/DL
IMM GRANULOCYTES NFR BLD AUTO: 0.2 %
LYMPHOCYTES # BLD AUTO: 2.33 K/UL
LYMPHOCYTES NFR BLD AUTO: 42.5 %
MAN DIFF?: NORMAL
MCHC RBC-ENTMCNC: 31.8 PG
MCHC RBC-ENTMCNC: 32.7 G/DL
MCV RBC AUTO: 97.3 FL
MONOCYTES # BLD AUTO: 0.52 K/UL
MONOCYTES NFR BLD AUTO: 9.5 %
NEUTROPHILS # BLD AUTO: 2.46 K/UL
NEUTROPHILS NFR BLD AUTO: 44.9 %
PLATELET # BLD AUTO: 309 K/UL
POTASSIUM SERPL-SCNC: 4.3 MMOL/L
RBC # BLD: 4.15 M/UL
RBC # FLD: 12.9 %
SODIUM SERPL-SCNC: 140 MMOL/L
WBC # FLD AUTO: 5.48 K/UL

## 2024-12-17 ENCOUNTER — APPOINTMENT (OUTPATIENT)
Dept: UROLOGY | Facility: CLINIC | Age: 54
End: 2024-12-17
Payer: COMMERCIAL

## 2024-12-17 VITALS
RESPIRATION RATE: 16 BRPM | OXYGEN SATURATION: 99 % | SYSTOLIC BLOOD PRESSURE: 138 MMHG | HEART RATE: 78 BPM | DIASTOLIC BLOOD PRESSURE: 88 MMHG

## 2024-12-17 DIAGNOSIS — N39.0 URINARY TRACT INFECTION, SITE NOT SPECIFIED: ICD-10-CM

## 2024-12-17 DIAGNOSIS — N90.89 OTHER SPECIFIED NONINFLAMMATORY DISORDERS OF VULVA AND PERINEUM: ICD-10-CM

## 2024-12-17 PROCEDURE — 99459 PELVIC EXAMINATION: CPT

## 2024-12-17 PROCEDURE — G2211 COMPLEX E/M VISIT ADD ON: CPT

## 2024-12-17 PROCEDURE — 99213 OFFICE O/P EST LOW 20 MIN: CPT | Mod: 25

## 2024-12-17 PROCEDURE — 51701 INSERT BLADDER CATHETER: CPT

## 2024-12-17 RX ORDER — CLOBETASOL PROPIONATE 0.5 MG/G
0.05 OINTMENT TOPICAL TWICE DAILY
Qty: 1 | Refills: 0 | Status: ACTIVE | COMMUNITY
Start: 2024-12-17 | End: 1900-01-01

## 2024-12-28 ENCOUNTER — NON-APPOINTMENT (OUTPATIENT)
Age: 54
End: 2024-12-28

## 2025-01-03 ENCOUNTER — APPOINTMENT (OUTPATIENT)
Dept: UROLOGY | Facility: CLINIC | Age: 55
End: 2025-01-03
Payer: COMMERCIAL

## 2025-01-03 VITALS
RESPIRATION RATE: 17 BRPM | OXYGEN SATURATION: 100 % | HEART RATE: 74 BPM | DIASTOLIC BLOOD PRESSURE: 82 MMHG | SYSTOLIC BLOOD PRESSURE: 133 MMHG

## 2025-01-03 PROCEDURE — 51701 INSERT BLADDER CATHETER: CPT

## 2025-01-05 ENCOUNTER — NON-APPOINTMENT (OUTPATIENT)
Age: 55
End: 2025-01-05

## 2025-01-06 ENCOUNTER — TRANSCRIPTION ENCOUNTER (OUTPATIENT)
Age: 55
End: 2025-01-06

## 2025-01-06 LAB
APPEARANCE: ABNORMAL
BACTERIA UR CULT: NORMAL
BACTERIA: NEGATIVE /HPF
BILIRUBIN URINE: NEGATIVE
BLOOD URINE: NEGATIVE
CAST: 1 /LPF
COLOR: NORMAL
EPITHELIAL CELLS: 2 /HPF
GLUCOSE QUALITATIVE U: NEGATIVE MG/DL
KETONES URINE: ABNORMAL MG/DL
LEUKOCYTE ESTERASE URINE: ABNORMAL
MICROSCOPIC-UA: NORMAL
NITRITE URINE: NEGATIVE
PH URINE: 6
PROTEIN URINE: NORMAL MG/DL
RED BLOOD CELLS URINE: 2 /HPF
SPECIFIC GRAVITY URINE: >1.03
UROBILINOGEN URINE: 1 MG/DL
WHITE BLOOD CELLS URINE: 0 /HPF

## 2025-01-10 NOTE — DISCHARGE NOTE PROVIDER - EXTENDED VTE YES NO FOR MLM ENOXAPARIN
Per snapshot left detail message with results. Advise patient to call clinic with any questions.    ,

## 2025-01-13 ENCOUNTER — APPOINTMENT (OUTPATIENT)
Dept: DERMATOLOGY | Facility: CLINIC | Age: 55
End: 2025-01-13

## 2025-01-14 ENCOUNTER — APPOINTMENT (OUTPATIENT)
Dept: NEUROLOGY | Facility: CLINIC | Age: 55
End: 2025-01-14

## 2025-01-22 ENCOUNTER — APPOINTMENT (OUTPATIENT)
Dept: UROLOGY | Facility: CLINIC | Age: 55
End: 2025-01-22
Payer: COMMERCIAL

## 2025-01-22 VITALS
RESPIRATION RATE: 17 BRPM | OXYGEN SATURATION: 100 % | HEART RATE: 67 BPM | DIASTOLIC BLOOD PRESSURE: 84 MMHG | SYSTOLIC BLOOD PRESSURE: 136 MMHG

## 2025-01-22 DIAGNOSIS — N39.0 URINARY TRACT INFECTION, SITE NOT SPECIFIED: ICD-10-CM

## 2025-01-22 DIAGNOSIS — R10.2 PELVIC AND PERINEAL PAIN: ICD-10-CM

## 2025-01-22 DIAGNOSIS — R39.198 OTHER DIFFICULTIES WITH MICTURITION: ICD-10-CM

## 2025-01-22 PROCEDURE — G2211 COMPLEX E/M VISIT ADD ON: CPT

## 2025-01-22 PROCEDURE — 51702 INSERT TEMP BLADDER CATH: CPT

## 2025-01-22 PROCEDURE — 99214 OFFICE O/P EST MOD 30 MIN: CPT | Mod: 25

## 2025-01-23 ENCOUNTER — APPOINTMENT (OUTPATIENT)
Dept: DERMATOLOGY | Facility: CLINIC | Age: 55
End: 2025-01-23

## 2025-02-03 ENCOUNTER — RX RENEWAL (OUTPATIENT)
Age: 55
End: 2025-02-03

## 2025-02-04 ENCOUNTER — APPOINTMENT (OUTPATIENT)
Dept: VASCULAR SURGERY | Facility: CLINIC | Age: 55
End: 2025-02-04
Payer: COMMERCIAL

## 2025-02-04 PROCEDURE — 99214 OFFICE O/P EST MOD 30 MIN: CPT

## 2025-02-04 PROCEDURE — 93971 EXTREMITY STUDY: CPT

## 2025-02-05 ENCOUNTER — APPOINTMENT (OUTPATIENT)
Dept: UROLOGY | Facility: CLINIC | Age: 55
End: 2025-02-05
Payer: COMMERCIAL

## 2025-02-05 VITALS
HEART RATE: 73 BPM | DIASTOLIC BLOOD PRESSURE: 85 MMHG | SYSTOLIC BLOOD PRESSURE: 129 MMHG | RESPIRATION RATE: 17 BRPM | OXYGEN SATURATION: 100 %

## 2025-02-05 DIAGNOSIS — R39.198 OTHER DIFFICULTIES WITH MICTURITION: ICD-10-CM

## 2025-02-05 PROCEDURE — 51701 INSERT BLADDER CATHETER: CPT

## 2025-02-05 PROCEDURE — 99214 OFFICE O/P EST MOD 30 MIN: CPT | Mod: 25

## 2025-02-06 LAB
APPEARANCE: ABNORMAL
BACTERIA: ABNORMAL /HPF
BILIRUBIN URINE: ABNORMAL
BLOOD URINE: NEGATIVE
CAST: 2 /LPF
COLOR: NORMAL
EPITHELIAL CELLS: 2 /HPF
GLUCOSE QUALITATIVE U: NEGATIVE MG/DL
KETONES URINE: NEGATIVE MG/DL
LEUKOCYTE ESTERASE URINE: ABNORMAL
MICROSCOPIC-UA: NORMAL
NITRITE URINE: POSITIVE
PH URINE: 5.5
PROTEIN URINE: NORMAL MG/DL
RED BLOOD CELLS URINE: 2 /HPF
REVIEW: NORMAL
SPECIFIC GRAVITY URINE: 1.02
UROBILINOGEN URINE: 1 MG/DL
WHITE BLOOD CELLS URINE: 2 /HPF

## 2025-02-09 ENCOUNTER — OUTPATIENT (OUTPATIENT)
Dept: OUTPATIENT SERVICES | Facility: HOSPITAL | Age: 55
LOS: 1 days | End: 2025-02-09
Payer: COMMERCIAL

## 2025-02-09 DIAGNOSIS — Z98.890 OTHER SPECIFIED POSTPROCEDURAL STATES: Chronic | ICD-10-CM

## 2025-02-09 DIAGNOSIS — E03.9 HYPOTHYROIDISM, UNSPECIFIED: ICD-10-CM

## 2025-02-09 DIAGNOSIS — Z00.8 ENCOUNTER FOR OTHER GENERAL EXAMINATION: ICD-10-CM

## 2025-02-09 DIAGNOSIS — Z90.49 ACQUIRED ABSENCE OF OTHER SPECIFIED PARTS OF DIGESTIVE TRACT: Chronic | ICD-10-CM

## 2025-02-09 DIAGNOSIS — T14.8XXA OTHER INJURY OF UNSPECIFIED BODY REGION, INITIAL ENCOUNTER: Chronic | ICD-10-CM

## 2025-02-09 LAB — BACTERIA UR CULT: ABNORMAL

## 2025-02-09 PROCEDURE — 76536 US EXAM OF HEAD AND NECK: CPT

## 2025-02-11 ENCOUNTER — APPOINTMENT (OUTPATIENT)
Dept: CARDIOLOGY | Facility: CLINIC | Age: 55
End: 2025-02-11
Payer: COMMERCIAL

## 2025-02-11 ENCOUNTER — TRANSCRIPTION ENCOUNTER (OUTPATIENT)
Age: 55
End: 2025-02-11

## 2025-02-11 VITALS
SYSTOLIC BLOOD PRESSURE: 100 MMHG | HEART RATE: 84 BPM | BODY MASS INDEX: 26.06 KG/M2 | HEIGHT: 61 IN | TEMPERATURE: 98.2 F | OXYGEN SATURATION: 98 % | WEIGHT: 138 LBS | DIASTOLIC BLOOD PRESSURE: 70 MMHG

## 2025-02-11 DIAGNOSIS — E03.9 HYPOTHYROIDISM, UNSPECIFIED: ICD-10-CM

## 2025-02-11 DIAGNOSIS — E78.2 MIXED HYPERLIPIDEMIA: ICD-10-CM

## 2025-02-11 DIAGNOSIS — E55.9 VITAMIN D DEFICIENCY, UNSPECIFIED: ICD-10-CM

## 2025-02-11 DIAGNOSIS — T14.8XXA OTHER INJURY OF UNSPECIFIED BODY REGION, INITIAL ENCOUNTER: ICD-10-CM

## 2025-02-11 DIAGNOSIS — R51.9 HEADACHE, UNSPECIFIED: ICD-10-CM

## 2025-02-11 DIAGNOSIS — R09.A2 FOREIGN BODY SENSATION, THROAT: ICD-10-CM

## 2025-02-11 DIAGNOSIS — R21 RASH AND OTHER NONSPECIFIC SKIN ERUPTION: ICD-10-CM

## 2025-02-11 DIAGNOSIS — R73.03 PREDIABETES.: ICD-10-CM

## 2025-02-11 DIAGNOSIS — K76.0 FATTY (CHANGE OF) LIVER, NOT ELSEWHERE CLASSIFIED: ICD-10-CM

## 2025-02-11 DIAGNOSIS — E66.9 OBESITY, UNSPECIFIED: ICD-10-CM

## 2025-02-11 DIAGNOSIS — R53.83 OTHER FATIGUE: ICD-10-CM

## 2025-02-11 DIAGNOSIS — R07.9 CHEST PAIN, UNSPECIFIED: ICD-10-CM

## 2025-02-11 DIAGNOSIS — I82.409 ACUTE EMBOLISM AND THROMBOSIS OF UNSPECIFIED DEEP VEINS OF UNSPECIFIED LOWER EXTREMITY: ICD-10-CM

## 2025-02-11 DIAGNOSIS — R06.02 SHORTNESS OF BREATH: ICD-10-CM

## 2025-02-11 DIAGNOSIS — I25.10 ATHEROSCLEROTIC HEART DISEASE OF NATIVE CORONARY ARTERY W/OUT ANGINA PECTORIS: ICD-10-CM

## 2025-02-11 DIAGNOSIS — E23.7 DISORDER OF PITUITARY GLAND, UNSPECIFIED: ICD-10-CM

## 2025-02-11 DIAGNOSIS — I74.2 EMBOLISM AND THROMBOSIS OF ARTERIES OF THE UPPER EXTREMITIES: ICD-10-CM

## 2025-02-11 DIAGNOSIS — R42 DIZZINESS AND GIDDINESS: ICD-10-CM

## 2025-02-11 PROCEDURE — G2211 COMPLEX E/M VISIT ADD ON: CPT

## 2025-02-11 PROCEDURE — 99214 OFFICE O/P EST MOD 30 MIN: CPT

## 2025-02-11 PROCEDURE — 93000 ELECTROCARDIOGRAM COMPLETE: CPT

## 2025-02-11 RX ORDER — CLOTRIMAZOLE AND BETAMETHASONE DIPROPIONATE 10; .5 MG/G; MG/G
1-0.05 CREAM TOPICAL TWICE DAILY
Qty: 1 | Refills: 3 | Status: ACTIVE | COMMUNITY
Start: 2025-02-11 | End: 1900-01-01

## 2025-02-11 RX ORDER — ASPIRIN ENTERIC COATED TABLETS 81 MG 81 MG/1
81 TABLET, DELAYED RELEASE ORAL DAILY
Qty: 30 | Refills: 3 | Status: ACTIVE | COMMUNITY
Start: 2025-02-11

## 2025-02-11 RX ORDER — MULTIVIT-MIN/IRON/FOLIC ACID/K 18-600-40
CAPSULE ORAL
Refills: 0 | Status: ACTIVE | COMMUNITY

## 2025-02-11 RX ORDER — PNV NO.95/FERROUS FUM/FOLIC AC 28MG-0.8MG
TABLET ORAL
Refills: 0 | Status: ACTIVE | COMMUNITY

## 2025-02-11 RX ORDER — CRANBERRY FRUIT EXTRACT 200 MG
CAPSULE ORAL
Refills: 0 | Status: ACTIVE | COMMUNITY

## 2025-02-11 RX ORDER — UBIDECARENONE 200 MG
CAPSULE ORAL
Refills: 0 | Status: ACTIVE | COMMUNITY

## 2025-02-11 RX ORDER — BIFIDOBACTERIUM LONGUM 10MM CELL
CAPSULE ORAL
Refills: 0 | Status: ACTIVE | COMMUNITY

## 2025-02-12 ENCOUNTER — APPOINTMENT (OUTPATIENT)
Dept: UROLOGY | Facility: CLINIC | Age: 55
End: 2025-02-12
Payer: COMMERCIAL

## 2025-02-12 ENCOUNTER — APPOINTMENT (OUTPATIENT)
Dept: MRI IMAGING | Facility: IMAGING CENTER | Age: 55
End: 2025-02-12
Payer: COMMERCIAL

## 2025-02-12 ENCOUNTER — OUTPATIENT (OUTPATIENT)
Dept: OUTPATIENT SERVICES | Facility: HOSPITAL | Age: 55
LOS: 1 days | End: 2025-02-12
Payer: COMMERCIAL

## 2025-02-12 ENCOUNTER — NON-APPOINTMENT (OUTPATIENT)
Age: 55
End: 2025-02-12

## 2025-02-12 VITALS
SYSTOLIC BLOOD PRESSURE: 106 MMHG | HEART RATE: 73 BPM | OXYGEN SATURATION: 100 % | RESPIRATION RATE: 17 BRPM | DIASTOLIC BLOOD PRESSURE: 69 MMHG

## 2025-02-12 DIAGNOSIS — N89.8 OTHER SPECIFIED NONINFLAMMATORY DISORDERS OF VAGINA: ICD-10-CM

## 2025-02-12 DIAGNOSIS — Z90.49 ACQUIRED ABSENCE OF OTHER SPECIFIED PARTS OF DIGESTIVE TRACT: Chronic | ICD-10-CM

## 2025-02-12 DIAGNOSIS — R51.9 HEADACHE, UNSPECIFIED: ICD-10-CM

## 2025-02-12 DIAGNOSIS — R39.9 UNSPECIFIED SYMPTOMS AND SIGNS INVOLVING THE GENITOURINARY SYSTEM: ICD-10-CM

## 2025-02-12 LAB
25(OH)D3 SERPL-MCNC: 18 NG/ML
ALBUMIN SERPL ELPH-MCNC: 4.4 G/DL
ALP BLD-CCNC: 43 U/L
ALT SERPL-CCNC: 6 U/L
ANION GAP SERPL CALC-SCNC: 14 MMOL/L
AST SERPL-CCNC: 12 U/L
BASOPHILS # BLD AUTO: 0.05 K/UL
BASOPHILS NFR BLD AUTO: 0.9 %
BILIRUB DIRECT SERPL-MCNC: 0.1 MG/DL
BILIRUB INDIRECT SERPL-MCNC: 0.2 MG/DL
BILIRUB SERPL-MCNC: 0.3 MG/DL
BUN SERPL-MCNC: 19 MG/DL
CALCIUM SERPL-MCNC: 10 MG/DL
CHLORIDE SERPL-SCNC: 105 MMOL/L
CHOLEST SERPL-MCNC: 238 MG/DL
CK SERPL-CCNC: 121 U/L
CO2 SERPL-SCNC: 22 MMOL/L
CREAT SERPL-MCNC: 1.1 MG/DL
EGFR: 59 ML/MIN/1.73M2
EOSINOPHIL # BLD AUTO: 0.11 K/UL
EOSINOPHIL NFR BLD AUTO: 1.9 %
ESTIMATED AVERAGE GLUCOSE: 91 MG/DL
FERRITIN SERPL-MCNC: 96 NG/ML
FOLATE SERPL-MCNC: 5.3 NG/ML
GLUCOSE SERPL-MCNC: 66 MG/DL
HBA1C MFR BLD HPLC: 4.8 %
HCT VFR BLD CALC: 38.4 %
HDLC SERPL-MCNC: 70 MG/DL
HGB BLD-MCNC: 12.7 G/DL
IMM GRANULOCYTES NFR BLD AUTO: 0.2 %
IRON SATN MFR SERPL: 27 %
IRON SERPL-MCNC: 77 UG/DL
LDLC SERPL CALC-MCNC: 146 MG/DL
LYMPHOCYTES # BLD AUTO: 2.52 K/UL
LYMPHOCYTES NFR BLD AUTO: 43.9 %
MAN DIFF?: NORMAL
MCHC RBC-ENTMCNC: 31.8 PG
MCHC RBC-ENTMCNC: 33.1 G/DL
MCV RBC AUTO: 96.2 FL
MONOCYTES # BLD AUTO: 0.67 K/UL
MONOCYTES NFR BLD AUTO: 11.7 %
NEUTROPHILS # BLD AUTO: 2.38 K/UL
NEUTROPHILS NFR BLD AUTO: 41.4 %
NONHDLC SERPL-MCNC: 167 MG/DL
PLATELET # BLD AUTO: 283 K/UL
POTASSIUM SERPL-SCNC: 4.5 MMOL/L
PROT SERPL-MCNC: 6.9 G/DL
RBC # BLD: 3.99 M/UL
RBC # FLD: 12.5 %
SODIUM SERPL-SCNC: 140 MMOL/L
T4 FREE SERPL-MCNC: 1.8 NG/DL
TIBC SERPL-MCNC: 286 UG/DL
TRIGL SERPL-MCNC: 124 MG/DL
TSH SERPL-ACNC: 1.14 UIU/ML
UIBC SERPL-MCNC: 208 UG/DL
VIT B12 SERPL-MCNC: 1906 PG/ML
WBC # FLD AUTO: 5.74 K/UL

## 2025-02-12 PROCEDURE — 70551 MRI BRAIN STEM W/O DYE: CPT | Mod: 26

## 2025-02-12 PROCEDURE — 99214 OFFICE O/P EST MOD 30 MIN: CPT | Mod: 25

## 2025-02-12 PROCEDURE — 51701 INSERT BLADDER CATHETER: CPT

## 2025-02-12 PROCEDURE — 99459 PELVIC EXAMINATION: CPT

## 2025-02-12 PROCEDURE — 70551 MRI BRAIN STEM W/O DYE: CPT

## 2025-02-12 RX ORDER — ERGOCALCIFEROL 1.25 MG/1
1.25 MG CAPSULE, LIQUID FILLED ORAL
Qty: 6 | Refills: 0 | Status: ACTIVE | COMMUNITY
Start: 2025-02-12 | End: 1900-01-01

## 2025-02-13 ENCOUNTER — APPOINTMENT (OUTPATIENT)
Dept: DERMATOLOGY | Facility: CLINIC | Age: 55
End: 2025-02-13
Payer: COMMERCIAL

## 2025-02-13 ENCOUNTER — TRANSCRIPTION ENCOUNTER (OUTPATIENT)
Age: 55
End: 2025-02-13

## 2025-02-13 DIAGNOSIS — L30.9 DERMATITIS, UNSPECIFIED: ICD-10-CM

## 2025-02-13 DIAGNOSIS — L30.4 ERYTHEMA INTERTRIGO: ICD-10-CM

## 2025-02-13 DIAGNOSIS — R79.89 OTHER SPECIFIED ABNORMAL FINDINGS OF BLOOD CHEMISTRY: ICD-10-CM

## 2025-02-13 LAB
CANDIDA VAG CYTO: DETECTED
G VAGINALIS+PREV SP MTYP VAG QL MICRO: NOT DETECTED
T VAGINALIS VAG QL WET PREP: NOT DETECTED

## 2025-02-13 PROCEDURE — 99204 OFFICE O/P NEW MOD 45 MIN: CPT

## 2025-02-13 RX ORDER — TACROLIMUS 1 MG/G
0.1 OINTMENT TOPICAL
Qty: 1 | Refills: 4 | Status: ACTIVE | COMMUNITY
Start: 2025-02-13 | End: 1900-01-01

## 2025-02-13 RX ORDER — TRIAMCINOLONE ACETONIDE 1 MG/G
0.1 OINTMENT TOPICAL
Qty: 1 | Refills: 2 | Status: ACTIVE | COMMUNITY
Start: 2025-02-13 | End: 1900-01-01

## 2025-02-13 RX ORDER — KETOCONAZOLE 20 MG/G
2 CREAM TOPICAL
Qty: 1 | Refills: 11 | Status: ACTIVE | COMMUNITY
Start: 2025-02-13 | End: 1900-01-01

## 2025-02-13 RX ORDER — FLUCONAZOLE 150 MG/1
150 TABLET ORAL
Qty: 2 | Refills: 0 | Status: ACTIVE | COMMUNITY
Start: 2025-02-13 | End: 1900-01-01

## 2025-02-14 ENCOUNTER — TRANSCRIPTION ENCOUNTER (OUTPATIENT)
Age: 55
End: 2025-02-14

## 2025-02-14 ENCOUNTER — APPOINTMENT (OUTPATIENT)
Dept: CARDIOLOGY | Facility: CLINIC | Age: 55
End: 2025-02-14
Payer: COMMERCIAL

## 2025-02-14 LAB — BACTERIA UR CULT: NORMAL

## 2025-02-14 PROCEDURE — 93880 EXTRACRANIAL BILAT STUDY: CPT

## 2025-02-18 NOTE — ED ADULT NURSE NOTE - NS ED NURSE RECORD ANOTHER HT AND WT
Dr. Abarca aware of admission, cat 1 tracing and c/o tightening last night.  To examine cervix, if cervix closed, to discharge and keep next scheduled appointment.  Cervix closed and posterior.  Discharge instructions reviewed with patient and  and both verbalize understanding. Pt to keep appointment for Friday.    Yes

## 2025-02-19 ENCOUNTER — APPOINTMENT (OUTPATIENT)
Dept: UROLOGY | Facility: CLINIC | Age: 55
End: 2025-02-19
Payer: COMMERCIAL

## 2025-02-19 VITALS
HEART RATE: 80 BPM | OXYGEN SATURATION: 100 % | DIASTOLIC BLOOD PRESSURE: 84 MMHG | SYSTOLIC BLOOD PRESSURE: 131 MMHG | RESPIRATION RATE: 17 BRPM

## 2025-02-19 DIAGNOSIS — N39.0 URINARY TRACT INFECTION, SITE NOT SPECIFIED: ICD-10-CM

## 2025-02-19 PROCEDURE — 52000 CYSTOURETHROSCOPY: CPT

## 2025-02-20 ENCOUNTER — OUTPATIENT (OUTPATIENT)
Dept: OUTPATIENT SERVICES | Facility: HOSPITAL | Age: 55
LOS: 1 days | Discharge: HOME | End: 2025-02-20

## 2025-02-20 DIAGNOSIS — Z90.49 ACQUIRED ABSENCE OF OTHER SPECIFIED PARTS OF DIGESTIVE TRACT: Chronic | ICD-10-CM

## 2025-02-20 DIAGNOSIS — Z98.890 OTHER SPECIFIED POSTPROCEDURAL STATES: Chronic | ICD-10-CM

## 2025-02-20 DIAGNOSIS — D64.9 ANEMIA, UNSPECIFIED: ICD-10-CM

## 2025-02-20 DIAGNOSIS — T14.8XXA OTHER INJURY OF UNSPECIFIED BODY REGION, INITIAL ENCOUNTER: Chronic | ICD-10-CM

## 2025-02-25 ENCOUNTER — LABORATORY RESULT (OUTPATIENT)
Age: 55
End: 2025-02-25

## 2025-02-25 ENCOUNTER — RESULT REVIEW (OUTPATIENT)
Age: 55
End: 2025-02-25

## 2025-02-25 ENCOUNTER — APPOINTMENT (OUTPATIENT)
Dept: HEMATOLOGY ONCOLOGY | Facility: CLINIC | Age: 55
End: 2025-02-25
Payer: COMMERCIAL

## 2025-02-25 VITALS
HEIGHT: 61 IN | BODY MASS INDEX: 25.93 KG/M2 | OXYGEN SATURATION: 99 % | RESPIRATION RATE: 17 BRPM | HEART RATE: 87 BPM | WEIGHT: 137.35 LBS | SYSTOLIC BLOOD PRESSURE: 136 MMHG | TEMPERATURE: 97.2 F | DIASTOLIC BLOOD PRESSURE: 83 MMHG

## 2025-02-25 DIAGNOSIS — T14.8XXA OTHER INJURY OF UNSPECIFIED BODY REGION, INITIAL ENCOUNTER: ICD-10-CM

## 2025-02-25 LAB
BASOPHILS # BLD AUTO: 0.04 K/UL — SIGNIFICANT CHANGE UP (ref 0–0.2)
BASOPHILS NFR BLD AUTO: 0.7 % — SIGNIFICANT CHANGE UP (ref 0–2)
EOSINOPHIL # BLD AUTO: 0.1 K/UL — SIGNIFICANT CHANGE UP (ref 0–0.5)
EOSINOPHIL NFR BLD AUTO: 1.9 % — SIGNIFICANT CHANGE UP (ref 0–6)
HCT VFR BLD CALC: 36.8 % — SIGNIFICANT CHANGE UP (ref 34.5–45)
HGB BLD-MCNC: 12.1 G/DL — SIGNIFICANT CHANGE UP (ref 11.5–15.5)
IMM GRANULOCYTES NFR BLD AUTO: 1.1 % — HIGH (ref 0–0.9)
LYMPHOCYTES # BLD AUTO: 1.92 K/UL — SIGNIFICANT CHANGE UP (ref 1–3.3)
LYMPHOCYTES # BLD AUTO: 35.6 % — SIGNIFICANT CHANGE UP (ref 13–44)
MCHC RBC-ENTMCNC: 31.9 PG — SIGNIFICANT CHANGE UP (ref 27–34)
MCHC RBC-ENTMCNC: 32.9 G/DL — SIGNIFICANT CHANGE UP (ref 32–36)
MCV RBC AUTO: 97.1 FL — SIGNIFICANT CHANGE UP (ref 80–100)
MONOCYTES # BLD AUTO: 0.62 K/UL — SIGNIFICANT CHANGE UP (ref 0–0.9)
MONOCYTES NFR BLD AUTO: 11.5 % — SIGNIFICANT CHANGE UP (ref 2–14)
NEUTROPHILS # BLD AUTO: 2.66 K/UL — SIGNIFICANT CHANGE UP (ref 1.8–7.4)
NEUTROPHILS NFR BLD AUTO: 49.2 % — SIGNIFICANT CHANGE UP (ref 43–77)
NRBC BLD AUTO-RTO: 0 /100 WBCS — SIGNIFICANT CHANGE UP (ref 0–0)
PLATELET # BLD AUTO: 266 K/UL — SIGNIFICANT CHANGE UP (ref 150–400)
RBC # BLD: 3.79 M/UL — LOW (ref 3.8–5.2)
RBC # FLD: 12.1 % — SIGNIFICANT CHANGE UP (ref 10.3–14.5)
WBC # BLD: 5.4 K/UL — SIGNIFICANT CHANGE UP (ref 3.8–10.5)
WBC # FLD AUTO: 5.4 K/UL — SIGNIFICANT CHANGE UP (ref 3.8–10.5)

## 2025-02-25 PROCEDURE — 99214 OFFICE O/P EST MOD 30 MIN: CPT

## 2025-02-26 ENCOUNTER — APPOINTMENT (OUTPATIENT)
Dept: UROLOGY | Facility: CLINIC | Age: 55
End: 2025-02-26
Payer: COMMERCIAL

## 2025-02-26 VITALS
WEIGHT: 137 LBS | HEART RATE: 85 BPM | RESPIRATION RATE: 16 BRPM | SYSTOLIC BLOOD PRESSURE: 101 MMHG | DIASTOLIC BLOOD PRESSURE: 67 MMHG | HEIGHT: 61 IN | OXYGEN SATURATION: 98 % | TEMPERATURE: 98 F | BODY MASS INDEX: 25.86 KG/M2

## 2025-02-26 LAB
APTT BLD: 29 SEC
INR PPP: 0.89 RATIO
PT BLD: 10.6 SEC

## 2025-02-26 PROCEDURE — 51720 TREATMENT OF BLADDER LESION: CPT

## 2025-02-27 LAB
APPEARANCE: ABNORMAL
BACTERIA: NEGATIVE /HPF
BILIRUBIN URINE: NORMAL
BLOOD URINE: NORMAL
CAST: 2 /LPF
COLOR: ABNORMAL
EPITHELIAL CELLS: 3 /HPF
GLUCOSE QUALITATIVE U: NORMAL
KETONES URINE: NORMAL
LEUKOCYTE ESTERASE URINE: NORMAL
MICROSCOPIC-UA: NORMAL
NITRITE URINE: NORMAL
PH URINE: NORMAL
PROTEIN URINE: NORMAL
RED BLOOD CELLS URINE: 2 /HPF
SPECIFIC GRAVITY URINE: NORMAL
UROBILINOGEN URINE: NORMAL
WHITE BLOOD CELLS URINE: 0 /HPF

## 2025-02-28 ENCOUNTER — TRANSCRIPTION ENCOUNTER (OUTPATIENT)
Age: 55
End: 2025-02-28

## 2025-02-28 LAB
BACTERIA UR CULT: NORMAL
FACT IX ACT/NOR PPP: 102 %
FACT VII ACT/NOR PPP: 112 %
FACT VIII ACT/NOR PPP: 143 %
FACT X ACT/NOR PPP: 119 %
FACT XII PPP CHRO-ACNC: 95 %
PT BLD: 122 %
VWF AG PPP IA-ACNC: 137 %
VWF:RCO ACT/NOR PPP PL AGG: 130 %

## 2025-03-02 PROCEDURE — 93241 XTRNL ECG REC>48HR<7D: CPT

## 2025-03-03 NOTE — H&P CARDIOLOGY - EKG AND INTERPRETATION
Landon called in because his medication was denied; I did discuss why it was denied and advised that he would need to keep those appointments for us to fill the medications. He stated that him and Branden have discussed his difficult schedule that makes it hard for him to come into the office for the appointments. He stated that he doesn't mind to come into the office for his appointments if he had known about the previous appointments. I double checked the notifications that he would get via text and [employer] to ensure that he was getting those notifications.       Manjinder asked if he would be able to have a refill on the medication to last him until he is can do the video visit on visit on 03/13/25.     Please advise   SR @ 72, T/St abnormality in II, III, aVF

## 2025-03-04 LAB — VWF MULTIMERS PPP IA-ACNC: NORMAL

## 2025-03-05 ENCOUNTER — APPOINTMENT (OUTPATIENT)
Dept: UROLOGY | Facility: CLINIC | Age: 55
End: 2025-03-05
Payer: COMMERCIAL

## 2025-03-05 VITALS
SYSTOLIC BLOOD PRESSURE: 122 MMHG | RESPIRATION RATE: 16 BRPM | DIASTOLIC BLOOD PRESSURE: 83 MMHG | OXYGEN SATURATION: 99 % | HEART RATE: 72 BPM

## 2025-03-05 PROCEDURE — 51701 INSERT BLADDER CATHETER: CPT

## 2025-03-07 LAB — FACTOR XIII ACTIVITY: 138 % ACTIV.

## 2025-03-12 ENCOUNTER — APPOINTMENT (OUTPATIENT)
Dept: UROLOGY | Facility: CLINIC | Age: 55
End: 2025-03-12

## 2025-03-13 ENCOUNTER — APPOINTMENT (OUTPATIENT)
Dept: INTERNAL MEDICINE | Facility: CLINIC | Age: 55
End: 2025-03-13
Payer: COMMERCIAL

## 2025-03-13 VITALS
HEIGHT: 61 IN | SYSTOLIC BLOOD PRESSURE: 122 MMHG | DIASTOLIC BLOOD PRESSURE: 82 MMHG | BODY MASS INDEX: 25.49 KG/M2 | HEART RATE: 74 BPM | OXYGEN SATURATION: 99 % | TEMPERATURE: 98 F | WEIGHT: 135 LBS

## 2025-03-13 VITALS — SYSTOLIC BLOOD PRESSURE: 126 MMHG | DIASTOLIC BLOOD PRESSURE: 82 MMHG

## 2025-03-13 VITALS — SYSTOLIC BLOOD PRESSURE: 116 MMHG | DIASTOLIC BLOOD PRESSURE: 80 MMHG

## 2025-03-13 DIAGNOSIS — R42 DIZZINESS AND GIDDINESS: ICD-10-CM

## 2025-03-13 PROCEDURE — G2211 COMPLEX E/M VISIT ADD ON: CPT

## 2025-03-13 PROCEDURE — 99213 OFFICE O/P EST LOW 20 MIN: CPT

## 2025-03-17 ENCOUNTER — APPOINTMENT (OUTPATIENT)
Dept: OTOLARYNGOLOGY | Facility: CLINIC | Age: 55
End: 2025-03-17

## 2025-03-18 ENCOUNTER — OUTPATIENT (OUTPATIENT)
Dept: OUTPATIENT SERVICES | Facility: HOSPITAL | Age: 55
LOS: 1 days | End: 2025-03-18
Payer: COMMERCIAL

## 2025-03-18 ENCOUNTER — APPOINTMENT (OUTPATIENT)
Dept: MRI IMAGING | Facility: IMAGING CENTER | Age: 55
End: 2025-03-18
Payer: COMMERCIAL

## 2025-03-18 DIAGNOSIS — Z90.49 ACQUIRED ABSENCE OF OTHER SPECIFIED PARTS OF DIGESTIVE TRACT: Chronic | ICD-10-CM

## 2025-03-18 DIAGNOSIS — R42 DIZZINESS AND GIDDINESS: ICD-10-CM

## 2025-03-18 DIAGNOSIS — T14.8XXA OTHER INJURY OF UNSPECIFIED BODY REGION, INITIAL ENCOUNTER: Chronic | ICD-10-CM

## 2025-03-18 PROCEDURE — 70553 MRI BRAIN STEM W/O & W/DYE: CPT | Mod: 26

## 2025-03-18 PROCEDURE — A9585: CPT

## 2025-03-18 PROCEDURE — 70553 MRI BRAIN STEM W/O & W/DYE: CPT

## 2025-03-26 ENCOUNTER — APPOINTMENT (OUTPATIENT)
Dept: DERMATOLOGY | Facility: CLINIC | Age: 55
End: 2025-03-26

## 2025-03-26 PROCEDURE — 99214 OFFICE O/P EST MOD 30 MIN: CPT | Mod: 25

## 2025-03-26 PROCEDURE — 95044 PATCH/APPLICATION TESTS: CPT

## 2025-03-26 RX ORDER — HALOBETASOL PROPIONATE 0.5 MG/G
0.05 OINTMENT TOPICAL
Qty: 1 | Refills: 4 | Status: ACTIVE | COMMUNITY
Start: 2025-03-26 | End: 1900-01-01

## 2025-03-28 ENCOUNTER — APPOINTMENT (OUTPATIENT)
Dept: DERMATOLOGY | Facility: CLINIC | Age: 55
End: 2025-03-28
Payer: COMMERCIAL

## 2025-03-28 PROCEDURE — 99212 OFFICE O/P EST SF 10 MIN: CPT

## 2025-03-31 ENCOUNTER — APPOINTMENT (OUTPATIENT)
Dept: DERMATOLOGY | Facility: CLINIC | Age: 55
End: 2025-03-31

## 2025-03-31 DIAGNOSIS — L30.9 DERMATITIS, UNSPECIFIED: ICD-10-CM

## 2025-03-31 PROCEDURE — G2211 COMPLEX E/M VISIT ADD ON: CPT

## 2025-03-31 PROCEDURE — 99214 OFFICE O/P EST MOD 30 MIN: CPT

## 2025-03-31 RX ORDER — CLOBETASOL PROPIONATE 0.5 MG/G
0.05 OINTMENT TOPICAL
Qty: 1 | Refills: 1 | Status: ACTIVE | COMMUNITY
Start: 2025-03-27 | End: 1900-01-01

## 2025-04-02 ENCOUNTER — APPOINTMENT (OUTPATIENT)
Dept: UROLOGY | Facility: CLINIC | Age: 55
End: 2025-04-02
Payer: COMMERCIAL

## 2025-04-02 VITALS
OXYGEN SATURATION: 100 % | HEART RATE: 85 BPM | SYSTOLIC BLOOD PRESSURE: 130 MMHG | DIASTOLIC BLOOD PRESSURE: 88 MMHG | RESPIRATION RATE: 16 BRPM

## 2025-04-02 PROCEDURE — 51701 INSERT BLADDER CATHETER: CPT

## 2025-04-03 LAB
APPEARANCE: CLEAR
BACTERIA: NEGATIVE /HPF
BILIRUBIN URINE: NEGATIVE
BLOOD URINE: NEGATIVE
CAST: 2 /LPF
COLOR: ABNORMAL
EPITHELIAL CELLS: 2 /HPF
GLUCOSE QUALITATIVE U: NEGATIVE MG/DL
KETONES URINE: NEGATIVE MG/DL
LEUKOCYTE ESTERASE URINE: NEGATIVE
MICROSCOPIC-UA: NORMAL
NITRITE URINE: NEGATIVE
PH URINE: 5.5
PROTEIN URINE: NEGATIVE MG/DL
RED BLOOD CELLS URINE: 0 /HPF
SPECIFIC GRAVITY URINE: 1.03
UROBILINOGEN URINE: 0.2 MG/DL
WHITE BLOOD CELLS URINE: 0 /HPF

## 2025-04-04 ENCOUNTER — RX RENEWAL (OUTPATIENT)
Age: 55
End: 2025-04-04

## 2025-04-04 ENCOUNTER — TRANSCRIPTION ENCOUNTER (OUTPATIENT)
Age: 55
End: 2025-04-04

## 2025-04-04 LAB — BACTERIA UR CULT: NORMAL

## 2025-04-16 ENCOUNTER — APPOINTMENT (OUTPATIENT)
Dept: UROLOGY | Facility: CLINIC | Age: 55
End: 2025-04-16

## 2025-04-16 ENCOUNTER — APPOINTMENT (OUTPATIENT)
Dept: UROLOGY | Facility: CLINIC | Age: 55
End: 2025-04-16
Payer: COMMERCIAL

## 2025-04-16 VITALS
DIASTOLIC BLOOD PRESSURE: 85 MMHG | SYSTOLIC BLOOD PRESSURE: 132 MMHG | RESPIRATION RATE: 16 BRPM | OXYGEN SATURATION: 98 % | HEART RATE: 83 BPM

## 2025-04-16 PROCEDURE — 51701 INSERT BLADDER CATHETER: CPT

## 2025-04-17 LAB
APPEARANCE: CLEAR
BACTERIA: NEGATIVE /HPF
BILIRUBIN URINE: NEGATIVE
BLOOD URINE: NEGATIVE
CAST: NORMAL /LPF
COLOR: NORMAL
EPITHELIAL CELLS: 1 /HPF
GLUCOSE QUALITATIVE U: NEGATIVE MG/DL
KETONES URINE: NEGATIVE MG/DL
LEUKOCYTE ESTERASE URINE: NEGATIVE
MICROSCOPIC-UA: NORMAL
NITRITE URINE: POSITIVE
PH URINE: 7
PROTEIN URINE: NEGATIVE MG/DL
RED BLOOD CELLS URINE: NORMAL /HPF
REVIEW: NORMAL
SPECIFIC GRAVITY URINE: 1.02
UROBILINOGEN URINE: 1 MG/DL
WHITE BLOOD CELLS URINE: 0 /HPF

## 2025-04-18 LAB — BACTERIA UR CULT: NORMAL

## 2025-04-21 ENCOUNTER — APPOINTMENT (OUTPATIENT)
Dept: DERMATOLOGY | Facility: CLINIC | Age: 55
End: 2025-04-21
Payer: COMMERCIAL

## 2025-04-21 VITALS — WEIGHT: 137 LBS | BODY MASS INDEX: 25.89 KG/M2

## 2025-04-21 DIAGNOSIS — L30.9 DERMATITIS, UNSPECIFIED: ICD-10-CM

## 2025-04-21 DIAGNOSIS — D48.9 NEOPLASM OF UNCERTAIN BEHAVIOR, UNSPECIFIED: ICD-10-CM

## 2025-04-21 PROCEDURE — 99214 OFFICE O/P EST MOD 30 MIN: CPT | Mod: 25

## 2025-04-21 PROCEDURE — 11102 TANGNTL BX SKIN SINGLE LES: CPT

## 2025-04-22 PROBLEM — D48.9 NEOPLASM OF UNCERTAIN BEHAVIOR: Status: ACTIVE | Noted: 2025-04-21

## 2025-04-24 LAB — DERMATOLOGY BIOPSY: NORMAL

## 2025-04-29 ENCOUNTER — NON-APPOINTMENT (OUTPATIENT)
Age: 55
End: 2025-04-29

## 2025-04-29 ENCOUNTER — APPOINTMENT (OUTPATIENT)
Dept: UROLOGY | Facility: CLINIC | Age: 55
End: 2025-04-29
Payer: COMMERCIAL

## 2025-04-29 VITALS
SYSTOLIC BLOOD PRESSURE: 126 MMHG | HEART RATE: 65 BPM | OXYGEN SATURATION: 100 % | DIASTOLIC BLOOD PRESSURE: 80 MMHG | RESPIRATION RATE: 16 BRPM

## 2025-04-29 PROCEDURE — 51701 INSERT BLADDER CATHETER: CPT

## 2025-05-05 ENCOUNTER — APPOINTMENT (OUTPATIENT)
Dept: UROLOGY | Facility: CLINIC | Age: 55
End: 2025-05-05

## 2025-05-06 ENCOUNTER — APPOINTMENT (OUTPATIENT)
Dept: INTERNAL MEDICINE | Facility: CLINIC | Age: 55
End: 2025-05-06
Payer: COMMERCIAL

## 2025-05-06 VITALS
OXYGEN SATURATION: 99 % | WEIGHT: 139 LBS | DIASTOLIC BLOOD PRESSURE: 77 MMHG | BODY MASS INDEX: 26.24 KG/M2 | HEIGHT: 61 IN | HEART RATE: 76 BPM | SYSTOLIC BLOOD PRESSURE: 129 MMHG | TEMPERATURE: 98.1 F

## 2025-05-06 DIAGNOSIS — M25.512 PAIN IN RIGHT SHOULDER: ICD-10-CM

## 2025-05-06 DIAGNOSIS — R82.90 UNSPECIFIED ABNORMAL FINDINGS IN URINE: ICD-10-CM

## 2025-05-06 DIAGNOSIS — R10.32 LEFT LOWER QUADRANT PAIN: ICD-10-CM

## 2025-05-06 DIAGNOSIS — M25.511 PAIN IN RIGHT SHOULDER: ICD-10-CM

## 2025-05-06 DIAGNOSIS — M53.3 SACROCOCCYGEAL DISORDERS, NOT ELSEWHERE CLASSIFIED: ICD-10-CM

## 2025-05-06 DIAGNOSIS — R93.89 ABNORMAL FINDINGS ON DIAGNOSTIC IMAGING OF OTHER SPECIFIED BODY STRUCTURES: ICD-10-CM

## 2025-05-06 DIAGNOSIS — M19.011 PRIMARY OSTEOARTHRITIS, RIGHT SHOULDER: ICD-10-CM

## 2025-05-06 DIAGNOSIS — M79.662 PAIN IN RIGHT LOWER LEG: ICD-10-CM

## 2025-05-06 DIAGNOSIS — M65.341 TRIGGER FINGER, RIGHT RING FINGER: ICD-10-CM

## 2025-05-06 DIAGNOSIS — R79.89 OTHER SPECIFIED ABNORMAL FINDINGS OF BLOOD CHEMISTRY: ICD-10-CM

## 2025-05-06 DIAGNOSIS — M65.331 TRIGGER FINGER, RIGHT MIDDLE FINGER: ICD-10-CM

## 2025-05-06 DIAGNOSIS — Z86.2 PERSONAL HISTORY OF DISEASES OF THE BLOOD AND BLOOD-FORMING ORGANS AND CERTAIN DISORDERS INVOLVING THE IMMUNE MECHANISM: ICD-10-CM

## 2025-05-06 DIAGNOSIS — Z87.42 PERSONAL HISTORY OF OTHER DISEASES OF THE FEMALE GENITAL TRACT: ICD-10-CM

## 2025-05-06 DIAGNOSIS — R89.9 UNSPECIFIED ABNORMAL FINDING IN SPECIMENS FROM OTHER ORGANS, SYSTEMS AND TISSUES: ICD-10-CM

## 2025-05-06 DIAGNOSIS — M79.661 PAIN IN RIGHT LOWER LEG: ICD-10-CM

## 2025-05-06 DIAGNOSIS — H93.13 TINNITUS, BILATERAL: ICD-10-CM

## 2025-05-06 DIAGNOSIS — R39.198 OTHER DIFFICULTIES WITH MICTURITION: ICD-10-CM

## 2025-05-06 DIAGNOSIS — Z87.898 PERSONAL HISTORY OF OTHER SPECIFIED CONDITIONS: ICD-10-CM

## 2025-05-06 DIAGNOSIS — R10.30 LOWER ABDOMINAL PAIN, UNSPECIFIED: ICD-10-CM

## 2025-05-06 DIAGNOSIS — H90.3 SENSORINEURAL HEARING LOSS, BILATERAL: ICD-10-CM

## 2025-05-06 DIAGNOSIS — R07.89 OTHER CHEST PAIN: ICD-10-CM

## 2025-05-06 PROCEDURE — 99214 OFFICE O/P EST MOD 30 MIN: CPT

## 2025-05-06 PROCEDURE — G2211 COMPLEX E/M VISIT ADD ON: CPT

## 2025-05-06 RX ORDER — MELOXICAM 15 MG/1
15 TABLET ORAL
Qty: 1 | Refills: 0 | Status: ACTIVE | COMMUNITY
Start: 2025-05-06 | End: 1900-01-01

## 2025-05-08 ENCOUNTER — INPATIENT (INPATIENT)
Facility: HOSPITAL | Age: 55
LOS: 2 days | Discharge: HOME | DRG: 390 | End: 2025-05-11
Admitting: SURGERY
Payer: COMMERCIAL

## 2025-05-08 VITALS
WEIGHT: 138.89 LBS | SYSTOLIC BLOOD PRESSURE: 159 MMHG | HEART RATE: 80 BPM | HEIGHT: 61 IN | RESPIRATION RATE: 20 BRPM | OXYGEN SATURATION: 100 % | DIASTOLIC BLOOD PRESSURE: 99 MMHG | TEMPERATURE: 97 F

## 2025-05-08 DIAGNOSIS — T14.8XXA OTHER INJURY OF UNSPECIFIED BODY REGION, INITIAL ENCOUNTER: Chronic | ICD-10-CM

## 2025-05-08 DIAGNOSIS — Z90.49 ACQUIRED ABSENCE OF OTHER SPECIFIED PARTS OF DIGESTIVE TRACT: Chronic | ICD-10-CM

## 2025-05-08 DIAGNOSIS — Z98.890 OTHER SPECIFIED POSTPROCEDURAL STATES: Chronic | ICD-10-CM

## 2025-05-08 PROCEDURE — 93010 ELECTROCARDIOGRAM REPORT: CPT

## 2025-05-08 PROCEDURE — 99285 EMERGENCY DEPT VISIT HI MDM: CPT

## 2025-05-08 NOTE — ED ADULT NURSE NOTE - OBJECTIVE STATEMENT
56 y/o F complaining of chest pain. Pt states that the pain began roughly 3 hours ago and has been a constant sharp pain radiating through the chest to the back. Pt also complaining of lightheadedness, nausea with 1 episode of emesis during arrival. Pt did not take any medications at home. Denies headache, vision changes, diaphoresis, fevers, chills, abd pain.  PMH CAD, hypothyroidism, DVT in September 2024 (eliquis stopped in January). AAOx4. Breathing spontaneous and unlabored. Skin warm, dry, and color normal for race.

## 2025-05-09 ENCOUNTER — APPOINTMENT (OUTPATIENT)
Dept: ULTRASOUND IMAGING | Facility: CLINIC | Age: 55
End: 2025-05-09

## 2025-05-09 ENCOUNTER — RX RENEWAL (OUTPATIENT)
Age: 55
End: 2025-05-09

## 2025-05-09 DIAGNOSIS — R07.9 CHEST PAIN, UNSPECIFIED: ICD-10-CM

## 2025-05-09 LAB
ALBUMIN SERPL ELPH-MCNC: 4.6 G/DL — SIGNIFICANT CHANGE UP (ref 3.3–5)
ALP SERPL-CCNC: 46 U/L — SIGNIFICANT CHANGE UP (ref 40–120)
ALT FLD-CCNC: 10 U/L — SIGNIFICANT CHANGE UP (ref 10–45)
ANION GAP SERPL CALC-SCNC: 17 MMOL/L — SIGNIFICANT CHANGE UP (ref 5–17)
APTT BLD: 28.6 SEC — SIGNIFICANT CHANGE UP (ref 26.1–36.8)
AST SERPL-CCNC: 14 U/L — SIGNIFICANT CHANGE UP (ref 10–40)
BASOPHILS # BLD AUTO: 0.06 K/UL — SIGNIFICANT CHANGE UP (ref 0–0.2)
BASOPHILS NFR BLD AUTO: 0.5 % — SIGNIFICANT CHANGE UP (ref 0–2)
BILIRUB SERPL-MCNC: 0.8 MG/DL — SIGNIFICANT CHANGE UP (ref 0.2–1.2)
BLD GP AB SCN SERPL QL: NEGATIVE — SIGNIFICANT CHANGE UP
BUN SERPL-MCNC: 14 MG/DL — SIGNIFICANT CHANGE UP (ref 7–23)
CALCIUM SERPL-MCNC: 10.1 MG/DL — SIGNIFICANT CHANGE UP (ref 8.4–10.5)
CHLORIDE SERPL-SCNC: 102 MMOL/L — SIGNIFICANT CHANGE UP (ref 96–108)
CO2 SERPL-SCNC: 22 MMOL/L — SIGNIFICANT CHANGE UP (ref 22–31)
CREAT SERPL-MCNC: 0.95 MG/DL — SIGNIFICANT CHANGE UP (ref 0.5–1.3)
EGFR: 70 ML/MIN/1.73M2 — SIGNIFICANT CHANGE UP
EGFR: 70 ML/MIN/1.73M2 — SIGNIFICANT CHANGE UP
EOSINOPHIL # BLD AUTO: 0.07 K/UL — SIGNIFICANT CHANGE UP (ref 0–0.5)
EOSINOPHIL NFR BLD AUTO: 0.6 % — SIGNIFICANT CHANGE UP (ref 0–6)
GAS PNL BLDV: SIGNIFICANT CHANGE UP
GLUCOSE SERPL-MCNC: 97 MG/DL — SIGNIFICANT CHANGE UP (ref 70–99)
HCT VFR BLD CALC: 41.3 % — SIGNIFICANT CHANGE UP (ref 34.5–45)
HGB BLD-MCNC: 13.8 G/DL — SIGNIFICANT CHANGE UP (ref 11.5–15.5)
IMM GRANULOCYTES NFR BLD AUTO: 0.4 % — SIGNIFICANT CHANGE UP (ref 0–0.9)
INR BLD: 1.05 RATIO — SIGNIFICANT CHANGE UP (ref 0.85–1.16)
LYMPHOCYTES # BLD AUTO: 1.92 K/UL — SIGNIFICANT CHANGE UP (ref 1–3.3)
LYMPHOCYTES # BLD AUTO: 17 % — SIGNIFICANT CHANGE UP (ref 13–44)
MCHC RBC-ENTMCNC: 32.1 PG — SIGNIFICANT CHANGE UP (ref 27–34)
MCHC RBC-ENTMCNC: 33.4 G/DL — SIGNIFICANT CHANGE UP (ref 32–36)
MCV RBC AUTO: 96 FL — SIGNIFICANT CHANGE UP (ref 80–100)
MONOCYTES # BLD AUTO: 0.86 K/UL — SIGNIFICANT CHANGE UP (ref 0–0.9)
MONOCYTES NFR BLD AUTO: 7.6 % — SIGNIFICANT CHANGE UP (ref 2–14)
NEUTROPHILS # BLD AUTO: 8.36 K/UL — HIGH (ref 1.8–7.4)
NEUTROPHILS NFR BLD AUTO: 73.9 % — SIGNIFICANT CHANGE UP (ref 43–77)
NRBC BLD AUTO-RTO: 0 /100 WBCS — SIGNIFICANT CHANGE UP (ref 0–0)
PLATELET # BLD AUTO: 299 K/UL — SIGNIFICANT CHANGE UP (ref 150–400)
POTASSIUM SERPL-MCNC: 3.6 MMOL/L — SIGNIFICANT CHANGE UP (ref 3.5–5.3)
POTASSIUM SERPL-SCNC: 3.6 MMOL/L — SIGNIFICANT CHANGE UP (ref 3.5–5.3)
PROT SERPL-MCNC: 7.5 G/DL — SIGNIFICANT CHANGE UP (ref 6–8.3)
PROTHROM AB SERPL-ACNC: 12 SEC — SIGNIFICANT CHANGE UP (ref 9.9–13.4)
RBC # BLD: 4.3 M/UL — SIGNIFICANT CHANGE UP (ref 3.8–5.2)
RBC # FLD: 12.1 % — SIGNIFICANT CHANGE UP (ref 10.3–14.5)
RH IG SCN BLD-IMP: POSITIVE — SIGNIFICANT CHANGE UP
SODIUM SERPL-SCNC: 141 MMOL/L — SIGNIFICANT CHANGE UP (ref 135–145)
TROPONIN T, HIGH SENSITIVITY RESULT: <6 NG/L — SIGNIFICANT CHANGE UP (ref 0–51)
WBC # BLD: 11.32 K/UL — HIGH (ref 3.8–10.5)
WBC # FLD AUTO: 11.32 K/UL — HIGH (ref 3.8–10.5)

## 2025-05-09 PROCEDURE — 74176 CT ABD & PELVIS W/O CONTRAST: CPT | Mod: 26

## 2025-05-09 PROCEDURE — 74177 CT ABD & PELVIS W/CONTRAST: CPT | Mod: 26

## 2025-05-09 PROCEDURE — 71045 X-RAY EXAM CHEST 1 VIEW: CPT | Mod: 26,76

## 2025-05-09 PROCEDURE — 99222 1ST HOSP IP/OBS MODERATE 55: CPT

## 2025-05-09 RX ORDER — ACETAMINOPHEN 500 MG/5ML
1000 LIQUID (ML) ORAL EVERY 6 HOURS
Refills: 0 | Status: COMPLETED | OUTPATIENT
Start: 2025-05-09 | End: 2025-05-10

## 2025-05-09 RX ORDER — LEVOTHYROXINE SODIUM 300 MCG
52 TABLET ORAL AT BEDTIME
Refills: 0 | Status: DISCONTINUED | OUTPATIENT
Start: 2025-05-09 | End: 2025-05-10

## 2025-05-09 RX ORDER — BENZOCAINE 220 MG/G
1 SPRAY, METERED PERIODONTAL
Refills: 0 | Status: DISCONTINUED | OUTPATIENT
Start: 2025-05-09 | End: 2025-05-09

## 2025-05-09 RX ORDER — BENZOCAINE 220 MG/G
1 SPRAY, METERED PERIODONTAL ONCE
Refills: 0 | Status: DISCONTINUED | OUTPATIENT
Start: 2025-05-09 | End: 2025-05-09

## 2025-05-09 RX ORDER — DIPHENHYDRAMINE HCL 12.5MG/5ML
50 ELIXIR ORAL ONCE
Refills: 0 | Status: COMPLETED | OUTPATIENT
Start: 2025-05-09 | End: 2025-05-09

## 2025-05-09 RX ORDER — SODIUM CHLORIDE 9 G/1000ML
1000 INJECTION, SOLUTION INTRAVENOUS
Refills: 0 | Status: DISCONTINUED | OUTPATIENT
Start: 2025-05-09 | End: 2025-05-11

## 2025-05-09 RX ORDER — ASPIRIN 325 MG
0 TABLET ORAL
Refills: 0 | DISCHARGE

## 2025-05-09 RX ORDER — METHYLPREDNISOLONE ACETATE 80 MG/ML
40 INJECTION, SUSPENSION INTRA-ARTICULAR; INTRALESIONAL; INTRAMUSCULAR; SOFT TISSUE ONCE
Refills: 0 | Status: COMPLETED | OUTPATIENT
Start: 2025-05-09 | End: 2025-05-09

## 2025-05-09 RX ORDER — LEVOTHYROXINE SODIUM 300 MCG
1 TABLET ORAL
Refills: 0 | DISCHARGE

## 2025-05-09 RX ORDER — ONDANSETRON HCL/PF 4 MG/2 ML
4 VIAL (ML) INJECTION ONCE
Refills: 0 | Status: COMPLETED | OUTPATIENT
Start: 2025-05-09 | End: 2025-05-09

## 2025-05-09 RX ORDER — MAGNESIUM, ALUMINUM HYDROXIDE 200-200 MG
30 TABLET,CHEWABLE ORAL ONCE
Refills: 0 | Status: COMPLETED | OUTPATIENT
Start: 2025-05-09 | End: 2025-05-09

## 2025-05-09 RX ORDER — ACETAMINOPHEN 500 MG/5ML
1000 LIQUID (ML) ORAL ONCE
Refills: 0 | Status: COMPLETED | OUTPATIENT
Start: 2025-05-09 | End: 2025-05-09

## 2025-05-09 RX ORDER — ENOXAPARIN SODIUM 100 MG/ML
40 INJECTION SUBCUTANEOUS EVERY 24 HOURS
Refills: 0 | Status: DISCONTINUED | OUTPATIENT
Start: 2025-05-09 | End: 2025-05-11

## 2025-05-09 RX ADMIN — SODIUM CHLORIDE 125 MILLILITER(S): 9 INJECTION, SOLUTION INTRAVENOUS at 01:52

## 2025-05-09 RX ADMIN — Medication 1000 MILLIGRAM(S): at 22:44

## 2025-05-09 RX ADMIN — METHYLPREDNISOLONE ACETATE 40 MILLIGRAM(S): 80 INJECTION, SUSPENSION INTRA-ARTICULAR; INTRALESIONAL; INTRAMUSCULAR; SOFT TISSUE at 19:46

## 2025-05-09 RX ADMIN — Medication 400 MILLIGRAM(S): at 14:39

## 2025-05-09 RX ADMIN — Medication 52 MICROGRAM(S): at 22:13

## 2025-05-09 RX ADMIN — Medication 1000 MILLILITER(S): at 00:19

## 2025-05-09 RX ADMIN — SODIUM CHLORIDE 125 MILLILITER(S): 9 INJECTION, SOLUTION INTRAVENOUS at 14:39

## 2025-05-09 RX ADMIN — ENOXAPARIN SODIUM 40 MILLIGRAM(S): 100 INJECTION SUBCUTANEOUS at 08:15

## 2025-05-09 RX ADMIN — Medication 400 MILLIGRAM(S): at 08:14

## 2025-05-09 RX ADMIN — Medication 400 MILLIGRAM(S): at 22:14

## 2025-05-09 RX ADMIN — METHYLPREDNISOLONE ACETATE 40 MILLIGRAM(S): 80 INJECTION, SUSPENSION INTRA-ARTICULAR; INTRALESIONAL; INTRAMUSCULAR; SOFT TISSUE at 14:37

## 2025-05-09 RX ADMIN — Medication 4 MILLIGRAM(S): at 00:15

## 2025-05-09 RX ADMIN — Medication 1000 MILLIGRAM(S): at 08:45

## 2025-05-09 RX ADMIN — METHYLPREDNISOLONE ACETATE 40 MILLIGRAM(S): 80 INJECTION, SUSPENSION INTRA-ARTICULAR; INTRALESIONAL; INTRAMUSCULAR; SOFT TISSUE at 08:15

## 2025-05-09 RX ADMIN — SODIUM CHLORIDE 125 MILLILITER(S): 9 INJECTION, SOLUTION INTRAVENOUS at 11:40

## 2025-05-09 RX ADMIN — Medication 4 MILLIGRAM(S): at 20:38

## 2025-05-09 RX ADMIN — Medication 1000 MILLIGRAM(S): at 15:39

## 2025-05-09 RX ADMIN — Medication 20 MILLIGRAM(S): at 00:15

## 2025-05-09 RX ADMIN — Medication 1 SPRAY(S): at 11:51

## 2025-05-09 RX ADMIN — Medication 400 MILLIGRAM(S): at 05:09

## 2025-05-09 RX ADMIN — Medication 50 MILLIGRAM(S): at 19:46

## 2025-05-09 NOTE — ED PROVIDER NOTE - PROGRESS NOTE DETAILS
[place] PGY2: ct ap with high grade SBO.  surg consulted. moe- discussed with surg and the patient was admitted for further evaluation and treatment/management

## 2025-05-09 NOTE — CONSULT NOTE ADULT - SUBJECTIVE AND OBJECTIVE BOX
DATE OF SERVICE: 25     CHIEF COMPLAINT:Patient is a 55y old  Female who presents with a chief complaint of SBO (09 May 2025 04:08)      HISTORY OF PRESENT ILLNESS:HPI:  HPI: 52yr F with medical history of HTN, HLD, hypothyroidism, CAD (on ASA) and surgical history of  and lap LAR for diverticulitis (DR. Medrano, ) presenting to the ED with epigastric abdominal pain and chest pain. Patient reports intermittent abdominal pain for the past week. Last night at 6PM patient developed acute onset chest pain with radiation to the epigastric region associated with nausea and one episode of emesis, She reports passing flatus and having bowel movements yesterday morning. No history of prior SBOs. In the ED she is hemodynamically stable, hypertensive to 170s. Labs with WBC 11.3, lactate 1.9. CT A/P non-con (contrast allergy) demonstrating dilated loops of small bowel to 3.4cm with a transition point in the RLQ concerning for high grade SBO. (09 May 2025 04:08)      PAST MEDICAL & SURGICAL HISTORY:  Irritable Bowel Syndrome      h/o Heart Palpitations      Diverticulitis  last hospitalized 9.19      Hypothyroid      Sleep apnea  nasal mask- settings unknown      Hypertension      C Section - x 1994      h/o  Endoscopy      History of Colonoscopy      h/o dental implant      H/O vaginal surgery  vaginal mesh      Torn ligament  left thumb 2020      History of colectomy              MEDICATIONS:  enoxaparin Injectable 40 milliGRAM(s) SubCutaneous every 24 hours        acetaminophen   IVPB .. 1000 milliGRAM(s) IV Intermittent every 6 hours PRN      levothyroxine Injectable 52 MICROGram(s) IV Push at bedtime    benzocaine/butamben/tetracaine Spray 1 Spray(s) Topical three times a day PRN  lactated ringers. 1000 milliLiter(s) IV Continuous <Continuous>      FAMILY HISTORY:  Family history of colon cancer (Grandparent)    Family history of early CAD  Father-45, Paternal Aunt Maternal Uncle    Family history of early CAD (Father, Aunt, Uncle)        Non-contributory    SOCIAL HISTORY:    [ ] not a smoker    Allergies    iodinated radiocontrast agents (Anaphylaxis; Hives; Short breath)  Zosyn (Urticaria)    Intolerances    	    REVIEW OF SYSTEMS:  CONSTITUTIONAL: No fever  EYES: No eye pain, visual disturbances, or discharge  ENMT:  No difficulty hearing, tinnitus  NECK: No pain or stiffness  RESPIRATORY: No cough, wheezing,  CARDIOVASCULAR: No chest pain, palpitations, passing out, dizziness, or leg swelling  GASTROINTESTINAL:  No nausea, vomiting, diarrhea or constipation. No melena.  GENITOURINARY: No dysuria, hematuria  NEUROLOGICAL: No stroke like symptoms  SKIN: No burning or lesions   ENDOCRINE: No heat or cold intolerance  MUSCULOSKELETAL: No joint pain or swelling  PSYCHIATRIC: No  anxiety, mood swings  HEME/LYMPH: No bleeding gums  ALLERGY AND IMMUNOLOGIC: No hives or eczema	    All other ROS negative    PHYSICAL EXAM:  T(C): 36.9 (25 @ 20:58), Max: 36.9 (25 @ 13:35)  HR: 98 (25 @ 20:58) (79 - 98)  BP: 150/85 (25 @ 20:58) (129/79 - 176/81)  RR: 18 (25 @ 20:58) (18 - 18)  SpO2: 100% (25 @ 20:58) (92% - 100%)  Wt(kg): --  I&O's Summary    09 May 2025 07:01  -  09 May 2025 21:55  --------------------------------------------------------  IN: 0 mL / OUT: 500 mL / NET: -500 mL        Appearance: Normal	  HEENT:   Normal oral mucosa, EOMI	  Cardiovascular:  S1 S2, No JVD,    Respiratory: Lungs clear to auscultation	  Psychiatry: Alert  Gastrointestinal:  Soft, Non-tender, + BS	  Skin: No rashes   Neurologic: Non-focal  Extremities:  No edema  Vascular: Peripheral pulses palpable    	    	  	  CARDIAC MARKERS:  Labs personally reviewed by me                                  13.8   11.32 )-----------( 299      ( 09 May 2025 00:16 )             41.3         141  |  102  |  14  ----------------------------<  97  3.6   |  22  |  0.95    Ca    10.1      09 May 2025 00:16    TPro  7.5  /  Alb  4.6  /  TBili  0.8  /  DBili  x   /  AST  14  /  ALT  10  /  AlkPhos  46  05-09          EKG: Personally reviewed by me - NSR  Radiology: Personally reviewed by me - The lungs are clear.  There is no pneumothorax or pleural effusion.  There are no acute osseous abnormalities.    TTE 2023  CONCLUSIONS:      1. The left ventricular systolic function is normal with an ejection fraction of 57 % by 3D.   2. There is normal LV mass and normal geometry.   3. Global longitudinal strain is -22.4 % (normal < -18%). GLS was assessed on [employer]eneMedTeTaplister echo machine with a heart rate of 60 bpm and a blood pressure of 120/70 mmHg.   4. There is normal left ventricular diastolic function, with normal left atrial filling pressure.   5. Normal right ventricular cavity size and normal systolic function.   6. No significant valvular disease.   7. No pericardial effusion seen.   8. Compared to the transthoracic echocardiogram performed on 2023 there have been no significant interval changes.      Cardiac cath Coronary Angiography   The coronary circulation is right dominant.      LM   Left main artery: Angiography shows no disease.      LAD   Left anterior descending artery: Angiography shows no disease.      CX   Proximal circumflex: There is a 20 % stenosis.      RCA   Right posterior descending artery: There is a 20 % stenosis.            Assessment /Plan:   52yr F with medical history of HTN, HLD, hypothyroidism, CAD (on ASA) and surgical history of  and lap LAR for diverticulitis (DR. Medrano, ) presenting to the ED with epigastric abdominal pain and chest pain. Found to have dilated loops of small bowel to 3.4cm with a transition point in the RLQ concerning for high grade SBO.    1. Chest pain - noncardiac on origin and more likely 2/2 SBO  - When able to tolerate PO can resume ASA 81mg and statin, safe to hold while NPO    2. HTN - Resume Amlodipine 5mg when able to tolerate PO     3. HLD - Resume Crestor when can tolerate PO     4. SBO - - NGT decompression  - NPO, IVF resuscitation  - Care as per CRS          Differential diagnosis and plan of care discussed with patient after the evaluation. Counseling on diet, nutritional counseling, weight management, exercise and medication compliance was done.   Advanced care planning/advanced directives discussed with patient/family. DNR status including forceful chest compressions to attempt to restart the heart, ventilator support/artificial breathing, electric shock, artificial nutrition, health care proxy, Molst form all discussed with pt. Pt wishes to consider. Sixteen minutes spent on discussing advanced directives.           Neri Atkinson DO [facility]  Cardiovascular Medicine  [address] [place] [address], [address]  Office [phone]  Available via call/text on Microsoft Teams

## 2025-05-09 NOTE — ED PROVIDER NOTE - CARE PLAN
Principal Discharge DX:	Chest pain  Secondary Diagnosis:	Abdominal pain   1 Principal Discharge DX:	SBO (small bowel obstruction)  Secondary Diagnosis:	Abdominal pain  Secondary Diagnosis:	Chest pain

## 2025-05-09 NOTE — H&P ADULT - ASSESSMENT
Assessment: 52yr F with medical history of HTN, HLD, hypothyroidism, CAD (on ASA) and surgical history of  and lap LAR for diverticulitis (DR. Medrano, 2020) presenting to the ED with chest pain and epigastric abdominal pain with associated nausea and vomiting. ACS work-up negative. Patient found to have high grade SBO with transition point in the RLQ.    Plan:  - Admit to surgery under Dr. Dc  - NGT decompression, f/u x-ray  - NPO, IVF resuscitation  - Gastrograffin challenge in the PM  - Exam before meds  - Home meds started as able  - DCT ppx, home ASA held (no hx of stents)    Discussed with Dr. Dc  ACS/Trauma  [id number]

## 2025-05-09 NOTE — H&P ADULT - NSHPLABSRESULTS_GEN_ALL_CORE
LABS:                        13.8   11.32 )-----------( 299      ( 09 May 2025 00:16 )             41.3     05-09    141  |  102  |  14  ----------------------------<  97  3.6   |  22  |  0.95    Ca    10.1      09 May 2025 00:16    TPro  7.5  /  Alb  4.6  /  TBili  0.8  /  DBili  x   /  AST  14  /  ALT  10  /  AlkPhos  46  05-09    PT/INR - ( 09 May 2025 02:27 )   PT: 12.0 sec;   INR: 1.05 ratio      PTT - ( 09 May 2025 02:27 )  PTT:28.6 sec    Albumin: 4.6 g/dL (05-09-25 @ 00:16)    RADS:  EXAM:  CT ABDOMEN AND PELVIS   ORDERED BY: REBECCA ZAVALA   PROCEDURE DATE:  05/09/2025    INTERPRETATION:  CLINICAL INFORMATION: Right lower quadrant and   epigastric abdominal pain. History of partial sigmoid resection.    COMPARISON: CT of the abdomen and pelvis from 11/9/2024.    CONTRAST/COMPLICATIONS:  IV Contrast: NONE  Evaluation of the visceral organs is limited without   intravenous contrast  Oral Contrast: NONE  .    PROCEDURE:  CT of the Abdomen and Pelvis was performed.  Sagittal and coronal reformats were performed.    FINDINGS:  LOWER CHEST: Right lower lobe dependent atelectasis. Left lower lobe   linear atelectasis.    LIVER: Small amount of focal fat adjacent to the falciform ligament.  BILE DUCTS: Normal caliber.  GALLBLADDER: Within normal limits.  SPLEEN: Within normal limits.  PANCREAS: Within normal limits.  ADRENALS: Within normal limits.  KIDNEYS/URETERS: Within normal limits.    BLADDER: Minimally distended.  REPRODUCTIVE ORGANS: Uterus and ovaries within normal limits.    BOWEL: Sigmoid anastomotic sutures. Dilated loops of small bowel   measuring up to 3.4 cm to the level of a transition point in the right   lower quadrant (series 301, images 80-93) with associated mild mesenteric   edema compatible with a high-grade small bowel obstruction. Colonic   diverticulosis without diverticulitis. Appendix is normal.  PERITONEUM/RETROPERITONEUM: Retroperitoneal surgical clips.  VESSELS: Within normal limits.  LYMPH NODES: No lymphadenopathy.  ABDOMINAL WALL: Within normal limits.  BONES: Moderate degenerative disc disease at L5-S1.    IMPRESSION:  High-grade small bowel obstruction to the level of a transition point in   the right lower quadrant.

## 2025-05-09 NOTE — H&P ADULT - HISTORY OF PRESENT ILLNESS
HPI: 52yr F with medical history of HTN, HLD, hypothyroidism, CAD (on ASA) and surgical history of  and lap LAR for diverticulitis (DR. Medrano, 2020) presenting to the ED with epigastric abdominal pain and chest pain. Patient reports intermittent abdominal pain for the past week. Last night at 6PM patient developed acute onset chest pain with radiation to the epigastric region associated with nausea and one episode of emesis, She reports passing flatus and having bowel movements yesterday morning. No history of prior SBOs. In the ED she is hemodynamically stable, hypertensive to 170s. Labs with WBC 11.3, lactate 1.9. CT A/P non-con (contrast allergy) demonstrating dilated loops of small bowel to 3.4cm with a transition point in the RLQ concerning for high grade SBO.

## 2025-05-09 NOTE — ED PROVIDER NOTE - CLINICAL SUMMARY MEDICAL DECISION MAKING FREE TEXT BOX
6-year-old female, history of eczema, presents to ED with 2 days of nasal congestion, today started to have coughing.  No fevers or chills.  Cough is productive of clear sputum.  No shortness of breath, sick contacts.  Mom has been trialing allergy medicine at home without relief.  Patient currently does not have a pediatrician as they just moved from [place].  Vital signs stable.  Patient well-appearing, no acute distress, heart regular rate and rhythm, lungs clear, abdomen soft and nontender, no gross motor or sensory deficits.  Differential includes but not limited to allergic rhinitis, viral infection.  Will check viral panel, give antihistamines, DC with pediatrician follow-up. 55-year-old female, history of CAD, hypertension, hyperlipidemia, hypothyroidism, presents to ED with complaints of few days of abdominal pain on and off, today started have worsening chest pain starting at 6 PM.  Patient reports she has on and off chest pain for "a while "today after dinner pain got worse and had nausea.  No vomiting.  Blood pressure elevated to 170s over 80s, remainder vital signs stable.  Patient no acute distress, heart regular rate and rhythm, lungs clear, abdomen soft over epigastrium, right upper quadrant, right lower quadrant, no gross motor or sensory deficits.  Will assess for ACS, reflux\gastritis, acute intra-abdominal pathology.  Plan for lab work, cardiac markers, CT abdomen pelvis GI cocktail.  Patient with noted iodine contrast will obtain CT without IV contrast.

## 2025-05-09 NOTE — CHART NOTE - NSCHARTNOTEFT_GEN_A_CORE
Spoke with patient at bedside. Patient reports rash and anaphylactic allergy to contrast. She states she has previously received a 6 hour premedication prep, which was unsuccessful and resulted in allergic reaction. She has agreed to CT abd/pelvis with PO and IV contrast at this time. She will undergo 13 hour premedication prep. She is aware of risks/benefits of the study and voices her consent. She has no questions at this time. CT tech aware - 9pm scan scheduled. Premedications ordered. Will need anesthesia/code team in CT at time of scan in case of allergic reaction.     [facility] Team Surgery  [phone]

## 2025-05-09 NOTE — H&P ADULT - ATTENDING COMMENTS
Patient seen and examined  Labs and imaging reviewed  55 year old with signs / symptoms / imaging consistent with acute adhesive small bowel obstruction  vitas stable  Abdominal exam benign  WBC=11    - Will attempt nonoperative treatment with NGT / NPO / serial exams  - As patient is previously known to Dr. Medrano from colon resection in 2020, will transfer to his group's service (discussed with Dr. Medrano directly by myself this AM). Patient seen and examined  Labs and imaging reviewed  55 year old with signs / symptoms / imaging consistent with acute adhesive small bowel obstruction  vitas stable  Abdominal exam benign  WBC=11    - Will attempt nonoperative treatment with NGT / NPO / serial exams  - As patient is previously known to Dr. Medrano from colon resection in 2020, will transfer to his group's service (discussed with Dr. Medrano directly by myself this AM).    Colorectal:  Patient seen by me.  She is on Zepbound and has had intermittent nausea and vomiting and constipation.  Has had abdominal pain for two weeks.  Abd soft and flat without any distention or tenderness.  Unclear if this is adhesive SBO.  Will repeat CT with oral and IV (after steroid prep) contrast.  Continue IV fluids and NGT at this time.      Alvarez Mendiola MD

## 2025-05-09 NOTE — ED PROVIDER NOTE - ATTENDING CONTRIBUTION TO CARE
I, Clif Bacon, performed a history and physical exam of the patient and discussed their management with the resident provider. I reviewed the resident provider's note and agree with the documented findings and plan of care. I was present and available for all procedures.        Patient presenting with abdominal pain radiating up to the chest had an episode of nausea and vomiting as well continued epigastric pain at this point and no chest pain denies fever chills shortness of breath unlikely ACS PE pneumothorax dissection AAA pneumonia will evaluate for intra-abdominal surgical pathology otherwise dispo pending workup and reevaluation discussed patient agreed with plan      Well appearing and in NAD, head normal appearing atraumatic, trachea midline, no respiratory distress, lungs cta bilaterally, rrr no murmurs, soft ruq rlq ttp ND abdomen, no visible extremity deformities, Alert and oriented, non focal neuro exam, skin warm and dry, normal affect and mood, no leg swelling, calf ttp or jvd

## 2025-05-09 NOTE — H&P ADULT - NSHPPHYSICALEXAM_GEN_ALL_CORE
Vital Signs Last 24 Hrs  T(C): 36.1 (08 May 2025 23:48), Max: 36.3 (08 May 2025 21:42)  T(F): 97 (08 May 2025 23:48), Max: 97.3 (08 May 2025 21:42)  HR: 83 (08 May 2025 23:48) (79 - 83)  BP: 176/81 (08 May 2025 23:48) (159/99 - 176/81)  BP(mean): --  RR: 18 (08 May 2025 23:48) (18 - 20)  SpO2: 100% (08 May 2025 23:48) (99% - 100%)    Parameters below as of 08 May 2025 23:48  Patient On (Oxygen Delivery Method): room air    Physical Exam:  Gen: NAD  Neuro: A&O x3  Pulm: Symmetric chest rise, on room air  Cards: Regular rate, hypertensive  GI: Abdomen soft, epigastric tenderness, non-distended, NGT in place   Extremities: Warm

## 2025-05-10 LAB
ANION GAP SERPL CALC-SCNC: 14 MMOL/L — SIGNIFICANT CHANGE UP (ref 5–17)
BUN SERPL-MCNC: 12 MG/DL — SIGNIFICANT CHANGE UP (ref 7–23)
CALCIUM SERPL-MCNC: 9.3 MG/DL — SIGNIFICANT CHANGE UP (ref 8.4–10.5)
CHLORIDE SERPL-SCNC: 102 MMOL/L — SIGNIFICANT CHANGE UP (ref 96–108)
CO2 SERPL-SCNC: 20 MMOL/L — LOW (ref 22–31)
CREAT SERPL-MCNC: 0.68 MG/DL — SIGNIFICANT CHANGE UP (ref 0.5–1.3)
EGFR: 102 ML/MIN/1.73M2 — SIGNIFICANT CHANGE UP
EGFR: 102 ML/MIN/1.73M2 — SIGNIFICANT CHANGE UP
GLUCOSE SERPL-MCNC: 106 MG/DL — HIGH (ref 70–99)
HCT VFR BLD CALC: 36.5 % — SIGNIFICANT CHANGE UP (ref 34.5–45)
HGB BLD-MCNC: 12.2 G/DL — SIGNIFICANT CHANGE UP (ref 11.5–15.5)
MAGNESIUM SERPL-MCNC: 2 MG/DL — SIGNIFICANT CHANGE UP (ref 1.6–2.6)
MCHC RBC-ENTMCNC: 31.9 PG — SIGNIFICANT CHANGE UP (ref 27–34)
MCHC RBC-ENTMCNC: 33.4 G/DL — SIGNIFICANT CHANGE UP (ref 32–36)
MCV RBC AUTO: 95.5 FL — SIGNIFICANT CHANGE UP (ref 80–100)
NRBC BLD AUTO-RTO: 0 /100 WBCS — SIGNIFICANT CHANGE UP (ref 0–0)
PHOSPHATE SERPL-MCNC: 3.8 MG/DL — SIGNIFICANT CHANGE UP (ref 2.5–4.5)
PLATELET # BLD AUTO: 250 K/UL — SIGNIFICANT CHANGE UP (ref 150–400)
POTASSIUM SERPL-MCNC: 3.8 MMOL/L — SIGNIFICANT CHANGE UP (ref 3.5–5.3)
POTASSIUM SERPL-SCNC: 3.8 MMOL/L — SIGNIFICANT CHANGE UP (ref 3.5–5.3)
RBC # BLD: 3.82 M/UL — SIGNIFICANT CHANGE UP (ref 3.8–5.2)
RBC # FLD: 12.1 % — SIGNIFICANT CHANGE UP (ref 10.3–14.5)
SODIUM SERPL-SCNC: 136 MMOL/L — SIGNIFICANT CHANGE UP (ref 135–145)
WBC # BLD: 9.91 K/UL — SIGNIFICANT CHANGE UP (ref 3.8–10.5)
WBC # FLD AUTO: 9.91 K/UL — SIGNIFICANT CHANGE UP (ref 3.8–10.5)

## 2025-05-10 PROCEDURE — 99231 SBSQ HOSP IP/OBS SF/LOW 25: CPT | Mod: GC

## 2025-05-10 PROCEDURE — 74018 RADEX ABDOMEN 1 VIEW: CPT | Mod: 26

## 2025-05-10 RX ORDER — POLYETHYLENE GLYCOL 3350 17 G/17G
17 POWDER, FOR SOLUTION ORAL
Refills: 0 | Status: DISCONTINUED | OUTPATIENT
Start: 2025-05-10 | End: 2025-05-10

## 2025-05-10 RX ORDER — ACETAMINOPHEN 500 MG/5ML
1000 LIQUID (ML) ORAL EVERY 6 HOURS
Refills: 0 | Status: DISCONTINUED | OUTPATIENT
Start: 2025-05-10 | End: 2025-05-11

## 2025-05-10 RX ORDER — LEVOTHYROXINE SODIUM 300 MCG
75 TABLET ORAL DAILY
Refills: 0 | Status: DISCONTINUED | OUTPATIENT
Start: 2025-05-11 | End: 2025-05-11

## 2025-05-10 RX ORDER — POLYETHYLENE GLYCOL 3350 17 G/17G
17 POWDER, FOR SOLUTION ORAL
Refills: 0 | Status: COMPLETED | OUTPATIENT
Start: 2025-05-10 | End: 2025-05-10

## 2025-05-10 RX ADMIN — Medication 400 MILLIGRAM(S): at 05:28

## 2025-05-10 RX ADMIN — Medication 1000 MILLIGRAM(S): at 13:00

## 2025-05-10 RX ADMIN — ENOXAPARIN SODIUM 40 MILLIGRAM(S): 100 INJECTION SUBCUTANEOUS at 09:43

## 2025-05-10 RX ADMIN — POLYETHYLENE GLYCOL 3350 17 GRAM(S): 17 POWDER, FOR SOLUTION ORAL at 14:16

## 2025-05-10 RX ADMIN — Medication 400 MILLIGRAM(S): at 12:31

## 2025-05-10 RX ADMIN — SODIUM CHLORIDE 125 MILLILITER(S): 9 INJECTION, SOLUTION INTRAVENOUS at 09:44

## 2025-05-10 RX ADMIN — Medication 10 MILLIGRAM(S): at 14:16

## 2025-05-10 RX ADMIN — Medication 1000 MILLIGRAM(S): at 05:58

## 2025-05-10 RX ADMIN — Medication 1 LOZENGE: at 09:43

## 2025-05-10 RX ADMIN — POLYETHYLENE GLYCOL 3350 17 GRAM(S): 17 POWDER, FOR SOLUTION ORAL at 12:32

## 2025-05-10 RX ADMIN — POLYETHYLENE GLYCOL 3350 17 GRAM(S): 17 POWDER, FOR SOLUTION ORAL at 16:45

## 2025-05-10 NOTE — PROGRESS NOTE ADULT - SUBJECTIVE AND OBJECTIVE BOX
Interval Events:  No acute events overnight.     SUBJECTIVE:  Patient seen and examined at bedside.    OBJECTIVE:  Vital Signs Last 24 Hrs  T(C): 36.3 (10 May 2025 00:35), Max: 36.9 (09 May 2025 13:35)  T(F): 97.4 (10 May 2025 00:35), Max: 98.5 (09 May 2025 13:35)  HR: 99 (10 May 2025 00:35) (90 - 99)  BP: 132/77 (10 May 2025 00:35) (129/79 - 154/88)  BP(mean): --  RR: 18 (10 May 2025 00:35) (18 - 18)  SpO2: 97% (10 May 2025 00:35) (92% - 100%)    Parameters below as of 10 May 2025 00:35  Patient On (Oxygen Delivery Method): room air            Physical Examination:  GEN: NAD, resting quietly  NEURO: AAOx3, no focal deficits  PULM: symmetric chest rise bilaterally, no increased WOB  ABD: Abdomen soft, epigastric tenderness, non-distended, NGT in place   EXTR: no lower extremity edema, moving all extremities   Interval Events:  CT PO IV contrast performed showing improving dilation of small bowel with NGT decompression.      SUBJECTIVE:  Patient seen and examined at bedside.    OBJECTIVE:  Vital Signs Last 24 Hrs  T(C): 36.3 (10 May 2025 00:35), Max: 36.9 (09 May 2025 13:35)  T(F): 97.4 (10 May 2025 00:35), Max: 98.5 (09 May 2025 13:35)  HR: 99 (10 May 2025 00:35) (90 - 99)  BP: 132/77 (10 May 2025 00:35) (129/79 - 154/88)  BP(mean): --  RR: 18 (10 May 2025 00:35) (18 - 18)  SpO2: 97% (10 May 2025 00:35) (92% - 100%)    Parameters below as of 10 May 2025 00:35  Patient On (Oxygen Delivery Method): room air            Physical Examination:  GEN: NAD, resting quietly  NEURO: AAOx3, no focal deficits  PULM: symmetric chest rise bilaterally, no increased WOB  ABD: Abdomen soft, epigastric tenderness, non-distended, NGT in place   EXTR: no lower extremity edema, moving all extremities

## 2025-05-10 NOTE — PROGRESS NOTE ADULT - SUBJECTIVE AND OBJECTIVE BOX
DATE OF SERVICE: 05-10-25 @ 12:16    Patient is a 55y old  Female who presents with a chief complaint of SBO (10 May 2025 01:04)      INTERVAL HISTORY: NAD     REVIEW OF SYSTEMS:  CONSTITUTIONAL: No weakness  EYES/ENT: No visual changes;  No throat pain   NECK: No pain or stiffness  RESPIRATORY: No cough, wheezing; No shortness of breath  CARDIOVASCULAR: No chest pain or palpitations  GASTROINTESTINAL: No abdominal  pain. No nausea, vomiting, or hematemesis  GENITOURINARY: No dysuria, frequency or hematuria  NEUROLOGICAL: No stroke like symptoms  SKIN: No rashes       	  MEDICATIONS:        PHYSICAL EXAM:  T(C): 36.8 (05-10-25 @ 09:46), Max: 36.9 (25 @ 13:35)  HR: 94 (05-10-25 @ 09:46) (90 - 99)  BP: 133/81 (05-10-25 @ 09:46) (122/77 - 150/85)  RR: 18 (05-10-25 @ 09:46) (18 - 18)  SpO2: 100% (05-10-25 @ 09:46) (94% - 100%)  Wt(kg): --  I&O's Summary    09 May 2025 07:  -  10 May 2025 07:00  --------------------------------------------------------  IN: 1500 mL / OUT: 1450 mL / NET: 50 mL    10 May 2025 07:01  -  10 May 2025 12:16  --------------------------------------------------------  IN: 0 mL / OUT: 0 mL / NET: 0 mL          Appearance: In no distress	  HEENT:    PERRL, EOMI	  Cardiovascular:  S1 S2, No JVD  Respiratory: Lungs clear to auscultation	  Gastrointestinal:  Soft, Non-tender, + BS	  Vascularature:  No edema of LE  Psychiatric: Appropriate affect   Neuro: no acute focal deficits                               12.2   9.91  )-----------( 250      ( 10 May 2025 07:17 )             36.5     05-10    136  |  102  |  12  ----------------------------<  106[H]  3.8   |  20[L]  |  0.68    Ca    9.3      10 May 2025 07:18  Phos  3.8     05-10  Mg     2.0     05-10    TPro  7.5  /  Alb  4.6  /  TBili  0.8  /  DBili  x   /  AST  14  /  ALT  10  /  AlkPhos  46  05-09        Labs personally reviewed      ASSESSMENT/PLAN: 	    52yr F with medical history of HTN, HLD, hypothyroidism, CAD (on ASA) and surgical history of  and lap LAR for diverticulitis (DR. Medrano, ) presenting to the ED with epigastric abdominal pain and chest pain. Found to have dilated loops of small bowel to 3.4cm with a transition point in the RLQ concerning for high grade SBO.    1. Chest pain - noncardiac on origin and more likely 2/2 SBO  - When able to tolerate PO can resume ASA 81mg and statin, safe to hold while NPO    2. HTN - Resume Amlodipine 5mg when able to tolerate PO     3. HLD - Resume Crestor when can tolerate PO     4. SBO - - NGT decompression  - NPO, IVF resuscitation  - Care as per CRS        PENELOPE Clark DO [facility]  Cardiovascular Medicine  [address] [place] [address], [address]  Office: [phone]  Available via call/text on [employer] Teams

## 2025-05-10 NOTE — PATIENT PROFILE ADULT - FUNCTIONAL ASSESSMENT - DAILY ACTIVITY 6.
4 = No assist / stand by assistance FAMILY HISTORY:  Family history of breast cancer, mother  age 82  Family history of pulmonary fibrosis, mom  FHx: Alzheimer's disease, father  age 84, mom

## 2025-05-10 NOTE — PATIENT PROFILE ADULT - DO YOU LACK THE NECESSARY SUPPORT TO HELP YOU COPE WITH LIFE CHALLENGES?
Pt brought in by ems with c/o n/v for 2 hours with some noted blood in emesis. Pt has cirrhosis and still daily drinker on most days. Pt had etoh yesterday. Pt also had paracentesis last week with 500 ml removed. Pt a/o x 4 calm, skin yellow warm and dry. Abd distended. Pt is calm, no sob noted.  Pt was given 500 ml bolus by EMS
no

## 2025-05-10 NOTE — PROVIDER CONTACT NOTE (OTHER) - ASSESSMENT
pt refusing levothyroxine injectable, now ordered for and tolerating diet. pt with all VS as charted

## 2025-05-11 VITALS
SYSTOLIC BLOOD PRESSURE: 123 MMHG | DIASTOLIC BLOOD PRESSURE: 66 MMHG | OXYGEN SATURATION: 97 % | TEMPERATURE: 98 F | HEART RATE: 85 BPM | RESPIRATION RATE: 18 BRPM

## 2025-05-11 LAB
ANION GAP SERPL CALC-SCNC: 14 MMOL/L — SIGNIFICANT CHANGE UP (ref 5–17)
BUN SERPL-MCNC: 13 MG/DL — SIGNIFICANT CHANGE UP (ref 7–23)
CALCIUM SERPL-MCNC: 9.1 MG/DL — SIGNIFICANT CHANGE UP (ref 8.4–10.5)
CHLORIDE SERPL-SCNC: 106 MMOL/L — SIGNIFICANT CHANGE UP (ref 96–108)
CO2 SERPL-SCNC: 24 MMOL/L — SIGNIFICANT CHANGE UP (ref 22–31)
CREAT SERPL-MCNC: 0.76 MG/DL — SIGNIFICANT CHANGE UP (ref 0.5–1.3)
EGFR: 91 ML/MIN/1.73M2 — SIGNIFICANT CHANGE UP
EGFR: 91 ML/MIN/1.73M2 — SIGNIFICANT CHANGE UP
GLUCOSE SERPL-MCNC: 71 MG/DL — SIGNIFICANT CHANGE UP (ref 70–99)
HCT VFR BLD CALC: 33.9 % — LOW (ref 34.5–45)
HGB BLD-MCNC: 11.3 G/DL — LOW (ref 11.5–15.5)
MAGNESIUM SERPL-MCNC: 2 MG/DL — SIGNIFICANT CHANGE UP (ref 1.6–2.6)
MCHC RBC-ENTMCNC: 31.7 PG — SIGNIFICANT CHANGE UP (ref 27–34)
MCHC RBC-ENTMCNC: 33.3 G/DL — SIGNIFICANT CHANGE UP (ref 32–36)
MCV RBC AUTO: 95.2 FL — SIGNIFICANT CHANGE UP (ref 80–100)
NRBC BLD AUTO-RTO: 0 /100 WBCS — SIGNIFICANT CHANGE UP (ref 0–0)
PHOSPHATE SERPL-MCNC: 2.9 MG/DL — SIGNIFICANT CHANGE UP (ref 2.5–4.5)
PLATELET # BLD AUTO: 241 K/UL — SIGNIFICANT CHANGE UP (ref 150–400)
POTASSIUM SERPL-MCNC: 3.8 MMOL/L — SIGNIFICANT CHANGE UP (ref 3.5–5.3)
POTASSIUM SERPL-SCNC: 3.8 MMOL/L — SIGNIFICANT CHANGE UP (ref 3.5–5.3)
RBC # BLD: 3.56 M/UL — LOW (ref 3.8–5.2)
RBC # FLD: 12.1 % — SIGNIFICANT CHANGE UP (ref 10.3–14.5)
SODIUM SERPL-SCNC: 144 MMOL/L — SIGNIFICANT CHANGE UP (ref 135–145)
WBC # BLD: 7.29 K/UL — SIGNIFICANT CHANGE UP (ref 3.8–10.5)
WBC # FLD AUTO: 7.29 K/UL — SIGNIFICANT CHANGE UP (ref 3.8–10.5)

## 2025-05-11 PROCEDURE — 82803 BLOOD GASES ANY COMBINATION: CPT

## 2025-05-11 PROCEDURE — 99231 SBSQ HOSP IP/OBS SF/LOW 25: CPT | Mod: GC

## 2025-05-11 PROCEDURE — 83690 ASSAY OF LIPASE: CPT

## 2025-05-11 PROCEDURE — 85730 THROMBOPLASTIN TIME PARTIAL: CPT

## 2025-05-11 PROCEDURE — 80048 BASIC METABOLIC PNL TOTAL CA: CPT

## 2025-05-11 PROCEDURE — 74176 CT ABD & PELVIS W/O CONTRAST: CPT

## 2025-05-11 PROCEDURE — 86850 RBC ANTIBODY SCREEN: CPT

## 2025-05-11 PROCEDURE — 96375 TX/PRO/DX INJ NEW DRUG ADDON: CPT

## 2025-05-11 PROCEDURE — 86901 BLOOD TYPING SEROLOGIC RH(D): CPT

## 2025-05-11 PROCEDURE — 85018 HEMOGLOBIN: CPT

## 2025-05-11 PROCEDURE — 83735 ASSAY OF MAGNESIUM: CPT

## 2025-05-11 PROCEDURE — 82947 ASSAY GLUCOSE BLOOD QUANT: CPT

## 2025-05-11 PROCEDURE — 83605 ASSAY OF LACTIC ACID: CPT

## 2025-05-11 PROCEDURE — 85025 COMPLETE CBC W/AUTO DIFF WBC: CPT

## 2025-05-11 PROCEDURE — 99285 EMERGENCY DEPT VISIT HI MDM: CPT | Mod: 25

## 2025-05-11 PROCEDURE — 84295 ASSAY OF SERUM SODIUM: CPT

## 2025-05-11 PROCEDURE — 85014 HEMATOCRIT: CPT

## 2025-05-11 PROCEDURE — 74177 CT ABD & PELVIS W/CONTRAST: CPT

## 2025-05-11 PROCEDURE — 85610 PROTHROMBIN TIME: CPT

## 2025-05-11 PROCEDURE — 71045 X-RAY EXAM CHEST 1 VIEW: CPT

## 2025-05-11 PROCEDURE — 84100 ASSAY OF PHOSPHORUS: CPT

## 2025-05-11 PROCEDURE — 85027 COMPLETE CBC AUTOMATED: CPT

## 2025-05-11 PROCEDURE — 96374 THER/PROPH/DIAG INJ IV PUSH: CPT

## 2025-05-11 PROCEDURE — 86900 BLOOD TYPING SEROLOGIC ABO: CPT

## 2025-05-11 PROCEDURE — 82435 ASSAY OF BLOOD CHLORIDE: CPT

## 2025-05-11 PROCEDURE — 80053 COMPREHEN METABOLIC PANEL: CPT

## 2025-05-11 PROCEDURE — 93005 ELECTROCARDIOGRAM TRACING: CPT

## 2025-05-11 PROCEDURE — 84132 ASSAY OF SERUM POTASSIUM: CPT

## 2025-05-11 PROCEDURE — 84484 ASSAY OF TROPONIN QUANT: CPT

## 2025-05-11 PROCEDURE — 74018 RADEX ABDOMEN 1 VIEW: CPT

## 2025-05-11 PROCEDURE — 82330 ASSAY OF CALCIUM: CPT

## 2025-05-11 RX ORDER — ACETAMINOPHEN 500 MG/5ML
650 LIQUID (ML) ORAL EVERY 6 HOURS
Refills: 0 | Status: DISCONTINUED | OUTPATIENT
Start: 2025-05-11 | End: 2025-05-11

## 2025-05-11 RX ADMIN — Medication 75 MICROGRAM(S): at 05:11

## 2025-05-11 RX ADMIN — Medication 400 MILLIGRAM(S): at 06:48

## 2025-05-11 RX ADMIN — ENOXAPARIN SODIUM 40 MILLIGRAM(S): 100 INJECTION SUBCUTANEOUS at 09:37

## 2025-05-11 NOTE — PROGRESS NOTE ADULT - ATTENDING COMMENTS
Tolerating diet with bowel function.  Exam unremarkable.  Likely Zepbound dysmotility.  Patient encouraged to stop Zepbound and take daily MiraLax.  Follow up GI.
Feels well.  Abd soft and flat.  NGT removed.  Clears with miralax and if tolerated then solid diet.  AXR with contrast throughout the colon.  Likely Zepbound GI dysfunction

## 2025-05-11 NOTE — PROGRESS NOTE ADULT - NS ATTEND AMEND GEN_ALL_CORE FT
Patient care and plan discussed and reviewed with Advanced Care Provider. Plan as outlined above edited by me to reflect our discussion.   In addition, I participated in    - Ordering, reviewing, and interpreting labs, testing, and imaging.  - Reviewing prior hospitalization and outpatient records when necessary  - Counselling and educating patient and/or family regarding interpretation of aforementioned items and plan of care.  - Communicating with other health professionals (when not separately reported), and documenting clinical information in the electronic health record.
Patient care and plan discussed and reviewed with Advanced Care Provider. Plan as outlined above edited by me to reflect our discussion.   In addition, I participated in    - Ordering, reviewing, and interpreting labs, testing, and imaging.  - Reviewing prior hospitalization and outpatient records when necessary  - Counselling and educating patient and/or family regarding interpretation of aforementioned items and plan of care.  - Communicating with other health professionals (when not separately reported), and documenting clinical information in the electronic health record.

## 2025-05-11 NOTE — PROGRESS NOTE ADULT - SUBJECTIVE AND OBJECTIVE BOX
DATE OF SERVICE: 25 @ 11:14    Patient is a 55y old  Female who presents with a chief complaint of SBO (11 May 2025 09:24)      INTERVAL HISTORY: NAD    REVIEW OF SYSTEMS:  CONSTITUTIONAL: No weakness  EYES/ENT: No visual changes;  No throat pain   NECK: No pain or stiffness  RESPIRATORY: No cough, wheezing; No shortness of breath  CARDIOVASCULAR: No chest pain or palpitations  GASTROINTESTINAL: No abdominal  pain. No nausea, vomiting, or hematemesis  GENITOURINARY: No dysuria, frequency or hematuria  NEUROLOGICAL: No stroke like symptoms  SKIN: No rashes     	  MEDICATIONS:        PHYSICAL EXAM:  T(C): 36.6 (25 @ 08:42), Max: 37.1 (05-10-25 @ 16:55)  HR: 85 (25 @ 08:42) (76 - 90)  BP: 123/66 (25 @ 08:42) (122/74 - 136/84)  RR: 18 (25 @ 08:42) (18 - 18)  SpO2: 97% (25 @ 08:42) (95% - 100%)  Wt(kg): --  I&O's Summary    10 May 2025 07:  -  11 May 2025 07:00  --------------------------------------------------------  IN: 1980 mL / OUT: 300 mL / NET: 1680 mL    11 May 2025 07:  -  11 May 2025 11:14  --------------------------------------------------------  IN: 240 mL / OUT: 0 mL / NET: 240 mL          Appearance: In no distress	  HEENT:    PERRL, EOMI	  Cardiovascular:  S1 S2, No JVD  Respiratory: Lungs clear to auscultation	  Gastrointestinal:  Soft, Non-tender, + BS	  Vascularature:  No edema of LE  Psychiatric: Appropriate affect   Neuro: no acute focal deficits                               11.3   7.29  )-----------( 241      ( 11 May 2025 07:32 )             33.9     05-11    144  |  106  |  13  ----------------------------<  71  3.8   |  24  |  0.76    Ca    9.1      11 May 2025 07:32  Phos  2.9     05-11  Mg     2.0     05-11          Labs personally reviewed      ASSESSMENT/PLAN: 	      52yr F with medical history of HTN, HLD, hypothyroidism, CAD (on ASA) and surgical history of  and lap LAR for diverticulitis (DR. Medrano, ) presenting to the ED with epigastric abdominal pain and chest pain. Found to have dilated loops of small bowel to 3.4cm with a transition point in the RLQ concerning for high grade SBO.    1. Chest pain - noncardiac on origin and more likely 2/2 SBO  - When able to tolerate PO can resume ASA 81mg and statin, safe to hold while NPO    2. HTN - Resume Amlodipine 5mg when able to tolerate PO     3. HLD - Resume Crestor when can tolerate PO     4. SBO - - NGT decompression  - NPO, IVF resuscitation  - Care as per CRS        PENELOPE Clark DO [facility]  Cardiovascular Medicine  [address] [place] [address], [address]  Office: [phone]  Available via call/text on [employer] Teams

## 2025-05-11 NOTE — PROGRESS NOTE ADULT - SUBJECTIVE AND OBJECTIVE BOX
SUBJECTIVE:  Patient seen and examined at bedside.  overnight    OBJECTIVE:  Vital Signs Last 24 Hrs  T(C): 36.6 (11 May 2025 00:35), Max: 37.1 (10 May 2025 16:55)  T(F): 97.9 (11 May 2025 00:35), Max: 98.7 (10 May 2025 16:55)  HR: 89 (11 May 2025 00:35) (80 - 98)  BP: 127/73 (11 May 2025 00:35) (122/77 - 136/84)  BP(mean): --  RR: 18 (11 May 2025 00:35) (18 - 18)  SpO2: 95% (11 May 2025 00:35) (95% - 100%)    Parameters below as of 11 May 2025 00:35  Patient On (Oxygen Delivery Method): room air      Daily     Daily   I&O's Detail    09 May 2025 07:01  -  10 May 2025 07:00  --------------------------------------------------------  IN:    Lactated Ringers: 1500 mL  Total IN: 1500 mL    OUT:    Nasogastric/Oral tube (mL): 100 mL    Oral Fluid: 0 mL    Voided (mL): 1350 mL  Total OUT: 1450 mL    Total NET: 50 mL      10 May 2025 07:01  -  11 May 2025 02:35  --------------------------------------------------------  IN:    Oral Fluid: 480 mL  Total IN: 480 mL    OUT:    Voided (mL): 300 mL  Total OUT: 300 mL    Total NET: 180 mL                              12.2   9.91  )-----------( 250      ( 10 May 2025 07:17 )             36.5     05-10    136  |  102  |  12  ----------------------------<  106[H]  3.8   |  20[L]  |  0.68    Ca    9.3      10 May 2025 07:18  Phos  3.8     05-10  Mg     2.0     05-10        Urinalysis Basic - ( 10 May 2025 07:18 )    Color: x / Appearance: x / SG: x / pH: x  Gluc: 106 mg/dL / Ketone: x  / Bili: x / Urobili: x   Blood: x / Protein: x / Nitrite: x   Leuk Esterase: x / RBC: x / WBC x   Sq Epi: x / Non Sq Epi: x / Bacteria: x                    Physical Examination:  GEN: NAD, resting quietly  NEURO: AAOx3, no focal deficits  PULM: symmetric chest rise bilaterally, no increased WOB  ABD: Abdomen soft, epigastric tenderness, non-distended,  EXTR: no lower extremity edema, moving all extremities

## 2025-05-11 NOTE — PROGRESS NOTE ADULT - ASSESSMENT
Assessment: 52yr F with medical history of HTN, HLD, hypothyroidism, CAD (on ASA) and surgical history of  and lap LAR for diverticulitis (DR. Medrano, 2020) presenting to the ED with chest pain and epigastric abdominal pain with associated nausea and vomiting. ACS work-up negative. Patient found to have high grade SBO with transition point in the RLQ.    Plan:  - NGT decompression  - NPO, IVF resuscitation  - Gastrograffin challenge in the PM  - Exam before meds  - Home meds started as able  - DCT ppx, home ASA held (no hx of stents)    [facility]  [id number]
Assessment: 52yr F with medical history of HTN, HLD, hypothyroidism, CAD (on ASA) and surgical history of  and lap LAR for diverticulitis (DR. Medrano, 2020) presenting to the ED with chest pain and epigastric abdominal pain with associated nausea and vomiting. ACS work-up negative. Patient found to have high grade SBO with transition point in the RLQ.    Plan:  - LRD  - Exam before meds  - Home meds started as able  - DCT ppx, home ASA held (no hx of stents)    [facility]  [id number]

## 2025-05-11 NOTE — DISCHARGE NOTE PROVIDER - NSDCMRMEDTOKEN_GEN_ALL_CORE_FT
amLODIPine 5 mg oral tablet: 1 tab(s) orally 2 times a day  aspirin 81 mg oral tablet: orally once a day  levothyroxine 75 mcg (0.075 mg) oral tablet: 1 tab(s) orally once a day  ranolazine 500 mg oral tablet, extended release: 1 tab(s) orally every 12 hours  rosuvastatin 40 mg oral tablet: 1 tab(s) orally once a day (at bedtime)

## 2025-05-11 NOTE — PROGRESS NOTE ADULT - NS ATTEND BILL GEN_ALL_CORE
Attending to bill
Attending to bill
Hpi Title: Evaluation of Skin Lesions
How Severe Are Your Spot(S)?: mild
Have Your Spot(S) Been Treated In The Past?: has not been treated

## 2025-05-11 NOTE — DISCHARGE NOTE PROVIDER - NSDCCPCAREPLAN_GEN_ALL_CORE_FT
PRINCIPAL DISCHARGE DIAGNOSIS  Diagnosis: SBO (small bowel obstruction)  Assessment and Plan of Treatment: You did not have surgery this admission as your GI function returned. Please call office or return to emergency room if you stop passing gas or having bowel movements, are unable to tolerate food or drink, have severe abdominal pain, persistent nausea or vomiting.      SECONDARY DISCHARGE DIAGNOSES  Diagnosis: Abdominal pain  Assessment and Plan of Treatment:     Diagnosis: Chest pain  Assessment and Plan of Treatment:

## 2025-05-11 NOTE — DISCHARGE NOTE PROVIDER - INSTRUCTIONS
You may resume your regular diet. Please discontinue your Tirzepatide at this time until follow up with GI doctor.

## 2025-05-11 NOTE — DISCHARGE NOTE NURSING/CASE MANAGEMENT/SOCIAL WORK - PATIENT PORTAL LINK FT
You can access the FollowMyHealth Patient Portal offered by [facility] by registering at the following website: [email or web address]. By joining [employer]’s FollowMyHealth portal, you will also be able to view your health information using other applications (apps) compatible with our system.

## 2025-05-11 NOTE — DISCHARGE NOTE PROVIDER - HOSPITAL COURSE
James Stapleton is 52yr F with medical history of HTN, HLD, hypothyroidism, CAD (on ASA) and surgical history of  and lap LAR for diverticulitis (DR. Medrano, ) who presented to the [facility] ED with epigastric abdominal pain and chest pain. Patient reports intermittent abdominal pain for the past week. Last night at 6PM patient developed acute onset chest pain with radiation to the epigastric region associated with nausea and one episode of emesis, She reports passing flatus and having bowel movements yesterday morning. No history of prior SBOs. In the ED she is hemodynamically stable, hypertensive to 170s. Labs with WBC 11.3, lactate 1.9. CT A/P non-con (contrast allergy) demonstrating dilated loops of small bowel to 3.4cm with a transition point in the RLQ concerning for high grade SBO. NG tube was placed and confirmed by XR.     Pt was admitted under Surgery for further evaluation and management. On HD#1, patient completed a CTAP w/ IV and PO contrast requiring premedication for contrast allergy. Imaging showed improved small bowel dilation. Patient was passing flatus. On HD#2 NGT output was very minimal and subsequently removed. Patient was advanced to a clear liquid diet. Patient was passing gas and chest/abdominal discomfort resolved. Miralax was ordered and patient later had bowel movement. Diet was advanced to solids. On HD#3 and day of discharge, the patient's vital signs are within normal limits, pain is controlled, voiding urine, passing gas/stool, tolerating a low fiber diet, and ambulating well. Pt will f/u with   in 1-2 weeks. Pt will f/u with PCP in 1-2 weeks. James Stapleton is 52yr F with medical history of HTN, HLD, hypothyroidism, CAD (on ASA) and surgical history of  and lap LAR for diverticulitis (Dr. Jaime Medrano, ) who presented to the [facility] ED with epigastric abdominal pain and chest pain. Patient reports intermittent abdominal pain for the past week. Last night at 6PM patient developed acute onset chest pain with radiation to the epigastric region associated with nausea and one episode of emesis, She reports passing flatus and having bowel movements yesterday morning. No history of prior SBOs. In the ED she is hemodynamically stable, hypertensive to 170s. Labs with WBC 11.3, lactate 1.9. CT A/P non-con (contrast allergy) demonstrating dilated loops of small bowel to 3.4cm with a transition point in the RLQ concerning for high grade SBO. NG tube was placed and confirmed by XR.     Pt was admitted under Surgery for further evaluation and management. Further discussion patient noted Tirzepatide in medication regimen which could also cause her symptoms. On HD#1, patient completed a CTAP w/ IV and PO contrast requiring premedication for contrast allergy. Imaging showed improved small bowel dilation. Patient was passing flatus. On HD#2 NGT output was very minimal and subsequently removed. Patient was advanced to a clear liquid diet. Patient was passing gas and chest/abdominal discomfort resolved. Miralax was ordered and patient later had bowel movements. Diet was advanced to solids. On HD#3 and day of discharge, the patient's vital signs are within normal limits, pain is controlled, voiding urine, passing gas/stool, tolerating a low fiber diet, and ambulating well. Pt was advised to discontinue Tirazepatide at this time and follow up with her gastroenterologist in 1-2 weeks.

## 2025-05-11 NOTE — DISCHARGE NOTE NURSING/CASE MANAGEMENT/SOCIAL WORK - FINANCIAL ASSISTANCE
[facility] provides services at a reduced cost to those who are determined to be eligible through [facility]’s financial assistance program. Information regarding [facility]’s financial assistance program can be found by going to [email or web address] or by calling [phone].

## 2025-05-12 ENCOUNTER — TRANSCRIPTION ENCOUNTER (OUTPATIENT)
Age: 55
End: 2025-05-12

## 2025-05-15 ENCOUNTER — APPOINTMENT (OUTPATIENT)
Dept: ENDOCRINOLOGY | Facility: CLINIC | Age: 55
End: 2025-05-15

## 2025-05-15 VITALS
OXYGEN SATURATION: 99 % | WEIGHT: 138 LBS | BODY MASS INDEX: 26.06 KG/M2 | SYSTOLIC BLOOD PRESSURE: 122 MMHG | HEART RATE: 86 BPM | HEIGHT: 61 IN | DIASTOLIC BLOOD PRESSURE: 81 MMHG | TEMPERATURE: 97.3 F

## 2025-05-15 PROCEDURE — 36415 COLL VENOUS BLD VENIPUNCTURE: CPT

## 2025-05-15 PROCEDURE — 99213 OFFICE O/P EST LOW 20 MIN: CPT

## 2025-05-16 ENCOUNTER — APPOINTMENT (OUTPATIENT)
Dept: UROLOGY | Facility: CLINIC | Age: 55
End: 2025-05-16

## 2025-05-20 LAB
25(OH)D3 SERPL-MCNC: 23.9 NG/ML
ALBUMIN SERPL ELPH-MCNC: 4.4 G/DL
ALP BLD-CCNC: 39 U/L
ALT SERPL-CCNC: 26 U/L
ANION GAP SERPL CALC-SCNC: 14 MMOL/L
AST SERPL-CCNC: 17 U/L
BASOPHILS # BLD AUTO: 0.05 K/UL
BASOPHILS NFR BLD AUTO: 0.7 %
BILIRUB SERPL-MCNC: 0.3 MG/DL
BUN SERPL-MCNC: 15 MG/DL
CALCIUM SERPL-MCNC: 9.6 MG/DL
CHLORIDE SERPL-SCNC: 104 MMOL/L
CHOLEST SERPL-MCNC: 166 MG/DL
CO2 SERPL-SCNC: 23 MMOL/L
CORTIS SERPL-MCNC: 11.6 UG/DL
CREAT SERPL-MCNC: 0.78 MG/DL
EGFRCR SERPLBLD CKD-EPI 2021: 89 ML/MIN/1.73M2
EOSINOPHIL # BLD AUTO: 0.22 K/UL
EOSINOPHIL NFR BLD AUTO: 3.2 %
ESTIMATED AVERAGE GLUCOSE: 94 MG/DL
GLUCOSE SERPL-MCNC: 75 MG/DL
HBA1C MFR BLD HPLC: 4.9 %
HCT VFR BLD CALC: 41 %
HDLC SERPL-MCNC: 70 MG/DL
HGB BLD-MCNC: 13.1 G/DL
IGF-1 INTERP: NORMAL
IGF-I BLD-MCNC: 57 NG/ML
IMM GRANULOCYTES NFR BLD AUTO: 0.3 %
LDLC SERPL-MCNC: 78 MG/DL
LYMPHOCYTES # BLD AUTO: 2.68 K/UL
LYMPHOCYTES NFR BLD AUTO: 38.6 %
MAN DIFF?: NORMAL
MCHC RBC-ENTMCNC: 31.6 PG
MCHC RBC-ENTMCNC: 32 G/DL
MCV RBC AUTO: 98.8 FL
MONOCYTES # BLD AUTO: 0.58 K/UL
MONOCYTES NFR BLD AUTO: 8.4 %
NEUTROPHILS # BLD AUTO: 3.39 K/UL
NEUTROPHILS NFR BLD AUTO: 48.8 %
NONHDLC SERPL-MCNC: 97 MG/DL
PLATELET # BLD AUTO: 273 K/UL
POTASSIUM SERPL-SCNC: 5.1 MMOL/L
PROLACTIN SERPL-MCNC: 11.6 NG/ML
PROT SERPL-MCNC: 6.7 G/DL
RBC # BLD: 4.15 M/UL
RBC # FLD: 12.4 %
SODIUM SERPL-SCNC: 140 MMOL/L
T4 FREE SERPL-MCNC: 1.9 NG/DL
TRIGL SERPL-MCNC: 104 MG/DL
TSH SERPL-ACNC: 3.64 UIU/ML
VIT B12 SERPL-MCNC: 855 PG/ML
WBC # FLD AUTO: 6.94 K/UL

## 2025-05-21 ENCOUNTER — APPOINTMENT (OUTPATIENT)
Dept: ULTRASOUND IMAGING | Facility: CLINIC | Age: 55
End: 2025-05-21

## 2025-05-21 ENCOUNTER — OUTPATIENT (OUTPATIENT)
Dept: OUTPATIENT SERVICES | Facility: HOSPITAL | Age: 55
LOS: 1 days | End: 2025-05-21
Payer: COMMERCIAL

## 2025-05-21 ENCOUNTER — APPOINTMENT (OUTPATIENT)
Dept: MRI IMAGING | Facility: CLINIC | Age: 55
End: 2025-05-21
Payer: COMMERCIAL

## 2025-05-21 DIAGNOSIS — M53.3 SACROCOCCYGEAL DISORDERS, NOT ELSEWHERE CLASSIFIED: ICD-10-CM

## 2025-05-21 DIAGNOSIS — R10.30 LOWER ABDOMINAL PAIN, UNSPECIFIED: ICD-10-CM

## 2025-05-21 DIAGNOSIS — T14.8XXA OTHER INJURY OF UNSPECIFIED BODY REGION, INITIAL ENCOUNTER: Chronic | ICD-10-CM

## 2025-05-21 DIAGNOSIS — Z98.890 OTHER SPECIFIED POSTPROCEDURAL STATES: Chronic | ICD-10-CM

## 2025-05-21 PROCEDURE — 93971 EXTREMITY STUDY: CPT | Mod: 26,RT

## 2025-05-21 PROCEDURE — 93971 EXTREMITY STUDY: CPT

## 2025-05-21 PROCEDURE — 72195 MRI PELVIS W/O DYE: CPT | Mod: 26

## 2025-05-21 PROCEDURE — 72195 MRI PELVIS W/O DYE: CPT

## 2025-05-23 ENCOUNTER — APPOINTMENT (OUTPATIENT)
Dept: UROLOGY | Facility: CLINIC | Age: 55
End: 2025-05-23
Payer: COMMERCIAL

## 2025-05-23 VITALS
OXYGEN SATURATION: 99 % | RESPIRATION RATE: 16 BRPM | DIASTOLIC BLOOD PRESSURE: 77 MMHG | HEART RATE: 67 BPM | SYSTOLIC BLOOD PRESSURE: 127 MMHG

## 2025-05-23 DIAGNOSIS — R10.2 PELVIC AND PERINEAL PAIN: ICD-10-CM

## 2025-05-23 PROCEDURE — 51701 INSERT BLADDER CATHETER: CPT

## 2025-05-29 ENCOUNTER — APPOINTMENT (OUTPATIENT)
Dept: CARDIOLOGY | Facility: CLINIC | Age: 55
End: 2025-05-29
Payer: COMMERCIAL

## 2025-05-29 VITALS
DIASTOLIC BLOOD PRESSURE: 60 MMHG | HEIGHT: 61 IN | HEART RATE: 71 BPM | SYSTOLIC BLOOD PRESSURE: 120 MMHG | TEMPERATURE: 98.1 F | WEIGHT: 140 LBS | OXYGEN SATURATION: 98 % | BODY MASS INDEX: 26.43 KG/M2

## 2025-05-29 DIAGNOSIS — K76.0 FATTY (CHANGE OF) LIVER, NOT ELSEWHERE CLASSIFIED: ICD-10-CM

## 2025-05-29 DIAGNOSIS — R09.A2 FOREIGN BODY SENSATION, THROAT: ICD-10-CM

## 2025-05-29 DIAGNOSIS — I82.409 ACUTE EMBOLISM AND THROMBOSIS OF UNSPECIFIED DEEP VEINS OF UNSPECIFIED LOWER EXTREMITY: ICD-10-CM

## 2025-05-29 DIAGNOSIS — E78.2 MIXED HYPERLIPIDEMIA: ICD-10-CM

## 2025-05-29 DIAGNOSIS — I25.10 ATHEROSCLEROTIC HEART DISEASE OF NATIVE CORONARY ARTERY W/OUT ANGINA PECTORIS: ICD-10-CM

## 2025-05-29 DIAGNOSIS — M53.3 SACROCOCCYGEAL DISORDERS, NOT ELSEWHERE CLASSIFIED: ICD-10-CM

## 2025-05-29 DIAGNOSIS — K56.609 UNSPECIFIED INTESTINAL OBSTRUCTION, UNSPECIFIED AS TO PARTIAL VERSUS COMPLETE OBSTRUCTION: ICD-10-CM

## 2025-05-29 DIAGNOSIS — R10.30 LOWER ABDOMINAL PAIN, UNSPECIFIED: ICD-10-CM

## 2025-05-29 DIAGNOSIS — R73.03 PREDIABETES.: ICD-10-CM

## 2025-05-29 DIAGNOSIS — T14.8XXA OTHER INJURY OF UNSPECIFIED BODY REGION, INITIAL ENCOUNTER: ICD-10-CM

## 2025-05-29 DIAGNOSIS — R07.9 CHEST PAIN, UNSPECIFIED: ICD-10-CM

## 2025-05-29 DIAGNOSIS — E66.9 OBESITY, UNSPECIFIED: ICD-10-CM

## 2025-05-29 DIAGNOSIS — N39.0 URINARY TRACT INFECTION, SITE NOT SPECIFIED: ICD-10-CM

## 2025-05-29 DIAGNOSIS — R06.02 SHORTNESS OF BREATH: ICD-10-CM

## 2025-05-29 DIAGNOSIS — R42 DIZZINESS AND GIDDINESS: ICD-10-CM

## 2025-05-29 DIAGNOSIS — R53.83 OTHER FATIGUE: ICD-10-CM

## 2025-05-29 PROCEDURE — G2211 COMPLEX E/M VISIT ADD ON: CPT

## 2025-05-29 PROCEDURE — 93000 ELECTROCARDIOGRAM COMPLETE: CPT

## 2025-05-29 PROCEDURE — 99214 OFFICE O/P EST MOD 30 MIN: CPT

## 2025-05-29 RX ORDER — EZETIMIBE 10 MG/1
10 TABLET ORAL
Qty: 90 | Refills: 0 | Status: ACTIVE | COMMUNITY
Start: 2025-05-29 | End: 1900-01-01

## 2025-05-30 ENCOUNTER — APPOINTMENT (OUTPATIENT)
Dept: UROLOGY | Facility: CLINIC | Age: 55
End: 2025-05-30

## 2025-06-02 ENCOUNTER — APPOINTMENT (OUTPATIENT)
Dept: RADIOLOGY | Facility: CLINIC | Age: 55
End: 2025-06-02
Payer: COMMERCIAL

## 2025-06-02 ENCOUNTER — OUTPATIENT (OUTPATIENT)
Dept: OUTPATIENT SERVICES | Facility: HOSPITAL | Age: 55
LOS: 1 days | End: 2025-06-02
Payer: COMMERCIAL

## 2025-06-02 DIAGNOSIS — Z90.49 ACQUIRED ABSENCE OF OTHER SPECIFIED PARTS OF DIGESTIVE TRACT: Chronic | ICD-10-CM

## 2025-06-02 DIAGNOSIS — R10.30 LOWER ABDOMINAL PAIN, UNSPECIFIED: ICD-10-CM

## 2025-06-02 DIAGNOSIS — Z98.890 OTHER SPECIFIED POSTPROCEDURAL STATES: Chronic | ICD-10-CM

## 2025-06-02 DIAGNOSIS — T14.8XXA OTHER INJURY OF UNSPECIFIED BODY REGION, INITIAL ENCOUNTER: Chronic | ICD-10-CM

## 2025-06-02 PROCEDURE — 77085 DXA BONE DENSITY AXL VRT FX: CPT | Mod: 26

## 2025-06-02 PROCEDURE — 77085 DXA BONE DENSITY AXL VRT FX: CPT

## 2025-06-03 ENCOUNTER — NON-APPOINTMENT (OUTPATIENT)
Age: 55
End: 2025-06-03

## 2025-06-05 ENCOUNTER — NON-APPOINTMENT (OUTPATIENT)
Age: 55
End: 2025-06-05

## 2025-06-11 ENCOUNTER — NON-APPOINTMENT (OUTPATIENT)
Age: 55
End: 2025-06-11

## 2025-06-11 ENCOUNTER — APPOINTMENT (OUTPATIENT)
Dept: OPHTHALMOLOGY | Facility: CLINIC | Age: 55
End: 2025-06-11
Payer: COMMERCIAL

## 2025-06-11 PROCEDURE — 92014 COMPRE OPH EXAM EST PT 1/>: CPT

## 2025-06-11 PROCEDURE — 92083 EXTENDED VISUAL FIELD XM: CPT

## 2025-06-13 ENCOUNTER — APPOINTMENT (OUTPATIENT)
Dept: UROLOGY | Facility: CLINIC | Age: 55
End: 2025-06-13
Payer: COMMERCIAL

## 2025-06-13 VITALS
OXYGEN SATURATION: 98 % | RESPIRATION RATE: 16 BRPM | HEART RATE: 70 BPM | SYSTOLIC BLOOD PRESSURE: 121 MMHG | DIASTOLIC BLOOD PRESSURE: 73 MMHG

## 2025-06-13 PROCEDURE — 51720 TREATMENT OF BLADDER LESION: CPT

## 2025-06-17 ENCOUNTER — APPOINTMENT (OUTPATIENT)
Dept: OTOLARYNGOLOGY | Facility: CLINIC | Age: 55
End: 2025-06-17

## 2025-06-17 ENCOUNTER — APPOINTMENT (OUTPATIENT)
Dept: DERMATOLOGY | Facility: CLINIC | Age: 55
End: 2025-06-17

## 2025-06-20 ENCOUNTER — APPOINTMENT (OUTPATIENT)
Dept: UROLOGY | Facility: CLINIC | Age: 55
End: 2025-06-20

## 2025-06-20 ENCOUNTER — APPOINTMENT (OUTPATIENT)
Dept: ORTHOPEDIC SURGERY | Facility: CLINIC | Age: 55
End: 2025-06-20
Payer: COMMERCIAL

## 2025-06-20 PROCEDURE — 99204 OFFICE O/P NEW MOD 45 MIN: CPT

## 2025-06-20 PROCEDURE — 73522 X-RAY EXAM HIPS BI 3-4 VIEWS: CPT

## 2025-06-20 RX ORDER — MELOXICAM 15 MG/1
15 TABLET ORAL
Qty: 30 | Refills: 1 | Status: ACTIVE | COMMUNITY
Start: 2025-06-20 | End: 1900-01-01

## 2025-06-24 ENCOUNTER — RESULT REVIEW (OUTPATIENT)
Age: 55
End: 2025-06-24

## 2025-06-24 ENCOUNTER — OUTPATIENT (OUTPATIENT)
Dept: OUTPATIENT SERVICES | Facility: HOSPITAL | Age: 55
LOS: 1 days | End: 2025-06-24
Payer: COMMERCIAL

## 2025-06-24 ENCOUNTER — APPOINTMENT (OUTPATIENT)
Dept: MRI IMAGING | Facility: CLINIC | Age: 55
End: 2025-06-24
Payer: COMMERCIAL

## 2025-06-24 DIAGNOSIS — M54.16 RADICULOPATHY, LUMBAR REGION: ICD-10-CM

## 2025-06-24 DIAGNOSIS — Z98.890 OTHER SPECIFIED POSTPROCEDURAL STATES: Chronic | ICD-10-CM

## 2025-06-24 DIAGNOSIS — T14.8XXA OTHER INJURY OF UNSPECIFIED BODY REGION, INITIAL ENCOUNTER: Chronic | ICD-10-CM

## 2025-06-24 DIAGNOSIS — Z90.49 ACQUIRED ABSENCE OF OTHER SPECIFIED PARTS OF DIGESTIVE TRACT: Chronic | ICD-10-CM

## 2025-06-24 PROCEDURE — 72148 MRI LUMBAR SPINE W/O DYE: CPT | Mod: 26

## 2025-06-24 PROCEDURE — 72148 MRI LUMBAR SPINE W/O DYE: CPT

## 2025-07-02 ENCOUNTER — APPOINTMENT (OUTPATIENT)
Dept: ORTHOPEDIC SURGERY | Facility: CLINIC | Age: 55
End: 2025-07-02
Payer: COMMERCIAL

## 2025-07-02 PROCEDURE — 99214 OFFICE O/P EST MOD 30 MIN: CPT

## 2025-07-09 ENCOUNTER — APPOINTMENT (OUTPATIENT)
Dept: INTERNAL MEDICINE | Facility: CLINIC | Age: 55
End: 2025-07-09
Payer: COMMERCIAL

## 2025-07-09 VITALS
HEART RATE: 67 BPM | SYSTOLIC BLOOD PRESSURE: 129 MMHG | DIASTOLIC BLOOD PRESSURE: 82 MMHG | OXYGEN SATURATION: 99 % | TEMPERATURE: 97.3 F | WEIGHT: 145.31 LBS | HEIGHT: 61 IN | BODY MASS INDEX: 27.43 KG/M2

## 2025-07-09 PROBLEM — N89.8 VAGINAL IRRITATION: Status: RESOLVED | Noted: 2025-02-12 | Resolved: 2025-07-09

## 2025-07-09 PROBLEM — Z86.718 HISTORY OF DEEP VENOUS THROMBOSIS: Status: RESOLVED | Noted: 2024-10-08 | Resolved: 2025-07-09

## 2025-07-09 PROBLEM — Z00.01 ANNUAL VISIT FOR GENERAL ADULT MEDICAL EXAMINATION WITH ABNORMAL FINDINGS: Status: RESOLVED | Noted: 2019-01-29 | Resolved: 2025-07-09

## 2025-07-09 PROBLEM — Z87.19 HISTORY OF FATTY INFILTRATION OF LIVER: Status: RESOLVED | Noted: 2025-02-11 | Resolved: 2025-07-09

## 2025-07-09 PROBLEM — Z09 HOSPITAL DISCHARGE FOLLOW-UP: Status: RESOLVED | Noted: 2020-09-24 | Resolved: 2025-07-09

## 2025-07-09 PROBLEM — M62.838 SPASM OF CERVICAL PARASPINOUS MUSCLE: Status: RESOLVED | Noted: 2021-05-04 | Resolved: 2025-07-09
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
## 2025-07-09 PROBLEM — R94.39 EQUIVOCAL STRESS TEST: Status: RESOLVED | Noted: 2020-09-16 | Resolved: 2025-07-09

## 2025-07-09 PROBLEM — R94.01 ABNORMAL EEG: Status: RESOLVED | Noted: 2022-05-04 | Resolved: 2025-07-09

## 2025-07-09 PROBLEM — R22.0 SUBMANDIBULAR SWELLING: Status: RESOLVED | Noted: 2022-02-11 | Resolved: 2025-07-09

## 2025-07-09 PROBLEM — K82.8 GALLBLADDER SLUDGE: Status: RESOLVED | Noted: 2024-07-09 | Resolved: 2025-07-09

## 2025-07-09 PROBLEM — R06.02 SHORTNESS OF BREATH AT REST: Status: RESOLVED | Noted: 2023-06-28 | Resolved: 2025-07-09

## 2025-07-09 PROBLEM — Z87.898 HISTORY OF VERTIGO: Status: RESOLVED | Noted: 2025-03-13 | Resolved: 2025-07-09

## 2025-07-09 PROBLEM — R39.9 UTI SYMPTOMS: Status: RESOLVED | Noted: 2024-05-09 | Resolved: 2025-07-09

## 2025-07-09 PROBLEM — M65.319 ACQUIRED TRIGGER THUMB: Status: RESOLVED | Noted: 2019-11-14 | Resolved: 2025-07-09

## 2025-07-09 PROBLEM — R93.1 ABNORMAL ECHOCARDIOGRAM: Status: RESOLVED | Noted: 2024-02-06 | Resolved: 2025-07-09

## 2025-07-09 PROBLEM — M79.641 HAND PAIN, RIGHT: Status: RESOLVED | Noted: 2022-07-14 | Resolved: 2025-07-09

## 2025-07-09 PROBLEM — M65.312 TRIGGER THUMB OF LEFT HAND: Status: RESOLVED | Noted: 2020-06-11 | Resolved: 2025-07-09

## 2025-07-09 PROBLEM — R20.9 SENSORY DISTURBANCE: Status: RESOLVED | Noted: 2023-12-11 | Resolved: 2025-07-09

## 2025-07-09 PROBLEM — S63.592D: Status: RESOLVED | Noted: 2019-10-02 | Resolved: 2025-07-09

## 2025-07-09 PROCEDURE — 99396 PREV VISIT EST AGE 40-64: CPT

## 2025-07-10 ENCOUNTER — APPOINTMENT (OUTPATIENT)
Dept: UROLOGY | Facility: CLINIC | Age: 55
End: 2025-07-10
Payer: COMMERCIAL

## 2025-07-10 VITALS
RESPIRATION RATE: 16 BRPM | OXYGEN SATURATION: 100 % | WEIGHT: 145 LBS | BODY MASS INDEX: 27.38 KG/M2 | SYSTOLIC BLOOD PRESSURE: 131 MMHG | HEIGHT: 61 IN | DIASTOLIC BLOOD PRESSURE: 84 MMHG | HEART RATE: 65 BPM

## 2025-07-10 PROCEDURE — 51700 IRRIGATION OF BLADDER: CPT

## 2025-07-11 ENCOUNTER — OUTPATIENT (OUTPATIENT)
Dept: OUTPATIENT SERVICES | Facility: HOSPITAL | Age: 55
LOS: 1 days | End: 2025-07-11

## 2025-07-11 ENCOUNTER — APPOINTMENT (OUTPATIENT)
Dept: CT IMAGING | Facility: HOSPITAL | Age: 55
End: 2025-07-11

## 2025-07-11 ENCOUNTER — APPOINTMENT (OUTPATIENT)
Dept: BARIATRICS/WEIGHT MGMT | Facility: CLINIC | Age: 55
End: 2025-07-11
Payer: COMMERCIAL

## 2025-07-11 ENCOUNTER — OUTPATIENT (OUTPATIENT)
Dept: OUTPATIENT SERVICES | Facility: HOSPITAL | Age: 55
LOS: 1 days | End: 2025-07-11
Payer: COMMERCIAL

## 2025-07-11 DIAGNOSIS — T14.8XXA OTHER INJURY OF UNSPECIFIED BODY REGION, INITIAL ENCOUNTER: Chronic | ICD-10-CM

## 2025-07-11 DIAGNOSIS — Z98.890 OTHER SPECIFIED POSTPROCEDURAL STATES: Chronic | ICD-10-CM

## 2025-07-11 DIAGNOSIS — Z90.49 ACQUIRED ABSENCE OF OTHER SPECIFIED PARTS OF DIGESTIVE TRACT: Chronic | ICD-10-CM

## 2025-07-11 DIAGNOSIS — I10 ESSENTIAL (PRIMARY) HYPERTENSION: ICD-10-CM

## 2025-07-11 DIAGNOSIS — K59.00 CONSTIPATION, UNSPECIFIED: ICD-10-CM

## 2025-07-11 LAB
APPEARANCE: CLEAR
BACTERIA: NEGATIVE /HPF
BILIRUBIN URINE: NEGATIVE
BLOOD URINE: NEGATIVE
CAST: 0 /LPF
COLOR: YELLOW
EPITHELIAL CELLS: 0 /HPF
GLUCOSE QUALITATIVE U: NEGATIVE MG/DL
KETONES URINE: NEGATIVE MG/DL
LEUKOCYTE ESTERASE URINE: NEGATIVE
MICROSCOPIC-UA: NORMAL
NITRITE URINE: NEGATIVE
PH URINE: 8
PROTEIN URINE: NEGATIVE MG/DL
RED BLOOD CELLS URINE: 0 /HPF
SPECIFIC GRAVITY URINE: 1.01
UROBILINOGEN URINE: 0.2 MG/DL
WHITE BLOOD CELLS URINE: 0 /HPF

## 2025-07-11 PROCEDURE — 99215 OFFICE O/P EST HI 40 MIN: CPT | Mod: 95

## 2025-07-11 PROCEDURE — 74177 CT ABD & PELVIS W/CONTRAST: CPT | Mod: 26

## 2025-07-11 PROCEDURE — 74177 CT ABD & PELVIS W/CONTRAST: CPT

## 2025-07-13 LAB — BACTERIA UR CULT: NORMAL

## 2025-07-21 ENCOUNTER — APPOINTMENT (OUTPATIENT)
Dept: UROLOGY | Facility: CLINIC | Age: 55
End: 2025-07-21
Payer: COMMERCIAL

## 2025-07-21 ENCOUNTER — APPOINTMENT (OUTPATIENT)
Dept: DERMATOLOGY | Facility: CLINIC | Age: 55
End: 2025-07-21

## 2025-07-21 VITALS
TEMPERATURE: 98 F | OXYGEN SATURATION: 100 % | HEART RATE: 69 BPM | BODY MASS INDEX: 27.38 KG/M2 | DIASTOLIC BLOOD PRESSURE: 81 MMHG | RESPIRATION RATE: 16 BRPM | HEIGHT: 61 IN | WEIGHT: 145 LBS | SYSTOLIC BLOOD PRESSURE: 137 MMHG

## 2025-07-21 DIAGNOSIS — N39.0 URINARY TRACT INFECTION, SITE NOT SPECIFIED: ICD-10-CM

## 2025-07-21 PROCEDURE — 51700 IRRIGATION OF BLADDER: CPT

## 2025-07-23 LAB — BACTERIA UR CULT: NORMAL

## 2025-07-24 ENCOUNTER — APPOINTMENT (OUTPATIENT)
Dept: ENDOCRINOLOGY | Facility: CLINIC | Age: 55
End: 2025-07-24
Payer: COMMERCIAL

## 2025-07-24 ENCOUNTER — APPOINTMENT (OUTPATIENT)
Dept: ENDOCRINOLOGY | Facility: CLINIC | Age: 55
End: 2025-07-24

## 2025-07-24 PROCEDURE — 99212 OFFICE O/P EST SF 10 MIN: CPT | Mod: 93

## 2025-07-30 ENCOUNTER — APPOINTMENT (OUTPATIENT)
Dept: BARIATRICS/WEIGHT MGMT | Facility: CLINIC | Age: 55
End: 2025-07-30
Payer: COMMERCIAL

## 2025-07-30 ENCOUNTER — OUTPATIENT (OUTPATIENT)
Dept: OUTPATIENT SERVICES | Facility: HOSPITAL | Age: 55
LOS: 1 days | End: 2025-07-30

## 2025-07-30 VITALS — WEIGHT: 150 LBS | BODY MASS INDEX: 28.32 KG/M2 | HEIGHT: 61 IN

## 2025-07-30 DIAGNOSIS — Z98.890 OTHER SPECIFIED POSTPROCEDURAL STATES: Chronic | ICD-10-CM

## 2025-07-30 DIAGNOSIS — E66.813 OBESITY, CLASS 3: ICD-10-CM

## 2025-07-30 DIAGNOSIS — T14.8XXA OTHER INJURY OF UNSPECIFIED BODY REGION, INITIAL ENCOUNTER: Chronic | ICD-10-CM

## 2025-07-30 DIAGNOSIS — Z90.49 ACQUIRED ABSENCE OF OTHER SPECIFIED PARTS OF DIGESTIVE TRACT: Chronic | ICD-10-CM

## 2025-07-30 DIAGNOSIS — I10 ESSENTIAL (PRIMARY) HYPERTENSION: ICD-10-CM

## 2025-07-30 PROCEDURE — 99213 OFFICE O/P EST LOW 20 MIN: CPT | Mod: 95

## 2025-07-31 ENCOUNTER — RX RENEWAL (OUTPATIENT)
Age: 55
End: 2025-07-31

## 2025-08-07 ENCOUNTER — APPOINTMENT (OUTPATIENT)
Dept: UROLOGY | Facility: CLINIC | Age: 55
End: 2025-08-07

## 2025-08-07 RX ORDER — METFORMIN HYDROCHLORIDE 500 MG/1
500 TABLET, COATED ORAL
Qty: 45 | Refills: 0 | Status: ACTIVE | COMMUNITY
Start: 2025-07-30 | End: 1900-01-01

## 2025-08-11 ENCOUNTER — APPOINTMENT (OUTPATIENT)
Dept: UROLOGY | Facility: CLINIC | Age: 55
End: 2025-08-11
Payer: COMMERCIAL

## 2025-08-11 DIAGNOSIS — R10.2 PELVIC AND PERINEAL PAIN: ICD-10-CM

## 2025-08-11 PROCEDURE — 51700 IRRIGATION OF BLADDER: CPT

## 2025-08-12 LAB
APPEARANCE: CLEAR
BACTERIA: NEGATIVE /HPF
BILIRUBIN URINE: NEGATIVE
BLOOD URINE: NEGATIVE
CAST: 0 /LPF
COLOR: YELLOW
EPITHELIAL CELLS: 0 /HPF
GLUCOSE QUALITATIVE U: NEGATIVE MG/DL
KETONES URINE: NEGATIVE MG/DL
LEUKOCYTE ESTERASE URINE: NEGATIVE
MICROSCOPIC-UA: NORMAL
NITRITE URINE: NEGATIVE
PH URINE: 7
PROTEIN URINE: NEGATIVE MG/DL
RED BLOOD CELLS URINE: 0 /HPF
SPECIFIC GRAVITY URINE: 1.01
UROBILINOGEN URINE: 0.2 MG/DL
WHITE BLOOD CELLS URINE: 0 /HPF

## 2025-08-13 LAB — BACTERIA UR CULT: NORMAL

## 2025-08-20 ENCOUNTER — RX RENEWAL (OUTPATIENT)
Age: 55
End: 2025-08-20

## 2025-08-25 ENCOUNTER — TRANSCRIPTION ENCOUNTER (OUTPATIENT)
Age: 55
End: 2025-08-25

## 2025-08-26 ENCOUNTER — TRANSCRIPTION ENCOUNTER (OUTPATIENT)
Age: 55
End: 2025-08-26

## 2025-08-27 ENCOUNTER — APPOINTMENT (OUTPATIENT)
Dept: UROLOGY | Facility: CLINIC | Age: 55
End: 2025-08-27
Payer: COMMERCIAL

## 2025-08-27 VITALS
DIASTOLIC BLOOD PRESSURE: 84 MMHG | SYSTOLIC BLOOD PRESSURE: 136 MMHG | HEART RATE: 66 BPM | RESPIRATION RATE: 16 BRPM | OXYGEN SATURATION: 100 %

## 2025-08-27 DIAGNOSIS — R10.2 PELVIC AND PERINEAL PAIN: ICD-10-CM

## 2025-08-27 PROCEDURE — 51700 IRRIGATION OF BLADDER: CPT

## 2025-08-29 ENCOUNTER — RX RENEWAL (OUTPATIENT)
Age: 55
End: 2025-08-29

## 2025-09-04 ENCOUNTER — RX RENEWAL (OUTPATIENT)
Age: 55
End: 2025-09-04

## 2025-09-10 ENCOUNTER — APPOINTMENT (OUTPATIENT)
Dept: UROLOGY | Facility: CLINIC | Age: 55
End: 2025-09-10
Payer: COMMERCIAL

## 2025-09-10 VITALS
HEART RATE: 70 BPM | SYSTOLIC BLOOD PRESSURE: 114 MMHG | DIASTOLIC BLOOD PRESSURE: 78 MMHG | OXYGEN SATURATION: 99 % | RESPIRATION RATE: 16 BRPM

## 2025-09-10 DIAGNOSIS — N39.0 URINARY TRACT INFECTION, SITE NOT SPECIFIED: ICD-10-CM

## 2025-09-10 DIAGNOSIS — R10.2 PELVIC AND PERINEAL PAIN: ICD-10-CM

## 2025-09-10 PROCEDURE — 99214 OFFICE O/P EST MOD 30 MIN: CPT | Mod: 25

## 2025-09-10 PROCEDURE — A4216: CPT | Mod: NC

## 2025-09-10 PROCEDURE — 51701 INSERT BLADDER CATHETER: CPT
